# Patient Record
Sex: MALE | Race: WHITE | NOT HISPANIC OR LATINO | Employment: OTHER | URBAN - METROPOLITAN AREA
[De-identification: names, ages, dates, MRNs, and addresses within clinical notes are randomized per-mention and may not be internally consistent; named-entity substitution may affect disease eponyms.]

---

## 2017-01-24 ENCOUNTER — OFFICE VISIT (OUTPATIENT)
Dept: INTERNAL MEDICINE | Facility: CLINIC | Age: 67
End: 2017-01-24
Payer: COMMERCIAL

## 2017-01-24 ENCOUNTER — HOSPITAL ENCOUNTER (OUTPATIENT)
Dept: RADIOLOGY | Facility: HOSPITAL | Age: 67
Discharge: HOME | End: 2017-01-24
Attending: PEDIATRICS
Payer: COMMERCIAL

## 2017-01-24 VITALS
TEMPERATURE: 97 F | WEIGHT: 178.13 LBS | DIASTOLIC BLOOD PRESSURE: 84 MMHG | SYSTOLIC BLOOD PRESSURE: 140 MMHG | HEIGHT: 68 IN | BODY MASS INDEX: 27 KG/M2 | HEART RATE: 80 BPM | OXYGEN SATURATION: 98 %

## 2017-01-24 DIAGNOSIS — N40.0 PROSTATE HYPERTROPHY: ICD-10-CM

## 2017-01-24 DIAGNOSIS — Z96.651 HISTORY OF TOTAL RIGHT KNEE REPLACEMENT: ICD-10-CM

## 2017-01-24 DIAGNOSIS — C44.90 SKIN CANCER: ICD-10-CM

## 2017-01-24 DIAGNOSIS — S22.42XA CLOSED FRACTURE OF MULTIPLE RIBS OF LEFT SIDE, INITIAL ENCOUNTER: ICD-10-CM

## 2017-01-24 DIAGNOSIS — Z00.00 WELL ADULT EXAM: Primary | ICD-10-CM

## 2017-01-24 PROCEDURE — 90732 PPSV23 VACC 2 YRS+ SUBQ/IM: CPT | Mod: S$GLB,,, | Performed by: PEDIATRICS

## 2017-01-24 PROCEDURE — 90471 IMMUNIZATION ADMIN: CPT | Mod: S$GLB,,, | Performed by: PEDIATRICS

## 2017-01-24 PROCEDURE — 90472 IMMUNIZATION ADMIN EACH ADD: CPT | Mod: S$GLB,,, | Performed by: PEDIATRICS

## 2017-01-24 PROCEDURE — 71100 X-RAY EXAM RIBS UNI 2 VIEWS: CPT | Mod: TC,PO

## 2017-01-24 PROCEDURE — 99999 PR PBB SHADOW E&M-NEW PATIENT-LVL IV: CPT | Mod: PBBFAC,,, | Performed by: PEDIATRICS

## 2017-01-24 PROCEDURE — 71100 X-RAY EXAM RIBS UNI 2 VIEWS: CPT | Mod: 26,,, | Performed by: RADIOLOGY

## 2017-01-24 PROCEDURE — 90715 TDAP VACCINE 7 YRS/> IM: CPT | Mod: S$GLB,,, | Performed by: PEDIATRICS

## 2017-01-24 PROCEDURE — 99387 INIT PM E/M NEW PAT 65+ YRS: CPT | Mod: 25,S$GLB,, | Performed by: PEDIATRICS

## 2017-01-24 PROCEDURE — 90662 IIV NO PRSV INCREASED AG IM: CPT | Mod: S$GLB,,, | Performed by: PEDIATRICS

## 2017-01-24 RX ORDER — SILODOSIN 4 MG/1
8 CAPSULE ORAL DAILY
COMMUNITY
End: 2018-06-08

## 2017-01-24 NOTE — PROGRESS NOTES
Subjective:       Patient ID: Mekhi Lambert is a 66 y.o. male.    Chief Complaint: Establish Care    HPI Comments: New patient just retired [employer]  and moved from [place].     PMH/PSH/SH/FH reviewed.  Had robotic prostectomy due to severe BPH last year, TKR, right shoulder arthroscopy, skin cancer(BCC).  FH: father with separate lung, bone, skin, prostate cancers, mother with DM and lupus  SH: nonsmoker, rare Etoh use, exercises daily. B/P and  HR always good.    HMI:  Colonoscopy 2015 every 10 years, shingle vaccine 2016, no pneumococcal, tdap >10 years.    Review of Systems   Constitutional: Negative for fever and unexpected weight change.   HENT: Negative for congestion and rhinorrhea.    Eyes: Negative for discharge and redness.   Respiratory: Negative for cough, shortness of breath and wheezing.    Cardiovascular: Positive for chest pain (rolled left posterior ribs during exercise last week). Negative for palpitations and leg swelling.   Gastrointestinal: Negative for abdominal pain, anal bleeding, blood in stool, constipation, diarrhea and vomiting.   Endocrine: Negative for cold intolerance, heat intolerance, polydipsia, polyphagia and polyuria.   Genitourinary: Negative for decreased urine volume, difficulty urinating and hematuria.        S/p prostectomy   Musculoskeletal: Negative for arthralgias and joint swelling.   Skin: Negative for rash and wound.   Neurological: Negative for syncope and headaches.   Psychiatric/Behavioral: Negative for behavioral problems and sleep disturbance.       Objective:      Physical Exam   Constitutional: He is oriented to person, place, and time. He appears well-developed and well-nourished. No distress.   HENT:   Head: Normocephalic and atraumatic.   Right Ear: Tympanic membrane and external ear normal.   Left Ear: Tympanic membrane and external ear normal.   Nose: Nose normal.   Mouth/Throat: Uvula is midline, oropharynx is clear and moist and mucous  membranes are normal. Normal dentition.   Eyes: Conjunctivae, EOM and lids are normal. Pupils are equal, round, and reactive to light.   Neck: Trachea normal and normal range of motion. Neck supple. No JVD present. No thyromegaly present.   Cardiovascular: Normal rate, regular rhythm, S1 normal, S2 normal, normal heart sounds and normal pulses.  Exam reveals no gallop and no friction rub.    No murmur heard.  Pulmonary/Chest: Effort normal and breath sounds normal. He has no wheezes. He has no rales.   Abdominal: Soft. Normal appearance and bowel sounds are normal. He exhibits no mass. There is no hepatosplenomegaly. There is no tenderness. There is no rebound and no guarding.   Well healed prostectomy robotic scars.   Musculoskeletal: Normal range of motion. He exhibits no edema.   Tender step off left posterior rib about 4th rib.   Lymphadenopathy:     He has no cervical adenopathy.   Neurological: He is alert and oriented to person, place, and time. He has normal strength. Coordination and gait normal.   Skin: Skin is warm and intact. No rash noted.   Psychiatric: He has a normal mood and affect. His speech is normal and behavior is normal. Judgment and thought content normal.       Assessment:       1. Well adult exam    2. Prostate hypertrophy    3. History of total right knee replacement    4. Skin cancer    5. Closed fracture of multiple ribs of left side, initial encounter        Plan:       Well adult exam  -     Comprehensive metabolic panel; Future; Expected date: 1/24/17  -     Lipid panel; Future; Expected date: 1/24/17  -     TSH; Future; Expected date: 1/24/17  -     CBC auto differential; Future; Expected date: 1/24/17    Prostate hypertrophy  -     Ambulatory referral to Urology  -     PSA, Screening; Future; Expected date: 1/24/17    History of total right knee replacement    Skin cancer  -     Ambulatory referral to Dermatology    Closed fracture of multiple ribs of left side, initial encounter  -      X-Ray Ribs 2 View Left; Future; Expected date: 1/24/17    Other orders  -     Tdap Vaccine (Adult)  -     Pneumococcal Polysaccharide Vaccine (23 Valent) (SQ/IM)    flu vaccine. GENET. Monitor B/P for now. Rib belt. F/U 4 weeks to reassess.

## 2017-01-24 NOTE — MR AVS SNAPSHOT
[facility] Internal [facility]  [address] [place] [address]  Phone: [phone]  Fax: [phone]                  Mekhi Lambert   2017 1:00 PM   Office Visit    Description:  Male : 1950   Provider:  DEMETRIO Lopez Jr., MD   Department:  [facility]           Reason for Visit     Establish Care           Diagnoses this Visit        Comments    Well adult exam    -  Primary     Prostate hypertrophy         History of total right knee replacement         Skin cancer         Closed fracture of multiple ribs of left side, initial encounter                To Do List           Future Appointments        Provider Department Dept Phone    2017 7:50 AM LABORATORY, [facility] [phone]    2017 9:00 AM DEMETRIO Lopez Jr., MD [facility] Internal [facility] [phone]    2017 8:20 AM Asad Aburto IV, MD [facility] - Urology [phone]    2017 12:30 PM Zenaida Arguelles MD [facility] Dermatology [phone]      Goals (5 Years of Data)     None      Follow-Up and Disposition     Return in about 4 weeks (around 2017).      [facility]     [facility] -  Assistance  Registered nurses in the [facility] provide clinical advisement, health education, appointment booking, and other advisory services.  Call for this free service at [phone].             Medications           Message regarding Medications     Verify the changes and/or additions to your medication regime listed below are the same as discussed with your clinician today.  If any of these changes or additions are incorrect, please notify your healthcare provider.             Verify that the below list of medications is an accurate representation of the medications you are currently taking.  If none reported, the list may be blank. If incorrect, please contact your healthcare provider. Carry this list with you in case of emergency.     "       Current Medications     silodosin (RAPAFLO) 4 mg Cap capsule Take 4 mg by mouth once daily.           Clinical Reference Information           Vital Signs - Last Recorded  Most recent update: 1/24/2017  1:00 PM by Alison Pathak LPN    BP Pulse Temp Ht    (!) 140/84 (BP Location: Left arm, Patient Position: Sitting, BP Method: Manual) 80 96.6 °F (35.9 °C) (Tympanic) 5' 8" (1.727 m)    Wt SpO2 BMI    80.8 kg (178 lb 2.1 oz) 98% 27.08 kg/m2      Blood Pressure          Most Recent Value    BP  (!)  140/84      Allergies as of 1/24/2017     No Known Allergies      Immunizations Administered on Date of Encounter - 1/24/2017     Name Date Dose VIS Date Route    Pneumococcal Polysaccharide - 23 Valent  Incomplete 0.5 mL 4/24/2015 Intramuscular    TDAP  Incomplete 0.5 mL 2/24/2015 Intramuscular      Orders Placed During Today's Visit      Normal Orders This Visit    Ambulatory referral to Dermatology     Ambulatory referral to Urology     Pneumococcal Polysaccharide Vaccine (23 Valent) (SQ/IM)     Tdap Vaccine (Adult)     Future Labs/Procedures Expected by Expires    CBC auto differential  1/24/2017 3/25/2018    Comprehensive metabolic panel  1/24/2017 3/25/2018    Lipid panel  1/24/2017 1/24/2018    PSA, Screening  1/24/2017 3/25/2018    TSH  1/24/2017 3/25/2018    X-Ray Ribs 2 View Left  1/24/2017 1/24/2018      [facility] Sign-Up     Activating your [facility] account is as easy as 1-2-3!     1) Visit [email or web address], select Sign Up Now, enter this activation code and your date of birth, then select Next.  [email or web address]  Expires: 3/10/2017  1:27 PM      2) Create a username and password to use when you visit [facility] in the future and select a security question in case you lose your password and select Next.    3) Enter your e-mail address and click Sign Up!    Additional Information  If you have questions, please e-mail [email or web address] or call [phone] to talk to our [facility] staff. Remember, " [facility] is NOT to be used for urgent needs. For medical emergencies, dial 911.

## 2017-02-14 ENCOUNTER — LAB VISIT (OUTPATIENT)
Dept: LAB | Facility: HOSPITAL | Age: 67
End: 2017-02-14
Attending: PEDIATRICS
Payer: COMMERCIAL

## 2017-02-14 DIAGNOSIS — N40.0 PROSTATE HYPERTROPHY: ICD-10-CM

## 2017-02-14 DIAGNOSIS — Z00.00 WELL ADULT EXAM: ICD-10-CM

## 2017-02-14 LAB
ALBUMIN SERPL BCP-MCNC: 3.8 G/DL
ALP SERPL-CCNC: 79 U/L
ALT SERPL W/O P-5'-P-CCNC: 18 U/L
ANION GAP SERPL CALC-SCNC: 9 MMOL/L
AST SERPL-CCNC: 20 U/L
BASOPHILS # BLD AUTO: 0.04 K/UL
BASOPHILS NFR BLD: 0.6 %
BILIRUB SERPL-MCNC: 1.2 MG/DL
BUN SERPL-MCNC: 16 MG/DL
CALCIUM SERPL-MCNC: 9.2 MG/DL
CHLORIDE SERPL-SCNC: 107 MMOL/L
CHOLEST/HDLC SERPL: 3.9 {RATIO}
CO2 SERPL-SCNC: 25 MMOL/L
COMPLEXED PSA SERPL-MCNC: 1.2 NG/ML
CREAT SERPL-MCNC: 1 MG/DL
DIFFERENTIAL METHOD: NORMAL
EOSINOPHIL # BLD AUTO: 0.2 K/UL
EOSINOPHIL NFR BLD: 2.7 %
ERYTHROCYTE [DISTWIDTH] IN BLOOD BY AUTOMATED COUNT: 13.6 %
EST. GFR  (AFRICAN AMERICAN): >60 ML/MIN/1.73 M^2
EST. GFR  (NON AFRICAN AMERICAN): >60 ML/MIN/1.73 M^2
GLUCOSE SERPL-MCNC: 90 MG/DL
HCT VFR BLD AUTO: 47.8 %
HDL/CHOLESTEROL RATIO: 25.6 %
HDLC SERPL-MCNC: 234 MG/DL
HDLC SERPL-MCNC: 60 MG/DL
HGB BLD-MCNC: 16.9 G/DL
LDLC SERPL CALC-MCNC: 149.4 MG/DL
LYMPHOCYTES # BLD AUTO: 1.8 K/UL
LYMPHOCYTES NFR BLD: 28.3 %
MCH RBC QN AUTO: 30.9 PG
MCHC RBC AUTO-ENTMCNC: 35.4 %
MCV RBC AUTO: 87 FL
MONOCYTES # BLD AUTO: 0.5 K/UL
MONOCYTES NFR BLD: 7.9 %
NEUTROPHILS # BLD AUTO: 3.8 K/UL
NEUTROPHILS NFR BLD: 60.3 %
NONHDLC SERPL-MCNC: 174 MG/DL
PLATELET # BLD AUTO: 156 K/UL
PMV BLD AUTO: 10.4 FL
POTASSIUM SERPL-SCNC: 4.4 MMOL/L
PROT SERPL-MCNC: 6.5 G/DL
RBC # BLD AUTO: 5.47 M/UL
SODIUM SERPL-SCNC: 141 MMOL/L
TRIGL SERPL-MCNC: 123 MG/DL
TSH SERPL DL<=0.005 MIU/L-ACNC: 1.61 UIU/ML
WBC # BLD AUTO: 6.32 K/UL

## 2017-02-14 PROCEDURE — 36415 COLL VENOUS BLD VENIPUNCTURE: CPT | Mod: PO

## 2017-02-14 PROCEDURE — 84443 ASSAY THYROID STIM HORMONE: CPT

## 2017-02-14 PROCEDURE — 85025 COMPLETE CBC W/AUTO DIFF WBC: CPT

## 2017-02-14 PROCEDURE — 84153 ASSAY OF PSA TOTAL: CPT

## 2017-02-14 PROCEDURE — 80053 COMPREHEN METABOLIC PANEL: CPT

## 2017-02-14 PROCEDURE — 80061 LIPID PANEL: CPT

## 2017-02-20 ENCOUNTER — PATIENT OUTREACH (OUTPATIENT)
Dept: ADMINISTRATIVE | Facility: HOSPITAL | Age: 67
End: 2017-02-20

## 2017-02-20 DIAGNOSIS — Z11.59 NEED FOR HEPATITIS C SCREENING TEST: Primary | ICD-10-CM

## 2017-02-21 ENCOUNTER — OFFICE VISIT (OUTPATIENT)
Dept: INTERNAL MEDICINE | Facility: CLINIC | Age: 67
End: 2017-02-21
Payer: COMMERCIAL

## 2017-02-21 VITALS
DIASTOLIC BLOOD PRESSURE: 80 MMHG | BODY MASS INDEX: 26.09 KG/M2 | SYSTOLIC BLOOD PRESSURE: 118 MMHG | HEART RATE: 65 BPM | OXYGEN SATURATION: 98 % | TEMPERATURE: 97 F | WEIGHT: 176.13 LBS | HEIGHT: 69 IN

## 2017-02-21 DIAGNOSIS — Z13.9 SCREENING: ICD-10-CM

## 2017-02-21 DIAGNOSIS — N40.0 PROSTATE HYPERTROPHY: Primary | ICD-10-CM

## 2017-02-21 DIAGNOSIS — E78.00 HYPERCHOLESTEROLEMIA: ICD-10-CM

## 2017-02-21 PROCEDURE — 99999 PR PBB SHADOW E&M-EST. PATIENT-LVL III: CPT | Mod: PBBFAC,,, | Performed by: PEDIATRICS

## 2017-02-21 PROCEDURE — 1159F MED LIST DOCD IN RCRD: CPT | Mod: S$GLB,,, | Performed by: PEDIATRICS

## 2017-02-21 PROCEDURE — 99214 OFFICE O/P EST MOD 30 MIN: CPT | Mod: S$GLB,,, | Performed by: PEDIATRICS

## 2017-02-21 PROCEDURE — 1157F ADVNC CARE PLAN IN RCRD: CPT | Mod: S$GLB,,, | Performed by: PEDIATRICS

## 2017-02-21 RX ORDER — PRAVASTATIN SODIUM 10 MG/1
10 TABLET ORAL DAILY
Qty: 90 TABLET | Refills: 3 | Status: SHIPPED | OUTPATIENT
Start: 2017-02-21 | End: 2017-05-24

## 2017-02-21 NOTE — MR AVS SNAPSHOT
[facility] Internal Medicine  [address] [place] [address]  Phone: [phone]  Fax: [phone]                  Mekhi Lambert   2017 9:00 AM   Office Visit    Description:  Male : 1950   Provider:  DEMETRIO Lopez Jr., MD   Department:  [facility] Internal Medicine           Reason for Visit     Follow-up           Diagnoses this Visit        Comments    Prostate hypertrophy    -  Primary     Hypercholesterolemia         Screening                To Do List           Future Appointments        Provider Department Dept Phone    2017 8:20 AM LABORATORY, [facility] [phone]    2017 9:40 AM DEMETRIO Lopez Jr., MD [facility] Internal Medicine [phone]    2017 8:20 AM Asad Aburto IV, MD [place] - Urology [phone]    2017 12:30 PM Zenaida Arguelles MD [facility] Dermatology [phone]      Goals (5 Years of Data)     None      Follow-Up and Disposition     Return in about 3 months (around 2017).    Follow-up and Disposition History       These Medications        Disp Refills Start End    pravastatin (PRAVACHOL) 10 MG tablet 90 tablet 3 2017    Take 1 tablet (10 mg total) by mouth once daily. - Oral    Pharmacy: [id number] - [place], [place] - [address].  #: [phone]         [facility]     [facility] Care [facility] -  Assistance  Registered nurses in the [facility] provide clinical advisement, health education, appointment booking, and other advisory services.  Call for this free service at [phone].             Medications           Message regarding Medications     Verify the changes and/or additions to your medication regime listed below are the same as discussed with your clinician today.  If any of these changes or additions are incorrect, please notify your healthcare provider.        START taking these NEW medications        Refills    pravastatin  "(PRAVACHOL) 10 MG tablet 3    Sig: Take 1 tablet (10 mg total) by mouth once daily.    Class: Normal    Route: Oral           Verify that the below list of medications is an accurate representation of the medications you are currently taking.  If none reported, the list may be blank. If incorrect, please contact your healthcare provider. Carry this list with you in case of emergency.           Current Medications     silodosin (RAPAFLO) 4 mg Cap capsule Take 8 mg by mouth once daily.     pravastatin (PRAVACHOL) 10 MG tablet Take 1 tablet (10 mg total) by mouth once daily.           Clinical Reference Information           Your Vitals Were     BP Pulse Temp Height    118/80 (BP Location: Left arm, Patient Position: Sitting, BP Method: Manual) 65 97.3 °F (36.3 °C) (Tympanic) 5' 9.29" (1.76 m)    Weight SpO2 BMI    79.9 kg (176 lb 2.4 oz) 98% 25.79 kg/m2      Blood Pressure          Most Recent Value    BP  118/80      Allergies as of 2/21/2017     No Known Allergies      Immunizations Administered on Date of Encounter - 2/21/2017     None      Orders Placed During Today's Visit     Future Labs/Procedures Expected by Expires    CK  2/21/2017 4/22/2018    Comprehensive metabolic panel  2/21/2017 4/22/2018    Hepatitis C antibody  2/21/2017 4/22/2018    Lipid panel  2/21/2017 2/21/2018      Language Assistance Services     ATTENTION: Language assistance services are available, free of charge. Please call [phone].      ATENCIÓN: Si habla español, tiene a segura disposición servicios gratuitos de asistencia lingüística. Llame al [phone].     CHÚ Ý: N?u b?n nói Ti?ng Vi?t, có các d?ch v? h? tr? ngôn ng? mi?n phí dành cho b?n. G?i s? [phone].         [facility] - Internal Medicine complies with applicable Federal civil rights laws and does not discriminate on the basis of race, color, national origin, age, disability, or sex.        "

## 2017-02-21 NOTE — PROGRESS NOTES
Subjective:       Patient ID: Mekhi Lambert is a 66 y.o. male.    Chief Complaint: Follow-up (4 wk w lab review)    HPI Comments: Labs reviewed with patient, his PSA by his recollection is always about this level. He had trouble with muscle cramps with lipitor. Patient is very aware of lipitor studies. His 10 year risk is 15.9%    Review of Systems   Constitutional: Negative for fever and unexpected weight change.   HENT: Negative for congestion and rhinorrhea.    Eyes: Negative for discharge and redness.   Respiratory: Negative for cough and wheezing.    Cardiovascular: Negative for chest pain, palpitations and leg swelling.   Gastrointestinal: Negative for constipation, diarrhea and vomiting.   Endocrine: Negative for cold intolerance, heat intolerance, polydipsia, polyphagia and polyuria.   Genitourinary: Negative for decreased urine volume and difficulty urinating.   Musculoskeletal: Negative for arthralgias and joint swelling.   Skin: Negative for rash and wound.   Neurological: Negative for syncope and headaches.   Psychiatric/Behavioral: Negative for behavioral problems and sleep disturbance.       Objective:      Physical Exam   Constitutional: He is oriented to person, place, and time. He appears well-developed and well-nourished. No distress.   HENT:   Right Ear: Tympanic membrane normal.   Left Ear: Tympanic membrane normal.   Mouth/Throat: Uvula is midline and mucous membranes are normal. Normal dentition.   Eyes: EOM and lids are normal.   Neck: Trachea normal and normal range of motion. Neck supple. No thyromegaly present.   Cardiovascular: Normal rate, regular rhythm, S1 normal, S2 normal, normal heart sounds and normal pulses.  Exam reveals no gallop and no friction rub.    No murmur heard.  Pulmonary/Chest: Effort normal and breath sounds normal. He has no wheezes. He has no rales.   Abdominal: Soft. Normal appearance and bowel sounds are normal. He exhibits no mass. There is no  hepatosplenomegaly. There is no tenderness. There is no rebound and no guarding.   Musculoskeletal: He exhibits no edema.   Lymphadenopathy:     He has no cervical adenopathy.   Neurological: He is alert and oriented to person, place, and time. He has normal strength. No cranial nerve deficit. Coordination and gait normal.   Skin: Skin is warm and intact. No rash noted.   Psychiatric: He has a normal mood and affect. His speech is normal and behavior is normal. Judgment and thought content normal.       Assessment:       1. Prostate hypertrophy    2. Hypercholesterolemia    3. Screening        Plan:       Prostate hypertrophy    Hypercholesterolemia  -     Lipid panel; Future; Expected date: 2/21/17  -     Comprehensive metabolic panel; Future; Expected date: 2/21/17    Screening  -     Hepatitis C antibody; Future; Expected date: 2/21/17    Other orders  -     pravastatin (PRAVACHOL) 10 MG tablet; Take 1 tablet (10 mg total) by mouth once daily.  Dispense: 90 tablet; Refill: 3    Patient has healthy diet and exercise. We will start low dose pravastatin due to increase CV risk. Try CoQ10 . F/U in 3 months.

## 2017-05-18 ENCOUNTER — OFFICE VISIT (OUTPATIENT)
Dept: OPHTHALMOLOGY | Facility: CLINIC | Age: 67
End: 2017-05-18
Payer: COMMERCIAL

## 2017-05-18 DIAGNOSIS — Z13.5 GLAUCOMA SCREENING: ICD-10-CM

## 2017-05-18 DIAGNOSIS — H52.7 REFRACTIVE ERROR: ICD-10-CM

## 2017-05-18 PROCEDURE — 92015 DETERMINE REFRACTIVE STATE: CPT | Mod: S$GLB,,, | Performed by: OPTOMETRIST

## 2017-05-18 PROCEDURE — 99999 PR PBB SHADOW E&M-EST. PATIENT-LVL II: CPT | Mod: PBBFAC,,, | Performed by: OPTOMETRIST

## 2017-05-18 PROCEDURE — 92004 COMPRE OPH EXAM NEW PT 1/>: CPT | Mod: S$GLB,,, | Performed by: OPTOMETRIST

## 2017-05-18 NOTE — PROGRESS NOTES
HPI     New patient  Screening for glaucoma  RE  Needs updated Rx   Patient has new glasses and is not seeing well   Moved back to the area from [place] and is establishing care       Last edited by Krish Nunn MA on 5/18/2017  9:30 AM.         Assessment /Plan     For exam results, see Encounter Report.    Cataract, nuclear, bilateral    Glaucoma screening    Refractive error      Mild to moderate NS cataracts OU worse in the OS without major complaint = discussed and will follow.  OH OK OU otherwise.  Spec Rx given.  RTC one year.

## 2017-05-18 NOTE — MR AVS SNAPSHOT
[facility] Ophthalmology  [address] [place] [address]  Phone: [phone]  Fax: [phone]                  Mekhi Lambert   2017 9:00 AM   Office Visit    Description:  Male : 1950   Provider:  EMILY Rice, OD   Department:  [facility] - Op[facility]mology           Reason for Visit     Eye Exam           Diagnoses this Visit        Comments    Cataract, nuclear, bilateral    -  Primary     Glaucoma screening         Refractive error                To Do List           Future Appointments        Provider Department Dept Phone    2017 8:20 AM MD Lisette Mena [facility] - Urology [phone]    2017 2:15 PM Zenaida Arguelles MD [facility] Dermatology [phone]    2018 9:45 AM EMILY Rice, CHUCK [facility] Ophthalmology [phone]      Goals (5 Years of Data)     None      Follow-Up and Disposition     Return in about 1 year (around 2018).      [facility] On Call     [facility] On Call Nurse Care Line -  Assistance  Unless otherwise directed by your provider, please contact [facility], our nurse care line that is available for  assistance.     Registered nurses in the [facility] provide: appointment scheduling, clinical advisement, health education, and other advisory services.  Call: [phone] (toll free)               Medications           Message regarding Medications     Verify the changes and/or additions to your medication regime listed below are the same as discussed with your clinician today.  If any of these changes or additions are incorrect, please notify your healthcare provider.             Verify that the below list of medications is an accurate representation of the medications you are currently taking.  If none reported, the list may be blank. If incorrect, please contact your healthcare provider. Carry this list with you in case of emergency.           Current Medications     silodosin (RAPAFLO) 4 mg Cap capsule Take 8 mg by  mouth once daily.     pravastatin (PRAVACHOL) 10 MG tablet Take 1 tablet (10 mg total) by mouth once daily.           Clinical Reference Information           Allergies as of 5/18/2017     No Known Allergies      Immunizations Administered on Date of Encounter - 5/18/2017     None      Language Assistance Services     ATTENTION: Language assistance services are available, free of charge. Please call [phone].      ATENCIÓN: Si habla jaquan, tiene a segura disposición servicios gratuitos de asistencia lingüística. Llame al [phone].     CHÚ Ý: N?u b?n nói Ti?ng Vi?t, có các d?ch v? h? tr? ngôn ng? mi?n phí dành cho b?n. G?i s? [phone].         [facility] - Ophthalmology complies with applicable Federal civil rights laws and does not discriminate on the basis of race, color, national origin, age, disability, or sex.

## 2017-05-24 ENCOUNTER — OFFICE VISIT (OUTPATIENT)
Dept: UROLOGY | Facility: CLINIC | Age: 67
End: 2017-05-24
Payer: COMMERCIAL

## 2017-05-24 VITALS
HEART RATE: 66 BPM | SYSTOLIC BLOOD PRESSURE: 119 MMHG | DIASTOLIC BLOOD PRESSURE: 78 MMHG | BODY MASS INDEX: 25.5 KG/M2 | WEIGHT: 174.19 LBS

## 2017-05-24 DIAGNOSIS — K40.90 UNILATERAL INGUINAL HERNIA WITHOUT OBSTRUCTION OR GANGRENE, RECURRENCE NOT SPECIFIED: ICD-10-CM

## 2017-05-24 DIAGNOSIS — J30.2 SEASONAL ALLERGIC RHINITIS, UNSPECIFIED ALLERGIC RHINITIS TRIGGER: ICD-10-CM

## 2017-05-24 DIAGNOSIS — N40.1 BPH NOS W UR OBS/LUTS: Primary | ICD-10-CM

## 2017-05-24 LAB
BILIRUB SERPL-MCNC: NORMAL MG/DL
BLOOD URINE, POC: NORMAL
COLOR, POC UA: NORMAL
GLUCOSE UR QL STRIP: NORMAL
KETONES UR QL STRIP: NORMAL
LEUKOCYTE ESTERASE URINE, POC: NORMAL
NITRITE, POC UA: NORMAL
PH, POC UA: 6
PROTEIN, POC: NORMAL
SPECIFIC GRAVITY, POC UA: 1.01
UROBILINOGEN, POC UA: NORMAL

## 2017-05-24 PROCEDURE — 99999 PR PBB SHADOW E&M-EST. PATIENT-LVL II: CPT | Mod: PBBFAC,,, | Performed by: UROLOGY

## 2017-05-24 PROCEDURE — 1126F AMNT PAIN NOTED NONE PRSNT: CPT | Mod: S$GLB,,, | Performed by: UROLOGY

## 2017-05-24 PROCEDURE — 1160F RVW MEDS BY RX/DR IN RCRD: CPT | Mod: S$GLB,,, | Performed by: UROLOGY

## 2017-05-24 PROCEDURE — 99204 OFFICE O/P NEW MOD 45 MIN: CPT | Mod: 25,S$GLB,, | Performed by: UROLOGY

## 2017-05-24 PROCEDURE — 1159F MED LIST DOCD IN RCRD: CPT | Mod: S$GLB,,, | Performed by: UROLOGY

## 2017-05-24 PROCEDURE — 81002 URINALYSIS NONAUTO W/O SCOPE: CPT | Mod: S$GLB,,, | Performed by: UROLOGY

## 2017-05-24 NOTE — PROGRESS NOTES
Chief Complaint: BPH    HPI:   5/24/17: 67 yo man in last two years had a TURP followed by a RA Simple Prostatectomy.  No LUTS at all.  No abd/pelvic pain and no exac/rel factors.  No hematuria.  No urolithiasis.  No urinary bother.  Normal sexual function.  Takes rapaflo.    Allergies:  Review of patient's allergies indicates no known allergies.    Medications:  has a current medication list which includes the following prescription(s): silodosin.    Review of Systems:  General: No fever, chills, fatigability, or weight loss.  Skin: No rashes, itching, or changes in color or texture of skin.  Chest: Denies LILLY, cyanosis, wheezing, cough, and sputum production.  Abdomen: Appetite fine. No weight loss. Denies diarrhea, abdominal pain, hematemesis, or blood in stool.  Musculoskeletal: No joint stiffness or swelling. Denies back pain.  : As above.  All other review of systems negative.    PMH:   has no past medical history on file.    PSH:   has a past surgical history that includes Shoulder arthroscopy (Right); Knee surgery; Total knee arthroplasty; and simple prostatectomy (06/06/2016).    FamHx: family history includes Cancer in his father; Diabetes in his maternal aunt and mother; Heart disease in his mother.    SocHx:  reports that he has never smoked. He does not have any smokeless tobacco history on file. He reports that he drinks about 1.2 oz of alcohol per week . He reports that he does not use drugs.      Physical Exam:  Vitals:    05/24/17 0830   BP: 119/78   Pulse: 66     General: A&Ox3, no apparent distress, no deformities  Neck: No masses, normal thyroid  Lungs: normal inspiration, no use of accessory muscles  Heart: normal pulse, no arrhythmias  Abdomen: Soft, NT, ND, no masses,  no hepatosplenomegaly, right IH  Lymphatic: Neck and groin nodes negative  Skin: The skin is warm and dry. No jaundice.  Ext: No c/c/e.  : Test desc ofe, no abnormalities of epididymus. Penis normal, with normal penile and  scrotal skin. Meatus normal. Normal rectal tone, no hemorrhoids. Prost 40 gm no nodules or masses appreciated. SV not palpable. Perineum and anus normal.    Labs/Studies:   Urinalysis performed in clinic, summary: UA normal  PSA    2/17: 1.2    Impression/Plan:   1. Stop rapaflo.  PSA/RTC 1 year.  2. Discussed allergies (no benadryl)  3. Discussed right IH (cons mgmt)

## 2017-05-24 NOTE — LETTER
May 24, 2017      DEMETRIO Lopez Jr., MD  [address] [address]           [facility]  [address] [place] [address]  Phone: [phone]  Fax: [phone]          Patient: Mekhi Lambert   MR Number: [id number]   YOB: 1950   Date of Visit: 5/24/2017       Dear Dr. DEMETRIO Lopez Jr.:    Thank you for referring Mekhi Lambert to me for evaluation. Attached you will find relevant portions of my assessment and plan of care.    If you have questions, please do not hesitate to call me. I look forward to following Mekhi Lambert along with you.    Sincerely,    Asad Aburto IV, MD    Enclosure  CC:  No Recipients    If you would like to receive this communication electronically, please contact [email or web address] or [phone] to request more information on [facility] access.    For providers and/or their staff who would like to refer a patient to [facility], please contact us through our one-stop-shop provider referral line, [facility], at [phone].    If you feel you have received this communication in error or would no longer like to receive these types of communications, please e-mail [email or web address]

## 2018-05-14 ENCOUNTER — OFFICE VISIT (OUTPATIENT)
Dept: DERMATOLOGY | Facility: CLINIC | Age: 68
End: 2018-05-14
Payer: MEDICARE

## 2018-05-14 DIAGNOSIS — Z85.828 HISTORY OF SKIN CANCER: ICD-10-CM

## 2018-05-14 DIAGNOSIS — L90.5 SCAR CONDITIONS/SKIN FIBROSIS: Primary | ICD-10-CM

## 2018-05-14 DIAGNOSIS — Z12.83 SCREENING, MALIGNANT NEOPLASM, SKIN: ICD-10-CM

## 2018-05-14 DIAGNOSIS — D18.00 ANGIOMA: ICD-10-CM

## 2018-05-14 DIAGNOSIS — L82.1 SEBORRHEIC KERATOSIS: ICD-10-CM

## 2018-05-14 PROCEDURE — 99212 OFFICE O/P EST SF 10 MIN: CPT | Mod: PBBFAC,PO | Performed by: DERMATOLOGY

## 2018-05-14 PROCEDURE — 99203 OFFICE O/P NEW LOW 30 MIN: CPT | Mod: S$PBB,,, | Performed by: DERMATOLOGY

## 2018-05-14 PROCEDURE — 99999 PR PBB SHADOW E&M-EST. PATIENT-LVL II: CPT | Mod: PBBFAC,,, | Performed by: DERMATOLOGY

## 2018-05-14 NOTE — PROGRESS NOTES
Subjective:       Patient ID:  Mekhi Lambert is a 67 y.o. male who presents for   Chief Complaint   Patient presents with    Spot     Patient has a hx of skin cancer to the head 1.5 years and right side back 3 years. Patient is concerned about new spots that he noticed.     Hx of NMSC of the right ear and right shoulder, here to establish care. Father c/ hx of skin CA.     History of Present Illness: The patient presents with chief complaint of lesion.  Location: left forearm  Duration: several months  Signs/Symptoms: dark    Prior treatments: none              Review of Systems   Constitutional: Negative for fever and chills.   Gastrointestinal: Negative for nausea and vomiting.   Skin: Positive for activity-related sunscreen use. Negative for daily sunscreen use and recent sunburn.   Hematologic/Lymphatic: Does not bruise/bleed easily.        Objective:    Physical Exam   Constitutional: He appears well-developed and well-nourished. No distress.   Neurological: He is alert and oriented to person, place, and time. He is not disoriented.   Psychiatric: He has a normal mood and affect.   Skin:   Areas Examined (abnormalities noted in diagram):   Scalp / Hair Palpated and Inspected  Head / Face Inspection Performed  Neck Inspection Performed  Chest / Axilla Inspection Performed  Abdomen Inspection Performed  Genitals / Buttocks / Groin Inspection Performed  Back Inspection Performed  RUE Inspected  LUE Inspection Performed  RLE Inspected  LLE Inspection Performed  Nails and Digits Inspection Performed                   Diagram Legend     Erythematous scaling macule/papule c/w actinic keratosis       Vascular papule c/w angioma      Pigmented verrucoid papule/plaque c/w seborrheic keratosis      Yellow umbilicated papule c/w sebaceous hyperplasia      Irregularly shaped tan macule c/w lentigo     1-2 mm smooth white papules consistent with Milia      Movable subcutaneous cyst with punctum c/w epidermal  inclusion cyst      Subcutaneous movable cyst c/w pilar cyst      Firm pink to brown papule c/w dermatofibroma      Pedunculated fleshy papule(s) c/w skin tag(s)      Evenly pigmented macule c/w junctional nevus     Mildly variegated pigmented, slightly irregular-bordered macule c/w mildly atypical nevus      Flesh colored to evenly pigmented papule c/w intradermal nevus       Pink pearly papule/plaque c/w basal cell carcinoma      Erythematous hyperkeratotic cursted plaque c/w SCC      Surgical scar with no sign of skin cancer recurrence      Open and closed comedones      Inflammatory papules and pustules      Verrucoid papule consistent consistent with wart     Erythematous eczematous patches and plaques     Dystrophic onycholytic nail with subungual debris c/w onychomycosis     Umbilicated papule    Erythematous-base heme-crusted tan verrucoid plaque consistent with inflamed seborrheic keratosis     Erythematous Silvery Scaling Plaque c/w Psoriasis     See annotation      Assessment / Plan:        Scar conditions/skin fibrosis  Screening, malignant neoplasm, skin  History of skin cancer  Scar of the right upper back, hx of NMSC.  No evidence of recurrence on physical exam today.  Continue routine skin surveillance. Daily sunscreen advised.    Seborrheic keratosis  Reassurance given. Discussed diagnosis and that lesions are benign.  AAD handout given.     Angioma  Reassurance given.  Lesions are benign.             Follow-up in about 1 year (around 5/14/2019).

## 2018-05-14 NOTE — PATIENT INSTRUCTIONS
Preventing Skin Cancer  Relaxing in the sun may feel good. But it isnt good for your skin. In fact, being exposed to the suns harmful rays is a major cause of skin cancer. This is a serious disease that can be life-threatening. People of all ages and backgrounds are at risk. But in most cases, skin cancer can be prevented.    Your Role in Prevention  You can act today to help prevent skin cancer. Start by avoiding the suns UV (ultraviolet) rays. And dont use tanning beds, which are no safer than the sun. Taking these steps can help keep you from getting skin cancer. It can also help prevent wrinkles and other sun-induced aging effects. Make sure your children also follow these safeguards. Now is the time to start taking preventive steps against skin cancer.  When You Are Outdoors  Protect your skin when you go outdoors during the day. Take precautions whenever you go out to eat, run errands by car or on foot, or do any outdoor activity. There isnt just one easy way to protect your skin. Its best to follow all of these steps:  · Wear tightly woven clothing that covers your skin. Put on a wide-brimmed hat to protect your face, ears, and scalp.  · Watch the clock. Try to avoid the sun between 10 a.m. and 4 p.m., when it is strongest.  · Head for the shade or create your own. Use an umbrella when sitting or strolling.  · Know that the suns rays can reflect off sand, water, and snow. This can harm your skin. Take extra care when you are near reflective surfaces.  · Keep in mind that even when the weather is hazy or cloudy, your skin can be exposed to strong UV rays.  · Shield your skin with sunscreen. Also, apply sunscreen to your childrens skin.  Tips for Using Sunscreen  To help prevent skin cancer, choose the right sunscreen and use it correctly. Try the following tips:  · Choose a sunscreen that has a sun protection factor (SPF) of at least 15. For the best protection, an SPF of at least 30 is preferred.  Also, choose a sunscreen labeled broad spectrum. This will shield you from both UVA and UVB (ultraviolet A and B) rays.  · If one brand irritates your skin, try another, particularly ones without fragrance.  · Use a water-resistant sunscreen if swimming or sweating.  · Reapply sunscreen every 2 hours. If youre active, do this more often.  · Cover any sun-exposed skin, from your face to your feet. Dont forget your ears and your lips.  · Know that while sunscreen helps protect you, it isnt enough. You should also wear protective clothing. And try to stay out of the sun as much as you can, especially from 10 a.m. to 4 p.m.  © 4939-9775 [employer]. [address] [place] [address], [place] [address]. All rights reserved. This information is not intended as a substitute for professional medical care. Always follow your healthcare professional's instructions.

## 2018-06-08 ENCOUNTER — OFFICE VISIT (OUTPATIENT)
Dept: INTERNAL MEDICINE | Facility: CLINIC | Age: 68
End: 2018-06-08
Payer: MEDICARE

## 2018-06-08 ENCOUNTER — HOSPITAL ENCOUNTER (OUTPATIENT)
Dept: RADIOLOGY | Facility: HOSPITAL | Age: 68
Discharge: HOME | End: 2018-06-08
Attending: PEDIATRICS
Payer: MEDICARE

## 2018-06-08 ENCOUNTER — TELEPHONE (OUTPATIENT)
Dept: ORTHOPEDICS | Facility: CLINIC | Age: 68
End: 2018-06-08

## 2018-06-08 VITALS
HEART RATE: 63 BPM | OXYGEN SATURATION: 95 % | HEIGHT: 69 IN | BODY MASS INDEX: 25.37 KG/M2 | TEMPERATURE: 97 F | SYSTOLIC BLOOD PRESSURE: 124 MMHG | DIASTOLIC BLOOD PRESSURE: 72 MMHG | WEIGHT: 171.31 LBS

## 2018-06-08 DIAGNOSIS — M19.90 INFLAMMATORY ARTHRITIS: ICD-10-CM

## 2018-06-08 DIAGNOSIS — Z11.59 ENCOUNTER FOR HEPATITIS C SCREENING TEST FOR LOW RISK PATIENT: ICD-10-CM

## 2018-06-08 DIAGNOSIS — M15.9 PRIMARY OSTEOARTHRITIS INVOLVING MULTIPLE JOINTS: ICD-10-CM

## 2018-06-08 DIAGNOSIS — E78.00 HYPERCHOLESTEROLEMIA: Primary | ICD-10-CM

## 2018-06-08 PROCEDURE — 99213 OFFICE O/P EST LOW 20 MIN: CPT | Mod: PBBFAC,PO,25 | Performed by: PEDIATRICS

## 2018-06-08 PROCEDURE — 73130 X-RAY EXAM OF HAND: CPT | Mod: 50,TC,FY,PO

## 2018-06-08 PROCEDURE — 73130 X-RAY EXAM OF HAND: CPT | Mod: 26,XS,LT, | Performed by: RADIOLOGY

## 2018-06-08 PROCEDURE — 99999 PR PBB SHADOW E&M-EST. PATIENT-LVL III: CPT | Mod: PBBFAC,,, | Performed by: PEDIATRICS

## 2018-06-08 PROCEDURE — 99214 OFFICE O/P EST MOD 30 MIN: CPT | Mod: S$PBB,,, | Performed by: PEDIATRICS

## 2018-06-08 PROCEDURE — 73130 X-RAY EXAM OF HAND: CPT | Mod: 26,RT,, | Performed by: RADIOLOGY

## 2018-06-08 RX ORDER — METHOCARBAMOL 500 MG/1
500 TABLET, FILM COATED ORAL 3 TIMES DAILY PRN
Qty: 30 TABLET | Refills: 3 | Status: SHIPPED | OUTPATIENT
Start: 2018-06-08 | End: 2018-06-18

## 2018-06-08 NOTE — PROGRESS NOTES
Subjective:       Patient ID: Mekhi Lambert is a 67 y.o. male.    Chief Complaint: Muscle Pain    He has concerns about multiple complaints of arthritic pain. Sees Dr Bella. He has had 3 surgeries on knees and right shoulder microfracture surgery 3 years ago. He gets periodic cortisone injections. He has trouble with muscle cramps and achiness over most of his body. He has athletic training degree and has been following a scheduled exercise program. He wants to know if any other options beside wear and tear arthritis. He uses glucosamine and tumeric and either aleve or ibuprofen. Mother with lupus      Muscle Pain   Associated symptoms include arthralgias, myalgias and neck pain. Pertinent negatives include no chest pain, congestion, coughing, fever, headaches, joint swelling, rash or vomiting.     Review of Systems   Constitutional: Negative for fever and unexpected weight change.   HENT: Negative for congestion and rhinorrhea.    Eyes: Negative for discharge and redness.   Respiratory: Negative for cough and wheezing.    Cardiovascular: Negative for chest pain, palpitations and leg swelling.   Gastrointestinal: Negative for constipation, diarrhea and vomiting.   Genitourinary: Negative for decreased urine volume and difficulty urinating.   Musculoskeletal: Positive for arthralgias, back pain, myalgias, neck pain and neck stiffness. Negative for joint swelling.   Skin: Negative for rash and wound.   Neurological: Negative for syncope and headaches.   Psychiatric/Behavioral: Negative for behavioral problems and sleep disturbance.       Objective:      Physical Exam   Constitutional: He is oriented to person, place, and time. He appears well-developed and well-nourished. No distress.   Neck: No JVD present. No thyromegaly present.   Cardiovascular: Normal rate, regular rhythm and normal heart sounds.    No murmur heard.  Pulmonary/Chest: Effort normal and breath sounds normal. No respiratory distress. He has no  wheezes. He has no rales.   Abdominal: Soft. He exhibits no distension and no mass. There is no tenderness. There is no guarding.   Musculoskeletal: He exhibits no edema.   Good rom of joints but does has osteoarthritic changes.    Neurological: He is alert and oriented to person, place, and time. No cranial nerve deficit. Coordination normal.   Skin: Capillary refill takes less than 2 seconds. Rash (mild dry patches on elbow c/w mild psoriasis) noted.   Psychiatric: He has a normal mood and affect. His behavior is normal. Judgment and thought content normal.       Assessment:       1. Hypercholesterolemia    2. Primary osteoarthritis involving multiple joints    3. Inflammatory arthritis    4. Encounter for hepatitis C screening test for low risk patient        Plan:       Hypercholesterolemia  -     Comprehensive metabolic panel; Future; Expected date: 06/08/2018  -     Lipid panel; Future; Expected date: 06/08/2018    Primary osteoarthritis involving multiple joints  -     methocarbamol (ROBAXIN) 500 MG Tab; Take 1 tablet (500 mg total) by mouth 3 (three) times daily as needed.  Dispense: 30 tablet; Refill: 3  -     Ambulatory referral to Orthopedics  -     CK; Future; Expected date: 06/08/2018    Inflammatory arthritis  -     Sedimentation rate; Future; Expected date: 06/08/2018  -     Rheumatoid factor; Future; Expected date: 06/08/2018  -     Comprehensive metabolic panel; Future; Expected date: 06/08/2018  -     PHOSPHORUS; Future; Expected date: 06/08/2018  -     Magnesium; Future; Expected date: 06/08/2018  -     X-Ray Hand 3 View Bilateral; Future; Expected date: 06/08/2018  -     NOLVIA; Future; Expected date: 06/08/2018    Encounter for hepatitis C screening test for low risk patient  -     Hepatitis C antibody; Future; Expected date: 06/08/2018    Screening labs for inflammatory arthritis in case these complaints are not osteoarthritis. He meets with Dr Jena Holbrook and if he wants second opinion will  set up with our ortho. We discussed statin therapy for lipids, he absolutely refuses. Will screen yearly.

## 2018-06-08 NOTE — TELEPHONE ENCOUNTER
Called pt regarding about a referral that was sent to us from his PCP. He state he would be out of town and would like to schedule after his vacation. He will be seeing Dr. Urbina on Friday July 6th.

## 2018-06-26 DIAGNOSIS — M25.511 RIGHT SHOULDER PAIN, UNSPECIFIED CHRONICITY: Primary | ICD-10-CM

## 2018-09-19 ENCOUNTER — OFFICE VISIT (OUTPATIENT)
Dept: INTERNAL MEDICINE | Facility: CLINIC | Age: 68
End: 2018-09-19
Payer: MEDICARE

## 2018-09-19 VITALS
HEART RATE: 64 BPM | HEIGHT: 69 IN | TEMPERATURE: 98 F | BODY MASS INDEX: 25.11 KG/M2 | WEIGHT: 169.56 LBS | OXYGEN SATURATION: 98 % | SYSTOLIC BLOOD PRESSURE: 112 MMHG | DIASTOLIC BLOOD PRESSURE: 78 MMHG

## 2018-09-19 DIAGNOSIS — F43.0 STRESS REACTION: Primary | ICD-10-CM

## 2018-09-19 PROCEDURE — 99213 OFFICE O/P EST LOW 20 MIN: CPT | Mod: PBBFAC,PO | Performed by: PEDIATRICS

## 2018-09-19 PROCEDURE — 99214 OFFICE O/P EST MOD 30 MIN: CPT | Mod: S$PBB,,, | Performed by: PEDIATRICS

## 2018-09-19 PROCEDURE — 99999 PR PBB SHADOW E&M-EST. PATIENT-LVL III: CPT | Mod: PBBFAC,,, | Performed by: PEDIATRICS

## 2018-09-19 RX ORDER — ESCITALOPRAM OXALATE 10 MG/1
10 TABLET ORAL DAILY
Qty: 30 TABLET | Refills: 11 | Status: SHIPPED | OUTPATIENT
Start: 2018-09-19 | End: 2018-10-04 | Stop reason: SINTOL

## 2018-09-19 NOTE — PROGRESS NOTES
Subjective:       Patient ID: Mekhi Lambert is a 68 y.o. male.    Chief Complaint: Stress    He has semilong standing trouble with life changes, half-way, etc. Has trouble with stress, emotional lability, feeling dread. Was sleeping poorly, now ok. No HI/SI, no auditory/visual hallucinations- father and PGF were paranoid schizophrenic at end of life. Father was alcoholic. Patient drinks 1-2 glasses wine a week.      Review of Systems   Constitutional: Negative for fever and unexpected weight change.   HENT: Negative for congestion and rhinorrhea.    Eyes: Negative for discharge and redness.   Respiratory: Negative for cough and wheezing.    Cardiovascular: Negative for chest pain, palpitations and leg swelling.   Gastrointestinal: Negative for constipation, diarrhea and vomiting.   Genitourinary: Negative for decreased urine volume and difficulty urinating.   Musculoskeletal: Positive for arthralgias. Negative for joint swelling.   Skin: Negative for rash and wound.   Neurological: Negative for syncope and headaches.   Psychiatric/Behavioral: Positive for dysphoric mood and sleep disturbance. Negative for agitation, behavioral problems, confusion, decreased concentration, hallucinations, self-injury and suicidal ideas. The patient is nervous/anxious. The patient is not hyperactive.        Objective:      Physical Exam   Constitutional: He is oriented to person, place, and time. He appears well-developed and well-nourished. No distress.   Neck: No JVD present. No thyromegaly present.   Cardiovascular: Normal rate, regular rhythm and normal heart sounds.   No murmur heard.  Pulmonary/Chest: Effort normal and breath sounds normal. No respiratory distress. He has no wheezes. He has no rales.   Abdominal: Soft. He exhibits no distension and no mass. There is no tenderness. There is no guarding.   Musculoskeletal: He exhibits no edema.   Lymphadenopathy:     He has no cervical adenopathy.   Neurological: He is  alert and oriented to person, place, and time. No cranial nerve deficit. Coordination normal.   Skin: Capillary refill takes less than 2 seconds. No rash noted.   Psychiatric: He has a normal mood and affect. His behavior is normal. Judgment and thought content normal.       Assessment:       1. Stress reaction        Plan:       Stress reaction  -     escitalopram oxalate (LEXAPRO) 10 MG tablet; Take 1 tablet (10 mg total) by mouth once daily.  Dispense: 30 tablet; Refill: 11    FDA warnings d/w patient. Expect 2-6 weeks improvement. F/U 4-6 weeks.

## 2018-09-24 ENCOUNTER — TELEPHONE (OUTPATIENT)
Dept: UROLOGY | Facility: CLINIC | Age: 68
End: 2018-09-24

## 2018-09-24 NOTE — TELEPHONE ENCOUNTER
Patient states he is out of town and having difficulty urinating. Has a history of TURP and RA simple prostatectomy per Dr. Aburto's last note on 5/24/17. He was on Rapaflo 4mg BID for two years following prostate sx but it was discontinued by Dr. Aburto at last office visit in 2017. Scheduled first available follow up with MD to discuss and instructed patient to go to ER if he becomes unable to urinate or begins to have bladder pain/pressure. Patient states understanding but is requesting refill for Rapaflo to help facilitate urination. . . Please advise.

## 2018-09-24 NOTE — TELEPHONE ENCOUNTER
Called in Rapaflo 4mg BID #30 with 1 refill per Dr. Ulloa to [facility] in [place], [place] per patient request.

## 2018-10-04 ENCOUNTER — PATIENT MESSAGE (OUTPATIENT)
Dept: INTERNAL MEDICINE | Facility: CLINIC | Age: 68
End: 2018-10-04

## 2018-10-04 RX ORDER — BUPROPION HYDROCHLORIDE 150 MG/1
150 TABLET ORAL DAILY
Qty: 30 TABLET | Refills: 11 | Status: SHIPPED | OUTPATIENT
Start: 2018-10-04 | End: 2018-11-05

## 2018-10-07 ENCOUNTER — PATIENT MESSAGE (OUTPATIENT)
Dept: INTERNAL MEDICINE | Facility: CLINIC | Age: 68
End: 2018-10-07

## 2018-10-09 ENCOUNTER — PATIENT MESSAGE (OUTPATIENT)
Dept: INTERNAL MEDICINE | Facility: CLINIC | Age: 68
End: 2018-10-09

## 2018-10-09 RX ORDER — SULFAMETHOXAZOLE AND TRIMETHOPRIM 800; 160 MG/1; MG/1
1 TABLET ORAL 2 TIMES DAILY
Qty: 60 TABLET | Refills: 0 | Status: SHIPPED | OUTPATIENT
Start: 2018-10-09 | End: 2018-11-05

## 2018-10-15 ENCOUNTER — LAB VISIT (OUTPATIENT)
Dept: LAB | Facility: HOSPITAL | Age: 68
End: 2018-10-15
Attending: PEDIATRICS
Payer: MEDICARE

## 2018-10-15 ENCOUNTER — OFFICE VISIT (OUTPATIENT)
Dept: OPHTHALMOLOGY | Facility: CLINIC | Age: 68
End: 2018-10-15
Payer: MEDICARE

## 2018-10-15 ENCOUNTER — OFFICE VISIT (OUTPATIENT)
Dept: INTERNAL MEDICINE | Facility: CLINIC | Age: 68
End: 2018-10-15
Payer: MEDICARE

## 2018-10-15 VITALS
TEMPERATURE: 96 F | OXYGEN SATURATION: 99 % | HEIGHT: 69 IN | SYSTOLIC BLOOD PRESSURE: 110 MMHG | BODY MASS INDEX: 24.98 KG/M2 | DIASTOLIC BLOOD PRESSURE: 68 MMHG | HEART RATE: 65 BPM | WEIGHT: 168.63 LBS

## 2018-10-15 DIAGNOSIS — N40.0 PROSTATE HYPERTROPHY: Primary | ICD-10-CM

## 2018-10-15 DIAGNOSIS — H52.203 MYOPIC ASTIGMATISM OF BOTH EYES: ICD-10-CM

## 2018-10-15 DIAGNOSIS — H52.13 MYOPIC ASTIGMATISM OF BOTH EYES: ICD-10-CM

## 2018-10-15 DIAGNOSIS — H25.13 CATARACT, NUCLEAR SCLEROTIC SENILE, BILATERAL: Primary | ICD-10-CM

## 2018-10-15 DIAGNOSIS — Z13.5 GLAUCOMA SCREENING: ICD-10-CM

## 2018-10-15 DIAGNOSIS — H52.4 PRESBYOPIA: ICD-10-CM

## 2018-10-15 DIAGNOSIS — N40.0 PROSTATE HYPERTROPHY: ICD-10-CM

## 2018-10-15 DIAGNOSIS — H25.013 CATARACT CORTICAL, SENILE, BILATERAL: ICD-10-CM

## 2018-10-15 LAB — COMPLEXED PSA SERPL-MCNC: 1.8 NG/ML

## 2018-10-15 PROCEDURE — 99212 OFFICE O/P EST SF 10 MIN: CPT | Mod: PBBFAC,PO | Performed by: OPTOMETRIST

## 2018-10-15 PROCEDURE — 99213 OFFICE O/P EST LOW 20 MIN: CPT | Mod: PBBFAC,27,PO | Performed by: PEDIATRICS

## 2018-10-15 PROCEDURE — 92015 DETERMINE REFRACTIVE STATE: CPT | Mod: ,,, | Performed by: OPTOMETRIST

## 2018-10-15 PROCEDURE — 36415 COLL VENOUS BLD VENIPUNCTURE: CPT | Mod: PO

## 2018-10-15 PROCEDURE — 99213 OFFICE O/P EST LOW 20 MIN: CPT | Mod: S$PBB,,, | Performed by: PEDIATRICS

## 2018-10-15 PROCEDURE — 92014 COMPRE OPH EXAM EST PT 1/>: CPT | Mod: S$PBB,,, | Performed by: OPTOMETRIST

## 2018-10-15 PROCEDURE — 99999 PR PBB SHADOW E&M-EST. PATIENT-LVL III: CPT | Mod: PBBFAC,,, | Performed by: PEDIATRICS

## 2018-10-15 PROCEDURE — 84153 ASSAY OF PSA TOTAL: CPT

## 2018-10-15 PROCEDURE — 99999 PR PBB SHADOW E&M-EST. PATIENT-LVL II: CPT | Mod: PBBFAC,,, | Performed by: OPTOMETRIST

## 2018-10-15 RX ORDER — SILODOSIN 8 MG/1
CAPSULE ORAL
COMMUNITY
Start: 2018-09-24 | End: 2018-11-05

## 2018-10-15 NOTE — PROGRESS NOTES
HPI     Last [facility] exam 05/18/2017  Cataract, nuclear, bilateral  Screening for glaucoma  RE  No visual complaints  Interested in CLs   Patient has worn Mono Lenses in the past       Last edited by Krish Nunn MA on 10/15/2018  9:47 AM. (History)            Assessment /Plan     For exam results, see Encounter Report.    Cataract, nuclear sclerotic senile, bilateral    Cataract cortical, senile, bilateral    Glaucoma screening    Myopic astigmatism of both eyes    Presbyopia      Mild to moderate NS/cortical cataracts OU (OS>OD).  This makes him a poor candidate for monovision SCL = discussed and will decide.  RTC CL fit without dilation prn.  OH OK OU otherwise.  Spec Rx given.  RTC one year routine.

## 2018-10-15 NOTE — PROGRESS NOTES
Subjective:       Patient ID: Mekhi Lambert is a 68 y.o. male.    Chief Complaint: Follow-up    W/i 5 days of starting lexapro he developed obstipation(not hard stools) and urinary retention. Worsened over weekend 10 days ago. Stopped lexapro(has not yet started wellbutrin) tried few doses of repaflo and saw palmetto which has gotten him through. I called in bactrim also. Today his urination is much improved. He has f/u with urology next month. Hx of his prostate reviewed.      Review of Systems   Constitutional: Negative for fever and unexpected weight change.   HENT: Negative for congestion and rhinorrhea.    Eyes: Negative for discharge and redness.   Respiratory: Negative for cough and wheezing.    Cardiovascular: Negative for chest pain, palpitations and leg swelling.   Gastrointestinal: Negative for constipation, diarrhea and vomiting.   Genitourinary: Positive for difficulty urinating and urgency. Negative for decreased urine volume, discharge, dysuria, frequency, hematuria and penile pain.   Musculoskeletal: Negative for arthralgias and joint swelling.   Skin: Negative for rash and wound.   Neurological: Negative for syncope and headaches.   Psychiatric/Behavioral: Negative for behavioral problems and sleep disturbance.       Objective:      Physical Exam   Constitutional: He is oriented to person, place, and time. He appears well-developed and well-nourished. No distress.   Neck: No JVD present. No thyromegaly present.   Cardiovascular: Normal rate, regular rhythm and normal heart sounds.   No murmur heard.  Pulmonary/Chest: Effort normal and breath sounds normal. No respiratory distress. He has no wheezes. He has no rales.   Abdominal: Soft. He exhibits no distension and no mass. There is no tenderness. There is no guarding.   Bladder not palpated   Musculoskeletal: He exhibits no edema.   Lymphadenopathy:     He has no cervical adenopathy.   Neurological: He is alert and oriented to person, place,  and time. No cranial nerve deficit. Coordination normal.   Skin: Capillary refill takes less than 2 seconds. No rash noted.   Psychiatric: He has a normal mood and affect. His behavior is normal. Judgment and thought content normal.       Assessment:       1. Prostate hypertrophy        Plan:       Prostate hypertrophy  -     Urinalysis; Future; Expected date: 10/15/2018  -     Urine culture; Future; Expected date: 10/15/2018  -     Prostate Specific Antigen, Diagnostic; Future; Expected date: 10/15/2018    As he is better, will continue as is, finish antibiotics, use rapiflo daily or prn. Hold wellbutrin(I think this was lexapro side effect that may have affected his tone of his bladder with previous prostate surgery), see psychology. Secondary consideration would be abd/pelvic adhesion from previous surgery

## 2018-10-23 ENCOUNTER — OFFICE VISIT (OUTPATIENT)
Dept: OPHTHALMOLOGY | Facility: CLINIC | Age: 68
End: 2018-10-23
Payer: MEDICARE

## 2018-10-23 DIAGNOSIS — Z46.0 CONTACT LENS/GLASSES FITTING: Primary | ICD-10-CM

## 2018-10-23 PROCEDURE — 99212 OFFICE O/P EST SF 10 MIN: CPT | Mod: PBBFAC,PO | Performed by: OPTOMETRIST

## 2018-10-23 PROCEDURE — 92310 CONTACT LENS FITTING OU: CPT | Mod: ,,, | Performed by: OPTOMETRIST

## 2018-10-23 PROCEDURE — 99499 UNLISTED E&M SERVICE: CPT | Mod: S$PBB,,, | Performed by: OPTOMETRIST

## 2018-10-23 PROCEDURE — 99999 PR PBB SHADOW E&M-EST. PATIENT-LVL II: CPT | Mod: PBBFAC,,, | Performed by: OPTOMETRIST

## 2018-10-23 NOTE — PROGRESS NOTES
HPI     Last MLC exam 10/15/2018  CL Fit = distance only  Needs updated CL Rx  Has worn in the past for distance only      Cataract, nuclear, bilateral  Cataract cortical, senile, bilateral      Last edited by EMILY Rice, OD on 10/23/2018 10:37 AM. (History)            Assessment /Plan     For exam results, see Encounter Report.    Contact lens/glasses fitting      Good fit and vision with [employer] (see CL notes)  Follow-up in 5-7 days.  Distance only in a former wearer.

## 2018-10-30 ENCOUNTER — OFFICE VISIT (OUTPATIENT)
Dept: OPHTHALMOLOGY | Facility: CLINIC | Age: 68
End: 2018-10-30
Payer: MEDICARE

## 2018-10-30 DIAGNOSIS — Z46.0 CONTACT LENS/GLASSES FITTING: Primary | ICD-10-CM

## 2018-10-30 PROCEDURE — 99499 UNLISTED E&M SERVICE: CPT | Mod: S$PBB,,, | Performed by: OPTOMETRIST

## 2018-10-30 NOTE — PROGRESS NOTES
HPI     Last [facility] [place] [place] visit 10/23/2018  CL follow up  No visual complaints  Lenses are comfortable     Last edited by Krish Nunn MA on 10/30/2018 10:37 AM. (History)            Assessment /Plan     For exam results, see Encounter Report.    Contact lens/glasses fitting      Good fit and vision with [employer].  Dispense final boxes.  RTC one year.

## 2018-11-05 ENCOUNTER — OFFICE VISIT (OUTPATIENT)
Dept: UROLOGY | Facility: CLINIC | Age: 68
End: 2018-11-05
Payer: MEDICARE

## 2018-11-05 VITALS
HEART RATE: 75 BPM | HEIGHT: 69 IN | BODY MASS INDEX: 23.6 KG/M2 | WEIGHT: 159.38 LBS | DIASTOLIC BLOOD PRESSURE: 74 MMHG | SYSTOLIC BLOOD PRESSURE: 110 MMHG

## 2018-11-05 DIAGNOSIS — R33.9 URINARY RETENTION: ICD-10-CM

## 2018-11-05 DIAGNOSIS — N40.0 BENIGN PROSTATIC HYPERPLASIA, UNSPECIFIED WHETHER LOWER URINARY TRACT SYMPTOMS PRESENT: Primary | ICD-10-CM

## 2018-11-05 LAB
BILIRUB SERPL-MCNC: NORMAL MG/DL
BLOOD URINE, POC: NORMAL
COLOR, POC UA: YELLOW
GLUCOSE UR QL STRIP: NORMAL
KETONES UR QL STRIP: NORMAL
LEUKOCYTE ESTERASE URINE, POC: NORMAL
NITRITE, POC UA: NORMAL
PH, POC UA: 6
PROTEIN, POC: NORMAL
SPECIFIC GRAVITY, POC UA: 1.01
UROBILINOGEN, POC UA: NORMAL

## 2018-11-05 PROCEDURE — 99212 OFFICE O/P EST SF 10 MIN: CPT | Mod: PBBFAC | Performed by: UROLOGY

## 2018-11-05 PROCEDURE — 81002 URINALYSIS NONAUTO W/O SCOPE: CPT | Mod: PBBFAC | Performed by: UROLOGY

## 2018-11-05 PROCEDURE — 99999 PR PBB SHADOW E&M-EST. PATIENT-LVL II: CPT | Mod: PBBFAC,,, | Performed by: UROLOGY

## 2018-11-05 PROCEDURE — 99214 OFFICE O/P EST MOD 30 MIN: CPT | Mod: S$PBB,,, | Performed by: UROLOGY

## 2018-11-05 RX ORDER — TAMSULOSIN HYDROCHLORIDE 0.4 MG/1
0.4 CAPSULE ORAL DAILY
Qty: 30 CAPSULE | Refills: 11 | Status: SHIPPED | OUTPATIENT
Start: 2018-11-05 | End: 2019-04-04

## 2018-11-05 RX ORDER — FINASTERIDE 5 MG/1
5 TABLET, FILM COATED ORAL DAILY
Qty: 30 TABLET | Refills: 11 | Status: SHIPPED | OUTPATIENT
Start: 2018-11-05 | End: 2019-04-04

## 2018-11-05 NOTE — PROGRESS NOTES
Chief Complaint: BPH    HPI:   11/5/18: Was having trouble a month ago with voiding.  Hasn't taken rapaflo in quite a while.  Had started buproprion prior to that and the LUTS came in to play after that mainly with hesitancy.  At day 4 he felt he couldn't get started at all.  He stopped the med, started rapaflo.  Then had constipation/gas and thought he had a UTI.  Been great for a week.  Stopped rapaflo again.  PSA normal.    5/24/17: 65 yo man in last two years had a TURP followed by a RA Simple Prostatectomy.  No LUTS at all.  No abd/pelvic pain and no exac/rel factors.  No hematuria.  No urolithiasis.  No urinary bother.  Normal sexual function.  Takes rapaflo.    Allergies:  Patient has no known allergies.    Medications:  currently has no medications in their medication list.    Review of Systems:  General: No fever, chills, fatigability, or weight loss.  Skin: No rashes, itching, or changes in color or texture of skin.  Chest: Denies LILLY, cyanosis, wheezing, cough, and sputum production.  Abdomen: Appetite fine. No weight loss. Denies diarrhea, abdominal pain, hematemesis, or blood in stool.  Musculoskeletal: No joint stiffness or swelling. Denies back pain.  : As above.  All other review of systems negative.    PMH:   has a past medical history of Arthritis.    PSH:   has a past surgical history that includes Shoulder arthroscopy (Right); Knee surgery; Total knee arthroplasty; and simple prostatectomy (06/06/2016).    FamHx: family history includes Cancer in his father; Diabetes in his maternal aunt and mother; Heart disease in his mother.    SocHx:  reports that  has never smoked. He does not have any smokeless tobacco history on file. He reports that he drinks about 1.2 oz of alcohol per week. He reports that he does not use drugs.      Physical Exam:  Vitals:    11/05/18 1621   BP: 110/74   Pulse: 75     General: A&Ox3, no apparent distress, no deformities  Neck: No masses, normal thyroid  Lungs: normal  inspiration, no use of accessory muscles  Heart: normal pulse, no arrhythmias  Abdomen: Soft, NT, ND  Skin: The skin is warm and dry. No jaundice.  Ext: No c/c/e.  :   5/24/17: Test desc ofe, no abnormalities of epididymus. Penis normal, with normal penile and scrotal skin. Meatus normal. Normal rectal tone, no hemorrhoids. Prost 40 gm no nodules or masses appreciated. SV not palpable. Perineum and anus normal.    Labs/Studies:   Urinalysis performed in clinic, summary: UA normal  PSA    2/17: 1.2    10/18: 1.8    Impression/Plan:   1. Low risk of prostate cancer. PSA/RTC 1 year.  2. Can't say what antidepressants he can take and not have LUTS.  3. Recc finasteride to shrink prostate volume.  Adding flomax too.

## 2019-04-04 ENCOUNTER — OFFICE VISIT (OUTPATIENT)
Dept: INTERNAL MEDICINE | Facility: CLINIC | Age: 69
End: 2019-04-04
Payer: MEDICARE

## 2019-04-04 VITALS
OXYGEN SATURATION: 99 % | TEMPERATURE: 97 F | HEART RATE: 84 BPM | SYSTOLIC BLOOD PRESSURE: 122 MMHG | DIASTOLIC BLOOD PRESSURE: 74 MMHG | HEIGHT: 69 IN | WEIGHT: 169.56 LBS | BODY MASS INDEX: 25.11 KG/M2

## 2019-04-04 DIAGNOSIS — J40 BRONCHITIS: Primary | ICD-10-CM

## 2019-04-04 DIAGNOSIS — R09.82 POSTNASAL DRIP: ICD-10-CM

## 2019-04-04 PROCEDURE — 99999 PR PBB SHADOW E&M-EST. PATIENT-LVL III: ICD-10-PCS | Mod: PBBFAC,,, | Performed by: FAMILY MEDICINE

## 2019-04-04 PROCEDURE — 99999 PR PBB SHADOW E&M-EST. PATIENT-LVL III: CPT | Mod: PBBFAC,,, | Performed by: FAMILY MEDICINE

## 2019-04-04 PROCEDURE — 99214 OFFICE O/P EST MOD 30 MIN: CPT | Mod: S$PBB,,, | Performed by: FAMILY MEDICINE

## 2019-04-04 PROCEDURE — 99213 OFFICE O/P EST LOW 20 MIN: CPT | Mod: PBBFAC,PN | Performed by: FAMILY MEDICINE

## 2019-04-04 PROCEDURE — 99214 PR OFFICE/OUTPT VISIT, EST, LEVL IV, 30-39 MIN: ICD-10-PCS | Mod: S$PBB,,, | Performed by: FAMILY MEDICINE

## 2019-04-04 RX ORDER — TRIAMCINOLONE ACETONIDE 55 UG/1
2 SPRAY, METERED NASAL DAILY
Qty: 10.8 ML | Refills: 6 | Status: SHIPPED | OUTPATIENT
Start: 2019-04-04 | End: 2019-04-16

## 2019-04-04 RX ORDER — PREDNISONE 10 MG/1
10 TABLET ORAL DAILY
Qty: 5 TABLET | Refills: 0 | Status: SHIPPED | OUTPATIENT
Start: 2019-04-04 | End: 2019-04-09

## 2019-04-04 RX ORDER — PROMETHAZINE HYDROCHLORIDE 6.25 MG/5ML
12.5 SYRUP ORAL NIGHTLY PRN
Qty: 118 ML | Refills: 0 | Status: SHIPPED | OUTPATIENT
Start: 2019-04-04 | End: 2019-06-03

## 2019-04-04 NOTE — PROGRESS NOTES
Subjective:       Patient ID: Mekhi Lambert is a 68 y.o. male.    Chief Complaint: Sore Throat    69 yo male with history of sore throat and cough that started 4 days ago; on about the 4th day he was seen for sore throat in an urgent care and treated with amoxicillin. Had subjective fever but none documented at his visit. Sore throat resolved about 4 days into antibiotic therapy but returned about 2 days after completion of the antibiotic.         does not have any pertinent problems on file.  Past Medical History:   Diagnosis Date    Arthritis      Past Surgical History:   Procedure Laterality Date    KNEE SURGERY      SHOULDER ARTHROSCOPY Right     Dr Bella    simple prostatectomy  06/06/2016    robotic assisted partial prostectomy due to BPH    TOTAL KNEE ARTHROPLASTY       Family History   Problem Relation Age of Onset    Heart disease Mother         lupus related    Diabetes Mother     Cancer Father         prostate, bone, lung, skin cancers all seperate types    Diabetes Maternal Aunt      Social History     Socioeconomic History    Marital status:      Spouse name: Not on file    Number of children: Not on file    Years of education: Not on file    Highest education level: Not on file   Occupational History    Not on file   Social Needs    Financial resource strain: Not on file    Food insecurity:     Worry: Not on file     Inability: Not on file    Transportation needs:     Medical: Not on file     Non-medical: Not on file   Tobacco Use    Smoking status: Never Smoker   Substance and Sexual Activity    Alcohol use: Yes     Alcohol/week: 1.2 oz     Types: 2 Glasses of wine per week     Comment: per week    Drug use: No    Sexual activity: Not on file   Lifestyle    Physical activity:     Days per week: Not on file     Minutes per session: Not on file    Stress: Not on file   Relationships    Social connections:     Talks on phone: Not on file     Gets together: Not on file      Attends [employer] service: Not on file     Active member of club or organization: Not on file     Attends meetings of clubs or organizations: Not on file     Relationship status: Not on file   Other Topics Concern    Not on file   Social History Narrative    Not on file     Review of Systems   Constitutional: Negative for activity change, appetite change and fatigue.   HENT: Positive for postnasal drip and sore throat.    Respiratory: Positive for cough. Negative for chest tightness and shortness of breath.    Cardiovascular: Negative for chest pain and palpitations.   Gastrointestinal: Negative for abdominal pain, diarrhea, nausea and vomiting.   All other systems reviewed and are negative.      Objective:     Vitals:    04/04/19 1103   BP: 122/74   Pulse: 84   Temp: 97.3 °F (36.3 °C)        Physical Exam   Constitutional: He is oriented to person, place, and time. He appears well-developed and well-nourished.   HENT:   Head: Normocephalic and atraumatic.   Nose: Nose normal.   Mouth/Throat: Oropharynx is clear and moist. No oropharyngeal exudate or posterior oropharyngeal erythema.   Postnasal drip visible in posterior OP; mild erythema. Tonsils absent   Eyes: Pupils are equal, round, and reactive to light. Conjunctivae and EOM are normal. No scleral icterus.   Cardiovascular: Normal rate, regular rhythm, normal heart sounds and intact distal pulses. Exam reveals no friction rub.   No murmur heard.  Pulmonary/Chest: Effort normal and breath sounds normal. No respiratory distress. He has no wheezes. He has no rales.   Neurological: He is alert and oriented to person, place, and time.   Normal gait.   No speech abnormality   Psychiatric: He has a normal mood and affect. His behavior is normal. Judgment and thought content normal.   Nursing note and vitals reviewed.      Assessment:       1. Bronchitis    2. Postnasal drip        Plan:           Problem List Items Addressed This Visit     None      Visit  Diagnoses     Bronchitis    -  Primary    Relevant Medications    predniSONE (DELTASONE) 10 MG tablet    promethazine (PHENERGAN) 6.25 mg/5 mL syrup    Postnasal drip        Relevant Medications    triamcinolone (NASACORT) 55 mcg nasal inhaler

## 2019-04-04 NOTE — PATIENT INSTRUCTIONS
Viral Upper Respiratory Illness (Adult)  You have a viral upper respiratory illness (URI), which is another term for the common cold. This illness is contagious during the first few days. It is spread through the air by coughing and sneezing. It may also be spread by direct contact (touching the sick person and then touching your own eyes, nose, or mouth). Frequent handwashing will decrease risk of spread. Most viral illnesses go away within 7 to 10 days with rest and simple home remedies. Sometimes the illness may last for several weeks. Antibiotics will not kill a virus, and they are generally not prescribed for this condition.    Home care  · If symptoms are severe, rest at home for the first 2 to 3 days. When you resume activity, don't let yourself get too tired.  · Avoid being exposed to cigarette smoke (yours or others).  · You may use acetaminophen or ibuprofen to control pain and fever, unless another medicine was prescribed. (Note: If you have chronic liver or kidney disease, have ever had a stomach ulcer or gastrointestinal bleeding, or are taking blood-thinning medicines, talk with your healthcare provider before using these medicines.) Aspirin should never be given to anyone under 18 years of age who is ill with a viral infection or fever. It may cause severe liver or brain damage.  · Your appetite may be poor, so a light diet is fine. Avoid dehydration by drinking 6 to 8 glasses of fluids per day (water, soft drinks, juices, tea, or soup). Extra fluids will help loosen secretions in the nose and lungs.  · Over-the-counter cold medicines will not shorten the length of time youre sick, but they may be helpful for the following symptoms: cough, sore throat, and nasal and sinus congestion. (Note: Do not use decongestants if you have high blood pressure.)  Follow-up care  Follow up with your healthcare provider, or as advised.  When to seek medical advice  Call your healthcare provider right away if any  of these occur:  · Cough with lots of colored sputum (mucus)  · Severe headache; face, neck, or ear pain  · Difficulty swallowing due to throat pain  · Fever of 100.4°F (38°C)  Call 911, or get immediate medical care  Call emergency services right away if any of these occur:  · Chest pain, shortness of breath, wheezing, or difficulty breathing  · Coughing up blood  · Inability to swallow due to throat pain  Date Last Reviewed: 9/13/2015 © 2000-2017 [employer]. [address] [place] [id number]. All rights reserved. This information is not intended as a substitute for professional medical care. Always follow your healthcare professional's instructions.      Get extra rest, especially a good night's sleep.  Drink plenty of clear fluids to help thin mucus.   Use nasal saline spray several times a day to clear nasal drainage and help with nasal congestion and post-nasal drip.  Gargle with warm salt water several times a day as needed for throat comfort.  Over the counter medications that may be helpful:  Mucinex or Mucinex DM for thinning mucus and decreasing cough. Mucinex only works if you drink plenty of fluids while you are taking it.   Tylenol or Ibuprofen for fever, headache and body aches  Chloraseptic spray or lozenges for throat comfort  Pseudoephedrine is great for nasal congestion. You have to ask the pharmacist for this as it is kept behind the counter, but it does not require a prescription. DO NOT take pseudoephedrine or medication containing phenylephrine if you have high blood pressure or heart disease.

## 2019-04-15 ENCOUNTER — PATIENT MESSAGE (OUTPATIENT)
Dept: INTERNAL MEDICINE | Facility: CLINIC | Age: 69
End: 2019-04-15

## 2019-04-16 ENCOUNTER — OFFICE VISIT (OUTPATIENT)
Dept: INTERNAL MEDICINE | Facility: CLINIC | Age: 69
End: 2019-04-16
Payer: MEDICARE

## 2019-04-16 VITALS
OXYGEN SATURATION: 99 % | TEMPERATURE: 98 F | SYSTOLIC BLOOD PRESSURE: 90 MMHG | DIASTOLIC BLOOD PRESSURE: 60 MMHG | HEART RATE: 67 BPM | WEIGHT: 168.63 LBS | HEIGHT: 68 IN | BODY MASS INDEX: 25.56 KG/M2

## 2019-04-16 DIAGNOSIS — J32.0 CHRONIC MAXILLARY SINUSITIS: Primary | ICD-10-CM

## 2019-04-16 PROCEDURE — 99999 PR PBB SHADOW E&M-EST. PATIENT-LVL III: CPT | Mod: PBBFAC,,, | Performed by: PEDIATRICS

## 2019-04-16 PROCEDURE — 99213 OFFICE O/P EST LOW 20 MIN: CPT | Mod: S$PBB,,, | Performed by: PEDIATRICS

## 2019-04-16 PROCEDURE — 99213 PR OFFICE/OUTPT VISIT, EST, LEVL III, 20-29 MIN: ICD-10-PCS | Mod: S$PBB,,, | Performed by: PEDIATRICS

## 2019-04-16 PROCEDURE — 99999 PR PBB SHADOW E&M-EST. PATIENT-LVL III: ICD-10-PCS | Mod: PBBFAC,,, | Performed by: PEDIATRICS

## 2019-04-16 PROCEDURE — 99213 OFFICE O/P EST LOW 20 MIN: CPT | Mod: PBBFAC,PN | Performed by: PEDIATRICS

## 2019-04-16 RX ORDER — AMOXICILLIN AND CLAVULANATE POTASSIUM 875; 125 MG/1; MG/1
1 TABLET, FILM COATED ORAL 2 TIMES DAILY
Qty: 28 TABLET | Refills: 0 | Status: SHIPPED | OUTPATIENT
Start: 2019-04-16 | End: 2019-04-30

## 2019-04-16 NOTE — PROGRESS NOTES
Subjective:       Patient ID: Mekhi Lambert is a 68 y.o. male.    Chief Complaint: Sinusitis and Nasal Congestion (Yellow mucus)    Reviewed Dr Machado's note. Patient improved, but last week symptoms all returned. He has hx of septoplasty and turbinate shaving remotely.    Review of Systems   Constitutional: Negative for fever and unexpected weight change.   HENT: Positive for congestion, postnasal drip, rhinorrhea, sinus pressure and sinus pain. Negative for sneezing.    Eyes: Negative for discharge and redness.   Respiratory: Negative for cough and wheezing.    Cardiovascular: Negative for chest pain, palpitations and leg swelling.   Gastrointestinal: Negative for constipation, diarrhea and vomiting.   Genitourinary: Negative for decreased urine volume and difficulty urinating.   Musculoskeletal: Negative for arthralgias and joint swelling.   Skin: Negative for rash and wound.   Neurological: Negative for syncope and headaches.   Psychiatric/Behavioral: Negative for behavioral problems and sleep disturbance.       Objective:      Physical Exam   Constitutional: He is oriented to person, place, and time. He appears well-developed and well-nourished. No distress.   HENT:   Right Ear: External ear normal.   Left Ear: External ear normal.   Mouth/Throat: Oropharynx is clear and moist. No oropharyngeal exudate (nasal passages are swollen red, irritated. o/p red and raw, PND.).   Eyes: Pupils are equal, round, and reactive to light. Conjunctivae and EOM are normal.   Neck: Normal range of motion. No JVD present. No thyromegaly present.   Cardiovascular: Normal rate, regular rhythm and normal heart sounds.   No murmur heard.  Pulmonary/Chest: Effort normal and breath sounds normal. No respiratory distress. He has no wheezes. He has no rales.   Musculoskeletal: He exhibits no edema.   Lymphadenopathy:     He has no cervical adenopathy.   Neurological: He is alert and oriented to person, place, and time. No cranial  nerve deficit. Coordination normal.   Skin: Capillary refill takes less than 2 seconds. No rash noted.   Psychiatric: He has a normal mood and affect. His behavior is normal. Judgment and thought content normal.       Assessment:       1. Chronic maxillary sinusitis        Plan:       Chronic maxillary sinusitis  -     amoxicillin-clavulanate 875-125mg (AUGMENTIN) 875-125 mg per tablet; Take 1 tablet by mouth 2 (two) times daily. for 14 days  Dispense: 28 tablet; Refill: 0    Use distilled water su pots, go will 14 days augmentin. If not better then CT sinuses and see ENT.

## 2019-06-03 ENCOUNTER — OFFICE VISIT (OUTPATIENT)
Dept: OTOLARYNGOLOGY | Facility: CLINIC | Age: 69
End: 2019-06-03
Payer: MEDICARE

## 2019-06-03 VITALS
SYSTOLIC BLOOD PRESSURE: 117 MMHG | BODY MASS INDEX: 25.14 KG/M2 | TEMPERATURE: 98 F | DIASTOLIC BLOOD PRESSURE: 81 MMHG | WEIGHT: 165.38 LBS | HEART RATE: 67 BPM

## 2019-06-03 DIAGNOSIS — J32.9 CHRONIC SINUSITIS, UNSPECIFIED LOCATION: Primary | ICD-10-CM

## 2019-06-03 DIAGNOSIS — R51.9 LEFT FACIAL PRESSURE AND PAIN: ICD-10-CM

## 2019-06-03 PROCEDURE — 99204 OFFICE O/P NEW MOD 45 MIN: CPT | Mod: S$PBB,,, | Performed by: ORTHOPAEDIC SURGERY

## 2019-06-03 PROCEDURE — 99999 PR PBB SHADOW E&M-EST. PATIENT-LVL III: CPT | Mod: PBBFAC,,, | Performed by: ORTHOPAEDIC SURGERY

## 2019-06-03 PROCEDURE — 99204 PR OFFICE/OUTPT VISIT, NEW, LEVL IV, 45-59 MIN: ICD-10-PCS | Mod: S$PBB,,, | Performed by: ORTHOPAEDIC SURGERY

## 2019-06-03 PROCEDURE — 99213 OFFICE O/P EST LOW 20 MIN: CPT | Mod: PBBFAC | Performed by: ORTHOPAEDIC SURGERY

## 2019-06-03 PROCEDURE — 99999 PR PBB SHADOW E&M-EST. PATIENT-LVL III: ICD-10-PCS | Mod: PBBFAC,,, | Performed by: ORTHOPAEDIC SURGERY

## 2019-06-03 RX ORDER — MUPIROCIN 20 MG/G
OINTMENT TOPICAL 2 TIMES DAILY
Qty: 15 G | Refills: 3 | Status: SHIPPED | OUTPATIENT
Start: 2019-06-03 | End: 2019-06-13

## 2019-06-03 NOTE — PROGRESS NOTES
Subjective:       Patient ID: Mekhi Lambert is a 68 y.o. male.    Chief Complaint: Sinus Problem and Epistaxis    Patient is a very pleasant 68 year old gentleman here to see me today for evaluation of possible sinusitis.  He was first treated with Amoxil in March for strep throat (diagnosed in [place]).  He was then seen here and diagnosed with sinusitis by Dr. Lopez, and was treated with a course of oral Augmentin.  He denies any headaches, but has some pressure over the left midface with watering of his left eye.  He does not generally have issues with allergies.  He has a history of a septoplasty with SMRT about 16 years ago, and says that his breathing has been much better since that time.  The last time he was treated for sinusitis prior to this episode was about 3 years ago.  He has not had any recent imaging.  He is currently using a Nedipot, but still has the pressure in his left mid-face.  He has not noted any nasal drainage or secretions.  He does not smoke.    Review of Systems   Constitutional: Negative for fatigue, fever and unexpected weight change.   HENT: Positive for congestion, postnasal drip and sinus pressure (left midface). Negative for ear discharge, ear pain, facial swelling, hearing loss, nosebleeds, rhinorrhea, sneezing, sore throat (resolved for the last several days), tinnitus, trouble swallowing and voice change.    Eyes: Negative for discharge, redness and itching.   Respiratory: Positive for cough (he relates to postnasal drip). Negative for choking, shortness of breath and wheezing.    Cardiovascular: Negative for chest pain and palpitations.   Gastrointestinal: Negative for abdominal pain.        No reflux.   Musculoskeletal: Negative for neck pain.   Neurological: Negative for dizziness, facial asymmetry, light-headedness and headaches.   Hematological: Negative for adenopathy. Does not bruise/bleed easily.   Psychiatric/Behavioral: Negative for agitation, behavioral problems,  confusion and decreased concentration.       Objective:      Physical Exam   Constitutional: He is oriented to person, place, and time. Vital signs are normal. He appears well-developed and well-nourished. No distress.   HENT:   Head: Normocephalic and atraumatic.   Right Ear: Hearing, tympanic membrane, external ear and ear canal normal.   Left Ear: Hearing, tympanic membrane, external ear and ear canal normal.   Nose: Nose normal. No mucosal edema, rhinorrhea, nasal deformity or septal deviation.   Mouth/Throat: Uvula is midline, oropharynx is clear and moist and mucous membranes are normal. No trismus in the jaw. Normal dentition. No uvula swelling. No oropharyngeal exudate or posterior oropharyngeal edema.   Dried blood left anterior nasal septum, able to see middle turbinates bilaterally with no purulent drainage   Eyes: Pupils are equal, round, and reactive to light. Conjunctivae and EOM are normal. Right eye exhibits no chemosis. Left eye exhibits no chemosis. Right conjunctiva is not injected. Left conjunctiva is not injected. No scleral icterus.   Neck: Trachea normal and phonation normal. No tracheal tenderness present. No tracheal deviation present. No thyroid mass and no thyromegaly present.   Cardiovascular: Intact distal pulses.   Pulmonary/Chest: Effort normal. No accessory muscle usage or stridor. No respiratory distress.   Lymphadenopathy:        Head (right side): No submental, no submandibular, no preauricular and no posterior auricular adenopathy present.        Head (left side): No submental, no submandibular, no preauricular and no posterior auricular adenopathy present.     He has no cervical adenopathy.        Right cervical: No superficial cervical and no deep cervical adenopathy present.       Left cervical: No superficial cervical and no deep cervical adenopathy present.   Neurological: He is alert and oriented to person, place, and time. No cranial nerve deficit.   Skin: Skin is warm and  dry. No rash noted. No erythema.   Psychiatric: He has a normal mood and affect. His behavior is normal. Thought content normal.       Assessment:       1. Chronic sinusitis, unspecified location    2. Left facial pressure and pain        Plan:       1.  Chronic sinusitis:  He continues to have pain and pressure over the left side of his face despite 2 recent rounds of antibiotics, amoxicillin as well as Augmentin.  He also has a previous history of septoplasty with submucous resection of his inferior turbinates.  At this point, I would recommend a CT scan of the sinuses, and will call with results.  If he does in fact have persistent sinus disease, specifically in the left maxillary region, we will then proceed with a longer course of oral antibiotics likely with steroids as well. Will call with results when available.  Could also consider adding budesonide with or without antibiotics to his nasal irrigation.  Prescription for mupirocin twice daily to the bilateral nostril sent to his pharmacy as well.  2.  Left facial pressure and pain:  CT scan as above.  Will follow with results, could consider neurology evaluation if CT scan does not show any persistent sinus disease in that left maxillary sinus.

## 2019-06-04 ENCOUNTER — HOSPITAL ENCOUNTER (OUTPATIENT)
Dept: RADIOLOGY | Facility: HOSPITAL | Age: 69
Discharge: HOME | End: 2019-06-04
Attending: ORTHOPAEDIC SURGERY
Payer: MEDICARE

## 2019-06-04 DIAGNOSIS — R51.9 LEFT FACIAL PRESSURE AND PAIN: ICD-10-CM

## 2019-06-04 DIAGNOSIS — J32.9 CHRONIC SINUSITIS, UNSPECIFIED LOCATION: ICD-10-CM

## 2019-06-04 PROCEDURE — 70486 CT MAXILLOFACIAL W/O DYE: CPT | Mod: TC

## 2019-06-04 PROCEDURE — 70486 CT SINUSES WITHOUT CONTRAST: ICD-10-PCS | Mod: 26,,, | Performed by: RADIOLOGY

## 2019-06-04 PROCEDURE — 70486 CT MAXILLOFACIAL W/O DYE: CPT | Mod: 26,,, | Performed by: RADIOLOGY

## 2019-06-10 ENCOUNTER — OFFICE VISIT (OUTPATIENT)
Dept: DERMATOLOGY | Facility: CLINIC | Age: 69
End: 2019-06-10
Payer: MEDICARE

## 2019-06-10 DIAGNOSIS — Z80.8 FAMILY HISTORY OF MELANOMA: ICD-10-CM

## 2019-06-10 DIAGNOSIS — L40.8 OTHER PSORIASIS: ICD-10-CM

## 2019-06-10 DIAGNOSIS — D22.9 MULTIPLE NEVI: ICD-10-CM

## 2019-06-10 DIAGNOSIS — Z12.83 SCREENING, MALIGNANT NEOPLASM, SKIN: ICD-10-CM

## 2019-06-10 DIAGNOSIS — L90.5 SCAR CONDITIONS/SKIN FIBROSIS: Primary | ICD-10-CM

## 2019-06-10 DIAGNOSIS — Z85.828 HISTORY OF SKIN CANCER: ICD-10-CM

## 2019-06-10 DIAGNOSIS — L57.0 ACTINIC KERATOSES: ICD-10-CM

## 2019-06-10 PROCEDURE — 99999 PR PBB SHADOW E&M-EST. PATIENT-LVL III: ICD-10-PCS | Mod: PBBFAC,,, | Performed by: DERMATOLOGY

## 2019-06-10 PROCEDURE — 99213 OFFICE O/P EST LOW 20 MIN: CPT | Mod: PBBFAC | Performed by: DERMATOLOGY

## 2019-06-10 PROCEDURE — 17000 DESTRUCT PREMALG LESION: CPT | Mod: S$PBB,,, | Performed by: DERMATOLOGY

## 2019-06-10 PROCEDURE — 99213 PR OFFICE/OUTPT VISIT, EST, LEVL III, 20-29 MIN: ICD-10-PCS | Mod: 25,S$PBB,, | Performed by: DERMATOLOGY

## 2019-06-10 PROCEDURE — 99999 PR PBB SHADOW E&M-EST. PATIENT-LVL III: CPT | Mod: PBBFAC,,, | Performed by: DERMATOLOGY

## 2019-06-10 PROCEDURE — 99213 OFFICE O/P EST LOW 20 MIN: CPT | Mod: 25,S$PBB,, | Performed by: DERMATOLOGY

## 2019-06-10 PROCEDURE — 17003 DESTRUCT PREMALG LES 2-14: CPT | Mod: PBBFAC | Performed by: DERMATOLOGY

## 2019-06-10 PROCEDURE — 17003 DESTRUCTION, PREMALIGNANT LESIONS; SECOND THROUGH 14 LESIONS: ICD-10-PCS | Mod: S$PBB,,, | Performed by: DERMATOLOGY

## 2019-06-10 PROCEDURE — 17003 DESTRUCT PREMALG LES 2-14: CPT | Mod: S$PBB,,, | Performed by: DERMATOLOGY

## 2019-06-10 PROCEDURE — 17000 PR DESTRUCTION(LASER SURGERY,CRYOSURGERY,CHEMOSURGERY),PREMALIGNANT LESIONS,FIRST LESION: ICD-10-PCS | Mod: S$PBB,,, | Performed by: DERMATOLOGY

## 2019-06-10 PROCEDURE — 17000 DESTRUCT PREMALG LESION: CPT | Mod: PBBFAC | Performed by: DERMATOLOGY

## 2019-06-10 RX ORDER — MOMETASONE FUROATE 1 MG/G
CREAM TOPICAL
Qty: 50 G | Refills: 1 | Status: SHIPPED | OUTPATIENT
Start: 2019-06-10

## 2019-06-10 RX ORDER — TRIAMCINOLONE ACETONIDE 0.25 MG/G
CREAM TOPICAL 2 TIMES DAILY PRN
Qty: 30 G | Refills: 1 | Status: SHIPPED | OUTPATIENT
Start: 2019-06-10 | End: 2022-02-25 | Stop reason: ALTCHOICE

## 2019-06-10 NOTE — PROGRESS NOTES
Subjective:       Patient ID:  Mekhi Lambert is a 68 y.o. 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 This is a high risk patient here to check for the development of new lesions. He c/o lesion of the left forearm x several years, asymptomatic. No tx tried.         Review of Systems   Constitutional: Negative for fever and chills.   Gastrointestinal: Negative for nausea and vomiting.   Skin: Positive for activity-related sunscreen use. Negative for daily sunscreen use and recent sunburn.   Hematologic/Lymphatic: Does not bruise/bleed easily.        Objective:    Physical Exam   Constitutional: He appears well-developed and well-nourished. No distress.   Neurological: He is alert and oriented to person, place, and time. He is not disoriented.   Psychiatric: He has a normal mood and affect.   Skin:   Areas Examined (abnormalities noted in diagram):   Scalp / Hair Palpated and Inspected  Head / Face Inspection Performed  Neck Inspection Performed  Chest / Axilla Inspection Performed  Abdomen Inspection Performed  Genitals / Buttocks / Groin Inspection Performed  Back Inspection Performed  RUE Inspected  LUE Inspection Performed  RLE Inspected  LLE Inspection Performed  Nails and Digits Inspection Performed                       Diagram Legend     Erythematous scaling macule/papule c/w actinic keratosis       Vascular papule c/w angioma      Pigmented verrucoid papule/plaque c/w seborrheic keratosis      Yellow umbilicated papule c/w sebaceous hyperplasia      Irregularly shaped tan macule c/w lentigo     1-2 mm smooth white papules consistent with Milia      Movable subcutaneous cyst with punctum c/w epidermal inclusion cyst      Subcutaneous movable cyst c/w pilar cyst      Firm pink to brown papule c/w dermatofibroma       Pedunculated fleshy papule(s) c/w skin tag(s)      Evenly pigmented macule c/w junctional nevus     Mildly variegated pigmented, slightly irregular-bordered macule c/w mildly atypical nevus      Flesh colored to evenly pigmented papule c/w intradermal nevus       Pink pearly papule/plaque c/w basal cell carcinoma      Erythematous hyperkeratotic cursted plaque c/w SCC      Surgical scar with no sign of skin cancer recurrence      Open and closed comedones      Inflammatory papules and pustules      Verrucoid papule consistent consistent with wart     Erythematous eczematous patches and plaques     Dystrophic onycholytic nail with subungual debris c/w onychomycosis     Umbilicated papule    Erythematous-base heme-crusted tan verrucoid plaque consistent with inflamed seborrheic keratosis     Erythematous Silvery Scaling Plaque c/w Psoriasis     See annotation      Assessment / Plan:        Scar conditions/skin fibrosis  Screening, malignant neoplasm, skin  History of skin cancer  Scar of the right ear and right upper back, hx of NMSC.  No evidence of recurrence on physical exam today.  Continue routine skin surveillance. Daily sunscreen advised.    Multiple nevi  Family history of melanoma  Discussed ABCDEF of melanoma and changes for patient to look for.  AAD Handout given. Discussed importance of daily use of sunscreen which is broad-spectrum and has a minimum SPF of 30.    Other psoriasis  -     mometasone 0.1% (ELOCON) 0.1 % cream; AAA bid prn for psoriasis. Strong steroid.  Do not use on face, underarms or groin.  Dispense: 50 g; Refill: 1  -     triamcinolone acetonide 0.025% (KENALOG) 0.025 % cream; Apply topically 2 (two) times daily as needed. Mild steroid.  Safe for face up to 2 weeks then stop.  Dispense: 30 g; Refill: 1  -     No active areas noted.  Will start above meds prn.     Actinic keratoses  Cryosurgery Procedure Note    The patient is informed of the precancerous quality and need for  treatment of these lesions. After risks, benefits and alternatives explained, including blistering, pain, hyper- and hypopigmentation, patient verbally consents to cryotherapy to precancerous lesions. Liquid nitrogen cryosurgery is applied to the 3 actinic keratoses, as detailed in the physical exam, to produce a freeze injury. The patient is aware that blisters may form and is instructed on wound care with gentle cleansing and use of vaseline ointment to keep moist until healed. The patient is supplied a handout on cryosurgery and is instructed to call if lesions do not completely resolve.           Follow up in about 1 year (around 6/10/2020).

## 2019-10-25 DIAGNOSIS — Z12.11 COLON CANCER SCREENING: ICD-10-CM

## 2019-11-01 ENCOUNTER — LAB VISIT (OUTPATIENT)
Dept: LAB | Facility: HOSPITAL | Age: 69
End: 2019-11-01
Attending: PEDIATRICS
Payer: MEDICARE

## 2019-11-01 DIAGNOSIS — Z12.11 COLON CANCER SCREENING: ICD-10-CM

## 2019-11-01 PROCEDURE — 82274 ASSAY TEST FOR BLOOD FECAL: CPT

## 2019-11-05 ENCOUNTER — LAB VISIT (OUTPATIENT)
Dept: LAB | Facility: HOSPITAL | Age: 69
End: 2019-11-05
Attending: UROLOGY
Payer: MEDICARE

## 2019-11-05 DIAGNOSIS — N40.0 BENIGN PROSTATIC HYPERPLASIA, UNSPECIFIED WHETHER LOWER URINARY TRACT SYMPTOMS PRESENT: ICD-10-CM

## 2019-11-05 LAB — COMPLEXED PSA SERPL-MCNC: 0.98 NG/ML (ref 0–4)

## 2019-11-05 PROCEDURE — 84153 ASSAY OF PSA TOTAL: CPT

## 2019-11-05 PROCEDURE — 36415 COLL VENOUS BLD VENIPUNCTURE: CPT

## 2019-11-08 ENCOUNTER — TELEPHONE (OUTPATIENT)
Dept: INTERNAL MEDICINE | Facility: CLINIC | Age: 69
End: 2019-11-08

## 2019-11-08 NOTE — TELEPHONE ENCOUNTER
----- Message from Singh Kamara sent at 11/8/2019  2:17 PM CST -----  Contact: self -[phone]  .Type:  Patient Requesting Referral    Who Called:Mekhi Lambert    Does the patient already have the specialty appointment scheduled?:No  If yes, what is the date of that appointment?:  Referral to What Specialty:Pain medicine   Reason for Referral:pinching of nerves across spine   Does the patient want the referral with a specific physician?:no   [facility]   Patient Requesting a Response?:yes   Would the patient rather a call back or a response via [facility]? Call back   Best Call Back Number:[phone]    Additional Information:

## 2019-11-08 NOTE — TELEPHONE ENCOUNTER
Advised pt needs to see Dr. Lopez to eval s/s and for tx/referral. Pt voiced understanding and confirmed appt sched w/ Dr. Lopez 11/11/19 at  location.

## 2019-11-11 ENCOUNTER — OFFICE VISIT (OUTPATIENT)
Dept: UROLOGY | Facility: CLINIC | Age: 69
End: 2019-11-11
Payer: MEDICARE

## 2019-11-11 ENCOUNTER — OFFICE VISIT (OUTPATIENT)
Dept: INTERNAL MEDICINE | Facility: CLINIC | Age: 69
End: 2019-11-11
Payer: MEDICARE

## 2019-11-11 VITALS
TEMPERATURE: 98 F | DIASTOLIC BLOOD PRESSURE: 72 MMHG | WEIGHT: 168 LBS | HEIGHT: 68 IN | OXYGEN SATURATION: 99 % | SYSTOLIC BLOOD PRESSURE: 116 MMHG | BODY MASS INDEX: 25.46 KG/M2 | HEART RATE: 68 BPM

## 2019-11-11 VITALS
HEART RATE: 68 BPM | WEIGHT: 165 LBS | BODY MASS INDEX: 25.09 KG/M2 | DIASTOLIC BLOOD PRESSURE: 74 MMHG | SYSTOLIC BLOOD PRESSURE: 124 MMHG

## 2019-11-11 DIAGNOSIS — N40.0 BENIGN PROSTATIC HYPERPLASIA, UNSPECIFIED WHETHER LOWER URINARY TRACT SYMPTOMS PRESENT: ICD-10-CM

## 2019-11-11 DIAGNOSIS — M54.9 BACK PAIN, UNSPECIFIED BACK LOCATION, UNSPECIFIED BACK PAIN LATERALITY, UNSPECIFIED CHRONICITY: Primary | ICD-10-CM

## 2019-11-11 DIAGNOSIS — Z12.5 PROSTATE CANCER SCREENING: Primary | ICD-10-CM

## 2019-11-11 DIAGNOSIS — Z12.11 COLON CANCER SCREENING: ICD-10-CM

## 2019-11-11 LAB
BILIRUB SERPL-MCNC: NORMAL MG/DL
BLOOD URINE, POC: NORMAL
COLOR, POC UA: YELLOW
GLUCOSE UR QL STRIP: NORMAL
KETONES UR QL STRIP: NORMAL
LEUKOCYTE ESTERASE URINE, POC: NORMAL
NITRITE, POC UA: NORMAL
PH, POC UA: 5
PROTEIN, POC: NORMAL
SPECIFIC GRAVITY, POC UA: 1.02
UROBILINOGEN, POC UA: NORMAL

## 2019-11-11 PROCEDURE — 99213 OFFICE O/P EST LOW 20 MIN: CPT | Mod: S$PBB,,, | Performed by: PEDIATRICS

## 2019-11-11 PROCEDURE — 99999 PR PBB SHADOW E&M-EST. PATIENT-LVL III: CPT | Mod: PBBFAC,,, | Performed by: UROLOGY

## 2019-11-11 PROCEDURE — 99213 PR OFFICE/OUTPT VISIT, EST, LEVL III, 20-29 MIN: ICD-10-PCS | Mod: S$PBB,,, | Performed by: PEDIATRICS

## 2019-11-11 PROCEDURE — 99999 PR PBB SHADOW E&M-EST. PATIENT-LVL III: CPT | Mod: PBBFAC,,, | Performed by: PEDIATRICS

## 2019-11-11 PROCEDURE — 99999 PR PBB SHADOW E&M-EST. PATIENT-LVL III: ICD-10-PCS | Mod: PBBFAC,,, | Performed by: PEDIATRICS

## 2019-11-11 PROCEDURE — 99213 OFFICE O/P EST LOW 20 MIN: CPT | Mod: PBBFAC,27 | Performed by: PEDIATRICS

## 2019-11-11 PROCEDURE — 99213 OFFICE O/P EST LOW 20 MIN: CPT | Mod: PBBFAC | Performed by: UROLOGY

## 2019-11-11 PROCEDURE — 99999 PR PBB SHADOW E&M-EST. PATIENT-LVL III: ICD-10-PCS | Mod: PBBFAC,,, | Performed by: UROLOGY

## 2019-11-11 PROCEDURE — 81002 URINALYSIS NONAUTO W/O SCOPE: CPT | Mod: PBBFAC | Performed by: UROLOGY

## 2019-11-11 PROCEDURE — 99213 PR OFFICE/OUTPT VISIT, EST, LEVL III, 20-29 MIN: ICD-10-PCS | Mod: S$PBB,,, | Performed by: UROLOGY

## 2019-11-11 PROCEDURE — 99213 OFFICE O/P EST LOW 20 MIN: CPT | Mod: S$PBB,,, | Performed by: UROLOGY

## 2019-11-11 RX ORDER — METHOCARBAMOL 750 MG/1
750 TABLET, FILM COATED ORAL 3 TIMES DAILY PRN
Qty: 40 TABLET | Refills: 1 | Status: SHIPPED | OUTPATIENT
Start: 2019-11-11 | End: 2019-11-21

## 2019-11-11 RX ORDER — FINASTERIDE 5 MG/1
5 TABLET, FILM COATED ORAL DAILY
Qty: 30 TABLET | Refills: 11 | Status: ON HOLD | OUTPATIENT
Start: 2019-11-11 | End: 2020-02-23 | Stop reason: HOSPADM

## 2019-11-11 RX ORDER — AMOXICILLIN 500 MG/1
CAPSULE ORAL
Refills: 0 | Status: ON HOLD | COMMUNITY
Start: 2019-11-06 | End: 2020-02-23 | Stop reason: HOSPADM

## 2019-11-11 NOTE — PROGRESS NOTES
Chief Complaint: BPH    HPI:   11/11/19: PSA remains low.  BPH not been a problem lately.  Has read a lot about finasteride and flomax.  Discussed finasteride a a preventative hasn't ever taken it.    11/5/18: Was having trouble a month ago with voiding.  Hasn't taken rapaflo in quite a while.  Had started buproprion prior to that and the LUTS came in to play after that mainly with hesitancy.  At day 4 he felt he couldn't get started at all.  He stopped the med, started rapaflo.  Then had constipation/gas and thought he had a UTI.  Been great for a week.  Stopped rapaflo again.  PSA normal.    5/24/17: 65 yo man in last two years had a TURP followed by a RA Simple Prostatectomy.  No LUTS at all.  No abd/pelvic pain and no exac/rel factors.  No hematuria.  No urolithiasis.  No urinary bother.  Normal sexual function.  Takes rapaflo.    Allergies:  Patient has no known allergies.    Medications:  has a current medication list which includes the following prescription(s): mometasone 0.1% and triamcinolone acetonide 0.025%.    Review of Systems:  General: No fever, chills, fatigability, or weight loss.  Skin: No rashes, itching, or changes in color or texture of skin.  Chest: Denies LILLY, cyanosis, wheezing, cough, and sputum production.  Abdomen: Appetite fine. No weight loss. Denies diarrhea, abdominal pain, hematemesis, or blood in stool.  Musculoskeletal: No joint stiffness or swelling. Denies back pain.  : As above.  All other review of systems negative.    PMH:   has a past medical history of Arthritis.    PSH:   has a past surgical history that includes Shoulder arthroscopy (Right); Knee surgery; Total knee arthroplasty; and simple prostatectomy (06/06/2016).    FamHx: family history includes Cancer in his father; Diabetes in his maternal aunt and mother; Heart disease in his mother.    SocHx:  reports that he has never smoked. He quit smokeless tobacco use about 15 years ago.  His smokeless tobacco use included  chew. He reports that he drinks about 2.0 standard drinks of alcohol per week. He reports that he does not use drugs.      Physical Exam:  There were no vitals filed for this visit.  General: A&Ox3, no apparent distress, no deformities  Neck: No masses, normal thyroid  Lungs: normal inspiration, no use of accessory muscles  Heart: normal pulse, no arrhythmias  Abdomen: Soft, NT, ND  Skin: The skin is warm and dry. No jaundice.  Ext: No c/c/e.  :   5/24/17: Test desc ofe, no abnormalities of epididymus. Penis normal, with normal penile and scrotal skin. Meatus normal. Normal rectal tone, no hemorrhoids. Prost 40 gm no nodules or masses appreciated. SV not palpable. Perineum and anus normal.    Labs/Studies:   Urinalysis performed in clinic, summary: UA normal  PSA    2/17: 1.2    10/18: 1.8    11/19: 0.98    Impression/Plan:   1. Low risk of prostate cancer. PSA/RTC 1 year.  2. New finasteride prescription to prevent new onset BPH problems.

## 2019-11-11 NOTE — PROGRESS NOTES
Subjective:       Patient ID: Mekhi Lambert is a 69 y.o. male.    Chief Complaint: Back Pain (pinched nerve); Hand Injury (slammed index finger in the door); and Abrasion (scab/rough skin on right calf )    1)has had 4 years or more of back pain/stiffness from neck to lower back. Has seen pain specialist, has had MRI and NCS without significant findings. These symptoms are not constant, variable in nature and timing. For instance, turning head in certain way causes flashing lights and electrical activity or rising in AM causes pain and electrical activity elsewhere. It was done at [facility] 4 years ago. Dry needling, Yoga, stretching all help. He was evaluated by me for this 6/18, inflammatory w/u was negative.    Review of Systems   Constitutional: Negative for fever and unexpected weight change.   HENT: Negative for congestion and rhinorrhea.    Eyes: Negative for discharge and redness.   Respiratory: Negative for cough and wheezing.    Cardiovascular: Negative for chest pain, palpitations and leg swelling.   Gastrointestinal: Negative for constipation, diarrhea and vomiting.   Genitourinary: Negative for decreased urine volume and difficulty urinating.   Musculoskeletal: Positive for arthralgias, back pain, myalgias, neck pain and neck stiffness. Negative for gait problem and joint swelling.   Skin: Negative for rash and wound.   Neurological: Negative for syncope and headaches.   Psychiatric/Behavioral: Negative for behavioral problems and sleep disturbance.       Objective:      Physical Exam   Constitutional: He is oriented to person, place, and time. He appears well-developed and well-nourished. No distress.   Neck: No JVD present. No thyromegaly present.   Cardiovascular: Normal rate, regular rhythm and normal heart sounds.   No murmur heard.  Pulmonary/Chest: Effort normal and breath sounds normal. No respiratory distress. He has no wheezes. He has no rales.   Abdominal: Soft. He exhibits no  distension and no mass. There is no tenderness. There is no guarding.   Musculoskeletal: He exhibits no edema.   Mild trapezius muscle spasm bilaterally. Very flexible, rom excellent back motion, can place palms on floor. No scoliosis   Lymphadenopathy:     He has no cervical adenopathy.   Neurological: He is alert and oriented to person, place, and time. No cranial nerve deficit. He exhibits normal muscle tone. Coordination normal.   Skin: Capillary refill takes less than 2 seconds. No rash noted.   1 cm seb keratosis, right calf. Right index subnail hematoma w/o infection or tenderness   Psychiatric: He has a normal mood and affect. His behavior is normal. Judgment and thought content normal.       Assessment:       1. Back pain, unspecified back location, unspecified back pain laterality, unspecified chronicity    2. Colon cancer screening        Plan:       Back pain, unspecified back location, unspecified back pain laterality, unspecified chronicity  -     methocarbamol (ROBAXIN) 750 MG Tab; Take 1 tablet (750 mg total) by mouth 3 (three) times daily as needed.  Dispense: 40 tablet; Refill: 1  -     Ambulatory consult to Physical Therapy    Colon cancer screening  -     Case request GI: COLONOSCOPY    ROM is good, I would like to restart dry needling and continue core strength building. Will try robaxin prn. Address mattress sleeping. He agrees to colon cancer screening is about 5 years out. He will only address lipids with life style modifications. thr calf lesion is benign and the finger injury is minimal.

## 2019-11-12 ENCOUNTER — TELEPHONE (OUTPATIENT)
Dept: ENDOSCOPY | Facility: HOSPITAL | Age: 69
End: 2019-11-12

## 2019-11-12 RX ORDER — SODIUM, POTASSIUM,MAG SULFATES 17.5-3.13G
1 SOLUTION, RECONSTITUTED, ORAL ORAL DAILY
Qty: 1 KIT | Refills: 0 | Status: SHIPPED | OUTPATIENT
Start: 2019-11-12 | End: 2019-11-14

## 2019-11-12 NOTE — TELEPHONE ENCOUNTER

## 2019-11-15 LAB — HEMOCCULT STL QL IA: NEGATIVE

## 2019-11-29 ENCOUNTER — TELEPHONE (OUTPATIENT)
Dept: INTERNAL MEDICINE | Facility: CLINIC | Age: 69
End: 2019-11-29

## 2019-11-29 NOTE — TELEPHONE ENCOUNTER
LATE ENTRY 11/27/19: Per Dr. Lopez, faxed pt's signed GENET to [facility] @ [place], [place] requesting pt's Rt TKA op report and Pain Mgt OV from [phone], fax transmission confirmed F#[phone].

## 2019-12-04 ENCOUNTER — TELEPHONE (OUTPATIENT)
Dept: INTERNAL MEDICINE | Facility: CLINIC | Age: 69
End: 2019-12-04

## 2019-12-04 NOTE — TELEPHONE ENCOUNTER
Advised pt response to medical records requested received from [facility], per their medical records dept they have no records on pt from [phone]. Pt voiced understanding, will investigate his records further and bring any papers to office and we will send off new signed GENET at that time to any place that he brings in paperwork from.

## 2020-01-21 ENCOUNTER — TELEPHONE (OUTPATIENT)
Dept: INTERNAL MEDICINE | Facility: CLINIC | Age: 70
End: 2020-01-21

## 2020-02-18 DIAGNOSIS — M54.9 BACK PAIN, UNSPECIFIED BACK LOCATION, UNSPECIFIED BACK PAIN LATERALITY, UNSPECIFIED CHRONICITY: Primary | ICD-10-CM

## 2020-02-18 NOTE — TELEPHONE ENCOUNTER
----- Message from Polly Pate sent at 2/18/2020  1:19 PM CST -----  Contact: Self   Type: RX Refill Request    Who Called: Self     Have you contacted your pharmacy: no     Refill or New Rx:  Refill     RX Name and Strength:methocarbamol (ROBAXIN) 500 MG Tab    Preferred Pharmacy with phone number:[id number] [place] [place] [facility] [address]. [phone] (Phone)  [phone] (Fax)        Local or Mail Order: Local     Ordering Provider: Dr. Lopez     Would the patient rather a call back or a response via [facility]?  Call     Best Call Back Number:[phone]

## 2020-02-18 NOTE — TELEPHONE ENCOUNTER
S/w pt stating his Methocarbamol 750mg rx has , and pt still continuing PT but w/ recent gym exercising pt has strained back, and requesting new rx. Pt confirmeS able to walk and completes ADLs, denies dysuria or LOC, denies other pain reliever or narcotic use. Advised pt would send refill request to Dr. Lopez's assistant CARLITO Byrd since Dr. Lopez out of clinic, and let pt know when receive her response. Pt voiced understanding.    [place]  Verified  [place] 19  [place] not sched

## 2020-02-19 RX ORDER — METHOCARBAMOL 750 MG/1
750 TABLET, FILM COATED ORAL 3 TIMES DAILY PRN
Qty: 30 TABLET | Refills: 0 | Status: SHIPPED | OUTPATIENT
Start: 2020-02-19 | End: 2021-02-01

## 2020-02-22 ENCOUNTER — HOSPITAL ENCOUNTER (INPATIENT)
Facility: HOSPITAL | Age: 70
LOS: 1 days | Discharge: HOME | DRG: 247 | End: 2020-02-23
Attending: EMERGENCY MEDICINE | Admitting: FAMILY MEDICINE
Payer: MEDICARE

## 2020-02-22 DIAGNOSIS — R74.8 ELEVATED LIVER ENZYMES: ICD-10-CM

## 2020-02-22 DIAGNOSIS — I25.10 CAD (CORONARY ARTERY DISEASE): ICD-10-CM

## 2020-02-22 DIAGNOSIS — I21.4 NSTEMI (NON-ST ELEVATED MYOCARDIAL INFARCTION): ICD-10-CM

## 2020-02-22 DIAGNOSIS — E78.00 HYPERCHOLESTEROLEMIA: ICD-10-CM

## 2020-02-22 DIAGNOSIS — M79.10 MUSCLE PAIN: Primary | ICD-10-CM

## 2020-02-22 DIAGNOSIS — R07.9 CHEST PAIN: ICD-10-CM

## 2020-02-22 LAB
ALBUMIN SERPL BCP-MCNC: 3.9 G/DL (ref 3.5–5.2)
ALP SERPL-CCNC: 87 U/L (ref 55–135)
ALT SERPL W/O P-5'-P-CCNC: 67 U/L (ref 10–44)
ANION GAP SERPL CALC-SCNC: 8 MMOL/L (ref 8–16)
APTT BLDCRRT: 26.4 SEC (ref 21–32)
ASCENDING AORTA: 3.7 CM
AST SERPL-CCNC: 70 U/L (ref 10–40)
AV INDEX (PROSTH): 0.63
AV VALVE AREA: 3.1 CM2
BASOPHILS # BLD AUTO: 0.03 K/UL (ref 0–0.2)
BASOPHILS NFR BLD: 0.4 % (ref 0–1.9)
BILIRUB SERPL-MCNC: 0.8 MG/DL (ref 0.1–1)
BSA FOR ECHO PROCEDURE: 1.98 M2
BUN SERPL-MCNC: 14 MG/DL (ref 8–23)
CALCIUM SERPL-MCNC: 9.4 MG/DL (ref 8.7–10.5)
CATH EF ESTIMATED: 45 %
CATH EF QUANTITATIVE: 45 %
CHLORIDE SERPL-SCNC: 108 MMOL/L (ref 95–110)
CO2 SERPL-SCNC: 25 MMOL/L (ref 23–29)
CREAT SERPL-MCNC: 1.2 MG/DL (ref 0.5–1.4)
CV ECHO LV RWT: 0.49 CM
DIFFERENTIAL METHOD: ABNORMAL
DOP CALC AO VTI: 20.7 CM
DOP CALC LVOT AREA: 4.9 CM2
DOP CALC LVOT DIAMETER: 2.5 CM
DOP CALC LVOT STROKE VOLUME: 64.27 CM3
DOP CALC RVOT VTI: 7 CM
DOP CALCLVOT PEAK VEL VTI: 13.1 CM
E WAVE DECELERATION TIME: 161 MSEC
E/A RATIO: 1.17
E/E' RATIO: 12.2 M/S
ECHO LV POSTERIOR WALL: 1.1 CM (ref 0.6–1.1)
EOSINOPHIL # BLD AUTO: 0 K/UL (ref 0–0.5)
EOSINOPHIL NFR BLD: 0.2 % (ref 0–8)
ERYTHROCYTE [DISTWIDTH] IN BLOOD BY AUTOMATED COUNT: 13.4 % (ref 11.5–14.5)
EST. GFR  (AFRICAN AMERICAN): >60 ML/MIN/1.73 M^2
EST. GFR  (NON AFRICAN AMERICAN): >60 ML/MIN/1.73 M^2
FRACTIONAL SHORTENING: 18 % (ref 28–44)
GLUCOSE SERPL-MCNC: 120 MG/DL (ref 70–110)
HCT VFR BLD AUTO: 44.1 % (ref 40–54)
HGB BLD-MCNC: 15.3 G/DL (ref 14–18)
IMM GRANULOCYTES # BLD AUTO: 0.03 K/UL (ref 0–0.04)
IMM GRANULOCYTES NFR BLD AUTO: 0.4 % (ref 0–0.5)
INR PPP: 1 (ref 0.8–1.2)
INTERVENTRICULAR SEPTUM: 1.3 CM (ref 0.6–1.1)
LA MAJOR: 4.6 CM
LA MINOR: 4 CM
LA WIDTH: 4.7 CM
LEFT INTERNAL DIMENSION IN SYSTOLE: 3.7 CM (ref 2.1–4)
LEFT VENTRICLE MASS INDEX: 100 G/M2
LEFT VENTRICULAR INTERNAL DIMENSION IN DIASTOLE: 4.5 CM (ref 3.5–6)
LEFT VENTRICULAR MASS: 198.1 G
LIPASE SERPL-CCNC: 24 U/L (ref 4–60)
LV LATERAL E/E' RATIO: 20.33 M/S
LV SEPTAL E/E' RATIO: 8.71 M/S
LYMPHOCYTES # BLD AUTO: 0.8 K/UL (ref 1–4.8)
LYMPHOCYTES NFR BLD: 10.2 % (ref 18–48)
MAGNESIUM SERPL-MCNC: 1.9 MG/DL (ref 1.6–2.6)
MCH RBC QN AUTO: 30.8 PG (ref 27–31)
MCHC RBC AUTO-ENTMCNC: 34.7 G/DL (ref 32–36)
MCV RBC AUTO: 89 FL (ref 82–98)
MONOCYTES # BLD AUTO: 0.5 K/UL (ref 0.3–1)
MONOCYTES NFR BLD: 5.7 % (ref 4–15)
MV PEAK A VEL: 0.52 M/S
MV PEAK E VEL: 0.61 M/S
NEUTROPHILS # BLD AUTO: 6.8 K/UL (ref 1.8–7.7)
NEUTROPHILS NFR BLD: 83.1 % (ref 38–73)
NRBC BLD-RTO: 0 /100 WBC
PLATELET # BLD AUTO: 177 K/UL (ref 150–350)
PMV BLD AUTO: 10 FL (ref 9.2–12.9)
POC ACTIVATED CLOTTING TIME K: 164 SEC (ref 74–137)
POC ACTIVATED CLOTTING TIME K: 235 SEC (ref 74–137)
POC ACTIVATED CLOTTING TIME K: 296 SEC (ref 74–137)
POTASSIUM SERPL-SCNC: 4.6 MMOL/L (ref 3.5–5.1)
PROT SERPL-MCNC: 6.5 G/DL (ref 6–8.4)
PROTHROMBIN TIME: 10.4 SEC (ref 9–12.5)
PV MEAN GRADIENT: 0 MMHG
RA MAJOR: 4.8 CM
RA PRESSURE: 8 MMHG
RA WIDTH: 2.7 CM
RBC # BLD AUTO: 4.97 M/UL (ref 4.6–6.2)
SAMPLE: ABNORMAL
SODIUM SERPL-SCNC: 141 MMOL/L (ref 136–145)
TDI LATERAL: 0.03 M/S
TDI SEPTAL: 0.07 M/S
TDI: 0.05 M/S
TROPONIN I SERPL DL<=0.01 NG/ML-MCNC: 10.32 NG/ML (ref 0–0.03)
TROPONIN I SERPL DL<=0.01 NG/ML-MCNC: 13.79 NG/ML (ref 0–0.03)
WBC # BLD AUTO: 8.21 K/UL (ref 3.9–12.7)

## 2020-02-22 PROCEDURE — 99223 PR INITIAL HOSPITAL CARE,LEVL III: ICD-10-PCS | Mod: 25,,, | Performed by: INTERNAL MEDICINE

## 2020-02-22 PROCEDURE — 93010 EKG 12-LEAD: ICD-10-PCS | Mod: 59,,, | Performed by: INTERNAL MEDICINE

## 2020-02-22 PROCEDURE — 85025 COMPLETE CBC W/AUTO DIFF WBC: CPT

## 2020-02-22 PROCEDURE — 83735 ASSAY OF MAGNESIUM: CPT

## 2020-02-22 PROCEDURE — 63600175 PHARM REV CODE 636 W HCPCS: Performed by: INTERNAL MEDICINE

## 2020-02-22 PROCEDURE — 25000003 PHARM REV CODE 250: Performed by: EMERGENCY MEDICINE

## 2020-02-22 PROCEDURE — 99291 CRITICAL CARE FIRST HOUR: CPT | Mod: 25

## 2020-02-22 PROCEDURE — 93458 L HRT ARTERY/VENTRICLE ANGIO: CPT | Mod: 59 | Performed by: INTERNAL MEDICINE

## 2020-02-22 PROCEDURE — C1887 CATHETER, GUIDING: HCPCS | Performed by: INTERNAL MEDICINE

## 2020-02-22 PROCEDURE — 27201423 OPTIME MED/SURG SUP & DEVICES STERILE SUPPLY: Performed by: INTERNAL MEDICINE

## 2020-02-22 PROCEDURE — C1894 INTRO/SHEATH, NON-LASER: HCPCS | Performed by: INTERNAL MEDICINE

## 2020-02-22 PROCEDURE — 25000003 PHARM REV CODE 250: Performed by: INTERNAL MEDICINE

## 2020-02-22 PROCEDURE — 92941 PR AMI ANY METHOD: ICD-10-PCS | Mod: 22,LC,, | Performed by: INTERNAL MEDICINE

## 2020-02-22 PROCEDURE — C1769 GUIDE WIRE: HCPCS | Performed by: INTERNAL MEDICINE

## 2020-02-22 PROCEDURE — 93005 ELECTROCARDIOGRAM TRACING: CPT

## 2020-02-22 PROCEDURE — 83690 ASSAY OF LIPASE: CPT

## 2020-02-22 PROCEDURE — 99153 MOD SED SAME PHYS/QHP EA: CPT | Performed by: INTERNAL MEDICINE

## 2020-02-22 PROCEDURE — 99152 PR MOD CONSCIOUS SEDATION, SAME PHYS, 5+ YRS, FIRST 15 MIN: ICD-10-PCS | Mod: ,,, | Performed by: INTERNAL MEDICINE

## 2020-02-22 PROCEDURE — 99152 MOD SED SAME PHYS/QHP 5/>YRS: CPT | Mod: ,,, | Performed by: INTERNAL MEDICINE

## 2020-02-22 PROCEDURE — 99223 1ST HOSP IP/OBS HIGH 75: CPT | Mod: 25,,, | Performed by: INTERNAL MEDICINE

## 2020-02-22 PROCEDURE — 84484 ASSAY OF TROPONIN QUANT: CPT

## 2020-02-22 PROCEDURE — 96374 THER/PROPH/DIAG INJ IV PUSH: CPT

## 2020-02-22 PROCEDURE — 99152 MOD SED SAME PHYS/QHP 5/>YRS: CPT | Performed by: INTERNAL MEDICINE

## 2020-02-22 PROCEDURE — 84484 ASSAY OF TROPONIN QUANT: CPT | Mod: 91

## 2020-02-22 PROCEDURE — 20000000 HC ICU ROOM

## 2020-02-22 PROCEDURE — 63600175 PHARM REV CODE 636 W HCPCS

## 2020-02-22 PROCEDURE — 96375 TX/PRO/DX INJ NEW DRUG ADDON: CPT

## 2020-02-22 PROCEDURE — 92941 PRQ TRLML REVSC TOT OCCL AMI: CPT | Mod: 22,LC,, | Performed by: INTERNAL MEDICINE

## 2020-02-22 PROCEDURE — 80074 ACUTE HEPATITIS PANEL: CPT

## 2020-02-22 PROCEDURE — 63600175 PHARM REV CODE 636 W HCPCS: Mod: JG | Performed by: FAMILY MEDICINE

## 2020-02-22 PROCEDURE — 85610 PROTHROMBIN TIME: CPT

## 2020-02-22 PROCEDURE — 25500020 PHARM REV CODE 255

## 2020-02-22 PROCEDURE — C1874 STENT, COATED/COV W/DEL SYS: HCPCS | Performed by: INTERNAL MEDICINE

## 2020-02-22 PROCEDURE — 80053 COMPREHEN METABOLIC PANEL: CPT

## 2020-02-22 PROCEDURE — 36415 COLL VENOUS BLD VENIPUNCTURE: CPT

## 2020-02-22 PROCEDURE — 85730 THROMBOPLASTIN TIME PARTIAL: CPT

## 2020-02-22 PROCEDURE — 25500020 PHARM REV CODE 255: Performed by: INTERNAL MEDICINE

## 2020-02-22 PROCEDURE — 93010 ELECTROCARDIOGRAM REPORT: CPT | Mod: 59,,, | Performed by: INTERNAL MEDICINE

## 2020-02-22 PROCEDURE — 93458 L HRT ARTERY/VENTRICLE ANGIO: CPT | Mod: 26,22,59,51 | Performed by: INTERNAL MEDICINE

## 2020-02-22 PROCEDURE — 25000003 PHARM REV CODE 250

## 2020-02-22 PROCEDURE — C1725 CATH, TRANSLUMIN NON-LASER: HCPCS | Performed by: INTERNAL MEDICINE

## 2020-02-22 PROCEDURE — 63600175 PHARM REV CODE 636 W HCPCS: Performed by: EMERGENCY MEDICINE

## 2020-02-22 PROCEDURE — 93458 PR CATH PLACE/CORON ANGIO, IMG SUPER/INTERP,W LEFT HEART VENTRICULOGRAPHY: ICD-10-PCS | Mod: 26,22,59,51 | Performed by: INTERNAL MEDICINE

## 2020-02-22 PROCEDURE — 63700000 PHARM REV CODE 250 ALT 637 W/O HCPCS: Performed by: EMERGENCY MEDICINE

## 2020-02-22 PROCEDURE — C9606 PERC D-E COR REVASC W AMI S: HCPCS | Mod: LC | Performed by: INTERNAL MEDICINE

## 2020-02-22 DEVICE — STENT RONYX35038UX RESOLUTE ONYX 3.50X38
Type: IMPLANTABLE DEVICE | Site: HEART | Status: FUNCTIONAL
Brand: RESOLUTE ONYX™

## 2020-02-22 RX ORDER — PANTOPRAZOLE SODIUM 40 MG/1
40 TABLET, DELAYED RELEASE ORAL DAILY
Status: DISCONTINUED | OUTPATIENT
Start: 2020-02-22 | End: 2020-02-23 | Stop reason: HOSPADM

## 2020-02-22 RX ORDER — MELOXICAM 15 MG/1
15 TABLET ORAL DAILY
Qty: 10 TABLET | Refills: 0 | Status: ON HOLD | OUTPATIENT
Start: 2020-02-22 | End: 2020-03-03 | Stop reason: HOSPADM

## 2020-02-22 RX ORDER — LIDOCAINE HYDROCHLORIDE 20 MG/ML
INJECTION, SOLUTION EPIDURAL; INFILTRATION; INTRACAUDAL; PERINEURAL
Status: DISCONTINUED | OUTPATIENT
Start: 2020-02-22 | End: 2020-02-22

## 2020-02-22 RX ORDER — PHENYLEPHRINE HYDROCHLORIDE 10 MG/ML
INJECTION INTRAVENOUS
Status: DISCONTINUED | OUTPATIENT
Start: 2020-02-22 | End: 2020-02-22

## 2020-02-22 RX ORDER — TIROFIBAN HYDROCHLORIDE 50 UG/ML
0.15 INJECTION INTRAVENOUS CONTINUOUS
Status: DISCONTINUED | OUTPATIENT
Start: 2020-02-22 | End: 2020-02-22

## 2020-02-22 RX ORDER — VERAPAMIL HYDROCHLORIDE 2.5 MG/ML
INJECTION, SOLUTION INTRAVENOUS
Status: DISCONTINUED | OUTPATIENT
Start: 2020-02-22 | End: 2020-02-22

## 2020-02-22 RX ORDER — NITROGLYCERIN 0.4 MG/1
0.4 TABLET SUBLINGUAL
Status: COMPLETED | OUTPATIENT
Start: 2020-02-22 | End: 2020-02-22

## 2020-02-22 RX ORDER — MIDAZOLAM HYDROCHLORIDE 1 MG/ML
INJECTION, SOLUTION INTRAMUSCULAR; INTRAVENOUS
Status: DISCONTINUED | OUTPATIENT
Start: 2020-02-22 | End: 2020-02-22

## 2020-02-22 RX ORDER — ASPIRIN 81 MG/1
81 TABLET ORAL DAILY
Status: DISCONTINUED | OUTPATIENT
Start: 2020-02-23 | End: 2020-02-23 | Stop reason: HOSPADM

## 2020-02-22 RX ORDER — TIROFIBAN HYDROCHLORIDE 50 UG/ML
25 INJECTION INTRAVENOUS ONCE
Status: DISCONTINUED | OUTPATIENT
Start: 2020-02-22 | End: 2020-02-22

## 2020-02-22 RX ORDER — SODIUM CHLORIDE 9 MG/ML
INJECTION, SOLUTION INTRAVENOUS CONTINUOUS
Status: ACTIVE | OUTPATIENT
Start: 2020-02-22 | End: 2020-02-23

## 2020-02-22 RX ORDER — ASPIRIN 325 MG
325 TABLET ORAL
Status: COMPLETED | OUTPATIENT
Start: 2020-02-22 | End: 2020-02-22

## 2020-02-22 RX ORDER — ONDANSETRON 8 MG/1
8 TABLET, ORALLY DISINTEGRATING ORAL EVERY 8 HOURS PRN
Status: DISCONTINUED | OUTPATIENT
Start: 2020-02-22 | End: 2020-02-23 | Stop reason: HOSPADM

## 2020-02-22 RX ORDER — NITROGLYCERIN 5 MG/ML
INJECTION, SOLUTION INTRAVENOUS
Status: DISCONTINUED | OUTPATIENT
Start: 2020-02-22 | End: 2020-02-22

## 2020-02-22 RX ORDER — HEPARIN SODIUM 1000 [USP'U]/ML
INJECTION INTRAVENOUS; SUBCUTANEOUS
Status: DISCONTINUED | OUTPATIENT
Start: 2020-02-22 | End: 2020-02-22

## 2020-02-22 RX ORDER — SODIUM NITROPRUSSIDE 25 MG/ML
INJECTION INTRAVENOUS
Status: DISCONTINUED | OUTPATIENT
Start: 2020-02-22 | End: 2020-02-22

## 2020-02-22 RX ORDER — FENTANYL CITRATE 50 UG/ML
INJECTION, SOLUTION INTRAMUSCULAR; INTRAVENOUS
Status: DISCONTINUED | OUTPATIENT
Start: 2020-02-22 | End: 2020-02-22

## 2020-02-22 RX ORDER — HEPARIN SODIUM 1000 [USP'U]/ML
4000 INJECTION, SOLUTION INTRAVENOUS; SUBCUTANEOUS
Status: COMPLETED | OUTPATIENT
Start: 2020-02-22 | End: 2020-02-22

## 2020-02-22 RX ORDER — OXYCODONE HYDROCHLORIDE 5 MG/1
5 TABLET ORAL EVERY 4 HOURS PRN
Status: DISCONTINUED | OUTPATIENT
Start: 2020-02-22 | End: 2020-02-23 | Stop reason: HOSPADM

## 2020-02-22 RX ORDER — TIROFIBAN HYDROCHLORIDE 50 UG/ML
INJECTION INTRAVENOUS
Status: DISCONTINUED | OUTPATIENT
Start: 2020-02-22 | End: 2020-02-22

## 2020-02-22 RX ORDER — ENOXAPARIN SODIUM 100 MG/ML
40 INJECTION SUBCUTANEOUS EVERY 24 HOURS
Status: DISCONTINUED | OUTPATIENT
Start: 2020-02-23 | End: 2020-02-23 | Stop reason: HOSPADM

## 2020-02-22 RX ORDER — SODIUM CHLORIDE 9 MG/ML
1000 INJECTION, SOLUTION INTRAVENOUS
Status: DISCONTINUED | OUTPATIENT
Start: 2020-02-22 | End: 2020-02-22

## 2020-02-22 RX ORDER — TIROFIBAN HYDROCHLORIDE 50 UG/ML
0.15 INJECTION INTRAVENOUS CONTINUOUS
Status: DISCONTINUED | OUTPATIENT
Start: 2020-02-22 | End: 2020-02-23 | Stop reason: HOSPADM

## 2020-02-22 RX ORDER — METOPROLOL TARTRATE 25 MG/1
25 TABLET, FILM COATED ORAL 2 TIMES DAILY
Status: DISCONTINUED | OUTPATIENT
Start: 2020-02-22 | End: 2020-02-23 | Stop reason: HOSPADM

## 2020-02-22 RX ORDER — METOPROLOL TARTRATE 1 MG/ML
5 INJECTION, SOLUTION INTRAVENOUS
Status: COMPLETED | OUTPATIENT
Start: 2020-02-22 | End: 2020-02-22

## 2020-02-22 RX ORDER — DIPHENHYDRAMINE HYDROCHLORIDE 50 MG/ML
25 INJECTION INTRAMUSCULAR; INTRAVENOUS EVERY 6 HOURS PRN
Status: DISCONTINUED | OUTPATIENT
Start: 2020-02-22 | End: 2020-02-23 | Stop reason: HOSPADM

## 2020-02-22 RX ORDER — FINASTERIDE 5 MG/1
5 TABLET, FILM COATED ORAL DAILY
Status: DISCONTINUED | OUTPATIENT
Start: 2020-02-23 | End: 2020-02-23 | Stop reason: HOSPADM

## 2020-02-22 RX ADMIN — ASPIRIN 325 MG: 325 TABLET ORAL at 02:02

## 2020-02-22 RX ADMIN — SODIUM CHLORIDE 250 ML: 0.9 INJECTION, SOLUTION INTRAVENOUS at 02:02

## 2020-02-22 RX ADMIN — SODIUM CHLORIDE: 0.9 INJECTION, SOLUTION INTRAVENOUS at 05:02

## 2020-02-22 RX ADMIN — NITROGLYCERIN 0.4 MG: 0.4 TABLET, ORALLY DISINTEGRATING SUBLINGUAL at 02:02

## 2020-02-22 RX ADMIN — TIROFIBAN 0.15 MCG/KG/MIN: 5 INJECTION, SOLUTION INTRAVENOUS at 06:02

## 2020-02-22 RX ADMIN — PANTOPRAZOLE SODIUM 40 MG: 40 TABLET, DELAYED RELEASE ORAL at 05:02

## 2020-02-22 RX ADMIN — HEPARIN SODIUM 4000 UNITS: 1000 INJECTION INTRAVENOUS; SUBCUTANEOUS at 02:02

## 2020-02-22 RX ADMIN — METOPROLOL TARTRATE 5 MG: 5 INJECTION INTRAVENOUS at 02:02

## 2020-02-22 RX ADMIN — METOPROLOL TARTRATE 25 MG: 25 TABLET ORAL at 08:02

## 2020-02-22 RX ADMIN — NITROGLYCERIN 1 INCH: 20 OINTMENT TOPICAL at 02:02

## 2020-02-22 NOTE — H&P (VIEW-ONLY)
[facility] -   Cardiology  Consult Note    Patient Name: Mekhi Lambert  MRN: [id number]  Admission Date: 2/22/2020  Hospital Length of Stay: 0 days  Code Status: No Order   Attending Provider: Darek Stevens MD   Consulting Provider: Francis Melchor MD  Primary Care Physician: ABIGAIL Lopez Jr, MD  Principal Problem:<principal problem not specified>    Patient information was obtained from patient and ER records.     Inpatient consult to Cardiology  Consult performed by: Ute Melchor MD  Consult ordered by: Darek Stevens MD  Reason for consult: nstemi        Subjective:     Chief Complaint:  nstemi with continued angina     HPI:   A 68 yo male with h/o hlp intolerance to statins due to gi symptoms and severe myalgias has been having intermittent interscapular pain and shoulder pain intermittently since last Monday has no exertional symptoms he had multiple episodes today leading to er visit troponin showing 13 and has st depression in v1-v3 . His echo shows inferoposterior hk. He has been getting dry needling and massage for the pain. He ahs no claudication tia shortness of breath syncope or near syncope. He wa streated with iv heparin in the er  b blcokers nitrates ivf cath lab called for urgent lhc.     Past Medical History:   Diagnosis Date    Arthritis        Past Surgical History:   Procedure Laterality Date    KNEE SURGERY      SHOULDER ARTHROSCOPY Right     Dr Bella    simple prostatectomy  06/06/2016    robotic assisted partial prostectomy due to BPH    TOTAL KNEE ARTHROPLASTY         Review of patient's allergies indicates:  No Known Allergies    No current facility-administered medications on file prior to encounter.      Current Outpatient Medications on File Prior to Encounter   Medication Sig    amoxicillin (AMOXIL) 500 MG capsule TAKE 1 CAPSULE BY MOUTH EVERY 8 HOURS UNTIL GONE, TAKE 4 CAPSULES BY MOUTH 1 HOUR BEFORE PROCEDURE    finasteride (PROSCAR) 5 mg tablet Take 1  tablet (5 mg total) by mouth once daily.    methocarbamol (ROBAXIN) 750 MG Tab Take 1 tablet (750 mg total) by mouth 3 (three) times daily as needed.    mometasone 0.1% (ELOCON) 0.1 % cream AAA bid prn for psoriasis. Strong steroid.  Do not use on face, underarms or groin.    triamcinolone acetonide 0.025% (KENALOG) 0.025 % cream Apply topically 2 (two) times daily as needed. Mild steroid.  Safe for face up to 2 weeks then stop.     Family History     Problem Relation (Age of Onset)    Cancer Father    Diabetes Mother, Maternal Aunt    Heart disease Mother        Tobacco Use    Smoking status: Never Smoker    Smokeless tobacco: Former User     Types: Chew   Substance and Sexual Activity    Alcohol use: Yes     Alcohol/week: 2.0 standard drinks     Types: 2 Glasses of wine per week     Comment: per week    Drug use: No    Sexual activity: Not on file     Review of Systems   Constitution: Negative for malaise/fatigue.   Eyes: Negative for blurred vision.   Cardiovascular: Positive for chest pain. Negative for claudication, cyanosis, dyspnea on exertion, irregular heartbeat, leg swelling, near-syncope, orthopnea, palpitations and paroxysmal nocturnal dyspnea.   Respiratory: Negative for cough, hemoptysis and shortness of breath.    Hematologic/Lymphatic: Negative for bleeding problem. Does not bruise/bleed easily.   Skin: Negative for dry skin and itching.   Musculoskeletal: Negative for falls, muscle weakness and myalgias.        Shoulder and interscapular pain.   Gastrointestinal: Negative for abdominal pain, diarrhea, heartburn, hematemesis, hematochezia and melena.   Genitourinary: Negative for flank pain and hematuria.   Neurological: Negative for dizziness, focal weakness, headaches, light-headedness, numbness, paresthesias, seizures and weakness.   Psychiatric/Behavioral: Negative for altered mental status and memory loss. The patient is not nervous/anxious.    Allergic/Immunologic: Negative for hives.      Objective:     Vital Signs (Most Recent):  Pulse: 92 (02/22/20 1339)  Resp: 17 (02/22/20 1250)  BP: (!) 162/102 (02/22/20 1339)  SpO2: 97 % (02/22/20 1339) Vital Signs (24h Range):  Pulse:  [76-92] 92  Resp:  [16-17] 17  SpO2:  [97 %-98 %] 97 %  BP: (139-162)/() 162/102     Weight: 78.4 kg (172 lb 13.5 oz)  Body mass index is 24.11 kg/m².    SpO2: 97 %  O2 Device (Oxygen Therapy): room air    No intake or output data in the 24 hours ending 02/22/20 1433    Lines/Drains/Airways     Peripheral Intravenous Line                 Peripheral IV - Single Lumen 02/22/20 1357 20 G Right Antecubital less than 1 day         Peripheral IV - Single Lumen 02/22/20 1415 18 G Left Antecubital less than 1 day                Physical Exam   Constitutional: He is oriented to person, place, and time. He appears well-developed and well-nourished. No distress.   HENT:   Head: Normocephalic and atraumatic.   Eyes: Pupils are equal, round, and reactive to light. EOM are normal. Right eye exhibits no discharge. Left eye exhibits no discharge.   Neck: Neck supple. No JVD present. No thyromegaly present.   Cardiovascular: Normal rate, regular rhythm, normal heart sounds and intact distal pulses. Exam reveals no gallop and no friction rub.   No murmur heard.  Pulmonary/Chest: Effort normal and breath sounds normal. No respiratory distress. He has no wheezes. He has no rales. He exhibits no tenderness.   Abdominal: Soft. Bowel sounds are normal. He exhibits no distension. There is no tenderness.   Musculoskeletal: Normal range of motion. He exhibits no edema.   Neurological: He is alert and oriented to person, place, and time. No cranial nerve deficit.   Skin: Skin is warm and dry. No rash noted. He is not diaphoretic. No erythema.   Psychiatric: He has a normal mood and affect. His behavior is normal.   Nursing note and vitals reviewed.      Significant Labs:   Recent Lab Results       02/22/20  1143        Albumin 3.9     Alkaline  Phosphatase 87     ALT 67     Anion Gap 8     AST 70     Baso # 0.03     Basophil% 0.4     BILIRUBIN TOTAL 0.8  Comment:  For infants and newborns, interpretation of results should be based  on gestational age, weight and in agreement with clinical  observations.  Premature Infant recommended reference ranges:  Up to 24 hours.............<8.0 mg/dL  Up to 48 hours............<12.0 mg/dL  3-5 days..................<15.0 mg/dL  6-29 days.................<15.0 mg/dL       BUN, Bld 14     Calcium 9.4     Chloride 108     CO2 25     Creatinine 1.2     Differential Method Automated     eGFR if  >60     eGFR if non  >60  Comment:  Calculation used to obtain the estimated glomerular filtration  rate (eGFR) is the CKD-EPI equation.        Eos # 0.0     Eosinophil% 0.2     Glucose 120     Gran # (ANC) 6.8     Gran% 83.1     Hematocrit 44.1     Hemoglobin 15.3     Immature Grans (Abs) 0.03  Comment:  Mild elevation in immature granulocytes is non specific and   can be seen in a variety of conditions including stress response,   acute inflammation, trauma and pregnancy. Correlation with other   laboratory and clinical findings is essential.       Immature Granulocytes 0.4     Lipase 24     Lymph # 0.8     Lymph% 10.2     Magnesium 1.9     MCH 30.8     MCHC 34.7     MCV 89     Mono # 0.5     Mono% 5.7     MPV 10.0     nRBC 0     Platelets 177     Potassium 4.6     PROTEIN TOTAL 6.5     RBC 4.97     RDW 13.4     Sodium 141     Troponin I 13.791  Comment:  The reference interval for Troponin I represents the 99th percentile   cutoff   for our facility and is consistent with 3rd generation assay   performance.       WBC 8.21           Significant Imaging:cxr reviewed.  Reviewed ekg showing st depression..    Assessment and Plan:     NSTEMI (non-ST elevated myocardial infarction)  Has nstemi with stuttering course and continued angina with ekg changes and wall motion abnormalities will trteat with  standard therapy and  proceed urgent lhc/ptca  I have explained the risks, benefits , and alternatives of the procedure in detail.the patient voices understanding and all questions have been answered.the patient agrees to proceed as planned.    Hypercholesterolemia  intolerant to statins        VTE Risk Mitigation (From admission, onward)    None          Thank you for your consult. I will follow-up with patient. Please contact us if you have any additional questions.    Francis Melchor MD  Cardiology   [facility]

## 2020-02-22 NOTE — CONSULTS
[facility] -   Cardiology  Consult Note    Patient Name: Mekhi Lambert  MRN: [id number]  Admission Date: 2/22/2020  Hospital Length of Stay: 0 days  Code Status: No Order   Attending Provider: Darek Stevens MD   Consulting Provider: Francis Melchor MD  Primary Care Physician: ABIGAIL Lopez Jr, MD  Principal Problem:<principal problem not specified>    Patient information was obtained from patient and ER records.     Inpatient consult to Cardiology  Consult performed by: Ute Melchor MD  Consult ordered by: Darek Stevens MD  Reason for consult: nstemi        Subjective:     Chief Complaint:  nstemi with continued angina     HPI:   A 70 yo male with h/o hlp intolerance to statins due to gi symptoms and severe myalgias has been having intermittent interscapular pain and shoulder pain intermittently since last Monday has no exertional symptoms he had multiple episodes today leading to er visit troponin showing 13 and has st depression in v1-v3 . His echo shows inferoposterior hk. He has been getting dry needling and massage for the pain. He ahs no claudication tia shortness of breath syncope or near syncope. He wa streated with iv heparin in the er  b blcokers nitrates ivf cath lab called for urgent lhc.     Past Medical History:   Diagnosis Date    Arthritis        Past Surgical History:   Procedure Laterality Date    KNEE SURGERY      SHOULDER ARTHROSCOPY Right     Dr Bella    simple prostatectomy  06/06/2016    robotic assisted partial prostectomy due to BPH    TOTAL KNEE ARTHROPLASTY         Review of patient's allergies indicates:  No Known Allergies    No current facility-administered medications on file prior to encounter.      Current Outpatient Medications on File Prior to Encounter   Medication Sig    amoxicillin (AMOXIL) 500 MG capsule TAKE 1 CAPSULE BY MOUTH EVERY 8 HOURS UNTIL GONE, TAKE 4 CAPSULES BY MOUTH 1 HOUR BEFORE PROCEDURE    finasteride (PROSCAR) 5 mg tablet Take 1  tablet (5 mg total) by mouth once daily.    methocarbamol (ROBAXIN) 750 MG Tab Take 1 tablet (750 mg total) by mouth 3 (three) times daily as needed.    mometasone 0.1% (ELOCON) 0.1 % cream AAA bid prn for psoriasis. Strong steroid.  Do not use on face, underarms or groin.    triamcinolone acetonide 0.025% (KENALOG) 0.025 % cream Apply topically 2 (two) times daily as needed. Mild steroid.  Safe for face up to 2 weeks then stop.     Family History     Problem Relation (Age of Onset)    Cancer Father    Diabetes Mother, Maternal Aunt    Heart disease Mother        Tobacco Use    Smoking status: Never Smoker    Smokeless tobacco: Former User     Types: Chew   Substance and Sexual Activity    Alcohol use: Yes     Alcohol/week: 2.0 standard drinks     Types: 2 Glasses of wine per week     Comment: per week    Drug use: No    Sexual activity: Not on file     Review of Systems   Constitution: Negative for malaise/fatigue.   Eyes: Negative for blurred vision.   Cardiovascular: Positive for chest pain. Negative for claudication, cyanosis, dyspnea on exertion, irregular heartbeat, leg swelling, near-syncope, orthopnea, palpitations and paroxysmal nocturnal dyspnea.   Respiratory: Negative for cough, hemoptysis and shortness of breath.    Hematologic/Lymphatic: Negative for bleeding problem. Does not bruise/bleed easily.   Skin: Negative for dry skin and itching.   Musculoskeletal: Negative for falls, muscle weakness and myalgias.        Shoulder and interscapular pain.   Gastrointestinal: Negative for abdominal pain, diarrhea, heartburn, hematemesis, hematochezia and melena.   Genitourinary: Negative for flank pain and hematuria.   Neurological: Negative for dizziness, focal weakness, headaches, light-headedness, numbness, paresthesias, seizures and weakness.   Psychiatric/Behavioral: Negative for altered mental status and memory loss. The patient is not nervous/anxious.    Allergic/Immunologic: Negative for hives.      Objective:     Vital Signs (Most Recent):  Pulse: 92 (02/22/20 1339)  Resp: 17 (02/22/20 1250)  BP: (!) 162/102 (02/22/20 1339)  SpO2: 97 % (02/22/20 1339) Vital Signs (24h Range):  Pulse:  [76-92] 92  Resp:  [16-17] 17  SpO2:  [97 %-98 %] 97 %  BP: (139-162)/() 162/102     Weight: 78.4 kg (172 lb 13.5 oz)  Body mass index is 24.11 kg/m².    SpO2: 97 %  O2 Device (Oxygen Therapy): room air    No intake or output data in the 24 hours ending 02/22/20 1433    Lines/Drains/Airways     Peripheral Intravenous Line                 Peripheral IV - Single Lumen 02/22/20 1357 20 G Right Antecubital less than 1 day         Peripheral IV - Single Lumen 02/22/20 1415 18 G Left Antecubital less than 1 day                Physical Exam   Constitutional: He is oriented to person, place, and time. He appears well-developed and well-nourished. No distress.   HENT:   Head: Normocephalic and atraumatic.   Eyes: Pupils are equal, round, and reactive to light. EOM are normal. Right eye exhibits no discharge. Left eye exhibits no discharge.   Neck: Neck supple. No JVD present. No thyromegaly present.   Cardiovascular: Normal rate, regular rhythm, normal heart sounds and intact distal pulses. Exam reveals no gallop and no friction rub.   No murmur heard.  Pulmonary/Chest: Effort normal and breath sounds normal. No respiratory distress. He has no wheezes. He has no rales. He exhibits no tenderness.   Abdominal: Soft. Bowel sounds are normal. He exhibits no distension. There is no tenderness.   Musculoskeletal: Normal range of motion. He exhibits no edema.   Neurological: He is alert and oriented to person, place, and time. No cranial nerve deficit.   Skin: Skin is warm and dry. No rash noted. He is not diaphoretic. No erythema.   Psychiatric: He has a normal mood and affect. His behavior is normal.   Nursing note and vitals reviewed.      Significant Labs:   Recent Lab Results       02/22/20  1143        Albumin 3.9     Alkaline  Phosphatase 87     ALT 67     Anion Gap 8     AST 70     Baso # 0.03     Basophil% 0.4     BILIRUBIN TOTAL 0.8  Comment:  For infants and newborns, interpretation of results should be based  on gestational age, weight and in agreement with clinical  observations.  Premature Infant recommended reference ranges:  Up to 24 hours.............<8.0 mg/dL  Up to 48 hours............<12.0 mg/dL  3-5 days..................<15.0 mg/dL  6-29 days.................<15.0 mg/dL       BUN, Bld 14     Calcium 9.4     Chloride 108     CO2 25     Creatinine 1.2     Differential Method Automated     eGFR if  >60     eGFR if non  >60  Comment:  Calculation used to obtain the estimated glomerular filtration  rate (eGFR) is the CKD-EPI equation.        Eos # 0.0     Eosinophil% 0.2     Glucose 120     Gran # (ANC) 6.8     Gran% 83.1     Hematocrit 44.1     Hemoglobin 15.3     Immature Grans (Abs) 0.03  Comment:  Mild elevation in immature granulocytes is non specific and   can be seen in a variety of conditions including stress response,   acute inflammation, trauma and pregnancy. Correlation with other   laboratory and clinical findings is essential.       Immature Granulocytes 0.4     Lipase 24     Lymph # 0.8     Lymph% 10.2     Magnesium 1.9     MCH 30.8     MCHC 34.7     MCV 89     Mono # 0.5     Mono% 5.7     MPV 10.0     nRBC 0     Platelets 177     Potassium 4.6     PROTEIN TOTAL 6.5     RBC 4.97     RDW 13.4     Sodium 141     Troponin I 13.791  Comment:  The reference interval for Troponin I represents the 99th percentile   cutoff   for our facility and is consistent with 3rd generation assay   performance.       WBC 8.21           Significant Imaging:cxr reviewed.  Reviewed ekg showing st depression..    Assessment and Plan:     NSTEMI (non-ST elevated myocardial infarction)  Has nstemi with stuttering course and continued angina with ekg changes and wall motion abnormalities will trteat with  standard therapy and  proceed urgent lhc/ptca  I have explained the risks, benefits , and alternatives of the procedure in detail.the patient voices understanding and all questions have been answered.the patient agrees to proceed as planned.    Hypercholesterolemia  intolerant to statins        VTE Risk Mitigation (From admission, onward)    None          Thank you for your consult. I will follow-up with patient. Please contact us if you have any additional questions.    Francis Melchor MD  Cardiology   [facility]

## 2020-02-22 NOTE — ED NOTES
Dr. Stevens at bedside at this time, patient reports that he is no longer in pain. Patient calm and following commands, wife at bedside. Will continue to monitor patient.

## 2020-02-22 NOTE — ED NOTES
Pt lying in bed comfortably with wife at bedside. AAO x 4. Resp even and unlabored with equal chest rise and fall. Skin warm and dry. 20G PIV noted to R AC. Flushes well, drg CDI. Pt reports pain in center of chest that radiates down arms with numbness and tingling. Side rails up x 2. Call light within reach.  Pt denies any needs or assist at this time.

## 2020-02-22 NOTE — ED NOTES
Patient shaking and c/o chest pain and tingling in arms and fingertips - Sonia VILLALOBOS talking with Dr. Stevens at this time. Will continue to monitor patient.

## 2020-02-22 NOTE — INTERVAL H&P NOTE
The patient has been examined and the H&P has been reviewed:    I concur with the findings and no changes have occurred since H&P was written.    Anesthesia/Surgery risks, benefits and alternative options discussed and understood by patient/family.  I have explained the risks, benefits , and alternatives of the procedure in detail.the patient voices understanding and all questions have been answered.the patient agrees to proceed as planned.          Active Hospital Problems    Diagnosis  POA    NSTEMI (non-ST elevated myocardial infarction) [I21.4]  Yes    Hypercholesterolemia [E78.00]  Yes      Resolved Hospital Problems   No resolved problems to display.

## 2020-02-22 NOTE — ED PROVIDER NOTES
SCRIBE #1 NOTE: I, Naomie Clemente, am scribing for, and in the presence of, Darek Stevens MD. I have scribed the entire note.       History     Chief Complaint   Patient presents with    Muscle Pain     pt reports trapezius pain bilaterally. Pt reports that he sees PT and does dry needling for pain     Review of patient's allergies indicates:  No Known Allergies      History of Present Illness     HPI    2/22/2020, 1:56 PM  History obtained from the patient      History of Present Illness: Mekhi Lambert is a 69 y.o. male patient with a h/o arthritis who presents to the Emergency Department for evaluation of CP which onset this PTA. Pt reports he came to the ED for chronic bilateral shoulder pain and when he was about to leave the ED he had sudden CP. Pt states his daughter had an AA. Symptoms are intermittent and moderate in severity. No mitigating or exacerbating factors reported. Associated sxs include nausea. Patient denies any fever, sore throat, SOB, emesis, diarrhea, dysuria, back pain, rash, HA, bruises/blds easily, and all other sxs at this time. No prior Tx reported. No further complaints or concerns at this time.       Arrival mode: Personal transportation      PCP: ABIGAIL Lopez Jr, MD      Past Medical History:  Past Medical History:   Diagnosis Date    Arthritis     NSTEMI (non-ST elevated myocardial infarction) 2/22/2020       Past Surgical History:  Past Surgical History:   Procedure Laterality Date    KNEE SURGERY      SHOULDER ARTHROSCOPY Right     Dr Bella    simple prostatectomy  06/06/2016    robotic assisted partial prostectomy due to BPH    TOTAL KNEE ARTHROPLASTY           Family History:  Family History   Problem Relation Age of Onset    Heart disease Mother         lupus related    Diabetes Mother     Cancer Father         prostate, bone, lung, skin cancers all seperate types    Diabetes Maternal Aunt        Social History:   Social History     Tobacco Use    Smoking  status: Never Smoker    Smokeless tobacco: Former User     Types: Chew   Substance and Sexual Activity    Alcohol use: Yes     Alcohol/week: 2.0 standard drinks     Types: 2 Glasses of wine per week     Comment: per week    Drug use: No    Sexual activity: Unknown        Review of Systems     Review of Systems   Constitutional: Negative for fever.   HENT: Negative for sore throat.    Respiratory: Negative for shortness of breath.    Cardiovascular: Positive for chest pain.   Gastrointestinal: Positive for nausea. Negative for diarrhea and vomiting.   Genitourinary: Negative for dysuria.   Musculoskeletal: Negative for back pain.   Skin: Negative for rash.   Neurological: Negative for headaches.   Hematological: Does not bruise/bleed easily.   All other systems reviewed and are negative.       Physical Exam     Initial Vitals [02/22/20 1053]   BP Pulse Resp Temp SpO2   (!) 159/98 82 16 -- 98 %      MAP       --          Physical Exam  Nursing Notes and Vital Signs Reviewed.  Constitutional: Well-developed and well-nourished.   Head: Atraumatic. Normocephalic.  Eyes: EOM intact. No scleral icterus.  ENT: Mucous membranes are moist. Oropharynx is clear and symmetric.    Neck: Supple. Full ROM. No lymphadenopathy.  Cardiovascular: Regular rate. Regular rhythm. No murmurs, rubs, or gallops. Distal pulses are 2+ and symmetric.  Pulmonary/Chest: No respiratory distress. Clear to auscultation bilaterally. No wheezing or rales.  Abdominal: Soft and non-distended.  There is no tenderness.  No rebound, guarding, or rigidity. Good bowel sounds.  Genitourinary: No CVA tenderness  Musculoskeletal: Moves all extremities. No obvious deformities. No calf tenderness.  Skin: Warm and dry.  Neurological:  Alert, awake, and appropriate.  Normal speech.  No acute focal neurological deficits are appreciated.  Psychiatric: Normal affect. Good eye contact. Appropriate in content.     ED Course   Critical Care  Date/Time: 2/22/2020 2:18  "PM  Performed by: Darek Stevens MD  Authorized by: Darek Stevens MD   Direct patient critical care time: 11 minutes  Additional history critical care time: 7 minutes  Ordering / reviewing critical care time: 8 minutes  Documentation critical care time: 6 minutes  Consulting other physicians critical care time: 5 minutes  Total critical care time (exclusive of procedural time) : 37 minutes  Critical care time was exclusive of separately billable procedures and treating other patients and teaching time.  Critical care was necessary to treat or prevent imminent or life-threatening deterioration of the following conditions: elevated troponin.  Critical care was time spent personally by me on the following activities: blood draw for specimens, development of treatment plan with patient or surrogate, discussions with consultants, interpretation of cardiac output measurements, evaluation of patient's response to treatment, examination of patient, obtaining history from patient or surrogate, ordering and performing treatments and interventions, ordering and review of laboratory studies, ordering and review of radiographic studies, pulse oximetry, re-evaluation of patient's condition and review of old charts.        ED Vital Signs:  Vitals:    02/22/20 1053 02/22/20 1250 02/22/20 1333 02/22/20 1339   BP: (!) 159/98 139/81  (!) 162/102   Pulse: 82 76 (S) 77 92   Resp: 16 17     SpO2: 98% 98%  97%   Weight: 78.4 kg (172 lb 13.5 oz)      Height: 5' 11" (1.803 m)          Abnormal Lab Results:  Labs Reviewed   CBC W/ AUTO DIFFERENTIAL - Abnormal; Notable for the following components:       Result Value    Lymph # 0.8 (*)     Gran% 83.1 (*)     Lymph% 10.2 (*)     All other components within normal limits   COMPREHENSIVE METABOLIC PANEL - Abnormal; Notable for the following components:    Glucose 120 (*)     AST 70 (*)     ALT 67 (*)     All other components within normal limits   TROPONIN I - Abnormal; Notable for the " following components:    Troponin I 13.791 (*)     All other components within normal limits   MAGNESIUM   LIPASE   TROPONIN I   LIPASE   HEPATITIS PANEL, ACUTE   TROPONIN I   APTT   PROTIME-INR        All Lab Results:  Results for orders placed or performed during the hospital encounter of 02/22/20   CBC auto differential   Result Value Ref Range    WBC 8.21 3.90 - 12.70 K/uL    RBC 4.97 4.60 - 6.20 M/uL    Hemoglobin 15.3 14.0 - 18.0 g/dL    Hematocrit 44.1 40.0 - 54.0 %    Mean Corpuscular Volume 89 82 - 98 fL    Mean Corpuscular Hemoglobin 30.8 27.0 - 31.0 pg    Mean Corpuscular Hemoglobin Conc 34.7 32.0 - 36.0 g/dL    RDW 13.4 11.5 - 14.5 %    Platelets 177 150 - 350 K/uL    MPV 10.0 9.2 - 12.9 fL    Immature Granulocytes 0.4 0.0 - 0.5 %    Gran # (ANC) 6.8 1.8 - 7.7 K/uL    Immature Grans (Abs) 0.03 0.00 - 0.04 K/uL    Lymph # 0.8 (L) 1.0 - 4.8 K/uL    Mono # 0.5 0.3 - 1.0 K/uL    Eos # 0.0 0.0 - 0.5 K/uL    Baso # 0.03 0.00 - 0.20 K/uL    nRBC 0 0 /100 WBC    Gran% 83.1 (H) 38.0 - 73.0 %    Lymph% 10.2 (L) 18.0 - 48.0 %    Mono% 5.7 4.0 - 15.0 %    Eosinophil% 0.2 0.0 - 8.0 %    Basophil% 0.4 0.0 - 1.9 %    Differential Method Automated    Comprehensive metabolic panel   Result Value Ref Range    Sodium 141 136 - 145 mmol/L    Potassium 4.6 3.5 - 5.1 mmol/L    Chloride 108 95 - 110 mmol/L    CO2 25 23 - 29 mmol/L    Glucose 120 (H) 70 - 110 mg/dL    BUN, Bld 14 8 - 23 mg/dL    Creatinine 1.2 0.5 - 1.4 mg/dL    Calcium 9.4 8.7 - 10.5 mg/dL    Total Protein 6.5 6.0 - 8.4 g/dL    Albumin 3.9 3.5 - 5.2 g/dL    Total Bilirubin 0.8 0.1 - 1.0 mg/dL    Alkaline Phosphatase 87 55 - 135 U/L    AST 70 (H) 10 - 40 U/L    ALT 67 (H) 10 - 44 U/L    Anion Gap 8 8 - 16 mmol/L    eGFR if African American >60 >60 mL/min/1.73 m^2    eGFR if non African American >60 >60 mL/min/1.73 m^2   Magnesium   Result Value Ref Range    Magnesium 1.9 1.6 - 2.6 mg/dL   Lipase   Result Value Ref Range    Lipase 24 4 - 60 U/L   Troponin I    Result Value Ref Range    Troponin I 13.791 (H) 0.000 - 0.026 ng/mL         Imaging Results          X-Ray Chest AP Portable (Final result)  Result time 02/22/20 14:09:52    Final result by Asad Obrien III, MD (02/22/20 14:09:52)                 Impression:      No acute abnormality identified in the chest.  Stable mild cardiomegaly.      Electronically signed by: Asad Obrien MD  Date:    02/22/2020  Time:    14:09             Narrative:    EXAMINATION:  XR CHEST AP PORTABLE    CLINICAL HISTORY:  chest pain;    COMPARISON:  Left rib x-ray 01/24/2017    FINDINGS:  Stable mild cardiomegaly without evidence of pulmonary edema.  The lungs appear clear of active disease. No acute appearing infiltrate, pleural effusion or pneumothorax identified.                                 The EKG was ordered, reviewed, and independently interpreted by the ED provider.  Interpretation time: 13:33:29  Rate: 77 BPM  Rhythm: Sinus rhythm with occasional premature ventricular complexes  Interpretation: Low voltage QRS. ST depression V2-V5. No STEMI.    The EKG was ordered, reviewed, and independently interpreted by the ED provider.  Interpretation time: 13:34:10  Rate: 81 BPM  Rhythm: normal sinus rhythm  Interpretation: Low voltage QRS. Cannot rule out anterior infarct, age undetermined. No STEMI.    The EKG was ordered, reviewed, and independently interpreted by the ED provider.  Interpretation time: 14:01:24  Rate: 79 BPM  Rhythm: Sinus rhythm with occasional premature ventricular complexes  Interpretation: Low voltage QRS. No STEMI.  When compared to EKG performed on 2/22/20 13:34:10, worsening ST depresses.    The EKG was ordered, reviewed, and independently interpreted by the ED provider.  Interpretation time: 14:18:46  Rate: 90 BPM  Rhythm: normal sinus rhythm  Interpretation: Low voltage QRS. Nonspecific ST abnormality. No STEMI.  When compared to EKG performed on 2/22/20 at 14:01:24, there are no significant  changes.        The Emergency Provider reviewed the vital signs and test results, which are outlined above.     ED Discussion     2:06 PM: Discussed pt's case with Dr. Melchor (Cardiology) who recommends getting an echo STAT and states he will come see patient.    2:23 PM: Pt seen by Dr. Melchor at ED bedside and taken to cath lab.     2:35 PM: Discussed case with Christi Aviles NP (Hospital Medicine). Dr. Manriquez agrees with current care and management of pt and accepts admission.   Admitting Service: Hospital Medicine  Admit to: inpatient tele    Re-evaluated pt. I have discussed test results, shared treatment plan, and the need for admission with patient and family at bedside. Pt and family express understanding at this time and agree with all information. All questions answered. Pt and family have no further questions or concerns at this time. Pt is ready for admit.         [facility]     ED Course as of Feb 22 1435   Sat Feb 22, 2020   1421 To cath lab with Dr. melchor    [BA]      ED Course User Index  [BA] Darek Stevens MD     Medical Decision Making:   Clinical Tests:   Lab Tests: Ordered and Reviewed  Radiological Study: Ordered and Reviewed  Medical Tests: Ordered and Reviewed           ED Medication(s):  Medications   aspirin tablet 325 mg (325 mg Oral Given 2/22/20 1406)   nitroGLYCERIN SL tablet 0.4 mg (0.4 mg Sublingual Given 2/22/20 1408)   metoprolol injection 5 mg (5 mg Intravenous Given 2/22/20 1420)   nitroGLYCERIN SL tablet 0.4 mg (0.4 mg Sublingual Given 2/22/20 1415)   sodium chloride 0.9% bolus 250 mL (250 mLs Intravenous New Bag 2/22/20 1432)   nitroGLYCERIN 2% TD oint ointment 1 inch (1 inch Transdermal Given 2/22/20 1426)   heparin (porcine) injection 4,000 Units (4,000 Units Intravenous Given 2/22/20 1425)       New Prescriptions    MELOXICAM (MOBIC) 15 MG TABLET    Take 1 tablet (15 mg total) by mouth once daily.       Follow-up Information     E Kayden Lopez Jr, MD In 2 days.    Specialties:   Internal Medicine, Pediatrics  Contact information:  [id number] [address] [facility]  [place] [address]  [phone]                       Scribe Attestation:   Scribe #1: I performed the above scribed service and the documentation accurately describes the services I performed. I attest to the accuracy of the note.     Attending:   Physician Attestation Statement for Scribe #1: I, Darek Stevens MD, personally performed the services described in this documentation, as scribed by Naomie Clemente, in my presence, and it is both accurate and complete.           Clinical Impression       ICD-10-CM ICD-9-CM   1. Muscle pain M79.10 729.1   2. Elevated liver enzymes R74.8 790.5   3. Chest pain R07.9 786.50   4. NSTEMI (non-ST elevated myocardial infarction) I21.4 410.70       Disposition:   Disposition: Admitted  Condition: Stable

## 2020-02-22 NOTE — HPI
A 68 yo male with h/o hlp intolerance to statins due to gi symptoms and severe myalgias has been having intermittent interscapular pain and shoulder pain intermittently since last Monday has no exertional symptoms he had multiple episodes today leading to er visit troponin showing 13 and has st depression in v1-v3 . His echo shows inferoposterior hk. He has been getting dry needling and massage for the pain. He ahs no claudication tia shortness of breath syncope or near syncope. He wa streated with iv heparin in the er  b blcokers nitrates ivf cath lab called for urgent lhc.

## 2020-02-22 NOTE — ED NOTES
Pt in lobby with c/o CP radiating to shoulders and upper back with numbness and tingling in BUE. CN and PA notified. Pt moved to ED bed 21 at this time.

## 2020-02-22 NOTE — Clinical Note
ostium   right coronary artery. Hemodynamics performed. Angiography performed of the right coronary arteries in multiple views.

## 2020-02-22 NOTE — ASSESSMENT & PLAN NOTE
Has nstemi with stuttering course and continued angina with ekg changes and wall motion abnormalities will trteat with standard therapy and  proceed urgent lhc/ptca  I have explained the risks, benefits , and alternatives of the procedure in detail.the patient voices understanding and all questions have been answered.the patient agrees to proceed as planned.

## 2020-02-22 NOTE — SUBJECTIVE & OBJECTIVE
Past Medical History:   Diagnosis Date    Arthritis        Past Surgical History:   Procedure Laterality Date    KNEE SURGERY      SHOULDER ARTHROSCOPY Right      [place]    simple prostatectomy  06/06/2016    robotic assisted partial prostectomy due to BPH    TOTAL KNEE ARTHROPLASTY         Review of patient's allergies indicates:  No Known Allergies    No current facility-administered medications on file prior to encounter.      Current Outpatient Medications on File Prior to Encounter   Medication Sig    amoxicillin (AMOXIL) 500 MG capsule TAKE 1 CAPSULE BY MOUTH EVERY 8 HOURS UNTIL GONE, TAKE 4 CAPSULES BY MOUTH 1 HOUR BEFORE PROCEDURE    finasteride (PROSCAR) 5 mg tablet Take 1 tablet (5 mg total) by mouth once daily.    methocarbamol (ROBAXIN) 750 MG Tab Take 1 tablet (750 mg total) by mouth 3 (three) times daily as needed.    mometasone 0.1% (ELOCON) 0.1 % cream AAA bid prn for psoriasis. Strong steroid.  Do not use on face, underarms or groin.    triamcinolone acetonide 0.025% (KENALOG) 0.025 % cream Apply topically 2 (two) times daily as needed. Mild steroid.  Safe for face up to 2 weeks then stop.     Family History     Problem Relation (Age of Onset)    Cancer Father    Diabetes Mother, Maternal Aunt    Heart disease Mother        Tobacco Use    Smoking status: Never Smoker    Smokeless tobacco: Former User     Types: Chew   Substance and Sexual Activity    Alcohol use: Yes     Alcohol/week: 2.0 standard drinks     Types: 2 Glasses of wine per week     Comment: per week    Drug use: No    Sexual activity: Not on file     Review of Systems   Constitution: Negative for malaise/fatigue.   Eyes: Negative for blurred vision.   Cardiovascular: Positive for chest pain. Negative for claudication, cyanosis, dyspnea on exertion, irregular heartbeat, leg swelling, near-syncope, orthopnea, palpitations and paroxysmal nocturnal dyspnea.   Respiratory: Negative for cough, hemoptysis and shortness of  breath.    Hematologic/Lymphatic: Negative for bleeding problem. Does not bruise/bleed easily.   Skin: Negative for dry skin and itching.   Musculoskeletal: Negative for falls, muscle weakness and myalgias.        Shoulder and interscapular pain.   Gastrointestinal: Negative for abdominal pain, diarrhea, heartburn, hematemesis, hematochezia and melena.   Genitourinary: Negative for flank pain and hematuria.   Neurological: Negative for dizziness, focal weakness, headaches, light-headedness, numbness, paresthesias, seizures and weakness.   Psychiatric/Behavioral: Negative for altered mental status and memory loss. The patient is not nervous/anxious.    Allergic/Immunologic: Negative for hives.     Objective:     Vital Signs (Most Recent):  Pulse: 92 (02/22/20 1339)  Resp: 17 (02/22/20 1250)  BP: (!) 162/102 (02/22/20 1339)  SpO2: 97 % (02/22/20 1339) Vital Signs (24h Range):  Pulse:  [76-92] 92  Resp:  [16-17] 17  SpO2:  [97 %-98 %] 97 %  BP: (139-162)/() 162/102     Weight: 78.4 kg (172 lb 13.5 oz)  Body mass index is 24.11 kg/m².    SpO2: 97 %  O2 Device (Oxygen Therapy): room air    No intake or output data in the 24 hours ending 02/22/20 1433    Lines/Drains/Airways     Peripheral Intravenous Line                 Peripheral IV - Single Lumen 02/22/20 1357 20 G Right Antecubital less than 1 day         Peripheral IV - Single Lumen 02/22/20 1415 18 G Left Antecubital less than 1 day                Physical Exam   Constitutional: He is oriented to person, place, and time. He appears well-developed and well-nourished. No distress.   HENT:   Head: Normocephalic and atraumatic.   Eyes: Pupils are equal, round, and reactive to light. EOM are normal. Right eye exhibits no discharge. Left eye exhibits no discharge.   Neck: Neck supple. No JVD present. No thyromegaly present.   Cardiovascular: Normal rate, regular rhythm, normal heart sounds and intact distal pulses. Exam reveals no gallop and no friction rub.   No  murmur heard.  Pulmonary/Chest: Effort normal and breath sounds normal. No respiratory distress. He has no wheezes. He has no rales. He exhibits no tenderness.   Abdominal: Soft. Bowel sounds are normal. He exhibits no distension. There is no tenderness.   Musculoskeletal: Normal range of motion. He exhibits no edema.   Neurological: He is alert and oriented to person, place, and time. No cranial nerve deficit.   Skin: Skin is warm and dry. No rash noted. He is not diaphoretic. No erythema.   Psychiatric: He has a normal mood and affect. His behavior is normal.   Nursing note and vitals reviewed.      Significant Labs:   Recent Lab Results       02/22/20  1143        Albumin 3.9     Alkaline Phosphatase 87     ALT 67     Anion Gap 8     AST 70     Baso # 0.03     Basophil% 0.4     BILIRUBIN TOTAL 0.8  Comment:  For infants and newborns, interpretation of results should be based  on gestational age, weight and in agreement with clinical  observations.  Premature Infant recommended reference ranges:  Up to 24 hours.............<8.0 mg/dL  Up to 48 hours............<12.0 mg/dL  3-5 days..................<15.0 mg/dL  6-29 days.................<15.0 mg/dL       BUN, Bld 14     Calcium 9.4     Chloride 108     CO2 25     Creatinine 1.2     Differential Method Automated     eGFR if  >60     eGFR if non  >60  Comment:  Calculation used to obtain the estimated glomerular filtration  rate (eGFR) is the CKD-EPI equation.        Eos # 0.0     Eosinophil% 0.2     Glucose 120     Gran # (ANC) 6.8     Gran% 83.1     Hematocrit 44.1     Hemoglobin 15.3     Immature Grans (Abs) 0.03  Comment:  Mild elevation in immature granulocytes is non specific and   can be seen in a variety of conditions including stress response,   acute inflammation, trauma and pregnancy. Correlation with other   laboratory and clinical findings is essential.       Immature Granulocytes 0.4     Lipase 24     Lymph # 0.8      Lymph% 10.2     Magnesium 1.9     MCH 30.8     MCHC 34.7     MCV 89     Mono # 0.5     Mono% 5.7     MPV 10.0     nRBC 0     Platelets 177     Potassium 4.6     PROTEIN TOTAL 6.5     RBC 4.97     RDW 13.4     Sodium 141     Troponin I 13.791  Comment:  The reference interval for Troponin I represents the 99th percentile   cutoff   for our facility and is consistent with 3rd generation assay   performance.       WBC 8.21           Significant Imaging:cxr reviewed.  Reviewed ekg showing st depression..

## 2020-02-22 NOTE — HPI
· Mekhi Lambert is a 69 y.o. male patient with a h/o arthritis who presents to the Emergency Department for evaluation of CP which onset this PTA. Pt reports he came to the ED for chronic bilateral shoulder pain and when he was about to leave the ED he had sudden CP. Pt states his daughter had an AA. Symptoms are intermittent and moderate in severity. No mitigating or exacerbating factors reported. Associated sxs include nausea. Patient denies any fever, sore throat, SOB, emesis, diarrhea, dysuria, back pain, rash, HA, bruises/blds easily, and all other sxs at this time. No prior Tx reported. No further complaints or concerns at this time. EKG noted NSR with inverted T waves in the inferior leads. Vital signs are Temp 97.6, pulse 69, resp 15 and B/P 130/90. CBC is stable, chemistries find glucose 120, AST 70, ALT 67, troponin 13.79 & 10.31. Pt taken immediately to the Cath Lab per Dr. Melchor and he was found to have Mid Cx lesion , 99% stenosed.  A STENT RESOLUTE BETO [id number] stent was successfully placed at 14 NAT for 30 sec.Pt is admitted to ICU post procedure. According to Cardiology note, the procedure was difficult as there was no reflow and distal embolization treated infuse vasodilator therapy and reverse no reflow and resolve distal embolization.

## 2020-02-22 NOTE — ED PROVIDER NOTES
History      Chief Complaint   Patient presents with    Muscle Pain     pt reports trapezius pain bilaterally. Pt reports that he sees PT and does dry needling for pain       Review of patient's allergies indicates:  No Known Allergies     HPI   HPI    2/22/2020, 1:25 PM   History obtained from the patient      History of Present Illness: Mekhi Lambert is a 69 y.o. male patient who presents to the Emergency Department for bilateral trap pain for months but worse this am.   He says he has been having dry needling and physical therapy with improvement.  Denies fever, focal weakness, cp, sob, headache, light sensitivity, or n/v.  Symptoms are moderate in severity.     No further complaints or concerns at this time.           PCP: ABIGAIL Lopez Jr, MD       Past Medical History:  Past Medical History:   Diagnosis Date    Arthritis          Past Surgical History:  Past Surgical History:   Procedure Laterality Date    KNEE SURGERY      SHOULDER ARTHROSCOPY Right     Dr Bella    simple prostatectomy  06/06/2016    robotic assisted partial prostectomy due to BPH    TOTAL KNEE ARTHROPLASTY             Family History:  Family History   Problem Relation Age of Onset    Heart disease Mother         lupus related    Diabetes Mother     Cancer Father         prostate, bone, lung, skin cancers all seperate types    Diabetes Maternal Aunt            Social History:  Social History     Tobacco Use    Smoking status: Never Smoker    Smokeless tobacco: Former User     Types: Chew   Substance and Sexual Activity    Alcohol use: Yes     Alcohol/week: 2.0 standard drinks     Types: 2 Glasses of wine per week     Comment: per week    Drug use: No    Sexual activity: Not on file       ROS     Review of Systems   Constitutional: Negative for chills and fever.   HENT: Negative for sore throat.    Respiratory: Negative for shortness of breath.    Cardiovascular: Negative for chest pain.   Gastrointestinal: Negative  "for nausea and vomiting.   Genitourinary: Negative for dysuria.   Musculoskeletal: Positive for neck pain. Negative for back pain and neck stiffness.   Skin: Negative for color change and rash.   Neurological: Negative for weakness, numbness and headaches.   Hematological: Does not bruise/bleed easily.   All other systems reviewed and are negative.      Review of Systems    Physical Exam      Initial Vitals [02/22/20 1053]   BP Pulse Resp Temp SpO2   (!) 159/98 82 16 -- 98 %      MAP       --         Physical Exam  Vital signs and nursing notes reviewed.  Constitutional: Patient is in NAD. Awake and alert. Well-developed and well-nourished.  Head: Atraumatic. Normocephalic.  Eyes: PERRL. EOM intact. Conjunctivae nl. No scleral icterus.  ENT: Mucous membranes are moist. Oropharynx is clear.  Neck: Supple. No JVD. No lymphadenopathy.  No meningismus.  No midline c spine ttp.  +bilateral perispinous ttp.  FROM.  Strong and equal hand   Cardiovascular: Regular rate and rhythm. No murmurs, rubs, or gallops. Distal pulses are 2+ and symmetric.  Pulmonary/Chest: No respiratory distress. Clear to auscultation bilaterally. No wheezing, rales, or rhonchi.  Abdominal: Soft. Non-distended. No TTP. No rebound, guarding, or rigidity. Good bowel sounds.  Genitourinary: No CVA tenderness  Musculoskeletal: Moves all extremities. No edema.   Bilateral trap ttp.  Skin: Warm and dry.  Neurological: Awake and alert. No acute focal neurological deficits are appreciated. Strong and equal hand .  Sensation to fingers x 5.  Psychiatric: Normal affect. Good eye contact. Appropriate in content.      ED Course          Procedures  ED Vital Signs:  Vitals:    02/22/20 1053 02/22/20 1250 02/22/20 1333 02/22/20 1339   BP: (!) 159/98 139/81  (!) 162/102   Pulse: 82 76 (S) 77 92   Resp: 16 17     SpO2: 98% 98%  97%   Weight: 78.4 kg (172 lb 13.5 oz)      Height: 5' 11" (1.803 m)            Results for orders placed or performed during the " hospital encounter of 02/22/20   CBC auto differential   Result Value Ref Range    WBC 8.21 3.90 - 12.70 K/uL    RBC 4.97 4.60 - 6.20 M/uL    Hemoglobin 15.3 14.0 - 18.0 g/dL    Hematocrit 44.1 40.0 - 54.0 %    Mean Corpuscular Volume 89 82 - 98 fL    Mean Corpuscular Hemoglobin 30.8 27.0 - 31.0 pg    Mean Corpuscular Hemoglobin Conc 34.7 32.0 - 36.0 g/dL    RDW 13.4 11.5 - 14.5 %    Platelets 177 150 - 350 K/uL    MPV 10.0 9.2 - 12.9 fL    Immature Granulocytes 0.4 0.0 - 0.5 %    Gran # (ANC) 6.8 1.8 - 7.7 K/uL    Immature Grans (Abs) 0.03 0.00 - 0.04 K/uL    Lymph # 0.8 (L) 1.0 - 4.8 K/uL    Mono # 0.5 0.3 - 1.0 K/uL    Eos # 0.0 0.0 - 0.5 K/uL    Baso # 0.03 0.00 - 0.20 K/uL    nRBC 0 0 /100 WBC    Gran% 83.1 (H) 38.0 - 73.0 %    Lymph% 10.2 (L) 18.0 - 48.0 %    Mono% 5.7 4.0 - 15.0 %    Eosinophil% 0.2 0.0 - 8.0 %    Basophil% 0.4 0.0 - 1.9 %    Differential Method Automated    Comprehensive metabolic panel   Result Value Ref Range    Sodium 141 136 - 145 mmol/L    Potassium 4.6 3.5 - 5.1 mmol/L    Chloride 108 95 - 110 mmol/L    CO2 25 23 - 29 mmol/L    Glucose 120 (H) 70 - 110 mg/dL    BUN, Bld 14 8 - 23 mg/dL    Creatinine 1.2 0.5 - 1.4 mg/dL    Calcium 9.4 8.7 - 10.5 mg/dL    Total Protein 6.5 6.0 - 8.4 g/dL    Albumin 3.9 3.5 - 5.2 g/dL    Total Bilirubin 0.8 0.1 - 1.0 mg/dL    Alkaline Phosphatase 87 55 - 135 U/L    AST 70 (H) 10 - 40 U/L    ALT 67 (H) 10 - 44 U/L    Anion Gap 8 8 - 16 mmol/L    eGFR if African American >60 >60 mL/min/1.73 m^2    eGFR if non African American >60 >60 mL/min/1.73 m^2   Magnesium   Result Value Ref Range    Magnesium 1.9 1.6 - 2.6 mg/dL   Lipase   Result Value Ref Range    Lipase 24 4 - 60 U/L   Troponin I   Result Value Ref Range    Troponin I 13.791 (H) 0.000 - 0.026 ng/mL           Imaging Results:  Imaging Results          X-Ray Chest AP Portable (In process)  Result time 02/22/20 14:09:55                   The Emergency Provider reviewed the vital signs and test results,  which are outlined above.    ED Discussion             Medication(s) given in the ER:  Medications   aspirin tablet 325 mg (325 mg Oral Given 2/22/20 1406)   nitroGLYCERIN SL tablet 0.4 mg (0.4 mg Sublingual Given 2/22/20 1408)           Follow-up Information     E Kayden Lopez Jr, MD In 2 days.    Specialties:  Internal Medicine, Pediatrics  Contact information:  [address]  [place] [address] [phone]                       New Prescriptions    MELOXICAM (MOBIC) 15 MG TABLET    Take 1 tablet (15 mg total) by mouth once daily.          Medical Decision Making        All findings were reviewed with the patient/family in detail.   All remaining questions and concerns were addressed at that time.  Patient/family has been counseled regarding the need for follow-up as well as the indication to return to the emergency room should new or worrisome developments occur.      As patient went to [facility] he c/o cp.  He was brought back in for cardiac work up. Ekg showed st depression in ant leads.  I informed Dr Stevens of symptoms and asked that he see patient.        MDM               Clinical Impression:        ICD-10-CM ICD-9-CM   1. Muscle pain M79.10 729.1   2. Elevated liver enzymes R74.8 790.5   3. Chest pain R07.9 786.50   4. NSTEMI (non-ST elevated myocardial infarction) I21.4 410.70             Sonia Hazel PA-C  02/22/20 1417

## 2020-02-23 ENCOUNTER — NURSE TRIAGE (OUTPATIENT)
Dept: ADMINISTRATIVE | Facility: CLINIC | Age: 70
End: 2020-02-23

## 2020-02-23 VITALS
SYSTOLIC BLOOD PRESSURE: 110 MMHG | DIASTOLIC BLOOD PRESSURE: 78 MMHG | RESPIRATION RATE: 15 BRPM | BODY MASS INDEX: 24.08 KG/M2 | TEMPERATURE: 98 F | HEIGHT: 71 IN | OXYGEN SATURATION: 98 % | WEIGHT: 172 LBS | HEART RATE: 68 BPM

## 2020-02-23 LAB
ANION GAP SERPL CALC-SCNC: 8 MMOL/L (ref 8–16)
BASOPHILS # BLD AUTO: 0.04 K/UL (ref 0–0.2)
BASOPHILS NFR BLD: 0.5 % (ref 0–1.9)
BUN SERPL-MCNC: 13 MG/DL (ref 8–23)
CALCIUM SERPL-MCNC: 8.4 MG/DL (ref 8.7–10.5)
CHLORIDE SERPL-SCNC: 110 MMOL/L (ref 95–110)
CHOLEST SERPL-MCNC: 176 MG/DL (ref 120–199)
CHOLEST/HDLC SERPL: 3.4 {RATIO} (ref 2–5)
CO2 SERPL-SCNC: 24 MMOL/L (ref 23–29)
CREAT SERPL-MCNC: 1.1 MG/DL (ref 0.5–1.4)
DIFFERENTIAL METHOD: ABNORMAL
EOSINOPHIL # BLD AUTO: 0 K/UL (ref 0–0.5)
EOSINOPHIL NFR BLD: 0.4 % (ref 0–8)
ERYTHROCYTE [DISTWIDTH] IN BLOOD BY AUTOMATED COUNT: 13.8 % (ref 11.5–14.5)
EST. GFR  (AFRICAN AMERICAN): >60 ML/MIN/1.73 M^2
EST. GFR  (NON AFRICAN AMERICAN): >60 ML/MIN/1.73 M^2
ESTIMATED AVG GLUCOSE: 94 MG/DL (ref 68–131)
GLUCOSE SERPL-MCNC: 116 MG/DL (ref 70–110)
HBA1C MFR BLD HPLC: 4.9 % (ref 4–5.6)
HCT VFR BLD AUTO: 38.3 % (ref 40–54)
HCT VFR BLD AUTO: 41.8 % (ref 40–54)
HDLC SERPL-MCNC: 52 MG/DL (ref 40–75)
HDLC SERPL: 29.5 % (ref 20–50)
HGB BLD-MCNC: 13.2 G/DL (ref 14–18)
HGB BLD-MCNC: 14.1 G/DL (ref 14–18)
IMM GRANULOCYTES # BLD AUTO: 0.02 K/UL (ref 0–0.04)
IMM GRANULOCYTES NFR BLD AUTO: 0.2 % (ref 0–0.5)
LDLC SERPL CALC-MCNC: 104.2 MG/DL (ref 63–159)
LYMPHOCYTES # BLD AUTO: 1.1 K/UL (ref 1–4.8)
LYMPHOCYTES NFR BLD: 14.1 % (ref 18–48)
MCH RBC QN AUTO: 30.7 PG (ref 27–31)
MCHC RBC AUTO-ENTMCNC: 34.5 G/DL (ref 32–36)
MCV RBC AUTO: 89 FL (ref 82–98)
MONOCYTES # BLD AUTO: 0.7 K/UL (ref 0.3–1)
MONOCYTES NFR BLD: 8 % (ref 4–15)
NEUTROPHILS # BLD AUTO: 6.2 K/UL (ref 1.8–7.7)
NEUTROPHILS NFR BLD: 76.8 % (ref 38–73)
NONHDLC SERPL-MCNC: 124 MG/DL
NRBC BLD-RTO: 0 /100 WBC
PLATELET # BLD AUTO: 185 K/UL (ref 150–350)
PMV BLD AUTO: 10.3 FL (ref 9.2–12.9)
POTASSIUM SERPL-SCNC: 3.9 MMOL/L (ref 3.5–5.1)
RBC # BLD AUTO: 4.3 M/UL (ref 4.6–6.2)
SODIUM SERPL-SCNC: 142 MMOL/L (ref 136–145)
TRIGL SERPL-MCNC: 99 MG/DL (ref 30–150)
TROPONIN I SERPL DL<=0.01 NG/ML-MCNC: 15.56 NG/ML (ref 0–0.03)
TROPONIN I SERPL DL<=0.01 NG/ML-MCNC: 22.47 NG/ML (ref 0–0.03)
WBC # BLD AUTO: 8.09 K/UL (ref 3.9–12.7)

## 2020-02-23 PROCEDURE — 80061 LIPID PANEL: CPT

## 2020-02-23 PROCEDURE — 99233 SBSQ HOSP IP/OBS HIGH 50: CPT | Mod: ,,, | Performed by: INTERNAL MEDICINE

## 2020-02-23 PROCEDURE — 85014 HEMATOCRIT: CPT

## 2020-02-23 PROCEDURE — 99291 CRITICAL CARE FIRST HOUR: CPT | Mod: ,,, | Performed by: NURSE PRACTITIONER

## 2020-02-23 PROCEDURE — 84484 ASSAY OF TROPONIN QUANT: CPT

## 2020-02-23 PROCEDURE — 83036 HEMOGLOBIN GLYCOSYLATED A1C: CPT

## 2020-02-23 PROCEDURE — 80048 BASIC METABOLIC PNL TOTAL CA: CPT

## 2020-02-23 PROCEDURE — 63600175 PHARM REV CODE 636 W HCPCS: Mod: JG | Performed by: FAMILY MEDICINE

## 2020-02-23 PROCEDURE — 36415 COLL VENOUS BLD VENIPUNCTURE: CPT

## 2020-02-23 PROCEDURE — 99233 PR SUBSEQUENT HOSPITAL CARE,LEVL III: ICD-10-PCS | Mod: ,,, | Performed by: INTERNAL MEDICINE

## 2020-02-23 PROCEDURE — 99291 PR CRITICAL CARE, E/M 30-74 MINUTES: ICD-10-PCS | Mod: ,,, | Performed by: NURSE PRACTITIONER

## 2020-02-23 PROCEDURE — 25000003 PHARM REV CODE 250: Performed by: INTERNAL MEDICINE

## 2020-02-23 PROCEDURE — 85025 COMPLETE CBC W/AUTO DIFF WBC: CPT

## 2020-02-23 PROCEDURE — 85018 HEMOGLOBIN: CPT

## 2020-02-23 RX ORDER — ASPIRIN 81 MG/1
81 TABLET ORAL DAILY
Qty: 90 TABLET | Refills: 3 | Status: SHIPPED | OUTPATIENT
Start: 2020-02-24 | End: 2021-02-01

## 2020-02-23 RX ORDER — METOPROLOL TARTRATE 25 MG/1
25 TABLET, FILM COATED ORAL 2 TIMES DAILY
Qty: 60 TABLET | Refills: 11 | Status: SHIPPED | OUTPATIENT
Start: 2020-02-23 | End: 2021-02-01

## 2020-02-23 RX ORDER — ATORVASTATIN CALCIUM 40 MG/1
40 TABLET, FILM COATED ORAL NIGHTLY
Status: DISCONTINUED | OUTPATIENT
Start: 2020-02-23 | End: 2020-02-23

## 2020-02-23 RX ORDER — EZETIMIBE 10 MG/1
10 TABLET ORAL DAILY
Qty: 30 TABLET | Refills: 11 | Status: SHIPPED | OUTPATIENT
Start: 2020-02-23 | End: 2021-02-01

## 2020-02-23 RX ORDER — BISACODYL 10 MG
10 SUPPOSITORY, RECTAL RECTAL DAILY PRN
Status: DISCONTINUED | OUTPATIENT
Start: 2020-02-23 | End: 2020-02-23 | Stop reason: HOSPADM

## 2020-02-23 RX ADMIN — TICAGRELOR 90 MG: 90 TABLET ORAL at 01:02

## 2020-02-23 RX ADMIN — TICAGRELOR 90 MG: 90 TABLET ORAL at 08:02

## 2020-02-23 RX ADMIN — ASPIRIN 81 MG: 81 TABLET ORAL at 08:02

## 2020-02-23 RX ADMIN — METOPROLOL TARTRATE 25 MG: 25 TABLET ORAL at 08:02

## 2020-02-23 RX ADMIN — PANTOPRAZOLE SODIUM 40 MG: 40 TABLET, DELAYED RELEASE ORAL at 08:02

## 2020-02-23 RX ADMIN — TIROFIBAN 0.15 MCG/KG/MIN: 5 INJECTION, SOLUTION INTRAVENOUS at 03:02

## 2020-02-23 NOTE — ASSESSMENT & PLAN NOTE
-Cards Following  -Status Post LHC with ISSAC X 1 to LCx  -Tirofiban to infuse for 24 hours  -Continue cardiac monitoring  - No CP or shoulder pain  - cont Lopressor, ASA and statin  - start Brilienta once Aggrastat complete  - will need follow up for repeat LHC to address LAD lesion 75%

## 2020-02-23 NOTE — ASSESSMENT & PLAN NOTE
Has nstemi with stuttering course and continued angina with ekg changes and wall motion abnormalities will trteat with standard therapy and  proceed urgent lhc/ptca  I have explained the risks, benefits , and alternatives of the procedure in detail.the patient voices understanding and all questions have been answered.the patient agrees to proceed as planned.    2/23/2020 HAD STENT LCX HAS STILL RESDUAL SIGNIFICANT LAD DIAGONAL DISEASE NEEDING STGAED PCI.  HE IS ON B BLOCKERS ASA BRILINTA . WILL NOT GIVE STTINS IT IS CONTRAINDICATED DUE TO HIS SIDE EFFECTS. HE WILL BE PLACED ON PCSK9 INHIBITORS. WILL TRY ZETIA 10 MG PO DAILY.  WILL REPEAT TROPONIS IF TRENDING DOWN WILL AMBULATE AND IF ASYMPTOMATIC WILL D/C AND F.U IN CLINIC NEXT WEEK.

## 2020-02-23 NOTE — SUBJECTIVE & OBJECTIVE
Interval History: HAS NSTEMI FEELS GREAT TODAY.    Review of Systems   Constitution: Negative for malaise/fatigue.   Eyes: Negative for blurred vision.   Cardiovascular: Negative for chest pain, claudication, cyanosis, dyspnea on exertion, irregular heartbeat, leg swelling, near-syncope, orthopnea, palpitations and paroxysmal nocturnal dyspnea.   Respiratory: Negative for cough, hemoptysis and shortness of breath.    Hematologic/Lymphatic: Negative for bleeding problem. Does not bruise/bleed easily.   Skin: Negative for dry skin and itching.   Musculoskeletal: Negative for falls, muscle weakness and myalgias.   Gastrointestinal: Negative for abdominal pain, diarrhea, heartburn, hematemesis, hematochezia and melena.   Genitourinary: Negative for flank pain and hematuria.   Neurological: Negative for dizziness, focal weakness, headaches, light-headedness, numbness, paresthesias, seizures and weakness.   Psychiatric/Behavioral: Negative for altered mental status and memory loss. The patient is not nervous/anxious.    Allergic/Immunologic: Negative for hives.     Objective:     Vital Signs (Most Recent):  Temp: 98.7 °F (37.1 °C) (02/23/20 0305)  Pulse: 77 (02/23/20 0847)  Resp: (!) 21 (02/23/20 0630)  BP: 116/78 (02/23/20 0847)  SpO2: (!) 93 % (02/23/20 0630) Vital Signs (24h Range):  Temp:  [97.6 °F (36.4 °C)-99 °F (37.2 °C)] 98.7 °F (37.1 °C)  Pulse:  [65-92] 77  Resp:  [12-23] 21  SpO2:  [91 %-99 %] 93 %  BP: (101-162)/() 116/78     Weight: 78 kg (172 lb)  Body mass index is 23.99 kg/m².     SpO2: (!) 93 %  O2 Device (Oxygen Therapy): room air      Intake/Output Summary (Last 24 hours) at 2/23/2020 0929  Last data filed at 2/23/2020 0700  Gross per 24 hour   Intake 2185.33 ml   Output 1825 ml   Net 360.33 ml       Lines/Drains/Airways     Peripheral Intravenous Line                 Peripheral IV - Single Lumen 02/22/20 1357 20 G Right Antecubital less than 1 day         Peripheral IV - Single Lumen 02/22/20  1415 18 G Left Antecubital less than 1 day                Physical Exam   Constitutional: He is oriented to person, place, and time. He appears well-developed and well-nourished. No distress.   HENT:   Head: Normocephalic and atraumatic.   Eyes: Pupils are equal, round, and reactive to light. EOM are normal. Right eye exhibits no discharge. Left eye exhibits no discharge.   Neck: Neck supple. No JVD present. No thyromegaly present.   Cardiovascular: Normal rate, regular rhythm, normal heart sounds and intact distal pulses. Exam reveals no gallop and no friction rub.   No murmur heard.  LEFT RADIAL SITE NORMAL.   Pulmonary/Chest: Effort normal and breath sounds normal. No respiratory distress. He has no wheezes. He has no rales. He exhibits no tenderness.   Abdominal: Soft. Bowel sounds are normal. He exhibits no distension. There is no tenderness.   Musculoskeletal: Normal range of motion. He exhibits no edema.   Neurological: He is alert and oriented to person, place, and time. No cranial nerve deficit.   Skin: Skin is warm and dry. No rash noted. He is not diaphoretic. No erythema.   Psychiatric: He has a normal mood and affect. His behavior is normal.   Nursing note and vitals reviewed.      Significant Labs:   Recent Lab Results       02/23/20  0426   02/23/20  0423   02/22/20  1632   02/22/20  1630   02/22/20  1619        Albumin               Alkaline Phosphatase               ALT               Anion Gap   8           Ascending aorta     3.70         Ao VTI     20.70         aPTT               AST               AV valve area     3.10         AV index (prosthetic)     0.63         Baso #   0.04           Basophil%   0.5           BILIRUBIN TOTAL               BSA     1.98         BUN, Bld   13           Calcium   8.4           Cath EF Estimated       45       Cath EF Quantitative       45       Chloride   110           CO2   24           Creatinine   1.1           Left Ventricle Relative Wall Thickness     0.49          Differential Method   Automated           E/A ratio     1.17         E/E' ratio     12.20         eGFR if    >60           eGFR if non    >60  Comment:  Calculation used to obtain the estimated glomerular filtration  rate (eGFR) is the CKD-EPI equation.              Eos #   0.0           Eosinophil%   0.4           E wave decelartion time     161.00         FS     18         Glucose   116           Gran # (ANC)   6.2           Gran%   76.8           Hematocrit   38.3           Hemoglobin   13.2           Immature Grans (Abs)   0.02  Comment:  Mild elevation in immature granulocytes is non specific and   can be seen in a variety of conditions including stress response,   acute inflammation, trauma and pregnancy. Correlation with other   laboratory and clinical findings is essential.             Immature Granulocytes   0.2           INR               IVS     1.30         LA WIDTH     4.70         Left Atrium Major Axis     4.60         Left Atrium Minor Axis     4.00         LVOT area     4.9         Lipase               LV LATERAL E/E' RATIO     20.33         LV SEPTAL E/E' RATIO     8.71         LVIDD     4.50         LVIDS     3.70         LV mass     198.10         LV Mass Index     100         LVOT diameter     2.50         LVOT stroke volume     64.27         LVOT peak VTI     13.10         Lymph #   1.1           Lymph%   14.1           Magnesium               MCH   30.7           MCHC   34.5           MCV   89           Mean e'     0.05         Mono #   0.7           Mono%   8.0           MPV   10.3           MV Peak A Fredo     0.52         MV Peak E Fredo     0.61         nRBC   0           Platelets   185           POC ACTIVATED CLOTTING TIME K         235     Potassium   3.9           PROTEIN TOTAL               Protime               PV mean gradient     0         PW     1.10         Right Atrial Pressure (from IVC)     8         RA Major Axis     4.80         RA Width      2.70         RBC   4.30           RDW   13.8           RVOT peak VTI     7         Sample         unknown     Sodium   142           TDI SEPTAL     0.07         TDI LATERAL     0.03         Troponin I 22.473  Comment:  The reference interval for Troponin I represents the 99th percentile   cutoff   for our facility and is consistent with 3rd generation assay   performance.               WBC   8.09                            02/22/20  1536   02/22/20  1523   02/22/20  1416   02/22/20  1404   02/22/20  1143        Albumin         3.9     Alkaline Phosphatase         87     ALT         67     Anion Gap         8     Ascending aorta               Ao VTI               aPTT     26.4  Comment:  aPTT therapeutic range = 39-69 seconds         AST         70     AV valve area               AV index (prosthetic)               Baso #         0.03     Basophil%         0.4     BILIRUBIN TOTAL         0.8  Comment:  For infants and newborns, interpretation of results should be based  on gestational age, weight and in agreement with clinical  observations.  Premature Infant recommended reference ranges:  Up to 24 hours.............<8.0 mg/dL  Up to 48 hours............<12.0 mg/dL  3-5 days..................<15.0 mg/dL  6-29 days.................<15.0 mg/dL       BSA               BUN, Bld         14     Calcium         9.4     Cath EF Estimated               Cath EF Quantitative               Chloride         108     CO2         25     Creatinine         1.2     Left Ventricle Relative Wall Thickness               Differential Method         Automated     E/A ratio               E/E' ratio               eGFR if          >60     eGFR if non          >60  Comment:  Calculation used to obtain the estimated glomerular filtration  rate (eGFR) is the CKD-EPI equation.        Eos #         0.0     Eosinophil%         0.2     E wave decelartion time               FS               Glucose         120      Gran # (ANC)         6.8     Gran%         83.1     Hematocrit         44.1     Hemoglobin         15.3     Immature Grans (Abs)         0.03  Comment:  Mild elevation in immature granulocytes is non specific and   can be seen in a variety of conditions including stress response,   acute inflammation, trauma and pregnancy. Correlation with other   laboratory and clinical findings is essential.       Immature Granulocytes         0.4     INR     1.0  Comment:  Coumadin Therapy:  2.0 - 3.0 for INR for all indicators except mechanical heart valves  and antiphospholipid syndromes which should use 2.5 - 3.5.           IVS               LA WIDTH               Left Atrium Major Axis               Left Atrium Minor Axis               LVOT area               Lipase         24     LV LATERAL E/E' RATIO               LV SEPTAL E/E' RATIO               LVIDD               LVIDS               LV mass               LV Mass Index               LVOT diameter               LVOT stroke volume               LVOT peak VTI               Lymph #         0.8     Lymph%         10.2     Magnesium         1.9     MCH         30.8     MCHC         34.7     MCV         89     Mean e'               Mono #         0.5     Mono%         5.7     MPV         10.0     MV Peak A Fredo               MV Peak E Fredo               nRBC         0     Platelets         177     POC ACTIVATED CLOTTING TIME K 296 164           Potassium         4.6     PROTEIN TOTAL         6.5     Protime     10.4         PV mean gradient               PW               Right Atrial Pressure (from IVC)               RA Major Axis               RA Width               RBC         4.97     RDW         13.4     RVOT peak VTI               Sample unknown unknown           Sodium         141     TDI SEPTAL               TDI LATERAL               Troponin I       10.318  Comment:  The reference interval for Troponin I represents the 99th percentile   cutoff   for our facility and is  consistent with 3rd generation assay   performance.   13.791  Comment:  The reference interval for Troponin I represents the 99th percentile   cutoff   for our facility and is consistent with 3rd generation assay   performance.       WBC         8.21                          Significant Imaging: X-Ray: CXR: X-Ray Chest 1 View (CXR): No results found for this visit on 02/22/20.

## 2020-02-23 NOTE — HOSPITAL COURSE
Patient was admitted for NSTEMI and was taken directly to the cath lab.  He was found to have occlusion of the left circumflex along with the LAD.  The lesion of the left circumflex was corrected with stenting appears cardio plan to correct LAD after having outpatient follow-up.  Troponins trended postprocedure and was trending down prior to discharge. Patient was discharged home on beta-blockers Brilinta aspirin and Zetia.  It was noted during this admission the patient had a statin intolerance.  Instructed to follow up with Cardiology in 1 week to discuss plan going forward for correction of the LAD.

## 2020-02-23 NOTE — ASSESSMENT & PLAN NOTE
S/P left heart cath  · Mid Cx lesion , 99% stenosed reduced to 0%      ASA, BB, ticagrelor, Lovenox  Tirofiban infusion  Being followed by Cardiology  · 2 D ECHO Mildly decreased left ventricular systolic function. The estimated ejection fraction is 45%.  Grade I (mild) left ventricular diastolic dysfunction consistent with impaired relaxation.    Lipid panel and Hgb A1 c pending

## 2020-02-23 NOTE — HPI
2/22 - 69 y.o. male patient with a h/o arthritis (non-rheumatoid) who presents to the Emergency Department for evaluation of bilateral shoulder pain. He was about to leave the ED when he had sudden onset of Chest pain that radiated to his neck and mid back. Once there, Troponin came back at 13.791 with ST depression in v1-v3 . Echo showed inferoposterior hk. Heparin drip, beta blockers, and IVF started in ED and sent for urgent Left heart cath. Mid Cx lesion 99% stenosed with stent placed x1. LAD 75% stenosed. The ejection fraction is calculated to be 45%. Patient transferred to ICU for post cath care.

## 2020-02-23 NOTE — CONSULTS
[facility] -   Critical Care Medicine  Consult Note    Patient Name: Mekhi Lambert  MRN: [id number]  Admission Date: 2/22/2020  Hospital Length of Stay: 1 days  Code Status: No Order  Attending Physician: Carmelo Manriquez MD   Primary Care Provider: ABIGAIL Lopez Jr, MD   Principal Problem: NSTEMI (non-ST elevated myocardial infarction)      Subjective:     HPI:  2/22 - 69 y.o. male patient with a h/o arthritis (non-rheumatoid) who presents to the Emergency Department for evaluation of bilateral shoulder pain. He was about to leave the ED when he had sudden onset of Chest pain that radiated to his neck and mid back. Once there, Troponin came back at 13.791 with ST depression in v1-v3 . Echo showed inferoposterior hk. Heparin drip, beta blockers, and IVF started in ED and sent for urgent Left heart cath. Mid Cx lesion 99% stenosed with stent placed x1. LAD 75% stenosed. The ejection fraction is calculated to be 45%. Patient transferred to ICU for post cath care.      Hospital/ICU Course:  2/23 - Patient AAOx3, sitting up in chair eating breakfast. No signs of distress noted. Sats 100% on room air. No SOB, chest pain, or edema. Tirofiban infusion at  0.15 mcg/kg/min    Past Medical History:   Diagnosis Date    Arthritis        Past Surgical History:   Procedure Laterality Date    KNEE SURGERY      SHOULDER ARTHROSCOPY Right     Dr Bella    simple prostatectomy  06/06/2016    robotic assisted partial prostectomy due to BPH    TOTAL KNEE ARTHROPLASTY         Review of patient's allergies indicates:  No Known Allergies    Family History     Problem Relation (Age of Onset)    Cancer Father    Diabetes Mother, Maternal Aunt    Heart disease Mother        Tobacco Use    Smoking status: Never Smoker    Smokeless tobacco: Former User     Types: Chew   Substance and Sexual Activity    Alcohol use: Yes     Alcohol/week: 2.0 standard drinks     Types: 2 Glasses of wine per week     Comment: per week    Drug  use: No    Sexual activity: Not on file         Review of Systems   Constitutional: Negative for appetite change, diaphoresis and fatigue.   HENT: Negative for congestion.    Eyes: Negative for discharge.   Respiratory: Negative for cough, chest tightness, shortness of breath and wheezing.    Cardiovascular: Negative for chest pain, palpitations and leg swelling.   Gastrointestinal: Negative for abdominal distention, abdominal pain, blood in stool, nausea and vomiting.   Endocrine: Negative for polydipsia and polyuria.   Genitourinary: Negative for difficulty urinating.   Musculoskeletal: Negative for arthralgias.   Skin: Negative for color change.   Allergic/Immunologic: Negative for immunocompromised state.   Neurological: Negative for dizziness, weakness and headaches.   Hematological: Does not bruise/bleed easily.   Psychiatric/Behavioral: Negative for agitation, behavioral problems and confusion.     Objective:     Vital Signs (Most Recent):  Temp: 98.7 °F (37.1 °C) (02/23/20 0305)  Pulse: 77 (02/23/20 0847)  Resp: (!) 21 (02/23/20 0630)  BP: 116/78 (02/23/20 0847)  SpO2: (!) 93 % (02/23/20 0630) Vital Signs (24h Range):  Temp:  [97.6 °F (36.4 °C)-99 °F (37.2 °C)] 98.7 °F (37.1 °C)  Pulse:  [65-92] 77  Resp:  [12-23] 21  SpO2:  [91 %-99 %] 93 %  BP: (101-162)/() 116/78     Weight: 78 kg (172 lb)  Body mass index is 23.99 kg/m².      Intake/Output Summary (Last 24 hours) at 2/23/2020 0910  Last data filed at 2/23/2020 0700  Gross per 24 hour   Intake 2185.33 ml   Output 1825 ml   Net 360.33 ml       Physical Exam   Constitutional: He is oriented to person, place, and time. Vital signs are normal. He appears well-developed and well-nourished. He is cooperative.  Non-toxic appearance. He does not have a sickly appearance. He does not appear ill. No distress. He is not intubated.       HENT:   Head: Normocephalic and atraumatic.   Mouth/Throat: Oropharynx is clear and moist and mucous membranes are normal.    Eyes: Pupils are equal, round, and reactive to light. EOM and lids are normal.   Neck: Trachea normal and normal range of motion. Carotid bruit is not present.   Cardiovascular: Normal rate, regular rhythm, S1 normal, S2 normal, normal heart sounds, intact distal pulses and normal pulses.   Pulses:       Radial pulses are 2+ on the right side, and 2+ on the left side.        Dorsalis pedis pulses are 2+ on the right side, and 2+ on the left side.   Pulmonary/Chest: Effort normal and breath sounds normal. No accessory muscle usage. He is not intubated. No respiratory distress.   Abdominal: Soft. Normal appearance and bowel sounds are normal. He exhibits no distension. There is no tenderness.   Musculoskeletal: Normal range of motion.        Right foot: There is no deformity.        Left foot: There is no deformity.   No edema   Lymphadenopathy:     He has no cervical adenopathy.   Neurological: He is alert and oriented to person, place, and time. He has normal strength. GCS eye subscore is 4. GCS verbal subscore is 5. GCS motor subscore is 6.   Skin: Skin is warm, dry and intact. Capillary refill takes less than 2 seconds. No rash noted. No cyanosis.   Psychiatric: He has a normal mood and affect. His speech is normal and behavior is normal. Judgment and thought content normal. Cognition and memory are normal.       Vents:       Lines/Drains/Airways     Peripheral Intravenous Line                 Peripheral IV - Single Lumen 02/22/20 1357 20 G Right Antecubital less than 1 day         Peripheral IV - Single Lumen 02/22/20 1415 18 G Left Antecubital less than 1 day                Significant Labs:    CBC/Anemia Profile:  Recent Labs   Lab 02/22/20  1143 02/23/20  0423   WBC 8.21 8.09   HGB 15.3 13.2*   HCT 44.1 38.3*    185   MCV 89 89   RDW 13.4 13.8        Chemistries:  Recent Labs   Lab 02/22/20  1143 02/23/20  0423    142   K 4.6 3.9    110   CO2 25 24   BUN 14 13   CREATININE 1.2 1.1    CALCIUM 9.4 8.4*   ALBUMIN 3.9  --    PROT 6.5  --    BILITOT 0.8  --    ALKPHOS 87  --    ALT 67*  --    AST 70*  --    MG 1.9  --        Troponin:   Recent Labs   Lab 02/22/20  1143 02/22/20  1404 02/23/20  0426   TROPONINI 13.791* 10.318* 22.473*     All pertinent labs within the past 24 hours have been reviewed.    Significant Imaging:   I have reviewed all pertinent imaging results/findings within the past 24 hours.    Assessment/Plan:     Cardiac/Vascular  * NSTEMI (non-ST elevated myocardial infarction)  -Cards Following  -Status Post LHC with ISSAC X 1 to LCx  -Tirofiban to infuse for 24 hours  -Continue cardiac monitoring  - No CP or shoulder pain  - cont Lopressor, ASA and statin  - start Brilienta once Aggrastat complete  - will need follow up for repeat LHC to address LAD lesion 75%    Hypercholesterolemia  -Atorvastatin 40mg to start tonight    Renal/  Prostate hypertrophy  -Continue home finasteride     Preventive Measures and Monitoring:   Stress Ulcer: PPI  Nutrition:  Cardiac Diet  Glucose control: N/A  Bowel prophylaxis: PRN dulcolax  DVT prophylaxis: on Aggrastat infusion then start LMWH once aggrastat stopped  Hx CAD on B-Blocker: metoprolol  Head of Bed/Reposition: Elevate HOB and turn Q1-2 hours   Early Mobility: OOB with assistance walking now  Code Status: Full  Pneumonia Vaccine: received 1/2017  Flu Vaccine: received 11/2019    Counseling/Consultation: I have discussed the care of this patient in detail with the bedside nursing staff and Dr. Gamez and Dr. Melchor    Critical Care Time: 43 minutes  Critical secondary to post NSTEMI with LCH and Tirofiban drip     Critical care was time spent personally by me on the following activities: development of treatment plan with patient or surrogate and bedside caregivers, discussions with consultants, evaluation of patient's response to treatment, examination of patient, ordering and performing treatments and interventions, ordering and review  of laboratory studies, ordering and review of radiographic studies, pulse oximetry, re-evaluation of patient's condition. This critical care time did not overlap with that of any other provider or involve time for any procedures.    Thank you for your consult. I will follow-up with patient. Please contact us if you have any additional questions.     Clarence Finn NP  Critical Care Medicine  [facility] [place]

## 2020-02-23 NOTE — PLAN OF CARE
Patient remained free of falls/injury/trauma throughout shift. Patient AAOx4. Neuro status unchanged. VSS. No complaints of chest pain or SOB. Left Radial TR band removed, site remains CDI with no evidence of hematoma formation or bleeding and distal pulses remain +2. Aggrastat continues to infuse at prescribed rate. NS gtt stopped as ordered. Patient continues to void per the urinal at the bedside. Fall risk precautions reviewed and maintained with patient. POC reviewed with patient and Patient's spouse. Patient verbalized understanding. Will continue to monitor.

## 2020-02-23 NOTE — PROGRESS NOTES
[facility] -   Cardiology  Progress Note    Patient Name: Mekhi Lambert  MRN: [id number]  Admission Date: 2/22/2020  Hospital Length of Stay: 1 days  Code Status: No Order   Attending Physician: Carmelo Manriquez MD   Primary Care Physician: ABIGAIL Lopez Jr, MD  Expected Discharge Date:   Principal Problem:NSTEMI (non-ST elevated myocardial infarction)    Subjective:     Hospital Course:   2/23/2020 HAS NSTEMI SECONDARY TO ULCERATED THROMBOTIC OCCLUSION OF LCX TREATED WITH ISSAC . HAD DISTAL EMBOLIZATION TREATED WITH PHARMACOTHERAPY SUCCESSFULLY. HAD AN UNEVENTFUL NITE NO CHF NO ANGINA TOLERATED B BLOCKERS WELL. HE IS ON BRILINTA ASA AGGRASTAT STILL INFUSION. HAS A DROP IN HCT WHICH IS DILUTIONAL  FROM IV HYDRATION.HE HAS STATINS ALLERGY.    Interval History: HAS NSTEMI FEELS GREAT TODAY.    Review of Systems   Constitution: Negative for malaise/fatigue.   Eyes: Negative for blurred vision.   Cardiovascular: Negative for chest pain, claudication, cyanosis, dyspnea on exertion, irregular heartbeat, leg swelling, near-syncope, orthopnea, palpitations and paroxysmal nocturnal dyspnea.   Respiratory: Negative for cough, hemoptysis and shortness of breath.    Hematologic/Lymphatic: Negative for bleeding problem. Does not bruise/bleed easily.   Skin: Negative for dry skin and itching.   Musculoskeletal: Negative for falls, muscle weakness and myalgias.   Gastrointestinal: Negative for abdominal pain, diarrhea, heartburn, hematemesis, hematochezia and melena.   Genitourinary: Negative for flank pain and hematuria.   Neurological: Negative for dizziness, focal weakness, headaches, light-headedness, numbness, paresthesias, seizures and weakness.   Psychiatric/Behavioral: Negative for altered mental status and memory loss. The patient is not nervous/anxious.    Allergic/Immunologic: Negative for hives.     Objective:     Vital Signs (Most Recent):  Temp: 98.7 °F (37.1 °C) (02/23/20 0305)  Pulse: 77 (02/23/20  0847)  Resp: (!) 21 (02/23/20 0630)  BP: 116/78 (02/23/20 0847)  SpO2: (!) 93 % (02/23/20 0630) Vital Signs (24h Range):  Temp:  [97.6 °F (36.4 °C)-99 °F (37.2 °C)] 98.7 °F (37.1 °C)  Pulse:  [65-92] 77  Resp:  [12-23] 21  SpO2:  [91 %-99 %] 93 %  BP: (101-162)/() 116/78     Weight: 78 kg (172 lb)  Body mass index is 23.99 kg/m².     SpO2: (!) 93 %  O2 Device (Oxygen Therapy): room air      Intake/Output Summary (Last 24 hours) at 2/23/2020 0929  Last data filed at 2/23/2020 0700  Gross per 24 hour   Intake 2185.33 ml   Output 1825 ml   Net 360.33 ml       Lines/Drains/Airways     Peripheral Intravenous Line                 Peripheral IV - Single Lumen 02/22/20 1357 20 G Right Antecubital less than 1 day         Peripheral IV - Single Lumen 02/22/20 1415 18 G Left Antecubital less than 1 day                Physical Exam   Constitutional: He is oriented to person, place, and time. He appears well-developed and well-nourished. No distress.   HENT:   Head: Normocephalic and atraumatic.   Eyes: Pupils are equal, round, and reactive to light. EOM are normal. Right eye exhibits no discharge. Left eye exhibits no discharge.   Neck: Neck supple. No JVD present. No thyromegaly present.   Cardiovascular: Normal rate, regular rhythm, normal heart sounds and intact distal pulses. Exam reveals no gallop and no friction rub.   No murmur heard.  LEFT RADIAL SITE NORMAL.   Pulmonary/Chest: Effort normal and breath sounds normal. No respiratory distress. He has no wheezes. He has no rales. He exhibits no tenderness.   Abdominal: Soft. Bowel sounds are normal. He exhibits no distension. There is no tenderness.   Musculoskeletal: Normal range of motion. He exhibits no edema.   Neurological: He is alert and oriented to person, place, and time. No cranial nerve deficit.   Skin: Skin is warm and dry. No rash noted. He is not diaphoretic. No erythema.   Psychiatric: He has a normal mood and affect. His behavior is normal.   Nursing  note and vitals reviewed.      Significant Labs:   Recent Lab Results       02/23/20  0426   02/23/20  0423   02/22/20  1632   02/22/20  1630   02/22/20  1619        Albumin               Alkaline Phosphatase               ALT               Anion Gap   8           Ascending aorta     3.70         Ao VTI     20.70         aPTT               AST               AV valve area     3.10         AV index (prosthetic)     0.63         Baso #   0.04           Basophil%   0.5           BILIRUBIN TOTAL               BSA     1.98         BUN, Bld   13           Calcium   8.4           Cath EF Estimated       45       Cath EF Quantitative       45       Chloride   110           CO2   24           Creatinine   1.1           Left Ventricle Relative Wall Thickness     0.49         Differential Method   Automated           E/A ratio     1.17         E/E' ratio     12.20         eGFR if    >60           eGFR if non    >60  Comment:  Calculation used to obtain the estimated glomerular filtration  rate (eGFR) is the CKD-EPI equation.              Eos #   0.0           Eosinophil%   0.4           E wave decelartion time     161.00         FS     18         Glucose   116           Gran # (ANC)   6.2           Gran%   76.8           Hematocrit   38.3           Hemoglobin   13.2           Immature Grans (Abs)   0.02  Comment:  Mild elevation in immature granulocytes is non specific and   can be seen in a variety of conditions including stress response,   acute inflammation, trauma and pregnancy. Correlation with other   laboratory and clinical findings is essential.             Immature Granulocytes   0.2           INR               IVS     1.30         LA WIDTH     4.70         Left Atrium Major Axis     4.60         Left Atrium Minor Axis     4.00         LVOT area     4.9         Lipase               LV LATERAL E/E' RATIO     20.33         LV SEPTAL E/E' RATIO     8.71         LVIDD     4.50         LVIDS      3.70         LV mass     198.10         LV Mass Index     100         LVOT diameter     2.50         LVOT stroke volume     64.27         LVOT peak VTI     13.10         Lymph #   1.1           Lymph%   14.1           Magnesium               MCH   30.7           MCHC   34.5           MCV   89           Mean e'     0.05         Mono #   0.7           Mono%   8.0           MPV   10.3           MV Peak A Fredo     0.52         MV Peak E Fredo     0.61         nRBC   0           Platelets   185           POC ACTIVATED CLOTTING TIME K         235     Potassium   3.9           PROTEIN TOTAL               Protime               PV mean gradient     0         PW     1.10         Right Atrial Pressure (from IVC)     8         RA Major Axis     4.80         RA Width     2.70         RBC   4.30           RDW   13.8           RVOT peak VTI     7         Sample         unknown     Sodium   142           TDI SEPTAL     0.07         TDI LATERAL     0.03         Troponin I 22.473  Comment:  The reference interval for Troponin I represents the 99th percentile   cutoff   for our facility and is consistent with 3rd generation assay   performance.               WBC   8.09                            02/22/20  1536   02/22/20  1523   02/22/20  1416   02/22/20  1404   02/22/20  1143        Albumin         3.9     Alkaline Phosphatase         87     ALT         67     Anion Gap         8     Ascending aorta               Ao VTI               aPTT     26.4  Comment:  aPTT therapeutic range = 39-69 seconds         AST         70     AV valve area               AV index (prosthetic)               Baso #         0.03     Basophil%         0.4     BILIRUBIN TOTAL         0.8  Comment:  For infants and newborns, interpretation of results should be based  on gestational age, weight and in agreement with clinical  observations.  Premature Infant recommended reference ranges:  Up to 24 hours.............<8.0 mg/dL  Up to 48 hours............<12.0  mg/dL  3-5 days..................<15.0 mg/dL  6-29 days.................<15.0 mg/dL       BSA               BUN, Bld         14     Calcium         9.4     Cath EF Estimated               Cath EF Quantitative               Chloride         108     CO2         25     Creatinine         1.2     Left Ventricle Relative Wall Thickness               Differential Method         Automated     E/A ratio               E/E' ratio               eGFR if          >60     eGFR if non          >60  Comment:  Calculation used to obtain the estimated glomerular filtration  rate (eGFR) is the CKD-EPI equation.        Eos #         0.0     Eosinophil%         0.2     E wave decelartion time               FS               Glucose         120     Gran # (ANC)         6.8     Gran%         83.1     Hematocrit         44.1     Hemoglobin         15.3     Immature Grans (Abs)         0.03  Comment:  Mild elevation in immature granulocytes is non specific and   can be seen in a variety of conditions including stress response,   acute inflammation, trauma and pregnancy. Correlation with other   laboratory and clinical findings is essential.       Immature Granulocytes         0.4     INR     1.0  Comment:  Coumadin Therapy:  2.0 - 3.0 for INR for all indicators except mechanical heart valves  and antiphospholipid syndromes which should use 2.5 - 3.5.           IVS               LA WIDTH               Left Atrium Major Axis               Left Atrium Minor Axis               LVOT area               Lipase         24     LV LATERAL E/E' RATIO               LV SEPTAL E/E' RATIO               LVIDD               LVIDS               LV mass               LV Mass Index               LVOT diameter               LVOT stroke volume               LVOT peak VTI               Lymph #         0.8     Lymph%         10.2     Magnesium         1.9     MCH         30.8     MCHC         34.7     MCV         89     Mean e'                Mono #         0.5     Mono%         5.7     MPV         10.0     MV Peak A Fredo               MV Peak E Fredo               nRBC         0     Platelets         177     POC ACTIVATED CLOTTING TIME K 296 164           Potassium         4.6     PROTEIN TOTAL         6.5     Protime     10.4         PV mean gradient               PW               Right Atrial Pressure (from IVC)               RA Major Axis               RA Width               RBC         4.97     RDW         13.4     RVOT peak VTI               Sample unknown unknown           Sodium         141     TDI SEPTAL               TDI LATERAL               Troponin I       10.318  Comment:  The reference interval for Troponin I represents the 99th percentile   cutoff   for our facility and is consistent with 3rd generation assay   performance.   13.791  Comment:  The reference interval for Troponin I represents the 99th percentile   cutoff   for our facility and is consistent with 3rd generation assay   performance.       WBC         8.21                          Significant Imaging: X-Ray: CXR: X-Ray Chest 1 View (CXR): No results found for this visit on 02/22/20.    Assessment and Plan:     Brief HPI: A 68 YO MALE WITH NSTEMI S/P LCX STENT IS ASYMPTOMATIC UNEVENTFUL NITE.     * NSTEMI (non-ST elevated myocardial infarction)  Has nstemi with stuttering course and continued angina with ekg changes and wall motion abnormalities will trteat with standard therapy and  proceed urgent lhc/ptca  I have explained the risks, benefits , and alternatives of the procedure in detail.the patient voices understanding and all questions have been answered.the patient agrees to proceed as planned.    2/23/2020 HAD STENT LCX HAS STILL RESDUAL SIGNIFICANT LAD DIAGONAL DISEASE NEEDING STGAED PCI.  HE IS ON B BLOCKERS ASA BRILINTA . WILL NOT GIVE STTINS IT IS CONTRAINDICATED DUE TO HIS SIDE EFFECTS. HE WILL BE PLACED ON PCSK9 INHIBITORS. WILL TRY ZETIA 10 MG PO DAILY.  WILL  REPEAT TROPONIS IF TRENDING DOWN WILL AMBULATE AND IF ASYMPTOMATIC WILL D/C AND F.U IN CLINIC NEXT WEEK.     Hypercholesterolemia  intolerant to statins  HAD MAJOR SIDE EFECTS .        VTE Risk Mitigation (From admission, onward)         Ordered     enoxaparin injection 40 mg  Daily      02/22/20 4546                Francis Melchor MD  Cardiology  [facility]

## 2020-02-23 NOTE — NURSING
Discharge instructions reviewed with patient. All questions answered. VSS. Left wrist puncture site stable. IV discontinued and site dressed. Pt discharged via wheelchair home with wife.

## 2020-02-23 NOTE — SUBJECTIVE & OBJECTIVE
Past Medical History:   Diagnosis Date    Arthritis        Past Surgical History:   Procedure Laterality Date    KNEE SURGERY      SHOULDER ARTHROSCOPY Right     Dr Bella    simple prostatectomy  06/06/2016    robotic assisted partial prostectomy due to BPH    TOTAL KNEE ARTHROPLASTY         Review of patient's allergies indicates:  No Known Allergies    No current facility-administered medications on file prior to encounter.      Current Outpatient Medications on File Prior to Encounter   Medication Sig    amoxicillin (AMOXIL) 500 MG capsule TAKE 1 CAPSULE BY MOUTH EVERY 8 HOURS UNTIL GONE, TAKE 4 CAPSULES BY MOUTH 1 HOUR BEFORE PROCEDURE    finasteride (PROSCAR) 5 mg tablet Take 1 tablet (5 mg total) by mouth once daily.    methocarbamol (ROBAXIN) 750 MG Tab Take 1 tablet (750 mg total) by mouth 3 (three) times daily as needed.    mometasone 0.1% (ELOCON) 0.1 % cream AAA bid prn for psoriasis. Strong steroid.  Do not use on face, underarms or groin.    triamcinolone acetonide 0.025% (KENALOG) 0.025 % cream Apply topically 2 (two) times daily as needed. Mild steroid.  Safe for face up to 2 weeks then stop.     Family History     Problem Relation (Age of Onset)    Cancer Father    Diabetes Mother, Maternal Aunt    Heart disease Mother        Tobacco Use    Smoking status: Never Smoker    Smokeless tobacco: Former User     Types: Chew   Substance and Sexual Activity    Alcohol use: Yes     Alcohol/week: 2.0 standard drinks     Types: 2 Glasses of wine per week     Comment: per week    Drug use: No    Sexual activity: Not on file     Review of Systems   Constitutional: Negative.    HENT: Negative.    Eyes: Negative.    Respiratory: Positive for shortness of breath.    Cardiovascular: Positive for chest pain.   Gastrointestinal: Negative for abdominal pain, nausea and vomiting.   Genitourinary: Negative.    Musculoskeletal: Positive for myalgias.   Skin: Negative for pallor, rash and wound.    Neurological: Negative.    Hematological: Negative.    Psychiatric/Behavioral: Negative.      Objective:     Vital Signs (Most Recent):  Temp: 97.6 °F (36.4 °C) (02/22/20 1715)  Pulse: 69 (02/22/20 1715)  Resp: 15 (02/22/20 1715)  BP: (!) 130/90 (02/22/20 1715)  SpO2: 98 % (02/22/20 1715) Vital Signs (24h Range):  Temp:  [97.6 °F (36.4 °C)] 97.6 °F (36.4 °C)  Pulse:  [69-92] 69  Resp:  [15-21] 15  SpO2:  [94 %-98 %] 98 %  BP: (128-162)/() 130/90     Weight: 78 kg (172 lb)  Body mass index is 23.99 kg/m².    Physical Exam   Constitutional: He is oriented to person, place, and time. He appears well-developed.   HENT:   Head: Normocephalic and atraumatic.   Nose: Nose normal.   Mouth/Throat: Oropharynx is clear and moist.   Eyes: Conjunctivae are normal. No scleral icterus.   Neck: Normal range of motion. Neck supple.   Cardiovascular: Normal rate, regular rhythm and normal heart sounds. Exam reveals no gallop and no friction rub.   No murmur heard.  Pulmonary/Chest: Effort normal and breath sounds normal.   Abdominal: Soft. Bowel sounds are normal.   Musculoskeletal: Normal range of motion. He exhibits no edema or tenderness.   Neurological: He is alert and oriented to person, place, and time.   Skin: Skin is warm and dry.   Psychiatric: He has a normal mood and affect. His behavior is normal.   Vitals reviewed.          Significant Labs:   CBC:   Recent Labs   Lab 02/22/20  1143   WBC 8.21   HGB 15.3   HCT 44.1        CMP:   Recent Labs   Lab 02/22/20  1143      K 4.6      CO2 25   *   BUN 14   CREATININE 1.2   CALCIUM 9.4   PROT 6.5   ALBUMIN 3.9   BILITOT 0.8   ALKPHOS 87   AST 70*   ALT 67*   ANIONGAP 8   EGFRNONAA >60     All pertinent labs within the past 24 hours have been reviewed.    Significant Imaging: I have reviewed all pertinent imaging results/findings within the past 24 hours.

## 2020-02-23 NOTE — DISCHARGE SUMMARY
[facility] Hospital Medicine  Discharge Summary      Patient Name: Mekhi Lambert  MRN: [id number]  Admission Date: 2/22/2020  Hospital Length of Stay: 1 days  Discharge Date and Time: 2/23/2020  2:35 PM  Attending Physician: No att. providers found   Discharging Provider: Carmelo Manriquez MD  Primary Care Provider: ABIGAIL Lopez Jr, MD      HPI:   · Mekhi Lambert is a 69 y.o. male patient with a h/o arthritis who presents to the Emergency Department for evaluation of CP which onset this PTA. Pt reports he came to the ED for chronic bilateral shoulder pain and when he was about to leave the ED he had sudden CP. Pt states his daughter had an AA. Symptoms are intermittent and moderate in severity. No mitigating or exacerbating factors reported. Associated sxs include nausea. Patient denies any fever, sore throat, SOB, emesis, diarrhea, dysuria, back pain, rash, HA, bruises/blds easily, and all other sxs at this time. No prior Tx reported. No further complaints or concerns at this time. EKG noted NSR with inverted T waves in the inferior leads. Vital signs are Temp 97.6, pulse 69, resp 15 and B/P 130/90. CBC is stable, chemistries find glucose 120, AST 70, ALT 67, troponin 13.79 & 10.31. Pt taken immediately to the Cath Lab per Dr. Melchor and he was found to have Mid Cx lesion , 99% stenosed.  A STENT RESOLUTE BETO [id number] stent was successfully placed at 14 NAT for 30 sec.Pt is admitted to ICU post procedure. According to Cardiology note, the procedure was difficult as there was no reflow and distal embolization treated infuse vasodilator therapy and reverse no reflow and resolve distal embolization.    Procedure(s) (LRB):  CATHETERIZATION, HEART, LEFT (Left)  Angioplasty-coronary (Left)  Thrombectomy, Coronary (Left)  Stent, Drug Eluting, Single Vessel, Coronary (Left)      Hospital Course:   Patient was admitted for NSTEMI and was taken directly to the cath lab.  He was found to have occlusion of the  left circumflex along with the LAD.  The lesion of the left circumflex was corrected with stenting appears cardio plan to correct LAD after having outpatient follow-up.  Troponins trended postprocedure and was trending down prior to discharge. Patient was discharged home on beta-blockers Brilinta aspirin and Zetia.  It was noted during this admission the patient had a statin intolerance.  Instructed to follow up with Cardiology in 1 week to discuss plan going forward for correction of the LAD.     Consults:   Consults (From admission, onward)        Status Ordering Provider     Inpatient consult to Cardiology  Once     Provider:  Ute Melchor MD    Completed NEFTALI ETIENNE     Inpatient consult to Pulmonology  Once     Provider:  Paulino Sanchez MD    Completed UTE MELCHOR.          No new Assessment & Plan notes have been filed under this hospital service since the last note was generated.  Service: Hospital Medicine    Final Active Diagnoses:    Diagnosis Date Noted POA    PRINCIPAL PROBLEM:  NSTEMI (non-ST elevated myocardial infarction) [I21.4] 02/22/2020 Yes    Hypercholesterolemia [E78.00] 02/21/2017 Yes      Problems Resolved During this Admission:       Discharged Condition: good    Disposition: Home or Self Care    Follow Up:  Follow-up Information     E Kayden Lopez Jr, MD In 2 days.    Specialties:  Internal Medicine, Pediatrics  Contact information:  [id number] [facility]  [place] [address]  [phone]             Francis Melchor MD. Schedule an appointment as soon as possible for a visit in 1 week.    Specialty:  Cardiology  Contact information:  [id number] [facility]  [place] [address]  [phone]                 Patient Instructions:      Diet Cardiac       Significant Diagnostic Studies:   Results for orders placed or performed during the hospital encounter of 02/22/20   CBC auto differential   Result Value Ref Range    WBC 8.21 3.90 - 12.70 K/uL    RBC 4.97 4.60 - 6.20 M/uL     Hemoglobin 15.3 14.0 - 18.0 g/dL    Hematocrit 44.1 40.0 - 54.0 %    Mean Corpuscular Volume 89 82 - 98 fL    Mean Corpuscular Hemoglobin 30.8 27.0 - 31.0 pg    Mean Corpuscular Hemoglobin Conc 34.7 32.0 - 36.0 g/dL    RDW 13.4 11.5 - 14.5 %    Platelets 177 150 - 350 K/uL    MPV 10.0 9.2 - 12.9 fL    Immature Granulocytes 0.4 0.0 - 0.5 %    Gran # (ANC) 6.8 1.8 - 7.7 K/uL    Immature Grans (Abs) 0.03 0.00 - 0.04 K/uL    Lymph # 0.8 (L) 1.0 - 4.8 K/uL    Mono # 0.5 0.3 - 1.0 K/uL    Eos # 0.0 0.0 - 0.5 K/uL    Baso # 0.03 0.00 - 0.20 K/uL    nRBC 0 0 /100 WBC    Gran% 83.1 (H) 38.0 - 73.0 %    Lymph% 10.2 (L) 18.0 - 48.0 %    Mono% 5.7 4.0 - 15.0 %    Eosinophil% 0.2 0.0 - 8.0 %    Basophil% 0.4 0.0 - 1.9 %    Differential Method Automated    Comprehensive metabolic panel   Result Value Ref Range    Sodium 141 136 - 145 mmol/L    Potassium 4.6 3.5 - 5.1 mmol/L    Chloride 108 95 - 110 mmol/L    CO2 25 23 - 29 mmol/L    Glucose 120 (H) 70 - 110 mg/dL    BUN, Bld 14 8 - 23 mg/dL    Creatinine 1.2 0.5 - 1.4 mg/dL    Calcium 9.4 8.7 - 10.5 mg/dL    Total Protein 6.5 6.0 - 8.4 g/dL    Albumin 3.9 3.5 - 5.2 g/dL    Total Bilirubin 0.8 0.1 - 1.0 mg/dL    Alkaline Phosphatase 87 55 - 135 U/L    AST 70 (H) 10 - 40 U/L    ALT 67 (H) 10 - 44 U/L    Anion Gap 8 8 - 16 mmol/L    eGFR if African American >60 >60 mL/min/1.73 m^2    eGFR if non African American >60 >60 mL/min/1.73 m^2   Magnesium   Result Value Ref Range    Magnesium 1.9 1.6 - 2.6 mg/dL   Lipase   Result Value Ref Range    Lipase 24 4 - 60 U/L   Troponin I   Result Value Ref Range    Troponin I 13.791 (H) 0.000 - 0.026 ng/mL   Troponin I   Result Value Ref Range    Troponin I 10.318 (H) 0.000 - 0.026 ng/mL   APTT   Result Value Ref Range    aPTT 26.4 21.0 - 32.0 sec   Protime-INR   Result Value Ref Range    Prothrombin Time 10.4 9.0 - 12.5 sec    INR 1.0 0.8 - 1.2   Basic metabolic panel   Result Value Ref Range    Sodium 142 136 - 145 mmol/L    Potassium 3.9 3.5 -  5.1 mmol/L    Chloride 110 95 - 110 mmol/L    CO2 24 23 - 29 mmol/L    Glucose 116 (H) 70 - 110 mg/dL    BUN, Bld 13 8 - 23 mg/dL    Creatinine 1.1 0.5 - 1.4 mg/dL    Calcium 8.4 (L) 8.7 - 10.5 mg/dL    Anion Gap 8 8 - 16 mmol/L    eGFR if African American >60 >60 mL/min/1.73 m^2    eGFR if non African American >60 >60 mL/min/1.73 m^2   CBC auto differential   Result Value Ref Range    WBC 8.09 3.90 - 12.70 K/uL    RBC 4.30 (L) 4.60 - 6.20 M/uL    Hemoglobin 13.2 (L) 14.0 - 18.0 g/dL    Hematocrit 38.3 (L) 40.0 - 54.0 %    Mean Corpuscular Volume 89 82 - 98 fL    Mean Corpuscular Hemoglobin 30.7 27.0 - 31.0 pg    Mean Corpuscular Hemoglobin Conc 34.5 32.0 - 36.0 g/dL    RDW 13.8 11.5 - 14.5 %    Platelets 185 150 - 350 K/uL    MPV 10.3 9.2 - 12.9 fL    Immature Granulocytes 0.2 0.0 - 0.5 %    Gran # (ANC) 6.2 1.8 - 7.7 K/uL    Immature Grans (Abs) 0.02 0.00 - 0.04 K/uL    Lymph # 1.1 1.0 - 4.8 K/uL    Mono # 0.7 0.3 - 1.0 K/uL    Eos # 0.0 0.0 - 0.5 K/uL    Baso # 0.04 0.00 - 0.20 K/uL    nRBC 0 0 /100 WBC    Gran% 76.8 (H) 38.0 - 73.0 %    Lymph% 14.1 (L) 18.0 - 48.0 %    Mono% 8.0 4.0 - 15.0 %    Eosinophil% 0.4 0.0 - 8.0 %    Basophil% 0.5 0.0 - 1.9 %    Differential Method Automated    Lipid panel   Result Value Ref Range    Cholesterol 176 120 - 199 mg/dL    Triglycerides 99 30 - 150 mg/dL    HDL 52 40 - 75 mg/dL    LDL Cholesterol 104.2 63.0 - 159.0 mg/dL    Hdl/Cholesterol Ratio 29.5 20.0 - 50.0 %    Total Cholesterol/HDL Ratio 3.4 2.0 - 5.0    Non-HDL Cholesterol 124 mg/dL   Hemoglobin A1c   Result Value Ref Range    Hemoglobin A1C 4.9 4.0 - 5.6 %    Estimated Avg Glucose 94 68 - 131 mg/dL   Troponin I   Result Value Ref Range    Troponin I 22.473 (H) 0.000 - 0.026 ng/mL   Hematocrit   Result Value Ref Range    Hematocrit 41.8 40.0 - 54.0 %   Hemoglobin   Result Value Ref Range    Hemoglobin 14.1 14.0 - 18.0 g/dL   Troponin I   Result Value Ref Range    Troponin I 15.563 (H) 0.000 - 0.026 ng/mL   Echo Color  Flow Doppler? Yes   Result Value Ref Range    BSA 1.98 m2    TDI SEPTAL 0.07 m/s    LV LATERAL E/E' RATIO 20.33 m/s    LV SEPTAL E/E' RATIO 8.71 m/s    LA WIDTH 4.70 cm    TDI LATERAL 0.03 m/s    LVIDD 4.50 3.5 - 6.0 cm    IVS 1.30 0.6 - 1.1 cm    PW 1.10 0.6 - 1.1 cm    LVIDS 3.70 2.1 - 4.0 cm    FS 18 28 - 44 %    LV mass 198.10 g    Left Ventricle Relative Wall Thickness 0.49 cm    AV valve area 3.10 cm2    AV index (prosthetic) 0.63     E/A ratio 1.17     Mean e' 0.05 m/s    E wave decelartion time 161.00 msec    LVOT diameter 2.50 cm    LVOT area 4.9 cm2    LVOT peak VTI 13.10 cm    Ao VTI 20.70 cm    RVOT peak VTI 7 cm    LVOT stroke volume 64.27 cm3    PV mean gradient 0 mmHg    E/E' ratio 12.20 m/s    MV Peak E Fredo 0.61 m/s    MV Peak A Fredo 0.52 m/s    LV Mass Index 100 g/m2    RA Major Axis 4.80 cm    Left Atrium Minor Axis 4.00 cm    Left Atrium Major Axis 4.60 cm    RA Width 2.70 cm    Right Atrial Pressure (from IVC) 8 mmHg    Ascending aorta 3.70 cm   Cardiac catheterization   Result Value Ref Range    Cath EF Quantitative 45 %    Cath EF Estimated 45 %   ISTAT ACT-K   Result Value Ref Range    POC ACTIVATED CLOTTING TIME K 164 (H) 74 - 137 sec    Sample unknown    ISTAT ACT-K   Result Value Ref Range    POC ACTIVATED CLOTTING TIME K 296 (H) 74 - 137 sec    Sample unknown    ISTAT ACT-K   Result Value Ref Range    POC ACTIVATED CLOTTING TIME K 235 (H) 74 - 137 sec    Sample unknown         Pending Diagnostic Studies:     Procedure Component Value Units Date/Time    EKG 12-LEAD on arrival to floor [[id number]] Collected:  02/22/20 1401    Order Status:  Sent Lab Status:  In process Updated:  02/23/20 9671    Narrative:       Test Reason : I25.10,    Vent. Rate : 079 BPM     Atrial Rate : 080 BPM     P-R Int : 160 ms          QRS Dur : 092 ms      QT Int : 378 ms       P-R-T Axes : 063 021 067 degrees     QTc Int : 433 ms    Sinus rhythm with occasional Premature ventricular complexes  Low voltage  QRS  Cannot rule out Inferior infarct ,age undetermined  Abnormal ECG  When compared with ECG of 22-FEB-2020 13:34,  Premature ventricular complexes are now Present    Referred By: [email or web address]   SELF           Confirmed By:     EKG 12-LEAD starting tomorrow [[id number]] Collected:  02/22/20 1334    Order Status:  Sent Lab Status:  In process Updated:  02/23/20 1507    Narrative:       Test Reason : I25.10,    Vent. Rate : 081 BPM     Atrial Rate : 081 BPM     P-R Int : 132 ms          QRS Dur : 096 ms      QT Int : 378 ms       P-R-T Axes : 065 033 070 degrees     QTc Int : 439 ms    Normal sinus rhythm  Low voltage QRS  Cannot rule out Anterior infarct (cited on or before 22-FEB-2020)  Abnormal ECG  When compared with ECG of 22-FEB-2020 13:33,  Premature ventricular complexes are no longer Present    Referred By: [email or web address]   SELF           Confirmed By:     Hepatitis panel, acute [[id number]] Collected:  02/22/20 1308    Order Status:  Sent Lab Status:  In process Updated:  02/22/20 1938    Specimen:  Blood          Medications:  Reconciled Home Medications:      Medication List      START taking these medications    aspirin 81 MG EC tablet  Commonly known as:  ECOTRIN  Take 1 tablet (81 mg total) by mouth once daily.  Start taking on:  February 24, 2020     ezetimibe 10 mg tablet  Commonly known as:  ZETIA  Take 1 tablet (10 mg total) by mouth once daily.     meloxicam 15 MG tablet  Commonly known as:  MOBIC  Take 1 tablet (15 mg total) by mouth once daily.     metoprolol tartrate 25 MG tablet  Commonly known as:  LOPRESSOR  Take 1 tablet (25 mg total) by mouth 2 (two) times daily.     ticagrelor 90 mg tablet  Commonly known as:  BRILINTA  Take 1 tablet (90 mg total) by mouth 2 (two) times daily.        CONTINUE taking these medications    methocarbamol 750 MG Tab  Commonly known as:  ROBAXIN  Take 1 tablet (750 mg total) by mouth 3 (three) times daily as needed.     mometasone 0.1% 0.1 % cream  Commonly known as:   ELOCON  AAA bid prn for psoriasis. Strong steroid.  Do not use on face, underarms or groin.     triamcinolone acetonide 0.025% 0.025 % cream  Commonly known as:  KENALOG  Apply topically 2 (two) times daily as needed. Mild steroid.  Safe for face up to 2 weeks then stop.        STOP taking these medications    amoxicillin 500 MG capsule  Commonly known as:  AMOXIL     finasteride 5 mg tablet  Commonly known as:  PROSCAR            Indwelling Lines/Drains at time of discharge:   Lines/Drains/Airways     None                 Time spent on the discharge of patient: 40 minutes  Patient was seen and examined on the date of discharge and determined to be suitable for discharge.        Carmelo Manriquez MD  Department of Hospital Medicine  [facility]

## 2020-02-23 NOTE — H&P
[facility] Hospital Medicine  History & Physical    Patient Name: Mekhi Lambert  MRN: [id number]  Admission Date: 2/22/2020  Attending Physician: Carmelo Manriquez MD   Primary Care Provider: ABIGAIL Lopez Jr, MD         Patient information was obtained from patient, past medical records and ER records.     Subjective:     Principal Problem:NSTEMI (non-ST elevated myocardial infarction)    Chief Complaint:   Chief Complaint   Patient presents with    Muscle Pain     pt reports trapezius pain bilaterally. Pt reports that he sees PT and does dry needling for pain        · HPI: Mekhi Lambert is a 69 y.o. male patient with a h/o arthritis who presents to the Emergency Department for evaluation of CP which onset this PTA. Pt reports he came to the ED for chronic bilateral shoulder pain and when he was about to leave the ED he had sudden CP. Pt states his daughter had an AA. Symptoms are intermittent and moderate in severity. No mitigating or exacerbating factors reported. Associated sxs include nausea. Patient denies any fever, sore throat, SOB, emesis, diarrhea, dysuria, back pain, rash, HA, bruises/blds easily, and all other sxs at this time. No prior Tx reported. No further complaints or concerns at this time. EKG noted NSR with inverted T waves in the inferior leads. Vital signs are Temp 97.6, pulse 69, resp 15 and B/P 130/90. CBC is stable, chemistries find glucose 120, AST 70, ALT 67, troponin 13.79 & 10.31. Pt taken immediately to the Cath Lab per Dr. Melchor and he was found to have Mid Cx lesion , 99% stenosed.  A STENT RESOLUTE BETO 3.5X38MM stent was successfully placed at 14 NAT for 30 sec.Pt is admitted to ICU post procedure. According to Cardiology note, the procedure was difficult as there was no reflow and distal embolization treated infuse vasodilator therapy and reverse no reflow and resolve distal embolization.    Past Medical History:   Diagnosis Date    Arthritis        Past Surgical  History:   Procedure Laterality Date    KNEE SURGERY      SHOULDER ARTHROSCOPY Right     Dr Bella    simple prostatectomy  06/06/2016    robotic assisted partial prostectomy due to BPH    TOTAL KNEE ARTHROPLASTY         Review of patient's allergies indicates:  No Known Allergies    No current facility-administered medications on file prior to encounter.      Current Outpatient Medications on File Prior to Encounter   Medication Sig    amoxicillin (AMOXIL) 500 MG capsule TAKE 1 CAPSULE BY MOUTH EVERY 8 HOURS UNTIL GONE, TAKE 4 CAPSULES BY MOUTH 1 HOUR BEFORE PROCEDURE    finasteride (PROSCAR) 5 mg tablet Take 1 tablet (5 mg total) by mouth once daily.    methocarbamol (ROBAXIN) 750 MG Tab Take 1 tablet (750 mg total) by mouth 3 (three) times daily as needed.    mometasone 0.1% (ELOCON) 0.1 % cream AAA bid prn for psoriasis. Strong steroid.  Do not use on face, underarms or groin.    triamcinolone acetonide 0.025% (KENALOG) 0.025 % cream Apply topically 2 (two) times daily as needed. Mild steroid.  Safe for face up to 2 weeks then stop.     Family History     Problem Relation (Age of Onset)    Cancer Father    Diabetes Mother, Maternal Aunt    Heart disease Mother        Tobacco Use    Smoking status: Never Smoker    Smokeless tobacco: Former User     Types: Chew   Substance and Sexual Activity    Alcohol use: Yes     Alcohol/week: 2.0 standard drinks     Types: 2 Glasses of wine per week     Comment: per week    Drug use: No    Sexual activity: Not on file     Review of Systems   Constitutional: Negative.    HENT: Negative.    Eyes: Negative.    Respiratory: Positive for shortness of breath.    Cardiovascular: Positive for chest pain.   Gastrointestinal: Negative for abdominal pain, nausea and vomiting.   Genitourinary: Negative.    Musculoskeletal: Positive for myalgias.   Skin: Negative for pallor, rash and wound.   Neurological: Negative.    Hematological: Negative.    Psychiatric/Behavioral:  Negative.      Objective:     Vital Signs (Most Recent):  Temp: 97.6 °F (36.4 °C) (02/22/20 1715)  Pulse: 69 (02/22/20 1715)  Resp: 15 (02/22/20 1715)  BP: (!) 130/90 (02/22/20 1715)  SpO2: 98 % (02/22/20 1715) Vital Signs (24h Range):  Temp:  [97.6 °F (36.4 °C)] 97.6 °F (36.4 °C)  Pulse:  [69-92] 69  Resp:  [15-21] 15  SpO2:  [94 %-98 %] 98 %  BP: (128-162)/() 130/90     Weight: 78 kg (172 lb)  Body mass index is 23.99 kg/m².    Physical Exam   Constitutional: He is oriented to person, place, and time. He appears well-developed.   HENT:   Head: Normocephalic and atraumatic.   Nose: Nose normal.   Mouth/Throat: Oropharynx is clear and moist.   Eyes: Conjunctivae are normal. No scleral icterus.   Neck: Normal range of motion. Neck supple.   Cardiovascular: Normal rate, regular rhythm and normal heart sounds. Exam reveals no gallop and no friction rub.   No murmur heard.  Pulmonary/Chest: Effort normal and breath sounds normal.   Abdominal: Soft. Bowel sounds are normal.   Musculoskeletal: Normal range of motion. He exhibits no edema or tenderness.   Neurological: He is alert and oriented to person, place, and time.   Skin: Skin is warm and dry.   Psychiatric: He has a normal mood and affect. His behavior is normal.   Vitals reviewed.          Significant Labs:   CBC:   Recent Labs   Lab 02/22/20  1143   WBC 8.21   HGB 15.3   HCT 44.1        CMP:   Recent Labs   Lab 02/22/20  1143      K 4.6      CO2 25   *   BUN 14   CREATININE 1.2   CALCIUM 9.4   PROT 6.5   ALBUMIN 3.9   BILITOT 0.8   ALKPHOS 87   AST 70*   ALT 67*   ANIONGAP 8   EGFRNONAA >60     All pertinent labs within the past 24 hours have been reviewed.    Significant Imaging: I have reviewed all pertinent imaging results/findings within the past 24 hours.    Assessment/Plan:     * NSTEMI (non-ST elevated myocardial infarction)  S/P left heart cath  · Mid Cx lesion , 99% stenosed reduced to 0%      ASA, BB, ticagrelor,  Lovenox  Tirofiban infusion  Being followed by Cardiology  · 2 D ECHO Mildly decreased left ventricular systolic function. The estimated ejection fraction is 45%.  Grade I (mild) left ventricular diastolic dysfunction consistent with impaired relaxation.    Lipid panel and Hgb A1 c pending      Hypercholesterolemia  Lipid panel pending        VTE Risk Mitigation (From admission, onward)         Ordered     enoxaparin injection 40 mg  Daily      02/22/20 6013              Critical care time spent on the evaluation and treatment of severe organ dysfunction, review of pertinent labs and imaging studies, discussions with consulting providers and discussions with patient/family: 75 minutes.     Christi Aviles NP  Department of Hospital Medicine   [facility]

## 2020-02-23 NOTE — HOSPITAL COURSE
2/23 - Patient AAOx3, sitting up in chair eating breakfast. No signs of distress noted. Sats 100% on room air. No SOB, chest pain, or edema. Tirofiban infusion at  0.15 mcg/kg/min

## 2020-02-23 NOTE — SUBJECTIVE & OBJECTIVE
Past Medical History:   Diagnosis Date    Arthritis        Past Surgical History:   Procedure Laterality Date    KNEE SURGERY      SHOULDER ARTHROSCOPY Right     Dr Bella    simple prostatectomy  06/06/2016    robotic assisted partial prostectomy due to BPH    TOTAL KNEE ARTHROPLASTY         Review of patient's allergies indicates:  No Known Allergies    Family History     Problem Relation (Age of Onset)    Cancer Father    Diabetes Mother, Maternal Aunt    Heart disease Mother        Tobacco Use    Smoking status: Never Smoker    Smokeless tobacco: Former User     Types: Chew   Substance and Sexual Activity    Alcohol use: Yes     Alcohol/week: 2.0 standard drinks     Types: 2 Glasses of wine per week     Comment: per week    Drug use: No    Sexual activity: Not on file         Review of Systems   Constitutional: Negative for appetite change, diaphoresis and fatigue.   HENT: Negative for congestion.    Eyes: Negative for discharge.   Respiratory: Negative for cough, chest tightness, shortness of breath and wheezing.    Cardiovascular: Negative for chest pain, palpitations and leg swelling.   Gastrointestinal: Negative for abdominal distention, abdominal pain, blood in stool, nausea and vomiting.   Endocrine: Negative for polydipsia and polyuria.   Genitourinary: Negative for difficulty urinating.   Musculoskeletal: Negative for arthralgias.   Skin: Negative for color change.   Allergic/Immunologic: Negative for immunocompromised state.   Neurological: Negative for dizziness, weakness and headaches.   Hematological: Does not bruise/bleed easily.   Psychiatric/Behavioral: Negative for agitation, behavioral problems and confusion.     Objective:     Vital Signs (Most Recent):  Temp: 98.7 °F (37.1 °C) (02/23/20 0305)  Pulse: 77 (02/23/20 0847)  Resp: (!) 21 (02/23/20 0630)  BP: 116/78 (02/23/20 0847)  SpO2: (!) 93 % (02/23/20 0630) Vital Signs (24h Range):  Temp:  [97.6 °F (36.4 °C)-99 °F (37.2 °C)] 98.7 °F  (37.1 °C)  Pulse:  [65-92] 77  Resp:  [12-23] 21  SpO2:  [91 %-99 %] 93 %  BP: (101-162)/() 116/78     Weight: 78 kg (172 lb)  Body mass index is 23.99 kg/m².      Intake/Output Summary (Last 24 hours) at 2/23/2020 0910  Last data filed at 2/23/2020 0700  Gross per 24 hour   Intake 2185.33 ml   Output 1825 ml   Net 360.33 ml       Physical Exam   Constitutional: He is oriented to person, place, and time. Vital signs are normal. He appears well-developed and well-nourished. He is cooperative.  Non-toxic appearance. He does not have a sickly appearance. He does not appear ill. No distress. He is not intubated.       HENT:   Head: Normocephalic and atraumatic.   Mouth/Throat: Oropharynx is clear and moist and mucous membranes are normal.   Eyes: Pupils are equal, round, and reactive to light. EOM and lids are normal.   Neck: Trachea normal and normal range of motion. Carotid bruit is not present.   Cardiovascular: Normal rate, regular rhythm, S1 normal, S2 normal, normal heart sounds, intact distal pulses and normal pulses.   Pulses:       Radial pulses are 2+ on the right side, and 2+ on the left side.        Dorsalis pedis pulses are 2+ on the right side, and 2+ on the left side.   Pulmonary/Chest: Effort normal and breath sounds normal. No accessory muscle usage. He is not intubated. No respiratory distress.   Abdominal: Soft. Normal appearance and bowel sounds are normal. He exhibits no distension. There is no tenderness.   Musculoskeletal: Normal range of motion.        Right foot: There is no deformity.        Left foot: There is no deformity.   No edema   Lymphadenopathy:     He has no cervical adenopathy.   Neurological: He is alert and oriented to person, place, and time. He has normal strength. GCS eye subscore is 4. GCS verbal subscore is 5. GCS motor subscore is 6.   Skin: Skin is warm, dry and intact. Capillary refill takes less than 2 seconds. No rash noted. No cyanosis.   Psychiatric: He has a  normal mood and affect. His speech is normal and behavior is normal. Judgment and thought content normal. Cognition and memory are normal.       Vents:       Lines/Drains/Airways     Peripheral Intravenous Line                 Peripheral IV - Single Lumen 02/22/20 1357 20 G Right Antecubital less than 1 day         Peripheral IV - Single Lumen 02/22/20 1415 18 G Left Antecubital less than 1 day                Significant Labs:    CBC/Anemia Profile:  Recent Labs   Lab 02/22/20  1143 02/23/20  0423   WBC 8.21 8.09   HGB 15.3 13.2*   HCT 44.1 38.3*    185   MCV 89 89   RDW 13.4 13.8        Chemistries:  Recent Labs   Lab 02/22/20  1143 02/23/20  0423    142   K 4.6 3.9    110   CO2 25 24   BUN 14 13   CREATININE 1.2 1.1   CALCIUM 9.4 8.4*   ALBUMIN 3.9  --    PROT 6.5  --    BILITOT 0.8  --    ALKPHOS 87  --    ALT 67*  --    AST 70*  --    MG 1.9  --        Troponin:   Recent Labs   Lab 02/22/20  1143 02/22/20  1404 02/23/20  0426   TROPONINI 13.791* 10.318* 22.473*     All pertinent labs within the past 24 hours have been reviewed.    Significant Imaging:   I have reviewed all pertinent imaging results/findings within the past 24 hours.

## 2020-02-24 ENCOUNTER — TELEPHONE (OUTPATIENT)
Dept: CARDIOLOGY | Facility: CLINIC | Age: 70
End: 2020-02-24

## 2020-02-24 LAB
HAV IGM SERPL QL IA: NEGATIVE
HBV CORE IGM SERPL QL IA: NEGATIVE
HBV SURFACE AG SERPL QL IA: NEGATIVE
HCV AB SERPL QL IA: NEGATIVE

## 2020-02-24 NOTE — TELEPHONE ENCOUNTER
Spoke with patient--patient had left heart catheterization  with stenting done on 2/22/20---states he is experiencing numbness in both thumbs and lightheadedness---patient denies any other symptoms--patient states that since he had been on the phone talking with me, the dizziness has subsided and he now has numbness in right thumb only--  states blood pressure was 118/72 pulse 64 prior to calling our office--patient rechecked blood pressure while we were on the call it was 129/85 pulse 67-advised patient if symptoms worsens, if any chest pain, fever,  shortness of breath to go to E.R. Patient verbalizes understanding

## 2020-02-24 NOTE — TELEPHONE ENCOUNTER
"Returned call to patient and apologized for scheduling all appointments before calling to confirm.  Patient expressed many questions about his case, heart disease, heart attack and "vein blockage versus artery blockage", current medications no high blood pressure assured Dr. Carrasco would/could answer his questions and that all Cath Lab dates are tentative until after Friday's visit    ----- Message -----  From: Lizette Longo  Sent: 2/24/2020  11:37 AM CST  To: Danilo Pitts Staff    States he would like to speak to the nurse. She would like to see if all of his test results are in and schedule a sooner appt, States they had to put his stent in his cardian vein and he will get a second one done on Tuesday. Please call pt [phone]. Thank you       "

## 2020-02-24 NOTE — TELEPHONE ENCOUNTER
Was just discharged from hospital and was suppose to  some medications. Was suppose to be some pain medication Meloxicam. Was not with medication. Medication was at pharmacy but not put in bag. Pt wanting to know if he can take an old Rx of hydrocodone. 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 Pt states very mild discomfort. Advised to use tylenol and  Rx in the morning. Call back if pain worsens.     Reason for Disposition   Caller has medication question only, adult not sick, and triager answers question    Protocols used: MEDICATION QUESTION CALL-A-AH

## 2020-02-26 ENCOUNTER — HOSPITAL ENCOUNTER (OUTPATIENT)
Dept: CARDIOLOGY | Facility: HOSPITAL | Age: 70
Discharge: HOME | End: 2020-02-26
Attending: INTERNAL MEDICINE
Payer: MEDICARE

## 2020-02-26 ENCOUNTER — OFFICE VISIT (OUTPATIENT)
Dept: CARDIOLOGY | Facility: CLINIC | Age: 70
End: 2020-02-26
Payer: MEDICARE

## 2020-02-26 VITALS
OXYGEN SATURATION: 94 % | BODY MASS INDEX: 23.6 KG/M2 | SYSTOLIC BLOOD PRESSURE: 110 MMHG | WEIGHT: 169.19 LBS | DIASTOLIC BLOOD PRESSURE: 72 MMHG | HEART RATE: 63 BPM

## 2020-02-26 DIAGNOSIS — Z95.5 H/O HEART ARTERY STENT: ICD-10-CM

## 2020-02-26 DIAGNOSIS — Z78.9 STATIN INTOLERANCE: ICD-10-CM

## 2020-02-26 DIAGNOSIS — E78.00 HYPERCHOLESTEROLEMIA: ICD-10-CM

## 2020-02-26 DIAGNOSIS — I21.4 NSTEMI (NON-ST ELEVATED MYOCARDIAL INFARCTION): Primary | ICD-10-CM

## 2020-02-26 DIAGNOSIS — I25.118 CORONARY ARTERY DISEASE OF NATIVE ARTERY OF NATIVE HEART WITH STABLE ANGINA PECTORIS: ICD-10-CM

## 2020-02-26 DIAGNOSIS — I25.118 CORONARY ARTERY DISEASE OF NATIVE ARTERY OF NATIVE HEART WITH STABLE ANGINA PECTORIS: Primary | ICD-10-CM

## 2020-02-26 PROCEDURE — 99214 PR OFFICE/OUTPT VISIT, EST, LEVL IV, 30-39 MIN: ICD-10-PCS | Mod: S$PBB,,, | Performed by: INTERNAL MEDICINE

## 2020-02-26 PROCEDURE — 99214 OFFICE O/P EST MOD 30 MIN: CPT | Mod: S$PBB,,, | Performed by: INTERNAL MEDICINE

## 2020-02-26 PROCEDURE — 99213 OFFICE O/P EST LOW 20 MIN: CPT | Mod: PBBFAC,PO | Performed by: INTERNAL MEDICINE

## 2020-02-26 PROCEDURE — 99999 PR PBB SHADOW E&M-EST. PATIENT-LVL III: CPT | Mod: PBBFAC,,, | Performed by: INTERNAL MEDICINE

## 2020-02-26 PROCEDURE — 99999 PR PBB SHADOW E&M-EST. PATIENT-LVL III: ICD-10-PCS | Mod: PBBFAC,,, | Performed by: INTERNAL MEDICINE

## 2020-02-26 NOTE — PROGRESS NOTES
Subjective:   Patient ID:  Mekhi Lambert is a 69 y.o. male who presents for follow up of Follow-up and Numbness (left leg and foot. whiteness at the bottom of feet)      70 yo male, post PCI f/u  PMH CAD h/o NSTEMI in , s/p  99% lesion of Cx and 75% pLAD, s/p ISSAC in Cx. EF 45%, by Dr. Melchor, cardiomyopathy. Statin intolerance  Today, no chest pain, dyspnea, dizziness and palpitation  Sometime BP dropped to 85 mmHG and felt dizziness/fatigue   and BP wnl  Left leg numbness started before the procedure. No numbness during the exercise  Turned to the left head causes light flash  Left radial access for LHC and left arm bruise. No pain and numbness  Good exercise  EKG NSR and non T abnormality          Past Medical History:   Diagnosis Date    Arthritis        Past Surgical History:   Procedure Laterality Date    CARDIAC CATHETERIZATION      KNEE SURGERY      SHOULDER ARTHROSCOPY Right     Dr Bella    simple prostatectomy  06/06/2016    robotic assisted partial prostectomy due to BPH    TOTAL KNEE ARTHROPLASTY         Social History     Tobacco Use    Smoking status: Never Smoker    Smokeless tobacco: Former User     Types: Chew   Substance Use Topics    Alcohol use: Yes     Alcohol/week: 2.0 standard drinks     Types: 2 Glasses of wine per week     Comment: per week    Drug use: No       Family History   Problem Relation Age of Onset    Heart disease Mother         lupus related    Diabetes Mother     Cancer Father         prostate, bone, lung, skin cancers all seperate types    Diabetes Maternal Aunt          Review of Systems   Constitution: Negative for decreased appetite, diaphoresis, fever, malaise/fatigue and night sweats.   HENT: Negative for nosebleeds.    Eyes: Negative for blurred vision and double vision.   Cardiovascular: Negative for chest pain, claudication, dyspnea on exertion, irregular heartbeat, leg swelling, near-syncope, orthopnea, palpitations, paroxysmal nocturnal  dyspnea and syncope.   Respiratory: Negative for cough, shortness of breath, sleep disturbances due to breathing, snoring, sputum production and wheezing.    Endocrine: Negative for cold intolerance and polyuria.   Hematologic/Lymphatic: Does not bruise/bleed easily.   Skin: Negative for rash.   Musculoskeletal: Negative for back pain, falls, joint pain, joint swelling and neck pain.   Gastrointestinal: Negative for abdominal pain, heartburn, nausea and vomiting.   Genitourinary: Negative for dysuria, frequency and hematuria.   Neurological: Negative for difficulty with concentration, dizziness, focal weakness, headaches, light-headedness, numbness, seizures and weakness.   Psychiatric/Behavioral: Negative for depression, memory loss and substance abuse. The patient does not have insomnia.    Allergic/Immunologic: Negative for HIV exposure and hives.       Objective:   Physical Exam   Constitutional: He is oriented to person, place, and time. He appears well-nourished.   HENT:   Head: Normocephalic.   Eyes: Pupils are equal, round, and reactive to light.   Neck: Normal carotid pulses and no JVD present. Carotid bruit is not present. No thyromegaly present.   Cardiovascular: Normal rate, regular rhythm, normal heart sounds and normal pulses.  No extrasystoles are present. PMI is not displaced. Exam reveals no gallop and no S3.   No murmur heard.  Pulses:       Radial pulses are 2+ on the right side, and 2+ on the left side.   Pulmonary/Chest: Breath sounds normal. No stridor. No respiratory distress.   Abdominal: Soft. Bowel sounds are normal. There is no tenderness. There is no rebound.   Musculoskeletal: Normal range of motion.   Neurological: He is alert and oriented to person, place, and time.   Skin: Skin is intact. Bruising (left arm) noted. No rash noted.   Psychiatric: His behavior is normal.       Lab Results   Component Value Date    CHOL 176 02/23/2020    CHOL 234 (H) 02/14/2017     Lab Results   Component  Value Date    HDL 52 02/23/2020    HDL 60 02/14/2017     Lab Results   Component Value Date    LDLCALC 104.2 02/23/2020    LDLCALC 149.4 02/14/2017     Lab Results   Component Value Date    TRIG 99 02/23/2020    TRIG 123 02/14/2017     Lab Results   Component Value Date    CHOLHDL 29.5 02/23/2020    CHOLHDL 25.6 02/14/2017       Chemistry        Component Value Date/Time     02/23/2020 0423    K 3.9 02/23/2020 0423     02/23/2020 0423    CO2 24 02/23/2020 0423    BUN 13 02/23/2020 0423    CREATININE 1.1 02/23/2020 0423     (H) 02/23/2020 0423        Component Value Date/Time    CALCIUM 8.4 (L) 02/23/2020 0423    ALKPHOS 87 02/22/2020 1143    AST 70 (H) 02/22/2020 1143    ALT 67 (H) 02/22/2020 1143    BILITOT 0.8 02/22/2020 1143    ESTGFRAFRICA >60 02/23/2020 0423    EGFRNONAA >60 02/23/2020 0423          Lab Results   Component Value Date    HGBA1C 4.9 02/23/2020     Lab Results   Component Value Date    TSH 1.607 02/14/2017     Lab Results   Component Value Date    INR 1.0 02/22/2020     Lab Results   Component Value Date    WBC 8.09 02/23/2020    HGB 14.1 02/23/2020    HCT 41.8 02/23/2020    MCV 89 02/23/2020     02/23/2020     BMP  Sodium   Date Value Ref Range Status   02/23/2020 142 136 - 145 mmol/L Final     Potassium   Date Value Ref Range Status   02/23/2020 3.9 3.5 - 5.1 mmol/L Final     Chloride   Date Value Ref Range Status   02/23/2020 110 95 - 110 mmol/L Final     CO2   Date Value Ref Range Status   02/23/2020 24 23 - 29 mmol/L Final     BUN, Bld   Date Value Ref Range Status   02/23/2020 13 8 - 23 mg/dL Final     Creatinine   Date Value Ref Range Status   02/23/2020 1.1 0.5 - 1.4 mg/dL Final     Calcium   Date Value Ref Range Status   02/23/2020 8.4 (L) 8.7 - 10.5 mg/dL Final     Anion Gap   Date Value Ref Range Status   02/23/2020 8 8 - 16 mmol/L Final     eGFR if    Date Value Ref Range Status   02/23/2020 >60 >60 mL/min/1.73 m^2 Final     eGFR if non African  American   Date Value Ref Range Status   02/23/2020 >60 >60 mL/min/1.73 m^2 Final     Comment:     Calculation used to obtain the estimated glomerular filtration  rate (eGFR) is the CKD-EPI equation.        BNP  @LABRCNTIP(BNP,BNPTRIAGEBLO)@  @LABRCNTIP(troponini)@  Estimated Creatinine Clearance: 67.5 mL/min (based on SCr of 1.1 mg/dL).  No results found in the last 24 hours.  No results found in the last 24 hours.  No results found in the last 24 hours.    Assessment:      1. NSTEMI (non-ST elevated myocardial infarction)    2. Statin intolerance    3. Coronary artery disease of native artery of native heart with stable angina pectoris    4. H/O heart artery stent    5. Hypercholesterolemia        Plan:   Continue ASA, Brilliant, metoprolol and Zetia  Staged PCI with Dr. Melchor   Repeat Lipid profile in 3 months  Carotid artery US  Stage PCI with   Counseled DASH  Recommend heart-healthy diet, weight control and regular exercise.  [place]. Risk modification.     I have reviewed all pertinent labs and cardiac studies. Plans and recommendations have been formulated under my direct supervision. All questions answered and patient voiced understanding. Patient to continue current medications.

## 2020-02-28 ENCOUNTER — TELEPHONE (OUTPATIENT)
Dept: CARDIOLOGY | Facility: CLINIC | Age: 70
End: 2020-02-28

## 2020-02-28 DIAGNOSIS — I25.10 CORONARY ARTERY DISEASE WITHOUT ANGINA PECTORIS, UNSPECIFIED VESSEL OR LESION TYPE, UNSPECIFIED WHETHER NATIVE OR TRANSPLANTED HEART: ICD-10-CM

## 2020-02-28 DIAGNOSIS — I21.4 NSTEMI (NON-ST ELEVATED MYOCARDIAL INFARCTION): Primary | ICD-10-CM

## 2020-02-28 NOTE — TELEPHONE ENCOUNTER
Returned patient's call. Patient has several questions regarding previous/future appointments. Patient would like to know if he needs to schedule an appointment with Dr. Melchor for a f/u after LHC with stenting on 2/22. Told patient that he had his hospital f/u post-procedure with Dr. Carrasco. Patient would still like to know if he needs an appointment with Dr. Melchor for f/u or for evaluation before his PTCA on 3/3/20. Patient is also requesting instructions for his upcoming procedure and is asking for directions on when to stop taking any medications if he needs to. Told patient that this message will be forwarded to find out this information for him. Patient would like a call back as soon as possible to get this information.

## 2020-02-28 NOTE — TELEPHONE ENCOUNTER
Returned call to patient and reviewed pre cath instructions, answered all questions regarding staged intervention.  Advised NPO status, continuing all medications, arrival time 9:30am, having a family member/friend available

## 2020-03-02 ENCOUNTER — HOSPITAL ENCOUNTER (OUTPATIENT)
Dept: CARDIOLOGY | Facility: HOSPITAL | Age: 70
Discharge: HOME | End: 2020-03-02
Attending: INTERNAL MEDICINE
Payer: MEDICARE

## 2020-03-02 VITALS — BODY MASS INDEX: 23.66 KG/M2 | HEIGHT: 71 IN | WEIGHT: 169 LBS

## 2020-03-02 DIAGNOSIS — I25.118 CORONARY ARTERY DISEASE OF NATIVE ARTERY OF NATIVE HEART WITH STABLE ANGINA PECTORIS: ICD-10-CM

## 2020-03-02 LAB
LEFT ARM DIASTOLIC BLOOD PRESSURE: 70 MMHG
LEFT ARM SYSTOLIC BLOOD PRESSURE: 110 MMHG
LEFT CBA DIAS: 19 CM/S
LEFT CBA SYS: 63 CM/S
LEFT CCA DIST DIAS: 23 CM/S
LEFT CCA DIST SYS: 67 CM/S
LEFT CCA MID DIAS: 24 CM/S
LEFT CCA MID SYS: 79 CM/S
LEFT CCA PROX DIAS: 22 CM/S
LEFT CCA PROX SYS: 89 CM/S
LEFT ECA DIAS: 15 CM/S
LEFT ECA SYS: 75 CM/S
LEFT ICA DIST DIAS: 25 CM/S
LEFT ICA DIST SYS: 65 CM/S
LEFT ICA MID DIAS: 24 CM/S
LEFT ICA MID SYS: 60 CM/S
LEFT ICA PROX DIAS: 15 CM/S
LEFT ICA PROX SYS: 62 CM/S
LEFT VERTEBRAL DIAS: 18 CM/S
LEFT VERTEBRAL SYS: 49 CM/S
OHS CV CAROTID RIGHT ICA EDV HIGHEST: 22
OHS CV CAROTID ULTRASOUND LEFT ICA/CCA RATIO: 0.73
OHS CV CAROTID ULTRASOUND RIGHT ICA/CCA RATIO: 0.81
OHS CV PV CAROTID LEFT HIGHEST CCA: 89
OHS CV PV CAROTID LEFT HIGHEST ICA: 65
OHS CV PV CAROTID RIGHT HIGHEST CCA: 83
OHS CV PV CAROTID RIGHT HIGHEST ICA: 67
OHS CV US CAROTID LEFT HIGHEST EDV: 25
RIGHT ARM DIASTOLIC BLOOD PRESSURE: 70 MMHG
RIGHT ARM SYSTOLIC BLOOD PRESSURE: 115 MMHG
RIGHT CBA DIAS: 18 CM/S
RIGHT CBA SYS: 63 CM/S
RIGHT CCA DIST DIAS: 16 CM/S
RIGHT CCA DIST SYS: 59 CM/S
RIGHT CCA MID DIAS: 17 CM/S
RIGHT CCA MID SYS: 63 CM/S
RIGHT CCA PROX DIAS: 23 CM/S
RIGHT CCA PROX SYS: 83 CM/S
RIGHT ECA DIAS: 8 CM/S
RIGHT ECA SYS: 37 CM/S
RIGHT ICA DIST DIAS: 22 CM/S
RIGHT ICA DIST SYS: 56 CM/S
RIGHT ICA MID DIAS: 22 CM/S
RIGHT ICA MID SYS: 53 CM/S
RIGHT ICA PROX DIAS: 13 CM/S
RIGHT ICA PROX SYS: 67 CM/S
RIGHT VERTEBRAL DIAS: 12 CM/S
RIGHT VERTEBRAL SYS: 37 CM/S

## 2020-03-02 PROCEDURE — 93880 EXTRACRANIAL BILAT STUDY: CPT

## 2020-03-02 PROCEDURE — 93880 EXTRACRANIAL BILAT STUDY: CPT | Mod: 26,,, | Performed by: INTERNAL MEDICINE

## 2020-03-02 PROCEDURE — 93880 CV US DOPPLER CAROTID (CUPID ONLY): ICD-10-PCS | Mod: 26,,, | Performed by: INTERNAL MEDICINE

## 2020-03-03 ENCOUNTER — HOSPITAL ENCOUNTER (OUTPATIENT)
Facility: HOSPITAL | Age: 70
Discharge: HOME | End: 2020-03-03
Attending: INTERNAL MEDICINE | Admitting: INTERNAL MEDICINE
Payer: MEDICARE

## 2020-03-03 VITALS
BODY MASS INDEX: 25.61 KG/M2 | OXYGEN SATURATION: 100 % | HEART RATE: 56 BPM | RESPIRATION RATE: 19 BRPM | SYSTOLIC BLOOD PRESSURE: 116 MMHG | HEIGHT: 68 IN | TEMPERATURE: 98 F | WEIGHT: 169 LBS | DIASTOLIC BLOOD PRESSURE: 62 MMHG

## 2020-03-03 DIAGNOSIS — I21.4 NSTEMI (NON-ST ELEVATED MYOCARDIAL INFARCTION): ICD-10-CM

## 2020-03-03 DIAGNOSIS — I25.10 CAD, MULTIPLE VESSEL: ICD-10-CM

## 2020-03-03 DIAGNOSIS — Z95.820 S/P ANGIOPLASTY WITH STENT: ICD-10-CM

## 2020-03-03 LAB
ANION GAP SERPL CALC-SCNC: 9 MMOL/L (ref 8–16)
APTT BLDCRRT: 27.1 SEC (ref 21–32)
BASOPHILS # BLD AUTO: 0.05 K/UL (ref 0–0.2)
BASOPHILS NFR BLD: 0.6 % (ref 0–1.9)
BUN SERPL-MCNC: 18 MG/DL (ref 8–23)
CALCIUM SERPL-MCNC: 9.3 MG/DL (ref 8.7–10.5)
CHLORIDE SERPL-SCNC: 108 MMOL/L (ref 95–110)
CO2 SERPL-SCNC: 23 MMOL/L (ref 23–29)
CREAT SERPL-MCNC: 1.2 MG/DL (ref 0.5–1.4)
DIFFERENTIAL METHOD: NORMAL
EOSINOPHIL # BLD AUTO: 0.1 K/UL (ref 0–0.5)
EOSINOPHIL NFR BLD: 0.8 % (ref 0–8)
ERYTHROCYTE [DISTWIDTH] IN BLOOD BY AUTOMATED COUNT: 13.3 % (ref 11.5–14.5)
EST. GFR  (AFRICAN AMERICAN): >60 ML/MIN/1.73 M^2
EST. GFR  (NON AFRICAN AMERICAN): >60 ML/MIN/1.73 M^2
GLUCOSE SERPL-MCNC: 98 MG/DL (ref 70–110)
HCT VFR BLD AUTO: 44.4 % (ref 40–54)
HGB BLD-MCNC: 15.2 G/DL (ref 14–18)
IMM GRANULOCYTES # BLD AUTO: 0.02 K/UL (ref 0–0.04)
IMM GRANULOCYTES NFR BLD AUTO: 0.2 % (ref 0–0.5)
INR PPP: 1 (ref 0.8–1.2)
LYMPHOCYTES # BLD AUTO: 1.6 K/UL (ref 1–4.8)
LYMPHOCYTES NFR BLD: 18.2 % (ref 18–48)
MCH RBC QN AUTO: 30.2 PG (ref 27–31)
MCHC RBC AUTO-ENTMCNC: 34.2 G/DL (ref 32–36)
MCV RBC AUTO: 88 FL (ref 82–98)
MONOCYTES # BLD AUTO: 0.7 K/UL (ref 0.3–1)
MONOCYTES NFR BLD: 8.2 % (ref 4–15)
NEUTROPHILS # BLD AUTO: 6.3 K/UL (ref 1.8–7.7)
NEUTROPHILS NFR BLD: 72 % (ref 38–73)
NRBC BLD-RTO: 0 /100 WBC
PLATELET # BLD AUTO: 242 K/UL (ref 150–350)
PMV BLD AUTO: 9.5 FL (ref 9.2–12.9)
POC ACTIVATED CLOTTING TIME K: 268 SEC (ref 74–137)
POTASSIUM SERPL-SCNC: 4.5 MMOL/L (ref 3.5–5.1)
PROTHROMBIN TIME: 10.8 SEC (ref 9–12.5)
RBC # BLD AUTO: 5.03 M/UL (ref 4.6–6.2)
SAMPLE: ABNORMAL
SODIUM SERPL-SCNC: 140 MMOL/L (ref 136–145)
WBC # BLD AUTO: 8.76 K/UL (ref 3.9–12.7)

## 2020-03-03 PROCEDURE — 85730 THROMBOPLASTIN TIME PARTIAL: CPT

## 2020-03-03 PROCEDURE — C1894 INTRO/SHEATH, NON-LASER: HCPCS | Performed by: INTERNAL MEDICINE

## 2020-03-03 PROCEDURE — 85610 PROTHROMBIN TIME: CPT

## 2020-03-03 PROCEDURE — 93454 PR CATH PLACE/CORONARY ANGIO, IMG SUPER/INTERP: ICD-10-PCS | Mod: 26,,, | Performed by: INTERNAL MEDICINE

## 2020-03-03 PROCEDURE — 80048 BASIC METABOLIC PNL TOTAL CA: CPT

## 2020-03-03 PROCEDURE — 27201423 OPTIME MED/SURG SUP & DEVICES STERILE SUPPLY: Performed by: INTERNAL MEDICINE

## 2020-03-03 PROCEDURE — 25000003 PHARM REV CODE 250

## 2020-03-03 PROCEDURE — 63600175 PHARM REV CODE 636 W HCPCS: Performed by: INTERNAL MEDICINE

## 2020-03-03 PROCEDURE — 85025 COMPLETE CBC W/AUTO DIFF WBC: CPT

## 2020-03-03 PROCEDURE — 93454 CORONARY ARTERY ANGIO S&I: CPT | Mod: 26,,, | Performed by: INTERNAL MEDICINE

## 2020-03-03 PROCEDURE — 99152 PR MOD CONSCIOUS SEDATION, SAME PHYS, 5+ YRS, FIRST 15 MIN: ICD-10-PCS | Mod: ,,, | Performed by: INTERNAL MEDICINE

## 2020-03-03 PROCEDURE — 25000003 PHARM REV CODE 250: Performed by: INTERNAL MEDICINE

## 2020-03-03 PROCEDURE — 93571 PR HEART FLOW RESERV MEASURE,INIT VESSL: ICD-10-PCS | Mod: 26,52,, | Performed by: INTERNAL MEDICINE

## 2020-03-03 PROCEDURE — C1887 CATHETER, GUIDING: HCPCS | Performed by: INTERNAL MEDICINE

## 2020-03-03 PROCEDURE — C1769 GUIDE WIRE: HCPCS | Performed by: INTERNAL MEDICINE

## 2020-03-03 PROCEDURE — 63600175 PHARM REV CODE 636 W HCPCS

## 2020-03-03 PROCEDURE — 85347 COAGULATION TIME ACTIVATED: CPT | Performed by: INTERNAL MEDICINE

## 2020-03-03 PROCEDURE — 93571 IV DOP VEL&/PRESS C FLO 1ST: CPT | Mod: 26,52,, | Performed by: INTERNAL MEDICINE

## 2020-03-03 PROCEDURE — 99152 MOD SED SAME PHYS/QHP 5/>YRS: CPT | Mod: ,,, | Performed by: INTERNAL MEDICINE

## 2020-03-03 PROCEDURE — 25500020 PHARM REV CODE 255

## 2020-03-03 PROCEDURE — 93454 CORONARY ARTERY ANGIO S&I: CPT | Performed by: INTERNAL MEDICINE

## 2020-03-03 PROCEDURE — 25500020 PHARM REV CODE 255: Performed by: INTERNAL MEDICINE

## 2020-03-03 PROCEDURE — 93571 IV DOP VEL&/PRESS C FLO 1ST: CPT | Mod: 52 | Performed by: INTERNAL MEDICINE

## 2020-03-03 RX ORDER — NAPROXEN SODIUM 220 MG/1
81 TABLET, FILM COATED ORAL ONCE
Status: COMPLETED | OUTPATIENT
Start: 2020-03-03 | End: 2020-03-03

## 2020-03-03 RX ORDER — PHENYLEPHRINE HYDROCHLORIDE 10 MG/ML
INJECTION INTRAVENOUS
Status: DISCONTINUED | OUTPATIENT
Start: 2020-03-03 | End: 2020-03-03 | Stop reason: HOSPADM

## 2020-03-03 RX ORDER — FENTANYL CITRATE 50 UG/ML
INJECTION, SOLUTION INTRAMUSCULAR; INTRAVENOUS
Status: DISCONTINUED | OUTPATIENT
Start: 2020-03-03 | End: 2020-03-03 | Stop reason: HOSPADM

## 2020-03-03 RX ORDER — LIDOCAINE HYDROCHLORIDE 20 MG/ML
INJECTION, SOLUTION EPIDURAL; INFILTRATION; INTRACAUDAL; PERINEURAL
Status: DISCONTINUED | OUTPATIENT
Start: 2020-03-03 | End: 2020-03-03 | Stop reason: HOSPADM

## 2020-03-03 RX ORDER — MIDAZOLAM HYDROCHLORIDE 1 MG/ML
INJECTION, SOLUTION INTRAMUSCULAR; INTRAVENOUS
Status: DISCONTINUED | OUTPATIENT
Start: 2020-03-03 | End: 2020-03-03 | Stop reason: HOSPADM

## 2020-03-03 RX ORDER — SODIUM CHLORIDE 9 MG/ML
INJECTION, SOLUTION INTRAVENOUS CONTINUOUS
Status: DISCONTINUED | OUTPATIENT
Start: 2020-03-03 | End: 2020-03-03 | Stop reason: HOSPADM

## 2020-03-03 RX ORDER — VERAPAMIL HYDROCHLORIDE 2.5 MG/ML
INJECTION, SOLUTION INTRAVENOUS
Status: DISCONTINUED | OUTPATIENT
Start: 2020-03-03 | End: 2020-03-03 | Stop reason: HOSPADM

## 2020-03-03 RX ORDER — NITROGLYCERIN 20 MG/100ML
INJECTION INTRAVENOUS
Status: DISCONTINUED | OUTPATIENT
Start: 2020-03-03 | End: 2020-03-03 | Stop reason: HOSPADM

## 2020-03-03 RX ORDER — HEPARIN SODIUM 1000 [USP'U]/ML
INJECTION, SOLUTION INTRAVENOUS; SUBCUTANEOUS
Status: DISCONTINUED | OUTPATIENT
Start: 2020-03-03 | End: 2020-03-03 | Stop reason: HOSPADM

## 2020-03-03 RX ORDER — DIPHENHYDRAMINE HCL 50 MG
50 CAPSULE ORAL ONCE
Status: COMPLETED | OUTPATIENT
Start: 2020-03-03 | End: 2020-03-03

## 2020-03-03 RX ORDER — DIAZEPAM 5 MG/1
5 TABLET ORAL
Status: DISCONTINUED | OUTPATIENT
Start: 2020-03-03 | End: 2020-03-03 | Stop reason: HOSPADM

## 2020-03-03 RX ADMIN — Medication 50 MG: at 10:03

## 2020-03-03 RX ADMIN — NAPROXEN SODIUM 81 MG: 220 TABLET, FILM COATED ORAL at 10:03

## 2020-03-03 RX ADMIN — DIAZEPAM 5 MG: 5 TABLET ORAL at 10:03

## 2020-03-03 RX ADMIN — SODIUM CHLORIDE: 9 INJECTION, SOLUTION INTRAVENOUS at 10:03

## 2020-03-03 NOTE — PLAN OF CARE
1415 AWAKE AND ORIENTED.  L RADIAL SITE INTACT.  DISCHARGE INSTRUCTIONS REVIEWED AND COPY GIVEN.  1435 IV REMOVED.  1450 DISCHARGED HOME .  ESCORTED TO FRONT DOORS VIA W/C

## 2020-03-03 NOTE — Clinical Note
Prepped: left groin and left radial. Prepped with: ChloraPrep. The site was clipped. The patient was draped.

## 2020-03-03 NOTE — BRIEF OP NOTE
Date: 03/03/2020  Surgeon/Physician: Francis Melchor MD  Assistants:none    Pre Op Diagnosis: cad     Post OP Diagnosis: cad    Procedure Performed:lhc ifr lad diagonal     ANESTHESIA:RN IV SEDATION    COMPLICATION: none    Specimen / Tissue Removed: No    Estimated Blood Loss: <50 cc    Prostetics/Devices: None    Findings / Operative Note:   Lad 40-50% ifr 0.97  d1 50% ifr 0.93  Patent lcx stent      PLAN:  No need for intervention   Medical therapy.      Discharge Note  Short Stay      SUMMARY     Admit Date: 3/3/2020    Attending Physician: Ute Melchor MD     Discharge Physician: Frnacis Melchor MD     Discharge Date: 3/3/2020    Final Diagnosis: cad     Outcome of Stay:patient tolerated procedure well no complications. He was deemed stable for discharge.his cad was moderate ifr suggest medical therapy.he had no stent placed. He was deemed stable for discharge. He will follow up in clinic.    Disposition: Home or Self Care    Patient Instructions:   Current Discharge Medication List      CONTINUE these medications which have NOT CHANGED    Details   aspirin (ECOTRIN) 81 MG EC tablet Take 1 tablet (81 mg total) by mouth once daily.  Qty: 90 tablet, Refills: 3      metoprolol tartrate (LOPRESSOR) 25 MG tablet Take 1 tablet (25 mg total) by mouth 2 (two) times daily.  Qty: 60 tablet, Refills: 11      mometasone 0.1% (ELOCON) 0.1 % cream AAA bid prn for psoriasis. Strong steroid.  Do not use on face, underarms or groin.  Qty: 50 g, Refills: 1    Associated Diagnoses: Other psoriasis      ticagrelor (BRILINTA) 90 mg tablet Take 1 tablet (90 mg total) by mouth 2 (two) times daily.  Qty: 60 tablet, Refills: 11      triamcinolone acetonide 0.025% (KENALOG) 0.025 % cream Apply topically 2 (two) times daily as needed. Mild steroid.  Safe for face up to 2 weeks then stop.  Qty: 30 g, Refills: 1    Associated Diagnoses: Other psoriasis      ezetimibe (ZETIA) 10 mg tablet Take 1 tablet (10 mg total) by mouth once  daily.  Qty: 30 tablet, Refills: 11      methocarbamol (ROBAXIN) 750 MG Tab Take 1 tablet (750 mg total) by mouth 3 (three) times daily as needed.  Qty: 30 tablet, Refills: 0    Associated Diagnoses: Back pain, unspecified back location, unspecified back pain laterality, unspecified chronicity         STOP taking these medications       meloxicam (MOBIC) 15 MG tablet Comments:   Reason for Stopping:               Discharge Procedure Orders (must include Diet, Follow-up, Activity)   Discharge Procedure Orders (must include Diet, Follow-up, Activity)   Diet general     Call MD for:  temperature >100.4     Call MD for:  persistent nausea and vomiting     Call MD for:  severe uncontrolled pain     Call MD for:  difficulty breathing, headache or visual disturbances     Call MD for:  redness, tenderness, or signs of infection (pain, swelling, redness, odor or green/yellow discharge around incision site)     Call MD for:  hives     Call MD for:  persistent dizziness or light-headedness     Call MD for:  extreme fatigue     Follow-up Information     Francis Melchor MD In 2 weeks.    Specialty:  Cardiology  Contact information:  [address]  [place] [address] [phone]

## 2020-03-03 NOTE — INTERVAL H&P NOTE
The patient has been examined and the H&P has been reviewed:    I concur with the findings and no changes have occurred since H&P was written.    Anesthesia/Surgery risks, benefits and alternative options discussed and understood by patient/family.    I have explained the risks, benefits , and alternatives of the procedure in detail.the patient voices understanding and all questions have been answered.the patient agrees to proceed as planned.        Active Hospital Problems    Diagnosis  POA    CAD, multiple vessel [I25.10]  Yes     Priority: High      Resolved Hospital Problems   No resolved problems to display.

## 2020-03-05 ENCOUNTER — PATIENT MESSAGE (OUTPATIENT)
Dept: CARDIOLOGY | Facility: CLINIC | Age: 70
End: 2020-03-05

## 2020-03-05 ENCOUNTER — TELEPHONE (OUTPATIENT)
Dept: CARDIOLOGY | Facility: CLINIC | Age: 70
End: 2020-03-05

## 2020-03-05 NOTE — TELEPHONE ENCOUNTER
Contacted pt about results, pt needs al little bit more understanding of results. Will discuss with Mrs. Olmstead in upcoming appointment.              ----- Message from Hong Carrasco MD sent at 3/4/2020  9:16 PM CST -----  Carotid us normal

## 2020-03-09 ENCOUNTER — PATIENT OUTREACH (OUTPATIENT)
Dept: ADMINISTRATIVE | Facility: OTHER | Age: 70
End: 2020-03-09

## 2020-03-09 NOTE — PROGRESS NOTES
"     Cardiology Clinic Note  Reason for Visit: CAD    HPI:     Mekhi Lambert is a 69 y.o. M, who presents for follow up for CAD.    He presents to discuss ways to prevent recurrent issues due to CAD. He reports that he is very well educated regarding nutrition and lipid metabolism. He is also a . 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 He has a healthy diet and maintains a healthy weight. He reports that he wishes to control his lipids without statins and through diet and exercise alone.     He exercises routinely 5-6x per week. His ACS event in 2/2020 occurred while working out. He felt his shoulders "lock up." He presented to the ED and was given a muscle relaxer for suspeck MSK component. After discharge from the ED but before leaving campus, he began having chest discomfort. He returned to the ED and EKG showed inferior infarct. He then underwent PCI to LCx. A moderate grade LAD/D1 lesion was also seen but not intervened upon. Since the procedure, he has been without ischemic symptoms.     He does report that a statin caused myalgias for "doing what statins do - decreasing cholesterol."     Medical: CAD s/p PCI (2/22/2020 - 3.5x38mm to mid LCx for NSTEMI), prior statin intolerance  Surgical: prostatectomy, knee  Family: mother with DM and lupus; father with cancer  Social: never smoked, drinks alcohol in moderation  Allergies/side effects: statins caused severe myalgias    ROS:    Constitution: Negative for fever or chills.  HENT: Negative for  headaches.  Eyes: Negative for blurred vision.   Cardiovascular: See above  Pulmonary: Negative for SOB. Negative for cough.   Gastrointestinal: Negative for nausea/vomiting.   : Negative for dysuria.   Skin: Negative for rashes.  Neurological: Negative for focal weakness.  Psychological: Negative for depression.  PMH:     Past Medical History:   Diagnosis Date    Arthritis      Past Surgical History:   Procedure Laterality " Date    CARDIAC CATHETERIZATION      CORONARY ANGIOGRAPHY INCLUDING BYPASS GRAFTS WITH CATHETERIZATION OF LEFT HEART N/A 3/3/2020    Procedure: ANGIOGRAM, CORONARY, INCLUDING BYPASS GRAFT, WITH LEFT HEART CATHETERIZATION;  Surgeon: Ute Melchor MD;  Location: [facility] CATH LAB;  Service: Cardiology;  Laterality: N/A;    KNEE SURGERY      LEFT HEART CATHETERIZATION Left 2/22/2020    Procedure: CATHETERIZATION, HEART, LEFT;  Surgeon: Ute Melchor MD;  Location: [facility] CATH LAB;  Service: Cardiology;  Laterality: Left;    PERCUTANEOUS TRANSLUMINAL BALLOON ANGIOPLASTY OF CORONARY ARTERY Left 2/22/2020    Procedure: Angioplasty-coronary;  Surgeon: Ute Melchor MD;  Location: [facility] CATH LAB;  Service: Cardiology;  Laterality: Left;    SHOULDER ARTHROSCOPY Right     Dr Bella    simple prostatectomy  06/06/2016    robotic assisted partial prostectomy due to BPH    TOTAL KNEE ARTHROPLASTY       Allergies:     Review of patient's allergies indicates:   Allergen Reactions    Statins-hmg-coa reductase inhibitors      SEVERE MYALGIAS AND GI SIDE EFFECTS     Medications:     Current Outpatient Medications on File Prior to Visit   Medication Sig Dispense Refill    aspirin (ECOTRIN) 81 MG EC tablet Take 1 tablet (81 mg total) by mouth once daily. 90 tablet 3    ezetimibe (ZETIA) 10 mg tablet Take 1 tablet (10 mg total) by mouth once daily. 30 tablet 11    methocarbamol (ROBAXIN) 750 MG Tab Take 1 tablet (750 mg total) by mouth 3 (three) times daily as needed. (Patient not taking: Reported on 2/26/2020) 30 tablet 0    metoprolol tartrate (LOPRESSOR) 25 MG tablet Take 1 tablet (25 mg total) by mouth 2 (two) times daily. 60 tablet 11    mometasone 0.1% (ELOCON) 0.1 % cream AAA bid prn for psoriasis. Strong steroid.  Do not use on face, underarms or groin. 50 g 1    ticagrelor (BRILINTA) 90 mg tablet Take 1 tablet (90 mg total) by mouth 2 (two) times daily. 60 tablet 11    triamcinolone acetonide 0.025% (KENALOG)  "0.025 % cream Apply topically 2 (two) times daily as needed. Mild steroid.  Safe for face up to 2 weeks then stop. 30 g 1     No current facility-administered medications on file prior to visit.      Social History:     Social History     Tobacco Use    Smoking status: Never Smoker    Smokeless tobacco: Former User     Types: Chew   Substance Use Topics    Alcohol use: Yes     Alcohol/week: 2.0 standard drinks     Types: 2 Glasses of wine per week     Comment: per week     Family History:     Family History   Problem Relation Age of Onset    Heart disease Mother         lupus related    Diabetes Mother     Cancer Father         prostate, bone, lung, skin cancers all seperate types    Diabetes Maternal Aunt      Physical Exam:   /78 (BP Location: Left arm, Patient Position: Sitting, BP Method: Medium (Automatic))   Pulse (!) 56   Ht 5' 8" (1.727 m)   Wt 73.4 kg (161 lb 13.1 oz)   BMI 24.60 kg/m²      Constitutional: No apparent distress, conversant  HEENT: Sclera anicteric, extraocular movements intact  Neck: No jugular venous distension, no carotid bruits  CV: Regular rate and rhythm, no murmurs rubs or gallops, normal S1/S2  Pulm: Clear to auscultation bilaterally  GI: Abdomen soft, no palpable masses  Extremities: No lower extremity edema, warm with palpable pulses  Skin: No ecchymosis, erythema, or ulcers  Psych: Alert and oriented to person place location, appropriate affect  Neuro: No focal deficits    Labs:     Blood Tests:  Lab Results   Component Value Date     03/03/2020    K 4.5 03/03/2020     03/03/2020    CO2 23 03/03/2020    BUN 18 03/03/2020    CREATININE 1.2 03/03/2020    GLU 98 03/03/2020    HGBA1C 4.9 02/23/2020    MG 1.9 02/22/2020    AST 70 (H) 02/22/2020    ALT 67 (H) 02/22/2020    ALBUMIN 3.9 02/22/2020    PROT 6.5 02/22/2020    BILITOT 0.8 02/22/2020    WBC 8.76 03/03/2020    HGB 15.2 03/03/2020    HCT 44.4 03/03/2020    MCV 88 03/03/2020     03/03/2020    " INR 1.0 03/03/2020    PSA 1.2 02/14/2017    TSH 1.607 02/14/2017       Lab Results   Component Value Date    CHOL 176 02/23/2020    HDL 52 02/23/2020    TRIG 99 02/23/2020       Lab Results   Component Value Date    LDLCALC 104.2 02/23/2020       Urine Tests:  Lab Results   Component Value Date    COLORU yellow 11/11/2019    COLORU Yellow 10/15/2018    APPEARANCEUA Clear 10/15/2018    PHUR 5 11/11/2019    PHUR 6.0 10/15/2018    SPECGRAV 1.025 11/11/2019    SPECGRAV <=1.005 (A) 10/15/2018    PROTEINUA Negative 10/15/2018    GLUCUA Negative 10/15/2018    KETONESU neg 11/11/2019    KETONESU Trace (A) 10/15/2018    BILIRUBINUA Negative 10/15/2018    OCCULTUA Negative 10/15/2018    NITRITE neg 11/11/2019    NITRITE Negative 10/15/2018    UROBILINOGEN neg 11/11/2019    LEUKOCYTESUR Negative 10/15/2018       Imaging:     Echocardiogram  TTE 2/22/20  · Concentric left ventricular remodeling.  · Mildly decreased left ventricular systolic function. The estimated ejection fraction is 45%.  · Grade I (mild) left ventricular diastolic dysfunction consistent with impaired relaxation.  · Local segmental wall motion abnormalities.  · Normal right ventricular systolic function.  · Intermediate central venous pressure (8 mmHg).  · The ascending aorta is mildly dilated.    Stress testing  None    Cath Lab  [facility] 3/3/20  · Estimated blood loss: <50 mL  · Moderate lad and diagonal disease with ifr suggesting medical therapy.  · Widely patent lcx stent and normal distal vascularure.    Diagnostic   Dominance: Right   Left Anterior Descending   Prox LAD lesion is 40% stenosed with 60% stenosed side branch in Ost 1st Diag to 1st Diag. This is not the culprit lesion. The lesion is located at the major branch and segmental. The lesion is 30 mm long. Lesion shape is eccentric. Lesion contour is irregular. The lesion is a type C lesion. The lesion was not previously treated. the ifr in the lad was 0.97 the ifr in the diagonal was 0.93   Left  Circumflex   Previously placed Prox Cx to Dist Cx drug eluting stent is widely patent. Previous treatment took place <1 month ago. No thrombosis in the previous stent. No restenosis in the previous stent. the stent is widely patent.   First Obtuse Marginal Branch   The vessel is large and is angiographically normal.   Intervention   No interventions have been documented.     [facility] 2/22/20  · Mid Cx lesion , 99% stenosed reduced to 0%..  · There is mild left ventricular systolic dysfunction.  · LV end diastolic pressure is normal.  · The ejection fraction is calculated to be 45%.  · LVEDP (Pre): 22  · LVEDP (Post): 22  · The ejection fraction is 40-50% by visual estimate.  · Prox LAD lesion , 75% stenosed.  · A STENT RESOLUTE BETO 3.5X38MM stent was successfully placed at 14 NAT for 30 sec.  · Estimated blood loss: <50 mL  · Two vessel coronary artery disease.  · Diastolic dysfunction.  · Successful PCI for acute myocardial infarction.  · There was no reflow and distal embolization treated with thrombectomy and intarcoronary nip[ride and nitroglycerin.     This is a techniocally challenging intervention for acute nstemi due to distal embolization and tyrasient no reflow requiring thrombectomy as well as distal catheter placemnet in the distal lcx and om to infuse vasodilator therapy and reverse no reflow and resolve distal embolization.     Diagnostic   Dominance: Right   Left Main   The vessel is moderate in size and is angiographically normal.   Left Anterior Descending   Prox LAD lesion is 75% stenosed with 70% stenosed side branch in Ost 1st Diag to 1st Diag. This is not the culprit lesion. The lesion is segmental. The lesion is 20 mm long. Lesion shape is eccentric. Lesion contour is irregular. The lesion was not previously treated.   First Diagonal Branch   The vessel was visualized by angiography and is moderate in size. There is severe diffuse disease throughout the vessel. there is an itrregular lesion in the  ostium and proximal diagonal which is an extension of the lad stenosis.   Left Circumflex   Mid Cx lesion is 99% stenosed. This is the culprit lesion. The lesion is located proximal to the major branch, dissected, segmental, thrombotic and ulcerated. The lesion is 20 mm long. Thrombus amount is mild. Lesion shape is eccentric. Lesion contour is irregular. Lesion proximal tortuosity is mild. The lesion is a type C lesion. The lesion was not previously treated.   Right Coronary Artery   The vessel is large and is angiographically normal.   Intervention     Mid Cx lesion   Angioplasty   The balloon was inserted and placed across lesion. Angioplasty using a standard balloon: CATH LN FG APEX MR 3.55I36IA was performed independent of stent deployment. The balloon was inflated. Max inflation for the main balloon was 14 tiki for 20 sec.   Supplies used: CATH LN FG APEX MR 3.13S36GD   Angioplasty   The balloon was placed across lesion. Angioplasty using CATH BLLN FG APEX MR 3.90D16LB was performed independent of stent deployment. The balloon was multiple times. Max inflation for the main balloon was 12 tiki for 20 sec.   Supplies used: CATH BLLN FG APEX MR 3.68M64CC   Thrombectomy   The clot was removed manually. The catheter used was a CATH EXPORT ASPIRATION 6FR. Passes taken: 1.   Supplies used: CATH EXPORT ASPIRATION 6FR   Stent   Drug-eluting stent was successfully placed. The stent used was a STENT RESOLUTE BETO 3.5X38MM. Stent was deployed by way of balloon expansion. Maximum pressure: 14 tiki. Inflation time: 30 sec. Stent balloon removed after the stent was deployed. Stent balloon inflated.   Supplies used: STENT RESOLUTE BETO 3.5X38MM   Thrombectomy   The clot was removed by aspiration. The catheter used was a CATH EXPORT ASPIRATION 6FR. Passes taken: 1.   Supplies used: CATH EXPORT ASPIRATION 6FR   Angioplasty   The balloon was inserted and placed across lesion. Angioplasty using CATH NC QUANTUM APEX MR 4X20 was  performed following stent deployment. The balloon was inflated multiple times. Max inflation for the main balloon was 14 tiki for 30 sec.   Supplies used: CATH NC QUANTUM APEX MR 4X20   Post-Intervention Lesion Assessment   Intervention was planned with diagnostic cath. The intervention was successful. The guidewire crossed the lesion. Intentional subintimal strategy was not used. Device was deployed. Diagnostic [place] flow is 3. Post-intervention [place] flow is 3. Lesion had 30 mm of its length treated. The patient experienced the no-reflow phenomenon in this vessel. At this lesion, distal embolization occurred. The no reflow and distal embolization were treated with thrombectomy and resolution of no reflow. There was significant white and red thrombus removed.   There is a 0% residual stenosis post intervention.     Other  Carotid US 3/2/20  · There is 0-19% right Internal Carotid Stenosis.  · There is 0-19% left Internal Carotid Stenosis.    EK20  Normal sinus rhythm  Low voltage QRS  Nonspecific T wave abnormality    20  Sinus rhythm with occasional Premature ventricular complexes  Low voltage QRS  Cannot rule out Anterior infarct ,age undetermined    00  Sinus bradycardia  Otherwise normal ECG    Assessment:     1. NSTEMI (non-ST elevated myocardial infarction)    2. Coronary artery disease of native artery of native heart with stable angina pectoris    3. H/O heart artery stent    4. Hypercholesterolemia    5. CAD, multiple vessel    6. Statin intolerance        Plan:     Coronary artery disease of native artery of native heart with stable angina pectoris  H/O heart artery stent  NSTEMI (non-ST elevated myocardial infarction)  Stable. BP and HR at goal. LDL not at goal.    Continue ASA 81mg daily indefinitely  Continue ticagrelor 90mg BID for at least 6 months after PCI. Due to stent in the setting of ACS, ideally would extend to 1 year.  Continue zetia 10mg daily with CoQ 10. He does not wish  to use a statin.   Check lipids in 6 weeks. He stated that he would be willing to compromise if lipids are not at goal (LDL <70, TC/HDL >2)   Will again discuss trying a different statin again at next visit.   Continue metoprolol 12.5mg BID    Discussed regular aerobic exercise. Refer to cardiac rehab. Will need a place in [place].  Discussed heart healthy diet.    Hypercholesterolemia  Statin intolerance  LDL not at goal.  Continue current medications. Will discuss statin at next visit.    I spent 60 minutes face-to-face with the patient, over half in discussion of the diagnosis and the importance of compliance with the treatment plan.    Signed:  Ty Morales MD  Cardiology     3/10/2020 11:07 AM    Follow-up:     Future Appointments   Date Time Provider Department Center   3/10/2020  8:00 AM Maco Morales III, MD [facility] CARDIO [facility]   4/28/2020  9:15 AM Zenaida Arguelles MD [facility]   11/10/2020  9:30 AM LABORATORY, [facility] LAB [place]   11/16/2020 10:10 AM Asad Aburto IV, MD [facility] UROLOGY  Medical

## 2020-03-10 ENCOUNTER — OFFICE VISIT (OUTPATIENT)
Dept: CARDIOLOGY | Facility: CLINIC | Age: 70
End: 2020-03-10
Payer: MEDICARE

## 2020-03-10 VITALS
HEART RATE: 56 BPM | SYSTOLIC BLOOD PRESSURE: 120 MMHG | BODY MASS INDEX: 24.52 KG/M2 | WEIGHT: 161.81 LBS | HEIGHT: 68 IN | DIASTOLIC BLOOD PRESSURE: 78 MMHG

## 2020-03-10 DIAGNOSIS — I25.118 CORONARY ARTERY DISEASE OF NATIVE ARTERY OF NATIVE HEART WITH STABLE ANGINA PECTORIS: ICD-10-CM

## 2020-03-10 DIAGNOSIS — I21.4 NSTEMI (NON-ST ELEVATED MYOCARDIAL INFARCTION): Primary | ICD-10-CM

## 2020-03-10 DIAGNOSIS — I25.10 CAD, MULTIPLE VESSEL: ICD-10-CM

## 2020-03-10 DIAGNOSIS — Z95.5 H/O HEART ARTERY STENT: ICD-10-CM

## 2020-03-10 DIAGNOSIS — Z78.9 STATIN INTOLERANCE: ICD-10-CM

## 2020-03-10 DIAGNOSIS — E78.00 HYPERCHOLESTEROLEMIA: ICD-10-CM

## 2020-03-10 PROCEDURE — 99999 PR PBB SHADOW E&M-EST. PATIENT-LVL IV: ICD-10-PCS | Mod: PBBFAC,,, | Performed by: INTERNAL MEDICINE

## 2020-03-10 PROCEDURE — 99215 OFFICE O/P EST HI 40 MIN: CPT | Mod: S$PBB,,, | Performed by: INTERNAL MEDICINE

## 2020-03-10 PROCEDURE — 99214 OFFICE O/P EST MOD 30 MIN: CPT | Mod: PBBFAC | Performed by: INTERNAL MEDICINE

## 2020-03-10 PROCEDURE — 99999 PR PBB SHADOW E&M-EST. PATIENT-LVL IV: CPT | Mod: PBBFAC,,, | Performed by: INTERNAL MEDICINE

## 2020-03-10 PROCEDURE — 99215 PR OFFICE/OUTPT VISIT, EST, LEVL V, 40-54 MIN: ICD-10-PCS | Mod: S$PBB,,, | Performed by: INTERNAL MEDICINE

## 2020-03-10 RX ORDER — UBIDECARENONE 30 MG
100 CAPSULE ORAL 3 TIMES DAILY
COMMUNITY
End: 2021-03-15

## 2020-03-11 ENCOUNTER — PATIENT MESSAGE (OUTPATIENT)
Dept: CARDIOLOGY | Facility: CLINIC | Age: 70
End: 2020-03-11

## 2020-03-12 ENCOUNTER — PATIENT MESSAGE (OUTPATIENT)
Dept: CARDIOLOGY | Facility: CLINIC | Age: 70
End: 2020-03-12

## 2020-03-16 ENCOUNTER — PATIENT MESSAGE (OUTPATIENT)
Dept: INTERNAL MEDICINE | Facility: CLINIC | Age: 70
End: 2020-03-16

## 2020-03-16 DIAGNOSIS — F32.A DEPRESSION, UNSPECIFIED DEPRESSION TYPE: Primary | ICD-10-CM

## 2020-03-18 ENCOUNTER — PATIENT MESSAGE (OUTPATIENT)
Dept: INTERNAL MEDICINE | Facility: CLINIC | Age: 70
End: 2020-03-18

## 2020-04-03 ENCOUNTER — PATIENT MESSAGE (OUTPATIENT)
Dept: INTERNAL MEDICINE | Facility: CLINIC | Age: 70
End: 2020-04-03

## 2020-04-27 ENCOUNTER — PATIENT MESSAGE (OUTPATIENT)
Dept: CARDIOLOGY | Facility: CLINIC | Age: 70
End: 2020-04-27

## 2020-04-27 ENCOUNTER — PATIENT MESSAGE (OUTPATIENT)
Dept: UROLOGY | Facility: CLINIC | Age: 70
End: 2020-04-27

## 2020-05-10 ENCOUNTER — PATIENT MESSAGE (OUTPATIENT)
Dept: CARDIOLOGY | Facility: CLINIC | Age: 70
End: 2020-05-10

## 2020-05-11 ENCOUNTER — PATIENT MESSAGE (OUTPATIENT)
Dept: INTERNAL MEDICINE | Facility: CLINIC | Age: 70
End: 2020-05-11

## 2020-05-12 ENCOUNTER — OFFICE VISIT (OUTPATIENT)
Dept: PSYCHIATRY | Facility: CLINIC | Age: 70
End: 2020-05-12
Payer: MEDICARE

## 2020-05-12 DIAGNOSIS — F32.A DEPRESSION, UNSPECIFIED DEPRESSION TYPE: ICD-10-CM

## 2020-05-12 DIAGNOSIS — F39 UNSPECIFIED MOOD (AFFECTIVE) DISORDER: Primary | ICD-10-CM

## 2020-05-12 PROCEDURE — 90792 PR PSYCHIATRIC DIAGNOSTIC EVALUATION W/MEDICAL SERVICES: ICD-10-PCS | Mod: 95,,, | Performed by: PSYCHIATRY & NEUROLOGY

## 2020-05-12 PROCEDURE — 90792 PSYCH DIAG EVAL W/MED SRVCS: CPT | Mod: 95,,, | Performed by: PSYCHIATRY & NEUROLOGY

## 2020-05-12 RX ORDER — LAMOTRIGINE 25 MG/1
TABLET ORAL
Qty: 45 TABLET | Refills: 1 | Status: SHIPPED | OUTPATIENT
Start: 2020-05-12 | End: 2020-06-09

## 2020-05-12 NOTE — PATIENT INSTRUCTIONS
PLAN:     Please call  [facility] at [phone] and  arrange your FOLLOW UP TELEHEALTH (video)  appointment  with [facility] Post Artemio DOSS for: June 9th @ 9:30 am     counselor Mendez Lopez LCSW June 1 @ 9am     Meds:  See after visit summary  / lamictal as Rx'd     References: (reviewed with pt as well):    Anxiety &  phobia workbook by ABIGAIL Fong PhD  (web retailers: used: $ 7-10)    Relaxation stress reduction workbook by ELISABETH Nunn PhD ( used: $7-10)     Feeling Good Website: Kayden Mead MD / www.[employer]It website (free)     [place]: Path to Better Sleep : https://www.veterantraining.[place].gov/insomnia/ (free)       Pt expressed appreciation for the visit today and did not have further question at this time though pt  was still informed to:     Call  if problems.    Call / Report Side Effects to Artemio DOSS     Encouraged to follow up with primary care / Gen Med MD for continued monitoring of general health and wellness.    Understanding was expressed; and no further concerns nor questions were raised at this time.     remember healthy self care:   eat right  attempt adequate rest   HANDWASHING / encourage such kosta. During this corona virus time   walk or light exercise within reason and as your general med team approves  read or explore any of reference materials / homework mentioned  reach out (I.e.,  connect with)  others who nuture and bring out best in you  avoid risky behaviors  keep your appointments  IF you  cannot make your appt THEN please call or go online to reschedule.  avoid  alcohol and illicit substances.  Look for the positive.  All is often relative-seek balance  Call sooner if needed : [phone]   Call 911 or go to Emergency Room  (ER)  if any acute concerns

## 2020-05-12 NOTE — PROGRESS NOTES
"PSYCHIATRIC EVALUATION     Disclaimer: Evaluation and treatment is based on information presented to date. Any new information may affect assessment and findings.     Name: Mekhi Lambert  Age: 69 y.o.  : 1950       Timeframe: Corona Virus Outbreak     The patient location is: Patient's home/ Patient reported that his/her location at the time of this visit was in the [facility]     Visit type: Virtual visit with synchronous audio and video     Each patient to whom he or she provides medical services by telemedicine is: (1) informed of the relationship between the physician and patient and the respective role of any other health care provider with respect to management of the patient; and (2) notified that he or she may decline to receive medical services by telemedicine and may withdraw from such care at any time.    I also informed patient of the following:   Kayden Salvador MD:  [id number]    My contact info as:  [facility]: [facility] / 2nd Floor  [address] [place] [address]   Ph: [phone]    If technology issues, call office phone: Ph: [phone]  if crisis: Dial 911 or go to nearest Emergency Room (ER)  If questions related to privacy practices: contact [facility] Information Department: [phone]    Understanding Expressed. No questions.      Note: I reviewed Epic EHR_"Encounters" Info for general background info.      CHIEF COMPLAINT: says he retired 3 yrs ago as  ; says has found himself getting  verbally irritable moreso than previous    HISTORY:         Mekhi Lambert a 69 y.o. biologic male who is a   retired  [employer] . He presents today as referred by Dr Lopez.     Pt Says moved from [place] back to [place].    Says there are some physical issues that developed. Says started with shoulder pain. Says escalated; says was taking OTC pain meds so he could sleep.''over " last 3 yrs went to 3 different physical therapists.    Says had heart attack. In retrospect he recognizes that shoulder pain was related to heart disease. Says he had 1 stent placed. Says physically improved.     Says prior to this he did not have MH history     Says never any suicidal thoughts gestures nor ideation.    Says no substance problem; says never had alcohol problem. Also says not hitting not punching.    Says the  main issue is that I have found myself self getting a  bit more verbally irritable (as compared to times past).    He deneis depression; denies bhavesh.      He reports that he has not had any  prior mental health issues or treatment.    He is well spoken, polite, calm. No history if SI / no HI nor gesture of such. As well no psychosis     Symptom Clusters:  Depressive Disorder: REPORTS irritable mood.   Anxiety Disorder: irritability.   Manic Disorder: REPORTS none.   Psychotic Disorder: DENIES all.   Substance Use:  DENIES all.   Physical or Sexual Abuse: DENIES all.       PAST PSYCHIATRIC HISTORY:     Inpatient: none  Outpatient: (incl. Primary Care): none       Allergy Review:   Review of patient's allergies indicates:   Allergen Reactions    Statins-hmg-coa reductase inhibitors      SEVERE MYALGIAS AND GI SIDE EFFECTS        Medical Problem List:   Patient Active Problem List   Diagnosis    Prostate hypertrophy    History of total right knee replacement    Skin cancer    Hypercholesterolemia    Family history of melanoma    NSTEMI (non-ST elevated myocardial infarction)    Statin intolerance    Coronary artery disease of native artery of native heart with stable angina pectoris    H/O heart artery stent    CAD, multiple vessel    Mood Disorder Unspecified (intermittent verbal irritability)        Past Surgical History:   Procedure Laterality Date    CARDIAC CATHETERIZATION      CORONARY ANGIOGRAPHY INCLUDING BYPASS GRAFTS WITH CATHETERIZATION OF LEFT HEART N/A 3/3/2020     Procedure: ANGIOGRAM, CORONARY, INCLUDING BYPASS GRAFT, WITH LEFT HEART CATHETERIZATION;  Surgeon: Ute Melchor MD;  Location: [facility] CATH LAB;  Service: Cardiology;  Laterality: N/A;    KNEE SURGERY      LEFT HEART CATHETERIZATION Left 2/22/2020    Procedure: CATHETERIZATION, HEART, LEFT;  Surgeon: Ute Melchor MD;  Location: [facility] CATH LAB;  Service: Cardiology;  Laterality: Left;    PERCUTANEOUS TRANSLUMINAL BALLOON ANGIOPLASTY OF CORONARY ARTERY Left 2/22/2020    Procedure: Angioplasty-coronary;  Surgeon: Ute Melchor MD;  Location: [facility] CATH LAB;  Service: Cardiology;  Laterality: Left;    SHOULDER ARTHROSCOPY Right     Dr Bella    simple prostatectomy  06/06/2016    robotic assisted partial prostectomy due to BPH    TOTAL KNEE ARTHROPLASTY          Other (medical) :    Head Injury: n  Seizure: n  Diabetes n  HTN n  Other: n    Substance Use:    Alcohol: rare social / says no control issues  Cannabis n  Tobacco:n  Cocaine:n  Benzo:n  Opioid: n  Ecstasy:n  Other:n    Family History:  Family History   Problem Relation Age of Onset    Heart disease Mother         lupus related    Diabetes Mother     Cancer Father         prostate, bone, lung, skin cancers all seperate types    Diabetes Maternal Aunt         Mental Status Exam:   Appearance: casual   Oriented: x 3  May 2020Minna Malcolm,   Attitude: cooperative engaging pleasant   Eye Contact: good   Behavior: calm here   Mood: says Ok   Cognition: alert / 20-3: 17, 14, 11, 8, 5 ,2   Concentration: grossly intact   Affect: appropriate range   Anxiety: mild / mod (cites intermittent irritability)  Thought Process: goal directed   Speech: Volume : WNL   Quantity WNL   Quality: appears to openly answer questions   Eye Contact: good   Insight: fair to good   Threats: no SI / HI   Memory: intact (as relay information across time spans)   Psychosis: denies all   Estimate of Intellectual Function: average   Judgment (to simple situation): if saw a  house on fire / A: call fire dept  Impulse Control: no history SI / nor HI ; calm here     3 wishes? : only came up with one item: continuous good health ; passed on other 2 wishes    PSYCHO-SOCIAL DEVELOPMENT HISTORY:   Social History     Socioeconomic History    Marital status:      Spouse name: Not on file    Number of children: Not on file    Years of education: Not on file    Highest education level: Not on file   Occupational History    Not on file   Social Needs    Financial resource strain: Not on file    Food insecurity:     Worry: Not on file     Inability: Not on file    Transportation needs:     Medical: Not on file     Non-medical: Not on file   Tobacco Use    Smoking status: Never Smoker    Smokeless tobacco: Former User     Types: Chew   Substance and Sexual Activity    Alcohol use: Yes     Alcohol/week: 2.0 standard drinks     Types: 2 Glasses of wine per week     Comment: per week    Drug use: No    Sexual activity: Not on file   Lifestyle    Physical activity:     Days per week: Not on file     Minutes per session: Not on file    Stress: Not on file   Relationships    Social connections:     Talks on phone: Not on file     Gets together: Not on file     Attends Adventism service: Not on file     Active member of club or organization: Not on file     Attends meetings of clubs or organizations: Not on file     Relationship status: Not on file   Other Topics Concern    Not on file   Social History Narrative    Not on file        City Born: [place]      Siblings (full or half)  Brothers: 0  Sisters: 1 one yr older    Closet to:     Briefly Describe  your Mom: very bright / later diagnosed lupus /  age 50   Briefly Describe your Dad self male hard working    Bio Mom: Occupation: dental assistant and nurse  Bio Dad:  Occupation:[employer]     Marital Status:  / 1st / 30+ years    Children   Girls  (ages): 2 ( 1 Stillmore / 1 Elaine)   Boys (ages): 0    Physical  Abuse: N       Sexual abuse  : N     Other trauma / abuse? 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 n    Employment:   Last Job?   Longest Job? [employer]  16 yrs    On Disability? n     Assessment:            Mood Disorder Unspecified (intermittent verbal irritability)               PLAN:     Please call  [facility] at [phone] and  arrange your FOLLOW UP TELEHEALTH (video)  appointment  with D Post Artemio DOSS for: June 9th @ 9:30 am     counselor Mendez Lopez LCSW June 1 @ 9am     Meds:  See after visit summary  / lamictal as Rx'd     References: (reviewed with pt as well):    Anxiety &  phobia workbook by ABIGAIL Fong PhD  (web retailers: used: $ 7-10)    Relaxation stress reduction workbook by ELISABETH Nunn PhD ( used: $7-10)     Feeling Good Website: Kayden Mead MD / [email or web address] website (free)     [place]: Path to Better Sleep : https://www.veterantraining.[place].gov/insomnia/ (free)       Pt expressed appreciation for the visit today and did not have further question at this time though pt  was still informed to:     Call  if problems.    Call / Report Side Effects to Artemio DOSS     Encouraged to follow up with primary care / Gen Med MD for continued monitoring of general health and wellness.    Understanding was expressed; and no further concerns nor questions were raised at this time.     remember healthy self care:   eat right  attempt adequate rest   HANDWASHING / encourage such kosta. During this corona virus time   walk or light exercise within reason and as your general med team  approves  read or explore any of reference materials / homework mentioned  reach out (I.e.,  connect with)  others who nuture and bring out best in you  avoid risky behaviors  keep your appointments  IF you  cannot make your appt THEN please call or go online to reschedule.  avoid  alcohol and illicit substances.  Look for the positive.  All is often relative-seek balance  Call sooner if needed : [phone]   Call 911 or go to Emergency Room  (ER)  if any acute concerns

## 2020-05-13 NOTE — TELEPHONE ENCOUNTER
Please read this:    https://www.acc.org/latest-in-cardiology/articles/2014/08/25/15/07/advanced-lipoprotein-testing-strengths-and-limitations    Thanks,  Ty Morales

## 2020-05-19 ENCOUNTER — PATIENT MESSAGE (OUTPATIENT)
Dept: CARDIOLOGY | Facility: CLINIC | Age: 70
End: 2020-05-19

## 2020-05-27 DIAGNOSIS — F39 UNSPECIFIED MOOD (AFFECTIVE) DISORDER: ICD-10-CM

## 2020-05-27 RX ORDER — LAMOTRIGINE 25 MG/1
TABLET ORAL
Qty: 45 TABLET | Refills: 1 | OUTPATIENT
Start: 2020-05-27

## 2020-06-01 ENCOUNTER — OFFICE VISIT (OUTPATIENT)
Dept: PSYCHIATRY | Facility: CLINIC | Age: 70
End: 2020-06-01
Payer: MEDICARE

## 2020-06-01 DIAGNOSIS — F41.1 GENERALIZED ANXIETY DISORDER: Primary | ICD-10-CM

## 2020-06-01 PROCEDURE — 90791 PR PSYCHIATRIC DIAGNOSTIC EVALUATION: ICD-10-PCS | Mod: 95,,, | Performed by: SOCIAL WORKER

## 2020-06-01 PROCEDURE — 90791 PSYCH DIAGNOSTIC EVALUATION: CPT | Mod: 95,,, | Performed by: SOCIAL WORKER

## 2020-06-01 NOTE — LETTER
June 1, 2020        Kayden Salvador MD  [address]  [place] [address]             [facility]  [address] [facility] [address]  Phone: [phone]  Fax: [phone]   Patient: Mekhi Lambert   MR Number: [id number]   YOB: 1950   Date of Visit: 6/1/2020       Dear Dr. Salvador:    Thank you for referring Mekhi Lambert to me for evaluation. Below are the relevant portions of my assessment and plan of care.    If you have questions, please do not hesitate to call me. I look forward to following Mekhi along with you.    Sincerely,      KATIE Keen Jr., MD

## 2020-06-01 NOTE — PROGRESS NOTES
"Psychiatry Initial Visit (PhD/LCSW)  Diagnostic Interview - CPT 76095    Date: 6/1/2020    Site: [place]    Referral source:  Kayden Salvador MD    Clinical status of patient: Outpatient    Mekhi Lambert, a 69 y.o. male, for initial evaluation visit.  Met with patient.  Virtual visit with synchronous audio and video     Each patient to whom he provides medical services by telemedicine is:  (1) informed of the relationship between the physician and patient and the respective role of any other health care provider with respect to management of the patient; and (2) notified that he or she may decline to receive medical services by telemedicine and may withdraw from such care at any time.     Location:  His home bedroom    Chief complaint/reason for encounter: anxiety    History of present illness:  He had an ergonomic office and he worked out all of the time when he was employed.  Six months after he retired, he had constant shoulder pain.  He was taking pain medication and having cortisone shots.  Six months ago, he went to a physical therapist and he conditioning started to work.  He has arthritis in both shoulders for fifteen years.  He was having a muscle imbalance issue.  His muscles started to cramp up.  He found out he had blockage of two arteries in February of 2020.  He had an emergency stent put in his heart.  He has had zero pain in his shoulders since then.  It has been three months and he is doing some exercise.  Over the past five years, it would take him three days to recover from a workout.  He has anxiety in "normal" discussions.  He will raise his voice continuously.  Dr. Salvador did not think the patient was manic.  He has been having this in past ten years.  He is now around his wife all of the time.  He notes that a big difference between his marriage and other couples is the patient and his wife do not have grandkids.  One of his friend in [place] is on medication.  He told the patient that " "he saw similar behaviors in him and encouraged him to seek treatment.  "I see everything in shades of mcdowell."  His wife sees everything in black and white.  He is sleeping "okay."  During his career, he was only getting 5-7 hours a sleep per night.  He is sleeping 7-8 hours of sleep per night.  His has a good appetite.        Pain: 0    Symptoms:   · Mood: diminished interest  · Anxiety: excessive anxiety/worry, restlessness/keyed up and irritability  · Substance abuse: denied  · Cognitive functioning: denied  · Health behaviors: noncontributory    Psychiatric history:  He has never been to counseling before.       Medical history: coronary artery disease, hypercholesterolemia    Family history of psychiatric illness: He has a paternal aunt and uncle who were alcoholics.      Social history (marriage, employment, etc.):  Patient's mother, Shavon, is  when she was 50 due to lupus.  The patient was 19.  She diagnosed when the patient was in the 6th grade.  She worked as a nurse for Dr. Edgar and a dental assistant.  Patient's father, Angel, is  due to lung cancer.  He was a smoker.  He was a WWII.  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  He was stationed in [place].  He worked for [employer].  His father was from [place], [place].  His father met his mother in [place].  His father was 82 when he passed.  He remarried after his mother .  The patient had a good relationship with his step mother. His parents were  about twenty two years.  He believes it was a good marriage.  His father was an alcoholic, but he was not abusive.  He had an addictive personality.  His father had a purple heart from the war.  His sister, Arturo Cobian, lives in [place].  She is .  She has three daughters.  She worked for [employer] as a  and a dental office.  He has an "okay" relationship with his sister.  They are different.  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  He grew up in " "[place], [place].  He reports a happy childhood.  He graduated from [employer] in 1968.  He has a bachelor's degree in science and physical education in 1973 from [employer].  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  He taught ninth grade science for two years.  He went to [employer] and he finished in petroleum engineering in 1979.  He worked as a  for the majority of his career from [id number].  He worked for [employer] for twelve years.  He was with [employer] for about six years.  There was reorganization.  He worked for [employer] for six years.  He moved to [place] for eight years.  He retired from there.  He lived in [place] for sixteen years.  He lived in [place].  He lived in [place] and [place] and [place].  He did not get water during [place].  He moved back to [place] three years ago.  He is  to Samuel Bailey, for forty years.  He believes it is a good marriage.  She is retired.  She taught school for a handful of years.  They have two girls.  They are:  Mariia, 39, lives in [place].  She is  with no children.  She works for a car dealership.  Tabatha, 37, lives in [place].  She is single with no children.  She is a sixth grade .  His oldest daughter has had major heart surgery in the past.  He reports a good relationship with his daughters.  He and his wife decided to move to [place] to renew acquaintances.  They are considering moving to [place].  He was raised Sabianist.  He converted to Catholicism in college.  He has been a humanist for 20 years.  He is a sports enthusiast.  He enjoys football and baseball.  He grew up playing a lot of tennis.  He has had knee and shoulder surgery.  He plays golf when he can.  He will read research.  He has been researching nutrition for the past couple of years.  He has studied brain function prior.  He read and studied "[employer]."    Substance use:   Alcohol: He drinks a couple glasses of wine " a week.  He is conscious about drinking alcohol due to his father.   Drugs: none   Tobacco: none   Caffeine: two cups of coffee in the morning    Current medications and drug reactions (include OTC, herbal): see medication list  He started Lamictal two weeks ago.  He will be increasing the dose tonight.  He does see some improvement in his anxiety.    Strengths and liabilities: Strength: Patient accepts guidance/feedback, Strength: Patient is expressive/articulate., Strength: Patient is intelligent., Strength: Patient is motivated for change., Strength: Patient has positive support network., Strength: Patient has reasonable judgment., Strength: Patient is stable., Liability: Patient has poor health., Liability: Patient lacks coping skills.    Current Evaluation:     Mental Status Exam:  General Appearance:  age appropriate, casually dressed, neatly groomed, glasses   Speech: normal tone, normal rate, normal pitch, normal volume      Level of Cooperation: cooperative      Thought Processes: normal and logical   Mood: anxious      Thought Content: normal, no suicidality, no homicidality, delusions, or paranoia   Affect: anxious   Orientation: Oriented x3   Memory: recent >  intact, remote >  intact   Attention Span & Concentration: intact   Fund of General Knowledge: intact and appropriate to age and level of education   Abstract Reasoning:    Judgment & Insight: fair     Language  intact     Diagnostic Impression - Plan:     Generalized Anxiety Disorder    Plan:individual psychotherapy and medication management by physician, Kayden Salvador MD    Return to Clinic: as scheduled    Length of Service (minutes): 45

## 2020-06-09 ENCOUNTER — OFFICE VISIT (OUTPATIENT)
Dept: PSYCHIATRY | Facility: CLINIC | Age: 70
End: 2020-06-09
Payer: MEDICARE

## 2020-06-09 DIAGNOSIS — F39 UNSPECIFIED MOOD (AFFECTIVE) DISORDER: Primary | ICD-10-CM

## 2020-06-09 PROCEDURE — 99214 PR OFFICE/OUTPT VISIT, EST, LEVL IV, 30-39 MIN: ICD-10-PCS | Mod: 95,,, | Performed by: PSYCHIATRY & NEUROLOGY

## 2020-06-09 PROCEDURE — 99214 OFFICE O/P EST MOD 30 MIN: CPT | Mod: 95,,, | Performed by: PSYCHIATRY & NEUROLOGY

## 2020-06-09 RX ORDER — LAMOTRIGINE 25 MG/1
TABLET ORAL
Qty: 75 TABLET | Refills: 1 | Status: SHIPPED | OUTPATIENT
Start: 2020-06-09 | End: 2020-07-15 | Stop reason: SDUPTHER

## 2020-06-09 NOTE — PATIENT INSTRUCTIONS
PLAN:     Please call  [facility] at [phone] and  arrange your FOLLOW UP TELEHEALTH (video)  appointment with:    D Post Artemio DOSS for: July 14 or July 16    Return to Clinic Mendez Lopez LCSW as arranged     References:     Anxiety &  phobia workbook by ABIGAIL Fong PhD  (web retailers: used: $ 7-10)    Relaxation stress reduction workbook: ELISABETH Nunn PhD ( used: $7-10)    Feeling Good Website: Kayden Mead MD / www.[employer] website (free)     [place]: Path to Better Sleep : https://www.veterantraining.[place].gov/insomnia/ (free)       Meds: see after visit summary / stop lamictal IF any rash and tell artemio DOSS    Pt expressed appreciation for the visit today and did not have further question at this time though pt  was still informed to:     Call  if problems.    Call / Report Side Effects to Artemio DOSS     Encouraged to follow up with primary care / Gen Med MD for continued monitoring of general health and wellness.    Understanding was expressed; and no further concerns nor questions were raised at this time.     remember healthy self care:   eat right  attempt adequate rest   HANDWASHING / encourage such kosta. During this corona virus time   walk or light exercise within reason and as your general med team approves  read or explore any of reference materials / homework mentioned  reach out (I.e.,  connect with)  others who nuture and bring out best in you  avoid risky behaviors  keep your appointments  IF you  cannot make your appt THEN please call or go online to reschedule.  avoid  alcohol and illicit substances.  Look for the positive.  All is often relative-seek balance  Call sooner if needed : [phone]   Call 911 or go to Emergency Room  (ER)  if any acute concerns

## 2020-06-09 NOTE — PROGRESS NOTES
Timeframe: Corona Virus Outbreak     The patient location is: Patient's home/ Patient reported that his/her location at the time of this visit was in the [facility]     Visit type: Virtual visit with synchronous audio and video     Each patient to whom  medical services are provided by telemedicine is: (1) informed of the relationship between the physician and patient and the respective role of any other health care provider with respect to management of the patient; and (2) notified that he or she may decline to receive medical services by telemedicine and may withdraw from such care at any time.    I also informed patient of the following:   Kayden Salvador MD:  [id number]    My contact info as:  [facility]: [facility] / 2nd Floor  [address] [address] [facility] [address]  [place] [address]   Ph: [phone]    If technology issues, call office phone: Ph: [phone]  if crisis: Dial 911 or go to nearest Emergency Room (ER)  If questions related to privacy practices: contact [facility] Information Department: [phone]      Mekhi Lambert   1950 06/09/2020     Disclaimer: Evaluation and treatment is based on information presented to date. Any new information may affect assessment and findings.     Who (in attendance) :  Pt himself       S: Patient's Own Perception of Condition (& Side Effects) : no side effects  / no rash      O:     CURRENT PRESENTATION:     Overall impression: doing ok; seems a bit more 'mellow ' since initial session; was never overly brash in interaction with me; tho does sound relaxed; only recently moved from 1 tab of lamictal 25 mg to 50 mg; says does seem to think as well feeling bit more calm    Discussed moving to 100 mg vs more gradual advance from 50 mg to 75 mg of almictal; mutually agree to the more gradual ramp up    I did emphasize his working on CBT with Mendez WILSON; he has history doing some self hypnosis for sleep  and that did help him in past 920 yrs ago); I did shoe him the [employer] Path to better sleep web asite as well as I reviewed relaxation stress mngt book and Feeling good.Pharmalink website; says he will investigate such.       Any New Medical / Allergies Status: none new     Mood: good  / more relaxed     Safety: no SI / no HI     Supports: wife of 49 yrs / and daughter     Work / School : retired       Medication Adjustments / Renewals:   Discussed moving to 100 mg vs more gradual advance from 50 mg to 75 mg of lamictal ; mutually agree to the more gradual ramp up: I will renew at THREE tabs of 75 mg Lamictal / stop If any rash       Counselor/ Coping Skills / Counseling work zoltan Lopez LCSW    Constitutional Health Concerns:       Review of Systems   Constitutional: Negative.    HENT: Negative.    Eyes: Negative.    Respiratory: Negative.    Cardiovascular: Negative.    Gastrointestinal: Negative.    Genitourinary: Negative.    Musculoskeletal: Negative.    Skin: Negative.    Neurological: Negative.    Endo/Heme/Allergies: Negative.         Musculoskeletal: no tremor / no stiffness     Laboratory Data  No visits with results within 1 Month(s) from this visit.   Latest known visit with results is:   Admission on 03/03/2020, Discharged on 03/03/2020   Component Date Value Ref Range Status    WBC 03/03/2020 8.76  3.90 - 12.70 K/uL Final    RBC 03/03/2020 5.03  4.60 - 6.20 M/uL Final    Hemoglobin 03/03/2020 15.2  14.0 - 18.0 g/dL Final    Hematocrit 03/03/2020 44.4  40.0 - 54.0 % Final    Mean Corpuscular Volume 03/03/2020 88  82 - 98 fL Final    Mean Corpuscular Hemoglobin 03/03/2020 30.2  27.0 - 31.0 pg Final    Mean Corpuscular Hemoglobin Conc 03/03/2020 34.2  32.0 - 36.0 g/dL Final    RDW 03/03/2020 13.3  11.5 - 14.5 % Final    Platelets 03/03/2020 242  150 - 350 K/uL Final    MPV 03/03/2020 9.5  9.2 - 12.9 fL Final    Immature Granulocytes 03/03/2020 0.2  0.0 - 0.5 % Final    Gran # (ANC)  03/03/2020 6.3  1.8 - 7.7 K/uL Final    Immature Grans (Abs) 03/03/2020 0.02  0.00 - 0.04 K/uL Final    Lymph # 03/03/2020 1.6  1.0 - 4.8 K/uL Final    Mono # 03/03/2020 0.7  0.3 - 1.0 K/uL Final    Eos # 03/03/2020 0.1  0.0 - 0.5 K/uL Final    Baso # 03/03/2020 0.05  0.00 - 0.20 K/uL Final    nRBC 03/03/2020 0  0 /100 WBC Final    Gran% 03/03/2020 72.0  38.0 - 73.0 % Final    Lymph% 03/03/2020 18.2  18.0 - 48.0 % Final    Mono% 03/03/2020 8.2  4.0 - 15.0 % Final    Eosinophil% 03/03/2020 0.8  0.0 - 8.0 % Final    Basophil% 03/03/2020 0.6  0.0 - 1.9 % Final    Differential Method 03/03/2020 Automated   Final    Sodium 03/03/2020 140  136 - 145 mmol/L Final    Potassium 03/03/2020 4.5  3.5 - 5.1 mmol/L Final    Chloride 03/03/2020 108  95 - 110 mmol/L Final    CO2 03/03/2020 23  23 - 29 mmol/L Final    Glucose 03/03/2020 98  70 - 110 mg/dL Final    BUN, Bld 03/03/2020 18  8 - 23 mg/dL Final    Creatinine 03/03/2020 1.2  0.5 - 1.4 mg/dL Final    Calcium 03/03/2020 9.3  8.7 - 10.5 mg/dL Final    Anion Gap 03/03/2020 9  8 - 16 mmol/L Final    eGFR if African American 03/03/2020 >60  >60 mL/min/1.73 m^2 Final    eGFR if non African American 03/03/2020 >60  >60 mL/min/1.73 m^2 Final    Prothrombin Time 03/03/2020 10.8  9.0 - 12.5 sec Final    INR 03/03/2020 1.0  0.8 - 1.2 Final    aPTT 03/03/2020 27.1  21.0 - 32.0 sec Final    POC ACTIVATED CLOTTING TIME K 03/03/2020 268* 74 - 137 sec Final    Sample 03/03/2020 unknown   Final       Patient Active Problem List   Diagnosis    Prostate hypertrophy    History of total right knee replacement    Skin cancer    Hypercholesterolemia    Family history of melanoma    NSTEMI (non-ST elevated myocardial infarction)    Statin intolerance    Coronary artery disease of native artery of native heart with stable angina pectoris    H/O heart artery stent    CAD, multiple vessel    Mood Disorder Unspecified (intermittent verbal irritability)           Current Outpatient Medications:     aspirin (ECOTRIN) 81 MG EC tablet, Take 1 tablet (81 mg total) by mouth once daily., Disp: 90 tablet, Rfl: 3    co-enzyme Q-10 30 mg capsule, Take 100 mg by mouth 3 (three) times daily., Disp: , Rfl:     ezetimibe (ZETIA) 10 mg tablet, Take 1 tablet (10 mg total) by mouth once daily. (Patient not taking: Reported on 3/10/2020), Disp: 30 tablet, Rfl: 11    lamoTRIgine (LAMICTAL) 25 MG tablet, THREE tabs by mouth at bed. Note: STOP ALL Lamictal IF any RASH and tell Psyc MD, Disp: 75 tablet, Rfl: 1    methocarbamol (ROBAXIN) 750 MG Tab, Take 1 tablet (750 mg total) by mouth 3 (three) times daily as needed. (Patient not taking: Reported on 3/10/2020), Disp: 30 tablet, Rfl: 0    metoprolol tartrate (LOPRESSOR) 25 MG tablet, Take 1 tablet (25 mg total) by mouth 2 (two) times daily. (Patient taking differently: Take 12.5 mg by mouth 2 (two) times daily. ), Disp: 60 tablet, Rfl: 11    mometasone 0.1% (ELOCON) 0.1 % cream, AAA bid prn for psoriasis. Strong steroid.  Do not use on face, underarms or groin., Disp: 50 g, Rfl: 1    multivitamin capsule, Take 1 capsule by mouth once daily., Disp: , Rfl:     ticagrelor (BRILINTA) 90 mg tablet, Take 1 tablet (90 mg total) by mouth 2 (two) times daily., Disp: 60 tablet, Rfl: 11    triamcinolone acetonide 0.025% (KENALOG) 0.025 % cream, Apply topically 2 (two) times daily as needed. Mild steroid.  Safe for face up to 2 weeks then stop., Disp: 30 g, Rfl: 1     Social History     Tobacco Use   Smoking Status Never Smoker   Smokeless Tobacco Former User    Types: Chew        Review of patient's allergies indicates:   Allergen Reactions    Statins-hmg-coa reductase inhibitors      SEVERE MYALGIAS AND GI SIDE EFFECTS        Mental Status Exam:   Appearance: casual   Oriented: x 3   Attitude: cooperative   Eye Contact: good   Behavior: calm here   Mood: Ok   Cognition: alert   Concentration: grossly intact   Affect: appropriate range    Anxiety: mild  Thought Process: goal directed   Speech: Volume : WNL   Quantity WNL   Quality: appears to openly answer questions   Eye Contact: good   Threats: no SI / HI   Memory: intact (as relay information across time spans)   Psychosis: denies all   Estimate of Intellectual Function: upper  average       ASSESSMENT:   Encounter Diagnosis   Name Primary?    Mood Disorder Unspecified (intermittent verbal irritability) Yes       PLAN:     Please call  [facility] at [phone] and  arrange your FOLLOW UP TELEHEALTH (video)  appointment with:    D Post Artemio DOSS for: July 14 or July 16    Return to Clinic Mendez Lopez LCSW as arranged     References:     Anxiety &  phobia workbook by ABIGAIL Fong PhD  (web retailers: used: $ 7-10)    Relaxation stress reduction workbook: ELISABETH Nunn PhD ( used: $7-10)    Feeling Good Website: Kayden Mead MD / [email or web address] Solar.[email or web address] website (free)     [place]: Path to Better Sleep : https://www.veterantraining.[place].gov/insomnia/ (free)     Meds: see after visit summary / stop lamictal IF any rash and tell artemio DOSS    Pt expressed appreciation for the visit today and did not have further question at this time though pt  was still informed to:     Call  if problems.    Call / Report Side Effects to Artemio DOSS

## 2020-06-16 ENCOUNTER — TELEPHONE (OUTPATIENT)
Dept: CARDIOLOGY | Facility: CLINIC | Age: 70
End: 2020-06-16

## 2020-06-16 NOTE — TELEPHONE ENCOUNTER
"Patient complains of L side rib discomfort. Reports has been having muscle issues. Denies chest pain. Has been doing light exercises at home without discomfort until 15min later will get pain around rib cage on L side, lasting 5min. intermittent, "sharp", sometimes occurs lying on L side as well, relieved with stretching, sometimes occurs with rest, has not tried anything over the counter. Denies shortness of breath with exertion. Also reports can feel heart beat on side of rib cage lying on that side, states can feel has occasional skip of heart beat. Asymptomatic. States does not feel bad. Reports no pain with exercise. Routed to Dr. Morales for review.   "

## 2020-06-16 NOTE — TELEPHONE ENCOUNTER
Message left on machine for patient to call for review of symptoms post [facility] message sent physician. Included instruction to go to ER with severe acute or un resolving chest pain.

## 2020-06-18 ENCOUNTER — OFFICE VISIT (OUTPATIENT)
Dept: CARDIOLOGY | Facility: CLINIC | Age: 70
End: 2020-06-18
Payer: MEDICARE

## 2020-06-18 ENCOUNTER — PATIENT OUTREACH (OUTPATIENT)
Dept: ADMINISTRATIVE | Facility: OTHER | Age: 70
End: 2020-06-18

## 2020-06-18 ENCOUNTER — TELEPHONE (OUTPATIENT)
Dept: CARDIOLOGY | Facility: CLINIC | Age: 70
End: 2020-06-18

## 2020-06-18 VITALS
DIASTOLIC BLOOD PRESSURE: 66 MMHG | HEART RATE: 56 BPM | SYSTOLIC BLOOD PRESSURE: 118 MMHG | WEIGHT: 160.69 LBS | BODY MASS INDEX: 24.44 KG/M2 | OXYGEN SATURATION: 98 %

## 2020-06-18 DIAGNOSIS — I71.20 THORACIC AORTIC ANEURYSM, WITHOUT RUPTURE: ICD-10-CM

## 2020-06-18 DIAGNOSIS — E78.00 HYPERCHOLESTEROLEMIA: ICD-10-CM

## 2020-06-18 DIAGNOSIS — I25.118 CORONARY ARTERY DISEASE OF NATIVE ARTERY OF NATIVE HEART WITH STABLE ANGINA PECTORIS: Primary | ICD-10-CM

## 2020-06-18 DIAGNOSIS — Z78.9 STATIN INTOLERANCE: ICD-10-CM

## 2020-06-18 DIAGNOSIS — Z95.5 H/O HEART ARTERY STENT: ICD-10-CM

## 2020-06-18 DIAGNOSIS — I25.10 CAD, MULTIPLE VESSEL: Primary | ICD-10-CM

## 2020-06-18 PROCEDURE — 99214 OFFICE O/P EST MOD 30 MIN: CPT | Mod: S$PBB,,, | Performed by: INTERNAL MEDICINE

## 2020-06-18 PROCEDURE — 99999 PR PBB SHADOW E&M-EST. PATIENT-LVL IV: CPT | Mod: PBBFAC,,, | Performed by: INTERNAL MEDICINE

## 2020-06-18 PROCEDURE — 99214 OFFICE O/P EST MOD 30 MIN: CPT | Mod: PBBFAC | Performed by: INTERNAL MEDICINE

## 2020-06-18 PROCEDURE — 99214 PR OFFICE/OUTPT VISIT, EST, LEVL IV, 30-39 MIN: ICD-10-PCS | Mod: S$PBB,,, | Performed by: INTERNAL MEDICINE

## 2020-06-18 PROCEDURE — 99999 PR PBB SHADOW E&M-EST. PATIENT-LVL IV: ICD-10-PCS | Mod: PBBFAC,,, | Performed by: INTERNAL MEDICINE

## 2020-06-18 NOTE — TELEPHONE ENCOUNTER
----- Message from Cora Riggins, RT sent at 6/18/2020  4:21 PM CDT -----  Regarding: Stat labs for Mr Lambert  Good  afternoon,   Mr Lambert has a CT scan scheduled for Monday 6/22/2020. He needs a current Creatinine and GFR for his test. His last value is not within the last 30 days, so we need you to order STAT labs for these values before his test.   Thank You,   Cora Riggins   Ct Technologist

## 2020-06-18 NOTE — PROGRESS NOTES
Subjective:   Patient ID:  Mekhi Lambert is a 69 y.o. male who presents for follow up of Shortness of Breath, Chest Pain, and Back Pain (since stent was put in.)      70 yo male, post PCI f/u  PMH CAD h/o NSTEMI in , s/p  99% lesion of Cx and 75% pLAD, s/p ISSAC in Cx. EF 45%, by Dr. Melchor, cardiomyopathy. Statin intolerance. Repeat LHC in  LAD lesion FR 0.9%  Today, c/o back pain when sitting. Resolved quickly  No chest pain, dyspnea, dizziness and palpitation. Sleeps with 2 pillows   and BP wnl  No leg numbness  Good exercise, weight rubber band walking  EKG NSR and non T abnormality  Refused cardiac rehab and cholesterol Rx including statin and PSCS9 inhibitor          Past Medical History:   Diagnosis Date    Arthritis        Past Surgical History:   Procedure Laterality Date    CARDIAC CATHETERIZATION      CORONARY ANGIOGRAPHY INCLUDING BYPASS GRAFTS WITH CATHETERIZATION OF LEFT HEART N/A 3/3/2020    Procedure: ANGIOGRAM, CORONARY, INCLUDING BYPASS GRAFT, WITH LEFT HEART CATHETERIZATION;  Surgeon: Ute Melchor MD;  Location: [facility] CATH LAB;  Service: Cardiology;  Laterality: N/A;    KNEE SURGERY      LEFT HEART CATHETERIZATION Left 2/22/2020    Procedure: CATHETERIZATION, HEART, LEFT;  Surgeon: Ute Melchor MD;  Location: [facility] CATH LAB;  Service: Cardiology;  Laterality: Left;    PERCUTANEOUS TRANSLUMINAL BALLOON ANGIOPLASTY OF CORONARY ARTERY Left 2/22/2020    Procedure: Angioplasty-coronary;  Surgeon: Ute Melchor MD;  Location: [facility] CATH LAB;  Service: Cardiology;  Laterality: Left;    SHOULDER ARTHROSCOPY Right     Dr Bella    simple prostatectomy  06/06/2016    robotic assisted partial prostectomy due to BPH    TOTAL KNEE ARTHROPLASTY         Social History     Tobacco Use    Smoking status: Never Smoker    Smokeless tobacco: Former User     Types: Chew   Substance Use Topics    Alcohol use: Yes     Alcohol/week: 2.0 standard drinks     Types: 2 Glasses of wine  per week     Comment: per week    Drug use: No       Family History   Problem Relation Age of Onset    Heart disease Mother         lupus related    Diabetes Mother     Cancer Father         prostate, bone, lung, skin cancers all seperate types    Diabetes Maternal Aunt          Review of Systems   Constitution: Negative for decreased appetite, diaphoresis, fever, malaise/fatigue and night sweats.   HENT: Negative for nosebleeds.    Eyes: Negative for blurred vision and double vision.   Cardiovascular: Negative for chest pain, claudication, dyspnea on exertion, irregular heartbeat, leg swelling, near-syncope, orthopnea, palpitations, paroxysmal nocturnal dyspnea and syncope.        Apical pulse when lying on left side   Respiratory: Negative for cough, shortness of breath, sleep disturbances due to breathing, snoring, sputum production and wheezing.    Endocrine: Negative for cold intolerance and polyuria.   Hematologic/Lymphatic: Does not bruise/bleed easily.   Skin: Negative for rash.   Musculoskeletal: Positive for back pain. Negative for falls, joint pain, joint swelling and neck pain.   Gastrointestinal: Negative for abdominal pain, heartburn, nausea and vomiting.   Genitourinary: Negative for dysuria, frequency and hematuria.   Neurological: Negative for difficulty with concentration, dizziness, focal weakness, headaches, light-headedness, numbness, seizures and weakness.   Psychiatric/Behavioral: Negative for depression, memory loss and substance abuse. The patient does not have insomnia.    Allergic/Immunologic: Negative for HIV exposure and hives.       Objective:   Physical Exam   Constitutional: He is oriented to person, place, and time. He appears well-nourished.   HENT:   Head: Normocephalic.   Eyes: Pupils are equal, round, and reactive to light.   Neck: Normal carotid pulses and no JVD present. Carotid bruit is not present. No thyromegaly present.   Cardiovascular: Normal rate, regular rhythm,  normal heart sounds and normal pulses.  No extrasystoles are present. PMI is not displaced. Exam reveals no gallop and no S3.   No murmur heard.  Pulses:       Radial pulses are 2+ on the right side and 2+ on the left side.   Pulmonary/Chest: Breath sounds normal. No stridor. No respiratory distress.   Abdominal: Soft. Bowel sounds are normal. There is no abdominal tenderness. There is no rebound.   Musculoskeletal: Normal range of motion.   Neurological: He is alert and oriented to person, place, and time.   Skin: Skin is intact. Bruising (left arm) noted. No rash noted.   Psychiatric: His behavior is normal.       Lab Results   Component Value Date    CHOL 176 02/23/2020    CHOL 234 (H) 02/14/2017     Lab Results   Component Value Date    HDL 52 02/23/2020    HDL 60 02/14/2017     Lab Results   Component Value Date    LDLCALC 104.2 02/23/2020    LDLCALC 149.4 02/14/2017     Lab Results   Component Value Date    TRIG 99 02/23/2020    TRIG 123 02/14/2017     Lab Results   Component Value Date    CHOLHDL 29.5 02/23/2020    CHOLHDL 25.6 02/14/2017       Chemistry        Component Value Date/Time     03/03/2020 1012    K 4.5 03/03/2020 1012     03/03/2020 1012    CO2 23 03/03/2020 1012    BUN 18 03/03/2020 1012    CREATININE 1.2 03/03/2020 1012    GLU 98 03/03/2020 1012        Component Value Date/Time    CALCIUM 9.3 03/03/2020 1012    ALKPHOS 87 02/22/2020 1143    AST 70 (H) 02/22/2020 1143    ALT 67 (H) 02/22/2020 1143    BILITOT 0.8 02/22/2020 1143    ESTGFRAFRICA >60 03/03/2020 1012    EGFRNONAA >60 03/03/2020 1012          Lab Results   Component Value Date    HGBA1C 4.9 02/23/2020     Lab Results   Component Value Date    TSH 1.607 02/14/2017     Lab Results   Component Value Date    INR 1.0 03/03/2020    INR 1.0 02/22/2020     Lab Results   Component Value Date    WBC 8.76 03/03/2020    HGB 15.2 03/03/2020    HCT 44.4 03/03/2020    MCV 88 03/03/2020     03/03/2020     BMP  Sodium   Date Value  Ref Range Status   03/03/2020 140 136 - 145 mmol/L Final     Potassium   Date Value Ref Range Status   03/03/2020 4.5 3.5 - 5.1 mmol/L Final     Chloride   Date Value Ref Range Status   03/03/2020 108 95 - 110 mmol/L Final     CO2   Date Value Ref Range Status   03/03/2020 23 23 - 29 mmol/L Final     BUN, Bld   Date Value Ref Range Status   03/03/2020 18 8 - 23 mg/dL Final     Creatinine   Date Value Ref Range Status   03/03/2020 1.2 0.5 - 1.4 mg/dL Final     Calcium   Date Value Ref Range Status   03/03/2020 9.3 8.7 - 10.5 mg/dL Final     Anion Gap   Date Value Ref Range Status   03/03/2020 9 8 - 16 mmol/L Final     eGFR if    Date Value Ref Range Status   03/03/2020 >60 >60 mL/min/1.73 m^2 Final     eGFR if non    Date Value Ref Range Status   03/03/2020 >60 >60 mL/min/1.73 m^2 Final     Comment:     Calculation used to obtain the estimated glomerular filtration  rate (eGFR) is the CKD-EPI equation.        BNP  @LABRCNTIP(BNP,BNPTRIAGEBLO)@  @LABRCNTIP(troponini)@  CrCl cannot be calculated (Patient's most recent lab result is older than the maximum 7 days allowed.).  No results found in the last 24 hours.  No results found in the last 24 hours.  No results found in the last 24 hours.    Assessment:      1. CAD, multiple vessel    2. H/O heart artery stent    3. Hypercholesterolemia    4. Statin intolerance    5. Thoracic aortic aneurysm, without rupture        Plan:   Check Lipid profile  Chest Ct ordered for positional apical pulse  Continue ASA Brilinta 90 mg bid and Metoprolol  Counseled DASH  Check Lipid profile in 6 months  Recommend heart-healthy diet, weight control and regular exercise.  [place]. Risk modification.   RTC in 6 months    I have reviewed all pertinent labs and cardiac studies independently. Plans and recommendations have been formulated under my direct supervision. All questions answered and patient voiced understanding. Patient to continue current medications.    Return sooner for concerns or questions.   If symptoms persist go to the ED

## 2020-06-19 ENCOUNTER — TELEPHONE (OUTPATIENT)
Dept: RADIOLOGY | Facility: HOSPITAL | Age: 70
End: 2020-06-19

## 2020-06-22 ENCOUNTER — HOSPITAL ENCOUNTER (OUTPATIENT)
Dept: RADIOLOGY | Facility: HOSPITAL | Age: 70
Discharge: HOME | End: 2020-06-22
Attending: INTERNAL MEDICINE
Payer: MEDICARE

## 2020-06-22 ENCOUNTER — PATIENT MESSAGE (OUTPATIENT)
Dept: CARDIOLOGY | Facility: CLINIC | Age: 70
End: 2020-06-22

## 2020-06-22 DIAGNOSIS — I71.20 THORACIC AORTIC ANEURYSM, WITHOUT RUPTURE: ICD-10-CM

## 2020-06-22 DIAGNOSIS — I25.10 CAD, MULTIPLE VESSEL: Primary | ICD-10-CM

## 2020-06-22 PROCEDURE — 71275 CT ANGIOGRAPHY CHEST: CPT | Mod: TC

## 2020-06-22 PROCEDURE — 25500020 PHARM REV CODE 255: Performed by: INTERNAL MEDICINE

## 2020-06-22 PROCEDURE — 71275 CTA CHEST NON CORONARY: ICD-10-PCS | Mod: 26,,, | Performed by: RADIOLOGY

## 2020-06-22 PROCEDURE — 71275 CT ANGIOGRAPHY CHEST: CPT | Mod: 26,,, | Performed by: RADIOLOGY

## 2020-06-22 RX ADMIN — IOHEXOL 100 ML: 350 INJECTION, SOLUTION INTRAVENOUS at 01:06

## 2020-06-23 ENCOUNTER — TELEPHONE (OUTPATIENT)
Dept: CARDIOLOGY | Facility: CLINIC | Age: 70
End: 2020-06-23

## 2020-06-23 ENCOUNTER — TELEPHONE (OUTPATIENT)
Dept: INTERNAL MEDICINE | Facility: CLINIC | Age: 70
End: 2020-06-23

## 2020-06-23 DIAGNOSIS — R91.1 SOLITARY PULMONARY NODULE: ICD-10-CM

## 2020-06-23 NOTE — TELEPHONE ENCOUNTER
Patient contacted and was advised that his   Chest CT showed aortic atherosclerosis  Also small lung nodules and please f/u with PCP.  Lab showed total chol 272 and    The patient stated understanding with no questions will follow up with primary provider

## 2020-06-23 NOTE — TELEPHONE ENCOUNTER
Due to non calcified nodes on CT, CT chest with stat cre lab draw prior in 3-6 months with f/u with me.

## 2020-06-23 NOTE — TELEPHONE ENCOUNTER
Advised pt as below per Dr. Lopez, pt voiced understanding and recall placed. Per pt request, Epic message sent to Cards Dr. Morales requesting advanced lipid testing prior to appt sched 07/13/20.

## 2020-06-25 ENCOUNTER — OFFICE VISIT (OUTPATIENT)
Dept: PSYCHIATRY | Facility: CLINIC | Age: 70
End: 2020-06-25
Payer: MEDICARE

## 2020-06-25 DIAGNOSIS — F41.1 GENERALIZED ANXIETY DISORDER: Primary | ICD-10-CM

## 2020-06-25 PROCEDURE — 90834 PSYTX W PT 45 MINUTES: CPT | Mod: 95,,, | Performed by: SOCIAL WORKER

## 2020-06-25 PROCEDURE — 90834 PR PSYCHOTHERAPY W/PATIENT, 45 MIN: ICD-10-PCS | Mod: 95,,, | Performed by: SOCIAL WORKER

## 2020-06-25 NOTE — PROGRESS NOTES
"Individual Psychotherapy (PhD/LCSW)    6/25/2020    Site:  [facility]         Therapeutic Intervention: Met with patient.  Outpatient - Insight oriented psychotherapy 45 min - CPT code 28935  Virtual visit with synchronous audio and video     Each patient to whom he provides medical services by telemedicine is:  (1) informed of the relationship between the physician and patient and the respective role of any other health care provider with respect to management of the patient; and (2) notified that he or she may decline to receive medical services by telemedicine and may withdraw from such care at any time.      Location:  His home bedroom    Chief complaint/reason for encounter: anxiety     Interval history and content of current session:  Patient presents via video to ongoing individual therapy due to anxiety.  He feels that his physical problems were contributing to anxiety.  He is a high energy person.  He is always moving and doing something.  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  He has been researching diet and health issues recently.  He is not sure where his stress is coming from.  Within the last few years after alcohol is moving out of his system, he has increased anxiety and he will do something more erratic.  He gets excited watching sporting events.  He would stop drinking prior to going into the game.  He was a lot more animated while in the game.  He is not typically that overexcited.  He has only had these symptoms in the last couple of years.  He is not able to take nasal decongestants because it would hype him up.  "I am sensitive to a lot of different things."  "When you read the side effects, it's almost guaranteed it's what I'm going to have."  His kidneys were shut down when  He tried to take another antidepressant.  Explore dynamics in his marriage.  Emphasize barriers that may trigger anxiety and irritability.  Encourage continued physical activity and nutrition.  Note that " "he may want to find a friendship where he can discuss his research.  He will be off Robaxin and Brilinta in two months.  He wonders if his anxiety is related to the medication.  He does admit he was having these problems prior to his heart issues.  He and his wife have not had any major confrontations.  He thinks his oldest daughter talked to his wife.  His wife is not as combative as she was before.  He is trying to be calmer in the marriage as well.  He does believe he chose some of his confrontative behavior on purpose.  He confronted his supervisors for their passive behavior because they were allowing his co workers to be trained with wrong information.  His wife is a binary thinker.  She will say that she is going to do something regardless of his opinion or feedback.  "It got to a point that I decided not to walk away."  His daughter has reflected to him that he comes off as lecturing.  "I am left prefrontal cortex dominant."  His manager told him he solves a problem quicker than others in a room.  His wife is not able to drive for past twenty years.  In [place], they had neighbors that would help.  She does not have the same support system in [place].  His wife is not interested academically in what he is studying.                     Treatment plan:  · Target symptoms: anxiety   · Why chosen therapy is appropriate versus another modality: relevant to diagnosis  · Outcome monitoring methods: self-report, observation  · Therapeutic intervention type: insight oriented psychotherapy, supportive psychotherapy, interactive psychotherapy    Risk parameters:  Patient reports no suicidal ideation  Patient reports no homicidal ideation  Patient reports no self-injurious behavior  Patient reports no violent behavior    Verbal deficits: None    Patient's response to intervention:  The patient's response to intervention is accepting, motivated.    Progress toward goals and other mental status changes:  The patient's " progress toward goals is fair .    Diagnosis:   Generalized Anxiety Disorder    Plan:  individual psychotherapy and medication management by physician    Return to clinic: as scheduled    Length of Service (minutes): 45

## 2020-07-15 DIAGNOSIS — F41.1 GENERALIZED ANXIETY DISORDER: ICD-10-CM

## 2020-07-15 DIAGNOSIS — F39 UNSPECIFIED MOOD (AFFECTIVE) DISORDER: Primary | ICD-10-CM

## 2020-07-15 DIAGNOSIS — I25.118 CORONARY ARTERY DISEASE OF NATIVE ARTERY OF NATIVE HEART WITH STABLE ANGINA PECTORIS: ICD-10-CM

## 2020-07-15 DIAGNOSIS — Z71.89 COMPLEX CARE COORDINATION: ICD-10-CM

## 2020-07-15 RX ORDER — LAMOTRIGINE 25 MG/1
TABLET ORAL
Qty: 75 TABLET | Refills: 1 | Status: SHIPPED | OUTPATIENT
Start: 2020-07-15 | End: 2020-09-17 | Stop reason: SDUPTHER

## 2020-07-15 NOTE — PROGRESS NOTES
Pt had been scheduled for [facility] tho he had difficulty logging in.    As such I called him, he is at his home in [place], [place].    S: no gen med complaint; says though previously he had some angina and cardiology adjusted his cardiac meds    O:    Goal directed calm    Alert    No psychosis     No SI no HI    Mood: ok    Meds: he only recently moved to the 75 mg lamictal; finds the medication helpful.    No rash     A:    Encounter Diagnoses   Name Primary?    Mood Disorder Unspecified (intermittent verbal irritability) Yes    Generalized anxiety disorder     Coronary artery disease of native artery of native heart with stable angina pectoris        P: I asked him to follow up 5-6 weeks

## 2020-07-27 ENCOUNTER — OFFICE VISIT (OUTPATIENT)
Dept: PSYCHIATRY | Facility: CLINIC | Age: 70
End: 2020-07-27
Payer: MEDICARE

## 2020-07-27 DIAGNOSIS — F41.1 GENERALIZED ANXIETY DISORDER: Primary | ICD-10-CM

## 2020-07-27 PROCEDURE — 90834 PSYTX W PT 45 MINUTES: CPT | Mod: 95,,, | Performed by: SOCIAL WORKER

## 2020-07-27 PROCEDURE — 90834 PR PSYCHOTHERAPY W/PATIENT, 45 MIN: ICD-10-PCS | Mod: 95,,, | Performed by: SOCIAL WORKER

## 2020-07-27 NOTE — PROGRESS NOTES
Individual Psychotherapy (PhD/LCSW)    7/27/2020    Site:  [facility]         Therapeutic Intervention: Met with patient.  Outpatient - Insight oriented psychotherapy 45 min - CPT code 94449  Virtual visit with synchronous audio and video     Each patient to whom he provides medical services by telemedicine is:  (1) informed of the relationship between the physician and patient and the respective role of any other health care provider with respect to management of the patient; and (2) notified that he or she may decline to receive medical services by telemedicine and may withdraw from such care at any time.      Location:  His home bedroom    Chief complaint/reason for encounter: anxiety     Interval history and content of current session:  Patient presents via video to ongoing individual therapy due to anxiety.  His medication, Lamictal, was increased last week.  He does not yet find a difference in his mood.  He started getting better sleep in the last month and a half.  He is sleeping more soundly.  He is getting an extra hour to hour and a half of sleep.  He now gets seven and a half to eight hours of sleep.  In the past, he had twenty four hour call which limited his sleep to only six hours.  He does find that the sleep has helped his mood.  He has not had any excitable discussions with his wife since she is back from his daughter a couple of months ago.  They took the time to separate.  He thinks they are more in line with doing things together.  He is hoping they can back into yoga together.  They have taken rides together.  His wife does not like to sweat.  He will ride his bike.  Emphasize the need to work toward compromise in a relationship.  Educate the patient that it is important to ask and be specific about his needs.  Encourage trying to find a positive activity to do together.  His wife is nervous about COVID.  His daughter is staying with them for the next week.  She teaches ninth grade science in  [place].  She is nervous about returning to schools.  His daughter came in last Thursday.  His youngest daughter does not talk much.  His wife tends to be quiet as well.  He is cautious when he first meets people.  He tries not to say things that offend people.  In the past, he would avoid conversation because he often had a different view point.  [place]  He was a .  He will now call out the facts when he hears something erroneous.  He will say that their statement is "idiotic."  A co worker warned another co worker to watch what he said in front of the patient because he would call them out.  His wife is more open to doing things at this time.  He hopes that they can find something to do with COVID.        Treatment plan:  ? Target symptoms: anxiety   ? Why chosen therapy is appropriate versus another modality: relevant to diagnosis  ? Outcome monitoring methods: self-report, observation  ? Therapeutic intervention type: insight oriented psychotherapy, supportive psychotherapy, interactive psychotherapy     Risk parameters:  Patient reports no suicidal ideation  Patient reports no homicidal ideation  Patient reports no self-injurious behavior  Patient reports no violent behavior     Verbal deficits: None     Patient's response to intervention:  The patient's response to intervention is accepting, motivated.     Progress toward goals and other mental status changes:  The patient's progress toward goals is fair .     Diagnosis:   Generalized Anxiety Disorder     Plan:  individual psychotherapy and medication management by physician     Return to clinic: as scheduled     Length of Service (minutes): 45       "

## 2020-08-08 ENCOUNTER — HOSPITAL ENCOUNTER (EMERGENCY)
Facility: HOSPITAL | Age: 70
Discharge: HOME | End: 2020-08-08
Attending: EMERGENCY MEDICINE
Payer: MEDICARE

## 2020-08-08 VITALS
HEIGHT: 68 IN | WEIGHT: 159 LBS | OXYGEN SATURATION: 98 % | RESPIRATION RATE: 22 BRPM | BODY MASS INDEX: 24.1 KG/M2 | DIASTOLIC BLOOD PRESSURE: 64 MMHG | HEART RATE: 61 BPM | TEMPERATURE: 98 F | SYSTOLIC BLOOD PRESSURE: 124 MMHG

## 2020-08-08 DIAGNOSIS — R07.9 CHEST PAIN: ICD-10-CM

## 2020-08-08 LAB
ALBUMIN SERPL BCP-MCNC: 4 G/DL (ref 3.5–5.2)
ALP SERPL-CCNC: 64 U/L (ref 55–135)
ALT SERPL W/O P-5'-P-CCNC: 18 U/L (ref 10–44)
ANION GAP SERPL CALC-SCNC: 8 MMOL/L (ref 8–16)
AST SERPL-CCNC: 20 U/L (ref 10–40)
BASOPHILS # BLD AUTO: 0.05 K/UL (ref 0–0.2)
BASOPHILS NFR BLD: 0.7 % (ref 0–1.9)
BILIRUB SERPL-MCNC: 0.6 MG/DL (ref 0.1–1)
BNP SERPL-MCNC: 326 PG/ML (ref 0–99)
BUN SERPL-MCNC: 14 MG/DL (ref 8–23)
CALCIUM SERPL-MCNC: 9.5 MG/DL (ref 8.7–10.5)
CHLORIDE SERPL-SCNC: 109 MMOL/L (ref 95–110)
CO2 SERPL-SCNC: 24 MMOL/L (ref 23–29)
CREAT SERPL-MCNC: 1 MG/DL (ref 0.5–1.4)
DIFFERENTIAL METHOD: ABNORMAL
EOSINOPHIL # BLD AUTO: 0 K/UL (ref 0–0.5)
EOSINOPHIL NFR BLD: 0.6 % (ref 0–8)
ERYTHROCYTE [DISTWIDTH] IN BLOOD BY AUTOMATED COUNT: 14.1 % (ref 11.5–14.5)
EST. GFR  (AFRICAN AMERICAN): >60 ML/MIN/1.73 M^2
EST. GFR  (NON AFRICAN AMERICAN): >60 ML/MIN/1.73 M^2
GLUCOSE SERPL-MCNC: 105 MG/DL (ref 70–110)
HCT VFR BLD AUTO: 45.6 % (ref 40–54)
HCV AB SERPL QL IA: NEGATIVE
HGB BLD-MCNC: 15.4 G/DL (ref 14–18)
IMM GRANULOCYTES # BLD AUTO: 0.02 K/UL (ref 0–0.04)
IMM GRANULOCYTES NFR BLD AUTO: 0.3 % (ref 0–0.5)
LYMPHOCYTES # BLD AUTO: 1.1 K/UL (ref 1–4.8)
LYMPHOCYTES NFR BLD: 15.8 % (ref 18–48)
MCH RBC QN AUTO: 30.4 PG (ref 27–31)
MCHC RBC AUTO-ENTMCNC: 33.8 G/DL (ref 32–36)
MCV RBC AUTO: 90 FL (ref 82–98)
MONOCYTES # BLD AUTO: 0.5 K/UL (ref 0.3–1)
MONOCYTES NFR BLD: 7.4 % (ref 4–15)
NEUTROPHILS # BLD AUTO: 5.2 K/UL (ref 1.8–7.7)
NEUTROPHILS NFR BLD: 75.2 % (ref 38–73)
NRBC BLD-RTO: 0 /100 WBC
PLATELET # BLD AUTO: 160 K/UL (ref 150–350)
PMV BLD AUTO: 9.9 FL (ref 9.2–12.9)
POTASSIUM SERPL-SCNC: 4.3 MMOL/L (ref 3.5–5.1)
PROT SERPL-MCNC: 6.5 G/DL (ref 6–8.4)
RBC # BLD AUTO: 5.06 M/UL (ref 4.6–6.2)
SODIUM SERPL-SCNC: 141 MMOL/L (ref 136–145)
TROPONIN I SERPL DL<=0.01 NG/ML-MCNC: 0.01 NG/ML (ref 0–0.03)
WBC # BLD AUTO: 6.91 K/UL (ref 3.9–12.7)

## 2020-08-08 PROCEDURE — 93010 EKG 12-LEAD: ICD-10-PCS | Mod: ,,, | Performed by: INTERNAL MEDICINE

## 2020-08-08 PROCEDURE — 84484 ASSAY OF TROPONIN QUANT: CPT

## 2020-08-08 PROCEDURE — 93010 ELECTROCARDIOGRAM REPORT: CPT | Mod: ,,, | Performed by: INTERNAL MEDICINE

## 2020-08-08 PROCEDURE — 86803 HEPATITIS C AB TEST: CPT

## 2020-08-08 PROCEDURE — 99284 EMERGENCY DEPT VISIT MOD MDM: CPT | Mod: 25

## 2020-08-08 PROCEDURE — 25000003 PHARM REV CODE 250: Performed by: EMERGENCY MEDICINE

## 2020-08-08 PROCEDURE — 85025 COMPLETE CBC W/AUTO DIFF WBC: CPT

## 2020-08-08 PROCEDURE — 93005 ELECTROCARDIOGRAM TRACING: CPT

## 2020-08-08 PROCEDURE — 80053 COMPREHEN METABOLIC PANEL: CPT

## 2020-08-08 PROCEDURE — 83880 ASSAY OF NATRIURETIC PEPTIDE: CPT

## 2020-08-08 RX ORDER — ASPIRIN 325 MG
325 TABLET ORAL
Status: COMPLETED | OUTPATIENT
Start: 2020-08-08 | End: 2020-08-08

## 2020-08-08 RX ADMIN — ASPIRIN 325 MG: 325 TABLET, FILM COATED ORAL at 11:08

## 2020-08-08 NOTE — ED NOTES
Patient placed on continuous cardiac monitor, automatic blood pressure cuff and continuous pulse oximeter.     Marlin Pollock, Patient Care Assistant  08/08/20 7179

## 2020-08-08 NOTE — ED PROVIDER NOTES
SCRIBE #1 NOTE: I, Naomie Clemente, am scribing for, and in the presence of, Malik Haines MD. I have scribed the entire note.       History     Chief Complaint   Patient presents with    Chest Pain     Review of patient's allergies indicates:   Allergen Reactions    Statins-hmg-coa reductase inhibitors      SEVERE MYALGIAS AND GI SIDE EFFECTS         History of Present Illness     HPI    8/8/2020, 11:34 AM  History obtained from the patient      History of Present Illness: Mekhi Lambert is a 70 y.o. male patient with a h/o arthritis, CAD, who presents to the Emergency Department for evaluation of sharp L-sided CP which onset this morning at 04:30 while sleeping. Pt reports that the sharp pain lasts for a few seconds and then alleviated. He states that he exercises regularly and denies noticing the pain when working out. Symptoms are intermittent and moderate in severity. No mitigating or exacerbating factors reported. No ssociated sxs reported. Patient denies any SOB, HA, light-headedness, palpitations, leg swelling, fever, n/v, and all other sxs at this time. Prior Tx includes a baby aspirin and peptidase C (for concerns about possible heartburn). No further complaints or concerns at this time.       Arrival mode: EMS    PCP: ABIGAIL Lopez Jr, MD      Past Medical History:  Past Medical History:   Diagnosis Date    Arthritis        Past Surgical History:  Past Surgical History:   Procedure Laterality Date    CARDIAC CATHETERIZATION      CORONARY ANGIOGRAPHY INCLUDING BYPASS GRAFTS WITH CATHETERIZATION OF LEFT HEART N/A 3/3/2020    Procedure: ANGIOGRAM, CORONARY, INCLUDING BYPASS GRAFT, WITH LEFT HEART CATHETERIZATION;  Surgeon: Ute Melchor MD;  Location: [facility] CATH LAB;  Service: Cardiology;  Laterality: N/A;    KNEE SURGERY      LEFT HEART CATHETERIZATION Left 2/22/2020    Procedure: CATHETERIZATION, HEART, LEFT;  Surgeon: Ute Melchor MD;  Location: [facility] CATH LAB;  Service: Cardiology;   Laterality: Left;    PERCUTANEOUS TRANSLUMINAL BALLOON ANGIOPLASTY OF CORONARY ARTERY Left 2/22/2020    Procedure: Angioplasty-coronary;  Surgeon: Ute Melchor MD;  Location: [facility] C[facility] LAB;  Service: Cardiology;  Laterality: Left;    SHOULDER ARTHROSCOPY Right     Dr Bella    simple prostatectomy  06/06/2016    robotic assisted partial prostectomy due to BPH    TOTAL KNEE ARTHROPLASTY           Family History:  Family History   Problem Relation Age of Onset    Heart disease Mother         lupus related    Diabetes Mother     Cancer Father         prostate, bone, lung, skin cancers all seperate types    Diabetes Maternal Aunt        Social History:   Social History     Tobacco Use    Smoking status: Never Smoker    Smokeless tobacco: Former User     Types: Chew   Substance and Sexual Activity    Alcohol use: Yes     Alcohol/week: 2.0 standard drinks     Types: 2 Glasses of wine per week     Comment: per week    Drug use: No    Sexual activity: Unknown        Review of Systems     Review of Systems   Constitutional: Negative for fever.   HENT: Negative for sore throat.    Respiratory: Negative for shortness of breath.    Cardiovascular: Positive for chest pain (sharp L-sided). Negative for palpitations and leg swelling.   Gastrointestinal: Negative for nausea and vomiting.   Genitourinary: Negative for dysuria.   Musculoskeletal: Negative for back pain.   Skin: Negative for rash.   Neurological: Negative for weakness, light-headedness and headaches.   Hematological: Does not bruise/bleed easily.   All other systems reviewed and are negative.       Physical Exam     Initial Vitals [08/08/20 1127]   BP Pulse Resp Temp SpO2   137/86 (!) 54 18 98.1 °F (36.7 °C) 98 %      MAP       --          Physical Exam  Nursing Notes and Vital Signs Reviewed.  Constitutional: Well-developed and well-nourished.   Head: Atraumatic. Normocephalic.  Eyes: EOM intact. No scleral icterus.  ENT: Mucous membranes are moist.  "Oropharynx is clear and symmetric.    Neck: Supple. Full ROM. No lymphadenopathy.  Cardiovascular: Bradycardic. Regular rhythm. No murmurs, rubs, or gallops. Distal pulses are 2+ and symmetric.  Pulmonary/Chest: No respiratory distress. Clear to auscultation bilaterally. No wheezing or rales.  Abdominal: Soft and non-distended.  There is no tenderness.  No rebound, guarding, or rigidity. Good bowel sounds.  Genitourinary: No CVA tenderness  Musculoskeletal: Moves all extremities. No obvious deformities. No calf tenderness.  Skin: Warm and dry.  Neurological:  Alert, awake, and appropriate.  Normal speech.  No acute focal neurological deficits are appreciated.  Psychiatric: Normal affect. Good eye contact. Appropriate in content.     ED Course   Procedures  ED Vital Signs:  Vitals:    08/08/20 1127 08/08/20 1149 08/08/20 1200 08/08/20 1300   BP: 137/86  133/86    Pulse: (!) 54  (!) 50 64   Resp: 18  20 20   Temp: 98.1 °F (36.7 °C)      TempSrc: Temporal      SpO2: 98%  97% 96%   Weight:  72.1 kg (159 lb)     Height: 5' 8" (1.727 m)       08/08/20 1301 08/08/20 1302   BP:  124/64   Pulse: 61    Resp:  (!) 22   Temp:     TempSrc:     SpO2:  98%   Weight:     Height:         Abnormal Lab Results:  Labs Reviewed   CBC W/ AUTO DIFFERENTIAL - Abnormal; Notable for the following components:       Result Value    Gran% 75.2 (*)     Lymph% 15.8 (*)     All other components within normal limits   B-TYPE NATRIURETIC PEPTIDE - Abnormal; Notable for the following components:     (*)     All other components within normal limits   HEPATITIS C ANTIBODY   COMPREHENSIVE METABOLIC PANEL   TROPONIN I        All Lab Results:  Results for orders placed or performed during the hospital encounter of 08/08/20   Hepatitis C antibody   Result Value Ref Range    Hepatitis C Ab Negative Negative   CBC auto differential   Result Value Ref Range    WBC 6.91 3.90 - 12.70 K/uL    RBC 5.06 4.60 - 6.20 M/uL    Hemoglobin 15.4 14.0 - 18.0 g/dL "    Hematocrit 45.6 40.0 - 54.0 %    Mean Corpuscular Volume 90 82 - 98 fL    Mean Corpuscular Hemoglobin 30.4 27.0 - 31.0 pg    Mean Corpuscular Hemoglobin Conc 33.8 32.0 - 36.0 g/dL    RDW 14.1 11.5 - 14.5 %    Platelets 160 150 - 350 K/uL    MPV 9.9 9.2 - 12.9 fL    Immature Granulocytes 0.3 0.0 - 0.5 %    Gran # (ANC) 5.2 1.8 - 7.7 K/uL    Immature Grans (Abs) 0.02 0.00 - 0.04 K/uL    Lymph # 1.1 1.0 - 4.8 K/uL    Mono # 0.5 0.3 - 1.0 K/uL    Eos # 0.0 0.0 - 0.5 K/uL    Baso # 0.05 0.00 - 0.20 K/uL    nRBC 0 0 /100 WBC    Gran% 75.2 (H) 38.0 - 73.0 %    Lymph% 15.8 (L) 18.0 - 48.0 %    Mono% 7.4 4.0 - 15.0 %    Eosinophil% 0.6 0.0 - 8.0 %    Basophil% 0.7 0.0 - 1.9 %    Differential Method Automated    Comprehensive metabolic panel   Result Value Ref Range    Sodium 141 136 - 145 mmol/L    Potassium 4.3 3.5 - 5.1 mmol/L    Chloride 109 95 - 110 mmol/L    CO2 24 23 - 29 mmol/L    Glucose 105 70 - 110 mg/dL    BUN, Bld 14 8 - 23 mg/dL    Creatinine 1.0 0.5 - 1.4 mg/dL    Calcium 9.5 8.7 - 10.5 mg/dL    Total Protein 6.5 6.0 - 8.4 g/dL    Albumin 4.0 3.5 - 5.2 g/dL    Total Bilirubin 0.6 0.1 - 1.0 mg/dL    Alkaline Phosphatase 64 55 - 135 U/L    AST 20 10 - 40 U/L    ALT 18 10 - 44 U/L    Anion Gap 8 8 - 16 mmol/L    eGFR if African American >60 >60 mL/min/1.73 m^2    eGFR if non African American >60 >60 mL/min/1.73 m^2   Troponin I #1   Result Value Ref Range    Troponin I 0.007 0.000 - 0.026 ng/mL   B-Type natriuretic peptide (BNP)   Result Value Ref Range     (H) 0 - 99 pg/mL         The EKG was ordered, reviewed, and independently interpreted by the ED provider.  Interpretation time: 11:31  Rate: 50 BPM  Rhythm: sinus bradycardia  Interpretation: Nonspecific T wave abnormality. No STEMI.      The Emergency Provider reviewed the vital signs and test results, which are outlined above.     ED Discussion       1:05 PM: Reassessed pt at this time.  Pt states his condition has improved at this time. Pt states  that his pain is reproducible when he turns to the left. Discussed with pt all pertinent ED information and results. Discussed pt dx and plan of tx. Gave pt all f/u and return to the ED instructions. All questions and concerns were addressed at this time. Pt expresses understanding of information and instructions, and is comfortable with plan to discharge. Pt is stable for discharge.    I discussed with patient and/or family/caretaker that evaluation in the ED does not suggest any emergent or life threatening medical conditions requiring immediate intervention beyond what was provided in the ED, and I believe patient is safe for discharge.  Regardless, an unremarkable evaluation in the ED does not preclude the development or presence of a serious of life threatening condition. As such, patient was instructed to return immediately for any worsening or change in current symptoms.       MDM        Medical Decision Making:   Clinical Tests:   Lab Tests: Ordered and Reviewed  Medical Tests: Ordered and Reviewed           ED Medication(s):  Medications   aspirin tablet 325 mg (325 mg Oral Given 8/8/20 1155)       Discharge Medication List as of 8/8/2020  1:04 PM          Follow-up Information     Francis Melchor MD In 2 days.    Specialty: Cardiology  Contact information:  [address]  [place] [address]  [phone]             [facility]    Specialty: Emergency Medicine  Why: As needed, If symptoms worsen  Contact information:  [address] [facility] [address] [phone]                     Scribe Attestation:   Scribe #1: I performed the above scribed service and the documentation accurately describes the services I performed. I attest to the accuracy of the note.     Attending:   Physician Attestation Statement for Scribe #1: I, Malik Haines MD, personally performed the services described in this documentation, as scribed by Naomie Clemente, in my presence,  and it is both accurate and complete.           Clinical Impression       ICD-10-CM ICD-9-CM   1. Chest pain  R07.9 786.50       Disposition:   Disposition: Discharged  Condition: Stable         Malik Haines MD  08/08/20 0719

## 2020-09-09 ENCOUNTER — OFFICE VISIT (OUTPATIENT)
Dept: PSYCHIATRY | Facility: CLINIC | Age: 70
End: 2020-09-09
Payer: MEDICARE

## 2020-09-09 DIAGNOSIS — F41.1 GENERALIZED ANXIETY DISORDER: Primary | ICD-10-CM

## 2020-09-09 PROCEDURE — 90834 PSYTX W PT 45 MINUTES: CPT | Mod: 95,,, | Performed by: SOCIAL WORKER

## 2020-09-09 PROCEDURE — 90834 PR PSYCHOTHERAPY W/PATIENT, 45 MIN: ICD-10-PCS | Mod: 95,,, | Performed by: SOCIAL WORKER

## 2020-09-09 NOTE — PROGRESS NOTES
Individual Psychotherapy (PhD/LCSW)    9/9/2020    Site:  [facility]         Therapeutic Intervention: Met with patient.  Outpatient - Insight oriented psychotherapy 45 min - CPT code 51844   Virtual visit with synchronous audio and video      Each patient to whom he provides medical services by telemedicine is:  (1) informed of the relationship between the physician and patient and the respective role of any other health care provider with respect to management of the patient; and (2) notified that he or she may decline to receive medical services by telemedicine and may withdraw from such care at any time.      Location:  His home bedroom    Chief complaint/reason for encounter: anxiety     Interval history and content of current session:  Patient presents via video to ongoing individual therapy due to anxiety.  His youngest daughter has advanced classes in science.  She is teaching half of her students remotely.  She feels better about social distancing now.  He youngest daughter is more emotional.  He has been doing well.  He is off of his medication from his stent and his blood pressure.  He did not sleep well for two days straight.  It made him hyper and on edge.  Three weeks ago, he had three glasses of wine.  Two to three hours later, he got into an intense mood episode.  He raised his voice and he had an attacking vocabulary.  He was more aggressive.  His father was an alcoholic.  When the alcohol was going out of his body, he became more angry.  He was a jokester in school.  He has issues when negative comments are made toward him.  Emphasize the need to set boundaries if others are trying to take advantage of him.  Emphasize that he and his wife have very different personalities.  Praise steps to take care of himself physically.  Encourage continuing to pursue his intellectual interests.  His wife has a pattern of saying things, but she will then deny what she has said.  Her wife is the youngest of five.   She was picked on by her older siblings because she was very emotional.  She was able to admit what she said and apologize to him.  When her sister some around, her wife is much more negative.  It is as if she wants to prove something.  However, when she returned from their oldest daugther, she was good for several months.  He gets more stressed because there is a combative situation at home.  If he asks her nicely, she will not agree to do something.  However, if he tells her to do something, she will follow his directions.  Her initial response is often negative.  She plays games on her iPad all day long.  He finds that she has no attention span.  She has difficulty focusing.  He has changed his diet and he is doing better physically.  He is doing a diet close to the Keto diet.  His wife has not read a book in thirty years.  He feels that his wife is intellectually lazy.         Treatment plan:  ? Target symptoms: anxiety   ? Why chosen therapy is appropriate versus another modality: relevant to diagnosis  ? Outcome monitoring methods: self-report, observation  ? Therapeutic intervention type: insight oriented psychotherapy, supportive psychotherapy, interactive psychotherapy     Risk parameters:  Patient reports no suicidal ideation  Patient reports no homicidal ideation  Patient reports no self-injurious behavior  Patient reports no violent behavior     Verbal deficits: None     Patient's response to intervention:  The patient's response to intervention is accepting, motivated.     Progress toward goals and other mental status changes:  The patient's progress toward goals is fair .     Diagnosis:   Generalized Anxiety Disorder     Plan:  individual psychotherapy and medication management by physician     Return to clinic: as scheduled     Length of Service (minutes): 45

## 2020-09-14 ENCOUNTER — TELEPHONE (OUTPATIENT)
Dept: PSYCHIATRY | Facility: CLINIC | Age: 70
End: 2020-09-14

## 2020-09-15 NOTE — TELEPHONE ENCOUNTER
Called pt     Re: lamictal / automated request    He has appt 9-17-20    Left message IF desires refill BEFORE appt to message me (via my chart jass) otherwise will address at appt in 3 days.    Also told him he could call office [phone] if needed     911 or ER if acute concerns.    D [place] DOSS

## 2020-09-17 ENCOUNTER — OFFICE VISIT (OUTPATIENT)
Dept: PSYCHIATRY | Facility: CLINIC | Age: 70
End: 2020-09-17
Payer: MEDICARE

## 2020-09-17 DIAGNOSIS — F39 UNSPECIFIED MOOD (AFFECTIVE) DISORDER: ICD-10-CM

## 2020-09-17 DIAGNOSIS — F41.9 ANXIETY DISORDER, UNSPECIFIED TYPE: ICD-10-CM

## 2020-09-17 DIAGNOSIS — I25.118 CORONARY ARTERY DISEASE OF NATIVE ARTERY OF NATIVE HEART WITH STABLE ANGINA PECTORIS: ICD-10-CM

## 2020-09-17 DIAGNOSIS — G47.00 INSOMNIA, UNSPECIFIED TYPE: Primary | ICD-10-CM

## 2020-09-17 PROCEDURE — 99214 OFFICE O/P EST MOD 30 MIN: CPT | Mod: 95,,, | Performed by: PSYCHIATRY & NEUROLOGY

## 2020-09-17 PROCEDURE — 99214 PR OFFICE/OUTPT VISIT, EST, LEVL IV, 30-39 MIN: ICD-10-PCS | Mod: 95,,, | Performed by: PSYCHIATRY & NEUROLOGY

## 2020-09-17 NOTE — PATIENT INSTRUCTIONS
PLAN:     Follow Up D Post Artemio DOSS for: 12-1-2020 at 1:30p TELEHEALTH    Return to [facility] as arranged     References:     Anxiety &  phobia workbook by ABIGAIL Fong PhD  (web retailers: used: $ 7-10)    Relaxation stress reduction workbook: ELISABETH Nunn PhD ( used: $7-10)    Feeling Good Website: Kayden Mead MD / www.[place] website (free)     [place]: Path to Better Sleep : https://www.veterantraining.[place].gov/insomnia/ (free)       Meds: see after visit summary / stop lamictal IF any rash and tell psnathan DOSS    Pt expressed appreciation for the visit today and did not have further question at this time though pt  was still informed to:     Call  if problems.    Call / Report Side Effects to Psnathan DOSS     Encouraged to follow up with primary care / Gen Med MD for continued monitoring of general health and wellness.    Understanding was expressed; and no further concerns nor questions were raised at this time.     remember healthy self care:   eat right  attempt adequate rest   HANDWASHING / encourage such kosta. During this corona virus time   walk or light exercise within reason and as your general med team approves  read or explore any of reference materials / homework mentioned  reach out (I.e.,  connect with)  others who nuture and bring out best in you  avoid risky behaviors  keep your appointments  IF you  cannot make your appt THEN please call or go online to reschedule.  avoid  alcohol and illicit substances.  Look for the positive.  All is often relative-seek balance  Call sooner if needed : [phone]   Call 911 or go to Emergency Room  (ER)  if any acute concerns

## 2020-09-17 NOTE — PROGRESS NOTES
Timeframe: Corona Virus Outbreak     [place] / Patient reported that his/her location at the time of this visit was in the [facility]     Each patient to whom  medical services are provided by telemedicine is: (1) informed of the relationship between the physician and patient and the respective role of any other health care provider with respect to management of the patient; and (2) notified that he or she may decline to receive medical services by telemedicine and may withdraw from such care at any time.    if crisis: Dial 911 or go to nearest Emergency Room (ER)  If questions related to privacy practices: contact [facility]: [phone]    Mekhi Bear Fausto   1950   07/15/2020     Disclaimer: Evaluation and treatment is based on information presented to date. Any new information may affect assessment and findings.     Who (in attendance) :  Pt himself       S: Patient's Own Perception of Condition (& Side Effects) : no side effects  / no rash / says contiues to have good result with lamictal; says he recently advacned few days ago to the 3rd tab (75 mg)    O:     CURRENT PRESENTATION:     Overall impression: doing ok; seems a bit more 'mellow ' since initial session; was never overly brash in interaction with me; tho does sound relaxed; only recently moved from 1 tab of lamictal 25 mg to 50 mg; says does seem to think as well feeling bit more calm    I did emphasize his working on CBT with Mendez WILSON; he has history doing some self hypnosis for sleep and that did help him in past (20 yrs ago); I did shoe him the [employer] Path to better sleep web site as well as I reviewed relaxation stress mngt book and Feeling good.com website; says he will investigate such.     Any New Medical / Allergies Status: none new     Mood: good  / more relaxed     Safety: no SI / no HI     Supports: wife of 49 yrs / and daughter     Work /  School : retired       Medication Adjustments / Renewals:   Discussed moving to 100 mg vs more gradual advance from 50 mg to 75 mg of lamictal ; mutually agree to the more gradual ramp up to 75 mg: I will renew at THREE tabs of 75 mg Lamictal / stop If any rash       Counselor/ Coping Skills / Counseling work zoltan Lopez LCSW    Constitutional Health Concerns:       Review of Systems   Constitutional: Negative.    HENT: Negative.    Eyes: Negative.    Respiratory: Negative.    Cardiovascular: Negative.    Gastrointestinal: Negative.    Genitourinary: Negative.    Musculoskeletal: Negative.    Skin: Negative.    Neurological: Negative.    Endo/Heme/Allergies: Negative.         Musculoskeletal: no tremor / no stiffness     Laboratory Data  Lab Visit on 06/22/2020   Component Date Value Ref Range Status    Cholesterol 06/22/2020 272* 120 - 199 mg/dL Final    Triglycerides 06/22/2020 92  30 - 150 mg/dL Final    HDL 06/22/2020 73  40 - 75 mg/dL Final    LDL Cholesterol 06/22/2020 180.6* 63.0 - 159.0 mg/dL Final    Hdl/Cholesterol Ratio 06/22/2020 26.8  20.0 - 50.0 % Final    Total Cholesterol/HDL Ratio 06/22/2020 3.7  2.0 - 5.0 Final    Non-HDL Cholesterol 06/22/2020 199  mg/dL Final    Sodium 06/22/2020 140  136 - 145 mmol/L Final    Potassium 06/22/2020 4.6  3.5 - 5.1 mmol/L Final    Chloride 06/22/2020 105  95 - 110 mmol/L Final    CO2 06/22/2020 27  23 - 29 mmol/L Final    Glucose 06/22/2020 92  70 - 110 mg/dL Final    BUN, Bld 06/22/2020 13  8 - 23 mg/dL Final    Creatinine 06/22/2020 1.1  0.5 - 1.4 mg/dL Final    Calcium 06/22/2020 9.7  8.7 - 10.5 mg/dL Final    Anion Gap 06/22/2020 8  8 - 16 mmol/L Final    eGFR if African American 06/22/2020 >60  >60 mL/min/1.73 m^2 Final    eGFR if non African American 06/22/2020 >60  >60 mL/min/1.73 m^2 Final       Patient Active Problem List   Diagnosis    Prostate hypertrophy    History of total right knee replacement    Skin cancer     Hypercholesterolemia    Family history of melanoma    NSTEMI (non-ST elevated myocardial infarction)    Statin intolerance    Coronary artery disease of native artery of native heart with stable angina pectoris    H/O heart artery stent    CAD, multiple vessel    Mood Disorder Unspecified (intermittent verbal irritability)          Current Outpatient Medications:     aspirin (ECOTRIN) 81 MG EC tablet, Take 1 tablet (81 mg total) by mouth once daily., Disp: 90 tablet, Rfl: 3    co-enzyme Q-10 30 mg capsule, Take 100 mg by mouth 3 (three) times daily., Disp: , Rfl:     ezetimibe (ZETIA) 10 mg tablet, Take 1 tablet (10 mg total) by mouth once daily. (Patient not taking: Reported on 3/10/2020), Disp: 30 tablet, Rfl: 11    lamoTRIgine (LAMICTAL) 25 MG tablet, THREE tabs by mouth at bed. Note: STOP ALL Lamictal IF any RASH and tell Psyc MD, Disp: 75 tablet, Rfl: 1    methocarbamol (ROBAXIN) 750 MG Tab, Take 1 tablet (750 mg total) by mouth 3 (three) times daily as needed. (Patient not taking: Reported on 3/10/2020), Disp: 30 tablet, Rfl: 0    metoprolol tartrate (LOPRESSOR) 25 MG tablet, Take 1 tablet (25 mg total) by mouth 2 (two) times daily. (Patient taking differently: Take 12.5 mg by mouth 2 (two) times daily. ), Disp: 60 tablet, Rfl: 11    mometasone 0.1% (ELOCON) 0.1 % cream, AAA bid prn for psoriasis. Strong steroid.  Do not use on face, underarms or groin., Disp: 50 g, Rfl: 1    multivitamin capsule, Take 1 capsule by mouth once daily., Disp: , Rfl:     ticagrelor (BRILINTA) 90 mg tablet, Take 1 tablet (90 mg total) by mouth 2 (two) times daily., Disp: 60 tablet, Rfl: 11    triamcinolone acetonide 0.025% (KENALOG) 0.025 % cream, Apply topically 2 (two) times daily as needed. Mild steroid.  Safe for face up to 2 weeks then stop., Disp: 30 g, Rfl: 1     Social History     Tobacco Use   Smoking Status Never Smoker   Smokeless Tobacco Former User    Types: Chew        Review of patient's allergies  indicates:   Allergen Reactions    Statins-hmg-coa reductase inhibitors      SEVERE MYALGIAS AND GI SIDE EFFECTS        Mental Status Exam:   Appearance: casual   Oriented: x 3   Attitude: cooperative   Eye Contact: good   Behavior: calm here   Mood: Ok   Cognition: alert   Concentration: grossly intact   Affect: appropriate range   Anxiety: mild  Thought Process: goal directed   Speech: Volume : WNL   Quantity WNL   Quality: appears to openly answer questions   Eye Contact: good   Threats: no SI / HI   Memory: intact (as relay information across time spans)   Psychosis: denies all   Estimate of Intellectual Function: upper  average       ASSESSMENT:   No diagnosis found.    PLAN:     Please call  [facility] at [phone] and  arrange your FOLLOW UP TELEHEALTH (video)  appointment with:    D Post Artemio DOSS for: July 14 or July 16    Return to Clinic Mendez Lopez LCSW as arranged     References:     Anxiety &  phobia workbook by ABIGAIL Fong PhD  (web retailers: used: $ 7-10)    Relaxation stress reduction workbook: ELISABETH Nunn PhD ( used: $7-10)    Feeling Good Website: Kayden Mead MD / www.[employer] website (free)     [place]: Path to Better Sleep : https://www.veterantraining.[place].gov/insomnia/ (free)     Meds: see after visit summary / stop lamictal IF any rash and tell artemio DOSS    Pt expressed appreciation for the visit today and did not have further question at this time though pt  was still informed to:     Call  if problems.    Call / Report Side Effects to Artemio DOSS

## 2020-09-21 RX ORDER — LAMOTRIGINE 25 MG/1
TABLET ORAL
Qty: 75 TABLET | Refills: 2 | Status: SHIPPED | OUTPATIENT
Start: 2020-09-21 | End: 2020-12-01

## 2020-11-10 ENCOUNTER — LAB VISIT (OUTPATIENT)
Dept: LAB | Facility: HOSPITAL | Age: 70
End: 2020-11-10
Attending: UROLOGY
Payer: MEDICARE

## 2020-11-10 DIAGNOSIS — Z12.5 PROSTATE CANCER SCREENING: ICD-10-CM

## 2020-11-10 LAB — COMPLEXED PSA SERPL-MCNC: 1.1 NG/ML (ref 0–4)

## 2020-11-10 PROCEDURE — 36415 COLL VENOUS BLD VENIPUNCTURE: CPT

## 2020-11-10 PROCEDURE — 84153 ASSAY OF PSA TOTAL: CPT

## 2020-11-30 ENCOUNTER — EXTERNAL CHRONIC CARE MANAGEMENT (OUTPATIENT)
Dept: PRIMARY CARE CLINIC | Facility: CLINIC | Age: 70
End: 2020-11-30
Payer: MEDICARE

## 2020-11-30 PROCEDURE — 99490 PR CHRONIC CARE MGMT, 1ST 20 MIN: ICD-10-PCS | Mod: S$PBB,,, | Performed by: PEDIATRICS

## 2020-11-30 PROCEDURE — 99490 CHRNC CARE MGMT STAFF 1ST 20: CPT | Mod: S$PBB,,, | Performed by: PEDIATRICS

## 2020-11-30 PROCEDURE — 99490 CHRNC CARE MGMT STAFF 1ST 20: CPT | Mod: PBBFAC | Performed by: PEDIATRICS

## 2020-12-01 ENCOUNTER — OFFICE VISIT (OUTPATIENT)
Dept: PSYCHIATRY | Facility: CLINIC | Age: 70
End: 2020-12-01
Payer: MEDICARE

## 2020-12-01 DIAGNOSIS — F41.1 GENERALIZED ANXIETY DISORDER: ICD-10-CM

## 2020-12-01 DIAGNOSIS — G47.00 INSOMNIA, UNSPECIFIED TYPE: ICD-10-CM

## 2020-12-01 DIAGNOSIS — F39 UNSPECIFIED MOOD (AFFECTIVE) DISORDER: Primary | ICD-10-CM

## 2020-12-01 DIAGNOSIS — I25.118 CORONARY ARTERY DISEASE OF NATIVE ARTERY OF NATIVE HEART WITH STABLE ANGINA PECTORIS: ICD-10-CM

## 2020-12-01 PROCEDURE — 99214 PR OFFICE/OUTPT VISIT, EST, LEVL IV, 30-39 MIN: ICD-10-PCS | Mod: 95,,, | Performed by: PSYCHIATRY & NEUROLOGY

## 2020-12-01 PROCEDURE — 99214 OFFICE O/P EST MOD 30 MIN: CPT | Mod: 95,,, | Performed by: PSYCHIATRY & NEUROLOGY

## 2020-12-01 RX ORDER — LAMOTRIGINE 100 MG/1
100 TABLET ORAL DAILY
Qty: 30 TABLET | Refills: 2 | Status: SHIPPED | OUTPATIENT
Start: 2020-12-01 | End: 2021-02-01 | Stop reason: SDUPTHER

## 2020-12-01 NOTE — PROGRESS NOTES
Timeframe: Corona Virus Outbreak     [place] / Patient reported that his/her location at the time of this visit was in the [facility]     Each patient to whom  medical services are provided by telemedicine is: (1) informed of the relationship between the physician and patient and the respective role of any other health care provider with respect to management of the patient; and (2) notified that he or she may decline to receive medical services by telemedicine and may withdraw from such care at any time.    if crisis: Dial 911 or go to nearest Emergency Room (ER)  If questions related to privacy practices: contact [facility]: [phone]    Mekhi Bear Fausto   1950   07/15/2020     Disclaimer: Evaluation and treatment is based on information presented to date. Any new information may affect assessment and findings.     Who (in attendance) :  Pt himself     S: Patient's Own Perception of Condition (& Side Effects) : side effects  / no rash / says contiues to have good result with lamictal; says he recently advacned few days ago to the 3rd tab (75 mg)    O:     CURRENT PRESENTATION:     Says he thinks not going to need to see  going forward    Says he discussed marital stress with Mendez WILSON     Saying that he was more assertive with wife ; challenging her     Ex; he details scenario telling wife where he wanted to plant  [place] plant ; she challenged where he wanted to plant; he told her the rationale of such; says based on where Sun omes up; he takes her outside and shows her     Overall impression: doing ok; seems a bit more 'mellow ' since initial session; was never overly brash in interaction with me; tho does sound relaxed; only recently moved from 1 tab of lamictal 25 mg to 50 mg; says does seem to think as well feeling bit more calm    I did emphasize his working on CBT with Mendez WILSON; he has  history doing some self hypnosis for sleep and that did help him in past (20 yrs ago); I did shoe him the [employer] Path to better sleep web site as well as I reviewed relaxation stress mngt book and Feeling good.[employer] website; says he will investigate such.     Any New Medical / Allergies Status: none new     Mood: good  / more relaxed     Safety: no SI / no HI     Supports: wife of 49 yrs / and daughter     Work / School : retired       Medication Adjustments / Renewals:   Discussed moving to 100 mg vs more gradual advance from 50 mg to 75 mg of lamictal ; mutually agree to the more gradual ramp up to 75 mg: I will renew at THREE tabs of 75 mg Lamictal / stop If any rash       Counselor/ Coping Skills / Counseling work zoltan Lopez LCSW    Constitutional Health Concerns:       Review of Systems   Constitutional: Negative.    HENT: Negative.    Eyes: Negative.    Respiratory: Negative.    Cardiovascular: Negative.    Gastrointestinal: Negative.    Genitourinary: Negative.    Musculoskeletal: Negative.    Skin: Negative.    Neurological: Negative.    Endo/Heme/Allergies: Negative.         Musculoskeletal: no tremor / no stiffness     Laboratory Data  Lab Visit on 06/22/2020   Component Date Value Ref Range Status    Cholesterol 06/22/2020 272* 120 - 199 mg/dL Final    Triglycerides 06/22/2020 92  30 - 150 mg/dL Final    HDL 06/22/2020 73  40 - 75 mg/dL Final    LDL Cholesterol 06/22/2020 180.6* 63.0 - 159.0 mg/dL Final    Hdl/Cholesterol Ratio 06/22/2020 26.8  20.0 - 50.0 % Final    Total Cholesterol/HDL Ratio 06/22/2020 3.7  2.0 - 5.0 Final    Non-HDL Cholesterol 06/22/2020 199  mg/dL Final    Sodium 06/22/2020 140  136 - 145 mmol/L Final    Potassium 06/22/2020 4.6  3.5 - 5.1 mmol/L Final    Chloride 06/22/2020 105  95 - 110 mmol/L Final    CO2 06/22/2020 27  23 - 29 mmol/L Final    Glucose 06/22/2020 92  70 - 110 mg/dL Final    BUN, Bld 06/22/2020 13  8 - 23 mg/dL Final    Creatinine  06/22/2020 1.1  0.5 - 1.4 mg/dL Final    Calcium 06/22/2020 9.7  8.7 - 10.5 mg/dL Final    Anion Gap 06/22/2020 8  8 - 16 mmol/L Final    eGFR if African American 06/22/2020 >60  >60 mL/min/1.73 m^2 Final    eGFR if non African American 06/22/2020 >60  >60 mL/min/1.73 m^2 Final       Patient Active Problem List   Diagnosis    Prostate hypertrophy    History of total right knee replacement    Skin cancer    Hypercholesterolemia    Family history of melanoma    NSTEMI (non-ST elevated myocardial infarction)    Statin intolerance    Coronary artery disease of native artery of native heart with stable angina pectoris    H/O heart artery stent    CAD, multiple vessel    Mood Disorder Unspecified (intermittent verbal irritability)          Current Outpatient Medications:     aspirin (ECOTRIN) 81 MG EC tablet, Take 1 tablet (81 mg total) by mouth once daily., Disp: 90 tablet, Rfl: 3    co-enzyme Q-10 30 mg capsule, Take 100 mg by mouth 3 (three) times daily., Disp: , Rfl:     ezetimibe (ZETIA) 10 mg tablet, Take 1 tablet (10 mg total) by mouth once daily. (Patient not taking: Reported on 3/10/2020), Disp: 30 tablet, Rfl: 11    lamoTRIgine (LAMICTAL) 25 MG tablet, THREE tabs by mouth at bed. Note: STOP ALL Lamictal IF any RASH and tell Psyc MD, Disp: 75 tablet, Rfl: 1    methocarbamol (ROBAXIN) 750 MG Tab, Take 1 tablet (750 mg total) by mouth 3 (three) times daily as needed. (Patient not taking: Reported on 3/10/2020), Disp: 30 tablet, Rfl: 0    metoprolol tartrate (LOPRESSOR) 25 MG tablet, Take 1 tablet (25 mg total) by mouth 2 (two) times daily. (Patient taking differently: Take 12.5 mg by mouth 2 (two) times daily. ), Disp: 60 tablet, Rfl: 11    mometasone 0.1% (ELOCON) 0.1 % cream, AAA bid prn for psoriasis. Strong steroid.  Do not use on face, underarms or groin., Disp: 50 g, Rfl: 1    multivitamin capsule, Take 1 capsule by mouth once daily., Disp: , Rfl:     ticagrelor (BRILINTA) 90 mg  tablet, Take 1 tablet (90 mg total) by mouth 2 (two) times daily., Disp: 60 tablet, Rfl: 11    triamcinolone acetonide 0.025% (KENALOG) 0.025 % cream, Apply topically 2 (two) times daily as needed. Mild steroid.  Safe for face up to 2 weeks then stop., Disp: 30 g, Rfl: 1     Social History     Tobacco Use   Smoking Status Never Smoker   Smokeless Tobacco Former User    Types: Chew        Review of patient's allergies indicates:   Allergen Reactions    Statins-hmg-coa reductase inhibitors      SEVERE MYALGIAS AND GI SIDE EFFECTS        Mental Status Exam:   Appearance: casual   Oriented: x 3   Attitude: cooperative   Eye Contact: good   Behavior: calm here   Mood: Ok   Cognition: alert   Concentration: grossly intact   Affect: appropriate range   Anxiety: mild  Thought Process: goal directed   Speech: Volume : WNL   Quantity WNL   Quality: appears to openly answer questions   Eye Contact: good   Threats: no SI / HI   Memory: intact (as relay information across time spans)   Psychosis: denies all   Estimate of Intellectual Function: upper  average       ASSESSMENT:   Encounter Diagnoses   Name Primary?    Mood Disorder Unspecified (intermittent verbal irritability) Yes    Generalized anxiety disorder     Coronary artery disease of native artery of native heart with stable angina pectoris     Insomnia, unspecified (mild intermittent)           PLAN:     Please call  [facility] at [phone] and  arrange your FOLLOW UP TELEHEALTH (video)  appointment with:    ADITI Ulloa MD for: Feb 1st  or 2nd 2021    Return to Clinic Mendez Lopez LCSW as arranged     Medication: lamictal renewed : at 100 mg (up from 75 mg) per his request; informed to Stop If any rasj and tell vinicio DOSS    References:     Anxiety &  phobia workbook by ABIGAIL Fong PhD  (web retailers: used: $ 7-10)    Relaxation stress reduction workbook: ELISABETH Nunn PhD ( used: $7-10)    Feeling Good Website: Kayden Mead MD /  www.feelinggood.com website (free)     [place]: Path to Better Sleep : https://www.veterantraining.[place].gov/insomnia/ (free)     Meds: see after visit summary / stop lamictal IF any rash and tell artemio DOSS    Pt expressed appreciation for the visit today and did not have further question at this time though pt  was still informed to:     Call  if problems.    Call / Report Side Effects to Artemio DOSS

## 2020-12-02 ENCOUNTER — PATIENT MESSAGE (OUTPATIENT)
Dept: ADMINISTRATIVE | Facility: HOSPITAL | Age: 70
End: 2020-12-02

## 2020-12-03 NOTE — PATIENT INSTRUCTIONS
PLAN:     Please call  [facility] at [phone] and  arrange your FOLLOW UP TELEHEALTH (video)  appointment with:    ADITI Ulloa MD for: Feb 1st  or 2nd 2021    Return to Clinic Mendez Lopez LCSW as arranged     Medication: lamictal renewed : at 100 mg (up from 75 mg) per his request; informed to Stop If any rasj and tell artemio DOSS    References:     Anxiety &  phobia workbook by ABIGAIL Fong PhD  (web retailers: used: $ 7-10)    Relaxation stress reduction workbook: ELISABETH Nunn PhD ( used: $7-10)    Feeling Good Website: Kaydne Mead MD / www.[email or web address] website (free)     [place]: Path to Better Sleep : https://www.veterantraining.[place].gov/insomnia/ (free)     Meds: see after visit summary / stop lamictal IF any rash and tell artemio DOSS    Pt expressed appreciation for the visit today and did not have further question at this time though pt  was still informed to:     Call  if problems.    Call / Report Side Effects to Artemio DOSS

## 2020-12-09 ENCOUNTER — OFFICE VISIT (OUTPATIENT)
Dept: DERMATOLOGY | Facility: CLINIC | Age: 70
End: 2020-12-09
Payer: MEDICARE

## 2020-12-09 ENCOUNTER — PATIENT OUTREACH (OUTPATIENT)
Dept: ADMINISTRATIVE | Facility: OTHER | Age: 70
End: 2020-12-09

## 2020-12-09 DIAGNOSIS — Z85.828 HISTORY OF SKIN CANCER: ICD-10-CM

## 2020-12-09 DIAGNOSIS — L90.5 SCAR CONDITIONS/SKIN FIBROSIS: Primary | ICD-10-CM

## 2020-12-09 DIAGNOSIS — L82.1 SEBORRHEIC KERATOSIS: ICD-10-CM

## 2020-12-09 DIAGNOSIS — Z12.83 SCREENING, MALIGNANT NEOPLASM, SKIN: ICD-10-CM

## 2020-12-09 PROCEDURE — 99213 OFFICE O/P EST LOW 20 MIN: CPT | Mod: PBBFAC | Performed by: DERMATOLOGY

## 2020-12-09 PROCEDURE — 99999 PR PBB SHADOW E&M-EST. PATIENT-LVL III: ICD-10-PCS | Mod: PBBFAC,,, | Performed by: DERMATOLOGY

## 2020-12-09 PROCEDURE — 99999 PR PBB SHADOW E&M-EST. PATIENT-LVL III: CPT | Mod: PBBFAC,,, | Performed by: DERMATOLOGY

## 2020-12-09 PROCEDURE — 99213 PR OFFICE/OUTPT VISIT, EST, LEVL III, 20-29 MIN: ICD-10-PCS | Mod: S$PBB,,, | Performed by: DERMATOLOGY

## 2020-12-09 PROCEDURE — 99213 OFFICE O/P EST LOW 20 MIN: CPT | Mod: S$PBB,,, | Performed by: DERMATOLOGY

## 2020-12-09 RX ORDER — A/SINGAPORE/GP1908/2015 IVR-180 (AN A/MICHIGAN/45/2015 (H1N1)PDM09-LIKE VIRUS, A/HONG KONG/4801/2014, NYMC X-263B (H3N2) (AN A/HONG KONG/4801/2014-LIKE VIRUS), AND B/BRISBANE/60/2008, WILD TYPE (A B/BRISBANE/60/2008-LIKE VIRUS) 15; 15; 15 UG/.5ML; UG/.5ML; UG/.5ML
INJECTION, SUSPENSION INTRAMUSCULAR
Status: ON HOLD | COMMUNITY
Start: 2020-09-14 | End: 2021-04-24 | Stop reason: HOSPADM

## 2020-12-09 NOTE — PATIENT INSTRUCTIONS
Preventing Skin Cancer  Relaxing in the sun may feel good. But it isnt good for your skin. In fact, being exposed to the suns harmful rays is a major cause of skin cancer. This is a serious disease that can be life-threatening. People of all ages and backgrounds are at risk. But in most cases, skin cancer can be prevented.    Your Role in Prevention  You can act today to help prevent skin cancer. Start by avoiding the suns UV (ultraviolet) rays. And dont use tanning beds, which are no safer than the sun. Taking these steps can help keep you from getting skin cancer. It can also help prevent wrinkles and other sun-induced aging effects. Make sure your children also follow these safeguards. Now is the time to start taking preventive steps against skin cancer.  When You Are Outdoors  Protect your skin when you go outdoors during the day. Take precautions whenever you go out to eat, run errands by car or on foot, or do any outdoor activity. There isnt just one easy way to protect your skin. Its best to follow all of these steps:  · Wear tightly woven clothing that covers your skin. Put on a wide-brimmed hat to protect your face, ears, and scalp.  · Watch the clock. Try to avoid the sun between 10 a.m. and 4 p.m., when it is strongest.  · Head for the shade or create your own. Use an umbrella when sitting or strolling.  · Know that the suns rays can reflect off sand, water, and snow. This can harm your skin. Take extra care when you are near reflective surfaces.  · Keep in mind that even when the weather is hazy or cloudy, your skin can be exposed to strong UV rays.  · Shield your skin with sunscreen. Also, apply sunscreen to your childrens skin.  Tips for Using Sunscreen  To help prevent skin cancer, choose the right sunscreen and use it correctly. Try the following tips:  · Choose a sunscreen that has a sun protection factor (SPF) of at least 15. For the best protection, an SPF of at least 30 is preferred.  Also, choose a sunscreen labeled broad spectrum. This will shield you from both UVA and UVB (ultraviolet A and B) rays.  · If one brand irritates your skin, try another, particularly ones without fragrance.  · Use a water-resistant sunscreen if swimming or sweating.  · Reapply sunscreen every 2 hours. If youre active, do this more often.  · Cover any sun-exposed skin, from your face to your feet. Dont forget your ears and your lips.  · Know that while sunscreen helps protect you, it isnt enough. You should also wear protective clothing. And try to stay out of the sun as much as you can, especially from 10 a.m. to 4 p.m.  © 1677-7248 [employer]. [address] [place] [address], [place] [address]. All rights reserved. This information is not intended as a substitute for professional medical care. Always follow your healthcare professional's instructions.

## 2020-12-09 NOTE — PROGRESS NOTES
Subjective:       Patient ID:  Mekhi Lambert is a 70 y.o. male who presents for   Chief Complaint   Patient presents with    Annual Exam     skin check     Hx of NMSC of the right upper back, family hx of melanoma (father), psoriasis, last seen on 6/10/19. He c/o lesions of the bilateral arms. No tx tried.  This is a high risk patient here to check for the development of new lesions.       Review of Systems   Constitutional: Negative for fever and chills.   Gastrointestinal: Negative for nausea and vomiting.   Skin: Positive for activity-related sunscreen use. Negative for daily sunscreen use and recent sunburn.   Hematologic/Lymphatic: Does not bruise/bleed easily.        Objective:    Physical Exam   Constitutional: He appears well-developed and well-nourished. No distress.   Neurological: He is alert and oriented to person, place, and time. He is not disoriented.   Psychiatric: He has a normal mood and affect.   Skin:   Areas Examined (abnormalities noted in diagram):   Scalp / Hair Palpated and Inspected  Head / Face Inspection Performed  Neck Inspection Performed  Chest / Axilla Inspection Performed  Abdomen Inspection Performed  Back Inspection Performed  RUE Inspected  LUE Inspection Performed  RLE Inspected  LLE Inspection Performed  Nails and Digits Inspection Performed                   Diagram Legend     Erythematous scaling macule/papule c/w actinic keratosis       Vascular papule c/w angioma      Pigmented verrucoid papule/plaque c/w seborrheic keratosis      Yellow umbilicated papule c/w sebaceous hyperplasia      Irregularly shaped tan macule c/w lentigo     1-2 mm smooth white papules consistent with Milia      Movable subcutaneous cyst with punctum c/w epidermal inclusion cyst      Subcutaneous movable cyst c/w pilar cyst      Firm pink to brown papule c/w dermatofibroma      Pedunculated fleshy papule(s) c/w skin tag(s)      Evenly pigmented macule c/w junctional nevus     Mildly variegated  pigmented, slightly irregular-bordered macule c/w mildly atypical nevus      Flesh colored to evenly pigmented papule c/w intradermal nevus       Pink pearly papule/plaque c/w basal cell carcinoma      Erythematous hyperkeratotic cursted plaque c/w SCC      Surgical scar with no sign of skin cancer recurrence      Open and closed comedones      Inflammatory papules and pustules      Verrucoid papule consistent consistent with wart     Erythematous eczematous patches and plaques     Dystrophic onycholytic nail with subungual debris c/w onychomycosis     Umbilicated papule    Erythematous-base heme-crusted tan verrucoid plaque consistent with inflamed seborrheic keratosis     Erythematous Silvery Scaling Plaque c/w Psoriasis     See annotation      Assessment / Plan:        Scar conditions/skin fibrosis  Screening, malignant neoplasm, skin  History of skin cancer  Scar of the right ear and right upper back, hx of NMSC.  No evidence of recurrence on physical exam today.  Continue routine skin surveillance. Daily sunscreen advised.    Seborrheic keratosis  Reassurance given. Discussed diagnosis and that lesions are benign.  AAD handout given.              Follow up in about 1 year (around 12/9/2021).

## 2020-12-10 ENCOUNTER — OFFICE VISIT (OUTPATIENT)
Dept: PSYCHIATRY | Facility: CLINIC | Age: 70
End: 2020-12-10
Payer: MEDICARE

## 2020-12-10 DIAGNOSIS — F41.1 GENERALIZED ANXIETY DISORDER: Primary | ICD-10-CM

## 2020-12-10 PROCEDURE — 90834 PSYTX W PT 45 MINUTES: CPT | Mod: 95,,, | Performed by: SOCIAL WORKER

## 2020-12-10 PROCEDURE — 90834 PR PSYCHOTHERAPY W/PATIENT, 45 MIN: ICD-10-PCS | Mod: 95,,, | Performed by: SOCIAL WORKER

## 2020-12-31 ENCOUNTER — EXTERNAL CHRONIC CARE MANAGEMENT (OUTPATIENT)
Dept: PRIMARY CARE CLINIC | Facility: CLINIC | Age: 70
End: 2020-12-31
Payer: MEDICARE

## 2020-12-31 PROCEDURE — 99490 CHRNC CARE MGMT STAFF 1ST 20: CPT | Mod: S$PBB,,, | Performed by: PEDIATRICS

## 2020-12-31 PROCEDURE — 99490 CHRNC CARE MGMT STAFF 1ST 20: CPT | Mod: PBBFAC | Performed by: PEDIATRICS

## 2020-12-31 PROCEDURE — 99490 PR CHRONIC CARE MGMT, 1ST 20 MIN: ICD-10-PCS | Mod: S$PBB,,, | Performed by: PEDIATRICS

## 2021-01-08 ENCOUNTER — IMMUNIZATION (OUTPATIENT)
Dept: INTERNAL MEDICINE | Facility: CLINIC | Age: 71
End: 2021-01-08
Payer: MEDICARE

## 2021-01-08 DIAGNOSIS — Z23 NEED FOR VACCINATION: ICD-10-CM

## 2021-01-08 PROCEDURE — 91300 COVID-19, MRNA, LNP-S, PF, 30 MCG/0.3 ML DOSE VACCINE: CPT | Mod: PBBFAC | Performed by: FAMILY MEDICINE

## 2021-01-29 ENCOUNTER — IMMUNIZATION (OUTPATIENT)
Dept: INTERNAL MEDICINE | Facility: CLINIC | Age: 71
End: 2021-01-29
Payer: MEDICARE

## 2021-01-29 DIAGNOSIS — Z23 NEED FOR VACCINATION: Primary | ICD-10-CM

## 2021-01-29 PROCEDURE — 0002A COVID-19, MRNA, LNP-S, PF, 30 MCG/0.3 ML DOSE VACCINE: CPT | Mod: PBBFAC

## 2021-01-29 PROCEDURE — 91300 COVID-19, MRNA, LNP-S, PF, 30 MCG/0.3 ML DOSE VACCINE: CPT | Mod: PBBFAC

## 2021-01-31 ENCOUNTER — EXTERNAL CHRONIC CARE MANAGEMENT (OUTPATIENT)
Dept: PRIMARY CARE CLINIC | Facility: CLINIC | Age: 71
End: 2021-01-31
Payer: MEDICARE

## 2021-01-31 PROCEDURE — 99490 CHRNC CARE MGMT STAFF 1ST 20: CPT | Mod: PBBFAC | Performed by: PEDIATRICS

## 2021-01-31 PROCEDURE — 99490 CHRNC CARE MGMT STAFF 1ST 20: CPT | Mod: S$PBB,,, | Performed by: PEDIATRICS

## 2021-01-31 PROCEDURE — 99490 PR CHRONIC CARE MGMT, 1ST 20 MIN: ICD-10-PCS | Mod: S$PBB,,, | Performed by: PEDIATRICS

## 2021-02-01 ENCOUNTER — OFFICE VISIT (OUTPATIENT)
Dept: PSYCHIATRY | Facility: CLINIC | Age: 71
End: 2021-02-01
Payer: MEDICARE

## 2021-02-01 DIAGNOSIS — I25.118 CORONARY ARTERY DISEASE OF NATIVE ARTERY OF NATIVE HEART WITH STABLE ANGINA PECTORIS: ICD-10-CM

## 2021-02-01 DIAGNOSIS — F39 UNSPECIFIED MOOD (AFFECTIVE) DISORDER: ICD-10-CM

## 2021-02-01 DIAGNOSIS — G47.00 INSOMNIA, UNSPECIFIED TYPE: ICD-10-CM

## 2021-02-01 DIAGNOSIS — F41.1 GENERALIZED ANXIETY DISORDER: Primary | ICD-10-CM

## 2021-02-01 DIAGNOSIS — F41.9 ANXIETY DISORDER, UNSPECIFIED TYPE: ICD-10-CM

## 2021-02-01 PROCEDURE — 99214 PR OFFICE/OUTPT VISIT, EST, LEVL IV, 30-39 MIN: ICD-10-PCS | Mod: 95,,, | Performed by: PSYCHIATRY & NEUROLOGY

## 2021-02-01 PROCEDURE — 99214 OFFICE O/P EST MOD 30 MIN: CPT | Mod: 95,,, | Performed by: PSYCHIATRY & NEUROLOGY

## 2021-02-01 RX ORDER — LAMOTRIGINE 100 MG/1
100 TABLET ORAL DAILY
Qty: 30 TABLET | Refills: 2 | Status: SHIPPED | OUTPATIENT
Start: 2021-02-01 | End: 2021-05-28 | Stop reason: SDUPTHER

## 2021-02-28 ENCOUNTER — EXTERNAL CHRONIC CARE MANAGEMENT (OUTPATIENT)
Dept: PRIMARY CARE CLINIC | Facility: CLINIC | Age: 71
End: 2021-02-28
Payer: MEDICARE

## 2021-02-28 PROCEDURE — 99490 CHRNC CARE MGMT STAFF 1ST 20: CPT | Mod: S$PBB,,, | Performed by: PEDIATRICS

## 2021-02-28 PROCEDURE — 99490 PR CHRONIC CARE MGMT, 1ST 20 MIN: ICD-10-PCS | Mod: S$PBB,,, | Performed by: PEDIATRICS

## 2021-02-28 PROCEDURE — 99490 CHRNC CARE MGMT STAFF 1ST 20: CPT | Mod: PBBFAC | Performed by: PEDIATRICS

## 2021-03-04 ENCOUNTER — OFFICE VISIT (OUTPATIENT)
Dept: DERMATOLOGY | Facility: CLINIC | Age: 71
End: 2021-03-04
Payer: MEDICARE

## 2021-03-04 ENCOUNTER — HOSPITAL ENCOUNTER (EMERGENCY)
Facility: HOSPITAL | Age: 71
Discharge: HOME | End: 2021-03-04
Attending: EMERGENCY MEDICINE
Payer: MEDICARE

## 2021-03-04 VITALS
HEART RATE: 79 BPM | SYSTOLIC BLOOD PRESSURE: 133 MMHG | WEIGHT: 164.25 LBS | TEMPERATURE: 98 F | OXYGEN SATURATION: 98 % | DIASTOLIC BLOOD PRESSURE: 75 MMHG | BODY MASS INDEX: 24.89 KG/M2 | RESPIRATION RATE: 18 BRPM | HEIGHT: 68 IN

## 2021-03-04 DIAGNOSIS — R10.13 EPIGASTRIC PAIN: ICD-10-CM

## 2021-03-04 DIAGNOSIS — D48.5 NEOPLASM OF UNCERTAIN BEHAVIOR OF SKIN: ICD-10-CM

## 2021-03-04 DIAGNOSIS — R10.10 UPPER ABDOMINAL PAIN: ICD-10-CM

## 2021-03-04 DIAGNOSIS — L70.0 ACNE COMEDONE: Primary | ICD-10-CM

## 2021-03-04 LAB
ALBUMIN SERPL BCP-MCNC: 4.2 G/DL (ref 3.5–5.2)
ALP SERPL-CCNC: 76 U/L (ref 55–135)
ALT SERPL W/O P-5'-P-CCNC: 22 U/L (ref 10–44)
ANION GAP SERPL CALC-SCNC: 9 MMOL/L (ref 8–16)
AST SERPL-CCNC: 22 U/L (ref 10–40)
BASOPHILS # BLD AUTO: 0.08 K/UL (ref 0–0.2)
BASOPHILS NFR BLD: 0.8 % (ref 0–1.9)
BILIRUB SERPL-MCNC: 0.8 MG/DL (ref 0.1–1)
BILIRUB UR QL STRIP: NEGATIVE
BUN SERPL-MCNC: 20 MG/DL (ref 8–23)
CALCIUM SERPL-MCNC: 9.4 MG/DL (ref 8.7–10.5)
CHLORIDE SERPL-SCNC: 108 MMOL/L (ref 95–110)
CLARITY UR: CLEAR
CO2 SERPL-SCNC: 24 MMOL/L (ref 23–29)
COLOR UR: YELLOW
CREAT SERPL-MCNC: 1 MG/DL (ref 0.5–1.4)
DIFFERENTIAL METHOD: ABNORMAL
EOSINOPHIL # BLD AUTO: 0.1 K/UL (ref 0–0.5)
EOSINOPHIL NFR BLD: 0.6 % (ref 0–8)
ERYTHROCYTE [DISTWIDTH] IN BLOOD BY AUTOMATED COUNT: 14 % (ref 11.5–14.5)
EST. GFR  (AFRICAN AMERICAN): >60 ML/MIN/1.73 M^2
EST. GFR  (NON AFRICAN AMERICAN): >60 ML/MIN/1.73 M^2
GLUCOSE SERPL-MCNC: 95 MG/DL (ref 70–110)
GLUCOSE UR QL STRIP: NEGATIVE
HCT VFR BLD AUTO: 45.4 % (ref 40–54)
HGB BLD-MCNC: 15.5 G/DL (ref 14–18)
HGB UR QL STRIP: ABNORMAL
IMM GRANULOCYTES # BLD AUTO: 0.05 K/UL (ref 0–0.04)
IMM GRANULOCYTES NFR BLD AUTO: 0.5 % (ref 0–0.5)
KETONES UR QL STRIP: ABNORMAL
LEUKOCYTE ESTERASE UR QL STRIP: NEGATIVE
LIPASE SERPL-CCNC: 30 U/L (ref 4–60)
LYMPHOCYTES # BLD AUTO: 1.2 K/UL (ref 1–4.8)
LYMPHOCYTES NFR BLD: 11.4 % (ref 18–48)
MCH RBC QN AUTO: 31 PG (ref 27–31)
MCHC RBC AUTO-ENTMCNC: 34.1 G/DL (ref 32–36)
MCV RBC AUTO: 91 FL (ref 82–98)
MONOCYTES # BLD AUTO: 0.7 K/UL (ref 0.3–1)
MONOCYTES NFR BLD: 6.6 % (ref 4–15)
NEUTROPHILS # BLD AUTO: 8.3 K/UL (ref 1.8–7.7)
NEUTROPHILS NFR BLD: 80.1 % (ref 38–73)
NITRITE UR QL STRIP: NEGATIVE
NRBC BLD-RTO: 0 /100 WBC
PH UR STRIP: 6 [PH] (ref 5–8)
PLATELET # BLD AUTO: 165 K/UL (ref 150–350)
PMV BLD AUTO: 10.1 FL (ref 9.2–12.9)
POTASSIUM SERPL-SCNC: 4.4 MMOL/L (ref 3.5–5.1)
PROT SERPL-MCNC: 6.5 G/DL (ref 6–8.4)
PROT UR QL STRIP: NEGATIVE
RBC # BLD AUTO: 5 M/UL (ref 4.6–6.2)
SODIUM SERPL-SCNC: 141 MMOL/L (ref 136–145)
SP GR UR STRIP: 1.01 (ref 1–1.03)
TROPONIN I SERPL DL<=0.01 NG/ML-MCNC: 0.02 NG/ML (ref 0–0.03)
URN SPEC COLLECT METH UR: ABNORMAL
UROBILINOGEN UR STRIP-ACNC: NEGATIVE EU/DL
WBC # BLD AUTO: 10.38 K/UL (ref 3.9–12.7)

## 2021-03-04 PROCEDURE — 99213 PR OFFICE/OUTPT VISIT, EST, LEVL III, 20-29 MIN: ICD-10-PCS | Mod: 25,S$PBB,, | Performed by: DERMATOLOGY

## 2021-03-04 PROCEDURE — 93010 EKG 12-LEAD: ICD-10-PCS | Mod: ,,, | Performed by: INTERNAL MEDICINE

## 2021-03-04 PROCEDURE — 11102 PR TANGENTIAL BIOPSY, SKIN, SINGLE LESION: ICD-10-PCS | Mod: S$PBB,59,, | Performed by: DERMATOLOGY

## 2021-03-04 PROCEDURE — 88305 TISSUE EXAM BY PATHOLOGIST: ICD-10-PCS | Mod: 26,,, | Performed by: PATHOLOGY

## 2021-03-04 PROCEDURE — 84484 ASSAY OF TROPONIN QUANT: CPT | Performed by: EMERGENCY MEDICINE

## 2021-03-04 PROCEDURE — 93010 ELECTROCARDIOGRAM REPORT: CPT | Mod: ,,, | Performed by: INTERNAL MEDICINE

## 2021-03-04 PROCEDURE — 80053 COMPREHEN METABOLIC PANEL: CPT | Performed by: EMERGENCY MEDICINE

## 2021-03-04 PROCEDURE — 88342 IMHCHEM/IMCYTCHM 1ST ANTB: CPT | Performed by: PATHOLOGY

## 2021-03-04 PROCEDURE — 85025 COMPLETE CBC W/AUTO DIFF WBC: CPT | Performed by: EMERGENCY MEDICINE

## 2021-03-04 PROCEDURE — 99999 PR PBB SHADOW E&M-EST. PATIENT-LVL III: ICD-10-PCS | Mod: PBBFAC,,, | Performed by: DERMATOLOGY

## 2021-03-04 PROCEDURE — 10040 PR ACNE SURGERY OF SKIN ABSCESS: ICD-10-PCS | Mod: S$PBB,,, | Performed by: DERMATOLOGY

## 2021-03-04 PROCEDURE — 10040 EXTRACTION: CPT | Mod: PBBFAC | Performed by: DERMATOLOGY

## 2021-03-04 PROCEDURE — 88341 IMHCHEM/IMCYTCHM EA ADD ANTB: CPT | Mod: 26,,, | Performed by: PATHOLOGY

## 2021-03-04 PROCEDURE — 93005 ELECTROCARDIOGRAM TRACING: CPT

## 2021-03-04 PROCEDURE — 10040 EXTRACTION: CPT | Mod: S$PBB,,, | Performed by: DERMATOLOGY

## 2021-03-04 PROCEDURE — 11102 TANGNTL BX SKIN SINGLE LES: CPT | Mod: S$PBB,59,, | Performed by: DERMATOLOGY

## 2021-03-04 PROCEDURE — 99213 OFFICE O/P EST LOW 20 MIN: CPT | Mod: 25,S$PBB,, | Performed by: DERMATOLOGY

## 2021-03-04 PROCEDURE — 99999 PR PBB SHADOW E&M-EST. PATIENT-LVL III: CPT | Mod: PBBFAC,,, | Performed by: DERMATOLOGY

## 2021-03-04 PROCEDURE — 99285 EMERGENCY DEPT VISIT HI MDM: CPT | Mod: 25,27

## 2021-03-04 PROCEDURE — 11102 TANGNTL BX SKIN SINGLE LES: CPT | Mod: PBBFAC | Performed by: DERMATOLOGY

## 2021-03-04 PROCEDURE — 99213 OFFICE O/P EST LOW 20 MIN: CPT | Mod: PBBFAC,25 | Performed by: DERMATOLOGY

## 2021-03-04 PROCEDURE — 88341 IMHCHEM/IMCYTCHM EA ADD ANTB: CPT | Performed by: PATHOLOGY

## 2021-03-04 PROCEDURE — 88305 TISSUE EXAM BY PATHOLOGIST: CPT | Mod: 26,,, | Performed by: PATHOLOGY

## 2021-03-04 PROCEDURE — 88342 CHG IMMUNOCYTOCHEMISTRY: ICD-10-PCS | Mod: 26,,, | Performed by: PATHOLOGY

## 2021-03-04 PROCEDURE — 88341 PR IHC OR ICC EACH ADD'L SINGLE ANTIBODY  STAINPR: ICD-10-PCS | Mod: 26,,, | Performed by: PATHOLOGY

## 2021-03-04 PROCEDURE — 36415 COLL VENOUS BLD VENIPUNCTURE: CPT | Performed by: EMERGENCY MEDICINE

## 2021-03-04 PROCEDURE — 88305 TISSUE EXAM BY PATHOLOGIST: CPT | Performed by: PATHOLOGY

## 2021-03-04 PROCEDURE — 81003 URINALYSIS AUTO W/O SCOPE: CPT | Performed by: EMERGENCY MEDICINE

## 2021-03-04 PROCEDURE — 83690 ASSAY OF LIPASE: CPT | Performed by: EMERGENCY MEDICINE

## 2021-03-04 PROCEDURE — 88342 IMHCHEM/IMCYTCHM 1ST ANTB: CPT | Mod: 26,,, | Performed by: PATHOLOGY

## 2021-03-04 PROCEDURE — 25000003 PHARM REV CODE 250: Performed by: EMERGENCY MEDICINE

## 2021-03-04 RX ORDER — OMEPRAZOLE 20 MG/1
20 CAPSULE, DELAYED RELEASE ORAL DAILY
Qty: 30 CAPSULE | Refills: 1 | Status: SHIPPED | OUTPATIENT
Start: 2021-03-04 | End: 2021-08-31

## 2021-03-04 RX ADMIN — LIDOCAINE HYDROCHLORIDE 50 ML: 20 SOLUTION ORAL; TOPICAL at 06:03

## 2021-03-05 ENCOUNTER — PATIENT MESSAGE (OUTPATIENT)
Dept: INTERNAL MEDICINE | Facility: CLINIC | Age: 71
End: 2021-03-05

## 2021-03-05 DIAGNOSIS — Z12.11 COLON CANCER SCREENING: Primary | ICD-10-CM

## 2021-03-08 ENCOUNTER — TELEPHONE (OUTPATIENT)
Dept: ENDOSCOPY | Facility: HOSPITAL | Age: 71
End: 2021-03-08

## 2021-03-08 ENCOUNTER — PES CALL (OUTPATIENT)
Dept: ADMINISTRATIVE | Facility: CLINIC | Age: 71
End: 2021-03-08

## 2021-03-08 DIAGNOSIS — Z13.9 SCREENING PROCEDURE: Primary | ICD-10-CM

## 2021-03-09 ENCOUNTER — OFFICE VISIT (OUTPATIENT)
Dept: PSYCHIATRY | Facility: CLINIC | Age: 71
End: 2021-03-09
Payer: MEDICARE

## 2021-03-09 DIAGNOSIS — F41.1 GENERALIZED ANXIETY DISORDER: Primary | ICD-10-CM

## 2021-03-09 LAB
FINAL PATHOLOGIC DIAGNOSIS: NORMAL
GROSS: NORMAL
MICROSCOPIC EXAM: NORMAL

## 2021-03-09 PROCEDURE — 90834 PSYTX W PT 45 MINUTES: CPT | Mod: 95,,, | Performed by: SOCIAL WORKER

## 2021-03-09 PROCEDURE — 90834 PR PSYCHOTHERAPY W/PATIENT, 45 MIN: ICD-10-PCS | Mod: 95,,, | Performed by: SOCIAL WORKER

## 2021-03-09 RX ORDER — SODIUM, POTASSIUM,MAG SULFATES 17.5-3.13G
1 SOLUTION, RECONSTITUTED, ORAL ORAL DAILY
Qty: 1 KIT | Refills: 0 | Status: SHIPPED | OUTPATIENT
Start: 2021-03-09 | End: 2021-03-11

## 2021-03-15 ENCOUNTER — OFFICE VISIT (OUTPATIENT)
Dept: INTERNAL MEDICINE | Facility: CLINIC | Age: 71
End: 2021-03-15
Payer: MEDICARE

## 2021-03-15 DIAGNOSIS — M19.011 PRIMARY OSTEOARTHRITIS OF RIGHT SHOULDER: ICD-10-CM

## 2021-03-15 DIAGNOSIS — K29.00 ACUTE GASTRITIS WITHOUT HEMORRHAGE, UNSPECIFIED GASTRITIS TYPE: Primary | ICD-10-CM

## 2021-03-15 PROCEDURE — 99213 PR OFFICE/OUTPT VISIT, EST, LEVL III, 20-29 MIN: ICD-10-PCS | Mod: 95,,, | Performed by: PEDIATRICS

## 2021-03-15 PROCEDURE — 99213 OFFICE O/P EST LOW 20 MIN: CPT | Mod: 95,,, | Performed by: PEDIATRICS

## 2021-03-15 RX ORDER — DICLOFENAC SODIUM 10 MG/G
2 GEL TOPICAL DAILY
Qty: 450 G | Refills: 1 | Status: SHIPPED | OUTPATIENT
Start: 2021-03-15 | End: 2021-08-31

## 2021-04-09 ENCOUNTER — TELEPHONE (OUTPATIENT)
Dept: PREADMISSION TESTING | Facility: HOSPITAL | Age: 71
End: 2021-04-09

## 2021-04-12 ENCOUNTER — LAB VISIT (OUTPATIENT)
Dept: OTOLARYNGOLOGY | Facility: CLINIC | Age: 71
End: 2021-04-12
Payer: MEDICARE

## 2021-04-12 DIAGNOSIS — Z13.9 SCREENING PROCEDURE: ICD-10-CM

## 2021-04-12 PROCEDURE — U0003 INFECTIOUS AGENT DETECTION BY NUCLEIC ACID (DNA OR RNA); SEVERE ACUTE RESPIRATORY SYNDROME CORONAVIRUS 2 (SARS-COV-2) (CORONAVIRUS DISEASE [COVID-19]), AMPLIFIED PROBE TECHNIQUE, MAKING USE OF HIGH THROUGHPUT TECHNOLOGIES AS DESCRIBED BY CMS-2020-01-R: HCPCS | Performed by: INTERNAL MEDICINE

## 2021-04-12 PROCEDURE — U0005 INFEC AGEN DETEC AMPLI PROBE: HCPCS | Performed by: INTERNAL MEDICINE

## 2021-04-13 ENCOUNTER — PATIENT MESSAGE (OUTPATIENT)
Dept: ENDOSCOPY | Facility: HOSPITAL | Age: 71
End: 2021-04-13

## 2021-04-13 ENCOUNTER — TELEPHONE (OUTPATIENT)
Dept: ENDOSCOPY | Facility: HOSPITAL | Age: 71
End: 2021-04-13

## 2021-04-13 ENCOUNTER — PATIENT MESSAGE (OUTPATIENT)
Dept: INTERNAL MEDICINE | Facility: CLINIC | Age: 71
End: 2021-04-13

## 2021-04-13 DIAGNOSIS — U07.1 COVID-19: ICD-10-CM

## 2021-04-13 DIAGNOSIS — Z01.818 PREOP EXAM FOR INTERNAL MEDICINE: Primary | ICD-10-CM

## 2021-04-13 DIAGNOSIS — D69.9 BLEEDING DISORDER: ICD-10-CM

## 2021-04-13 DIAGNOSIS — Z91.89 AT HIGH RISK FOR BLEEDING: ICD-10-CM

## 2021-04-13 LAB — SARS-COV-2 RNA RESP QL NAA+PROBE: NOT DETECTED

## 2021-04-14 ENCOUNTER — PATIENT MESSAGE (OUTPATIENT)
Dept: INTERNAL MEDICINE | Facility: CLINIC | Age: 71
End: 2021-04-14

## 2021-04-14 ENCOUNTER — HOSPITAL ENCOUNTER (OUTPATIENT)
Dept: RADIOLOGY | Facility: HOSPITAL | Age: 71
Discharge: HOME | End: 2021-04-14
Attending: PEDIATRICS
Payer: MEDICARE

## 2021-04-14 ENCOUNTER — HOSPITAL ENCOUNTER (OUTPATIENT)
Dept: CARDIOLOGY | Facility: HOSPITAL | Age: 71
Discharge: HOME | End: 2021-04-14
Attending: PEDIATRICS
Payer: MEDICARE

## 2021-04-14 ENCOUNTER — TELEPHONE (OUTPATIENT)
Dept: INTERNAL MEDICINE | Facility: CLINIC | Age: 71
End: 2021-04-14

## 2021-04-14 DIAGNOSIS — Z01.818 PREOP EXAM FOR INTERNAL MEDICINE: ICD-10-CM

## 2021-04-14 PROCEDURE — 93005 ELECTROCARDIOGRAM TRACING: CPT

## 2021-04-14 PROCEDURE — 71046 X-RAY EXAM CHEST 2 VIEWS: CPT | Mod: 26,,, | Performed by: RADIOLOGY

## 2021-04-14 PROCEDURE — 71046 X-RAY EXAM CHEST 2 VIEWS: CPT | Mod: TC

## 2021-04-14 PROCEDURE — 93010 ELECTROCARDIOGRAM REPORT: CPT | Mod: ,,, | Performed by: INTERNAL MEDICINE

## 2021-04-14 PROCEDURE — 93010 EKG 12-LEAD: ICD-10-PCS | Mod: ,,, | Performed by: INTERNAL MEDICINE

## 2021-04-14 PROCEDURE — 71046 XR CHEST PA AND LATERAL: ICD-10-PCS | Mod: 26,,, | Performed by: RADIOLOGY

## 2021-04-15 ENCOUNTER — TELEPHONE (OUTPATIENT)
Dept: INTERNAL MEDICINE | Facility: CLINIC | Age: 71
End: 2021-04-15

## 2021-04-20 ENCOUNTER — TELEPHONE (OUTPATIENT)
Dept: CARDIOLOGY | Facility: CLINIC | Age: 71
End: 2021-04-20

## 2021-04-20 ENCOUNTER — OFFICE VISIT (OUTPATIENT)
Dept: INTERNAL MEDICINE | Facility: CLINIC | Age: 71
End: 2021-04-20
Payer: MEDICARE

## 2021-04-20 ENCOUNTER — HOSPITAL ENCOUNTER (OUTPATIENT)
Dept: CARDIOLOGY | Facility: HOSPITAL | Age: 71
Discharge: HOME | End: 2021-04-20
Attending: INTERNAL MEDICINE
Payer: MEDICARE

## 2021-04-20 ENCOUNTER — OFFICE VISIT (OUTPATIENT)
Dept: CARDIOLOGY | Facility: CLINIC | Age: 71
End: 2021-04-20
Payer: MEDICARE

## 2021-04-20 VITALS
HEIGHT: 68 IN | BODY MASS INDEX: 24.25 KG/M2 | WEIGHT: 160 LBS | SYSTOLIC BLOOD PRESSURE: 98 MMHG | DIASTOLIC BLOOD PRESSURE: 64 MMHG

## 2021-04-20 VITALS
TEMPERATURE: 96 F | WEIGHT: 161.63 LBS | OXYGEN SATURATION: 97 % | HEIGHT: 68 IN | SYSTOLIC BLOOD PRESSURE: 118 MMHG | BODY MASS INDEX: 24.49 KG/M2 | DIASTOLIC BLOOD PRESSURE: 82 MMHG | HEART RATE: 86 BPM

## 2021-04-20 VITALS
DIASTOLIC BLOOD PRESSURE: 64 MMHG | WEIGHT: 160.94 LBS | BODY MASS INDEX: 24.47 KG/M2 | HEART RATE: 65 BPM | SYSTOLIC BLOOD PRESSURE: 98 MMHG | OXYGEN SATURATION: 98 %

## 2021-04-20 DIAGNOSIS — F39 UNSPECIFIED MOOD (AFFECTIVE) DISORDER: ICD-10-CM

## 2021-04-20 DIAGNOSIS — E78.00 HYPERCHOLESTEROLEMIA: ICD-10-CM

## 2021-04-20 DIAGNOSIS — I25.118 CORONARY ARTERY DISEASE OF NATIVE ARTERY OF NATIVE HEART WITH STABLE ANGINA PECTORIS: ICD-10-CM

## 2021-04-20 DIAGNOSIS — I21.4 NSTEMI (NON-ST ELEVATED MYOCARDIAL INFARCTION): ICD-10-CM

## 2021-04-20 DIAGNOSIS — Z78.9 STATIN INTOLERANCE: ICD-10-CM

## 2021-04-20 DIAGNOSIS — Z95.5 H/O HEART ARTERY STENT: ICD-10-CM

## 2021-04-20 DIAGNOSIS — N40.0 PROSTATE HYPERTROPHY: ICD-10-CM

## 2021-04-20 DIAGNOSIS — F41.1 GENERALIZED ANXIETY DISORDER: ICD-10-CM

## 2021-04-20 DIAGNOSIS — Z96.651 HISTORY OF TOTAL RIGHT KNEE REPLACEMENT: ICD-10-CM

## 2021-04-20 DIAGNOSIS — M19.011 PRIMARY OSTEOARTHRITIS OF RIGHT SHOULDER: ICD-10-CM

## 2021-04-20 DIAGNOSIS — Z01.810 PREOP CARDIOVASCULAR EXAM: Primary | ICD-10-CM

## 2021-04-20 DIAGNOSIS — Z01.818 PREOPERATIVE CLEARANCE: Primary | ICD-10-CM

## 2021-04-20 DIAGNOSIS — Z01.810 PREOP CARDIOVASCULAR EXAM: ICD-10-CM

## 2021-04-20 LAB
AORTIC ROOT ANNULUS: 3.72 CM
ASCENDING AORTA: 3.63 CM
AV INDEX (PROSTH): 0.62
AV MEAN GRADIENT: 3 MMHG
AV PEAK GRADIENT: 7 MMHG
AV VALVE AREA: 2.68 CM2
AV VELOCITY RATIO: 0.54
BSA FOR ECHO PROCEDURE: 1.87 M2
CV ECHO LV RWT: 0.37 CM
DOP CALC AO PEAK VEL: 1.3 M/S
DOP CALC AO VTI: 24.23 CM
DOP CALC LVOT AREA: 4.3 CM2
DOP CALC LVOT DIAMETER: 2.35 CM
DOP CALC LVOT PEAK VEL: 0.7 M/S
DOP CALC LVOT STROKE VOLUME: 64.85 CM3
DOP CALC RVOT PEAK VEL: 0.52 M/S
DOP CALC RVOT VTI: 12.78 CM
DOP CALCLVOT PEAK VEL VTI: 14.96 CM
E WAVE DECELERATION TIME: 229.77 MSEC
E/A RATIO: 0.55
E/E' RATIO: 3.82 M/S
ECHO LV POSTERIOR WALL: 0.93 CM (ref 0.6–1.1)
EJECTION FRACTION: 25 %
FRACTIONAL SHORTENING: 10 % (ref 28–44)
INTERVENTRICULAR SEPTUM: 1.19 CM (ref 0.6–1.1)
LA MAJOR: 5.11 CM
LA MINOR: 6.15 CM
LA WIDTH: 3.81 CM
LEFT ATRIUM SIZE: 3.43 CM
LEFT ATRIUM VOLUME INDEX: 33.3 ML/M2
LEFT ATRIUM VOLUME: 62 CM3
LEFT INTERNAL DIMENSION IN SYSTOLE: 4.54 CM (ref 2.1–4)
LEFT VENTRICLE DIASTOLIC VOLUME INDEX: 65.61 ML/M2
LEFT VENTRICLE DIASTOLIC VOLUME: 122.04 ML
LEFT VENTRICLE MASS INDEX: 108 G/M2
LEFT VENTRICLE SYSTOLIC VOLUME INDEX: 50.7 ML/M2
LEFT VENTRICLE SYSTOLIC VOLUME: 94.3 ML
LEFT VENTRICULAR INTERNAL DIMENSION IN DIASTOLE: 5.07 CM (ref 3.5–6)
LEFT VENTRICULAR MASS: 201.42 G
LV LATERAL E/E' RATIO: 3.82 M/S
LV SEPTAL E/E' RATIO: 3.82 M/S
MV PEAK A VEL: 0.76 M/S
MV PEAK E VEL: 0.42 M/S
MV STENOSIS PRESSURE HALF TIME: 66.63 MS
MV VALVE AREA P 1/2 METHOD: 3.3 CM2
PISA TR MAX VEL: 3.04 M/S
PULM VEIN S/D RATIO: 1.25
PV MEAN GRADIENT: 1 MMHG
PV PEAK D VEL: 0.57 M/S
PV PEAK S VEL: 0.71 M/S
PV PEAK VELOCITY: 0.67 CM/S
RA MAJOR: 4.56 CM
RA PRESSURE: 3 MMHG
RA WIDTH: 3.77 CM
RIGHT VENTRICULAR END-DIASTOLIC DIMENSION: 4.36 CM
SINUS: 3.58 CM
STJ: 3.81 CM
TDI LATERAL: 0.11 M/S
TDI SEPTAL: 0.11 M/S
TDI: 0.11 M/S
TR MAX PG: 37 MMHG
TV REST PULMONARY ARTERY PRESSURE: 40 MMHG

## 2021-04-20 PROCEDURE — 99214 PR OFFICE/OUTPT VISIT, EST, LEVL IV, 30-39 MIN: ICD-10-PCS | Mod: S$PBB,,, | Performed by: PEDIATRICS

## 2021-04-20 PROCEDURE — 93306 TTE W/DOPPLER COMPLETE: CPT | Mod: 26,,, | Performed by: INTERNAL MEDICINE

## 2021-04-20 PROCEDURE — 99215 OFFICE O/P EST HI 40 MIN: CPT | Mod: S$PBB,,, | Performed by: INTERNAL MEDICINE

## 2021-04-20 PROCEDURE — 99213 OFFICE O/P EST LOW 20 MIN: CPT | Mod: PBBFAC,25 | Performed by: PEDIATRICS

## 2021-04-20 PROCEDURE — 99999 PR PBB SHADOW E&M-EST. PATIENT-LVL III: ICD-10-PCS | Mod: PBBFAC,,, | Performed by: PEDIATRICS

## 2021-04-20 PROCEDURE — 99999 PR PBB SHADOW E&M-EST. PATIENT-LVL III: CPT | Mod: PBBFAC,,, | Performed by: INTERNAL MEDICINE

## 2021-04-20 PROCEDURE — 99999 PR PBB SHADOW E&M-EST. PATIENT-LVL III: ICD-10-PCS | Mod: PBBFAC,,, | Performed by: INTERNAL MEDICINE

## 2021-04-20 PROCEDURE — 99215 PR OFFICE/OUTPT VISIT, EST, LEVL V, 40-54 MIN: ICD-10-PCS | Mod: S$PBB,,, | Performed by: INTERNAL MEDICINE

## 2021-04-20 PROCEDURE — 93306 ECHO (CUPID ONLY): ICD-10-PCS | Mod: 26,,, | Performed by: INTERNAL MEDICINE

## 2021-04-20 PROCEDURE — 99999 PR PBB SHADOW E&M-EST. PATIENT-LVL III: CPT | Mod: PBBFAC,,, | Performed by: PEDIATRICS

## 2021-04-20 PROCEDURE — 93306 TTE W/DOPPLER COMPLETE: CPT

## 2021-04-20 PROCEDURE — 99213 OFFICE O/P EST LOW 20 MIN: CPT | Mod: PBBFAC,25,27 | Performed by: INTERNAL MEDICINE

## 2021-04-20 PROCEDURE — 99214 OFFICE O/P EST MOD 30 MIN: CPT | Mod: S$PBB,,, | Performed by: PEDIATRICS

## 2021-04-21 ENCOUNTER — PATIENT MESSAGE (OUTPATIENT)
Dept: CARDIOLOGY | Facility: CLINIC | Age: 71
End: 2021-04-21

## 2021-04-21 ENCOUNTER — TELEPHONE (OUTPATIENT)
Dept: CARDIOLOGY | Facility: CLINIC | Age: 71
End: 2021-04-21

## 2021-04-21 RX ORDER — LOSARTAN POTASSIUM 25 MG/1
12.5 TABLET ORAL DAILY
Qty: 30 TABLET | Refills: 5 | Status: ON HOLD | OUTPATIENT
Start: 2021-04-21 | End: 2021-04-24 | Stop reason: HOSPADM

## 2021-04-21 RX ORDER — METOPROLOL SUCCINATE 25 MG/1
12.5 TABLET, EXTENDED RELEASE ORAL DAILY
Qty: 30 TABLET | Refills: 5 | Status: ON HOLD | OUTPATIENT
Start: 2021-04-21 | End: 2021-04-24 | Stop reason: HOSPADM

## 2021-04-21 RX ORDER — ASPIRIN 81 MG/1
81 TABLET ORAL DAILY
Status: ON HOLD
Start: 2021-04-21 | End: 2022-02-27 | Stop reason: SDUPTHER

## 2021-04-22 ENCOUNTER — HOSPITAL ENCOUNTER (OUTPATIENT)
Facility: HOSPITAL | Age: 71
Discharge: HOME | End: 2021-04-24
Attending: EMERGENCY MEDICINE | Admitting: INTERNAL MEDICINE
Payer: MEDICARE

## 2021-04-22 DIAGNOSIS — R07.9 CHEST PAIN: ICD-10-CM

## 2021-04-22 DIAGNOSIS — R07.9 CHEST PAIN, UNSPECIFIED TYPE: Primary | ICD-10-CM

## 2021-04-22 DIAGNOSIS — I42.0 DILATED CARDIOMYOPATHY: ICD-10-CM

## 2021-04-22 PROBLEM — I25.5 ISCHEMIC CARDIOMYOPATHY: Status: ACTIVE | Noted: 2021-04-22

## 2021-04-22 LAB
ALBUMIN SERPL BCP-MCNC: 4.7 G/DL (ref 3.5–5.2)
ALP SERPL-CCNC: 79 U/L (ref 55–135)
ALT SERPL W/O P-5'-P-CCNC: 19 U/L (ref 10–44)
ANION GAP SERPL CALC-SCNC: 9 MMOL/L (ref 8–16)
AST SERPL-CCNC: 21 U/L (ref 10–40)
BASOPHILS # BLD AUTO: 0.04 K/UL (ref 0–0.2)
BASOPHILS NFR BLD: 0.4 % (ref 0–1.9)
BILIRUB SERPL-MCNC: 0.7 MG/DL (ref 0.1–1)
BILIRUB UR QL STRIP: NEGATIVE
BNP SERPL-MCNC: 147 PG/ML (ref 0–99)
BUN SERPL-MCNC: 15 MG/DL (ref 8–23)
CALCIUM SERPL-MCNC: 10 MG/DL (ref 8.7–10.5)
CHLORIDE SERPL-SCNC: 106 MMOL/L (ref 95–110)
CLARITY UR: CLEAR
CO2 SERPL-SCNC: 28 MMOL/L (ref 23–29)
COLOR UR: YELLOW
CREAT SERPL-MCNC: 1.2 MG/DL (ref 0.5–1.4)
DIFFERENTIAL METHOD: ABNORMAL
EOSINOPHIL # BLD AUTO: 0 K/UL (ref 0–0.5)
EOSINOPHIL NFR BLD: 0.2 % (ref 0–8)
ERYTHROCYTE [DISTWIDTH] IN BLOOD BY AUTOMATED COUNT: 12.8 % (ref 11.5–14.5)
EST. GFR  (AFRICAN AMERICAN): >60 ML/MIN/1.73 M^2
EST. GFR  (NON AFRICAN AMERICAN): >60 ML/MIN/1.73 M^2
GLUCOSE SERPL-MCNC: 116 MG/DL (ref 70–110)
GLUCOSE UR QL STRIP: NEGATIVE
HCT VFR BLD AUTO: 51.6 % (ref 40–54)
HGB BLD-MCNC: 18 G/DL (ref 14–18)
HGB UR QL STRIP: NEGATIVE
IMM GRANULOCYTES # BLD AUTO: 0.04 K/UL (ref 0–0.04)
IMM GRANULOCYTES NFR BLD AUTO: 0.4 % (ref 0–0.5)
KETONES UR QL STRIP: ABNORMAL
LEUKOCYTE ESTERASE UR QL STRIP: NEGATIVE
LYMPHOCYTES # BLD AUTO: 1.3 K/UL (ref 1–4.8)
LYMPHOCYTES NFR BLD: 13.8 % (ref 18–48)
MCH RBC QN AUTO: 30.6 PG (ref 27–31)
MCHC RBC AUTO-ENTMCNC: 34.9 G/DL (ref 32–36)
MCV RBC AUTO: 88 FL (ref 82–98)
MONOCYTES # BLD AUTO: 0.6 K/UL (ref 0.3–1)
MONOCYTES NFR BLD: 6.8 % (ref 4–15)
NEUTROPHILS # BLD AUTO: 7.3 K/UL (ref 1.8–7.7)
NEUTROPHILS NFR BLD: 78.4 % (ref 38–73)
NITRITE UR QL STRIP: NEGATIVE
NRBC BLD-RTO: 0 /100 WBC
PH UR STRIP: 6 [PH] (ref 5–8)
PLATELET # BLD AUTO: 212 K/UL (ref 150–450)
PMV BLD AUTO: 9.5 FL (ref 9.2–12.9)
POTASSIUM SERPL-SCNC: 4.4 MMOL/L (ref 3.5–5.1)
PROT SERPL-MCNC: 7.5 G/DL (ref 6–8.4)
PROT UR QL STRIP: NEGATIVE
RBC # BLD AUTO: 5.88 M/UL (ref 4.6–6.2)
SARS-COV-2 RDRP RESP QL NAA+PROBE: NEGATIVE
SODIUM SERPL-SCNC: 143 MMOL/L (ref 136–145)
SP GR UR STRIP: 1.02 (ref 1–1.03)
TROPONIN I SERPL DL<=0.01 NG/ML-MCNC: 0.01 NG/ML (ref 0–0.03)
TROPONIN I SERPL DL<=0.01 NG/ML-MCNC: 0.01 NG/ML (ref 0–0.03)
URN SPEC COLLECT METH UR: ABNORMAL
UROBILINOGEN UR STRIP-ACNC: NEGATIVE EU/DL
WBC # BLD AUTO: 9.37 K/UL (ref 3.9–12.7)

## 2021-04-22 PROCEDURE — G0378 HOSPITAL OBSERVATION PER HR: HCPCS

## 2021-04-22 PROCEDURE — 25000242 PHARM REV CODE 250 ALT 637 W/ HCPCS: Performed by: INTERNAL MEDICINE

## 2021-04-22 PROCEDURE — 83880 ASSAY OF NATRIURETIC PEPTIDE: CPT | Performed by: NURSE PRACTITIONER

## 2021-04-22 PROCEDURE — 96372 THER/PROPH/DIAG INJ SC/IM: CPT | Mod: 59 | Performed by: EMERGENCY MEDICINE

## 2021-04-22 PROCEDURE — 81003 URINALYSIS AUTO W/O SCOPE: CPT | Performed by: INTERNAL MEDICINE

## 2021-04-22 PROCEDURE — 36415 COLL VENOUS BLD VENIPUNCTURE: CPT | Performed by: INTERNAL MEDICINE

## 2021-04-22 PROCEDURE — 80053 COMPREHEN METABOLIC PANEL: CPT | Performed by: NURSE PRACTITIONER

## 2021-04-22 PROCEDURE — U0002 COVID-19 LAB TEST NON-CDC: HCPCS | Performed by: EMERGENCY MEDICINE

## 2021-04-22 PROCEDURE — 25000003 PHARM REV CODE 250: Performed by: INTERNAL MEDICINE

## 2021-04-22 PROCEDURE — 85025 COMPLETE CBC W/AUTO DIFF WBC: CPT | Performed by: NURSE PRACTITIONER

## 2021-04-22 PROCEDURE — 84484 ASSAY OF TROPONIN QUANT: CPT | Performed by: NURSE PRACTITIONER

## 2021-04-22 PROCEDURE — 63600175 PHARM REV CODE 636 W HCPCS: Performed by: INTERNAL MEDICINE

## 2021-04-22 PROCEDURE — 84484 ASSAY OF TROPONIN QUANT: CPT | Mod: 91 | Performed by: INTERNAL MEDICINE

## 2021-04-22 PROCEDURE — 96374 THER/PROPH/DIAG INJ IV PUSH: CPT | Performed by: EMERGENCY MEDICINE

## 2021-04-22 PROCEDURE — 99285 EMERGENCY DEPT VISIT HI MDM: CPT | Mod: 25

## 2021-04-22 RX ORDER — ONDANSETRON 2 MG/ML
4 INJECTION INTRAMUSCULAR; INTRAVENOUS EVERY 8 HOURS PRN
Status: DISCONTINUED | OUTPATIENT
Start: 2021-04-22 | End: 2021-04-24 | Stop reason: HOSPADM

## 2021-04-22 RX ORDER — HEPARIN SODIUM 5000 [USP'U]/ML
5000 INJECTION, SOLUTION INTRAVENOUS; SUBCUTANEOUS EVERY 8 HOURS
Status: DISCONTINUED | OUTPATIENT
Start: 2021-04-22 | End: 2021-04-24 | Stop reason: HOSPADM

## 2021-04-22 RX ORDER — LAMOTRIGINE 100 MG/1
100 TABLET ORAL DAILY
Status: DISCONTINUED | OUTPATIENT
Start: 2021-04-23 | End: 2021-04-24 | Stop reason: HOSPADM

## 2021-04-22 RX ORDER — METOPROLOL TARTRATE 25 MG/1
12.5 TABLET ORAL 2 TIMES DAILY
Status: DISCONTINUED | OUTPATIENT
Start: 2021-04-22 | End: 2021-04-23

## 2021-04-22 RX ORDER — AMOXICILLIN 250 MG
1 CAPSULE ORAL DAILY PRN
Status: DISCONTINUED | OUTPATIENT
Start: 2021-04-22 | End: 2021-04-24 | Stop reason: HOSPADM

## 2021-04-22 RX ORDER — ACETAMINOPHEN 325 MG/1
650 TABLET ORAL EVERY 4 HOURS PRN
Status: DISCONTINUED | OUTPATIENT
Start: 2021-04-22 | End: 2021-04-24 | Stop reason: HOSPADM

## 2021-04-22 RX ORDER — FAMOTIDINE 20 MG/1
20 TABLET, FILM COATED ORAL 2 TIMES DAILY
Status: DISCONTINUED | OUTPATIENT
Start: 2021-04-22 | End: 2021-04-24 | Stop reason: HOSPADM

## 2021-04-22 RX ORDER — MORPHINE SULFATE 2 MG/ML
2 INJECTION, SOLUTION INTRAMUSCULAR; INTRAVENOUS EVERY 4 HOURS PRN
Status: DISCONTINUED | OUTPATIENT
Start: 2021-04-22 | End: 2021-04-24 | Stop reason: HOSPADM

## 2021-04-22 RX ORDER — NITROGLYCERIN 0.4 MG/1
0.4 TABLET SUBLINGUAL EVERY 5 MIN PRN
Status: DISCONTINUED | OUTPATIENT
Start: 2021-04-22 | End: 2021-04-24 | Stop reason: HOSPADM

## 2021-04-22 RX ORDER — SODIUM CHLORIDE 0.9 % (FLUSH) 0.9 %
10 SYRINGE (ML) INJECTION
Status: DISCONTINUED | OUTPATIENT
Start: 2021-04-22 | End: 2021-04-24 | Stop reason: HOSPADM

## 2021-04-22 RX ORDER — TALC
6 POWDER (GRAM) TOPICAL NIGHTLY PRN
Status: DISCONTINUED | OUTPATIENT
Start: 2021-04-22 | End: 2021-04-24 | Stop reason: HOSPADM

## 2021-04-22 RX ORDER — HYDROCODONE BITARTRATE AND ACETAMINOPHEN 5; 325 MG/1; MG/1
1 TABLET ORAL EVERY 4 HOURS PRN
Status: DISCONTINUED | OUTPATIENT
Start: 2021-04-22 | End: 2021-04-24 | Stop reason: HOSPADM

## 2021-04-22 RX ORDER — ASPIRIN 81 MG/1
81 TABLET ORAL DAILY
Status: DISCONTINUED | OUTPATIENT
Start: 2021-04-23 | End: 2021-04-24 | Stop reason: HOSPADM

## 2021-04-22 RX ADMIN — METOPROLOL TARTRATE 12.5 MG: 25 TABLET, FILM COATED ORAL at 08:04

## 2021-04-22 RX ADMIN — HEPARIN SODIUM 5000 UNITS: 5000 INJECTION INTRAVENOUS; SUBCUTANEOUS at 09:04

## 2021-04-22 RX ADMIN — HYDROCODONE BITARTRATE AND ACETAMINOPHEN 1 TABLET: 5; 325 TABLET ORAL at 09:04

## 2021-04-22 RX ADMIN — MORPHINE SULFATE 2 MG: 2 INJECTION, SOLUTION INTRAMUSCULAR; INTRAVENOUS at 10:04

## 2021-04-22 RX ADMIN — FAMOTIDINE 20 MG: 20 TABLET ORAL at 08:04

## 2021-04-22 RX ADMIN — NITROGLYCERIN 0.4 MG: 0.4 TABLET, ORALLY DISINTEGRATING SUBLINGUAL at 09:04

## 2021-04-23 ENCOUNTER — TELEPHONE (OUTPATIENT)
Dept: CARDIOLOGY | Facility: CLINIC | Age: 71
End: 2021-04-23

## 2021-04-23 ENCOUNTER — TELEPHONE (OUTPATIENT)
Dept: INTERNAL MEDICINE | Facility: CLINIC | Age: 71
End: 2021-04-23

## 2021-04-23 PROBLEM — I42.0 DILATED CARDIOMYOPATHY: Status: ACTIVE | Noted: 2021-04-22

## 2021-04-23 LAB
ANION GAP SERPL CALC-SCNC: 12 MMOL/L (ref 8–16)
BASOPHILS # BLD AUTO: 0.05 K/UL (ref 0–0.2)
BASOPHILS NFR BLD: 0.7 % (ref 0–1.9)
BUN SERPL-MCNC: 16 MG/DL (ref 8–23)
CALCIUM SERPL-MCNC: 9.2 MG/DL (ref 8.7–10.5)
CHLORIDE SERPL-SCNC: 106 MMOL/L (ref 95–110)
CO2 SERPL-SCNC: 22 MMOL/L (ref 23–29)
CREAT SERPL-MCNC: 1 MG/DL (ref 0.5–1.4)
DIFFERENTIAL METHOD: NORMAL
EOSINOPHIL # BLD AUTO: 0.1 K/UL (ref 0–0.5)
EOSINOPHIL NFR BLD: 1.3 % (ref 0–8)
ERYTHROCYTE [DISTWIDTH] IN BLOOD BY AUTOMATED COUNT: 12.8 % (ref 11.5–14.5)
EST. GFR  (AFRICAN AMERICAN): >60 ML/MIN/1.73 M^2
EST. GFR  (NON AFRICAN AMERICAN): >60 ML/MIN/1.73 M^2
GLUCOSE SERPL-MCNC: 91 MG/DL (ref 70–110)
HCT VFR BLD AUTO: 48.4 % (ref 40–54)
HGB BLD-MCNC: 17 G/DL (ref 14–18)
IMM GRANULOCYTES # BLD AUTO: 0.01 K/UL (ref 0–0.04)
IMM GRANULOCYTES NFR BLD AUTO: 0.1 % (ref 0–0.5)
LYMPHOCYTES # BLD AUTO: 1.7 K/UL (ref 1–4.8)
LYMPHOCYTES NFR BLD: 25.4 % (ref 18–48)
MAGNESIUM SERPL-MCNC: 1.9 MG/DL (ref 1.6–2.6)
MCH RBC QN AUTO: 31 PG (ref 27–31)
MCHC RBC AUTO-ENTMCNC: 35.1 G/DL (ref 32–36)
MCV RBC AUTO: 88 FL (ref 82–98)
MONOCYTES # BLD AUTO: 0.6 K/UL (ref 0.3–1)
MONOCYTES NFR BLD: 8.8 % (ref 4–15)
NEUTROPHILS # BLD AUTO: 4.4 K/UL (ref 1.8–7.7)
NEUTROPHILS NFR BLD: 63.7 % (ref 38–73)
NRBC BLD-RTO: 0 /100 WBC
PHOSPHATE SERPL-MCNC: 3.3 MG/DL (ref 2.7–4.5)
PLATELET # BLD AUTO: 178 K/UL (ref 150–450)
PMV BLD AUTO: 9.8 FL (ref 9.2–12.9)
POTASSIUM SERPL-SCNC: 4.2 MMOL/L (ref 3.5–5.1)
RBC # BLD AUTO: 5.49 M/UL (ref 4.6–6.2)
SODIUM SERPL-SCNC: 140 MMOL/L (ref 136–145)
TROPONIN I SERPL DL<=0.01 NG/ML-MCNC: 0.01 NG/ML (ref 0–0.03)
TROPONIN I SERPL DL<=0.01 NG/ML-MCNC: 0.01 NG/ML (ref 0–0.03)
WBC # BLD AUTO: 6.84 K/UL (ref 3.9–12.7)

## 2021-04-23 PROCEDURE — 80048 BASIC METABOLIC PNL TOTAL CA: CPT | Performed by: INTERNAL MEDICINE

## 2021-04-23 PROCEDURE — 25000003 PHARM REV CODE 250: Performed by: INTERNAL MEDICINE

## 2021-04-23 PROCEDURE — 25000003 PHARM REV CODE 250: Performed by: NURSE PRACTITIONER

## 2021-04-23 PROCEDURE — 63600175 PHARM REV CODE 636 W HCPCS: Performed by: NURSE PRACTITIONER

## 2021-04-23 PROCEDURE — 99220 PR INITIAL OBSERVATION CARE,LEVL III: CPT | Mod: ,,, | Performed by: NURSE PRACTITIONER

## 2021-04-23 PROCEDURE — 85025 COMPLETE CBC W/AUTO DIFF WBC: CPT | Performed by: INTERNAL MEDICINE

## 2021-04-23 PROCEDURE — 36415 COLL VENOUS BLD VENIPUNCTURE: CPT | Performed by: INTERNAL MEDICINE

## 2021-04-23 PROCEDURE — 84484 ASSAY OF TROPONIN QUANT: CPT | Performed by: INTERNAL MEDICINE

## 2021-04-23 PROCEDURE — G0378 HOSPITAL OBSERVATION PER HR: HCPCS

## 2021-04-23 PROCEDURE — 99220 PR INITIAL OBSERVATION CARE,LEVL III: ICD-10-PCS | Mod: ,,, | Performed by: NURSE PRACTITIONER

## 2021-04-23 PROCEDURE — 84100 ASSAY OF PHOSPHORUS: CPT | Performed by: INTERNAL MEDICINE

## 2021-04-23 PROCEDURE — 83735 ASSAY OF MAGNESIUM: CPT | Performed by: INTERNAL MEDICINE

## 2021-04-23 PROCEDURE — 63600175 PHARM REV CODE 636 W HCPCS: Performed by: INTERNAL MEDICINE

## 2021-04-23 PROCEDURE — 96372 THER/PROPH/DIAG INJ SC/IM: CPT | Mod: 59 | Performed by: EMERGENCY MEDICINE

## 2021-04-23 RX ORDER — REGADENOSON 0.08 MG/ML
0.4 INJECTION, SOLUTION INTRAVENOUS ONCE
Status: COMPLETED | OUTPATIENT
Start: 2021-04-23 | End: 2021-04-23

## 2021-04-23 RX ADMIN — ASPIRIN 81 MG: 81 TABLET, COATED ORAL at 12:04

## 2021-04-23 RX ADMIN — HEPARIN SODIUM 5000 UNITS: 5000 INJECTION INTRAVENOUS; SUBCUTANEOUS at 09:04

## 2021-04-23 RX ADMIN — REGADENOSON 0.4 MG: 0.08 INJECTION, SOLUTION INTRAVENOUS at 09:04

## 2021-04-23 RX ADMIN — METOPROLOL TARTRATE 12.5 MG: 25 TABLET, FILM COATED ORAL at 12:04

## 2021-04-23 RX ADMIN — FAMOTIDINE 20 MG: 20 TABLET ORAL at 08:04

## 2021-04-23 RX ADMIN — LAMOTRIGINE 100 MG: 100 TABLET ORAL at 12:04

## 2021-04-23 RX ADMIN — HEPARIN SODIUM 5000 UNITS: 5000 INJECTION INTRAVENOUS; SUBCUTANEOUS at 01:04

## 2021-04-23 RX ADMIN — LOSARTAN POTASSIUM 12.5 MG: 25 TABLET, FILM COATED ORAL at 12:04

## 2021-04-23 RX ADMIN — METOPROLOL SUCCINATE 12.5 MG: 25 TABLET, EXTENDED RELEASE ORAL at 08:04

## 2021-04-23 RX ADMIN — HEPARIN SODIUM 5000 UNITS: 5000 INJECTION INTRAVENOUS; SUBCUTANEOUS at 05:04

## 2021-04-23 RX ADMIN — FAMOTIDINE 20 MG: 20 TABLET ORAL at 12:04

## 2021-04-24 VITALS
TEMPERATURE: 98 F | HEART RATE: 85 BPM | OXYGEN SATURATION: 98 % | RESPIRATION RATE: 18 BRPM | HEIGHT: 68 IN | DIASTOLIC BLOOD PRESSURE: 57 MMHG | BODY MASS INDEX: 23.52 KG/M2 | WEIGHT: 155.19 LBS | SYSTOLIC BLOOD PRESSURE: 95 MMHG

## 2021-04-24 PROBLEM — R07.9 CHEST PAIN: Status: RESOLVED | Noted: 2021-04-22 | Resolved: 2021-04-24

## 2021-04-24 LAB
ANION GAP SERPL CALC-SCNC: 10 MMOL/L (ref 8–16)
BASOPHILS # BLD AUTO: 0.05 K/UL (ref 0–0.2)
BASOPHILS NFR BLD: 0.7 % (ref 0–1.9)
BUN SERPL-MCNC: 19 MG/DL (ref 8–23)
CALCIUM SERPL-MCNC: 9.2 MG/DL (ref 8.7–10.5)
CHLORIDE SERPL-SCNC: 106 MMOL/L (ref 95–110)
CO2 SERPL-SCNC: 26 MMOL/L (ref 23–29)
CREAT SERPL-MCNC: 1.1 MG/DL (ref 0.5–1.4)
CV STRESS BASE HR: 67 BPM
DIASTOLIC BLOOD PRESSURE: 80 MMHG
DIFFERENTIAL METHOD: NORMAL
EOSINOPHIL # BLD AUTO: 0.1 K/UL (ref 0–0.5)
EOSINOPHIL NFR BLD: 0.8 % (ref 0–8)
ERYTHROCYTE [DISTWIDTH] IN BLOOD BY AUTOMATED COUNT: 13.1 % (ref 11.5–14.5)
EST. GFR  (AFRICAN AMERICAN): >60 ML/MIN/1.73 M^2
EST. GFR  (NON AFRICAN AMERICAN): >60 ML/MIN/1.73 M^2
GLUCOSE SERPL-MCNC: 87 MG/DL (ref 70–110)
HCT VFR BLD AUTO: 48.1 % (ref 40–54)
HGB BLD-MCNC: 16.4 G/DL (ref 14–18)
IMM GRANULOCYTES # BLD AUTO: 0.02 K/UL (ref 0–0.04)
IMM GRANULOCYTES NFR BLD AUTO: 0.3 % (ref 0–0.5)
LYMPHOCYTES # BLD AUTO: 1.6 K/UL (ref 1–4.8)
LYMPHOCYTES NFR BLD: 22 % (ref 18–48)
MAGNESIUM SERPL-MCNC: 1.9 MG/DL (ref 1.6–2.6)
MCH RBC QN AUTO: 30.3 PG (ref 27–31)
MCHC RBC AUTO-ENTMCNC: 34.1 G/DL (ref 32–36)
MCV RBC AUTO: 89 FL (ref 82–98)
MONOCYTES # BLD AUTO: 0.7 K/UL (ref 0.3–1)
MONOCYTES NFR BLD: 8.9 % (ref 4–15)
NEUTROPHILS # BLD AUTO: 4.9 K/UL (ref 1.8–7.7)
NEUTROPHILS NFR BLD: 67.3 % (ref 38–73)
NRBC BLD-RTO: 0 /100 WBC
NUC REST DIASTOLIC VOLUME INDEX: 225
NUC REST EJECTION FRACTION: 20
NUC REST SYSTOLIC VOLUME INDEX: 180
NUC STRESS DIASTOLIC VOLUME INDEX: 181
NUC STRESS EJECTION FRACTION: 25 %
NUC STRESS SYSTOLIC VOLUME INDEX: 135
OHS CV CPX 85 PERCENT MAX PREDICTED HEART RATE MALE: 128
OHS CV CPX MAX PREDICTED HEART RATE: 150
OHS CV CPX PATIENT IS FEMALE: 0
OHS CV CPX PATIENT IS MALE: 1
OHS CV CPX PEAK DIASTOLIC BLOOD PRESSURE: 75 MMHG
OHS CV CPX PEAK HEAR RATE: 97 BPM
OHS CV CPX PEAK RATE PRESSURE PRODUCT: NORMAL
OHS CV CPX PEAK SYSTOLIC BLOOD PRESSURE: 129 MMHG
OHS CV CPX PERCENT MAX PREDICTED HEART RATE ACHIEVED: 65
OHS CV CPX RATE PRESSURE PRODUCT PRESENTING: 8040
PHOSPHATE SERPL-MCNC: 3.1 MG/DL (ref 2.7–4.5)
PLATELET # BLD AUTO: 176 K/UL (ref 150–450)
PMV BLD AUTO: 9.6 FL (ref 9.2–12.9)
POTASSIUM SERPL-SCNC: 4.1 MMOL/L (ref 3.5–5.1)
RBC # BLD AUTO: 5.42 M/UL (ref 4.6–6.2)
SODIUM SERPL-SCNC: 142 MMOL/L (ref 136–145)
SYSTOLIC BLOOD PRESSURE: 120 MMHG
WBC # BLD AUTO: 7.28 K/UL (ref 3.9–12.7)

## 2021-04-24 PROCEDURE — 63600175 PHARM REV CODE 636 W HCPCS: Performed by: INTERNAL MEDICINE

## 2021-04-24 PROCEDURE — 36415 COLL VENOUS BLD VENIPUNCTURE: CPT | Performed by: INTERNAL MEDICINE

## 2021-04-24 PROCEDURE — G0378 HOSPITAL OBSERVATION PER HR: HCPCS

## 2021-04-24 PROCEDURE — 84100 ASSAY OF PHOSPHORUS: CPT | Performed by: INTERNAL MEDICINE

## 2021-04-24 PROCEDURE — 25000003 PHARM REV CODE 250: Performed by: INTERNAL MEDICINE

## 2021-04-24 PROCEDURE — 25000003 PHARM REV CODE 250: Performed by: NURSE PRACTITIONER

## 2021-04-24 PROCEDURE — 83735 ASSAY OF MAGNESIUM: CPT | Performed by: INTERNAL MEDICINE

## 2021-04-24 PROCEDURE — 96372 THER/PROPH/DIAG INJ SC/IM: CPT | Mod: 59 | Performed by: EMERGENCY MEDICINE

## 2021-04-24 PROCEDURE — 99226 PR SUBSEQUENT OBSERVATION CARE,LEVEL III: ICD-10-PCS | Mod: ,,, | Performed by: NURSE PRACTITIONER

## 2021-04-24 PROCEDURE — 80048 BASIC METABOLIC PNL TOTAL CA: CPT | Performed by: INTERNAL MEDICINE

## 2021-04-24 PROCEDURE — 99226 PR SUBSEQUENT OBSERVATION CARE,LEVEL III: CPT | Mod: ,,, | Performed by: NURSE PRACTITIONER

## 2021-04-24 PROCEDURE — 85025 COMPLETE CBC W/AUTO DIFF WBC: CPT | Performed by: INTERNAL MEDICINE

## 2021-04-24 RX ORDER — NITROGLYCERIN 0.4 MG/1
0.4 TABLET SUBLINGUAL EVERY 5 MIN PRN
Qty: 30 TABLET | Refills: 1 | Status: ON HOLD | OUTPATIENT
Start: 2021-04-24 | End: 2022-02-27 | Stop reason: SDUPTHER

## 2021-04-24 RX ORDER — METOPROLOL SUCCINATE 25 MG/1
12.5 TABLET, EXTENDED RELEASE ORAL NIGHTLY
Qty: 15 TABLET | Refills: 11 | Status: SHIPPED | OUTPATIENT
Start: 2021-04-24 | End: 2021-05-26 | Stop reason: SDUPTHER

## 2021-04-24 RX ADMIN — HEPARIN SODIUM 5000 UNITS: 5000 INJECTION INTRAVENOUS; SUBCUTANEOUS at 05:04

## 2021-04-24 RX ADMIN — SACUBITRIL AND VALSARTAN 1 TABLET: 24; 26 TABLET, FILM COATED ORAL at 08:04

## 2021-04-24 RX ADMIN — FAMOTIDINE 20 MG: 20 TABLET ORAL at 08:04

## 2021-04-24 RX ADMIN — ASPIRIN 81 MG: 81 TABLET, COATED ORAL at 08:04

## 2021-04-24 RX ADMIN — LAMOTRIGINE 100 MG: 100 TABLET ORAL at 08:04

## 2021-04-28 ENCOUNTER — OFFICE VISIT (OUTPATIENT)
Dept: CARDIOLOGY | Facility: CLINIC | Age: 71
End: 2021-04-28
Payer: MEDICARE

## 2021-04-28 VITALS
DIASTOLIC BLOOD PRESSURE: 68 MMHG | WEIGHT: 156.94 LBS | HEART RATE: 73 BPM | SYSTOLIC BLOOD PRESSURE: 110 MMHG | BODY MASS INDEX: 23.87 KG/M2 | OXYGEN SATURATION: 95 %

## 2021-04-28 DIAGNOSIS — I42.0 DILATED CARDIOMYOPATHY: ICD-10-CM

## 2021-04-28 DIAGNOSIS — Z78.9 STATIN INTOLERANCE: ICD-10-CM

## 2021-04-28 DIAGNOSIS — R00.2 PALPITATIONS: Primary | ICD-10-CM

## 2021-04-28 DIAGNOSIS — I50.20 HEART FAILURE WITH REDUCED EJECTION FRACTION, NYHA CLASS II: ICD-10-CM

## 2021-04-28 DIAGNOSIS — E78.00 HYPERCHOLESTEROLEMIA: ICD-10-CM

## 2021-04-28 PROCEDURE — 99999 PR PBB SHADOW E&M-EST. PATIENT-LVL III: CPT | Mod: PBBFAC,,, | Performed by: NURSE PRACTITIONER

## 2021-04-28 PROCEDURE — 99215 PR OFFICE/OUTPT VISIT, EST, LEVL V, 40-54 MIN: ICD-10-PCS | Mod: S$PBB,,, | Performed by: NURSE PRACTITIONER

## 2021-04-28 PROCEDURE — 99213 OFFICE O/P EST LOW 20 MIN: CPT | Mod: PBBFAC | Performed by: NURSE PRACTITIONER

## 2021-04-28 PROCEDURE — 99215 OFFICE O/P EST HI 40 MIN: CPT | Mod: S$PBB,,, | Performed by: NURSE PRACTITIONER

## 2021-04-28 PROCEDURE — 99999 PR PBB SHADOW E&M-EST. PATIENT-LVL III: ICD-10-PCS | Mod: PBBFAC,,, | Performed by: NURSE PRACTITIONER

## 2021-04-30 ENCOUNTER — TELEPHONE (OUTPATIENT)
Dept: ADMINISTRATIVE | Facility: HOSPITAL | Age: 71
End: 2021-04-30

## 2021-05-06 ENCOUNTER — HOSPITAL ENCOUNTER (OUTPATIENT)
Dept: CARDIOLOGY | Facility: HOSPITAL | Age: 71
Discharge: HOME | End: 2021-05-06
Attending: NURSE PRACTITIONER
Payer: MEDICARE

## 2021-05-06 DIAGNOSIS — I42.0 DILATED CARDIOMYOPATHY: ICD-10-CM

## 2021-05-06 DIAGNOSIS — R00.2 PALPITATIONS: ICD-10-CM

## 2021-05-06 PROCEDURE — 93246 EXT ECG>7D<15D RECORDING: CPT

## 2021-05-06 PROCEDURE — 93248 CV CARDIAC MONITOR - 3-14 DAY ADULT (CUPID ONLY): ICD-10-PCS | Mod: ,,, | Performed by: INTERNAL MEDICINE

## 2021-05-06 PROCEDURE — 93248 EXT ECG>7D<15D REV&INTERPJ: CPT | Mod: ,,, | Performed by: INTERNAL MEDICINE

## 2021-05-06 PROCEDURE — 93247 EXT ECG>7D<15D SCAN A/R: CPT

## 2021-05-17 NOTE — PROGRESS NOTES
-- DO NOT REPLY / DO NOT REPLY ALL --  -- Message is from the [facility]--    Patient is requesting a medication refill - medication is on active medication list    Patient is currently OUT of the requested medication.    Was Medication Pended?  Yes.    Rx Name and Dose:  irbesartan (AVAPRO) 150 MG tablet    Duration: 90 days    Pharmacy  [facility] [address] [place] [address]    Patient confirmed the above pharmacy as correct?  Yes    Caller Information       Type Contact Phone    05/17/2021 04:27 PM CDT Phone (Incoming) Ginny Ruiz (Self) [phone] (M)          Alternative phone number: Patient speaks [place] Islander only and will need an     Turnaround time given to caller:   \"Your doctor's office is closed. This message will be sent to our nurse. They will return your call as soon as they review your message. (Calls after 10 PM will be returned tomorrow morning.)\"   Individual Psychotherapy (PhD/LCSW)    12/10/2020    Site:  [facility]         Therapeutic Intervention: Met with patient.  Outpatient - Insight oriented psychotherapy 45 min - CPT code 08635  Virtual visit with synchronous audio and video      Each patient to whom he provides medical services by telemedicine is:  (1) informed of the relationship between the physician and patient and the respective role of any other health care provider with respect to management of the patient; and (2) notified that he or she may decline to receive medical services by telemedicine and may withdraw from such care at any time.      Location:  His home bedroom    Chief complaint/reason for encounter: anxiety     Interval history and content of current session:  Patient presents via video to ongoing individual therapy due to anxiety.  He injured his right shoulder again due to stressing out his muscles.  He has been in a lot pain.  He had a level six pain prior to cortisone shot last week.  The pain has improved in the last week.  He is not taking pain medication.  His anxiety has been higher with the pain.  He is not getting good sleep due to the pain.  He has slept good on and off since the cortisone shot.  He is feeling better during the day.  They did not have any family over for Thanksgiving.  His daughter in [place] was nervous about coming because she has a calf valve place in her heart three years ago.  She is nervous about being around others.  She works out of her house for four years.  She is in marketing for a big car dealership.  She likes being alone.  His daughters were supposed to come for [place], but his oldest daughter may be nervous due to the spike in COVID.  He does plan to get the COVID vaccine.  Emphasize that he has no control over others in relation to the vaccine or politics.  Encourage limiting the amount of time that he consumes negative news and social media.  Praise his insight into using a pause before  "replying to others.  Emphasize the concept of active listening without considering what the next response will be.  He is unsettled by several issues related to politics.  It has increased his anxiety.  He relays how President Fish has taken pages directly from the [employer] playbook.  A previous co worker has been putting his political ideas on [employer].  He confronted his friend that it wasn't proper material for a job website.  He is frustrated that he is no longer able to have a discourse with people about controversial topics.  He has been more isolated which has helped him to avoid talking about politics.  However, he is worried about people "arming up."  He plans to use the ten second rule, where he will pause and thin before he responds.  He read a book about active listening skills.      Treatment plan:  ? Target symptoms: anxiety   ? Why chosen therapy is appropriate versus another modality: relevant to diagnosis  ? Outcome monitoring methods: self-report, observation  ? Therapeutic intervention type: insight oriented psychotherapy, supportive psychotherapy, interactive psychotherapy     Risk parameters:  Patient reports no suicidal ideation  Patient reports no homicidal ideation  Patient reports no self-injurious behavior  Patient reports no violent behavior     Verbal deficits: None     Patient's response to intervention:  The patient's response to intervention is accepting, motivated.     Progress toward goals and other mental status changes:  The patient's progress toward goals is fair .     Diagnosis:   Generalized Anxiety Disorder     Plan:  individual psychotherapy and medication management by physician     Return to clinic: as scheduled     Length of Service (minutes): 45  "

## 2021-05-18 ENCOUNTER — PATIENT MESSAGE (OUTPATIENT)
Dept: CARDIOLOGY | Facility: CLINIC | Age: 71
End: 2021-05-18

## 2021-05-19 ENCOUNTER — PATIENT MESSAGE (OUTPATIENT)
Dept: CARDIOLOGY | Facility: CLINIC | Age: 71
End: 2021-05-19

## 2021-05-21 ENCOUNTER — PATIENT MESSAGE (OUTPATIENT)
Dept: CARDIOLOGY | Facility: CLINIC | Age: 71
End: 2021-05-21

## 2021-05-21 ENCOUNTER — PATIENT OUTREACH (OUTPATIENT)
Dept: ADMINISTRATIVE | Facility: OTHER | Age: 71
End: 2021-05-21

## 2021-05-21 DIAGNOSIS — Z12.11 ENCOUNTER FOR FIT (FECAL IMMUNOCHEMICAL TEST) SCREENING: Primary | ICD-10-CM

## 2021-05-24 ENCOUNTER — TELEPHONE (OUTPATIENT)
Dept: CARDIOLOGY | Facility: CLINIC | Age: 71
End: 2021-05-24

## 2021-05-24 ENCOUNTER — OFFICE VISIT (OUTPATIENT)
Dept: CARDIOLOGY | Facility: CLINIC | Age: 71
End: 2021-05-24
Payer: MEDICARE

## 2021-05-24 VITALS
BODY MASS INDEX: 24.1 KG/M2 | WEIGHT: 158.5 LBS | HEART RATE: 53 BPM | OXYGEN SATURATION: 97 % | DIASTOLIC BLOOD PRESSURE: 70 MMHG | SYSTOLIC BLOOD PRESSURE: 110 MMHG

## 2021-05-24 DIAGNOSIS — Z78.9 STATIN INTOLERANCE: ICD-10-CM

## 2021-05-24 DIAGNOSIS — I50.20 HEART FAILURE WITH REDUCED EJECTION FRACTION, NYHA CLASS II: ICD-10-CM

## 2021-05-24 DIAGNOSIS — I42.0 DILATED CARDIOMYOPATHY: Primary | ICD-10-CM

## 2021-05-24 DIAGNOSIS — E78.00 HYPERCHOLESTEROLEMIA: ICD-10-CM

## 2021-05-24 DIAGNOSIS — I27.20 PULMONARY HTN: ICD-10-CM

## 2021-05-24 PROBLEM — I25.2 H/O NON-ST ELEVATION MYOCARDIAL INFARCTION (NSTEMI): Status: ACTIVE | Noted: 2020-02-22

## 2021-05-24 PROCEDURE — 99214 PR OFFICE/OUTPT VISIT, EST, LEVL IV, 30-39 MIN: ICD-10-PCS | Mod: S$PBB,,, | Performed by: NURSE PRACTITIONER

## 2021-05-24 PROCEDURE — 99999 PR PBB SHADOW E&M-EST. PATIENT-LVL III: ICD-10-PCS | Mod: PBBFAC,,, | Performed by: NURSE PRACTITIONER

## 2021-05-24 PROCEDURE — 99213 OFFICE O/P EST LOW 20 MIN: CPT | Mod: PBBFAC | Performed by: NURSE PRACTITIONER

## 2021-05-24 PROCEDURE — 99214 OFFICE O/P EST MOD 30 MIN: CPT | Mod: S$PBB,,, | Performed by: NURSE PRACTITIONER

## 2021-05-24 PROCEDURE — 99999 PR PBB SHADOW E&M-EST. PATIENT-LVL III: CPT | Mod: PBBFAC,,, | Performed by: NURSE PRACTITIONER

## 2021-05-26 ENCOUNTER — PATIENT MESSAGE (OUTPATIENT)
Dept: PSYCHIATRY | Facility: CLINIC | Age: 71
End: 2021-05-26

## 2021-05-26 ENCOUNTER — PATIENT MESSAGE (OUTPATIENT)
Dept: CARDIOLOGY | Facility: CLINIC | Age: 71
End: 2021-05-26

## 2021-05-26 RX ORDER — METOPROLOL SUCCINATE 25 MG/1
12.5 TABLET, EXTENDED RELEASE ORAL NIGHTLY
Qty: 15 TABLET | Refills: 11 | Status: SHIPPED | OUTPATIENT
Start: 2021-05-26 | End: 2021-09-14 | Stop reason: SDUPTHER

## 2021-05-28 ENCOUNTER — PATIENT MESSAGE (OUTPATIENT)
Dept: PSYCHIATRY | Facility: CLINIC | Age: 71
End: 2021-05-28

## 2021-05-28 DIAGNOSIS — F39 UNSPECIFIED MOOD (AFFECTIVE) DISORDER: ICD-10-CM

## 2021-05-28 RX ORDER — LAMOTRIGINE 100 MG/1
100 TABLET ORAL DAILY
Qty: 30 TABLET | Refills: 0 | Status: SHIPPED | OUTPATIENT
Start: 2021-05-28 | End: 2021-06-07

## 2021-06-02 ENCOUNTER — PATIENT MESSAGE (OUTPATIENT)
Dept: SURGERY | Facility: CLINIC | Age: 71
End: 2021-06-02

## 2021-06-02 ENCOUNTER — TELEPHONE (OUTPATIENT)
Dept: DERMATOLOGY | Facility: CLINIC | Age: 71
End: 2021-06-02

## 2021-06-03 ENCOUNTER — OFFICE VISIT (OUTPATIENT)
Dept: INTERNAL MEDICINE | Facility: CLINIC | Age: 71
End: 2021-06-03
Payer: MEDICARE

## 2021-06-03 ENCOUNTER — LAB VISIT (OUTPATIENT)
Dept: LAB | Facility: HOSPITAL | Age: 71
End: 2021-06-03
Attending: PEDIATRICS
Payer: MEDICARE

## 2021-06-03 ENCOUNTER — TELEPHONE (OUTPATIENT)
Dept: DERMATOLOGY | Facility: CLINIC | Age: 71
End: 2021-06-03

## 2021-06-03 VITALS
DIASTOLIC BLOOD PRESSURE: 68 MMHG | HEART RATE: 56 BPM | BODY MASS INDEX: 24.81 KG/M2 | TEMPERATURE: 98 F | OXYGEN SATURATION: 98 % | WEIGHT: 163.13 LBS | SYSTOLIC BLOOD PRESSURE: 112 MMHG

## 2021-06-03 DIAGNOSIS — N40.0 PROSTATISM: Primary | ICD-10-CM

## 2021-06-03 DIAGNOSIS — N40.0 PROSTATISM: ICD-10-CM

## 2021-06-03 LAB — COMPLEXED PSA SERPL-MCNC: 1.1 NG/ML (ref 0–4)

## 2021-06-03 PROCEDURE — 99214 OFFICE O/P EST MOD 30 MIN: CPT | Mod: S$PBB,,, | Performed by: PEDIATRICS

## 2021-06-03 PROCEDURE — 99999 PR PBB SHADOW E&M-EST. PATIENT-LVL III: CPT | Mod: PBBFAC,,, | Performed by: PEDIATRICS

## 2021-06-03 PROCEDURE — 99999 PR PBB SHADOW E&M-EST. PATIENT-LVL III: ICD-10-PCS | Mod: PBBFAC,,, | Performed by: PEDIATRICS

## 2021-06-03 PROCEDURE — 99214 PR OFFICE/OUTPT VISIT, EST, LEVL IV, 30-39 MIN: ICD-10-PCS | Mod: S$PBB,,, | Performed by: PEDIATRICS

## 2021-06-03 PROCEDURE — 36415 COLL VENOUS BLD VENIPUNCTURE: CPT | Performed by: PEDIATRICS

## 2021-06-03 PROCEDURE — 84153 ASSAY OF PSA TOTAL: CPT | Performed by: PEDIATRICS

## 2021-06-03 PROCEDURE — 99213 OFFICE O/P EST LOW 20 MIN: CPT | Mod: PBBFAC | Performed by: PEDIATRICS

## 2021-06-03 RX ORDER — CIPROFLOXACIN 500 MG/1
500 TABLET ORAL 2 TIMES DAILY
Qty: 28 TABLET | Refills: 1 | Status: SHIPPED | OUTPATIENT
Start: 2021-06-03 | End: 2021-06-17

## 2021-06-03 RX ORDER — SILODOSIN 4 MG/1
4 CAPSULE ORAL DAILY
Qty: 30 CAPSULE | Refills: 11 | Status: SHIPPED | OUTPATIENT
Start: 2021-06-03 | End: 2021-08-31

## 2021-06-04 ENCOUNTER — OFFICE VISIT (OUTPATIENT)
Dept: DERMATOLOGY | Facility: CLINIC | Age: 71
End: 2021-06-04
Payer: MEDICARE

## 2021-06-04 DIAGNOSIS — D18.01 HEMANGIOMA OF SKIN: ICD-10-CM

## 2021-06-04 DIAGNOSIS — Z12.83 SCREENING, MALIGNANT NEOPLASM, SKIN: ICD-10-CM

## 2021-06-04 DIAGNOSIS — L90.5 SCAR CONDITIONS/SKIN FIBROSIS: Primary | ICD-10-CM

## 2021-06-04 DIAGNOSIS — Z85.828 HISTORY OF SKIN CANCER: ICD-10-CM

## 2021-06-04 DIAGNOSIS — L57.0 ACTINIC KERATOSES: ICD-10-CM

## 2021-06-04 PROCEDURE — 17000 DESTRUCT PREMALG LESION: CPT | Mod: S$PBB,,, | Performed by: DERMATOLOGY

## 2021-06-04 PROCEDURE — 99213 PR OFFICE/OUTPT VISIT, EST, LEVL III, 20-29 MIN: ICD-10-PCS | Mod: 25,S$PBB,, | Performed by: DERMATOLOGY

## 2021-06-04 PROCEDURE — 99999 PR PBB SHADOW E&M-EST. PATIENT-LVL III: CPT | Mod: PBBFAC,,, | Performed by: DERMATOLOGY

## 2021-06-04 PROCEDURE — 99213 OFFICE O/P EST LOW 20 MIN: CPT | Mod: PBBFAC | Performed by: DERMATOLOGY

## 2021-06-04 PROCEDURE — 17000 PR DESTRUCTION(LASER SURGERY,CRYOSURGERY,CHEMOSURGERY),PREMALIGNANT LESIONS,FIRST LESION: ICD-10-PCS | Mod: S$PBB,,, | Performed by: DERMATOLOGY

## 2021-06-04 PROCEDURE — 17000 DESTRUCT PREMALG LESION: CPT | Mod: PBBFAC | Performed by: DERMATOLOGY

## 2021-06-04 PROCEDURE — 99213 OFFICE O/P EST LOW 20 MIN: CPT | Mod: 25,S$PBB,, | Performed by: DERMATOLOGY

## 2021-06-04 PROCEDURE — 99999 PR PBB SHADOW E&M-EST. PATIENT-LVL III: ICD-10-PCS | Mod: PBBFAC,,, | Performed by: DERMATOLOGY

## 2021-06-06 ENCOUNTER — PATIENT MESSAGE (OUTPATIENT)
Dept: PSYCHIATRY | Facility: CLINIC | Age: 71
End: 2021-06-06

## 2021-06-07 ENCOUNTER — OFFICE VISIT (OUTPATIENT)
Dept: PSYCHIATRY | Facility: CLINIC | Age: 71
End: 2021-06-07
Payer: MEDICARE

## 2021-06-07 DIAGNOSIS — I25.118 CORONARY ARTERY DISEASE OF NATIVE ARTERY OF NATIVE HEART WITH STABLE ANGINA PECTORIS: ICD-10-CM

## 2021-06-07 DIAGNOSIS — F39 UNSPECIFIED MOOD (AFFECTIVE) DISORDER: Primary | ICD-10-CM

## 2021-06-07 DIAGNOSIS — F41.9 ANXIETY DISORDER, UNSPECIFIED TYPE: ICD-10-CM

## 2021-06-07 PROCEDURE — 99214 OFFICE O/P EST MOD 30 MIN: CPT | Mod: 95,,, | Performed by: PSYCHIATRY & NEUROLOGY

## 2021-06-07 PROCEDURE — 99214 PR OFFICE/OUTPT VISIT, EST, LEVL IV, 30-39 MIN: ICD-10-PCS | Mod: 95,,, | Performed by: PSYCHIATRY & NEUROLOGY

## 2021-06-07 RX ORDER — LAMOTRIGINE 25 MG/1
50 TABLET ORAL DAILY
Qty: 180 TABLET | Refills: 1 | Status: SHIPPED | OUTPATIENT
Start: 2021-06-07 | End: 2022-01-05 | Stop reason: ALTCHOICE

## 2021-06-24 ENCOUNTER — OFFICE VISIT (OUTPATIENT)
Dept: PSYCHIATRY | Facility: CLINIC | Age: 71
End: 2021-06-24
Payer: MEDICARE

## 2021-06-24 DIAGNOSIS — F41.1 GENERALIZED ANXIETY DISORDER: Primary | ICD-10-CM

## 2021-06-24 PROCEDURE — 90834 PR PSYCHOTHERAPY W/PATIENT, 45 MIN: ICD-10-PCS | Mod: ,,, | Performed by: SOCIAL WORKER

## 2021-06-24 PROCEDURE — 90834 PSYTX W PT 45 MINUTES: CPT | Mod: ,,, | Performed by: SOCIAL WORKER

## 2021-07-01 ENCOUNTER — PES CALL (OUTPATIENT)
Dept: ADMINISTRATIVE | Facility: CLINIC | Age: 71
End: 2021-07-01

## 2021-07-06 ENCOUNTER — PATIENT MESSAGE (OUTPATIENT)
Dept: ADMINISTRATIVE | Facility: HOSPITAL | Age: 71
End: 2021-07-06

## 2021-07-08 ENCOUNTER — PATIENT MESSAGE (OUTPATIENT)
Dept: PSYCHIATRY | Facility: CLINIC | Age: 71
End: 2021-07-08

## 2021-07-21 ENCOUNTER — PATIENT MESSAGE (OUTPATIENT)
Dept: ADMINISTRATIVE | Facility: HOSPITAL | Age: 71
End: 2021-07-21

## 2021-07-23 ENCOUNTER — HOSPITAL ENCOUNTER (OUTPATIENT)
Dept: CARDIOLOGY | Facility: HOSPITAL | Age: 71
Discharge: HOME | End: 2021-07-23
Attending: NURSE PRACTITIONER
Payer: MEDICARE

## 2021-07-23 VITALS — WEIGHT: 163 LBS | HEIGHT: 68 IN | BODY MASS INDEX: 24.71 KG/M2

## 2021-07-23 DIAGNOSIS — I42.0 DILATED CARDIOMYOPATHY: ICD-10-CM

## 2021-07-23 DIAGNOSIS — I50.20 HEART FAILURE WITH REDUCED EJECTION FRACTION, NYHA CLASS II: ICD-10-CM

## 2021-07-23 LAB
AORTIC ROOT ANNULUS: 3.58 CM
AV INDEX (PROSTH): 0.76
AV MEAN GRADIENT: 3 MMHG
AV PEAK GRADIENT: 5 MMHG
AV VALVE AREA: 2.25 CM2
AV VELOCITY RATIO: 0.74
BSA FOR ECHO PROCEDURE: 1.88 M2
CV ECHO LV RWT: 0.64 CM
DOP CALC AO PEAK VEL: 1.17 M/S
DOP CALC AO VTI: 27.72 CM
DOP CALC LVOT AREA: 3 CM2
DOP CALC LVOT DIAMETER: 1.95 CM
DOP CALC LVOT PEAK VEL: 0.87 M/S
DOP CALC LVOT STROKE VOLUME: 62.48 CM3
DOP CALC RVOT PEAK VEL: 0.64 M/S
DOP CALC RVOT VTI: 15.96 CM
DOP CALCLVOT PEAK VEL VTI: 20.93 CM
E WAVE DECELERATION TIME: 192.07 MSEC
E/A RATIO: 0.63
E/E' RATIO: 9.08 M/S
ECHO LV POSTERIOR WALL: 1.33 CM (ref 0.6–1.1)
EJECTION FRACTION: 30 %
FRACTIONAL SHORTENING: 20 % (ref 28–44)
INTERVENTRICULAR SEPTUM: 1.21 CM (ref 0.6–1.1)
IVRT: 94.2 MSEC
LA MAJOR: 5.28 CM
LA MINOR: 5.01 CM
LA WIDTH: 3.42 CM
LEFT ATRIUM SIZE: 3.97 CM
LEFT ATRIUM VOLUME INDEX: 31.7 ML/M2
LEFT ATRIUM VOLUME: 59.34 CM3
LEFT INTERNAL DIMENSION IN SYSTOLE: 3.3 CM (ref 2.1–4)
LEFT VENTRICLE DIASTOLIC VOLUME INDEX: 40.34 ML/M2
LEFT VENTRICLE DIASTOLIC VOLUME: 75.44 ML
LEFT VENTRICLE MASS INDEX: 101 G/M2
LEFT VENTRICLE SYSTOLIC VOLUME INDEX: 23.6 ML/M2
LEFT VENTRICLE SYSTOLIC VOLUME: 44.14 ML
LEFT VENTRICULAR INTERNAL DIMENSION IN DIASTOLE: 4.13 CM (ref 3.5–6)
LEFT VENTRICULAR MASS: 188.88 G
LV LATERAL E/E' RATIO: 7.38 M/S
LV SEPTAL E/E' RATIO: 11.8 M/S
MV A" WAVE DURATION": 9.42 MSEC
MV PEAK A VEL: 0.93 M/S
MV PEAK E VEL: 0.59 M/S
PISA TR MAX VEL: 2.55 M/S
PULM VEIN S/D RATIO: 1.79
PV MEAN GRADIENT: 1 MMHG
PV PEAK D VEL: 0.42 M/S
PV PEAK S VEL: 0.75 M/S
PV PEAK VELOCITY: 0.91 CM/S
RA MAJOR: 5.05 CM
RA PRESSURE: 3 MMHG
RA WIDTH: 3.57 CM
RIGHT VENTRICULAR END-DIASTOLIC DIMENSION: 3.08 CM
SINUS: 2.93 CM
STJ: 3.22 CM
TDI LATERAL: 0.08 M/S
TDI SEPTAL: 0.05 M/S
TDI: 0.07 M/S
TR MAX PG: 26 MMHG
TV REST PULMONARY ARTERY PRESSURE: 29 MMHG

## 2021-07-23 PROCEDURE — 93306 TTE W/DOPPLER COMPLETE: CPT | Mod: 26,,, | Performed by: INTERNAL MEDICINE

## 2021-07-23 PROCEDURE — 93306 ECHO (CUPID ONLY): ICD-10-PCS | Mod: 26,,, | Performed by: INTERNAL MEDICINE

## 2021-07-23 PROCEDURE — 93306 TTE W/DOPPLER COMPLETE: CPT

## 2021-07-26 ENCOUNTER — PATIENT MESSAGE (OUTPATIENT)
Dept: CARDIOLOGY | Facility: CLINIC | Age: 71
End: 2021-07-26

## 2021-07-26 ENCOUNTER — LAB VISIT (OUTPATIENT)
Dept: LAB | Facility: HOSPITAL | Age: 71
End: 2021-07-26
Payer: MEDICARE

## 2021-07-26 ENCOUNTER — TELEPHONE (OUTPATIENT)
Dept: CARDIOLOGY | Facility: CLINIC | Age: 71
End: 2021-07-26

## 2021-07-26 ENCOUNTER — DOCUMENTATION ONLY (OUTPATIENT)
Dept: CARDIOLOGY | Facility: CLINIC | Age: 71
End: 2021-07-26

## 2021-07-26 DIAGNOSIS — I50.20 NYHA CLASS 2 HEART FAILURE WITH REDUCED EJECTION FRACTION: ICD-10-CM

## 2021-07-26 DIAGNOSIS — I42.0 DCM (DILATED CARDIOMYOPATHY): ICD-10-CM

## 2021-07-26 DIAGNOSIS — I50.20 NYHA CLASS 2 HEART FAILURE WITH REDUCED EJECTION FRACTION: Primary | ICD-10-CM

## 2021-07-26 LAB
APTT BLDCRRT: 23.2 SEC (ref 21–32)
BASOPHILS # BLD AUTO: 0.04 K/UL (ref 0–0.2)
BASOPHILS NFR BLD: 0.6 % (ref 0–1.9)
DIFFERENTIAL METHOD: ABNORMAL
EOSINOPHIL # BLD AUTO: 0 K/UL (ref 0–0.5)
EOSINOPHIL NFR BLD: 0.3 % (ref 0–8)
ERYTHROCYTE [DISTWIDTH] IN BLOOD BY AUTOMATED COUNT: 14 % (ref 11.5–14.5)
HCT VFR BLD AUTO: 43.8 % (ref 40–54)
HGB BLD-MCNC: 15.1 G/DL (ref 14–18)
IMM GRANULOCYTES # BLD AUTO: 0.02 K/UL (ref 0–0.04)
IMM GRANULOCYTES NFR BLD AUTO: 0.3 % (ref 0–0.5)
INR PPP: 1 (ref 0.8–1.2)
LYMPHOCYTES # BLD AUTO: 1.5 K/UL (ref 1–4.8)
LYMPHOCYTES NFR BLD: 21.5 % (ref 18–48)
MCH RBC QN AUTO: 31.3 PG (ref 27–31)
MCHC RBC AUTO-ENTMCNC: 34.5 G/DL (ref 32–36)
MCV RBC AUTO: 91 FL (ref 82–98)
MONOCYTES # BLD AUTO: 0.5 K/UL (ref 0.3–1)
MONOCYTES NFR BLD: 6.8 % (ref 4–15)
NEUTROPHILS # BLD AUTO: 5 K/UL (ref 1.8–7.7)
NEUTROPHILS NFR BLD: 70.5 % (ref 38–73)
NRBC BLD-RTO: 0 /100 WBC
PLATELET # BLD AUTO: 164 K/UL (ref 150–450)
PMV BLD AUTO: 10.6 FL (ref 9.2–12.9)
PROTHROMBIN TIME: 10.6 SEC (ref 9–12.5)
RBC # BLD AUTO: 4.83 M/UL (ref 4.6–6.2)
WBC # BLD AUTO: 7.02 K/UL (ref 3.9–12.7)

## 2021-07-26 PROCEDURE — 85730 THROMBOPLASTIN TIME PARTIAL: CPT | Performed by: NURSE PRACTITIONER

## 2021-07-26 PROCEDURE — 80048 BASIC METABOLIC PNL TOTAL CA: CPT | Performed by: NURSE PRACTITIONER

## 2021-07-26 PROCEDURE — 36415 COLL VENOUS BLD VENIPUNCTURE: CPT | Performed by: NURSE PRACTITIONER

## 2021-07-26 PROCEDURE — 85610 PROTHROMBIN TIME: CPT | Performed by: NURSE PRACTITIONER

## 2021-07-26 PROCEDURE — 85025 COMPLETE CBC W/AUTO DIFF WBC: CPT | Performed by: NURSE PRACTITIONER

## 2021-07-27 DIAGNOSIS — Z95.810 ICD (IMPLANTABLE CARDIOVERTER-DEFIBRILLATOR) IN PLACE: Primary | ICD-10-CM

## 2021-07-27 DIAGNOSIS — I42.0 DCM (DILATED CARDIOMYOPATHY): ICD-10-CM

## 2021-07-27 DIAGNOSIS — I50.20 NYHA CLASS 2 HEART FAILURE WITH REDUCED EJECTION FRACTION: ICD-10-CM

## 2021-07-27 LAB
ANION GAP SERPL CALC-SCNC: 8 MMOL/L (ref 8–16)
BUN SERPL-MCNC: 21 MG/DL (ref 8–23)
CALCIUM SERPL-MCNC: 10 MG/DL (ref 8.7–10.5)
CHLORIDE SERPL-SCNC: 108 MMOL/L (ref 95–110)
CO2 SERPL-SCNC: 27 MMOL/L (ref 23–29)
CREAT SERPL-MCNC: 1.3 MG/DL (ref 0.5–1.4)
EST. GFR  (AFRICAN AMERICAN): >60 ML/MIN/1.73 M^2
EST. GFR  (NON AFRICAN AMERICAN): 54.9 ML/MIN/1.73 M^2
GLUCOSE SERPL-MCNC: 86 MG/DL (ref 70–110)
POTASSIUM SERPL-SCNC: 4.8 MMOL/L (ref 3.5–5.1)
SODIUM SERPL-SCNC: 143 MMOL/L (ref 136–145)

## 2021-07-28 ENCOUNTER — PATIENT MESSAGE (OUTPATIENT)
Dept: CARDIOLOGY | Facility: CLINIC | Age: 71
End: 2021-07-28

## 2021-08-03 ENCOUNTER — PATIENT MESSAGE (OUTPATIENT)
Dept: CARDIOLOGY | Facility: CLINIC | Age: 71
End: 2021-08-03

## 2021-08-04 ENCOUNTER — OFFICE VISIT (OUTPATIENT)
Dept: CARDIOLOGY | Facility: CLINIC | Age: 71
End: 2021-08-04
Payer: MEDICARE

## 2021-08-04 DIAGNOSIS — R07.9 CHEST PAIN, UNSPECIFIED TYPE: ICD-10-CM

## 2021-08-04 DIAGNOSIS — Z78.9 STATIN INTOLERANCE: ICD-10-CM

## 2021-08-04 DIAGNOSIS — Z95.810 ICD (IMPLANTABLE CARDIOVERTER-DEFIBRILLATOR) IN PLACE: ICD-10-CM

## 2021-08-04 DIAGNOSIS — E78.00 HYPERCHOLESTEROLEMIA: ICD-10-CM

## 2021-08-04 DIAGNOSIS — I42.0 DCM (DILATED CARDIOMYOPATHY): Primary | ICD-10-CM

## 2021-08-04 DIAGNOSIS — I50.20 NYHA CLASS 2 HEART FAILURE WITH REDUCED EJECTION FRACTION: ICD-10-CM

## 2021-08-04 PROCEDURE — 99214 OFFICE O/P EST MOD 30 MIN: CPT | Mod: 95,,, | Performed by: INTERNAL MEDICINE

## 2021-08-04 PROCEDURE — 99214 PR OFFICE/OUTPT VISIT, EST, LEVL IV, 30-39 MIN: ICD-10-PCS | Mod: 95,,, | Performed by: INTERNAL MEDICINE

## 2021-08-23 DIAGNOSIS — R06.02 SOB (SHORTNESS OF BREATH): Primary | ICD-10-CM

## 2021-08-25 ENCOUNTER — PATIENT OUTREACH (OUTPATIENT)
Dept: ADMINISTRATIVE | Facility: OTHER | Age: 71
End: 2021-08-25

## 2021-08-26 ENCOUNTER — HOSPITAL ENCOUNTER (OUTPATIENT)
Dept: RADIOLOGY | Facility: HOSPITAL | Age: 71
Discharge: HOME | End: 2021-08-26
Attending: INTERNAL MEDICINE
Payer: MEDICARE

## 2021-08-26 ENCOUNTER — OFFICE VISIT (OUTPATIENT)
Dept: PULMONOLOGY | Facility: CLINIC | Age: 71
End: 2021-08-26
Payer: MEDICARE

## 2021-08-26 VITALS
RESPIRATION RATE: 20 BRPM | OXYGEN SATURATION: 98 % | WEIGHT: 159.81 LBS | BODY MASS INDEX: 24.22 KG/M2 | HEIGHT: 68 IN | DIASTOLIC BLOOD PRESSURE: 60 MMHG | HEART RATE: 51 BPM | SYSTOLIC BLOOD PRESSURE: 122 MMHG

## 2021-08-26 DIAGNOSIS — I27.20 PULMONARY HTN: ICD-10-CM

## 2021-08-26 DIAGNOSIS — R06.02 SOB (SHORTNESS OF BREATH): ICD-10-CM

## 2021-08-26 DIAGNOSIS — R91.1 SOLITARY PULMONARY NODULE: ICD-10-CM

## 2021-08-26 DIAGNOSIS — I42.0 DILATED CARDIOMYOPATHY: ICD-10-CM

## 2021-08-26 DIAGNOSIS — G47.34 NOCTURNAL HYPOXEMIA: Primary | ICD-10-CM

## 2021-08-26 PROCEDURE — 99999 PR PBB SHADOW E&M-EST. PATIENT-LVL V: CPT | Mod: PBBFAC,,, | Performed by: INTERNAL MEDICINE

## 2021-08-26 PROCEDURE — 99999 PR PBB SHADOW E&M-EST. PATIENT-LVL V: ICD-10-PCS | Mod: PBBFAC,,, | Performed by: INTERNAL MEDICINE

## 2021-08-26 PROCEDURE — 71046 X-RAY EXAM CHEST 2 VIEWS: CPT | Mod: TC

## 2021-08-26 PROCEDURE — 71046 X-RAY EXAM CHEST 2 VIEWS: CPT | Mod: 26,,, | Performed by: RADIOLOGY

## 2021-08-26 PROCEDURE — 71046 XR CHEST PA AND LATERAL: ICD-10-PCS | Mod: 26,,, | Performed by: RADIOLOGY

## 2021-08-26 PROCEDURE — 99215 OFFICE O/P EST HI 40 MIN: CPT | Mod: PBBFAC | Performed by: INTERNAL MEDICINE

## 2021-08-26 PROCEDURE — 99215 OFFICE O/P EST HI 40 MIN: CPT | Mod: S$PBB,,, | Performed by: INTERNAL MEDICINE

## 2021-08-26 PROCEDURE — 99215 PR OFFICE/OUTPT VISIT, EST, LEVL V, 40-54 MIN: ICD-10-PCS | Mod: S$PBB,,, | Performed by: INTERNAL MEDICINE

## 2021-08-31 ENCOUNTER — TELEPHONE (OUTPATIENT)
Dept: RADIOLOGY | Facility: HOSPITAL | Age: 71
End: 2021-08-31

## 2021-08-31 ENCOUNTER — OFFICE VISIT (OUTPATIENT)
Dept: INTERNAL MEDICINE | Facility: CLINIC | Age: 71
End: 2021-08-31
Payer: MEDICARE

## 2021-08-31 ENCOUNTER — HOSPITAL ENCOUNTER (OUTPATIENT)
Dept: RADIOLOGY | Facility: HOSPITAL | Age: 71
Discharge: HOME | End: 2021-08-31
Attending: PEDIATRICS
Payer: MEDICARE

## 2021-08-31 VITALS
TEMPERATURE: 98 F | HEIGHT: 68 IN | HEART RATE: 50 BPM | OXYGEN SATURATION: 99 % | DIASTOLIC BLOOD PRESSURE: 64 MMHG | BODY MASS INDEX: 24.26 KG/M2 | WEIGHT: 160.06 LBS | SYSTOLIC BLOOD PRESSURE: 122 MMHG

## 2021-08-31 DIAGNOSIS — R15.2 RECTAL URGENCY: ICD-10-CM

## 2021-08-31 DIAGNOSIS — R15.2 RECTAL URGENCY: Primary | ICD-10-CM

## 2021-08-31 DIAGNOSIS — K59.00 CONSTIPATION, UNSPECIFIED CONSTIPATION TYPE: ICD-10-CM

## 2021-08-31 PROCEDURE — 99214 OFFICE O/P EST MOD 30 MIN: CPT | Mod: S$PBB,,, | Performed by: PEDIATRICS

## 2021-08-31 PROCEDURE — 99214 PR OFFICE/OUTPT VISIT, EST, LEVL IV, 30-39 MIN: ICD-10-PCS | Mod: S$PBB,,, | Performed by: PEDIATRICS

## 2021-08-31 PROCEDURE — 74019 XR ABDOMEN FLAT AND ERECT: ICD-10-PCS | Mod: 26,,, | Performed by: RADIOLOGY

## 2021-08-31 PROCEDURE — 74019 RADEX ABDOMEN 2 VIEWS: CPT | Mod: TC

## 2021-08-31 PROCEDURE — 99999 PR PBB SHADOW E&M-EST. PATIENT-LVL IV: CPT | Mod: PBBFAC,,, | Performed by: PEDIATRICS

## 2021-08-31 PROCEDURE — 74019 RADEX ABDOMEN 2 VIEWS: CPT | Mod: 26,,, | Performed by: RADIOLOGY

## 2021-08-31 PROCEDURE — 99214 OFFICE O/P EST MOD 30 MIN: CPT | Mod: PBBFAC | Performed by: PEDIATRICS

## 2021-08-31 PROCEDURE — 99999 PR PBB SHADOW E&M-EST. PATIENT-LVL IV: ICD-10-PCS | Mod: PBBFAC,,, | Performed by: PEDIATRICS

## 2021-09-01 ENCOUNTER — HOSPITAL ENCOUNTER (OUTPATIENT)
Dept: RADIOLOGY | Facility: HOSPITAL | Age: 71
Discharge: HOME | End: 2021-09-01
Attending: INTERNAL MEDICINE
Payer: MEDICARE

## 2021-09-01 ENCOUNTER — PATIENT MESSAGE (OUTPATIENT)
Dept: PULMONOLOGY | Facility: CLINIC | Age: 71
End: 2021-09-01

## 2021-09-01 DIAGNOSIS — R91.1 SOLITARY PULMONARY NODULE: ICD-10-CM

## 2021-09-01 DIAGNOSIS — I27.20 PULMONARY HTN: ICD-10-CM

## 2021-09-01 PROCEDURE — 71250 CT THORAX DX C-: CPT | Mod: TC

## 2021-09-01 PROCEDURE — 71250 CT THORAX DX C-: CPT | Mod: 26,,, | Performed by: RADIOLOGY

## 2021-09-01 PROCEDURE — 71250 CT CHEST WITHOUT CONTRAST: ICD-10-PCS | Mod: 26,,, | Performed by: RADIOLOGY

## 2021-09-09 ENCOUNTER — TELEPHONE (OUTPATIENT)
Dept: PULMONOLOGY | Facility: CLINIC | Age: 71
End: 2021-09-09

## 2021-09-10 ENCOUNTER — TELEPHONE (OUTPATIENT)
Dept: PULMONOLOGY | Facility: CLINIC | Age: 71
End: 2021-09-10

## 2021-09-10 ENCOUNTER — OFFICE VISIT (OUTPATIENT)
Dept: PULMONOLOGY | Facility: CLINIC | Age: 71
End: 2021-09-10
Payer: MEDICARE

## 2021-09-10 DIAGNOSIS — R91.1 SOLITARY PULMONARY NODULE: ICD-10-CM

## 2021-09-10 DIAGNOSIS — I27.20 PULMONARY HTN: ICD-10-CM

## 2021-09-10 DIAGNOSIS — G47.34 NOCTURNAL HYPOXEMIA: Primary | ICD-10-CM

## 2021-09-10 PROCEDURE — 99213 PR OFFICE/OUTPT VISIT, EST, LEVL III, 20-29 MIN: ICD-10-PCS | Mod: 95,,, | Performed by: INTERNAL MEDICINE

## 2021-09-10 PROCEDURE — 99213 OFFICE O/P EST LOW 20 MIN: CPT | Mod: 95,,, | Performed by: INTERNAL MEDICINE

## 2021-09-13 ENCOUNTER — OFFICE VISIT (OUTPATIENT)
Dept: GASTROENTEROLOGY | Facility: CLINIC | Age: 71
End: 2021-09-13
Payer: MEDICARE

## 2021-09-13 VITALS
DIASTOLIC BLOOD PRESSURE: 72 MMHG | WEIGHT: 158.31 LBS | HEIGHT: 68 IN | SYSTOLIC BLOOD PRESSURE: 122 MMHG | BODY MASS INDEX: 23.99 KG/M2

## 2021-09-13 DIAGNOSIS — Z86.010 HISTORY OF COLON POLYPS: ICD-10-CM

## 2021-09-13 DIAGNOSIS — K62.89 RECTAL PAIN: Primary | ICD-10-CM

## 2021-09-13 DIAGNOSIS — K59.00 CONSTIPATION, UNSPECIFIED CONSTIPATION TYPE: ICD-10-CM

## 2021-09-13 DIAGNOSIS — R15.2 RECTAL URGENCY: ICD-10-CM

## 2021-09-13 PROCEDURE — 99214 PR OFFICE/OUTPT VISIT, EST, LEVL IV, 30-39 MIN: ICD-10-PCS | Mod: S$PBB,,, | Performed by: NURSE PRACTITIONER

## 2021-09-13 PROCEDURE — 99214 OFFICE O/P EST MOD 30 MIN: CPT | Mod: S$PBB,,, | Performed by: NURSE PRACTITIONER

## 2021-09-13 PROCEDURE — 99999 PR PBB SHADOW E&M-EST. PATIENT-LVL IV: CPT | Mod: PBBFAC,,, | Performed by: NURSE PRACTITIONER

## 2021-09-13 PROCEDURE — 99999 PR PBB SHADOW E&M-EST. PATIENT-LVL IV: ICD-10-PCS | Mod: PBBFAC,,, | Performed by: NURSE PRACTITIONER

## 2021-09-13 PROCEDURE — 99214 OFFICE O/P EST MOD 30 MIN: CPT | Mod: PBBFAC | Performed by: NURSE PRACTITIONER

## 2021-09-14 ENCOUNTER — PATIENT MESSAGE (OUTPATIENT)
Dept: CARDIOLOGY | Facility: CLINIC | Age: 71
End: 2021-09-14

## 2021-09-14 DIAGNOSIS — I42.0 DCM (DILATED CARDIOMYOPATHY): ICD-10-CM

## 2021-09-14 DIAGNOSIS — I42.0 DILATED CARDIOMYOPATHY: ICD-10-CM

## 2021-09-14 DIAGNOSIS — R00.2 PALPITATIONS: Primary | ICD-10-CM

## 2021-09-14 RX ORDER — METOPROLOL SUCCINATE 25 MG/1
12.5 TABLET, EXTENDED RELEASE ORAL NIGHTLY
Qty: 15 TABLET | Refills: 11 | Status: ON HOLD | OUTPATIENT
Start: 2021-09-14 | End: 2022-02-27 | Stop reason: SDUPTHER

## 2021-09-19 ENCOUNTER — CLINICAL SUPPORT (OUTPATIENT)
Dept: URGENT CARE | Facility: CLINIC | Age: 71
End: 2021-09-19
Payer: MEDICARE

## 2021-09-19 DIAGNOSIS — Z86.010 HISTORY OF COLON POLYPS: ICD-10-CM

## 2021-09-19 PROCEDURE — U0003 INFECTIOUS AGENT DETECTION BY NUCLEIC ACID (DNA OR RNA); SEVERE ACUTE RESPIRATORY SYNDROME CORONAVIRUS 2 (SARS-COV-2) (CORONAVIRUS DISEASE [COVID-19]), AMPLIFIED PROBE TECHNIQUE, MAKING USE OF HIGH THROUGHPUT TECHNOLOGIES AS DESCRIBED BY CMS-2020-01-R: HCPCS | Performed by: NURSE PRACTITIONER

## 2021-09-19 PROCEDURE — U0005 INFEC AGEN DETEC AMPLI PROBE: HCPCS | Performed by: NURSE PRACTITIONER

## 2021-09-20 ENCOUNTER — OFFICE VISIT (OUTPATIENT)
Dept: CARDIOLOGY | Facility: CLINIC | Age: 71
End: 2021-09-20
Payer: MEDICARE

## 2021-09-20 VITALS
RESPIRATION RATE: 16 BRPM | HEART RATE: 55 BPM | HEIGHT: 68 IN | WEIGHT: 155.44 LBS | BODY MASS INDEX: 23.56 KG/M2 | SYSTOLIC BLOOD PRESSURE: 106 MMHG | DIASTOLIC BLOOD PRESSURE: 74 MMHG | OXYGEN SATURATION: 96 %

## 2021-09-20 DIAGNOSIS — I42.0 DILATED CARDIOMYOPATHY: ICD-10-CM

## 2021-09-20 DIAGNOSIS — Z78.9 STATIN INTOLERANCE: ICD-10-CM

## 2021-09-20 DIAGNOSIS — I42.0 DCM (DILATED CARDIOMYOPATHY): Primary | ICD-10-CM

## 2021-09-20 DIAGNOSIS — Z95.810 ICD (IMPLANTABLE CARDIOVERTER-DEFIBRILLATOR) IN PLACE: ICD-10-CM

## 2021-09-20 LAB
SARS-COV-2 RNA RESP QL NAA+PROBE: NOT DETECTED
SARS-COV-2- CYCLE NUMBER: NORMAL

## 2021-09-20 PROCEDURE — 99999 PR PBB SHADOW E&M-EST. PATIENT-LVL IV: ICD-10-PCS | Mod: PBBFAC,,, | Performed by: INTERNAL MEDICINE

## 2021-09-20 PROCEDURE — 99214 PR OFFICE/OUTPT VISIT, EST, LEVL IV, 30-39 MIN: ICD-10-PCS | Mod: S$PBB,,, | Performed by: INTERNAL MEDICINE

## 2021-09-20 PROCEDURE — 99214 OFFICE O/P EST MOD 30 MIN: CPT | Mod: PBBFAC | Performed by: INTERNAL MEDICINE

## 2021-09-20 PROCEDURE — 99214 OFFICE O/P EST MOD 30 MIN: CPT | Mod: S$PBB,,, | Performed by: INTERNAL MEDICINE

## 2021-09-20 PROCEDURE — 99999 PR PBB SHADOW E&M-EST. PATIENT-LVL IV: CPT | Mod: PBBFAC,,, | Performed by: INTERNAL MEDICINE

## 2021-09-22 ENCOUNTER — ANESTHESIA (OUTPATIENT)
Dept: ENDOSCOPY | Facility: HOSPITAL | Age: 71
End: 2021-09-22
Payer: MEDICARE

## 2021-09-22 ENCOUNTER — HOSPITAL ENCOUNTER (OUTPATIENT)
Facility: HOSPITAL | Age: 71
Discharge: HOME | End: 2021-09-22
Attending: INTERNAL MEDICINE | Admitting: INTERNAL MEDICINE
Payer: MEDICARE

## 2021-09-22 ENCOUNTER — ANESTHESIA EVENT (OUTPATIENT)
Dept: ENDOSCOPY | Facility: HOSPITAL | Age: 71
End: 2021-09-22
Payer: MEDICARE

## 2021-09-22 DIAGNOSIS — Z86.010 PERSONAL HISTORY OF COLONIC POLYPS: ICD-10-CM

## 2021-09-22 DIAGNOSIS — K62.89 RECTAL PAIN: Primary | ICD-10-CM

## 2021-09-22 DIAGNOSIS — Z12.11 ENCOUNTER FOR SCREENING COLONOSCOPY: ICD-10-CM

## 2021-09-22 PROBLEM — Z86.0100 PERSONAL HISTORY OF COLONIC POLYPS: Status: ACTIVE | Noted: 2021-04-20

## 2021-09-22 PROCEDURE — 37000008 HC ANESTHESIA 1ST 15 MINUTES: Performed by: INTERNAL MEDICINE

## 2021-09-22 PROCEDURE — 37000009 HC ANESTHESIA EA ADD 15 MINS: Performed by: INTERNAL MEDICINE

## 2021-09-22 PROCEDURE — 25000003 PHARM REV CODE 250: Performed by: ANESTHESIOLOGY

## 2021-09-22 PROCEDURE — 63600175 PHARM REV CODE 636 W HCPCS: Performed by: ANESTHESIOLOGY

## 2021-09-22 PROCEDURE — 45378 DIAGNOSTIC COLONOSCOPY: CPT | Performed by: INTERNAL MEDICINE

## 2021-09-22 PROCEDURE — 45378 PR COLONOSCOPY,DIAGNOSTIC: ICD-10-PCS | Mod: ,,, | Performed by: INTERNAL MEDICINE

## 2021-09-22 PROCEDURE — 45378 DIAGNOSTIC COLONOSCOPY: CPT | Mod: ,,, | Performed by: INTERNAL MEDICINE

## 2021-09-22 RX ORDER — ETOMIDATE 2 MG/ML
INJECTION INTRAVENOUS
Status: DISCONTINUED | OUTPATIENT
Start: 2021-09-22 | End: 2021-09-22

## 2021-09-22 RX ORDER — LIDOCAINE HCL/PF 100 MG/5ML
SYRINGE (ML) INTRAVENOUS
Status: DISCONTINUED | OUTPATIENT
Start: 2021-09-22 | End: 2021-09-22

## 2021-09-22 RX ORDER — SODIUM CHLORIDE, SODIUM LACTATE, POTASSIUM CHLORIDE, CALCIUM CHLORIDE 600; 310; 30; 20 MG/100ML; MG/100ML; MG/100ML; MG/100ML
INJECTION, SOLUTION INTRAVENOUS CONTINUOUS PRN
Status: DISCONTINUED | OUTPATIENT
Start: 2021-09-22 | End: 2021-09-22

## 2021-09-22 RX ORDER — SODIUM CHLORIDE, SODIUM LACTATE, POTASSIUM CHLORIDE, CALCIUM CHLORIDE 600; 310; 30; 20 MG/100ML; MG/100ML; MG/100ML; MG/100ML
INJECTION, SOLUTION INTRAVENOUS CONTINUOUS
Status: DISCONTINUED | OUTPATIENT
Start: 2021-09-22 | End: 2021-09-22 | Stop reason: HOSPADM

## 2021-09-22 RX ORDER — SODIUM CHLORIDE, SODIUM LACTATE, POTASSIUM CHLORIDE, CALCIUM CHLORIDE 600; 310; 30; 20 MG/100ML; MG/100ML; MG/100ML; MG/100ML
INJECTION, SOLUTION INTRAVENOUS CONTINUOUS
Status: CANCELLED | OUTPATIENT
Start: 2021-09-22

## 2021-09-22 RX ORDER — SODIUM CHLORIDE 0.9 % (FLUSH) 0.9 %
10 SYRINGE (ML) INJECTION
Status: CANCELLED | OUTPATIENT
Start: 2021-09-22

## 2021-09-22 RX ORDER — PROPOFOL 10 MG/ML
VIAL (ML) INTRAVENOUS
Status: DISCONTINUED | OUTPATIENT
Start: 2021-09-22 | End: 2021-09-22

## 2021-09-22 RX ADMIN — LIDOCAINE HYDROCHLORIDE 50 MG: 20 INJECTION, SOLUTION INTRAVENOUS at 11:09

## 2021-09-22 RX ADMIN — SODIUM CHLORIDE, SODIUM LACTATE, POTASSIUM CHLORIDE, AND CALCIUM CHLORIDE: .6; .31; .03; .02 INJECTION, SOLUTION INTRAVENOUS at 11:09

## 2021-09-22 RX ADMIN — ETOMIDATE 4 MG: 2 INJECTION, SOLUTION INTRAVENOUS at 11:09

## 2021-09-22 RX ADMIN — PROPOFOL 20 MG: 10 INJECTION, EMULSION INTRAVENOUS at 11:09

## 2021-09-22 RX ADMIN — PROPOFOL 50 MG: 10 INJECTION, EMULSION INTRAVENOUS at 11:09

## 2021-09-23 VITALS
WEIGHT: 153 LBS | BODY MASS INDEX: 23.19 KG/M2 | TEMPERATURE: 98 F | DIASTOLIC BLOOD PRESSURE: 79 MMHG | SYSTOLIC BLOOD PRESSURE: 117 MMHG | HEIGHT: 68 IN | RESPIRATION RATE: 18 BRPM | HEART RATE: 54 BPM | OXYGEN SATURATION: 99 %

## 2021-09-28 ENCOUNTER — PATIENT OUTREACH (OUTPATIENT)
Dept: ADMINISTRATIVE | Facility: OTHER | Age: 71
End: 2021-09-28

## 2021-09-29 ENCOUNTER — OFFICE VISIT (OUTPATIENT)
Dept: DERMATOLOGY | Facility: CLINIC | Age: 71
End: 2021-09-29
Payer: MEDICARE

## 2021-09-29 DIAGNOSIS — L90.5 SCAR CONDITIONS/SKIN FIBROSIS: Primary | ICD-10-CM

## 2021-09-29 DIAGNOSIS — L57.0 ACTINIC KERATOSES: ICD-10-CM

## 2021-09-29 DIAGNOSIS — Z85.828 HISTORY OF SKIN CANCER: ICD-10-CM

## 2021-09-29 DIAGNOSIS — Z12.83 SCREENING, MALIGNANT NEOPLASM, SKIN: ICD-10-CM

## 2021-09-29 DIAGNOSIS — L82.1 SEBORRHEIC KERATOSIS: ICD-10-CM

## 2021-09-29 PROCEDURE — 17000 DESTRUCT PREMALG LESION: CPT | Mod: PBBFAC | Performed by: DERMATOLOGY

## 2021-09-29 PROCEDURE — 99999 PR PBB SHADOW E&M-EST. PATIENT-LVL III: CPT | Mod: PBBFAC,,, | Performed by: DERMATOLOGY

## 2021-09-29 PROCEDURE — 99213 PR OFFICE/OUTPT VISIT, EST, LEVL III, 20-29 MIN: ICD-10-PCS | Mod: 25,S$PBB,, | Performed by: DERMATOLOGY

## 2021-09-29 PROCEDURE — 17000 PR DESTRUCTION(LASER SURGERY,CRYOSURGERY,CHEMOSURGERY),PREMALIGNANT LESIONS,FIRST LESION: ICD-10-PCS | Mod: S$PBB,,, | Performed by: DERMATOLOGY

## 2021-09-29 PROCEDURE — 99213 OFFICE O/P EST LOW 20 MIN: CPT | Mod: PBBFAC | Performed by: DERMATOLOGY

## 2021-09-29 PROCEDURE — 99999 PR PBB SHADOW E&M-EST. PATIENT-LVL III: ICD-10-PCS | Mod: PBBFAC,,, | Performed by: DERMATOLOGY

## 2021-09-29 PROCEDURE — 17000 DESTRUCT PREMALG LESION: CPT | Mod: S$PBB,,, | Performed by: DERMATOLOGY

## 2021-09-29 PROCEDURE — 99213 OFFICE O/P EST LOW 20 MIN: CPT | Mod: 25,S$PBB,, | Performed by: DERMATOLOGY

## 2021-10-11 ENCOUNTER — CLINICAL SUPPORT (OUTPATIENT)
Dept: PULMONOLOGY | Facility: CLINIC | Age: 71
End: 2021-10-11
Payer: MEDICARE

## 2021-10-11 DIAGNOSIS — G47.34 NOCTURNAL HYPOXEMIA: ICD-10-CM

## 2021-10-11 DIAGNOSIS — I27.20 PULMONARY HTN: ICD-10-CM

## 2021-10-11 PROCEDURE — 94762 N-INVAS EAR/PLS OXIMTRY CONT: CPT | Mod: PBBFAC

## 2021-10-11 PROCEDURE — 99999 PR PBB SHADOW E&M-EST. PATIENT-LVL I: CPT | Mod: PBBFAC,,,

## 2021-10-11 PROCEDURE — 99999 PR PBB SHADOW E&M-EST. PATIENT-LVL I: ICD-10-PCS | Mod: PBBFAC,,,

## 2021-10-11 PROCEDURE — 99211 OFF/OP EST MAY X REQ PHY/QHP: CPT | Mod: PBBFAC

## 2021-12-01 ENCOUNTER — OFFICE VISIT (OUTPATIENT)
Dept: CARDIOLOGY | Facility: CLINIC | Age: 71
End: 2021-12-01
Payer: MEDICARE

## 2021-12-01 VITALS
HEIGHT: 68 IN | WEIGHT: 160.5 LBS | HEART RATE: 53 BPM | DIASTOLIC BLOOD PRESSURE: 66 MMHG | OXYGEN SATURATION: 97 % | RESPIRATION RATE: 16 BRPM | SYSTOLIC BLOOD PRESSURE: 116 MMHG | BODY MASS INDEX: 24.32 KG/M2

## 2021-12-01 DIAGNOSIS — I42.0 DILATED CARDIOMYOPATHY: ICD-10-CM

## 2021-12-01 DIAGNOSIS — R00.2 PALPITATIONS: ICD-10-CM

## 2021-12-01 DIAGNOSIS — I42.0 DCM (DILATED CARDIOMYOPATHY): Primary | ICD-10-CM

## 2021-12-01 DIAGNOSIS — R07.9 CHEST PAIN, UNSPECIFIED TYPE: ICD-10-CM

## 2021-12-01 DIAGNOSIS — I27.20 PULMONARY HTN: ICD-10-CM

## 2021-12-01 DIAGNOSIS — Z78.9 STATIN INTOLERANCE: ICD-10-CM

## 2021-12-01 DIAGNOSIS — I50.20 HEART FAILURE WITH REDUCED EJECTION FRACTION, NYHA CLASS II: ICD-10-CM

## 2021-12-01 DIAGNOSIS — E78.00 HYPERCHOLESTEROLEMIA: ICD-10-CM

## 2021-12-01 PROCEDURE — 99999 PR PBB SHADOW E&M-EST. PATIENT-LVL IV: ICD-10-PCS | Mod: PBBFAC,,, | Performed by: INTERNAL MEDICINE

## 2021-12-01 PROCEDURE — 99214 OFFICE O/P EST MOD 30 MIN: CPT | Mod: S$PBB,,, | Performed by: INTERNAL MEDICINE

## 2021-12-01 PROCEDURE — 99214 OFFICE O/P EST MOD 30 MIN: CPT | Mod: PBBFAC | Performed by: INTERNAL MEDICINE

## 2021-12-01 PROCEDURE — 99999 PR PBB SHADOW E&M-EST. PATIENT-LVL IV: CPT | Mod: PBBFAC,,, | Performed by: INTERNAL MEDICINE

## 2021-12-01 PROCEDURE — 99214 PR OFFICE/OUTPT VISIT, EST, LEVL IV, 30-39 MIN: ICD-10-PCS | Mod: S$PBB,,, | Performed by: INTERNAL MEDICINE

## 2021-12-10 ENCOUNTER — HOSPITAL ENCOUNTER (OUTPATIENT)
Dept: RADIOLOGY | Facility: HOSPITAL | Age: 71
Discharge: HOME | End: 2021-12-10
Attending: INTERNAL MEDICINE
Payer: MEDICARE

## 2021-12-10 ENCOUNTER — HOSPITAL ENCOUNTER (OUTPATIENT)
Dept: CARDIOLOGY | Facility: HOSPITAL | Age: 71
Discharge: HOME | End: 2021-12-10
Attending: INTERNAL MEDICINE
Payer: MEDICARE

## 2021-12-10 DIAGNOSIS — R00.2 PALPITATIONS: ICD-10-CM

## 2021-12-10 DIAGNOSIS — E78.00 HYPERCHOLESTEROLEMIA: ICD-10-CM

## 2021-12-10 DIAGNOSIS — I50.20 HEART FAILURE WITH REDUCED EJECTION FRACTION, NYHA CLASS II: ICD-10-CM

## 2021-12-10 DIAGNOSIS — I42.0 DCM (DILATED CARDIOMYOPATHY): ICD-10-CM

## 2021-12-10 DIAGNOSIS — I42.0 DILATED CARDIOMYOPATHY: ICD-10-CM

## 2021-12-10 DIAGNOSIS — Z78.9 STATIN INTOLERANCE: ICD-10-CM

## 2021-12-10 DIAGNOSIS — I27.20 PULMONARY HTN: ICD-10-CM

## 2021-12-10 DIAGNOSIS — R07.9 CHEST PAIN, UNSPECIFIED TYPE: ICD-10-CM

## 2021-12-10 LAB
CV STRESS BASE HR: 59 BPM
DIASTOLIC BLOOD PRESSURE: 81 MMHG
NUC REST EJECTION FRACTION: 40
NUC STRESS EJECTION FRACTION: 33 %
OHS CV CPX 1 MINUTE RECOVERY HEART RATE: 115 BPM
OHS CV CPX 85 PERCENT MAX PREDICTED HEART RATE MALE: 127
OHS CV CPX ESTIMATED METS: 9
OHS CV CPX MAX PREDICTED HEART RATE: 149
OHS CV CPX PATIENT IS FEMALE: 0
OHS CV CPX PATIENT IS MALE: 1
OHS CV CPX PEAK DIASTOLIC BLOOD PRESSURE: 80 MMHG
OHS CV CPX PEAK HEAR RATE: 141 BPM
OHS CV CPX PEAK RATE PRESSURE PRODUCT: NORMAL
OHS CV CPX PEAK SYSTOLIC BLOOD PRESSURE: 179 MMHG
OHS CV CPX PERCENT MAX PREDICTED HEART RATE ACHIEVED: 95
OHS CV CPX RATE PRESSURE PRODUCT PRESENTING: 5841
STRESS ECHO POST EXERCISE DUR MIN: 7 MINUTES
STRESS ECHO POST EXERCISE DUR SEC: 20 SECONDS
SYSTOLIC BLOOD PRESSURE: 99 MMHG

## 2021-12-10 PROCEDURE — 93016 STRESS TEST WITH MYOCARDIAL PERFUSION (CUPID ONLY): ICD-10-PCS | Mod: ,,, | Performed by: INTERNAL MEDICINE

## 2021-12-10 PROCEDURE — 93017 CV STRESS TEST TRACING ONLY: CPT

## 2021-12-10 PROCEDURE — A9502 TC99M TETROFOSMIN: HCPCS

## 2021-12-10 PROCEDURE — 78452 HT MUSCLE IMAGE SPECT MULT: CPT | Mod: 26,,, | Performed by: INTERNAL MEDICINE

## 2021-12-10 PROCEDURE — 78452 STRESS TEST WITH MYOCARDIAL PERFUSION (CUPID ONLY): ICD-10-PCS | Mod: 26,,, | Performed by: INTERNAL MEDICINE

## 2021-12-10 PROCEDURE — 93018 STRESS TEST WITH MYOCARDIAL PERFUSION (CUPID ONLY): ICD-10-PCS | Mod: ,,, | Performed by: INTERNAL MEDICINE

## 2021-12-10 PROCEDURE — 93016 CV STRESS TEST SUPVJ ONLY: CPT | Mod: ,,, | Performed by: INTERNAL MEDICINE

## 2021-12-10 PROCEDURE — 93018 CV STRESS TEST I&R ONLY: CPT | Mod: ,,, | Performed by: INTERNAL MEDICINE

## 2021-12-10 PROCEDURE — 78452 HT MUSCLE IMAGE SPECT MULT: CPT

## 2021-12-10 RX ORDER — REGADENOSON 0.08 MG/ML
0.4 INJECTION, SOLUTION INTRAVENOUS ONCE
Status: DISCONTINUED | OUTPATIENT
Start: 2021-12-10 | End: 2021-12-10

## 2021-12-13 ENCOUNTER — PATIENT MESSAGE (OUTPATIENT)
Dept: CARDIOLOGY | Facility: CLINIC | Age: 71
End: 2021-12-13
Payer: MEDICARE

## 2021-12-14 ENCOUNTER — HOSPITAL ENCOUNTER (EMERGENCY)
Facility: HOSPITAL | Age: 71
Discharge: HOME | End: 2021-12-14
Attending: EMERGENCY MEDICINE
Payer: MEDICARE

## 2021-12-14 VITALS
SYSTOLIC BLOOD PRESSURE: 133 MMHG | HEART RATE: 60 BPM | BODY MASS INDEX: 24.55 KG/M2 | HEIGHT: 68 IN | TEMPERATURE: 98 F | RESPIRATION RATE: 18 BRPM | WEIGHT: 162 LBS | DIASTOLIC BLOOD PRESSURE: 82 MMHG | OXYGEN SATURATION: 98 %

## 2021-12-14 DIAGNOSIS — N45.1 EPIDIDYMITIS: Primary | ICD-10-CM

## 2021-12-14 DIAGNOSIS — N50.819 TESTALGIA: ICD-10-CM

## 2021-12-14 LAB
BACTERIA #/AREA URNS HPF: ABNORMAL /HPF
BILIRUB UR QL STRIP: NEGATIVE
CLARITY UR: CLEAR
COLOR UR: YELLOW
GLUCOSE UR QL STRIP: NEGATIVE
HGB UR QL STRIP: ABNORMAL
KETONES UR QL STRIP: NEGATIVE
LEUKOCYTE ESTERASE UR QL STRIP: ABNORMAL
MICROSCOPIC COMMENT: ABNORMAL
NITRITE UR QL STRIP: NEGATIVE
PH UR STRIP: 7 [PH] (ref 5–8)
PROT UR QL STRIP: NEGATIVE
RBC #/AREA URNS HPF: 0 /HPF (ref 0–4)
SP GR UR STRIP: 1.02 (ref 1–1.03)
URN SPEC COLLECT METH UR: ABNORMAL
UROBILINOGEN UR STRIP-ACNC: NEGATIVE EU/DL
WBC #/AREA URNS HPF: >100 /HPF (ref 0–5)

## 2021-12-14 PROCEDURE — 63700000 PHARM REV CODE 250 ALT 637 W/O HCPCS: Performed by: EMERGENCY MEDICINE

## 2021-12-14 PROCEDURE — 25000003 PHARM REV CODE 250: Performed by: EMERGENCY MEDICINE

## 2021-12-14 PROCEDURE — 87077 CULTURE AEROBIC IDENTIFY: CPT | Performed by: EMERGENCY MEDICINE

## 2021-12-14 PROCEDURE — 96372 THER/PROPH/DIAG INJ SC/IM: CPT

## 2021-12-14 PROCEDURE — 87088 URINE BACTERIA CULTURE: CPT | Performed by: EMERGENCY MEDICINE

## 2021-12-14 PROCEDURE — 87186 SC STD MICRODIL/AGAR DIL: CPT | Performed by: EMERGENCY MEDICINE

## 2021-12-14 PROCEDURE — 87086 URINE CULTURE/COLONY COUNT: CPT | Performed by: EMERGENCY MEDICINE

## 2021-12-14 PROCEDURE — 63600175 PHARM REV CODE 636 W HCPCS: Performed by: EMERGENCY MEDICINE

## 2021-12-14 PROCEDURE — 81000 URINALYSIS NONAUTO W/SCOPE: CPT | Performed by: EMERGENCY MEDICINE

## 2021-12-14 PROCEDURE — 99284 EMERGENCY DEPT VISIT MOD MDM: CPT | Mod: 25

## 2021-12-14 RX ORDER — CEFTRIAXONE 500 MG/1
500 INJECTION, POWDER, FOR SOLUTION INTRAMUSCULAR; INTRAVENOUS
Status: COMPLETED | OUTPATIENT
Start: 2021-12-14 | End: 2021-12-14

## 2021-12-14 RX ORDER — AZITHROMYCIN 250 MG/1
1000 TABLET, FILM COATED ORAL
Status: COMPLETED | OUTPATIENT
Start: 2021-12-14 | End: 2021-12-14

## 2021-12-14 RX ORDER — CIPROFLOXACIN 500 MG/1
500 TABLET ORAL 2 TIMES DAILY
Qty: 20 TABLET | Refills: 0 | Status: SHIPPED | OUTPATIENT
Start: 2021-12-14 | End: 2021-12-24

## 2021-12-14 RX ORDER — TRAMADOL HYDROCHLORIDE 50 MG/1
50 TABLET ORAL EVERY 6 HOURS PRN
Qty: 12 TABLET | Refills: 0 | Status: SHIPPED | OUTPATIENT
Start: 2021-12-14 | End: 2021-12-24

## 2021-12-14 RX ORDER — KETOROLAC TROMETHAMINE 10 MG/1
10 TABLET, FILM COATED ORAL
Status: COMPLETED | OUTPATIENT
Start: 2021-12-14 | End: 2021-12-14

## 2021-12-14 RX ADMIN — CEFTRIAXONE SODIUM 500 MG: 500 INJECTION, POWDER, FOR SOLUTION INTRAMUSCULAR; INTRAVENOUS at 11:12

## 2021-12-14 RX ADMIN — AZITHROMYCIN MONOHYDRATE 1000 MG: 250 TABLET ORAL at 11:12

## 2021-12-14 RX ADMIN — KETOROLAC TROMETHAMINE 10 MG: 10 TABLET, FILM COATED ORAL at 09:12

## 2021-12-18 LAB — BACTERIA UR CULT: ABNORMAL

## 2021-12-29 ENCOUNTER — OFFICE VISIT (OUTPATIENT)
Dept: INTERNAL MEDICINE | Facility: CLINIC | Age: 71
End: 2021-12-29
Payer: MEDICARE

## 2021-12-29 VITALS
OXYGEN SATURATION: 97 % | BODY MASS INDEX: 24.96 KG/M2 | WEIGHT: 164.69 LBS | DIASTOLIC BLOOD PRESSURE: 70 MMHG | HEART RATE: 60 BPM | SYSTOLIC BLOOD PRESSURE: 102 MMHG | HEIGHT: 68 IN | TEMPERATURE: 98 F

## 2021-12-29 DIAGNOSIS — N45.1 EPIDIDYMITIS: ICD-10-CM

## 2021-12-29 DIAGNOSIS — N39.0 URINARY TRACT INFECTION WITHOUT HEMATURIA, SITE UNSPECIFIED: Primary | ICD-10-CM

## 2021-12-29 PROCEDURE — 99213 OFFICE O/P EST LOW 20 MIN: CPT | Mod: S$PBB,,, | Performed by: PEDIATRICS

## 2021-12-29 PROCEDURE — 99213 OFFICE O/P EST LOW 20 MIN: CPT | Mod: PBBFAC | Performed by: PEDIATRICS

## 2021-12-29 PROCEDURE — 99213 PR OFFICE/OUTPT VISIT, EST, LEVL III, 20-29 MIN: ICD-10-PCS | Mod: S$PBB,,, | Performed by: PEDIATRICS

## 2021-12-29 PROCEDURE — 99999 PR PBB SHADOW E&M-EST. PATIENT-LVL III: ICD-10-PCS | Mod: PBBFAC,,, | Performed by: PEDIATRICS

## 2021-12-29 PROCEDURE — 99999 PR PBB SHADOW E&M-EST. PATIENT-LVL III: CPT | Mod: PBBFAC,,, | Performed by: PEDIATRICS

## 2022-01-04 ENCOUNTER — PATIENT OUTREACH (OUTPATIENT)
Dept: ADMINISTRATIVE | Facility: OTHER | Age: 72
End: 2022-01-04
Payer: MEDICARE

## 2022-01-04 NOTE — PROGRESS NOTES
Subjective:   Patient ID:  Mekhi Lambert is a 71 y.o. male who presents for follow-up of No chief complaint on file.  His recent ECHO preoperatively revealed EF 25% and underwent stress test which did not reveal any reversible defects. LifeVest placed prior to discharge. He was started on low dose Toprol XL with plans to optimize medical therapy as OP.    Recent cardiac echo shows LVEF about 30% and no significant changes.  Nuclear stress test showed evidence of fixed defect and LVEF in the 20-25% range.     Cardiac echo in July showed LVEF 30% will repeat it again this fall.  He is on metoprolol.  Refuses to take other medications.  Otherwise stable.  He is a former  and wishes to see if his heart function improves by itself.  He is exercising and uses is own blood pressure checks to check his pulse pressure.  Will continue to monitor and see if ejection fraction will improve current medications.  Patient has had no chest pain no fevers chills no syncope or near syncopal X episodes.  His exercise endurance he states is improving.  Long-term again discussed with him the necessity of AICD if his LV function does not improve.        Overall patient finds himself doing well exercising and oxygen saturations were improving.  Stable otherwise will go ahead with a nuclear stress test to evaluate for overall cardiac function.          Presents to the office after epididymitis and urinary infection.  He stabilized and improved.  He was to have heart catheterization but was interfered with due to the urinary tract infection.  Patient checks out fine today does have late exertional symptoms and for this reason heart catheterization will be done to re-evaluate the coronary arteries as there was significant disease in the left system.  Re-evaluation medications and will add nitrates.  LV function 40% by nuclear stress test.  Patient was hopefully would not need AICD.  We will go ahead with further evaluation of the  coronary vessels and possible further invasive strategy to improve his LV function.  Currently patient is feeling improved I will add Ranexa 500 mg b.i.d. to his current regimen we discussed the possibility of his symptoms improving.  Can always go back and do a heart catheterization as necessary.  He is over to doing that at this time.  Patient has had no active symptoms of chest discomfort he takes that the only time he has discomfort that the very end of exercise or in rest..  Otherwise stable this time.  Stress test did not show active ischemia but evidence of scar most likely he does have small-vessel disease as well.      Review of Systems   Constitutional: Positive for malaise/fatigue. Negative for chills, diaphoresis, night sweats, weight gain and weight loss.   HENT: Negative for congestion, hoarse voice, sore throat and stridor.    Eyes: Negative for double vision and pain.   Cardiovascular: Negative for chest pain, claudication, cyanosis, dyspnea on exertion, irregular heartbeat, leg swelling, near-syncope, orthopnea, palpitations, paroxysmal nocturnal dyspnea and syncope.   Respiratory: Negative for cough, hemoptysis, shortness of breath, sleep disturbances due to breathing, snoring, sputum production and wheezing.    Endocrine: Negative for cold intolerance, heat intolerance and polydipsia.   Hematologic/Lymphatic: Negative for bleeding problem. Does not bruise/bleed easily.   Skin: Negative for color change, dry skin and rash.   Musculoskeletal: Negative for joint swelling and muscle cramps.   Gastrointestinal: Negative for bloating, abdominal pain, constipation, diarrhea, dysphagia, melena, nausea and vomiting.   Genitourinary: Negative for flank pain and urgency.   Neurological: Negative for dizziness, focal weakness, headaches, light-headedness, loss of balance, seizures and weakness.   Psychiatric/Behavioral: Negative for altered mental status and memory loss. The patient is not nervous/anxious.       Family History   Problem Relation Age of Onset    Heart disease Mother         lupus related    Diabetes Mother     Cancer Father         prostate, bone, lung, skin cancers all seperate types    Diabetes Maternal Aunt      Past Medical History:   Diagnosis Date    Anticoagulant long-term use     Arthritis     Cancer     Coronary artery disease     Digestive disorder     Myocardial infarction     2020     Social History     Socioeconomic History    Marital status:    Tobacco Use    Smoking status: Never Smoker    Smokeless tobacco: Former User     Types: Chew   Substance and Sexual Activity    Alcohol use: Yes     Alcohol/week: 2.0 standard drinks     Types: 2 Glasses of wine per week     Comment: per week    Drug use: No     Current Outpatient Medications on File Prior to Visit   Medication Sig Dispense Refill    aspirin (ECOTRIN) 81 MG EC tablet Take 1 tablet (81 mg total) by mouth once daily.      lamoTRIgine (LAMICTAL) 25 MG tablet Take 2 tablets (50 mg total) by mouth once daily. 180 tablet 1    metoprolol succinate (TOPROL-XL) 25 MG 24 hr tablet Take 0.5 tablets (12.5 mg total) by mouth every evening. 15 tablet 11    mometasone 0.1% (ELOCON) 0.1 % cream AAA bid prn for psoriasis. Strong steroid.  Do not use on face, underarms or groin. 50 g 1    multivitamin capsule Take 1 capsule by mouth once daily.      nitroGLYCERIN (NITROSTAT) 0.4 MG SL tablet Place 1 tablet (0.4 mg total) under the tongue every 5 (five) minutes as needed for Chest pain. 30 tablet 1    triamcinolone acetonide 0.025% (KENALOG) 0.025 % cream Apply topically 2 (two) times daily as needed. Mild steroid.  Safe for face up to 2 weeks then stop. 30 g 1     No current facility-administered medications on file prior to visit.     Review of patient's allergies indicates:   Allergen Reactions    Statins-hmg-coa reductase inhibitors      SEVERE MYALGIAS AND GI SIDE EFFECTS       Objective:     Physical Exam  Eyes:       Pupils: Pupils are equal, round, and reactive to light.   Neck:      Trachea: No tracheal deviation.   Cardiovascular:      Rate and Rhythm: Normal rate and regular rhythm.      Pulses: Intact distal pulses.           Carotid pulses are 2+ on the right side and 2+ on the left side.       Radial pulses are 2+ on the right side and 2+ on the left side.        Femoral pulses are 2+ on the right side and 2+ on the left side.       Popliteal pulses are 2+ on the right side and 2+ on the left side.        Dorsalis pedis pulses are 2+ on the right side and 2+ on the left side.        Posterior tibial pulses are 2+ on the right side and 2+ on the left side.      Heart sounds: Normal heart sounds. No murmur heard.  No friction rub. No gallop.    Pulmonary:      Effort: Pulmonary effort is normal. No respiratory distress.      Breath sounds: Normal breath sounds. No stridor. No wheezing or rales.   Chest:      Chest wall: No tenderness.   Abdominal:      General: There is no distension.      Tenderness: There is no abdominal tenderness. There is no rebound.   Musculoskeletal:         General: No tenderness or edema.      Cervical back: Normal range of motion.   Skin:     General: Skin is warm and dry.   Neurological:      Mental Status: He is alert and oriented to person, place, and time.     Nuclear scan from 12/10/2021:    Abnormal myocardial perfusion scan.    There are two significant perfusion abnormalities.    Defect #1 - There is a moderate to severe intensity, large sized, fixed defect consistent with scar in the anterolateral and lateral wall(s).    Defect #2 - There is a large sized, moderate to severe intensity, fixed defect consistent with scar  in the inferior and inferolateral wall(s).    The gated perfusion images showed an ejection fraction of 40% at rest. The gated perfusion images showed an ejection fraction of 33% post stress.    There is moderate global hypokinesis at rest and stress.    The patient  reported no chest pain during the stress test.    During stress, occasional PVCs are noted.    Assessment:     1. DCM (dilated cardiomyopathy)    2. Heart failure with reduced ejection fraction, NYHA class II    3. Hypercholesterolemia    4. Palpitations    5. Chest pain, unspecified type        Plan:     DCM (dilated cardiomyopathy)    Heart failure with reduced ejection fraction, NYHA class II    Hypercholesterolemia    Palpitations    Chest pain, unspecified type    Impression 1 dilated cardiomyopathy improving and now EF of 40%.  2. Hypercholesterolemia stable  3. Palpitations resolved  4. Chest discomfort very mild at the end of exercise and stable add Ranexa.  He is exercise capacity is quite large at this time and continues to do fairly well will re-evaluate after Ranexa has been given for the next several months.  Can always go back and do a heart catheterization to re-evaluate the LAD and diagonal branch but they were not considered angioplasty at that time in 2020. All questions answered today the patient called his any exertional symptoms.  He continues on aspirin and beta-blockers.

## 2022-01-05 ENCOUNTER — OFFICE VISIT (OUTPATIENT)
Dept: CARDIOLOGY | Facility: CLINIC | Age: 72
End: 2022-01-05
Payer: MEDICARE

## 2022-01-05 VITALS
SYSTOLIC BLOOD PRESSURE: 110 MMHG | OXYGEN SATURATION: 97 % | DIASTOLIC BLOOD PRESSURE: 78 MMHG | WEIGHT: 158.5 LBS | BODY MASS INDEX: 24.1 KG/M2 | HEART RATE: 71 BPM

## 2022-01-05 DIAGNOSIS — R07.9 CHEST PAIN, UNSPECIFIED TYPE: Primary | ICD-10-CM

## 2022-01-05 DIAGNOSIS — R00.2 PALPITATIONS: ICD-10-CM

## 2022-01-05 DIAGNOSIS — E78.00 HYPERCHOLESTEROLEMIA: ICD-10-CM

## 2022-01-05 DIAGNOSIS — I42.0 DCM (DILATED CARDIOMYOPATHY): ICD-10-CM

## 2022-01-05 DIAGNOSIS — I50.20 HEART FAILURE WITH REDUCED EJECTION FRACTION, NYHA CLASS II: ICD-10-CM

## 2022-01-05 PROCEDURE — 99214 OFFICE O/P EST MOD 30 MIN: CPT | Mod: S$PBB,,, | Performed by: INTERNAL MEDICINE

## 2022-01-05 PROCEDURE — 99999 PR PBB SHADOW E&M-EST. PATIENT-LVL III: CPT | Mod: PBBFAC,,, | Performed by: INTERNAL MEDICINE

## 2022-01-05 PROCEDURE — 99214 PR OFFICE/OUTPT VISIT, EST, LEVL IV, 30-39 MIN: ICD-10-PCS | Mod: S$PBB,,, | Performed by: INTERNAL MEDICINE

## 2022-01-05 PROCEDURE — 99213 OFFICE O/P EST LOW 20 MIN: CPT | Mod: PBBFAC | Performed by: INTERNAL MEDICINE

## 2022-01-05 PROCEDURE — 99999 PR PBB SHADOW E&M-EST. PATIENT-LVL III: ICD-10-PCS | Mod: PBBFAC,,, | Performed by: INTERNAL MEDICINE

## 2022-01-05 RX ORDER — RANOLAZINE 500 MG/1
500 TABLET, EXTENDED RELEASE ORAL 2 TIMES DAILY
Qty: 60 TABLET | Refills: 11 | Status: SHIPPED | OUTPATIENT
Start: 2022-01-05 | End: 2022-09-23 | Stop reason: DRUGHIGH

## 2022-01-29 ENCOUNTER — HOSPITAL ENCOUNTER (EMERGENCY)
Facility: HOSPITAL | Age: 72
Discharge: HOME | End: 2022-01-30
Attending: EMERGENCY MEDICINE
Payer: MEDICARE

## 2022-01-29 DIAGNOSIS — N45.1 EPIDIDYMITIS: Primary | ICD-10-CM

## 2022-01-29 DIAGNOSIS — N50.819 TESTICULAR PAIN: ICD-10-CM

## 2022-01-29 LAB
BILIRUB UR QL STRIP: NEGATIVE
CLARITY UR: CLEAR
COLOR UR: YELLOW
GLUCOSE UR QL STRIP: NEGATIVE
HGB UR QL STRIP: ABNORMAL
KETONES UR QL STRIP: ABNORMAL
LEUKOCYTE ESTERASE UR QL STRIP: ABNORMAL
NITRITE UR QL STRIP: POSITIVE
PH UR STRIP: 6 [PH] (ref 5–8)
PROT UR QL STRIP: NEGATIVE
SP GR UR STRIP: 1.02 (ref 1–1.03)
URN SPEC COLLECT METH UR: ABNORMAL
UROBILINOGEN UR STRIP-ACNC: NEGATIVE EU/DL

## 2022-01-29 PROCEDURE — 87086 URINE CULTURE/COLONY COUNT: CPT | Performed by: NURSE PRACTITIONER

## 2022-01-29 PROCEDURE — 81000 URINALYSIS NONAUTO W/SCOPE: CPT | Performed by: NURSE PRACTITIONER

## 2022-01-29 PROCEDURE — 99284 EMERGENCY DEPT VISIT MOD MDM: CPT

## 2022-01-30 VITALS
HEART RATE: 64 BPM | DIASTOLIC BLOOD PRESSURE: 67 MMHG | RESPIRATION RATE: 17 BRPM | SYSTOLIC BLOOD PRESSURE: 128 MMHG | BODY MASS INDEX: 24.14 KG/M2 | TEMPERATURE: 98 F | WEIGHT: 159.31 LBS | HEIGHT: 68 IN | OXYGEN SATURATION: 98 %

## 2022-01-30 LAB
BACTERIA #/AREA URNS HPF: ABNORMAL /HPF
MICROSCOPIC COMMENT: ABNORMAL
RBC #/AREA URNS HPF: 17 /HPF (ref 0–4)
WBC #/AREA URNS HPF: 25 /HPF (ref 0–5)
WBC CLUMPS URNS QL MICRO: ABNORMAL

## 2022-01-30 PROCEDURE — 25000003 PHARM REV CODE 250: Performed by: NURSE PRACTITIONER

## 2022-01-30 RX ORDER — AMOXICILLIN 500 MG/1
500 CAPSULE ORAL
Status: COMPLETED | OUTPATIENT
Start: 2022-01-30 | End: 2022-01-30

## 2022-01-30 RX ORDER — AMOXICILLIN 500 MG/1
500 CAPSULE ORAL EVERY 12 HOURS
Qty: 20 CAPSULE | Refills: 0 | Status: SHIPPED | OUTPATIENT
Start: 2022-01-30 | End: 2022-02-09

## 2022-01-30 RX ORDER — LEVOFLOXACIN 500 MG/1
500 TABLET, FILM COATED ORAL DAILY
Qty: 10 TABLET | Refills: 0 | Status: SHIPPED | OUTPATIENT
Start: 2022-01-30 | End: 2022-01-30 | Stop reason: ALTCHOICE

## 2022-01-30 RX ORDER — LEVOFLOXACIN 500 MG/1
500 TABLET, FILM COATED ORAL
Status: DISCONTINUED | OUTPATIENT
Start: 2022-01-30 | End: 2022-01-30

## 2022-01-30 RX ADMIN — AMOXICILLIN 500 MG: 500 CAPSULE ORAL at 12:01

## 2022-01-30 NOTE — ED PROVIDER NOTES
Encounter Date: 1/29/2022       History     Chief Complaint   Patient presents with    Testicle Pain     A month ago pt was diagnosed with an infection of his R testicle and believes it has come back. Denies fevers.      Pt presents with c/o right testicular pain starting today.  Reports recent testicular infection about 1 month ago in the same testicle.  Reports it cleared up after antibiotics and never followed up with a urologist.  No distress noted at this time.  Denies any urinary symptoms at this time.  Denies any fever, nausea, and vomiting.        Review of patient's allergies indicates:   Allergen Reactions    Statins-hmg-coa reductase inhibitors      SEVERE MYALGIAS AND GI SIDE EFFECTS     Past Medical History:   Diagnosis Date    Anticoagulant long-term use     Arthritis     Cancer     Coronary artery disease     Digestive disorder     Myocardial infarction     2020     Past Surgical History:   Procedure Laterality Date    CARDIAC CATHETERIZATION      COLONOSCOPY N/A 9/22/2021    Procedure: COLONOSCOPY;  Surgeon: Meaghan Jaime MD;  Location: [facility] ENDO;  Service: Endoscopy;  Laterality: N/A;    CORONARY ANGIOGRAPHY INCLUDING BYPASS GRAFTS WITH CATHETERIZATION OF LEFT HEART N/A 3/3/2020    Procedure: ANGIOGRAM, CORONARY, INCLUDING BYPASS GRAFT, WITH LEFT HEART CATHETERIZATION;  Surgeon: Ute Melchor MD;  Location: [facility] CATH LAB;  Service: Cardiology;  Laterality: N/A;    JOINT REPLACEMENT Right     knee    KNEE SURGERY      LEFT HEART CATHETERIZATION Left 2/22/2020    Procedure: CATHETERIZATION, HEART, LEFT;  Surgeon: Ute Melchor MD;  Location: [facility] CATH LAB;  Service: Cardiology;  Laterality: Left;    PERCUTANEOUS TRANSLUMINAL BALLOON ANGIOPLASTY OF CORONARY ARTERY Left 2/22/2020    Procedure: Angioplasty-coronary;  Surgeon: Ute Melchor MD;  Location: [facility] CATH LAB;  Service: Cardiology;  Laterality: Left;    SHOULDER ARTHROSCOPY Right     Dr Bella    simple prostatectomy   06/06/2016    robotic assisted partial prostectomy due to BPH    TOTAL KNEE ARTHROPLASTY       Family History   Problem Relation Age of Onset    Heart disease Mother         lupus related    Diabetes Mother     Cancer Father         prostate, bone, lung, skin cancers all seperate types    Diabetes Maternal Aunt      Social History     Tobacco Use    Smoking status: Never Smoker    Smokeless tobacco: Former User     Types: Chew   Substance Use Topics    Alcohol use: Yes     Alcohol/week: 2.0 standard drinks     Types: 2 Glasses of wine per week     Comment: per week    Drug use: No     Review of Systems   Constitutional: Negative for fever.   HENT: Negative for sore throat.    Respiratory: Negative for shortness of breath.    Cardiovascular: Negative for chest pain.   Gastrointestinal: Negative for nausea.   Genitourinary: Positive for scrotal swelling and testicular pain. Negative for dysuria.   Musculoskeletal: Negative for back pain.   Skin: Negative for rash.   Neurological: Negative for weakness.   Hematological: Does not bruise/bleed easily.       Physical Exam     Initial Vitals [01/29/22 2204]   BP Pulse Resp Temp SpO2   113/72 69 18 97.7 °F (36.5 °C) 97 %      MAP       --         Physical Exam    Nursing note and vitals reviewed.  Constitutional: He appears well-developed and well-nourished.   HENT:   Head: Normocephalic and atraumatic.   Eyes: Conjunctivae and EOM are normal. Pupils are equal, round, and reactive to light.   Neck: Neck supple.   Normal range of motion.  Cardiovascular: Normal rate, regular rhythm, normal heart sounds and intact distal pulses.   Pulmonary/Chest: Breath sounds normal.   Abdominal: Abdomen is soft. Bowel sounds are normal.   Genitourinary:    Penis normal.   Right testis shows swelling and tenderness. Left testis shows no mass, no swelling and no tenderness. Left testis is descended. Circumcised.   Musculoskeletal:         General: Normal range of motion.       Cervical back: Normal range of motion and neck supple.     Neurological: He is alert and oriented to person, place, and time. He has normal strength and normal reflexes.   Skin: Skin is warm and dry. Capillary refill takes less than 2 seconds.   Psychiatric: He has a normal mood and affect. His behavior is normal. Judgment and thought content normal.         ED Course   Procedures  Labs Reviewed   URINALYSIS, REFLEX TO URINE CULTURE - Abnormal; Notable for the following components:       Result Value    Ketones, UA Trace (*)     Occult Blood UA Trace (*)     Nitrite, UA Positive (*)     Leukocytes, UA 3+ (*)     All other components within normal limits    Narrative:     Specimen Source->Urine   URINALYSIS MICROSCOPIC          Imaging Results          US Scrotum And Testicles (Final result)  Result time 01/29/22 23:01:20    Final result by Dereck Aquino MD (01/29/22 23:01:20)                 Impression:      Findings concerning for right epididymitis.  Correlation advised.    Otherwise unremarkable.      Electronically signed by: Dereck Aquino  Date:    01/29/2022  Time:    23:01             Narrative:    EXAMINATION:  US SCROTUM AND TESTICLES    CLINICAL HISTORY:  Testicular pain, unspecified    TECHNIQUE:  Sonography of the scrotum and testes.    COMPARISON:  None.    FINDINGS:  Right Testicle:    *Size: 3.9 x 1.7 x 3.1 cm  *Appearance: Normal.  *Flow: Normal arterial and venous flow  *Epididymis: Epididymal head cyst measuring up to 1 cm.  Hypervascularity of the epididymal tail.  *Hydrocele: None.  *Varicocele: None.  .    Left Testicle:    *Size: 4.6 x 2.0 x 3.0 cm  *Appearance: Normal.  *Flow: Normal arterial and venous flow  *Epididymis: 1.5 cm epididymal head cyst.  *Hydrocele: None.  *Varicocele: None.  .    Other findings: None.                                 Medications   levoFLOXacin tablet 500 mg (has no administration in time range)     Medical Decision Making:   ED Management:  Pt informed of lab  and ultrasound reports.  Pt instructed to take medications as prescribed and to follow up with urology for further evaluation and treatment.  Pt to return to the ED with any worsening of symptoms.                      Clinical Impression:   Final diagnoses:  [N50.819] Testicular pain  [N45.1] Epididymitis (Primary)          ED Disposition Condition    Discharge Stable        ED Prescriptions     Medication Sig Dispense Start Date End Date Auth. Provider    levoFLOXacin (LEVAQUIN) 500 MG tablet Take 1 tablet (500 mg total) by mouth once daily. for 10 days 10 tablet 1/30/2022 2/9/2022 Antonio Freitas NP        Follow-up Information     Follow up With Specialties Details Why Contact Info    Darek Mehta MD Urology In 3 days For wound re-check [id number]  [place] [address]  [phone]             Antonio Freitas NP  01/30/22 0006

## 2022-01-30 NOTE — ED NOTES
Assumed care of patient at this time, Pt here with  Right testicular pain states that he had an infection in the right  Testicle a month ago and he feels that the infection is back. Pt is  AAOx4, skin is w/d/i testicle red ultrasound currently at the bedside, afebrile, PERRLA, resp e/u, NAD noted wctm for further plan of care and assessment.

## 2022-01-31 LAB — BACTERIA UR CULT: NO GROWTH

## 2022-02-01 ENCOUNTER — OFFICE VISIT (OUTPATIENT)
Dept: GASTROENTEROLOGY | Facility: CLINIC | Age: 72
End: 2022-02-01
Payer: MEDICARE

## 2022-02-01 ENCOUNTER — TELEPHONE (OUTPATIENT)
Dept: GASTROENTEROLOGY | Facility: CLINIC | Age: 72
End: 2022-02-01
Payer: MEDICARE

## 2022-02-01 DIAGNOSIS — N39.0 RECURRENT URINARY TRACT INFECTION: ICD-10-CM

## 2022-02-01 DIAGNOSIS — K21.9 GASTROESOPHAGEAL REFLUX DISEASE, UNSPECIFIED WHETHER ESOPHAGITIS PRESENT: Primary | ICD-10-CM

## 2022-02-01 PROCEDURE — 99214 OFFICE O/P EST MOD 30 MIN: CPT | Mod: 95,,, | Performed by: INTERNAL MEDICINE

## 2022-02-01 PROCEDURE — 99214 PR OFFICE/OUTPT VISIT, EST, LEVL IV, 30-39 MIN: ICD-10-PCS | Mod: 95,,, | Performed by: INTERNAL MEDICINE

## 2022-02-01 RX ORDER — PANTOPRAZOLE SODIUM 40 MG/1
40 TABLET, DELAYED RELEASE ORAL DAILY
Qty: 30 TABLET | Refills: 3 | Status: SHIPPED | OUTPATIENT
Start: 2022-02-01 | End: 2023-04-24

## 2022-02-01 NOTE — PROGRESS NOTES
The patient location is: home  The chief complaint leading to consultation is: chronic uti    Visit type: audio only  Patient could not log into the virtual system thus is audio only.    Face to Face time with patient: 16 min  46 minutes of total time spent on the encounter, which includes face to face time and non-face to face time preparing to see the patient (eg, review of tests), Obtaining and/or reviewing separately obtained history, Documenting clinical information in the electronic or other health record, Independently interpreting results (not separately reported) and communicating results to the patient/family/caregiver, or Care coordination (not separately reported).         Each patient to whom he or she provides medical services by telemedicine is:  (1) informed of the relationship between the physician and patient and the respective role of any other health care provider with respect to management of the patient; and (2) notified that he or she may decline to receive medical services by telemedicine and may withdraw from such care at any time.         GASTROENTEROLOGY CLINIC NOTE    Reason for visit: The primary encounter diagnosis was Gastroesophageal reflux disease, unspecified whether esophagitis present. A diagnosis of Recurrent urinary tract infection was also pertinent to this visit.  Referring provider/PCP: ABIGAIL Lopez Jr, MD    HPI:  Mekhi Lambert is a 71 y.o. male here today for reflux and chronic uti.   He is new to me, established in our [place] office.  Could not log into system and thus appt was held over an hour after initial time planned.    He has chronic uti.  He is wondering why he continues to get urinary infections and wonders if it is coming from the colon.  He states he is next trying to plan to visit with his urologist.  There is no vomiting.  There is no abdominal pain.  He does have some on and off intermittent reflux.  It seems mainly when he has physical  stressors such as working out or mental stresses such as stress and anxiety P. These symptoms seem to worsen.  He takes Pepcid as needed and this does somewhat help the symptoms but not completely resolved them.  He usually has bouts of reflux that makes him not eat for multiple days at a time.  He has never had an upper endoscopy.  Recent colonoscopy done in [place] showed diverticular disease but no other significant findings.    Prior Endoscopy:  EGD:  Colon:  2021 [place]  Diverticulosis    (Portions of this note were dictated using voice recognition software and may contain dictation related errors in spelling/grammar/syntax not found on text review)    ROS    Past Medical History: has a past medical history of Anticoagulant long-term use, Arthritis, Cancer, Coronary artery disease, Digestive disorder, and Myocardial infarction.    Past Surgical History: has a past surgical history that includes Shoulder arthroscopy (Right); Knee surgery; Total knee arthroplasty; simple prostatectomy (06/06/2016); Cardiac catheterization; Left heart catheterization (Left, 2/22/2020); Percutaneous transluminal balloon angioplasty of coronary artery (Left, 2/22/2020); Coronary angiography including bypass grafts with catheterization of left heart (N/A, 3/3/2020); Joint replacement (Right); and Colonoscopy (N/A, 9/22/2021).    Family History:family history includes Cancer in his father; Diabetes in his maternal aunt and mother; Heart disease in his mother.    Allergies:   Review of patient's allergies indicates:   Allergen Reactions    Statins-hmg-coa reductase inhibitors      SEVERE MYALGIAS AND GI SIDE EFFECTS       Social History: reports that he has never smoked. He quit smokeless tobacco use about 17 years ago.  His smokeless tobacco use included chew. He reports current alcohol use of about 2.0 standard drinks of alcohol per week. He reports that he does not use drugs.    Home medications:   Current Outpatient  Medications on File Prior to Visit   Medication Sig Dispense Refill    amoxicillin (AMOXIL) 500 MG capsule Take 1 capsule (500 mg total) by mouth every 12 (twelve) hours. for 10 days 20 capsule 0    aspirin (ECOTRIN) 81 MG EC tablet Take 1 tablet (81 mg total) by mouth once daily.      metoprolol succinate (TOPROL-XL) 25 MG 24 hr tablet Take 0.5 tablets (12.5 mg total) by mouth every evening. 15 tablet 11    mometasone 0.1% (ELOCON) 0.1 % cream AAA bid prn for psoriasis. Strong steroid.  Do not use on face, underarms or groin. 50 g 1    multivitamin capsule Take 1 capsule by mouth once daily.      nitroGLYCERIN (NITROSTAT) 0.4 MG SL tablet Place 1 tablet (0.4 mg total) under the tongue every 5 (five) minutes as needed for Chest pain. 30 tablet 1    ranolazine (RANEXA) 500 MG Tb12 Take 1 tablet (500 mg total) by mouth 2 (two) times daily. 60 tablet 11    triamcinolone acetonide 0.025% (KENALOG) 0.025 % cream Apply topically 2 (two) times daily as needed. Mild steroid.  Safe for face up to 2 weeks then stop. 30 g 1     No current facility-administered medications on file prior to visit.       Vital signs:  There were no vitals taken for this visit.    Physical Exam    I have reviewed prior labs, imaging, notes from last month    Assessment:  1. Gastroesophageal reflux disease, unspecified whether esophagitis present    2. Recurrent urinary tract infection        Plan:  Orders Placed This Encounter    pantoprazole (PROTONIX) 40 MG tablet     Start protonix for gerd    Follow up with urology  I dont have a GI explanation for chronic UTI  If concern for fistula, a cystoscopy should help      RTC prn    Kieran Escobedo MD  [facility] [place]

## 2022-02-01 NOTE — TELEPHONE ENCOUNTER
Patient was scheduled for a virtual visit with Dr. Escobedo on 02/01/2022 @3pm. Patient never logged on.   Called patient to see if he wanted to do an audio call, if he wanted Gregg to call him. Patient did not answer, left him a VM

## 2022-02-03 ENCOUNTER — TELEPHONE (OUTPATIENT)
Dept: ADMINISTRATIVE | Facility: HOSPITAL | Age: 72
End: 2022-02-03
Payer: MEDICARE

## 2022-02-18 ENCOUNTER — PATIENT OUTREACH (OUTPATIENT)
Dept: ADMINISTRATIVE | Facility: OTHER | Age: 72
End: 2022-02-18
Payer: MEDICARE

## 2022-02-18 ENCOUNTER — OFFICE VISIT (OUTPATIENT)
Dept: UROLOGY | Facility: CLINIC | Age: 72
End: 2022-02-18
Payer: MEDICARE

## 2022-02-18 VITALS
HEIGHT: 68 IN | DIASTOLIC BLOOD PRESSURE: 74 MMHG | WEIGHT: 162.69 LBS | RESPIRATION RATE: 16 BRPM | TEMPERATURE: 97 F | HEART RATE: 51 BPM | SYSTOLIC BLOOD PRESSURE: 121 MMHG | BODY MASS INDEX: 24.66 KG/M2

## 2022-02-18 DIAGNOSIS — Z12.5 PROSTATE CANCER SCREENING: ICD-10-CM

## 2022-02-18 DIAGNOSIS — N40.1 BENIGN PROSTATIC HYPERPLASIA WITH WEAK URINARY STREAM: Primary | ICD-10-CM

## 2022-02-18 DIAGNOSIS — R39.12 BENIGN PROSTATIC HYPERPLASIA WITH WEAK URINARY STREAM: Primary | ICD-10-CM

## 2022-02-18 PROCEDURE — 99214 OFFICE O/P EST MOD 30 MIN: CPT | Mod: S$PBB,,, | Performed by: UROLOGY

## 2022-02-18 PROCEDURE — 99213 OFFICE O/P EST LOW 20 MIN: CPT | Mod: PBBFAC | Performed by: UROLOGY

## 2022-02-18 PROCEDURE — 99999 PR PBB SHADOW E&M-EST. PATIENT-LVL III: CPT | Mod: PBBFAC,,, | Performed by: UROLOGY

## 2022-02-18 PROCEDURE — 99999 PR PBB SHADOW E&M-EST. PATIENT-LVL III: ICD-10-PCS | Mod: PBBFAC,,, | Performed by: UROLOGY

## 2022-02-18 PROCEDURE — 99214 PR OFFICE/OUTPT VISIT, EST, LEVL IV, 30-39 MIN: ICD-10-PCS | Mod: S$PBB,,, | Performed by: UROLOGY

## 2022-02-18 NOTE — PROGRESS NOTES
Chief Complaint: UTIs, BPH    HPI:   02/18/2022 - patient returns today for follow-up, has had 2 episodes of epididymitis since December, these were accompanied by slow stream and dysuria, abx resolved his symptoms, since that time, he notes that his stream has greatly improved, he denies any further dysuria or gross hematuria     11/11/19: PSA remains low.  BPH not been a problem lately.  Has read a lot about finasteride and flomax.  Discussed finasteride a a preventative hasn't ever taken it.    11/5/18: Was having trouble a month ago with voiding.  Hasn't taken rapaflo in quite a while.  Had started buproprion prior to that and the LUTS came in to play after that mainly with hesitancy.  At day 4 he felt he couldn't get started at all.  He stopped the med, started rapaflo.  Then had constipation/gas and thought he had a UTI.  Been great for a week.  Stopped rapaflo again.  PSA normal.    5/24/17: 65 yo man in last two years had a TURP followed by a RA Simple Prostatectomy.  No LUTS at all.  No abd/pelvic pain and no exac/rel factors.  No hematuria.  No urolithiasis.  No urinary bother.  Normal sexual function.  Takes rapaflo.    Allergies:  Statins-hmg-coa reductase inhibitors    Medications:  has a current medication list which includes the following prescription(s): aspirin, metoprolol succinate, mometasone 0.1%, multivitamin, nitroglycerin, pantoprazole, ranolazine, and triamcinolone acetonide 0.025%.    Review of Systems:  General: No fever, chills  Skin: No rashes  Chest:  Denies cough and sputum production  Heart: Denies chest pain  Resp: Denies dyspnea  Abdomen: Denies diarrhea, abdominal pain, hematemesis, or blood in stool.  Musculoskeletal: No joint stiffness or swelling. Denies back pain.  : see HPI  Neuro: no dizziness or weakness      PMH:   has a past medical history of Anticoagulant long-term use, Arthritis, Cancer, Coronary artery disease, Digestive disorder, and Myocardial  infarction.    PSH:   has a past surgical history that includes Shoulder arthroscopy (Right); Knee surgery; Total knee arthroplasty; simple prostatectomy (06/06/2016); Cardiac catheterization; Left heart catheterization (Left, 2/22/2020); Percutaneous transluminal balloon angioplasty of coronary artery (Left, 2/22/2020); Coronary angiography including bypass grafts with catheterization of left heart (N/A, 3/3/2020); Joint replacement (Right); and Colonoscopy (N/A, 9/22/2021).    FamHx: family history includes Cancer in his father; Diabetes in his maternal aunt and mother; Heart disease in his mother.    SocHx:  reports that he has never smoked. He quit smokeless tobacco use about 17 years ago.  His smokeless tobacco use included chew. He reports current alcohol use of about 2.0 standard drinks of alcohol per week. He reports that he does not use drugs.      Physical Exam:  Vitals:    02/18/22 0914   BP: 121/74   Pulse: (!) 51   Resp: 16   Temp: 97.2 °F (36.2 °C)     General: awake, alert, cooperative  Head: NC/AT  Ears: external ears normal  Eyes: sclera normal  Lungs: normal inspiration, NAD  Heart: well-perfused  Abdomen: Soft, NT, ND  : Normal circ'd phallus, meatus normal in size and position, BL testicles palpable, no masses, nontender, scrotum normal  JOURDAN: Normal rectal tone, no hemorrhoids. Prostate smooth and normal, no nodules 40 gm SV not palpable. Perineum and anus normal.  Lymphatic: groin nodes negative  Skin: The skin is warm and dry  Ext: No c/c/e.  Neuro: grossly intact, AOx3    Imaging:  US SCROTUM AND TESTICLES     CLINICAL HISTORY:  Testicular pain, unspecified     TECHNIQUE:  Sonography of the scrotum and testes.     COMPARISON:  None.     FINDINGS:  Right Testicle:     *Size: 3.9 x 1.7 x 3.1 cm  *Appearance: Normal.  *Flow: Normal arterial and venous flow  *Epididymis: Epididymal head cyst measuring up to 1 cm.  Hypervascularity of the epididymal tail.  *Hydrocele: None.  *Varicocele:  None.  .     Left Testicle:     *Size: 4.6 x 2.0 x 3.0 cm  *Appearance: Normal.  *Flow: Normal arterial and venous flow  *Epididymis: 1.5 cm epididymal head cyst.  *Hydrocele: None.  *Varicocele: None.  .     Other findings: None.     Impression:     Findings concerning for right epididymitis.  Correlation advised.    Labs/Studies:   Lab Results   Component Value Date    WBC 7.02 07/26/2021    HGB 15.1 07/26/2021    HCT 43.8 07/26/2021     07/26/2021     07/26/2021    K 4.8 07/26/2021     07/26/2021    CREATININE 1.3 07/26/2021    BUN 21 07/26/2021    CO2 27 07/26/2021    TSH 1.607 02/14/2017    PSA 1.1 11/10/2020    INR 1.0 07/26/2021    HGBA1C 4.9 02/23/2020    CHOL 272 (H) 06/22/2020    TRIG 92 06/22/2020    HDL 73 06/22/2020    ALT 19 04/22/2021    AST 21 04/22/2021       Urinalysis performed in clinic today specific gravity 1.015 pH 5, negative for all parameters    PSA    2/17: 1.2    10/18: 1.8    11/19: 0.98    Impression/Plan:   Right epididymitis - resolved, monitor for further infections, if they continue to recur, will proceed with cystoscopy     BPH - patient has done well since simple prostatectomy    Prostate Cancer Screening - PSA and JOURDAN normal, continue annual screening    Clarence Harris MD

## 2022-02-25 ENCOUNTER — HOSPITAL ENCOUNTER (OUTPATIENT)
Facility: HOSPITAL | Age: 72
Discharge: HOME | End: 2022-02-27
Attending: EMERGENCY MEDICINE | Admitting: INTERNAL MEDICINE
Payer: MEDICARE

## 2022-02-25 DIAGNOSIS — I25.10 CAD (CORONARY ARTERY DISEASE): ICD-10-CM

## 2022-02-25 DIAGNOSIS — I42.0 DCM (DILATED CARDIOMYOPATHY): ICD-10-CM

## 2022-02-25 DIAGNOSIS — R07.9 CHEST PAIN: ICD-10-CM

## 2022-02-25 DIAGNOSIS — R00.2 PALPITATIONS: ICD-10-CM

## 2022-02-25 DIAGNOSIS — I21.4 NSTEMI (NON-ST ELEVATED MYOCARDIAL INFARCTION): ICD-10-CM

## 2022-02-25 DIAGNOSIS — R79.89 ELEVATED TROPONIN: Primary | ICD-10-CM

## 2022-02-25 DIAGNOSIS — I42.0 DILATED CARDIOMYOPATHY: ICD-10-CM

## 2022-02-25 LAB
ALBUMIN SERPL BCP-MCNC: 4 G/DL (ref 3.5–5.2)
ALP SERPL-CCNC: 55 U/L (ref 55–135)
ALT SERPL W/O P-5'-P-CCNC: 19 U/L (ref 10–44)
ANION GAP SERPL CALC-SCNC: 11 MMOL/L (ref 8–16)
APTT BLDCRRT: 26.2 SEC (ref 21–32)
AST SERPL-CCNC: 28 U/L (ref 10–40)
BASOPHILS # BLD AUTO: 0.04 K/UL (ref 0–0.2)
BASOPHILS NFR BLD: 0.7 % (ref 0–1.9)
BILIRUB SERPL-MCNC: 0.5 MG/DL (ref 0.1–1)
BNP SERPL-MCNC: 254 PG/ML (ref 0–99)
BUN SERPL-MCNC: 18 MG/DL (ref 8–23)
CALCIUM SERPL-MCNC: 9.4 MG/DL (ref 8.7–10.5)
CHLORIDE SERPL-SCNC: 109 MMOL/L (ref 95–110)
CO2 SERPL-SCNC: 23 MMOL/L (ref 23–29)
CREAT SERPL-MCNC: 1.2 MG/DL (ref 0.5–1.4)
CTP QC/QA: YES
D DIMER PPP IA.FEU-MCNC: <0.19 MG/L FEU
DIFFERENTIAL METHOD: ABNORMAL
EOSINOPHIL # BLD AUTO: 0.1 K/UL (ref 0–0.5)
EOSINOPHIL NFR BLD: 1.2 % (ref 0–8)
ERYTHROCYTE [DISTWIDTH] IN BLOOD BY AUTOMATED COUNT: 14.1 % (ref 11.5–14.5)
EST. GFR  (AFRICAN AMERICAN): >60 ML/MIN/1.73 M^2
EST. GFR  (NON AFRICAN AMERICAN): >60 ML/MIN/1.73 M^2
GLUCOSE SERPL-MCNC: 96 MG/DL (ref 70–110)
HCT VFR BLD AUTO: 43 % (ref 40–54)
HGB BLD-MCNC: 14.7 G/DL (ref 14–18)
IMM GRANULOCYTES # BLD AUTO: 0.01 K/UL (ref 0–0.04)
IMM GRANULOCYTES NFR BLD AUTO: 0.2 % (ref 0–0.5)
INR PPP: 0.9 (ref 0.8–1.2)
LYMPHOCYTES # BLD AUTO: 1.4 K/UL (ref 1–4.8)
LYMPHOCYTES NFR BLD: 24 % (ref 18–48)
MCH RBC QN AUTO: 30.4 PG (ref 27–31)
MCHC RBC AUTO-ENTMCNC: 34.2 G/DL (ref 32–36)
MCV RBC AUTO: 89 FL (ref 82–98)
MONOCYTES # BLD AUTO: 0.5 K/UL (ref 0.3–1)
MONOCYTES NFR BLD: 8.5 % (ref 4–15)
NEUTROPHILS # BLD AUTO: 3.7 K/UL (ref 1.8–7.7)
NEUTROPHILS NFR BLD: 65.4 % (ref 38–73)
NRBC BLD-RTO: 0 /100 WBC
PLATELET # BLD AUTO: 146 K/UL (ref 150–450)
PMV BLD AUTO: 9.6 FL (ref 9.2–12.9)
POTASSIUM SERPL-SCNC: 4.2 MMOL/L (ref 3.5–5.1)
PROT SERPL-MCNC: 6.3 G/DL (ref 6–8.4)
PROTHROMBIN TIME: 10.6 SEC (ref 9–12.5)
RBC # BLD AUTO: 4.83 M/UL (ref 4.6–6.2)
SARS-COV-2 RDRP RESP QL NAA+PROBE: NEGATIVE
SODIUM SERPL-SCNC: 143 MMOL/L (ref 136–145)
TROPONIN I SERPL DL<=0.01 NG/ML-MCNC: 0.03 NG/ML (ref 0–0.03)
WBC # BLD AUTO: 5.67 K/UL (ref 3.9–12.7)

## 2022-02-25 PROCEDURE — 93005 ELECTROCARDIOGRAM TRACING: CPT

## 2022-02-25 PROCEDURE — 85025 COMPLETE CBC W/AUTO DIFF WBC: CPT | Performed by: EMERGENCY MEDICINE

## 2022-02-25 PROCEDURE — 63600175 PHARM REV CODE 636 W HCPCS: Performed by: EMERGENCY MEDICINE

## 2022-02-25 PROCEDURE — 85730 THROMBOPLASTIN TIME PARTIAL: CPT | Performed by: EMERGENCY MEDICINE

## 2022-02-25 PROCEDURE — 83880 ASSAY OF NATRIURETIC PEPTIDE: CPT | Performed by: EMERGENCY MEDICINE

## 2022-02-25 PROCEDURE — 25000003 PHARM REV CODE 250: Performed by: INTERNAL MEDICINE

## 2022-02-25 PROCEDURE — 93010 ELECTROCARDIOGRAM REPORT: CPT | Mod: ,,, | Performed by: INTERNAL MEDICINE

## 2022-02-25 PROCEDURE — 84484 ASSAY OF TROPONIN QUANT: CPT | Performed by: EMERGENCY MEDICINE

## 2022-02-25 PROCEDURE — 80053 COMPREHEN METABOLIC PANEL: CPT | Performed by: EMERGENCY MEDICINE

## 2022-02-25 PROCEDURE — 96366 THER/PROPH/DIAG IV INF ADDON: CPT

## 2022-02-25 PROCEDURE — 85610 PROTHROMBIN TIME: CPT | Performed by: EMERGENCY MEDICINE

## 2022-02-25 PROCEDURE — 99291 CRITICAL CARE FIRST HOUR: CPT | Mod: 25,CS

## 2022-02-25 PROCEDURE — U0002 COVID-19 LAB TEST NON-CDC: HCPCS | Performed by: EMERGENCY MEDICINE

## 2022-02-25 PROCEDURE — G0378 HOSPITAL OBSERVATION PER HR: HCPCS

## 2022-02-25 PROCEDURE — 36415 COLL VENOUS BLD VENIPUNCTURE: CPT | Mod: 59 | Performed by: INTERNAL MEDICINE

## 2022-02-25 PROCEDURE — 85379 FIBRIN DEGRADATION QUANT: CPT | Performed by: EMERGENCY MEDICINE

## 2022-02-25 PROCEDURE — 93010 EKG 12-LEAD: ICD-10-PCS | Mod: ,,, | Performed by: INTERNAL MEDICINE

## 2022-02-25 PROCEDURE — 25000003 PHARM REV CODE 250: Performed by: EMERGENCY MEDICINE

## 2022-02-25 PROCEDURE — 84484 ASSAY OF TROPONIN QUANT: CPT | Mod: 91 | Performed by: INTERNAL MEDICINE

## 2022-02-25 PROCEDURE — 96365 THER/PROPH/DIAG IV INF INIT: CPT

## 2022-02-25 RX ORDER — ONDANSETRON 2 MG/ML
4 INJECTION INTRAMUSCULAR; INTRAVENOUS EVERY 8 HOURS PRN
Status: DISCONTINUED | OUTPATIENT
Start: 2022-02-25 | End: 2022-02-27 | Stop reason: HOSPADM

## 2022-02-25 RX ORDER — ACETAMINOPHEN 325 MG/1
650 TABLET ORAL EVERY 6 HOURS PRN
Status: DISCONTINUED | OUTPATIENT
Start: 2022-02-25 | End: 2022-02-27 | Stop reason: HOSPADM

## 2022-02-25 RX ORDER — HEPARIN SODIUM,PORCINE/D5W 25000/250
0-40 INTRAVENOUS SOLUTION INTRAVENOUS CONTINUOUS
Status: DISCONTINUED | OUTPATIENT
Start: 2022-02-25 | End: 2022-02-26

## 2022-02-25 RX ORDER — RANOLAZINE 500 MG/1
500 TABLET, EXTENDED RELEASE ORAL 2 TIMES DAILY
Status: DISCONTINUED | OUTPATIENT
Start: 2022-02-25 | End: 2022-02-27 | Stop reason: HOSPADM

## 2022-02-25 RX ORDER — ASPIRIN 325 MG
325 TABLET ORAL
Status: COMPLETED | OUTPATIENT
Start: 2022-02-25 | End: 2022-02-25

## 2022-02-25 RX ORDER — MORPHINE SULFATE 4 MG/ML
2 INJECTION, SOLUTION INTRAMUSCULAR; INTRAVENOUS EVERY 4 HOURS PRN
Status: DISCONTINUED | OUTPATIENT
Start: 2022-02-25 | End: 2022-02-27 | Stop reason: HOSPADM

## 2022-02-25 RX ORDER — ASPIRIN 81 MG/1
81 TABLET ORAL DAILY
Status: DISCONTINUED | OUTPATIENT
Start: 2022-02-26 | End: 2022-02-27 | Stop reason: HOSPADM

## 2022-02-25 RX ORDER — MAG HYDROX/ALUMINUM HYD/SIMETH 200-200-20
30 SUSPENSION, ORAL (FINAL DOSE FORM) ORAL EVERY 6 HOURS PRN
Status: DISCONTINUED | OUTPATIENT
Start: 2022-02-25 | End: 2022-02-27 | Stop reason: HOSPADM

## 2022-02-25 RX ORDER — GUAIFENESIN 100 MG/5ML
200 SOLUTION ORAL EVERY 4 HOURS PRN
Status: DISCONTINUED | OUTPATIENT
Start: 2022-02-25 | End: 2022-02-27 | Stop reason: HOSPADM

## 2022-02-25 RX ORDER — IPRATROPIUM BROMIDE AND ALBUTEROL SULFATE 2.5; .5 MG/3ML; MG/3ML
3 SOLUTION RESPIRATORY (INHALATION) EVERY 4 HOURS PRN
Status: DISCONTINUED | OUTPATIENT
Start: 2022-02-25 | End: 2022-02-27 | Stop reason: HOSPADM

## 2022-02-25 RX ORDER — MORPHINE SULFATE 4 MG/ML
4 INJECTION, SOLUTION INTRAMUSCULAR; INTRAVENOUS EVERY 4 HOURS PRN
Status: DISCONTINUED | OUTPATIENT
Start: 2022-02-25 | End: 2022-02-27 | Stop reason: HOSPADM

## 2022-02-25 RX ADMIN — METOPROLOL SUCCINATE 12.5 MG: 25 TABLET, EXTENDED RELEASE ORAL at 10:02

## 2022-02-25 RX ADMIN — ASPIRIN 325 MG ORAL TABLET 325 MG: 325 PILL ORAL at 06:02

## 2022-02-25 RX ADMIN — HEPARIN SODIUM 12 UNITS/KG/HR: 10000 INJECTION, SOLUTION INTRAVENOUS at 08:02

## 2022-02-25 NOTE — Clinical Note
ostium   left main. Hemodynamics were performed.  An angiography was performed of the left coronary arteries. Multiple views were taken.

## 2022-02-25 NOTE — Clinical Note
Diagnosis: Chest pain [826788]   Future Attending Provider: DAVID IQBAL [[id number]]   Admitting Provider:: DAVID IQBAL [[id number]]

## 2022-02-25 NOTE — Clinical Note
TR band removed. No active bleeding or hematoma to left radial access site.  Area gently cleaned with saline and gauze dressing and tegaderm applied.

## 2022-02-25 NOTE — Clinical Note
Removed the last 3 ml air from TR band. Upon removal of band, bleeding noted at site. TR band reapplied and inflated with 9 ml air.

## 2022-02-25 NOTE — Clinical Note
110 ml of contrast were injected throughout the case. 0 mL of contrast was the total wasted during the case. 110 mL was the total amount used during the case.

## 2022-02-25 NOTE — Clinical Note
ID band present and verified. Family is not present. Contact # : Lynn Lambert (wife) [phone]. Patient requests wife be contacted at the end of procedure by Dr. Melchor.

## 2022-02-25 NOTE — Clinical Note
The radial band was applied to the left radial artery. 11 cc's of air were inserted into the closure device.

## 2022-02-26 PROBLEM — I50.22 CHRONIC SYSTOLIC CHF (CONGESTIVE HEART FAILURE): Status: ACTIVE | Noted: 2022-02-26

## 2022-02-26 LAB
ALBUMIN SERPL BCP-MCNC: 3.7 G/DL (ref 3.5–5.2)
ALP SERPL-CCNC: 46 U/L (ref 55–135)
ALT SERPL W/O P-5'-P-CCNC: 19 U/L (ref 10–44)
ANION GAP SERPL CALC-SCNC: 9 MMOL/L (ref 8–16)
AORTIC ROOT ANNULUS: 3.92 CM
APTT BLDCRRT: 49.9 SEC (ref 21–32)
APTT BLDCRRT: 60.1 SEC (ref 21–32)
APTT BLDCRRT: 73.9 SEC (ref 21–32)
ASCENDING AORTA: 3.53 CM
AST SERPL-CCNC: 22 U/L (ref 10–40)
AV INDEX (PROSTH): 0.72
AV MEAN GRADIENT: 3 MMHG
AV PEAK GRADIENT: 7 MMHG
AV VALVE AREA: 3.39 CM2
AV VELOCITY RATIO: 0.73
BASOPHILS # BLD AUTO: 0.05 K/UL (ref 0–0.2)
BASOPHILS NFR BLD: 0.9 % (ref 0–1.9)
BILIRUB SERPL-MCNC: 0.9 MG/DL (ref 0.1–1)
BSA FOR ECHO PROCEDURE: 1.83 M2
BUN SERPL-MCNC: 13 MG/DL (ref 8–23)
CALCIUM SERPL-MCNC: 8.6 MG/DL (ref 8.7–10.5)
CATH EF QUANTITATIVE: 30 %
CHLORIDE SERPL-SCNC: 107 MMOL/L (ref 95–110)
CO2 SERPL-SCNC: 24 MMOL/L (ref 23–29)
CREAT SERPL-MCNC: 1 MG/DL (ref 0.5–1.4)
CV ECHO LV RWT: 0.61 CM
DIFFERENTIAL METHOD: ABNORMAL
DOP CALC AO PEAK VEL: 1.28 M/S
DOP CALC AO VTI: 30 CM
DOP CALC LVOT AREA: 4.7 CM2
DOP CALC LVOT DIAMETER: 2.45 CM
DOP CALC LVOT PEAK VEL: 0.94 M/S
DOP CALC LVOT STROKE VOLUME: 101.78 CM3
DOP CALC RVOT PEAK VEL: 0.52 M/S
DOP CALC RVOT VTI: 12.1 CM
DOP CALCLVOT PEAK VEL VTI: 21.6 CM
E WAVE DECELERATION TIME: 342.13 MSEC
E/A RATIO: 0.46
E/E' RATIO: 12.57 M/S
ECHO EF ESTIMATED: 26 %
ECHO LV POSTERIOR WALL: 1.49 CM (ref 0.6–1.1)
EJECTION FRACTION: 30 %
EOSINOPHIL # BLD AUTO: 0.1 K/UL (ref 0–0.5)
EOSINOPHIL NFR BLD: 1.4 % (ref 0–8)
ERYTHROCYTE [DISTWIDTH] IN BLOOD BY AUTOMATED COUNT: 13.9 % (ref 11.5–14.5)
EST. GFR  (AFRICAN AMERICAN): >60 ML/MIN/1.73 M^2
EST. GFR  (NON AFRICAN AMERICAN): >60 ML/MIN/1.73 M^2
FRACTIONAL SHORTENING: 12 % (ref 28–44)
GLUCOSE SERPL-MCNC: 94 MG/DL (ref 70–110)
HCT VFR BLD AUTO: 41.9 % (ref 40–54)
HGB BLD-MCNC: 14.4 G/DL (ref 14–18)
IMM GRANULOCYTES # BLD AUTO: 0.01 K/UL (ref 0–0.04)
IMM GRANULOCYTES NFR BLD AUTO: 0.2 % (ref 0–0.5)
INTERVENTRICULAR SEPTUM: 1.66 CM (ref 0.6–1.1)
IVC DIAMETER: 1.39 CM
IVRT: 71.36 MSEC
LA MAJOR: 4.49 CM
LA MINOR: 3.48 CM
LEFT ATRIUM SIZE: 3.36 CM
LEFT INTERNAL DIMENSION IN SYSTOLE: 4.28 CM (ref 2.1–4)
LEFT VENTRICLE DIASTOLIC VOLUME INDEX: 60.79 ML/M2
LEFT VENTRICLE DIASTOLIC VOLUME: 111.25 ML
LEFT VENTRICLE MASS INDEX: 182 G/M2
LEFT VENTRICLE SYSTOLIC VOLUME INDEX: 44.9 ML/M2
LEFT VENTRICLE SYSTOLIC VOLUME: 82.14 ML
LEFT VENTRICULAR INTERNAL DIMENSION IN DIASTOLE: 4.87 CM (ref 3.5–6)
LEFT VENTRICULAR MASS: 333.69 G
LV LATERAL E/E' RATIO: 14.67 M/S
LV SEPTAL E/E' RATIO: 11 M/S
LVOT MG: 1.89 MMHG
LVOT MV: 0.64 CM/S
LYMPHOCYTES # BLD AUTO: 1.6 K/UL (ref 1–4.8)
LYMPHOCYTES NFR BLD: 26.7 % (ref 18–48)
MAGNESIUM SERPL-MCNC: 1.9 MG/DL (ref 1.6–2.6)
MCH RBC QN AUTO: 30 PG (ref 27–31)
MCHC RBC AUTO-ENTMCNC: 34.4 G/DL (ref 32–36)
MCV RBC AUTO: 87 FL (ref 82–98)
MONOCYTES # BLD AUTO: 0.5 K/UL (ref 0.3–1)
MONOCYTES NFR BLD: 9.3 % (ref 4–15)
MV PEAK A VEL: 0.95 M/S
MV PEAK E VEL: 0.44 M/S
MV STENOSIS PRESSURE HALF TIME: 99.22 MS
MV VALVE AREA P 1/2 METHOD: 2.22 CM2
NEUTROPHILS # BLD AUTO: 3.6 K/UL (ref 1.8–7.7)
NEUTROPHILS NFR BLD: 61.5 % (ref 38–73)
NRBC BLD-RTO: 0 /100 WBC
PISA TR MAX VEL: 3.09 M/S
PLATELET # BLD AUTO: 144 K/UL (ref 150–450)
PMV BLD AUTO: 9.6 FL (ref 9.2–12.9)
POC ACTIVATED CLOTTING TIME K: 232 SEC (ref 74–137)
POC ACTIVATED CLOTTING TIME K: 255 SEC (ref 74–137)
POCT GLUCOSE: 124 MG/DL (ref 70–110)
POTASSIUM SERPL-SCNC: 3.7 MMOL/L (ref 3.5–5.1)
PROT SERPL-MCNC: 5.8 G/DL (ref 6–8.4)
PV MEAN GRADIENT: 0.59 MMHG
RA MAJOR: 5.07 CM
RA PRESSURE: 3 MMHG
RA WIDTH: 2.77 CM
RBC # BLD AUTO: 4.8 M/UL (ref 4.6–6.2)
SAMPLE: ABNORMAL
SAMPLE: ABNORMAL
SINUS: 3.83 CM
SODIUM SERPL-SCNC: 140 MMOL/L (ref 136–145)
STJ: 3.22 CM
TDI LATERAL: 0.03 M/S
TDI SEPTAL: 0.04 M/S
TDI: 0.04 M/S
TR MAX PG: 38 MMHG
TRICUSPID ANNULAR PLANE SYSTOLIC EXCURSION: 1.92 CM
TROPONIN I SERPL DL<=0.01 NG/ML-MCNC: 0.07 NG/ML (ref 0–0.03)
TROPONIN I SERPL DL<=0.01 NG/ML-MCNC: 0.09 NG/ML (ref 0–0.03)
TV REST PULMONARY ARTERY PRESSURE: 41 MMHG
WBC # BLD AUTO: 5.8 K/UL (ref 3.9–12.7)

## 2022-02-26 PROCEDURE — 92928 PRQ TCAT PLMT NTRAC ST 1 LES: CPT | Mod: LD,,, | Performed by: INTERNAL MEDICINE

## 2022-02-26 PROCEDURE — 99220 PR INITIAL OBSERVATION CARE,LEVL III: CPT | Mod: 25,,, | Performed by: INTERNAL MEDICINE

## 2022-02-26 PROCEDURE — C1887 CATHETER, GUIDING: HCPCS | Performed by: INTERNAL MEDICINE

## 2022-02-26 PROCEDURE — 96360 HYDRATION IV INFUSION INIT: CPT | Mod: 59

## 2022-02-26 PROCEDURE — C1769 GUIDE WIRE: HCPCS | Performed by: INTERNAL MEDICINE

## 2022-02-26 PROCEDURE — 93005 ELECTROCARDIOGRAM TRACING: CPT | Mod: 59

## 2022-02-26 PROCEDURE — 27201423 OPTIME MED/SURG SUP & DEVICES STERILE SUPPLY: Performed by: INTERNAL MEDICINE

## 2022-02-26 PROCEDURE — 83735 ASSAY OF MAGNESIUM: CPT | Performed by: INTERNAL MEDICINE

## 2022-02-26 PROCEDURE — 25000003 PHARM REV CODE 250: Performed by: INTERNAL MEDICINE

## 2022-02-26 PROCEDURE — 85025 COMPLETE CBC W/AUTO DIFF WBC: CPT | Performed by: EMERGENCY MEDICINE

## 2022-02-26 PROCEDURE — C9601 PERC DRUG-EL COR STENT BRAN: HCPCS | Mod: LD | Performed by: INTERNAL MEDICINE

## 2022-02-26 PROCEDURE — 36415 COLL VENOUS BLD VENIPUNCTURE: CPT | Performed by: INTERNAL MEDICINE

## 2022-02-26 PROCEDURE — 93458 L HRT ARTERY/VENTRICLE ANGIO: CPT | Mod: 59,51 | Performed by: INTERNAL MEDICINE

## 2022-02-26 PROCEDURE — 99152 PR MOD CONSCIOUS SEDATION, SAME PHYS, 5+ YRS, FIRST 15 MIN: ICD-10-PCS | Mod: ,,, | Performed by: INTERNAL MEDICINE

## 2022-02-26 PROCEDURE — 99152 MOD SED SAME PHYS/QHP 5/>YRS: CPT | Mod: ,,, | Performed by: INTERNAL MEDICINE

## 2022-02-26 PROCEDURE — 93458 L HRT ARTERY/VENTRICLE ANGIO: CPT | Mod: 26,59,51, | Performed by: INTERNAL MEDICINE

## 2022-02-26 PROCEDURE — 93010 ELECTROCARDIOGRAM REPORT: CPT | Mod: 59,,, | Performed by: INTERNAL MEDICINE

## 2022-02-26 PROCEDURE — 92928 PR STENT: ICD-10-PCS | Mod: LD,,, | Performed by: INTERNAL MEDICINE

## 2022-02-26 PROCEDURE — 96361 HYDRATE IV INFUSION ADD-ON: CPT

## 2022-02-26 PROCEDURE — 99220 PR INITIAL OBSERVATION CARE,LEVL III: ICD-10-PCS | Mod: 25,,, | Performed by: INTERNAL MEDICINE

## 2022-02-26 PROCEDURE — 85730 THROMBOPLASTIN TIME PARTIAL: CPT | Mod: 91 | Performed by: EMERGENCY MEDICINE

## 2022-02-26 PROCEDURE — C9600 PERC DRUG-EL COR STENT SING: HCPCS | Mod: LD | Performed by: INTERNAL MEDICINE

## 2022-02-26 PROCEDURE — 63600175 PHARM REV CODE 636 W HCPCS: Performed by: INTERNAL MEDICINE

## 2022-02-26 PROCEDURE — 85730 THROMBOPLASTIN TIME PARTIAL: CPT | Mod: 91 | Performed by: INTERNAL MEDICINE

## 2022-02-26 PROCEDURE — 25500020 PHARM REV CODE 255: Performed by: INTERNAL MEDICINE

## 2022-02-26 PROCEDURE — 93458 PR CATH PLACE/CORON ANGIO, IMG SUPER/INTERP,W LEFT HEART VENTRICULOGRAPHY: ICD-10-PCS | Mod: 26,59,51, | Performed by: INTERNAL MEDICINE

## 2022-02-26 PROCEDURE — 92929 PR STENT, ADD'L VESSEL: ICD-10-PCS | Mod: LD,,, | Performed by: INTERNAL MEDICINE

## 2022-02-26 PROCEDURE — G0378 HOSPITAL OBSERVATION PER HR: HCPCS

## 2022-02-26 PROCEDURE — C1725 CATH, TRANSLUMIN NON-LASER: HCPCS | Performed by: INTERNAL MEDICINE

## 2022-02-26 PROCEDURE — 92929 PR STENT, ADD'L VESSEL: CPT | Mod: LD,,, | Performed by: INTERNAL MEDICINE

## 2022-02-26 PROCEDURE — 25000003 PHARM REV CODE 250: Performed by: PHYSICIAN ASSISTANT

## 2022-02-26 PROCEDURE — 84484 ASSAY OF TROPONIN QUANT: CPT | Performed by: INTERNAL MEDICINE

## 2022-02-26 PROCEDURE — 80053 COMPREHEN METABOLIC PANEL: CPT | Performed by: INTERNAL MEDICINE

## 2022-02-26 PROCEDURE — 93010 EKG 12-LEAD: ICD-10-PCS | Mod: 59,,, | Performed by: INTERNAL MEDICINE

## 2022-02-26 PROCEDURE — 85347 COAGULATION TIME ACTIVATED: CPT | Performed by: INTERNAL MEDICINE

## 2022-02-26 PROCEDURE — 96366 THER/PROPH/DIAG IV INF ADDON: CPT

## 2022-02-26 PROCEDURE — C1894 INTRO/SHEATH, NON-LASER: HCPCS | Performed by: INTERNAL MEDICINE

## 2022-02-26 PROCEDURE — C1874 STENT, COATED/COV W/DEL SYS: HCPCS | Performed by: INTERNAL MEDICINE

## 2022-02-26 DEVICE — STENT RONYX30030UX RESOLUTE ONYX 3.00X30
Type: IMPLANTABLE DEVICE | Site: CORONARY | Status: FUNCTIONAL
Brand: RESOLUTE ONYX™

## 2022-02-26 DEVICE — STENT RONYX22518UX RESOLUTE ONYX 2.25X18
Type: IMPLANTABLE DEVICE | Site: CORONARY | Status: FUNCTIONAL
Brand: RESOLUTE ONYX™

## 2022-02-26 RX ORDER — ACETAMINOPHEN 325 MG/1
650 TABLET ORAL EVERY 4 HOURS PRN
Status: DISCONTINUED | OUTPATIENT
Start: 2022-02-26 | End: 2022-02-27 | Stop reason: HOSPADM

## 2022-02-26 RX ORDER — ONDANSETRON 8 MG/1
8 TABLET, ORALLY DISINTEGRATING ORAL EVERY 8 HOURS PRN
Status: DISCONTINUED | OUTPATIENT
Start: 2022-02-26 | End: 2022-02-27 | Stop reason: HOSPADM

## 2022-02-26 RX ORDER — VERAPAMIL HYDROCHLORIDE 2.5 MG/ML
INJECTION, SOLUTION INTRAVENOUS
Status: DISCONTINUED | OUTPATIENT
Start: 2022-02-26 | End: 2022-02-26 | Stop reason: HOSPADM

## 2022-02-26 RX ORDER — NITROGLYCERIN 5 MG/ML
INJECTION, SOLUTION INTRAVENOUS
Status: DISCONTINUED | OUTPATIENT
Start: 2022-02-26 | End: 2022-02-26 | Stop reason: HOSPADM

## 2022-02-26 RX ORDER — SODIUM CHLORIDE 9 MG/ML
INJECTION, SOLUTION INTRAVENOUS CONTINUOUS
Status: DISCONTINUED | OUTPATIENT
Start: 2022-02-26 | End: 2022-02-26

## 2022-02-26 RX ORDER — MIDAZOLAM HYDROCHLORIDE 1 MG/ML
INJECTION INTRAMUSCULAR; INTRAVENOUS
Status: DISCONTINUED | OUTPATIENT
Start: 2022-02-26 | End: 2022-02-26 | Stop reason: HOSPADM

## 2022-02-26 RX ORDER — SODIUM NITROPRUSSIDE 25 MG/ML
INJECTION INTRAVENOUS
Status: DISCONTINUED | OUTPATIENT
Start: 2022-02-26 | End: 2022-02-26 | Stop reason: HOSPADM

## 2022-02-26 RX ORDER — SODIUM CHLORIDE 9 MG/ML
INJECTION, SOLUTION INTRAVENOUS CONTINUOUS
Status: ACTIVE | OUTPATIENT
Start: 2022-02-26 | End: 2022-02-27

## 2022-02-26 RX ORDER — HEPARIN SODIUM 1000 [USP'U]/ML
INJECTION, SOLUTION INTRAVENOUS; SUBCUTANEOUS
Status: DISCONTINUED | OUTPATIENT
Start: 2022-02-26 | End: 2022-02-26 | Stop reason: HOSPADM

## 2022-02-26 RX ORDER — CEFAZOLIN SODIUM 1 G/3ML
INJECTION, POWDER, FOR SOLUTION INTRAMUSCULAR; INTRAVENOUS
Status: DISCONTINUED | OUTPATIENT
Start: 2022-02-26 | End: 2022-02-26 | Stop reason: HOSPADM

## 2022-02-26 RX ORDER — SODIUM CHLORIDE 9 MG/ML
INJECTION, SOLUTION INTRAVENOUS
Status: DISCONTINUED | OUTPATIENT
Start: 2022-02-26 | End: 2022-02-26

## 2022-02-26 RX ORDER — LIDOCAINE HYDROCHLORIDE 20 MG/ML
INJECTION, SOLUTION EPIDURAL; INFILTRATION; INTRACAUDAL; PERINEURAL
Status: DISCONTINUED | OUTPATIENT
Start: 2022-02-26 | End: 2022-02-26 | Stop reason: HOSPADM

## 2022-02-26 RX ORDER — PHENYLEPHRINE HYDROCHLORIDE 10 MG/ML
INJECTION INTRAVENOUS
Status: DISCONTINUED | OUTPATIENT
Start: 2022-02-26 | End: 2022-02-26 | Stop reason: HOSPADM

## 2022-02-26 RX ORDER — FENTANYL CITRATE 50 UG/ML
INJECTION, SOLUTION INTRAMUSCULAR; INTRAVENOUS
Status: DISCONTINUED | OUTPATIENT
Start: 2022-02-26 | End: 2022-02-26 | Stop reason: HOSPADM

## 2022-02-26 RX ADMIN — RANOLAZINE 500 MG: 500 TABLET, EXTENDED RELEASE ORAL at 08:02

## 2022-02-26 RX ADMIN — SODIUM CHLORIDE: 0.9 INJECTION, SOLUTION INTRAVENOUS at 08:02

## 2022-02-26 RX ADMIN — SODIUM CHLORIDE: 0.9 INJECTION, SOLUTION INTRAVENOUS at 12:02

## 2022-02-26 RX ADMIN — ASPIRIN 81 MG: 81 TABLET, COATED ORAL at 08:02

## 2022-02-26 RX ADMIN — TICAGRELOR 90 MG: 90 TABLET ORAL at 08:02

## 2022-02-26 RX ADMIN — METOPROLOL SUCCINATE 12.5 MG: 25 TABLET, EXTENDED RELEASE ORAL at 08:02

## 2022-02-26 NOTE — OP NOTE
INPATIENT Operative Note         SUMMARY     Surgery Date: 2/26/2022     Surgeon(s) and Role:     * Ute Melchor MD - Primary    ASSISTANT:none    Pre-op Diagnosis:  NSTEMI (non-ST elevated myocardial infarction) [I21.4]      Post-op Diagnosis:  NSTEMI (non-ST elevated myocardial infarction) [I21.4]    Procedure(s) (LRB):  CATHETERIZATION, HEART, LEFT (Left)  Angioplasty-coronary  Stent, Drug Eluting, Multi Vessel, Coronary    COMPLICATION:none    Anesthesia: Choice    Findings/Key Components:  Ld prox 90% with d2 bifurcation 50% requiring stenting with kissing angioplasty .  lcx patent   normAL RCA   Ef 305 ant wall apical akinesis     Estimated Blood Loss: < 50 ML.         SPECIMEN: NONE    Devices/Prostetics: resolute x2     PLAN:  Routine post stent care.

## 2022-02-26 NOTE — PHARMACY MED REC
"Admission Medication History     The home medication history was taken by Alejo Stewart.    You may go to "Admission" then "Reconcile Home Medications" tabs to review and/or act upon these items.      The home medication list has been updated by the Pharmacy department.    Please read ALL comments highlighted in yellow.    Please address this information as you see fit.     Feel free to contact us if you have any questions or require assistance.      The medications listed below were removed from the home medication list. Please reorder if appropriate:  Patient reports no longer taking the following medication(s):   TRIAMCINOLONE ACETONIDE 0.025 % CREAM    Medications listed below were obtained from: Patient/family and Analytic software- [employer]  (Not in a hospital admission)      Potential issues to be addressed PRIOR TO DISCHARGE: NONE      Alejo Stewart, [facility] [phone]      Current Outpatient Medications on File Prior to Encounter   Medication Sig Dispense Refill Last Dose    aspirin (ECOTRIN) 81 MG EC tablet Take 1 tablet (81 mg total) by mouth once daily.   2/25/2022    metoprolol succinate (TOPROL-XL) 25 MG 24 hr tablet Take 0.5 tablets (12.5 mg total) by mouth every evening. 15 tablet 11 2/24/2022 at Unknown time    mometasone 0.1% (ELOCON) 0.1 % cream AAA bid prn for psoriasis. Strong steroid.  Do not use on face, underarms or groin. 50 g 1 2/25/2022 at Unknown time    multivitamin capsule Take 1 capsule by mouth once daily.   2/24/2022    nitroGLYCERIN (NITROSTAT) 0.4 MG SL tablet Place 1 tablet (0.4 mg total) under the tongue every 5 (five) minutes as needed for Chest pain. 30 tablet 1 2/25/2022    pantoprazole (PROTONIX) 40 MG tablet Take 1 tablet (40 mg total) by mouth once daily. 30 tablet 3 2/24/2022 at Unknown time    ranolazine (RANEXA) 500 MG Tb12 Take 1 tablet (500 mg total) by mouth 2 (two) times daily. 60 tablet 11 2/25/2022 at Unknown time    [DISCONTINUED] " triamcinolone acetonide 0.025% (KENALOG) 0.025 % cream Apply topically 2 (two) times daily as needed. Mild steroid.  Safe for face up to 2 weeks then stop. 30 g 1                              .

## 2022-02-26 NOTE — ASSESSMENT & PLAN NOTE
Troponin 0.027, trend cardiac enzymes.  Currently chest pain-free.  Heparin drip.  Aspirin 325 mg p.o. x1 in the ED.  Continue home dose aspirin 81 mg.  Metoprolol.  Patient not on ACEI/ARB.  Patient intolerant to statins.

## 2022-02-26 NOTE — SUBJECTIVE & OBJECTIVE
Past Medical History:   Diagnosis Date    Anticoagulant long-term use     Arthritis     Cancer     Coronary artery disease     Digestive disorder     Myocardial infarction     2020       Past Surgical History:   Procedure Laterality Date    CARDIAC CATHETERIZATION      COLONOSCOPY N/A 9/22/2021    Procedure: COLONOSCOPY;  Surgeon: Meaghan Jaime MD;  Location: [facility] ENDO;  Service: Endoscopy;  Laterality: N/A;    CORONARY ANGIOGRAPHY INCLUDING BYPASS GRAFTS WITH CATHETERIZATION OF LEFT HEART N/A 3/3/2020    Procedure: ANGIOGRAM, CORONARY, INCLUDING BYPASS GRAFT, WITH LEFT HEART CATHETERIZATION;  Surgeon: Ute Melchor MD;  Location: [facility] CATH LAB;  Service: Cardiology;  Laterality: N/A;    JOINT REPLACEMENT Right     knee    KNEE SURGERY      LEFT HEART CATHETERIZATION Left 2/22/2020    Procedure: CATHETERIZATION, HEART, LEFT;  Surgeon: Ute Melchor MD;  Location: [facility] CATH LAB;  Service: Cardiology;  Laterality: Left;    PERCUTANEOUS TRANSLUMINAL BALLOON ANGIOPLASTY OF CORONARY ARTERY Left 2/22/2020    Procedure: Angioplasty-coronary;  Surgeon: Ute Melchor MD;  Location: [facility] CATH LAB;  Service: Cardiology;  Laterality: Left;    SHOULDER ARTHROSCOPY Right     Dr Bella    simple prostatectomy  06/06/2016    robotic assisted partial prostectomy due to BPH    TOTAL KNEE ARTHROPLASTY         Review of patient's allergies indicates:   Allergen Reactions    Statins-hmg-coa reductase inhibitors Other (See Comments)     SEVERE MYALGIAS AND GI SIDE EFFECTS       No current facility-administered medications on file prior to encounter.     Current Outpatient Medications on File Prior to Encounter   Medication Sig    aspirin (ECOTRIN) 81 MG EC tablet Take 1 tablet (81 mg total) by mouth once daily.    metoprolol succinate (TOPROL-XL) 25 MG 24 hr tablet Take 0.5 tablets (12.5 mg total) by mouth every evening.    mometasone 0.1% (ELOCON) 0.1 % cream AAA bid prn for psoriasis. Strong steroid.  Do not use on face,  underarms or groin.    multivitamin capsule Take 1 capsule by mouth once daily.    nitroGLYCERIN (NITROSTAT) 0.4 MG SL tablet Place 1 tablet (0.4 mg total) under the tongue every 5 (five) minutes as needed for Chest pain.    pantoprazole (PROTONIX) 40 MG tablet Take 1 tablet (40 mg total) by mouth once daily.    ranolazine (RANEXA) 500 MG Tb12 Take 1 tablet (500 mg total) by mouth 2 (two) times daily.    [DISCONTINUED] triamcinolone acetonide 0.025% (KENALOG) 0.025 % cream Apply topically 2 (two) times daily as needed. Mild steroid.  Safe for face up to 2 weeks then stop.     Family History       Problem Relation (Age of Onset)    Cancer Father    Diabetes Mother, Maternal Aunt    Heart disease Mother          Tobacco Use    Smoking status: Never Smoker    Smokeless tobacco: Former User     Types: Chew     Quit date: 6/3/2004   Substance and Sexual Activity    Alcohol use: Yes     Alcohol/week: 2.0 standard drinks     Types: 2 Glasses of wine per week     Comment: per week    Drug use: No    Sexual activity: Not on file     Review of Systems   Constitutional: Negative.  Negative for chills and fever.   HENT: Negative.  Negative for congestion, rhinorrhea, sore throat and trouble swallowing.    Eyes: Negative.  Negative for visual disturbance.   Respiratory: Negative.  Negative for cough, shortness of breath and wheezing.    Cardiovascular:  Positive for chest pain. Negative for palpitations.   Gastrointestinal:  Positive for nausea. Negative for abdominal pain, diarrhea and vomiting.   Endocrine: Negative.    Genitourinary: Negative.  Negative for dysuria and flank pain.   Musculoskeletal: Negative.  Negative for back pain.   Skin: Negative.  Negative for rash.   Allergic/Immunologic: Negative.    Neurological: Negative.  Negative for speech difficulty, weakness, numbness and headaches.   Hematological: Negative.    Psychiatric/Behavioral: Negative.  Negative for hallucinations.    All other systems reviewed and are  negative.  Objective:     Vital Signs (Most Recent):  Temp: 98.1 °F (36.7 °C) (02/25/22 1811)  Pulse: 62 (02/25/22 1902)  Resp: 18 (02/25/22 1902)  BP: 132/82 (02/25/22 1902)  SpO2: 98 % (02/25/22 1902) Vital Signs (24h Range):  Temp:  [98.1 °F (36.7 °C)] 98.1 °F (36.7 °C)  Pulse:  [56-62] 62  Resp:  [18] 18  SpO2:  [98 %-99 %] 98 %  BP: (132-158)/(82-93) 132/82     Weight: 70.3 kg (155 lb)  Body mass index is 23.57 kg/m².    Physical Exam  Vitals and nursing note reviewed.   Constitutional:       General: He is awake. He is not in acute distress.     Appearance: Normal appearance. He is not ill-appearing.   HENT:      Head: Normocephalic and atraumatic.      Mouth/Throat:      Mouth: Mucous membranes are moist.   Eyes:      General: No scleral icterus.     Conjunctiva/sclera: Conjunctivae normal.   Cardiovascular:      Rate and Rhythm: Normal rate and regular rhythm.      Heart sounds: No murmur heard.  Pulmonary:      Effort: Pulmonary effort is normal. No respiratory distress.      Breath sounds: Normal breath sounds. No wheezing.   Abdominal:      Palpations: Abdomen is soft.      Tenderness: There is no abdominal tenderness.   Musculoskeletal:         General: No swelling. Normal range of motion.      Cervical back: Normal range of motion and neck supple.   Skin:     General: Skin is warm.      Coloration: Skin is not jaundiced.   Neurological:      General: No focal deficit present.      Mental Status: He is alert and oriented to person, place, and time. Mental status is at baseline.   Psychiatric:         Attention and Perception: Attention normal.         Mood and Affect: Mood normal.         Speech: Speech normal.         Behavior: Behavior normal. Behavior is cooperative.           Significant Labs: All pertinent labs within the past 24 hours have been reviewed.  Recent Lab Results         02/25/22 1825        Albumin 4.0       Alkaline Phosphatase 55       ALT 19       Anion Gap 11       aPTT  26.2  Comment: aPTT therapeutic range = 39-69 seconds       AST 28       Baso # 0.04       Basophil % 0.7       BILIRUBIN TOTAL 0.5  Comment: For infants and newborns, interpretation of results should be based  on gestational age, weight and in agreement with clinical  observations.    Premature Infant recommended reference ranges:  Up to 24 hours.............<8.0 mg/dL  Up to 48 hours............<12.0 mg/dL  3-5 days..................<15.0 mg/dL  6-29 days.................<15.0 mg/dL           Comment: Values of less than 100 pg/ml are consistent with non-CHF populations.       BUN 18       Calcium 9.4       Chloride 109       CO2 23       Creatinine 1.2       D-Dimer <0.19  Comment: The quantitative D-dimer assay should be used as an aid in   the diagnosis of deep vein thrombosis and pulmonary embolism  in patients with the appropriate presentation and clinical  history. The upper limit of the reference interval and the clinical   cut off   point are identical. Causes of a positive (>0.50 mg/L FEU) D-Dimer   test  include, but are not limited to: DVT, PE, DIC, thrombolytic   therapy, anticoagulant therapy, recent surgery, trauma, or   pregnancy, disseminated malignancy, aortic aneurysm, cirrhosis,  and severe infection. False negative results may occur in   patients with distal DVT.         Differential Method Automated       eGFR if  >60       eGFR if non  >60  Comment: Calculation used to obtain the estimated glomerular filtration  rate (eGFR) is the CKD-EPI equation.          Eos # 0.1       Eosinophil % 1.2       Glucose 96       Gran # (ANC) 3.7       Gran % 65.4       Hematocrit 43.0       Hemoglobin 14.7       Immature Grans (Abs) 0.01  Comment: Mild elevation in immature granulocytes is non specific and   can be seen in a variety of conditions including stress response,   acute inflammation, trauma and pregnancy. Correlation with other   laboratory and clinical  findings is essential.         Immature Granulocytes 0.2       INR 0.9  Comment: Coumadin Therapy:  2.0 - 3.0 for INR for all indicators except mechanical heart valves  and antiphospholipid syndromes which should use 2.5 - 3.5.         Lymph # 1.4       Lymph % 24.0       MCH 30.4       MCHC 34.2       MCV 89       Mono # 0.5       Mono % 8.5       MPV 9.6       nRBC 0       Platelets 146       Potassium 4.2       PROTEIN TOTAL 6.3       Protime 10.6       RBC 4.83       RDW 14.1       Sodium 143       Troponin I 0.027  Comment: The reference interval for Troponin I represents the 99th percentile   cutoff   for our facility and is consistent with 3rd generation assay   performance.         WBC 5.67               Significant Imaging: I have reviewed all pertinent imaging results/findings within the past 24 hours.  I have reviewed and interpreted all pertinent imaging results/findings within the past 24 hours.  CXR: I have reviewed all pertinent results/findings within the past 24 hours and my personal findings are:     EKG: I have reviewed all pertinent results/findings within the past 24 hours and my personal findings are:      Imaging Results              X-Ray Chest AP Portable (Final result)  Result time 02/25/22 19:25:52      Final result by Dereck Aquino MD (02/25/22 19:25:52)                   Impression:      No acute abnormality.      Electronically signed by: Dereck Aquino  Date:    02/25/2022  Time:    19:25               Narrative:    EXAMINATION:  XR CHEST AP PORTABLE    CLINICAL HISTORY:  Chest Pain;    TECHNIQUE:  Single frontal view of the chest was performed.    COMPARISON:  Multiple priors.    FINDINGS:  The lungs are clear, with normal appearance of pulmonary vasculature and no pleural effusion or pneumothorax.    The cardiac silhouette is normal in size. The hilar and mediastinal contours are unremarkable.    Bones are intact.                                      I have independently reviewed  and interpreted the EKG. M    I have independently reviewed all pertinent labs within the past 24 hours.    I have independently reviewed, visualized and interpreted all pertinent imaging results within the past 24 hours and discussed the findings with the ED physician, Dr. Stevens

## 2022-02-26 NOTE — ED PROVIDER NOTES
SCRIBE #1 NOTE: I, Paul Villalobos, am scribing for, and in the presence of, Darek Stevens MD. I have scribed the entire note.       History     Chief Complaint   Patient presents with    Chest Pain     Nonradiating Left sided CP as tightness began this morning with SOB, lightheadedness, heartburn, tachycardia. Hx of MI and stent placement.      Review of patient's allergies indicates:   Allergen Reactions    Statins-hmg-coa reductase inhibitors      SEVERE MYALGIAS AND GI SIDE EFFECTS         History of Present Illness     HPI    2/25/2022, 6:49 PM  History obtained from the patient      History of Present Illness: Mekhi Lambert is a 71 y.o. male patient with a PMHx of CAD, cancer, digestive disorder, MI, stent placement who presents to the Emergency Department for evaluation of chest pain which onset abruptly today PTA. Pt reports his cardiologist put him on a new medication about a month ago but it caused him to have compressed/double beats so he stopped taking it. Pt states he works out every day but had his heart rate jump to an abnormally high rate today after walking (112 BPM), when his heart rate is typically low. Pt state his chest pain started today and he felt fine yesterday with similar activity. Symptoms are constant and moderate in severity. Walking is an exacerbating factor reported. Associated sxs include nausea. Patient denies any diaphoresis, SOB, vomiting, fever, chills, and all other sxs at this time. No prior Tx reported. No further complaints or concerns at this time.       Arrival mode: Personal vehicle    PCP: ABIGAIL Lopez Jr, MD        Past Medical History:  Past Medical History:   Diagnosis Date    Anticoagulant long-term use     Arthritis     Cancer     Coronary artery disease     Digestive disorder     Myocardial infarction     2020       Past Surgical History:  Past Surgical History:   Procedure Laterality Date    CARDIAC CATHETERIZATION      COLONOSCOPY N/A 9/22/2021     Procedure: COLONOSCOPY;  Surgeon: Meaghan Jaime MD;  Location: [facility] ENDO;  Service: Endoscopy;  Laterality: N/A;    CORONARY ANGIOGRAPHY INCLUDING BYPASS GRAFTS WITH CATHETERIZATION OF LEFT HEART N/A 3/3/2020    Procedure: ANGIOGRAM, CORONARY, INCLUDING BYPASS GRAFT, WITH LEFT HEART CATHETERIZATION;  Surgeon: Ute Melchor MD;  Location: [facility] CATH LAB;  Service: Cardiology;  Laterality: N/A;    JOINT REPLACEMENT Right     knee    KNEE SURGERY      LEFT HEART CATHETERIZATION Left 2/22/2020    Procedure: CATHETERIZATION, HEART, LEFT;  Surgeon: Ute Melchor MD;  Location: [facility] CATH LAB;  Service: Cardiology;  Laterality: Left;    PERCUTANEOUS TRANSLUMINAL BALLOON ANGIOPLASTY OF CORONARY ARTERY Left 2/22/2020    Procedure: Angioplasty-coronary;  Surgeon: Ute Melchor MD;  Location: [facility] CATH LAB;  Service: Cardiology;  Laterality: Left;    SHOULDER ARTHROSCOPY Right     Dr Bella    simple prostatectomy  06/06/2016    robotic assisted partial prostectomy due to BPH    TOTAL KNEE ARTHROPLASTY           Family History:  Family History   Problem Relation Age of Onset    Heart disease Mother         lupus related    Diabetes Mother     Cancer Father         prostate, bone, lung, skin cancers all seperate types    Diabetes Maternal Aunt        Social History:  Social History     Tobacco Use    Smoking status: Never Smoker    Smokeless tobacco: Former User     Types: Chew     Quit date: 6/3/2004   Substance and Sexual Activity    Alcohol use: Yes     Alcohol/week: 2.0 standard drinks     Types: 2 Glasses of wine per week     Comment: per week    Drug use: No    Sexual activity: Not on file        Review of Systems     Review of Systems   Constitutional: Negative for diaphoresis and fever.   HENT: Negative for sore throat.    Respiratory: Negative for shortness of breath.    Cardiovascular: Positive for chest pain.   Gastrointestinal: Positive for nausea. Negative for vomiting.    Genitourinary: Negative for dysuria.   Musculoskeletal: Negative for back pain.   Skin: Negative for rash.   Neurological: Negative for weakness.   Hematological: Does not bruise/bleed easily.   All other systems reviewed and are negative.     Physical Exam     Initial Vitals [02/25/22 1811]   BP Pulse Resp Temp SpO2   (!) 158/93 (!) 56 18 98.1 °F (36.7 °C) 99 %      MAP       --          Physical Exam  Nursing Notes and Vital Signs Reviewed.  Constitutional: Patient is in no acute distress. Well-developed and well-nourished.  Head: Atraumatic. Normocephalic.  Eyes: PERRL. EOM intact. Conjunctivae are not pale. No scleral icterus.  ENT: Mucous membranes are moist. Oropharynx is clear and symmetric.    Neck: Supple. Full ROM. No lymphadenopathy.  Cardiovascular: Regular rate. Regular rhythm. No murmurs, rubs, or gallops. Distal pulses are 2+ and symmetric.  Pulmonary/Chest: No respiratory distress. Clear to auscultation bilaterally. No wheezing or rales.  Abdominal: Soft and non-distended.  There is no tenderness.  No rebound, guarding, or rigidity. Good bowel sounds.  Genitourinary: No CVA tenderness  Musculoskeletal: Moves all extremities. No obvious deformities. No edema. No calf tenderness.  Skin: Warm and dry.  Neurological:  Alert, awake, and appropriate.  Normal speech.  No acute focal neurological deficits are appreciated.  Psychiatric: Normal affect. Good eye contact. Appropriate in content.     ED Course   Critical Care    Date/Time: 2/25/2022 7:19 PM  Performed by: Darek Stevens MD  Authorized by: Darek Stevens MD   Direct patient critical care time: 7 minutes  Additional history critical care time: 7 minutes  Ordering / reviewing critical care time: 7 minutes  Documentation critical care time: 9 minutes  Consulting other physicians critical care time: 8 minutes  Total critical care time (exclusive of procedural time) : 38 minutes  Critical care time was exclusive of separately billable procedures and  "treating other patients and teaching time.  Critical care was necessary to treat or prevent imminent or life-threatening deterioration of the following conditions: elevated troponin.  Critical care was time spent personally by me on the following activities: blood draw for specimens, development of treatment plan with patient or surrogate, discussions with consultants, interpretation of cardiac output measurements, evaluation of patient's response to treatment, examination of patient, obtaining history from patient or surrogate, ordering and performing treatments and interventions, ordering and review of laboratory studies, ordering and review of radiographic studies, pulse oximetry, re-evaluation of patient's condition and review of old charts.        ED Vital Signs:  Vitals:    02/25/22 1811 02/25/22 1817 02/25/22 1902   BP: (!) 158/93  132/82   Pulse: (!) 56 60 62   Resp: 18  18   Temp: 98.1 °F (36.7 °C)     TempSrc: Oral     SpO2: 99%  98%   Weight: 70.3 kg (155 lb)     Height: 5' 8" (1.727 m)         Abnormal Lab Results:  Labs Reviewed   CBC W/ AUTO DIFFERENTIAL - Abnormal; Notable for the following components:       Result Value    Platelets 146 (*)     All other components within normal limits   TROPONIN I - Abnormal; Notable for the following components:    Troponin I 0.027 (*)     All other components within normal limits   B-TYPE NATRIURETIC PEPTIDE - Abnormal; Notable for the following components:     (*)     All other components within normal limits   COMPREHENSIVE METABOLIC PANEL   D DIMER, QUANTITATIVE   TROPONIN I   APTT   PROTIME-INR   SARS-COV-2 RDRP GENE        All Lab Results:  Results for orders placed or performed during the hospital encounter of 02/25/22   CBC auto differential   Result Value Ref Range    WBC 5.67 3.90 - 12.70 K/uL    RBC 4.83 4.60 - 6.20 M/uL    Hemoglobin 14.7 14.0 - 18.0 g/dL    Hematocrit 43.0 40.0 - 54.0 %    MCV 89 82 - 98 fL    MCH 30.4 27.0 - 31.0 pg    MCHC " 34.2 32.0 - 36.0 g/dL    RDW 14.1 11.5 - 14.5 %    Platelets 146 (L) 150 - 450 K/uL    MPV 9.6 9.2 - 12.9 fL    Immature Granulocytes 0.2 0.0 - 0.5 %    Gran # (ANC) 3.7 1.8 - 7.7 K/uL    Immature Grans (Abs) 0.01 0.00 - 0.04 K/uL    Lymph # 1.4 1.0 - 4.8 K/uL    Mono # 0.5 0.3 - 1.0 K/uL    Eos # 0.1 0.0 - 0.5 K/uL    Baso # 0.04 0.00 - 0.20 K/uL    nRBC 0 0 /100 WBC    Gran % 65.4 38.0 - 73.0 %    Lymph % 24.0 18.0 - 48.0 %    Mono % 8.5 4.0 - 15.0 %    Eosinophil % 1.2 0.0 - 8.0 %    Basophil % 0.7 0.0 - 1.9 %    Differential Method Automated    Comprehensive metabolic panel   Result Value Ref Range    Sodium 143 136 - 145 mmol/L    Potassium 4.2 3.5 - 5.1 mmol/L    Chloride 109 95 - 110 mmol/L    CO2 23 23 - 29 mmol/L    Glucose 96 70 - 110 mg/dL    BUN 18 8 - 23 mg/dL    Creatinine 1.2 0.5 - 1.4 mg/dL    Calcium 9.4 8.7 - 10.5 mg/dL    Total Protein 6.3 6.0 - 8.4 g/dL    Albumin 4.0 3.5 - 5.2 g/dL    Total Bilirubin 0.5 0.1 - 1.0 mg/dL    Alkaline Phosphatase 55 55 - 135 U/L    AST 28 10 - 40 U/L    ALT 19 10 - 44 U/L    Anion Gap 11 8 - 16 mmol/L    eGFR if African American >60 >60 mL/min/1.73 m^2    eGFR if non African American >60 >60 mL/min/1.73 m^2   Troponin I #1   Result Value Ref Range    Troponin I 0.027 (H) 0.000 - 0.026 ng/mL   BNP   Result Value Ref Range     (H) 0 - 99 pg/mL   D dimer, quantitative   Result Value Ref Range    D-Dimer <0.19 <0.50 mg/L FEU         Imaging Results:  Imaging Results          X-Ray Chest AP Portable (In process)                  The EKG was ordered, reviewed, and independently interpreted by the ED provider.  Interpretation time: 1744  Rate: 59 BPM  Rhythm: sinus bradycardia  Interpretation: Low voltage QRS. Borderline abnormal EKG. No STEMI.             The Emergency Provider reviewed the vital signs and test results, which are outlined above.     ED Discussion       7:20 PM: Discussed case with Dr. Carol MD ([facility]). Dr. Medina agrees with current  care and management of pt and accepts admission.   Admitting Service: Hospital Medicine  Admitting Physician: Dr. Carol MD  Admit to: Obs Tele    7:20 PM: Re-evaluated pt. I have discussed test results, shared treatment plan, and the need for admission with patient and family at bedside. Pt and family express understanding at this time and agree with all information. All questions answered. Pt and family have no further questions or concerns at this time. Pt is ready for admit.         Medical Decision Making:   Clinical Tests:   Lab Tests: Ordered and Reviewed  Radiological Study: Ordered and Reviewed  Medical Tests: Ordered and Reviewed           ED Medication(s):  Medications   heparin 25,000 units in dextrose 5% (100 units/ml) IV bolus from bag INITIAL BOLUS (max bolus 4000 units) (has no administration in time range)   heparin 25,000 units in dextrose 5% 250 mL (100 units/mL) infusion LOW INTENSITY nomogram - [facility] (has no administration in time range)   heparin 25,000 units in dextrose 5% (100 units/ml) IV bolus from bag - ADDITIONAL PRN BOLUS - 60 units/kg (max bolus 4000 units) (has no administration in time range)   heparin 25,000 units in dextrose 5% (100 units/ml) IV bolus from bag - ADDITIONAL PRN BOLUS - 30 units/kg (max bolus 4000 units) (has no administration in time range)   acetaminophen tablet 650 mg (has no administration in time range)   ondansetron injection 4 mg (has no administration in time range)   guaiFENesin 100 mg/5 ml syrup 200 mg (has no administration in time range)   aluminum-magnesium hydroxide-simethicone 200-200-20 mg/5 mL suspension 30 mL (has no administration in time range)   albuterol-ipratropium 2.5 mg-0.5 mg/3 mL nebulizer solution 3 mL (has no administration in time range)   morphine injection 4 mg (has no administration in time range)   morphine injection 2 mg (has no administration in time range)   aspirin tablet 325 mg (325 mg Oral Given 2/25/22 1835)       New  Prescriptions    No medications on file               Scribe Attestation:   Scribe #1: I performed the above scribed service and the documentation accurately describes the services I performed. I attest to the accuracy of the note.     Attending:   Physician Attestation Statement for Scribe #1: I, Darek Stevens MD, personally performed the services described in this documentation, as scribed by Paul Villalobos, in my presence, and it is both accurate and complete.           Clinical Impression       ICD-10-CM ICD-9-CM   1. Elevated troponin  R77.8 790.6   2. Chest pain  R07.9 786.50       Disposition:   Disposition: Admitted  Condition: Stable         Darek Stevens MD  02/25/22 2128

## 2022-02-26 NOTE — SUBJECTIVE & OBJECTIVE
Past Medical History:   Diagnosis Date    Anticoagulant long-term use     Arthritis     Cancer     Coronary artery disease     Digestive disorder     Myocardial infarction     2020       Past Surgical History:   Procedure Laterality Date    CARDIAC CATHETERIZATION      COLONOSCOPY N/A 9/22/2021    Procedure: COLONOSCOPY;  Surgeon: Meaghan Jaime MD;  Location: [facility] ENDO;  Service: Endoscopy;  Laterality: N/A;    CORONARY ANGIOGRAPHY INCLUDING BYPASS GRAFTS WITH CATHETERIZATION OF LEFT HEART N/A 3/3/2020    Procedure: ANGIOGRAM, CORONARY, INCLUDING BYPASS GRAFT, WITH LEFT HEART CATHETERIZATION;  Surgeon: Ute Melchor MD;  Location: [facility] CATH LAB;  Service: Cardiology;  Laterality: N/A;    JOINT REPLACEMENT Right     knee    KNEE SURGERY      LEFT HEART CATHETERIZATION Left 2/22/2020    Procedure: CATHETERIZATION, HEART, LEFT;  Surgeon: Ute Melchor MD;  Location: [facility] CATH LAB;  Service: Cardiology;  Laterality: Left;    PERCUTANEOUS TRANSLUMINAL BALLOON ANGIOPLASTY OF CORONARY ARTERY Left 2/22/2020    Procedure: Angioplasty-coronary;  Surgeon: Ute Melchor MD;  Location: [facility] CATH LAB;  Service: Cardiology;  Laterality: Left;    SHOULDER ARTHROSCOPY Right     Dr Bella    simple prostatectomy  06/06/2016    robotic assisted partial prostectomy due to BPH    TOTAL KNEE ARTHROPLASTY         Review of patient's allergies indicates:   Allergen Reactions    Statins-hmg-coa reductase inhibitors Other (See Comments)     SEVERE MYALGIAS AND GI SIDE EFFECTS       No current facility-administered medications on file prior to encounter.     Current Outpatient Medications on File Prior to Encounter   Medication Sig    aspirin (ECOTRIN) 81 MG EC tablet Take 1 tablet (81 mg total) by mouth once daily.    metoprolol succinate (TOPROL-XL) 25 MG 24 hr tablet Take 0.5 tablets (12.5 mg total) by mouth every evening.    mometasone 0.1% (ELOCON) 0.1 % cream AAA bid prn for psoriasis. Strong steroid.  Do not use on face,  underarms or groin.    multivitamin capsule Take 1 capsule by mouth once daily.    nitroGLYCERIN (NITROSTAT) 0.4 MG SL tablet Place 1 tablet (0.4 mg total) under the tongue every 5 (five) minutes as needed for Chest pain.    pantoprazole (PROTONIX) 40 MG tablet Take 1 tablet (40 mg total) by mouth once daily.    ranolazine (RANEXA) 500 MG Tb12 Take 1 tablet (500 mg total) by mouth 2 (two) times daily.     Family History       Problem Relation (Age of Onset)    Cancer Father    Diabetes Mother, Maternal Aunt    Heart disease Mother          Tobacco Use    Smoking status: Never Smoker    Smokeless tobacco: Former User     Types: Chew     Quit date: 6/3/2004   Substance and Sexual Activity    Alcohol use: Yes     Alcohol/week: 2.0 standard drinks     Types: 2 Glasses of wine per week     Comment: per week    Drug use: No    Sexual activity: Not on file     Review of Systems   Constitutional: Positive for malaise/fatigue.   HENT: Negative.     Eyes: Negative.    Cardiovascular:  Positive for chest pain.        Periods of elevated HR   Respiratory: Negative.     Endocrine: Negative.    Hematologic/Lymphatic: Negative.    Skin: Negative.    Musculoskeletal: Negative.    Gastrointestinal: Negative.    Genitourinary: Negative.    Neurological: Negative.    Psychiatric/Behavioral: Negative.     Allergic/Immunologic: Negative.    Objective:     Vital Signs (Most Recent):  Temp: 97.9 °F (36.6 °C) (02/26/22 0722)  Pulse: (!) 58 (02/26/22 0722)  Resp: 16 (02/26/22 0722)  BP: 119/68 (02/26/22 0722)  SpO2: 98 % (02/26/22 0722)   Vital Signs (24h Range):  Temp:  [97.4 °F (36.3 °C)-98.1 °F (36.7 °C)] 97.9 °F (36.6 °C)  Pulse:  [53-62] 58  Resp:  [15-18] 16  SpO2:  [97 %-99 %] 98 %  BP: (107-158)/(66-93) 119/68     Weight: 69.9 kg (154 lb)  Body mass index is 23.42 kg/m².    SpO2: 98 %  O2 Device (Oxygen Therapy): room air      Intake/Output Summary (Last 24 hours) at 2/26/2022 1205  Last data filed at 2/26/2022 1000  Gross per 24  hour   Intake 0 ml   Output 1 ml   Net -1 ml       Lines/Drains/Airways       Peripheral Intravenous Line  Duration                  Peripheral IV - Single Lumen 02/25/22 1827 20 G Left Antecubital <1 day                    Physical Exam  Vitals and nursing note reviewed.   Constitutional:       General: He is not in acute distress.     Appearance: Normal appearance. He is well-developed. He is not diaphoretic.   HENT:      Head: Normocephalic and atraumatic.   Eyes:      General:         Right eye: No discharge.         Left eye: No discharge.      Pupils: Pupils are equal, round, and reactive to light.   Neck:      Thyroid: No thyromegaly.      Vascular: No JVD.      Trachea: No tracheal deviation.   Cardiovascular:      Rate and Rhythm: Normal rate and regular rhythm.      Heart sounds: Normal heart sounds, S1 normal and S2 normal. No murmur heard.  Pulmonary:      Effort: Pulmonary effort is normal. No respiratory distress.      Breath sounds: Normal breath sounds. No wheezing or rales.   Abdominal:      General: There is no distension.      Palpations: Abdomen is soft.      Tenderness: There is no rebound.   Musculoskeletal:      Cervical back: Neck supple.      Right lower leg: No edema.      Left lower leg: No edema.   Skin:     General: Skin is warm and dry.      Findings: No erythema.   Neurological:      General: No focal deficit present.      Mental Status: He is alert and oriented to person, place, and time.   Psychiatric:         Mood and Affect: Mood normal.         Behavior: Behavior normal.         Thought Content: Thought content normal.       Significant Labs: CMP   Recent Labs   Lab 02/25/22  1825 02/26/22  0612    140   K 4.2 3.7    107   CO2 23 24   GLU 96 94   BUN 18 13   CREATININE 1.2 1.0   CALCIUM 9.4 8.6*   PROT 6.3 5.8*   ALBUMIN 4.0 3.7   BILITOT 0.5 0.9   ALKPHOS 55 46*   AST 28 22   ALT 19 19   ANIONGAP 11 9   ESTGFRAFRICA >60 >60   EGFRNONAA >60 >60   , CBC   Recent Labs    Lab 02/25/22  1825 02/26/22  0612   WBC 5.67 5.80   HGB 14.7 14.4   HCT 43.0 41.9   * 144*   , Troponin   Recent Labs   Lab 02/25/22  1825 02/25/22  2321 02/26/22  0612   TROPONINI 0.027* 0.066* 0.091*   , and All pertinent lab results from the last 24 hours have been reviewed.    Significant Imaging: Echocardiogram: Transthoracic echo (TTE) complete (Cupid Only):   Results for orders placed or performed during the hospital encounter of 02/25/22   Echo   Result Value Ref Range    BSA 1.83 m2    TDI SEPTAL 0.04 m/s    LV LATERAL E/E' RATIO 14.67 m/s    LV SEPTAL E/E' RATIO 11.00 m/s    IVC diameter 1.39 cm    Left Ventricular Outflow Tract Mean Velocity 0.666408409442 cm/s    Left Ventricular Outflow Tract Mean Gradient 1.89 mmHg    TDI LATERAL 0.03 m/s    LVIDd 4.87 3.5 - 6.0 cm    IVS 1.66 (A) 0.6 - 1.1 cm    Posterior Wall 1.49 (A) 0.6 - 1.1 cm    Ao root annulus 3.92 cm    LVIDs 4.28 (A) 2.1 - 4.0 cm    FS 12 28 - 44 %    Sinus 3.83 cm    STJ 3.22 cm    Ascending aorta 3.53 cm    LV mass 333.69 g    LA size 3.36 cm    TAPSE 1.92 cm    Left Ventricle Relative Wall Thickness 0.61 cm    AV mean gradient 3 mmHg    AV valve area 3.39 cm2    AV Velocity Ratio 0.73     AV index (prosthetic) 0.72     MV valve area p 1/2 method 2.22 cm2    E/A ratio 0.46     Mean e' 0.04 m/s    E wave deceleration time 342.729893025722105 msec    IVRT 71.111767613172093 msec    LVOT diameter 2.45 cm    LVOT area 4.7 cm2    LVOT peak fredo 0.94 m/s    LVOT peak VTI 21.60 cm    Ao peak fredo 1.28 m/s    Ao VTI 30.0 cm    RVOT peak fredo 0.52 m/s    RVOT peak VTI 12.1 cm    LVOT stroke volume 101.78 cm3    AV peak gradient 7 mmHg    PV mean gradient 0.59 mmHg    E/E' ratio 12.57 m/s    MV Peak E Fredo 0.44 m/s    TR Max Fredo 3.09 m/s    MV stenosis pressure 1/2 time 99.268935625505427 ms    MV Peak A Fredo 0.95 m/s    LV Systolic Volume 82.14 mL    LV Systolic Volume Index 44.9 mL/m2    LV Diastolic Volume 111.25 mL    LV Diastolic Volume  Index 60.79 mL/m2    LV Mass Index 182 g/m2    Echo EF Estimated 26 %    RA Major Axis 5.07 cm    Left Atrium Minor Axis 3.48 cm    Left Atrium Major Axis 4.49 cm    Triscuspid Valve Regurgitation Peak Gradient 38 mmHg    RA Width 2.77 cm   , Stress Test: Reviewed, and X-Ray: CXR: X-Ray Chest 1 View (CXR): No results found for this visit on 02/25/22. and X-Ray Chest PA and Lateral (CXR): No results found for this visit on 02/25/22.

## 2022-02-26 NOTE — PROGRESS NOTES
[facility] ([facility] Medicine  Progress Note    Patient Name: Mekhi Lambert  MRN: [id number]  Patient Class: OP- Observation   Admission Date: 2/25/2022  Length of Stay: 0 days  Attending Physician: Nelson Benjamin, *  Primary Care Provider: ABIGAIL Lopez Jr, MD        Subjective:     Principal Problem:NSTEMI (non-ST elevated myocardial infarction)        HPI:  Mr. Lambert is a 71-year-old  male with PMH significant for CAD, cardiac stents in place, followed by Dr. Melchor as outpatient, HTN, presented to the ED complaining of chest pain that started after a walk earlier this morning.  Reports compliance with all his medications.  Denies shortness of breath, diaphoresis, but complained of nausea without vomiting.  EKG reveals mild sinus bradycardia.  Laboratory workup is unremarkable except for elevated troponin of 0.027.  Patient started on heparin drip.  ED physician discussed case with Cardiology on-call, recommended NPO past midnight for possible left heart catheterization in a.m..  Patient is currently chest pain-free.    Admitting diagnosis:  NSTEMI.      Overview/Hospital Course:  70 y/o WM admitted with a Dx of NSTEMI . Started on heparin drip , asa and BB . Cardiology consulted and plan for [facility] today       Interval History:     Review of Systems  Objective:     Vital Signs (Most Recent):  Temp: 98.1 °F (36.7 °C) (02/26/22 1100)  Pulse: (!) 58 (02/26/22 1100)  Resp: 18 (02/26/22 1100)  BP: 120/68 (02/26/22 1100)  SpO2: 99 % (02/26/22 1100)   Vital Signs (24h Range):  Temp:  [97.4 °F (36.3 °C)-98.1 °F (36.7 °C)] 98.1 °F (36.7 °C)  Pulse:  [53-62] 58  Resp:  [15-18] 18  SpO2:  [97 %-99 %] 99 %  BP: (107-158)/(66-93) 120/68     Weight: 69.9 kg (154 lb)  Body mass index is 23.42 kg/m².    Intake/Output Summary (Last 24 hours) at 2/26/2022 1422  Last data filed at 2/26/2022 1000  Gross per 24 hour   Intake 0 ml   Output 1 ml   Net -1 ml        Physical Exam  Vitals and nursing  note reviewed.   Constitutional:       General: He is not in acute distress.     Appearance: Normal appearance. He is well-developed. He is not diaphoretic.   HENT:      Head: Normocephalic and atraumatic.   Eyes:      General:         Right eye: No discharge.         Left eye: No discharge.      Pupils: Pupils are equal, round, and reactive to light.   Neck:      Thyroid: No thyromegaly.      Vascular: No JVD.      Trachea: No tracheal deviation.   Cardiovascular:      Rate and Rhythm: Normal rate and regular rhythm.      Heart sounds: Normal heart sounds, S1 normal and S2 normal. No murmur heard.  Pulmonary:      Effort: Pulmonary effort is normal. No respiratory distress.      Breath sounds: Normal breath sounds. No wheezing or rales.   Abdominal:      General: There is no distension.      Palpations: Abdomen is soft.      Tenderness: There is no rebound.   Musculoskeletal:      Cervical back: Neck supple.      Right lower leg: No edema.      Left lower leg: No edema.   Skin:     General: Skin is warm and dry.      Findings: No erythema.   Neurological:      General: No focal deficit present.      Mental Status: He is alert and oriented to person, place, and time.   Psychiatric:         Mood and Affect: Mood normal.         Behavior: Behavior normal.         Thought Content: Thought content normal.      Significant Labs: All pertinent labs within the past 24 hours have been reviewed.      Significant Imaging: I have reviewed all pertinent imaging results/findings within the past 24 hours.        Assessment/Plan:      * NSTEMI (non-ST elevated myocardial infarction)  Plan for [facility] today  Cont heparin , BB  And asa  Cardiology consulted       Chronic systolic CHF (congestive heart failure)  Patient is identified as having Systolic (HFrEF) heart failure that is Chronic. CHF is currently controlled. Latest ECHO performed and demonstrates- Results for orders placed during the hospital encounter of  02/25/22    Echo    Interpretation Summary  · The left ventricle is normal in size with moderate concentric hypertrophy and moderately decreased systolic function.  · The estimated ejection fraction is 30%.  · Grade I left ventricular diastolic dysfunction.  · There is left ventricular global hypokinesis.  · Normal right ventricular size with normal right ventricular systolic function.  · Normal central venous pressure (3 mmHg).  · The estimated PA systolic pressure is 41 mmHg.  · There is pulmonary hypertension.  · Trivial posterior pericardial effusion.  . Continue Beta Blocker and monitor clinical status closely. Monitor on telemetry. Patient is on CHF pathway.  Monitor strict Is&Os and daily weights.  Place on fluid restriction of 1.5 L. Continue to stress to patient importance of self efficacy and  on diet for CHF. Last BNP reviewed- and noted below   Recent Labs   Lab 02/25/22  1825   *   .      Coronary artery disease of native artery of native heart with  angina pectoris  History of cardiac stents about two years ago.  Followed by Dr. Melchor     Statin intolerance  Defer to Cardiology.        VTE Risk Mitigation (From admission, onward)         Ordered     heparin 25,000 units in dextrose 5% (100 units/ml) IV bolus from bag - ADDITIONAL PRN BOLUS - 60 units/kg (max bolus 4000 units)  As needed (PRN)        Question:  Heparin Infusion Adjustment (DO NOT MODIFY ANSWER)  Answer:  \\[email or web address]\Images\Pharmacy\HeparinInfusions\heparin LOW INTENSITY nomogram for [facility] [id number]    02/25/22 1916     heparin 25,000 units in dextrose 5% (100 units/ml) IV bolus from bag - ADDITIONAL PRN BOLUS - 30 units/kg (max bolus 4000 units)  As needed (PRN)        Question:  Heparin Infusion Adjustment (DO NOT MODIFY ANSWER)  Answer:  \\[email or web address]\Images\Pharmacy\HeparinInfusions\heparin LOW INTENSITY nomogram for [facility] [id number]    02/25/22 1916     heparin 25,000 units in dextrose 5% 250 mL (100 units/mL)  infusion LOW INTENSITY nomogram - [facility]  Continuous        Question Answer Comment   Heparin Infusion Adjustment (DO NOT MODIFY ANSWER) [email or web address]\Images\Pharmacy\HeparinInfusions\heparin LOW INTENSITY nomogram for [facility] [id number]    Begin at (in units/kg/hr) 12        02/25/22 1916     Place sequential compression device  Until discontinued         02/25/22 1919                Discharge Planning   KAILA:      Code Status: Prior   Is the patient medically ready for discharge?:     Reason for patient still in hospital (select all that apply): Treatment                     Nelson Benjamin MD  Department of Hospital Medicine   [facility] ([facility])

## 2022-02-26 NOTE — CONSULTS
[facility] - Telemetry ([facility])  Cardiology  Consult Note    Patient Name: Mekhi Lambert  MRN: [id number]  Admission Date: 2/25/2022  Hospital Length of Stay: 0 days  Code Status: Prior   Attending Provider: Nelson Benjamin, *   Consulting Provider: Leah Donahue PA-C  Primary Care Physician: ABIGAIL Lopez Jr, MD  Principal Problem:NSTEMI (non-ST elevated myocardial infarction)    Patient information was obtained from patient, past medical records and ER records.     Inpatient consult to Cardiology  Consult performed by: Leah Donahue PA-C  Consult ordered by: Quinton Medina MD        Subjective:     Chief Complaint:  Chest pain    HPI:   Mr. Lambert is a 71 year old male patient whose current medical conditions include CAD s/p prior MI with PCI of LAD and LCX in 2020 and chronic systolic CHF who presented to [facility] ED yesterday with a chief complaint of intermittent, exertional substernal chest pain over the past week. Associated symptoms included elevated HR. Patient denied any radiation of pain or any associated SOB, nausea, vomiting, diaphoresis, palpitations, near syncope, or syncope. Initial workup in ED revealed troponin of 0.027>0.066 and patient was subsequently admitted for further evaluation and treatment. Cardiology consulted to assist with management. Patient seen and examined today, resting in bed. Still having intermittent bouts of chest discomfort. Reports he is fairly active and was now having trouble completing his normal exercise  routine and noticed chest pain even when leisurely walking He reports compliance with his medications. Followed on OP basis by Dr. Sutton. Recent MPI stress test 12/21 negative for ischemia, +fixed defects. Echo pending. [facility] planned today by Dr. Melchor.       Past Medical History:   Diagnosis Date    Anticoagulant long-term use     Arthritis     Cancer     Coronary artery disease     Digestive disorder     Myocardial infarction     2020       Past  Surgical History:   Procedure Laterality Date    CARDIAC CATHETERIZATION      COLONOSCOPY N/A 9/22/2021    Procedure: COLONOSCOPY;  Surgeon: Meaghan Jaime MD;  Location: [facility] ENDO;  Service: Endoscopy;  Laterality: N/A;    CORONARY ANGIOGRAPHY INCLUDING BYPASS GRAFTS WITH CATHETERIZATION OF LEFT HEART N/A 3/3/2020    Procedure: ANGIOGRAM, CORONARY, INCLUDING BYPASS GRAFT, WITH LEFT HEART CATHETERIZATION;  Surgeon: Ute Melchor MD;  Location: [facility] CATH LAB;  Service: Cardiology;  Laterality: N/A;    JOINT REPLACEMENT Right     knee    KNEE SURGERY      LEFT HEART CATHETERIZATION Left 2/22/2020    Procedure: CATHETERIZATION, HEART, LEFT;  Surgeon: Ute Melchor MD;  Location: [facility] CATH LAB;  Service: Cardiology;  Laterality: Left;    PERCUTANEOUS TRANSLUMINAL BALLOON ANGIOPLASTY OF CORONARY ARTERY Left 2/22/2020    Procedure: Angioplasty-coronary;  Surgeon: Ute Melchor MD;  Location: [facility] CATH LAB;  Service: Cardiology;  Laterality: Left;    SHOULDER ARTHROSCOPY Right     Dr Bella    simple prostatectomy  06/06/2016    robotic assisted partial prostectomy due to BPH    TOTAL KNEE ARTHROPLASTY         Review of patient's allergies indicates:   Allergen Reactions    Statins-hmg-coa reductase inhibitors Other (See Comments)     SEVERE MYALGIAS AND GI SIDE EFFECTS       No current facility-administered medications on file prior to encounter.     Current Outpatient Medications on File Prior to Encounter   Medication Sig    aspirin (ECOTRIN) 81 MG EC tablet Take 1 tablet (81 mg total) by mouth once daily.    metoprolol succinate (TOPROL-XL) 25 MG 24 hr tablet Take 0.5 tablets (12.5 mg total) by mouth every evening.    mometasone 0.1% (ELOCON) 0.1 % cream AAA bid prn for psoriasis. Strong steroid.  Do not use on face, underarms or groin.    multivitamin capsule Take 1 capsule by mouth once daily.    nitroGLYCERIN (NITROSTAT) 0.4 MG SL tablet Place 1 tablet (0.4 mg total) under the tongue every  5 (five) minutes as needed for Chest pain.    pantoprazole (PROTONIX) 40 MG tablet Take 1 tablet (40 mg total) by mouth once daily.    ranolazine (RANEXA) 500 MG Tb12 Take 1 tablet (500 mg total) by mouth 2 (two) times daily.     Family History       Problem Relation (Age of Onset)    Cancer Father    Diabetes Mother, Maternal Aunt    Heart disease Mother          Tobacco Use    Smoking status: Never Smoker    Smokeless tobacco: Former User     Types: Chew     Quit date: 6/3/2004   Substance and Sexual Activity    Alcohol use: Yes     Alcohol/week: 2.0 standard drinks     Types: 2 Glasses of wine per week     Comment: per week    Drug use: No    Sexual activity: Not on file     Review of Systems   Constitutional: Positive for malaise/fatigue.   HENT: Negative.     Eyes: Negative.    Cardiovascular:  Positive for chest pain.        Periods of elevated HR   Respiratory: Negative.     Endocrine: Negative.    Hematologic/Lymphatic: Negative.    Skin: Negative.    Musculoskeletal: Negative.    Gastrointestinal: Negative.    Genitourinary: Negative.    Neurological: Negative.    Psychiatric/Behavioral: Negative.     Allergic/Immunologic: Negative.    Objective:     Vital Signs (Most Recent):  Temp: 97.9 °F (36.6 °C) (02/26/22 0722)  Pulse: (!) 58 (02/26/22 0722)  Resp: 16 (02/26/22 0722)  BP: 119/68 (02/26/22 0722)  SpO2: 98 % (02/26/22 0722)   Vital Signs (24h Range):  Temp:  [97.4 °F (36.3 °C)-98.1 °F (36.7 °C)] 97.9 °F (36.6 °C)  Pulse:  [53-62] 58  Resp:  [15-18] 16  SpO2:  [97 %-99 %] 98 %  BP: (107-158)/(66-93) 119/68     Weight: 69.9 kg (154 lb)  Body mass index is 23.42 kg/m².    SpO2: 98 %  O2 Device (Oxygen Therapy): room air      Intake/Output Summary (Last 24 hours) at 2/26/2022 1205  Last data filed at 2/26/2022 1000  Gross per 24 hour   Intake 0 ml   Output 1 ml   Net -1 ml       Lines/Drains/Airways       Peripheral Intravenous Line  Duration                  Peripheral IV - Single Lumen 02/25/22  1827 20 G Left Antecubital <1 day                    Physical Exam  Vitals and nursing note reviewed.   Constitutional:       General: He is not in acute distress.     Appearance: Normal appearance. He is well-developed. He is not diaphoretic.   HENT:      Head: Normocephalic and atraumatic.   Eyes:      General:         Right eye: No discharge.         Left eye: No discharge.      Pupils: Pupils are equal, round, and reactive to light.   Neck:      Thyroid: No thyromegaly.      Vascular: No JVD.      Trachea: No tracheal deviation.   Cardiovascular:      Rate and Rhythm: Normal rate and regular rhythm.      Heart sounds: Normal heart sounds, S1 normal and S2 normal. No murmur heard.  Pulmonary:      Effort: Pulmonary effort is normal. No respiratory distress.      Breath sounds: Normal breath sounds. No wheezing or rales.   Abdominal:      General: There is no distension.      Palpations: Abdomen is soft.      Tenderness: There is no rebound.   Musculoskeletal:      Cervical back: Neck supple.      Right lower leg: No edema.      Left lower leg: No edema.   Skin:     General: Skin is warm and dry.      Findings: No erythema.   Neurological:      General: No focal deficit present.      Mental Status: He is alert and oriented to person, place, and time.   Psychiatric:         Mood and Affect: Mood normal.         Behavior: Behavior normal.         Thought Content: Thought content normal.       Significant Labs: CMP   Recent Labs   Lab 02/25/22 1825 02/26/22  0612    140   K 4.2 3.7    107   CO2 23 24   GLU 96 94   BUN 18 13   CREATININE 1.2 1.0   CALCIUM 9.4 8.6*   PROT 6.3 5.8*   ALBUMIN 4.0 3.7   BILITOT 0.5 0.9   ALKPHOS 55 46*   AST 28 22   ALT 19 19   ANIONGAP 11 9   ESTGFRAFRICA >60 >60   EGFRNONAA >60 >60   , CBC   Recent Labs   Lab 02/25/22 1825 02/26/22  0612   WBC 5.67 5.80   HGB 14.7 14.4   HCT 43.0 41.9   * 144*   , Troponin   Recent Labs   Lab 02/25/22 1825 02/25/22  2321  02/26/22  0612   TROPONINI 0.027* 0.066* 0.091*   , and All pertinent lab results from the last 24 hours have been reviewed.    Significant Imaging: Echocardiogram: Transthoracic echo (TTE) complete (Cupid Only):   Results for orders placed or performed during the hospital encounter of 02/25/22   Echo   Result Value Ref Range    BSA 1.83 m2    TDI SEPTAL 0.04 m/s    LV LATERAL E/E' RATIO 14.67 m/s    LV SEPTAL E/E' RATIO 11.00 m/s    IVC diameter 1.39 cm    Left Ventricular Outflow Tract Mean Velocity 0.995793676844 cm/s    Left Ventricular Outflow Tract Mean Gradient 1.89 mmHg    TDI LATERAL 0.03 m/s    LVIDd 4.87 3.5 - 6.0 cm    IVS 1.66 (A) 0.6 - 1.1 cm    Posterior Wall 1.49 (A) 0.6 - 1.1 cm    Ao root annulus 3.92 cm    LVIDs 4.28 (A) 2.1 - 4.0 cm    FS 12 28 - 44 %    Sinus 3.83 cm    STJ 3.22 cm    Ascending aorta 3.53 cm    LV mass 333.69 g    LA size 3.36 cm    TAPSE 1.92 cm    Left Ventricle Relative Wall Thickness 0.61 cm    AV mean gradient 3 mmHg    AV valve area 3.39 cm2    AV Velocity Ratio 0.73     AV index (prosthetic) 0.72     MV valve area p 1/2 method 2.22 cm2    E/A ratio 0.46     Mean e' 0.04 m/s    E wave deceleration time 342.331201778058570 msec    IVRT 71.187788357283672 msec    LVOT diameter 2.45 cm    LVOT area 4.7 cm2    LVOT peak fredo 0.94 m/s    LVOT peak VTI 21.60 cm    Ao peak fredo 1.28 m/s    Ao VTI 30.0 cm    RVOT peak fredo 0.52 m/s    RVOT peak VTI 12.1 cm    LVOT stroke volume 101.78 cm3    AV peak gradient 7 mmHg    PV mean gradient 0.59 mmHg    E/E' ratio 12.57 m/s    MV Peak E Fredo 0.44 m/s    TR Max Fredo 3.09 m/s    MV stenosis pressure 1/2 time 99.289287593428971 ms    MV Peak A Fredo 0.95 m/s    LV Systolic Volume 82.14 mL    LV Systolic Volume Index 44.9 mL/m2    LV Diastolic Volume 111.25 mL    LV Diastolic Volume Index 60.79 mL/m2    LV Mass Index 182 g/m2    Echo EF Estimated 26 %    RA Major Axis 5.07 cm    Left Atrium Minor Axis 3.48 cm    Left Atrium Major Axis 4.49 cm     Triscuspid Valve Regurgitation Peak Gradient 38 mmHg    RA Width 2.77 cm   , Stress Test: Reviewed, and X-Ray: CXR: X-Ray Chest 1 View (CXR): No results found for this visit on 02/25/22. and X-Ray Chest PA and Lateral (CXR): No results found for this visit on 02/25/22.    Assessment and Plan:   Patient with history of CAD who presents with chest pain/NSTEMI. Continue OMT. Check echo. [facility] today. Further rec's to follow.    * NSTEMI (non-ST elevated myocardial infarction)  -Patient with history of CAD s/p prior PCI of LCX and LAD  -Presents with exertional chest pain, even with walking  -Troponin mildly elevated 0.027>0.066>0.091  -Continue ASA, heparin gtt, low dose BB  -Intolerant to statin therapy  -Echo pending  -[facility] planned today by Dr. Melchor. All risks, benefits, and treatment alternatives explained to patient in detail. All questions answered. He has agreed to proceed.    Chronic systolic CHF (congestive heart failure)  -Echo pending  -Continue low dose BB  -GDMT limited by hypotension/med intolerance  -Compensated        Coronary artery disease of native artery of native heart with  angina pectoris  -See plan under chest pain/NSTEMI    Statin intolerance  -Risk factor modification        VTE Risk Mitigation (From admission, onward)         Ordered     heparin 25,000 units in dextrose 5% (100 units/ml) IV bolus from bag - ADDITIONAL PRN BOLUS - 60 units/kg (max bolus 4000 units)  As needed (PRN)        Question:  Heparin Infusion Adjustment (DO NOT MODIFY ANSWER)  Answer:  \\[email or web address]\Images\Pharmacy\HeparinInfusions\heparin LOW INTENSITY nomogram for [facility] [id number].pdf    02/25/22 1916     heparin 25,000 units in dextrose 5% (100 units/ml) IV bolus from bag - ADDITIONAL PRN BOLUS - 30 units/kg (max bolus 4000 units)  As needed (PRN)        Question:  Heparin Infusion Adjustment (DO NOT MODIFY ANSWER)  Answer:  \[employer]\Reframe It\Images\Pharmacy\HeparinInfusions\heparin LOW INTENSITY nomogram for [facility]  [id number]    02/25/22 1916     heparin 25,000 units in dextrose 5% 250 mL (100 units/mL) infusion LOW INTENSITY nomogram - [facility]  Continuous        Question Answer Comment   Heparin Infusion Adjustment (DO NOT MODIFY ANSWER) \\[email or web address]\epic\Images\Pharmacy\HeparinInfusions\heparin LOW INTENSITY nomogram for [facility] [id number]    Begin at (in units/kg/hr) 12        02/25/22 1916     Place sequential compression device  Until discontinued         02/25/22 1919                Thank you for your consult. I will follow-up with patient. Please contact us if you have any additional questions.    Leah Donahue PA-C  Cardiology   [facility] - Telemetry ([facility])

## 2022-02-26 NOTE — HPI
Mr. Lambert is a 71-year-old  male with PMH significant for CAD, cardiac stents in place, followed by Dr. Melchor as outpatient, HTN, presented to the ED complaining of chest pain that started after a walk earlier this morning.  Reports compliance with all his medications.  Denies shortness of breath, diaphoresis, but complained of nausea without vomiting.  EKG reveals mild sinus bradycardia.  Laboratory workup is unremarkable except for elevated troponin of 0.027.  Patient started on heparin drip.  ED physician discussed case with Cardiology on-call, recommended NPO past midnight for possible left heart catheterization in a.m..  Patient is currently chest pain-free.    Admitting diagnosis:  NSTEMI.

## 2022-02-26 NOTE — SUBJECTIVE & OBJECTIVE
Interval History:     Review of Systems  Objective:     Vital Signs (Most Recent):  Temp: 98.1 °F (36.7 °C) (02/26/22 1100)  Pulse: (!) 58 (02/26/22 1100)  Resp: 18 (02/26/22 1100)  BP: 120/68 (02/26/22 1100)  SpO2: 99 % (02/26/22 1100)   Vital Signs (24h Range):  Temp:  [97.4 °F (36.3 °C)-98.1 °F (36.7 °C)] 98.1 °F (36.7 °C)  Pulse:  [53-62] 58  Resp:  [15-18] 18  SpO2:  [97 %-99 %] 99 %  BP: (107-158)/(66-93) 120/68     Weight: 69.9 kg (154 lb)  Body mass index is 23.42 kg/m².    Intake/Output Summary (Last 24 hours) at 2/26/2022 1422  Last data filed at 2/26/2022 1000  Gross per 24 hour   Intake 0 ml   Output 1 ml   Net -1 ml      Physical Exam    Significant Labs: All pertinent labs within the past 24 hours have been reviewed.      Significant Imaging: I have reviewed all pertinent imaging results/findings within the past 24 hours.

## 2022-02-26 NOTE — ASSESSMENT & PLAN NOTE
Patient is identified as having Systolic (HFrEF) heart failure that is Chronic. CHF is currently controlled. Latest ECHO performed and demonstrates- Results for orders placed during the hospital encounter of 02/25/22    Echo    Interpretation Summary  · The left ventricle is normal in size with moderate concentric hypertrophy and moderately decreased systolic function.  · The estimated ejection fraction is 30%.  · Grade I left ventricular diastolic dysfunction.  · There is left ventricular global hypokinesis.  · Normal right ventricular size with normal right ventricular systolic function.  · Normal central venous pressure (3 mmHg).  · The estimated PA systolic pressure is 41 mmHg.  · There is pulmonary hypertension.  · Trivial posterior pericardial effusion.  . Continue Beta Blocker and monitor clinical status closely. Monitor on telemetry. Patient is on CHF pathway.  Monitor strict Is&Os and daily weights.  Place on fluid restriction of 1.5 L. Continue to stress to patient importance of self efficacy and  on diet for CHF. Last BNP reviewed- and noted below   Recent Labs   Lab 02/25/22  7895   *   .

## 2022-02-26 NOTE — HOSPITAL COURSE
70 y/o WM admitted with a Dx of NSTEMI . Started on heparin drip , asa and BB . Cardiology consulted and plan for LHC today   2/27 Pt was seen and examined at bedside . He was determined to be  suitable for d/c   He  was admitted with a Dx of NSTEMI .  S/P LHC which show :Ld prox 90% with d2 bifurcation 50% requiring stenting with kissing angioplasty .  lcx patent , normAL RCA .Ef 30% ant wall apical akinesis . Cardiology rec brilinta and asa x 1 year . PT is ALLERGIC to STATIN .  There was gabrielle cue event since admission . He was advised to f/u with his cardiology and PCP in 1 to 2 weeks

## 2022-02-26 NOTE — INTERVAL H&P NOTE
The patient has been examined and the H&P has been reviewed:    I concur with the findings and no changes have occurred since H&P was written.    Procedure risks, benefits and alternative options discussed and understood by patient/family.          Active Hospital Problems    Diagnosis  POA    *NSTEMI (non-ST elevated myocardial infarction) [I21.4]  Yes    Chronic systolic CHF (congestive heart failure) [I50.22]  Yes    Statin intolerance [Z78.9]  Yes    Coronary artery disease of native artery of native heart with  angina pectoris [I25.118]  Yes      Resolved Hospital Problems   No resolved problems to display.

## 2022-02-26 NOTE — PLAN OF CARE
Patient AAOX 4 VSS  Patient remained afebrile throughout shift.  Heparin 12 units  Patient remained free of falls this shift  Patient 0/10 of pain this shift  Plan of care reviewed.  Patient verbalized understanding.  Patient moving/turning independently   Frequent weight shifting encouraged.  Patient NSR on monitor  Bed low, side rails up x2, wheels locked, call light in reach.  Bed alarm maintained for safety.  Patient instructed to call for assistance.  Hourly rounding completed.  Will continue to monitor.

## 2022-02-26 NOTE — ASSESSMENT & PLAN NOTE
-Echo pending  -Continue low dose BB  -GDMT limited by hypotension/med intolerance  -Compensated

## 2022-02-26 NOTE — H&P (VIEW-ONLY)
[facility] Tele[facility] ([facility])  Cardiology  Consult Note    Patient Name: Mekhi Lambert  MRN: [id number]  Admission Date: 2/25/2022  Hospital Length of Stay: 0 days  Code Status: Prior   Attending Provider: Nelson Benjamin, *   Consulting Provider: Leah Donahue PA-C  Primary Care Physician: ABIGAIL Lopez Jr, MD  Principal Problem:NSTEMI (non-ST elevated myocardial infarction)    Patient information was obtained from patient, past medical records and ER records.     Inpatient consult to Cardiology  Consult performed by: Leah Donahue PA-C  Consult ordered by: Quinton Medina MD        Subjective:     Chief Complaint:  Chest pain    HPI:   Mr. Lambert is a 71 year old male patient whose current medical conditions include CAD s/p prior MI with PCI of LAD and LCX in 2020 and chronic systolic CHF who presented to [facility] ED yesterday with a chief complaint of intermittent, exertional substernal chest pain over the past week. Associated symptoms included elevated HR. Patient denied any radiation of pain or any associated SOB, nausea, vomiting, diaphoresis, palpitations, near syncope, or syncope. Initial workup in ED revealed troponin of 0.027>0.066 and patient was subsequently admitted for further evaluation and treatment. Cardiology consulted to assist with management. Patient seen and examined today, resting in bed. Still having intermittent bouts of chest discomfort. Reports he is fairly active and was now having trouble completing his normal exercise  routine and noticed chest pain even when leisurely walking He reports compliance with his medications. Followed on OP basis by Dr. Sutton. Recent MPI stress test 12/21 negative for ischemia, +fixed defects. Echo pending. [facility] planned today by Dr. Melchor.       Past Medical History:   Diagnosis Date    Anticoagulant long-term use     Arthritis     Cancer     Coronary artery disease     Digestive disorder     Myocardial infarction     2020       Past  Surgical History:   Procedure Laterality Date    CARDIAC CATHETERIZATION      COLONOSCOPY N/A 9/22/2021    Procedure: COLONOSCOPY;  Surgeon: Meaghan Jaime MD;  Location: [facility] ENDO;  Service: Endoscopy;  Laterality: N/A;    CORONARY ANGIOGRAPHY INCLUDING BYPASS GRAFTS WITH CATHETERIZATION OF LEFT HEART N/A 3/3/2020    Procedure: ANGIOGRAM, CORONARY, INCLUDING BYPASS GRAFT, WITH LEFT HEART CATHETERIZATION;  Surgeon: Ute Melchor MD;  Location: [facility] CATH LAB;  Service: Cardiology;  Laterality: N/A;    JOINT REPLACEMENT Right     knee    KNEE SURGERY      LEFT HEART CATHETERIZATION Left 2/22/2020    Procedure: CATHETERIZATION, HEART, LEFT;  Surgeon: Ute Melchor MD;  Location: [facility] CATH LAB;  Service: Cardiology;  Laterality: Left;    PERCUTANEOUS TRANSLUMINAL BALLOON ANGIOPLASTY OF CORONARY ARTERY Left 2/22/2020    Procedure: Angioplasty-coronary;  Surgeon: Ute Melchor MD;  Location: [facility] CATH LAB;  Service: Cardiology;  Laterality: Left;    SHOULDER ARTHROSCOPY Right     Dr Bella    simple prostatectomy  06/06/2016    robotic assisted partial prostectomy due to BPH    TOTAL KNEE ARTHROPLASTY         Review of patient's allergies indicates:   Allergen Reactions    Statins-hmg-coa reductase inhibitors Other (See Comments)     SEVERE MYALGIAS AND GI SIDE EFFECTS       No current facility-administered medications on file prior to encounter.     Current Outpatient Medications on File Prior to Encounter   Medication Sig    aspirin (ECOTRIN) 81 MG EC tablet Take 1 tablet (81 mg total) by mouth once daily.    metoprolol succinate (TOPROL-XL) 25 MG 24 hr tablet Take 0.5 tablets (12.5 mg total) by mouth every evening.    mometasone 0.1% (ELOCON) 0.1 % cream AAA bid prn for psoriasis. Strong steroid.  Do not use on face, underarms or groin.    multivitamin capsule Take 1 capsule by mouth once daily.    nitroGLYCERIN (NITROSTAT) 0.4 MG SL tablet Place 1 tablet (0.4 mg total) under the tongue every  5 (five) minutes as needed for Chest pain.    pantoprazole (PROTONIX) 40 MG tablet Take 1 tablet (40 mg total) by mouth once daily.    ranolazine (RANEXA) 500 MG Tb12 Take 1 tablet (500 mg total) by mouth 2 (two) times daily.     Family History       Problem Relation (Age of Onset)    Cancer Father    Diabetes Mother, Maternal Aunt    Heart disease Mother          Tobacco Use    Smoking status: Never Smoker    Smokeless tobacco: Former User     Types: Chew     Quit date: 6/3/2004   Substance and Sexual Activity    Alcohol use: Yes     Alcohol/week: 2.0 standard drinks     Types: 2 Glasses of wine per week     Comment: per week    Drug use: No    Sexual activity: Not on file     Review of Systems   Constitutional: Positive for malaise/fatigue.   HENT: Negative.     Eyes: Negative.    Cardiovascular:  Positive for chest pain.        Periods of elevated HR   Respiratory: Negative.     Endocrine: Negative.    Hematologic/Lymphatic: Negative.    Skin: Negative.    Musculoskeletal: Negative.    Gastrointestinal: Negative.    Genitourinary: Negative.    Neurological: Negative.    Psychiatric/Behavioral: Negative.     Allergic/Immunologic: Negative.    Objective:     Vital Signs (Most Recent):  Temp: 97.9 °F (36.6 °C) (02/26/22 0722)  Pulse: (!) 58 (02/26/22 0722)  Resp: 16 (02/26/22 0722)  BP: 119/68 (02/26/22 0722)  SpO2: 98 % (02/26/22 0722)   Vital Signs (24h Range):  Temp:  [97.4 °F (36.3 °C)-98.1 °F (36.7 °C)] 97.9 °F (36.6 °C)  Pulse:  [53-62] 58  Resp:  [15-18] 16  SpO2:  [97 %-99 %] 98 %  BP: (107-158)/(66-93) 119/68     Weight: 69.9 kg (154 lb)  Body mass index is 23.42 kg/m².    SpO2: 98 %  O2 Device (Oxygen Therapy): room air      Intake/Output Summary (Last 24 hours) at 2/26/2022 1205  Last data filed at 2/26/2022 1000  Gross per 24 hour   Intake 0 ml   Output 1 ml   Net -1 ml       Lines/Drains/Airways       Peripheral Intravenous Line  Duration                  Peripheral IV - Single Lumen 02/25/22  1827 20 G Left Antecubital <1 day                    Physical Exam  Vitals and nursing note reviewed.   Constitutional:       General: He is not in acute distress.     Appearance: Normal appearance. He is well-developed. He is not diaphoretic.   HENT:      Head: Normocephalic and atraumatic.   Eyes:      General:         Right eye: No discharge.         Left eye: No discharge.      Pupils: Pupils are equal, round, and reactive to light.   Neck:      Thyroid: No thyromegaly.      Vascular: No JVD.      Trachea: No tracheal deviation.   Cardiovascular:      Rate and Rhythm: Normal rate and regular rhythm.      Heart sounds: Normal heart sounds, S1 normal and S2 normal. No murmur heard.  Pulmonary:      Effort: Pulmonary effort is normal. No respiratory distress.      Breath sounds: Normal breath sounds. No wheezing or rales.   Abdominal:      General: There is no distension.      Palpations: Abdomen is soft.      Tenderness: There is no rebound.   Musculoskeletal:      Cervical back: Neck supple.      Right lower leg: No edema.      Left lower leg: No edema.   Skin:     General: Skin is warm and dry.      Findings: No erythema.   Neurological:      General: No focal deficit present.      Mental Status: He is alert and oriented to person, place, and time.   Psychiatric:         Mood and Affect: Mood normal.         Behavior: Behavior normal.         Thought Content: Thought content normal.       Significant Labs: CMP   Recent Labs   Lab 02/25/22 1825 02/26/22  0612    140   K 4.2 3.7    107   CO2 23 24   GLU 96 94   BUN 18 13   CREATININE 1.2 1.0   CALCIUM 9.4 8.6*   PROT 6.3 5.8*   ALBUMIN 4.0 3.7   BILITOT 0.5 0.9   ALKPHOS 55 46*   AST 28 22   ALT 19 19   ANIONGAP 11 9   ESTGFRAFRICA >60 >60   EGFRNONAA >60 >60   , CBC   Recent Labs   Lab 02/25/22 1825 02/26/22  0612   WBC 5.67 5.80   HGB 14.7 14.4   HCT 43.0 41.9   * 144*   , Troponin   Recent Labs   Lab 02/25/22 1825 02/25/22  2321  02/26/22  0612   TROPONINI 0.027* 0.066* 0.091*   , and All pertinent lab results from the last 24 hours have been reviewed.    Significant Imaging: Echocardiogram: Transthoracic echo (TTE) complete ([facility] Only):   Results for orders placed or performed during the hospital encounter of 02/25/22   Echo   Result Value Ref Range    BSA 1.83 m2    TDI SEPTAL 0.04 m/s    LV LATERAL E/E' RATIO 14.67 m/s    LV SEPTAL E/E' RATIO 11.00 m/s    IVC diameter 1.39 cm    Left Ventricular Outflow Tract Mean Velocity 0.894894369752 cm/s    Left Ventricular Outflow Tract Mean Gradient 1.89 mmHg    TDI LATERAL 0.03 m/s    LVIDd 4.87 3.5 - 6.0 cm    IVS 1.66 (A) 0.6 - 1.1 cm    Posterior Wall 1.49 (A) 0.6 - 1.1 cm    Ao root annulus 3.92 cm    LVIDs 4.28 (A) 2.1 - 4.0 cm    FS 12 28 - 44 %    Sinus 3.83 cm    STJ 3.22 cm    Ascending aorta 3.53 cm    LV mass 333.69 g    LA size 3.36 cm    TAPSE 1.92 cm    Left Ventricle Relative Wall Thickness 0.61 cm    AV mean gradient 3 mmHg    AV valve area 3.39 cm2    AV Velocity Ratio 0.73     AV index (prosthetic) 0.72     MV valve area p 1/2 method 2.22 cm2    E/A ratio 0.46     Mean e' 0.04 m/s    E wave deceleration time 342.188823507513643 msec    IVRT 71.650528220576603 msec    LVOT diameter 2.45 cm    LVOT area 4.7 cm2    LVOT peak fredo 0.94 m/s    LVOT peak VTI 21.60 cm    Ao peak fredo 1.28 m/s    Ao VTI 30.0 cm    RVOT peak fredo 0.52 m/s    RVOT peak VTI 12.1 cm    LVOT stroke volume 101.78 cm3    AV peak gradient 7 mmHg    PV mean gradient 0.59 mmHg    E/E' ratio 12.57 m/s    MV Peak E Fredo 0.44 m/s    TR Max Fredo 3.09 m/s    MV stenosis pressure 1/2 time 99.728412251188126 ms    MV Peak A Fredo 0.95 m/s    LV Systolic Volume 82.14 mL    LV Systolic Volume Index 44.9 mL/m2    LV Diastolic Volume 111.25 mL    LV Diastolic Volume Index 60.79 mL/m2    LV Mass Index 182 g/m2    Echo EF Estimated 26 %    RA Major Axis 5.07 cm    Left Atrium Minor Axis 3.48 cm    Left Atrium Major Axis 4.49 cm     Triscuspid Valve Regurgitation Peak Gradient 38 mmHg    RA Width 2.77 cm   , Stress Test: Reviewed, and X-Ray: CXR: X-Ray Chest 1 View (CXR): No results found for this visit on 02/25/22. and X-Ray Chest PA and Lateral (CXR): No results found for this visit on 02/25/22.    Assessment and Plan:   Patient with history of CAD who presents with chest pain/NSTEMI. Continue OMT. Check echo. [facility] today. Further rec's to follow.    * NSTEMI (non-ST elevated myocardial infarction)  -Patient with history of CAD s/p prior PCI of LCX and LAD  -Presents with exertional chest pain, even with walking  -Troponin mildly elevated 0.027>0.066>0.091  -Continue ASA, heparin gtt, low dose BB  -Intolerant to statin therapy  -Echo pending  -[facility] planned today by Dr. Melchor. All risks, benefits, and treatment alternatives explained to patient in detail. All questions answered. He has agreed to proceed.    Chronic systolic CHF (congestive heart failure)  -Echo pending  -Continue low dose BB  -GDMT limited by hypotension/med intolerance  -Compensated        Coronary artery disease of native artery of native heart with  angina pectoris  -See plan under chest pain/NSTEMI    Statin intolerance  -Risk factor modification        VTE Risk Mitigation (From admission, onward)         Ordered     heparin 25,000 units in dextrose 5% (100 units/ml) IV bolus from bag - ADDITIONAL PRN BOLUS - 60 units/kg (max bolus 4000 units)  As needed (PRN)        Question:  Heparin Infusion Adjustment (DO NOT MODIFY ANSWER)  Answer:  \\[facility]\Images\Pharmacy\HeparinInfusions\heparin LOW INTENSITY nomogram for [facility] [id number]    02/25/22 1916     heparin 25,000 units in dextrose 5% (100 units/ml) IV bolus from bag - ADDITIONAL PRN BOLUS - 30 units/kg (max bolus 4000 units)  As needed (PRN)        Question:  Heparin Infusion Adjustment (DO NOT MODIFY ANSWER)  Answer:  \[employer]\Images\Pharmacy\HeparinInfusions\heparin LOW INTENSITY nomogram for [facility]  [id number]    02/25/22 1916     heparin 25,000 units in dextrose 5% 250 mL (100 units/mL) infusion LOW INTENSITY nomogram - [facility]  Continuous        Question Answer Comment   Heparin Infusion Adjustment (DO NOT MODIFY ANSWER) [email or web address]\Images\Pharmacy\HeparinInfusions\heparin LOW INTENSITY nomogram for [facility] [id number]    Begin at (in units/kg/hr) 12        02/25/22 1916     Place sequential compression device  Until discontinued         02/25/22 1919                Thank you for your consult. I will follow-up with patient. Please contact us if you have any additional questions.    Leah Donahue PA-C  Cardiology   [facility] - Telemetry ([facility])

## 2022-02-26 NOTE — PLAN OF CARE
Pt AAOx4. Sinus bradycardia on the heart monitor. On room air; tolerating well. Voids spontaneously without difficulty. Pt turned and repositioned independently. 20 G PIV in left AC and 20 G PIV in right forearm CDI with no redness, swelling, warmth, or drainage saline locked. Bed low, wheels locked, alarms audible. Plan of care reviewed with pt. Vital signs monitored. Fall precautions in place. Pain assessed. Safety promoted. Infection risks reduced. Pt sent to cath lab for cardiac catheterization.

## 2022-02-26 NOTE — H&P
[facility] - Emergency Dept.  [facility] Medicine  History & Physical    Patient Name: Mekhi Lambert  MRN: [id number]  Patient Class: OP- Observation  Admission Date: 2/25/2022  Attending Physician: Nelson Benjamin, *   Primary Care Provider: ABIGAIL Lopez Jr, MD         Patient information was obtained from patient, past medical records and ER records.     Subjective:     Principal Problem:NSTEMI (non-ST elevated myocardial infarction)    Chief Complaint:   Chief Complaint   Patient presents with    Chest Pain     Nonradiating Left sided CP as tightness began this morning with SOB, lightheadedness, heartburn, tachycardia. Hx of MI and stent placement.         HPI: Mr. Lambert is a 71-year-old  male with PMH significant for CAD, cardiac stents in place, followed by Dr. Melchor as outpatient, HTN, presented to the ED complaining of chest pain that started after a walk earlier this morning.  Reports compliance with all his medications.  Denies shortness of breath, diaphoresis, but complained of nausea without vomiting.  EKG reveals mild sinus bradycardia.  Laboratory workup is unremarkable except for elevated troponin of 0.027.  Patient started on heparin drip.  ED physician discussed case with Cardiology on-call, recommended NPO past midnight for possible left heart catheterization in a.m..  Patient is currently chest pain-free.    Admitting diagnosis:  NSTEMI.      Past Medical History:   Diagnosis Date    Anticoagulant long-term use     Arthritis     Cancer     Coronary artery disease     Digestive disorder     Myocardial infarction     2020       Past Surgical History:   Procedure Laterality Date    CARDIAC CATHETERIZATION      COLONOSCOPY N/A 9/22/2021    Procedure: COLONOSCOPY;  Surgeon: Meaghan Jaime MD;  Location: [facility];  Service: Endoscopy;  Laterality: N/A;    CORONARY ANGIOGRAPHY INCLUDING BYPASS GRAFTS WITH CATHETERIZATION OF LEFT HEART N/A 3/3/2020    Procedure: ANGIOGRAM,  CORONARY, INCLUDING BYPASS GRAFT, WITH LEFT HEART CATHETERIZATION;  Surgeon: Ute Melchor MD;  Location: [facility] CATH LAB;  Service: Cardiology;  Laterality: N/A;    JOINT REPLACEMENT Right     knee    KNEE SURGERY      LEFT HEART CATHETERIZATION Left 2/22/2020    Procedure: CATHETERIZATION, HEART, LEFT;  Surgeon: Ute Melchor MD;  Location: [facility] CATH LAB;  Service: Cardiology;  Laterality: Left;    PERCUTANEOUS TRANSLUMINAL BALLOON ANGIOPLASTY OF CORONARY ARTERY Left 2/22/2020    Procedure: Angioplasty-coronary;  Surgeon: Ute Melchor MD;  Location: [facility] CATH LAB;  Service: Cardiology;  Laterality: Left;    SHOULDER ARTHROSCOPY Right     Dr Bella    simple prostatectomy  06/06/2016    robotic assisted partial prostectomy due to BPH    TOTAL KNEE ARTHROPLASTY         Review of patient's allergies indicates:   Allergen Reactions    Statins-hmg-coa reductase inhibitors Other (See Comments)     SEVERE MYALGIAS AND GI SIDE EFFECTS       No current facility-administered medications on file prior to encounter.     Current Outpatient Medications on File Prior to Encounter   Medication Sig    aspirin (ECOTRIN) 81 MG EC tablet Take 1 tablet (81 mg total) by mouth once daily.    metoprolol succinate (TOPROL-XL) 25 MG 24 hr tablet Take 0.5 tablets (12.5 mg total) by mouth every evening.    mometasone 0.1% (ELOCON) 0.1 % cream AAA bid prn for psoriasis. Strong steroid.  Do not use on face, underarms or groin.    multivitamin capsule Take 1 capsule by mouth once daily.    nitroGLYCERIN (NITROSTAT) 0.4 MG SL tablet Place 1 tablet (0.4 mg total) under the tongue every 5 (five) minutes as needed for Chest pain.    pantoprazole (PROTONIX) 40 MG tablet Take 1 tablet (40 mg total) by mouth once daily.    ranolazine (RANEXA) 500 MG Tb12 Take 1 tablet (500 mg total) by mouth 2 (two) times daily.    [DISCONTINUED] triamcinolone acetonide 0.025% (KENALOG) 0.025 % cream Apply topically 2 (two) times daily as needed.  Mild steroid.  Safe for face up to 2 weeks then stop.     Family History       Problem Relation (Age of Onset)    Cancer Father    Diabetes Mother, Maternal Aunt    Heart disease Mother          Tobacco Use    Smoking status: Never Smoker    Smokeless tobacco: Former User     Types: Chew     Quit date: 6/3/2004   Substance and Sexual Activity    Alcohol use: Yes     Alcohol/week: 2.0 standard drinks     Types: 2 Glasses of wine per week     Comment: per week    Drug use: No    Sexual activity: Not on file     Review of Systems   Constitutional: Negative.  Negative for chills and fever.   HENT: Negative.  Negative for congestion, rhinorrhea, sore throat and trouble swallowing.    Eyes: Negative.  Negative for visual disturbance.   Respiratory: Negative.  Negative for cough, shortness of breath and wheezing.    Cardiovascular:  Positive for chest pain. Negative for palpitations.   Gastrointestinal:  Positive for nausea. Negative for abdominal pain, diarrhea and vomiting.   Endocrine: Negative.    Genitourinary: Negative.  Negative for dysuria and flank pain.   Musculoskeletal: Negative.  Negative for back pain.   Skin: Negative.  Negative for rash.   Allergic/Immunologic: Negative.    Neurological: Negative.  Negative for speech difficulty, weakness, numbness and headaches.   Hematological: Negative.    Psychiatric/Behavioral: Negative.  Negative for hallucinations.    All other systems reviewed and are negative.  Objective:     Vital Signs (Most Recent):  Temp: 98.1 °F (36.7 °C) (02/25/22 1811)  Pulse: 62 (02/25/22 1902)  Resp: 18 (02/25/22 1902)  BP: 132/82 (02/25/22 1902)  SpO2: 98 % (02/25/22 1902) Vital Signs (24h Range):  Temp:  [98.1 °F (36.7 °C)] 98.1 °F (36.7 °C)  Pulse:  [56-62] 62  Resp:  [18] 18  SpO2:  [98 %-99 %] 98 %  BP: (132-158)/(82-93) 132/82     Weight: 70.3 kg (155 lb)  Body mass index is 23.57 kg/m².    Physical Exam  Vitals and nursing note reviewed.   Constitutional:       General: He is  awake. He is not in acute distress.     Appearance: Normal appearance. He is not ill-appearing.   HENT:      Head: Normocephalic and atraumatic.      Mouth/Throat:      Mouth: Mucous membranes are moist.   Eyes:      General: No scleral icterus.     Conjunctiva/sclera: Conjunctivae normal.   Cardiovascular:      Rate and Rhythm: Normal rate and regular rhythm.      Heart sounds: No murmur heard.  Pulmonary:      Effort: Pulmonary effort is normal. No respiratory distress.      Breath sounds: Normal breath sounds. No wheezing.   Abdominal:      Palpations: Abdomen is soft.      Tenderness: There is no abdominal tenderness.   Musculoskeletal:         General: No swelling. Normal range of motion.      Cervical back: Normal range of motion and neck supple.   Skin:     General: Skin is warm.      Coloration: Skin is not jaundiced.   Neurological:      General: No focal deficit present.      Mental Status: He is alert and oriented to person, place, and time. Mental status is at baseline.   Psychiatric:         Attention and Perception: Attention normal.         Mood and Affect: Mood normal.         Speech: Speech normal.         Behavior: Behavior normal. Behavior is cooperative.           Significant Labs: All pertinent labs within the past 24 hours have been reviewed.  Recent Lab Results         02/25/22  1825        Albumin 4.0       Alkaline Phosphatase 55       ALT 19       Anion Gap 11       aPTT 26.2  Comment: aPTT therapeutic range = 39-69 seconds       AST 28       Baso # 0.04       Basophil % 0.7       BILIRUBIN TOTAL 0.5  Comment: For infants and newborns, interpretation of results should be based  on gestational age, weight and in agreement with clinical  observations.    Premature Infant recommended reference ranges:  Up to 24 hours.............<8.0 mg/dL  Up to 48 hours............<12.0 mg/dL  3-5 days..................<15.0 mg/dL  6-29 days.................<15.0 mg/dL           Comment: Values of  less than 100 pg/ml are consistent with non-CHF populations.       BUN 18       Calcium 9.4       Chloride 109       CO2 23       Creatinine 1.2       D-Dimer <0.19  Comment: The quantitative D-dimer assay should be used as an aid in   the diagnosis of deep vein thrombosis and pulmonary embolism  in patients with the appropriate presentation and clinical  history. The upper limit of the reference interval and the clinical   cut off   point are identical. Causes of a positive (>0.50 mg/L FEU) D-Dimer   test  include, but are not limited to: DVT, PE, DIC, thrombolytic   therapy, anticoagulant therapy, recent surgery, trauma, or   pregnancy, disseminated malignancy, aortic aneurysm, cirrhosis,  and severe infection. False negative results may occur in   patients with distal DVT.         Differential Method Automated       eGFR if  >60       eGFR if non  >60  Comment: Calculation used to obtain the estimated glomerular filtration  rate (eGFR) is the CKD-EPI equation.          Eos # 0.1       Eosinophil % 1.2       Glucose 96       Gran # (ANC) 3.7       Gran % 65.4       Hematocrit 43.0       Hemoglobin 14.7       Immature Grans (Abs) 0.01  Comment: Mild elevation in immature granulocytes is non specific and   can be seen in a variety of conditions including stress response,   acute inflammation, trauma and pregnancy. Correlation with other   laboratory and clinical findings is essential.         Immature Granulocytes 0.2       INR 0.9  Comment: Coumadin Therapy:  2.0 - 3.0 for INR for all indicators except mechanical heart valves  and antiphospholipid syndromes which should use 2.5 - 3.5.         Lymph # 1.4       Lymph % 24.0       MCH 30.4       MCHC 34.2       MCV 89       Mono # 0.5       Mono % 8.5       MPV 9.6       nRBC 0       Platelets 146       Potassium 4.2       PROTEIN TOTAL 6.3       Protime 10.6       RBC 4.83       RDW 14.1       Sodium 143       Troponin I  0.027  Comment: The reference interval for Troponin I represents the 99th percentile   cutoff   for our facility and is consistent with 3rd generation assay   performance.         WBC 5.67               Significant Imaging: I have reviewed all pertinent imaging results/findings within the past 24 hours.  I have reviewed and interpreted all pertinent imaging results/findings within the past 24 hours.  CXR: I have reviewed all pertinent results/findings within the past 24 hours and my personal findings are:     EKG: I have reviewed all pertinent results/findings within the past 24 hours and my personal findings are:      Imaging Results              X-Ray Chest AP Portable (Final result)  Result time 02/25/22 19:25:52      Final result by Dereck Aquino MD (02/25/22 19:25:52)                   Impression:      No acute abnormality.      Electronically signed by: Dereck Aquino  Date:    02/25/2022  Time:    19:25               Narrative:    EXAMINATION:  XR CHEST AP PORTABLE    CLINICAL HISTORY:  Chest Pain;    TECHNIQUE:  Single frontal view of the chest was performed.    COMPARISON:  Multiple priors.    FINDINGS:  The lungs are clear, with normal appearance of pulmonary vasculature and no pleural effusion or pneumothorax.    The cardiac silhouette is normal in size. The hilar and mediastinal contours are unremarkable.    Bones are intact.                                      I have independently reviewed and interpreted the EKG. M    I have independently reviewed all pertinent labs within the past 24 hours.    I have independently reviewed, visualized and interpreted all pertinent imaging results within the past 24 hours and discussed the findings with the ED physician, Dr. Stevens          Assessment/Plan:     * NSTEMI (non-ST elevated myocardial infarction)  Troponin 0.027, trend cardiac enzymes.  Currently chest pain-free.  Heparin drip.  Aspirin 325 mg p.o. x1 in the ED.  Continue home dose aspirin 81  mg.  Metoprolol.  Patient not on ACEI/ARB.  Patient intolerant to statins.      Coronary artery disease of native artery of native heart with  angina pectoris  History of cardiac stents about two years ago.  Followed by Dr. Melchor     Statin intolerance  Defer to Cardiology.        VTE Risk Mitigation (From admission, onward)         Ordered     heparin 25,000 units in dextrose 5% (100 units/ml) IV bolus from bag - ADDITIONAL PRN BOLUS - 60 units/kg (max bolus 4000 units)  As needed (PRN)        Question:  Heparin Infusion Adjustment (DO NOT MODIFY ANSWER)  Answer:  [email or web address]\epic\Images\Pharmacy\HeparinInfusions\heparin LOW INTENSITY nomogram for [facility] [id number].pdf    02/25/22 1916     heparin 25,000 units in dextrose 5% (100 units/ml) IV bolus from bag - ADDITIONAL PRN BOLUS - 30 units/kg (max bolus 4000 units)  As needed (PRN)        Question:  Heparin Infusion Adjustment (DO NOT MODIFY ANSWER)  Answer:  \\[email or web address]\epic\Images\Pharmacy\HeparinInfusions\heparin LOW INTENSITY nomogram for [facility] [id number].pdf    02/25/22 1916     heparin 25,000 units in dextrose 5% 250 mL (100 units/mL) infusion LOW INTENSITY nomogram - [facility]  Continuous        Question Answer Comment   Heparin Infusion Adjustment (DO NOT MODIFY ANSWER) \\[email or web address]\epic\Images\Pharmacy\HeparinInfusions\heparin LOW INTENSITY nomogram for [facility] [id number].pdf    Begin at (in units/kg/hr) 12        02/25/22 1916     Place sequential compression device  Until discontinued         02/25/22 1919                 The patient is placed in OBSERVATION status.    Quinton Medina MD  Department of Hospital Medicine   [facility] - Emergency Dept.

## 2022-02-26 NOTE — HPI
Mr. Lambert is a 71 year old male patient whose current medical conditions include CAD s/p prior MI with PCI of LAD and LCX in 2020 and chronic systolic CHF who presented to [facility] ED yesterday with a chief complaint of intermittent, exertional substernal chest pain over the past week. Associated symptoms included elevated HR. Patient denied any radiation of pain or any associated SOB, nausea, vomiting, diaphoresis, palpitations, near syncope, or syncope. Initial workup in ED revealed troponin of 0.027>0.066 and patient was subsequently admitted for further evaluation and treatment. Cardiology consulted to assist with management. Patient seen and examined today, resting in bed. Still having intermittent bouts of chest discomfort. Reports he is fairly active and was now having trouble completing his normal exercise  routine and noticed chest pain even when leisurely walking He reports compliance with his medications. Followed on OP basis by Dr. Sutton. Recent MPI stress test 12/21 negative for ischemia, +fixed defects. Echo pending. [facility] planned today by Dr. Melchor.

## 2022-02-26 NOTE — ASSESSMENT & PLAN NOTE
-Patient with history of CAD s/p prior PCI of LCX and LAD  -Presents with exertional chest pain, even with walking  -Troponin mildly elevated 0.027>0.066>0.091  -Continue ASA, heparin gtt, low dose BB  -Intolerant to statin therapy  -Echo pending  -[facility] planned today by Dr. Melchor. All risks, benefits, and treatment alternatives explained to patient in detail. All questions answered. He has agreed to proceed.

## 2022-02-27 ENCOUNTER — TELEPHONE (OUTPATIENT)
Dept: CARDIOLOGY | Facility: HOSPITAL | Age: 72
End: 2022-02-27

## 2022-02-27 VITALS
OXYGEN SATURATION: 98 % | RESPIRATION RATE: 20 BRPM | HEART RATE: 64 BPM | DIASTOLIC BLOOD PRESSURE: 77 MMHG | BODY MASS INDEX: 23.34 KG/M2 | WEIGHT: 154 LBS | SYSTOLIC BLOOD PRESSURE: 115 MMHG | HEIGHT: 68 IN | TEMPERATURE: 97 F

## 2022-02-27 LAB
ANION GAP SERPL CALC-SCNC: 10 MMOL/L (ref 8–16)
BASOPHILS # BLD AUTO: 0.04 K/UL (ref 0–0.2)
BASOPHILS # BLD AUTO: 0.04 K/UL (ref 0–0.2)
BASOPHILS NFR BLD: 0.5 % (ref 0–1.9)
BASOPHILS NFR BLD: 0.5 % (ref 0–1.9)
BUN SERPL-MCNC: 13 MG/DL (ref 8–23)
CALCIUM SERPL-MCNC: 8.3 MG/DL (ref 8.7–10.5)
CHLORIDE SERPL-SCNC: 111 MMOL/L (ref 95–110)
CO2 SERPL-SCNC: 20 MMOL/L (ref 23–29)
CREAT SERPL-MCNC: 0.8 MG/DL (ref 0.5–1.4)
DIFFERENTIAL METHOD: ABNORMAL
DIFFERENTIAL METHOD: ABNORMAL
EOSINOPHIL # BLD AUTO: 0.1 K/UL (ref 0–0.5)
EOSINOPHIL # BLD AUTO: 0.1 K/UL (ref 0–0.5)
EOSINOPHIL NFR BLD: 1 % (ref 0–8)
EOSINOPHIL NFR BLD: 1 % (ref 0–8)
ERYTHROCYTE [DISTWIDTH] IN BLOOD BY AUTOMATED COUNT: 14.1 % (ref 11.5–14.5)
ERYTHROCYTE [DISTWIDTH] IN BLOOD BY AUTOMATED COUNT: 14.1 % (ref 11.5–14.5)
EST. GFR  (AFRICAN AMERICAN): >60 ML/MIN/1.73 M^2
EST. GFR  (NON AFRICAN AMERICAN): >60 ML/MIN/1.73 M^2
GLUCOSE SERPL-MCNC: 80 MG/DL (ref 70–110)
HCT VFR BLD AUTO: 39.9 % (ref 40–54)
HCT VFR BLD AUTO: 39.9 % (ref 40–54)
HGB BLD-MCNC: 13.7 G/DL (ref 14–18)
HGB BLD-MCNC: 13.7 G/DL (ref 14–18)
IMM GRANULOCYTES # BLD AUTO: 0.02 K/UL (ref 0–0.04)
IMM GRANULOCYTES # BLD AUTO: 0.02 K/UL (ref 0–0.04)
IMM GRANULOCYTES NFR BLD AUTO: 0.3 % (ref 0–0.5)
IMM GRANULOCYTES NFR BLD AUTO: 0.3 % (ref 0–0.5)
LYMPHOCYTES # BLD AUTO: 1.4 K/UL (ref 1–4.8)
LYMPHOCYTES # BLD AUTO: 1.4 K/UL (ref 1–4.8)
LYMPHOCYTES NFR BLD: 17.5 % (ref 18–48)
LYMPHOCYTES NFR BLD: 17.5 % (ref 18–48)
MCH RBC QN AUTO: 30 PG (ref 27–31)
MCH RBC QN AUTO: 30 PG (ref 27–31)
MCHC RBC AUTO-ENTMCNC: 34.3 G/DL (ref 32–36)
MCHC RBC AUTO-ENTMCNC: 34.3 G/DL (ref 32–36)
MCV RBC AUTO: 87 FL (ref 82–98)
MCV RBC AUTO: 87 FL (ref 82–98)
MONOCYTES # BLD AUTO: 0.7 K/UL (ref 0.3–1)
MONOCYTES # BLD AUTO: 0.7 K/UL (ref 0.3–1)
MONOCYTES NFR BLD: 9 % (ref 4–15)
MONOCYTES NFR BLD: 9 % (ref 4–15)
NEUTROPHILS # BLD AUTO: 5.6 K/UL (ref 1.8–7.7)
NEUTROPHILS # BLD AUTO: 5.6 K/UL (ref 1.8–7.7)
NEUTROPHILS NFR BLD: 71.7 % (ref 38–73)
NEUTROPHILS NFR BLD: 71.7 % (ref 38–73)
NRBC BLD-RTO: 0 /100 WBC
NRBC BLD-RTO: 0 /100 WBC
PLATELET # BLD AUTO: 126 K/UL (ref 150–450)
PLATELET # BLD AUTO: 126 K/UL (ref 150–450)
PMV BLD AUTO: 10.1 FL (ref 9.2–12.9)
PMV BLD AUTO: 10.1 FL (ref 9.2–12.9)
POCT GLUCOSE: 81 MG/DL (ref 70–110)
POCT GLUCOSE: 82 MG/DL (ref 70–110)
POTASSIUM SERPL-SCNC: 3.7 MMOL/L (ref 3.5–5.1)
RBC # BLD AUTO: 4.57 M/UL (ref 4.6–6.2)
RBC # BLD AUTO: 4.57 M/UL (ref 4.6–6.2)
SODIUM SERPL-SCNC: 141 MMOL/L (ref 136–145)
WBC # BLD AUTO: 7.77 K/UL (ref 3.9–12.7)
WBC # BLD AUTO: 7.77 K/UL (ref 3.9–12.7)

## 2022-02-27 PROCEDURE — 25000003 PHARM REV CODE 250: Performed by: INTERNAL MEDICINE

## 2022-02-27 PROCEDURE — 99225 PR SUBSEQUENT OBSERVATION CARE,LEVEL II: CPT | Mod: ,,, | Performed by: INTERNAL MEDICINE

## 2022-02-27 PROCEDURE — 85025 COMPLETE CBC W/AUTO DIFF WBC: CPT | Performed by: INTERNAL MEDICINE

## 2022-02-27 PROCEDURE — G0378 HOSPITAL OBSERVATION PER HR: HCPCS

## 2022-02-27 PROCEDURE — 36415 COLL VENOUS BLD VENIPUNCTURE: CPT | Performed by: INTERNAL MEDICINE

## 2022-02-27 PROCEDURE — 96361 HYDRATE IV INFUSION ADD-ON: CPT

## 2022-02-27 PROCEDURE — 93010 EKG 12-LEAD: ICD-10-PCS | Mod: ,,, | Performed by: INTERNAL MEDICINE

## 2022-02-27 PROCEDURE — 93005 ELECTROCARDIOGRAM TRACING: CPT

## 2022-02-27 PROCEDURE — 80048 BASIC METABOLIC PNL TOTAL CA: CPT | Performed by: INTERNAL MEDICINE

## 2022-02-27 PROCEDURE — 99225 PR SUBSEQUENT OBSERVATION CARE,LEVEL II: ICD-10-PCS | Mod: ,,, | Performed by: INTERNAL MEDICINE

## 2022-02-27 PROCEDURE — 93010 ELECTROCARDIOGRAM REPORT: CPT | Mod: ,,, | Performed by: INTERNAL MEDICINE

## 2022-02-27 RX ORDER — ASPIRIN 81 MG/1
81 TABLET ORAL DAILY
Qty: 90 TABLET | Refills: 3 | Status: SHIPPED | OUTPATIENT
Start: 2022-02-27 | End: 2023-03-24

## 2022-02-27 RX ORDER — METOPROLOL SUCCINATE 25 MG/1
12.5 TABLET, EXTENDED RELEASE ORAL NIGHTLY
Qty: 45 TABLET | Refills: 3 | Status: SHIPPED | OUTPATIENT
Start: 2022-02-27 | End: 2023-03-09 | Stop reason: SDUPTHER

## 2022-02-27 RX ORDER — NITROGLYCERIN 0.4 MG/1
0.4 TABLET SUBLINGUAL EVERY 5 MIN PRN
Qty: 30 TABLET | Refills: 1 | Status: SHIPPED | OUTPATIENT
Start: 2022-02-27 | End: 2023-03-09

## 2022-02-27 RX ADMIN — ASPIRIN 81 MG: 81 TABLET, COATED ORAL at 09:02

## 2022-02-27 RX ADMIN — TICAGRELOR 90 MG: 90 TABLET ORAL at 09:02

## 2022-02-27 RX ADMIN — RANOLAZINE 500 MG: 500 TABLET, EXTENDED RELEASE ORAL at 09:02

## 2022-02-27 NOTE — ASSESSMENT & PLAN NOTE
-Patient with history of CAD s/p prior PCI of LCX and LAD  -Presents with exertional chest pain, even with walking  -Troponin mildly elevated 0.027>0.066>0.091  -Continue ASA, heparin gtt, low dose BB  -Intolerant to statin therapy  -Echo pending  -[facility] planned today by Dr. Melchor. All risks, benefits, and treatment alternatives explained to patient in detail. All questions answered. He has agreed to proceed.    2/27/22  -s/p PCI of LAD and diagonal  -Stable  -Continue ASA, Brilinta, BB, Ranexa  -Follow-up in clinic

## 2022-02-27 NOTE — PLAN OF CARE
[facility] - Telemetry (Hospital)  Discharge Final Note    Primary Care Provider: ABIGAIL Lopez Jr, MD    Expected Discharge Date: 2/27/2022    Final Discharge Note (most recent)     Final Note - 02/27/22 0949        Final Note    Assessment Type Final Discharge Note     Anticipated Discharge Disposition Home or Self Care        Post-Acute Status    Discharge Delays None known at this time                 Important Message from Medicare

## 2022-02-27 NOTE — SUBJECTIVE & OBJECTIVE
Review of Systems   Constitutional: Negative.   HENT: Negative.     Eyes: Negative.    Cardiovascular: Negative.    Respiratory: Negative.     Endocrine: Negative.    Hematologic/Lymphatic: Negative.    Skin: Negative.    Musculoskeletal: Negative.    Gastrointestinal: Negative.    Genitourinary: Negative.    Neurological: Negative.    Psychiatric/Behavioral: Negative.     Allergic/Immunologic: Negative.    Objective:     Vital Signs (Most Recent):  Temp: 97.4 °F (36.3 °C) (02/27/22 1117)  Pulse: 64 (02/27/22 1117)  Resp: 20 (02/27/22 1117)  BP: 115/77 (02/27/22 1117)  SpO2: 98 % (02/27/22 1117) Vital Signs (24h Range):  Temp:  [97.3 °F (36.3 °C)-98.2 °F (36.8 °C)] 97.4 °F (36.3 °C)  Pulse:  [52-64] 64  Resp:  [18-20] 20  SpO2:  [98 %-100 %] 98 %  BP: (108-127)/(63-79) 115/77     Weight: 69.9 kg (154 lb)  Body mass index is 23.42 kg/m².     SpO2: 98 %  O2 Device (Oxygen Therapy): room air      Intake/Output Summary (Last 24 hours) at 2/27/2022 1353  Last data filed at 2/27/2022 1000  Gross per 24 hour   Intake 452.71 ml   Output --   Net 452.71 ml       Lines/Drains/Airways       None                   Physical Exam  Vitals and nursing note reviewed.   Constitutional:       General: He is not in acute distress.     Appearance: Normal appearance. He is well-developed. He is not diaphoretic.   HENT:      Head: Normocephalic and atraumatic.   Eyes:      General:         Right eye: No discharge.         Left eye: No discharge.      Pupils: Pupils are equal, round, and reactive to light.   Neck:      Thyroid: No thyromegaly.      Vascular: No JVD.      Trachea: No tracheal deviation.   Cardiovascular:      Rate and Rhythm: Normal rate and regular rhythm.      Heart sounds: Normal heart sounds, S1 normal and S2 normal. No murmur heard.  Pulmonary:      Effort: Pulmonary effort is normal. No respiratory distress.      Breath sounds: Normal breath sounds. No wheezing or rales.   Abdominal:      General: There is no  distension.      Tenderness: There is no abdominal tenderness. There is no rebound.   Musculoskeletal:      Cervical back: Neck supple.      Right lower leg: No edema.      Left lower leg: No edema.   Skin:     General: Skin is warm and dry.      Findings: No erythema.      Comments: Left radial access site C/D/I; no bleeding erythema or drainage   Neurological:      General: No focal deficit present.      Mental Status: He is alert and oriented to person, place, and time.   Psychiatric:         Mood and Affect: Mood normal.         Behavior: Behavior normal.         Thought Content: Thought content normal.       Significant Labs: CMP   Recent Labs   Lab 02/25/22 1825 02/26/22 0612 02/27/22 0407    140 141   K 4.2 3.7 3.7    107 111*   CO2 23 24 20*   GLU 96 94 80   BUN 18 13 13   CREATININE 1.2 1.0 0.8   CALCIUM 9.4 8.6* 8.3*   PROT 6.3 5.8*  --    ALBUMIN 4.0 3.7  --    BILITOT 0.5 0.9  --    ALKPHOS 55 46*  --    AST 28 22  --    ALT 19 19  --    ANIONGAP 11 9 10   ESTGFRAFRICA >60 >60 >60   EGFRNONAA >60 >60 >60   , CBC   Recent Labs   Lab 02/25/22 1825 02/26/22 0612 02/27/22  0407   WBC 5.67 5.80 7.77  7.77   HGB 14.7 14.4 13.7*  13.7*   HCT 43.0 41.9 39.9*  39.9*   * 144* 126*  126*   , Troponin   Recent Labs   Lab 02/25/22 1825 02/25/22 2321 02/26/22 0612   TROPONINI 0.027* 0.066* 0.091*   , and All pertinent lab results from the last 24 hours have been reviewed.    Significant Imaging: Echocardiogram: Transthoracic echo (TTE) complete ([facility] Only):   Results for orders placed or performed during the hospital encounter of 02/25/22   Echo   Result Value Ref Range    BSA 1.83 m2    TDI SEPTAL 0.04 m/s    LV LATERAL E/E' RATIO 14.67 m/s    LV SEPTAL E/E' RATIO 11.00 m/s    IVC diameter 1.39 cm    Left Ventricular Outflow Tract Mean Velocity 0.469633686530 cm/s    Left Ventricular Outflow Tract Mean Gradient 1.89 mmHg    TDI LATERAL 0.03 m/s    LVIDd 4.87 3.5 - 6.0 cm    IVS 1.66  (A) 0.6 - 1.1 cm    Posterior Wall 1.49 (A) 0.6 - 1.1 cm    Ao root annulus 3.92 cm    LVIDs 4.28 (A) 2.1 - 4.0 cm    FS 12 28 - 44 %    Sinus 3.83 cm    STJ 3.22 cm    Ascending aorta 3.53 cm    LV mass 333.69 g    LA size 3.36 cm    TAPSE 1.92 cm    Left Ventricle Relative Wall Thickness 0.61 cm    AV mean gradient 3 mmHg    AV valve area 3.39 cm2    AV Velocity Ratio 0.73     AV index (prosthetic) 0.72     MV valve area p 1/2 method 2.22 cm2    E/A ratio 0.46     Mean e' 0.04 m/s    E wave deceleration time 342.529435234060132 msec    IVRT 71.756118911600971 msec    LVOT diameter 2.45 cm    LVOT area 4.7 cm2    LVOT peak fredo 0.94 m/s    LVOT peak VTI 21.60 cm    Ao peak fredo 1.28 m/s    Ao VTI 30.0 cm    RVOT peak fredo 0.52 m/s    RVOT peak VTI 12.1 cm    LVOT stroke volume 101.78 cm3    AV peak gradient 7 mmHg    PV mean gradient 0.59 mmHg    E/E' ratio 12.57 m/s    MV Peak E Fredo 0.44 m/s    TR Max Fredo 3.09 m/s    MV stenosis pressure 1/2 time 99.260858040181503 ms    MV Peak A Fredo 0.95 m/s    LV Systolic Volume 82.14 mL    LV Systolic Volume Index 44.9 mL/m2    LV Diastolic Volume 111.25 mL    LV Diastolic Volume Index 60.79 mL/m2    LV Mass Index 182 g/m2    Echo EF Estimated 26 %    RA Major Axis 5.07 cm    Left Atrium Minor Axis 3.48 cm    Left Atrium Major Axis 4.49 cm    Triscuspid Valve Regurgitation Peak Gradient 38 mmHg    RA Width 2.77 cm    Right Atrial Pressure (from IVC) 3 mmHg    EF 30 %    TV rest pulmonary artery pressure 41 mmHg    Narrative    · The left ventricle is normal in size with moderate concentric   hypertrophy and moderately decreased systolic function.  · The estimated ejection fraction is 30%.  · Grade I left ventricular diastolic dysfunction.  · There is left ventricular global hypokinesis.  · Normal right ventricular size with normal right ventricular systolic   function.  · Normal central venous pressure (3 mmHg).  · The estimated PA systolic pressure is 41 mmHg.  · There is  pulmonary hypertension.  · Trivial posterior pericardial effusion.      , EKG: Reiewed, and X-Ray: CXR: X-Ray Chest 1 View (CXR): No results found for this visit on 02/25/22. and X-Ray Chest PA and Lateral (CXR): No results found for this visit on 02/25/22.

## 2022-02-27 NOTE — DISCHARGE SUMMARY
'[facility] Medicine  Discharge Summary      Patient Name: Mekhi Lambert  MRN: [id number]  Patient Class: OP- Observation  Admission Date: 2/25/2022  Hospital Length of Stay: 0 days  Discharge Date and Time:  02/27/2022 9:55 AM  Attending Physician: Nelson Benjamin, *   Discharging Provider: Nelson Benjamin MD  Primary Care Provider: ABIGAIL Lopez Jr, MD      HPI:   Mr. Lambert is a 71-year-old  male with PMH significant for CAD, cardiac stents in place, followed by Dr. Melchor as outpatient, HTN, presented to the ED complaining of chest pain that started after a walk earlier this morning.  Reports compliance with all his medications.  Denies shortness of breath, diaphoresis, but complained of nausea without vomiting.  EKG reveals mild sinus bradycardia.  Laboratory workup is unremarkable except for elevated troponin of 0.027.  Patient started on heparin drip.  ED physician discussed case with Cardiology on-call, recommended NPO past midnight for possible left heart catheterization in a.m..  Patient is currently chest pain-free.    Admitting diagnosis:  NSTEMI.      Procedure(s) (LRB):  CATHETERIZATION, HEART, LEFT (Left)  Angioplasty-coronary  Stent, Drug Eluting, Multi Vessel, Coronary      Hospital Course:   70 y/o WM admitted with a Dx of NSTEMI . Started on heparin drip , asa and BB . Cardiology consulted and plan for LHC today   2/27 Pt was seen and examined at bedside . He was determined to be  suitable for d/c   He  was admitted with a Dx of NSTEMI .  S/P LHC which show :Ld prox 90% with d2 bifurcation 50% requiring stenting with kissing angioplasty .  lcx patent , normAL RCA .Ef 30% ant wall apical akinesis . Cardiology rec brilinta and asa x 1 year . PT is ALLERGIC to STATIN .  There was gabrielle cue event since admission . He was advised to f/u with his cardiology and PCP in 1 to 2 weeks        Goals of Care Treatment Preferences:  Code Status: Full  Code      Consults:   Consults (From admission, onward)        Status Ordering Provider     Inpatient consult to Cardiology  Once        Provider:  Ute Melchor MD    Completed DAVID IQBAL          No new Assessment & Plan notes have been filed under this hospital service since the last note was generated.  Service: Hospital Medicine    Final Active Diagnoses:    Diagnosis Date Noted POA    PRINCIPAL PROBLEM:  NSTEMI (non-ST elevated myocardial infarction) [I21.4] 02/25/2022 Yes    Chronic systolic CHF (congestive heart failure) [I50.22] 02/26/2022 Yes    Statin intolerance [Z78.9] 02/26/2020 Yes    Coronary artery disease of native artery of native heart with  angina pectoris [I25.118] 02/26/2020 Yes      Problems Resolved During this Admission:       Discharged Condition: stable    Disposition: Home or Self Care    Follow Up:   Follow-up Information     E Kayden Lopez Jr, MD Follow up in 1 week(s).    Specialties: Internal Medicine, Pediatrics  Contact information:  [id number]  [place] [address]  [phone]             Francis Melchor MD Follow up in 2 week(s).    Specialties: Interventional Cardiology, Cardiology  Contact information:  [id number]  [place] [address]  [phone]                       Patient Instructions:      Diet Cardiac     Activity as tolerated       Significant Diagnostic Studies: Labs:   BMP:   Recent Labs   Lab 02/25/22  1825 02/26/22  0612 02/27/22  0407   GLU 96 94 80    140 141   K 4.2 3.7 3.7    107 111*   CO2 23 24 20*   BUN 18 13 13   CREATININE 1.2 1.0 0.8   CALCIUM 9.4 8.6* 8.3*   MG  --  1.9  --    , CMP   Recent Labs   Lab 02/25/22  1825 02/26/22  0612 02/27/22  0407    140 141   K 4.2 3.7 3.7    107 111*   CO2 23 24 20*   GLU 96 94 80   BUN 18 13 13   CREATININE 1.2 1.0 0.8   CALCIUM 9.4 8.6* 8.3*   PROT 6.3 5.8*  --    ALBUMIN 4.0 3.7  --    BILITOT 0.5 0.9  --    ALKPHOS 55 46*  --    AST 28 22  --    ALT 19 19  --     ANIONGAP 11 9 10   ESTGFRAFRICA >60 >60 >60   EGFRNONAA >60 >60 >60    and CBC   Recent Labs   Lab 02/25/22 1825 02/26/22  0612 02/27/22  0407   WBC 5.67 5.80 7.77  7.77   HGB 14.7 14.4 13.7*  13.7*   HCT 43.0 41.9 39.9*  39.9*   * 144* 126*  126*     Microbiology: Blood Culture No results found for: LABBLOO    Pending Diagnostic Studies:     Procedure Component Value Units Date/Time    Troponin I #2 [[id number]] Collected: 02/25/22 1825    Order Status: Sent Lab Status: In process Updated: 02/25/22 1825    Specimen: Blood          Medications:  Reconciled Home Medications:      Medication List      START taking these medications    ticagrelor 90 mg tablet  Commonly known as: BRILINTA  Take 1 tablet (90 mg total) by mouth 2 (two) times a day.        CONTINUE taking these medications    aspirin 81 MG EC tablet  Commonly known as: ECOTRIN  Take 1 tablet (81 mg total) by mouth once daily.     metoprolol succinate 25 MG 24 hr tablet  Commonly known as: TOPROL-XL  Take 0.5 tablets (12.5 mg total) by mouth every evening.     mometasone 0.1% 0.1 % cream  Commonly known as: ELOCON  AAA bid prn for psoriasis. Strong steroid.  Do not use on face, underarms or groin.     multivitamin capsule  Take 1 capsule by mouth once daily.     nitroGLYCERIN 0.4 MG SL tablet  Commonly known as: NITROSTAT  Place 1 tablet (0.4 mg total) under the tongue every 5 (five) minutes as needed for Chest pain.     pantoprazole 40 MG tablet  Commonly known as: PROTONIX  Take 1 tablet (40 mg total) by mouth once daily.     ranolazine 500 MG Tb12  Commonly known as: RANEXA  Take 1 tablet (500 mg total) by mouth 2 (two) times daily.            Indwelling Lines/Drains at time of discharge:   Lines/Drains/Airways     None                 Time spent on the discharge of patient: 31 minutes         Nelson Benjamin MD  Department of Hospital Medicine  [facility] ([facility])

## 2022-02-27 NOTE — PROGRESS NOTES
[facility] - [facility] ([facility])  Cardiology  Progress Note    Patient Name: Mekhi Lambert  MRN: [id number]  Admission Date: 2/25/2022  Hospital Length of Stay: 0 days  Code Status: Prior   Attending Physician: No att. providers found   Primary Care Physician: ABIGAIL Lopez Jr, MD  Expected Discharge Date: 2/27/2022  Principal Problem:NSTEMI (non-ST elevated myocardial infarction)    Subjective:   HPI:  Mr. Lambert is a 71 year old male patient whose current medical conditions include CAD s/p prior MI with PCI of LAD and LCX in 2020 and chronic systolic CHF who presented to [facility] ED yesterday with a chief complaint of intermittent, exertional substernal chest pain over the past week. Associated symptoms included elevated HR. Patient denied any radiation of pain or any associated SOB, nausea, vomiting, diaphoresis, palpitations, near syncope, or syncope. Initial workup in ED revealed troponin of 0.027>0.066 and patient was subsequently admitted for further evaluation and treatment. Cardiology consulted to assist with management. Patient seen and examined today, resting in bed. Still having intermittent bouts of chest discomfort. Reports he is fairly active and was now having trouble completing his normal exercise  routine and noticed chest pain even when leisurely walking He reports compliance with his medications. Followed on OP basis by Dr. Sutton. Recent MPI stress test 12/21 negative for ischemia, +fixed defects. Echo pending. [facility] planned today by Dr. Melchor.     Hospital Course:   2/27/22-Patient seen and examined today, s/p [facility] yesterday with successful PCI of LAD and diagonal. Feels well. No CV complaints. Labs stable.          Review of Systems   Constitutional: Negative.   HENT: Negative.     Eyes: Negative.    Cardiovascular: Negative.    Respiratory: Negative.     Endocrine: Negative.    Hematologic/Lymphatic: Negative.    Skin: Negative.    Musculoskeletal: Negative.    Gastrointestinal: Negative.     Genitourinary: Negative.    Neurological: Negative.    Psychiatric/Behavioral: Negative.     Allergic/Immunologic: Negative.    Objective:     Vital Signs (Most Recent):  Temp: 97.4 °F (36.3 °C) (02/27/22 1117)  Pulse: 64 (02/27/22 1117)  Resp: 20 (02/27/22 1117)  BP: 115/77 (02/27/22 1117)  SpO2: 98 % (02/27/22 1117) Vital Signs (24h Range):  Temp:  [97.3 °F (36.3 °C)-98.2 °F (36.8 °C)] 97.4 °F (36.3 °C)  Pulse:  [52-64] 64  Resp:  [18-20] 20  SpO2:  [98 %-100 %] 98 %  BP: (108-127)/(63-79) 115/77     Weight: 69.9 kg (154 lb)  Body mass index is 23.42 kg/m².     SpO2: 98 %  O2 Device (Oxygen Therapy): room air      Intake/Output Summary (Last 24 hours) at 2/27/2022 1353  Last data filed at 2/27/2022 1000  Gross per 24 hour   Intake 452.71 ml   Output --   Net 452.71 ml       Lines/Drains/Airways       None                   Physical Exam  Vitals and nursing note reviewed.   Constitutional:       General: He is not in acute distress.     Appearance: Normal appearance. He is well-developed. He is not diaphoretic.   HENT:      Head: Normocephalic and atraumatic.   Eyes:      General:         Right eye: No discharge.         Left eye: No discharge.      Pupils: Pupils are equal, round, and reactive to light.   Neck:      Thyroid: No thyromegaly.      Vascular: No JVD.      Trachea: No tracheal deviation.   Cardiovascular:      Rate and Rhythm: Normal rate and regular rhythm.      Heart sounds: Normal heart sounds, S1 normal and S2 normal. No murmur heard.  Pulmonary:      Effort: Pulmonary effort is normal. No respiratory distress.      Breath sounds: Normal breath sounds. No wheezing or rales.   Abdominal:      General: There is no distension.      Tenderness: There is no abdominal tenderness. There is no rebound.   Musculoskeletal:      Cervical back: Neck supple.      Right lower leg: No edema.      Left lower leg: No edema.   Skin:     General: Skin is warm and dry.      Findings: No erythema.      Comments: Left  radial access site C/D/I; no bleeding erythema or drainage   Neurological:      General: No focal deficit present.      Mental Status: He is alert and oriented to person, place, and time.   Psychiatric:         Mood and Affect: Mood normal.         Behavior: Behavior normal.         Thought Content: Thought content normal.       Significant Labs: CMP   Recent Labs   Lab 02/25/22 1825 02/26/22 0612 02/27/22  0407    140 141   K 4.2 3.7 3.7    107 111*   CO2 23 24 20*   GLU 96 94 80   BUN 18 13 13   CREATININE 1.2 1.0 0.8   CALCIUM 9.4 8.6* 8.3*   PROT 6.3 5.8*  --    ALBUMIN 4.0 3.7  --    BILITOT 0.5 0.9  --    ALKPHOS 55 46*  --    AST 28 22  --    ALT 19 19  --    ANIONGAP 11 9 10   ESTGFRAFRICA >60 >60 >60   EGFRNONAA >60 >60 >60   , CBC   Recent Labs   Lab 02/25/22 1825 02/26/22 0612 02/27/22  0407   WBC 5.67 5.80 7.77  7.77   HGB 14.7 14.4 13.7*  13.7*   HCT 43.0 41.9 39.9*  39.9*   * 144* 126*  126*   , Troponin   Recent Labs   Lab 02/25/22 1825 02/25/22 2321 02/26/22 0612   TROPONINI 0.027* 0.066* 0.091*   , and All pertinent lab results from the last 24 hours have been reviewed.    Significant Imaging: Echocardiogram: Transthoracic echo (TTE) complete ([facility] Only):   Results for orders placed or performed during the hospital encounter of 02/25/22   Echo   Result Value Ref Range    BSA 1.83 m2    TDI SEPTAL 0.04 m/s    LV LATERAL E/E' RATIO 14.67 m/s    LV SEPTAL E/E' RATIO 11.00 m/s    IVC diameter 1.39 cm    Left Ventricular Outflow Tract Mean Velocity 0.571453484515 cm/s    Left Ventricular Outflow Tract Mean Gradient 1.89 mmHg    TDI LATERAL 0.03 m/s    LVIDd 4.87 3.5 - 6.0 cm    IVS 1.66 (A) 0.6 - 1.1 cm    Posterior Wall 1.49 (A) 0.6 - 1.1 cm    Ao root annulus 3.92 cm    LVIDs 4.28 (A) 2.1 - 4.0 cm    FS 12 28 - 44 %    Sinus 3.83 cm    STJ 3.22 cm    Ascending aorta 3.53 cm    LV mass 333.69 g    LA size 3.36 cm    TAPSE 1.92 cm    Left Ventricle Relative Wall Thickness  0.61 cm    AV mean gradient 3 mmHg    AV valve area 3.39 cm2    AV Velocity Ratio 0.73     AV index (prosthetic) 0.72     MV valve area p 1/2 method 2.22 cm2    E/A ratio 0.46     Mean e' 0.04 m/s    E wave deceleration time 342.604342905244815 msec    IVRT 71.113553512625662 msec    LVOT diameter 2.45 cm    LVOT area 4.7 cm2    LVOT peak fredo 0.94 m/s    LVOT peak VTI 21.60 cm    Ao peak fredo 1.28 m/s    Ao VTI 30.0 cm    RVOT peak fredo 0.52 m/s    RVOT peak VTI 12.1 cm    LVOT stroke volume 101.78 cm3    AV peak gradient 7 mmHg    PV mean gradient 0.59 mmHg    E/E' ratio 12.57 m/s    MV Peak E Fredo 0.44 m/s    TR Max Fredo 3.09 m/s    MV stenosis pressure 1/2 time 99.683076279553763 ms    MV Peak A Fredo 0.95 m/s    LV Systolic Volume 82.14 mL    LV Systolic Volume Index 44.9 mL/m2    LV Diastolic Volume 111.25 mL    LV Diastolic Volume Index 60.79 mL/m2    LV Mass Index 182 g/m2    Echo EF Estimated 26 %    RA Major Axis 5.07 cm    Left Atrium Minor Axis 3.48 cm    Left Atrium Major Axis 4.49 cm    Triscuspid Valve Regurgitation Peak Gradient 38 mmHg    RA Width 2.77 cm    Right Atrial Pressure (from IVC) 3 mmHg    EF 30 %    TV rest pulmonary artery pressure 41 mmHg    Narrative    · The left ventricle is normal in size with moderate concentric   hypertrophy and moderately decreased systolic function.  · The estimated ejection fraction is 30%.  · Grade I left ventricular diastolic dysfunction.  · There is left ventricular global hypokinesis.  · Normal right ventricular size with normal right ventricular systolic   function.  · Normal central venous pressure (3 mmHg).  · The estimated PA systolic pressure is 41 mmHg.  · There is pulmonary hypertension.  · Trivial posterior pericardial effusion.      , EKG: Reiewed, and X-Ray: CXR: X-Ray Chest 1 View (CXR): No results found for this visit on 02/25/22. and X-Ray Chest PA and Lateral (CXR): No results found for this visit on 02/25/22.    Assessment and Plan:   Patient who  presents with chest pain/NSTEMI s/p [facility] with successful PCI of LAD and diagonal. No issues, chest pain free. Continue OMT. Follow-up in clinic.    * NSTEMI (non-ST elevated myocardial infarction)  -Patient with history of CAD s/p prior PCI of LCX and LAD  -Presents with exertional chest pain, even with walking  -Troponin mildly elevated 0.027>0.066>0.091  -Continue ASA, heparin gtt, low dose BB  -Intolerant to statin therapy  -Echo pending  -[facility] planned today by Dr. Melchor. All risks, benefits, and treatment alternatives explained to patient in detail. All questions answered. He has agreed to proceed.    2/27/22  -s/p PCI of LAD and diagonal  -Stable  -Continue ASA, Brilinta, BB, Ranexa  -Follow-up in clinic    Chronic systolic CHF (congestive heart failure)  -Echo pending  -Continue low dose BB  -GDMT limited by hypotension/med intolerance  -Compensated        Coronary artery disease of native artery of native heart with  angina pectoris  -See plan under chest pain/NSTEMI    Statin intolerance  -Risk factor modification        VTE Risk Mitigation (From admission, onward)         Ordered     Place sequential compression device  Until discontinued         02/25/22 1919                Leah Donahue PA-C  Cardiology  [facility] - Tele[facility] ([facility])

## 2022-02-27 NOTE — PLAN OF CARE
Went over discharge instructions with patient.   Stressed importance of making and keeping all follow ups as well as making prescribed medication changes.   Prescriptions sent to pt's requested pharmacy.  Patient verbalized understanding and has had all questions in regards to discharge answered to satisfaction.  IV removed without complications.  Telemetry box removed and returned to monitor tech.  Patient transported via wheelchair to personal transportation at the front of building.

## 2022-02-27 NOTE — HOSPITAL COURSE
2/27/22-Patient seen and examined today, s/p [facility] yesterday with successful PCI of LAD and diagonal. Feels well. No CV complaints. Labs stable.

## 2022-02-28 ENCOUNTER — TELEPHONE (OUTPATIENT)
Dept: CARDIOLOGY | Facility: CLINIC | Age: 72
End: 2022-02-28
Payer: MEDICARE

## 2022-02-28 NOTE — TELEPHONE ENCOUNTER
Patient scheduled for a  hospital f/u with Dr. Sutton next week. The patient stated understanding with no questions or concerns.

## 2022-03-01 NOTE — PROGRESS NOTES
Subjective:   Patient ID:  Mekhi Lambert is a 71 y.o. male who presents for follow-up of No chief complaint on file.  Post hospitalization:  72 y/o WM admitted with a Dx of NSTEMI . Started on heparin drip , asa and BB . Cardiology consulted and plan for LHC today   2/27 Pt was seen and examined at bedside . He was determined to be  suitable for d/c   He  was admitted with a Dx of NSTEMI .  S/P LHC which show :Ld prox 90% with d2 bifurcation 50% requiring stenting with kissing angioplasty .  lcx patent , normAL RCA .Ef 30% ant wall apical akinesis . Cardiology rec brilinta and asa x 1 year . PT is ALLERGIC to STATIN .  There was gabrielle cue event since admission . He was advised to f/u with his cardiology and PCP in 1 to 2 weeks     Vis fu echo.  Will re-evaluate in the near future after stent hopef of the heart.  Clinically otherwise stable having no to try his own rehab.  He will continue with antiplatelet medications and metoprolol.  He has been reluctant to take additional medications would like to exercise before making further decisions.  I have offered him the use of cholesterol profile and have a discussion about Repatha injectable medications lower cholesterol levels.  Will have a cholesterol profile near future, also a repeat cardiac echo be done  Discussion will be made in the future about giving him Repatha.  Discussion will be made the future about ACE-inhibitor or Entresto in the near future.  Patient is reluctant take these medications and would like to wait to the next visit.  The patient is an  and very thoughtful about the potential use of new medications.  He understands now the guideline directed medical therapy includes the use of cholesterol lowering agents, Ace inhibitors or Entresto medications as well as beta-blockers and the use of antiplatelet medications for the stent.  We will continue to have these discussions in the future and hopefully he will take additional medications as  necessary.  I would like him to take an ACE-inhibitor and Repatha medication.      Review of Systems   Constitutional: Positive for malaise/fatigue. Negative for chills, diaphoresis, night sweats, weight gain and weight loss.   HENT: Negative for congestion, hoarse voice, sore throat and stridor.    Eyes: Negative for double vision and pain.   Cardiovascular: Negative for chest pain, claudication, cyanosis, dyspnea on exertion, irregular heartbeat, leg swelling, near-syncope, orthopnea, palpitations, paroxysmal nocturnal dyspnea and syncope.   Respiratory: Negative for cough, hemoptysis, shortness of breath, sleep disturbances due to breathing, snoring, sputum production and wheezing.    Endocrine: Negative for cold intolerance, heat intolerance and polydipsia.   Hematologic/Lymphatic: Negative for bleeding problem. Does not bruise/bleed easily.   Skin: Negative for color change, dry skin and rash.   Musculoskeletal: Negative for joint swelling and muscle cramps.   Gastrointestinal: Negative for bloating, abdominal pain, constipation, diarrhea, dysphagia, melena, nausea and vomiting.   Genitourinary: Negative for flank pain and urgency.   Neurological: Negative for dizziness, focal weakness, headaches, light-headedness, loss of balance, seizures and weakness.   Psychiatric/Behavioral: Negative for altered mental status and memory loss. The patient is not nervous/anxious.      Family History   Problem Relation Age of Onset    Heart disease Mother         lupus related    Diabetes Mother     Cancer Father         prostate, bone, lung, skin cancers all seperate types    Diabetes Maternal Aunt      Past Medical History:   Diagnosis Date    Anticoagulant long-term use     Arthritis     Cancer     Chronic systolic CHF (congestive heart failure) 2/26/2022    Coronary artery disease     Digestive disorder     Myocardial infarction     2020     Social History     Socioeconomic History    Marital status:     Tobacco Use    Smoking status: Never Smoker    Smokeless tobacco: Former User     Types: Chew     Quit date: 6/3/2004   Substance and Sexual Activity    Alcohol use: Yes     Alcohol/week: 2.0 standard drinks     Types: 2 Glasses of wine per week     Comment: per week    Drug use: No     Current Outpatient Medications on File Prior to Visit   Medication Sig Dispense Refill    aspirin (ECOTRIN) 81 MG EC tablet Take 1 tablet (81 mg total) by mouth once daily. 90 tablet 3    metoprolol succinate (TOPROL-XL) 25 MG 24 hr tablet Take 0.5 tablets (12.5 mg total) by mouth every evening. 45 tablet 3    mometasone 0.1% (ELOCON) 0.1 % cream AAA bid prn for psoriasis. Strong steroid.  Do not use on face, underarms or groin. 50 g 1    multivitamin capsule Take 1 capsule by mouth once daily.      nitroGLYCERIN (NITROSTAT) 0.4 MG SL tablet Place 1 tablet (0.4 mg total) under the tongue every 5 (five) minutes as needed for Chest pain. 30 tablet 1    pantoprazole (PROTONIX) 40 MG tablet Take 1 tablet (40 mg total) by mouth once daily. 30 tablet 3    ranolazine (RANEXA) 500 MG Tb12 Take 1 tablet (500 mg total) by mouth 2 (two) times daily. 60 tablet 11    ticagrelor (BRILINTA) 90 mg tablet Take 1 tablet (90 mg total) by mouth 2 (two) times a day. 180 tablet 1     No current facility-administered medications on file prior to visit.     Review of patient's allergies indicates:   Allergen Reactions    Statins-hmg-coa reductase inhibitors Other (See Comments)     SEVERE MYALGIAS AND GI SIDE EFFECTS       Objective:     Physical Exam  Eyes:      Pupils: Pupils are equal, round, and reactive to light.   Neck:      Trachea: No tracheal deviation.   Cardiovascular:      Rate and Rhythm: Normal rate and regular rhythm.      Pulses: Intact distal pulses.           Carotid pulses are 2+ on the right side and 2+ on the left side.       Radial pulses are 2+ on the right side and 2+ on the left side.        Femoral pulses are 2+ on  the right side and 2+ on the left side.       Popliteal pulses are 2+ on the right side and 2+ on the left side.        Dorsalis pedis pulses are 2+ on the right side and 2+ on the left side.        Posterior tibial pulses are 2+ on the right side and 2+ on the left side.      Heart sounds: Normal heart sounds. No murmur heard.    No friction rub. No gallop.   Pulmonary:      Effort: Pulmonary effort is normal. No respiratory distress.      Breath sounds: Normal breath sounds. No stridor. No wheezing or rales.   Chest:      Chest wall: No tenderness.   Abdominal:      General: There is no distension.      Tenderness: There is no abdominal tenderness. There is no rebound.   Musculoskeletal:         General: No tenderness.      Cervical back: Normal range of motion.   Skin:     General: Skin is warm and dry.   Neurological:      Mental Status: He is alert and oriented to person, place, and time.         Assessment:     1. Chest pain, unspecified type    2. Palpitations    3. Hypercholesterolemia    4. NSTEMI (non-ST elevated myocardial infarction)    5. DCM (dilated cardiomyopathy)    6. Statin intolerance    7. Coronary artery disease, unspecified vessel or lesion type, unspecified whether angina present, unspecified whether native or transplanted heart        Plan:     Chest pain, unspecified type    Palpitations    Hypercholesterolemia    NSTEMI (non-ST elevated myocardial infarction)    DCM (dilated cardiomyopathy)    Statin intolerance    Coronary artery disease, unspecified vessel or lesion type, unspecified whether angina present, unspecified whether native or transplanted heart      Impression 1. NSTEMI:  Stable with stenting of the LAD and diagonal branch.  He continues on Brilinta and aspirin.  He could.  He continues on beta-blocker  2. Statin intolerance will repeat cholesterol profile hopefully he will be able to allow himself to take Repatha medication  3. Dilated cardiomyopathy will repeat cardiac echo  and hopefully the patient can be convinced to take additional medications such as Ace inhibitor or Entresto.  Follow-up evaluation within the next month to 2 months.

## 2022-03-02 ENCOUNTER — OFFICE VISIT (OUTPATIENT)
Dept: CARDIOLOGY | Facility: CLINIC | Age: 72
End: 2022-03-02
Payer: MEDICARE

## 2022-03-02 VITALS
HEIGHT: 68 IN | HEART RATE: 62 BPM | BODY MASS INDEX: 24.09 KG/M2 | WEIGHT: 158.94 LBS | SYSTOLIC BLOOD PRESSURE: 122 MMHG | OXYGEN SATURATION: 96 % | DIASTOLIC BLOOD PRESSURE: 90 MMHG

## 2022-03-02 DIAGNOSIS — I25.10 CORONARY ARTERY DISEASE, UNSPECIFIED VESSEL OR LESION TYPE, UNSPECIFIED WHETHER ANGINA PRESENT, UNSPECIFIED WHETHER NATIVE OR TRANSPLANTED HEART: ICD-10-CM

## 2022-03-02 DIAGNOSIS — I42.0 DCM (DILATED CARDIOMYOPATHY): ICD-10-CM

## 2022-03-02 DIAGNOSIS — E78.00 HYPERCHOLESTEROLEMIA: Primary | ICD-10-CM

## 2022-03-02 DIAGNOSIS — R00.2 PALPITATIONS: ICD-10-CM

## 2022-03-02 DIAGNOSIS — I21.4 NSTEMI (NON-ST ELEVATED MYOCARDIAL INFARCTION): ICD-10-CM

## 2022-03-02 DIAGNOSIS — Z78.9 STATIN INTOLERANCE: ICD-10-CM

## 2022-03-02 DIAGNOSIS — R07.9 CHEST PAIN, UNSPECIFIED TYPE: ICD-10-CM

## 2022-03-02 PROCEDURE — 99999 PR PBB SHADOW E&M-EST. PATIENT-LVL III: ICD-10-PCS | Mod: PBBFAC,,, | Performed by: INTERNAL MEDICINE

## 2022-03-02 PROCEDURE — 99214 PR OFFICE/OUTPT VISIT, EST, LEVL IV, 30-39 MIN: ICD-10-PCS | Mod: S$PBB,,, | Performed by: INTERNAL MEDICINE

## 2022-03-02 PROCEDURE — 99999 PR PBB SHADOW E&M-EST. PATIENT-LVL III: CPT | Mod: PBBFAC,,, | Performed by: INTERNAL MEDICINE

## 2022-03-02 PROCEDURE — 99213 OFFICE O/P EST LOW 20 MIN: CPT | Mod: PBBFAC | Performed by: INTERNAL MEDICINE

## 2022-03-02 PROCEDURE — 99214 OFFICE O/P EST MOD 30 MIN: CPT | Mod: S$PBB,,, | Performed by: INTERNAL MEDICINE

## 2022-03-16 ENCOUNTER — LAB VISIT (OUTPATIENT)
Dept: LAB | Facility: HOSPITAL | Age: 72
End: 2022-03-16
Attending: INTERNAL MEDICINE
Payer: MEDICARE

## 2022-03-16 DIAGNOSIS — Z78.9 STATIN INTOLERANCE: ICD-10-CM

## 2022-03-16 DIAGNOSIS — E78.00 HYPERCHOLESTEROLEMIA: ICD-10-CM

## 2022-03-16 DIAGNOSIS — R07.9 CHEST PAIN, UNSPECIFIED TYPE: ICD-10-CM

## 2022-03-16 DIAGNOSIS — I42.0 DCM (DILATED CARDIOMYOPATHY): ICD-10-CM

## 2022-03-16 DIAGNOSIS — I21.4 NSTEMI (NON-ST ELEVATED MYOCARDIAL INFARCTION): ICD-10-CM

## 2022-03-16 DIAGNOSIS — R00.2 PALPITATIONS: ICD-10-CM

## 2022-03-16 DIAGNOSIS — I25.10 CORONARY ARTERY DISEASE, UNSPECIFIED VESSEL OR LESION TYPE, UNSPECIFIED WHETHER ANGINA PRESENT, UNSPECIFIED WHETHER NATIVE OR TRANSPLANTED HEART: ICD-10-CM

## 2022-03-16 LAB
CHOLEST SERPL-MCNC: 299 MG/DL (ref 120–199)
CHOLEST/HDLC SERPL: 3.8 {RATIO} (ref 2–5)
HDLC SERPL-MCNC: 78 MG/DL (ref 40–75)
HDLC SERPL: 26.1 % (ref 20–50)
LDLC SERPL CALC-MCNC: 204.6 MG/DL (ref 63–159)
NONHDLC SERPL-MCNC: 221 MG/DL
TRIGL SERPL-MCNC: 82 MG/DL (ref 30–150)

## 2022-03-16 PROCEDURE — 36415 COLL VENOUS BLD VENIPUNCTURE: CPT | Mod: PN | Performed by: INTERNAL MEDICINE

## 2022-03-16 PROCEDURE — 80061 LIPID PANEL: CPT | Performed by: INTERNAL MEDICINE

## 2022-03-17 ENCOUNTER — TELEPHONE (OUTPATIENT)
Dept: CARDIOLOGY | Facility: CLINIC | Age: 72
End: 2022-03-17
Payer: MEDICARE

## 2022-03-17 NOTE — TELEPHONE ENCOUNTER
Spoke with pt regarding test results, informed pt that  would strongly recommend statin therapy if pt agrees. Pt declined statins and said he would rather discuss first with  at his next visit. Pt verbalized understanding of his results.

## 2022-03-17 NOTE — TELEPHONE ENCOUNTER
----- Message from Kiko Sutton MD sent at 3/17/2022 10:17 AM CDT -----  Lipid levels elevated would strongly advise statin medications, I will prescribe them once he agrees

## 2022-03-18 ENCOUNTER — PATIENT MESSAGE (OUTPATIENT)
Dept: CARDIOLOGY | Facility: CLINIC | Age: 72
End: 2022-03-18
Payer: MEDICARE

## 2022-04-10 ENCOUNTER — PATIENT MESSAGE (OUTPATIENT)
Dept: CARDIOLOGY | Facility: CLINIC | Age: 72
End: 2022-04-10
Payer: MEDICARE

## 2022-04-25 ENCOUNTER — HOSPITAL ENCOUNTER (OUTPATIENT)
Dept: CARDIOLOGY | Facility: HOSPITAL | Age: 72
Discharge: HOME | DRG: 329 | End: 2022-04-25
Attending: INTERNAL MEDICINE
Payer: MEDICARE

## 2022-04-25 ENCOUNTER — PATIENT MESSAGE (OUTPATIENT)
Dept: CARDIOLOGY | Facility: CLINIC | Age: 72
End: 2022-04-25

## 2022-04-25 VITALS
WEIGHT: 158 LBS | DIASTOLIC BLOOD PRESSURE: 90 MMHG | HEIGHT: 68 IN | BODY MASS INDEX: 23.95 KG/M2 | SYSTOLIC BLOOD PRESSURE: 122 MMHG

## 2022-04-25 DIAGNOSIS — Z78.9 STATIN INTOLERANCE: ICD-10-CM

## 2022-04-25 DIAGNOSIS — I42.0 DCM (DILATED CARDIOMYOPATHY): ICD-10-CM

## 2022-04-25 DIAGNOSIS — I25.10 CORONARY ARTERY DISEASE, UNSPECIFIED VESSEL OR LESION TYPE, UNSPECIFIED WHETHER ANGINA PRESENT, UNSPECIFIED WHETHER NATIVE OR TRANSPLANTED HEART: ICD-10-CM

## 2022-04-25 DIAGNOSIS — I21.4 NSTEMI (NON-ST ELEVATED MYOCARDIAL INFARCTION): ICD-10-CM

## 2022-04-25 DIAGNOSIS — E78.00 HYPERCHOLESTEROLEMIA: ICD-10-CM

## 2022-04-25 DIAGNOSIS — R07.9 CHEST PAIN, UNSPECIFIED TYPE: ICD-10-CM

## 2022-04-25 DIAGNOSIS — R00.2 PALPITATIONS: ICD-10-CM

## 2022-04-25 LAB
AORTIC ROOT ANNULUS: 3.54 CM
ASCENDING AORTA: 3.52 CM
AV INDEX (PROSTH): 0.66
AV MEAN GRADIENT: 4 MMHG
AV PEAK GRADIENT: 7 MMHG
AV VALVE AREA: 2.09 CM2
AV VELOCITY RATIO: 0.58
BSA FOR ECHO PROCEDURE: 1.85 M2
CV ECHO LV RWT: 0.3 CM
DOP CALC AO PEAK VEL: 1.32 M/S
DOP CALC AO VTI: 30.1 CM
DOP CALC LVOT AREA: 3.2 CM2
DOP CALC LVOT DIAMETER: 2.01 CM
DOP CALC LVOT PEAK VEL: 0.77 M/S
DOP CALC LVOT STROKE VOLUME: 62.8 CM3
DOP CALCLVOT PEAK VEL VTI: 19.8 CM
E WAVE DECELERATION TIME: 276.79 MSEC
E/A RATIO: 0.47
ECHO EF ESTIMATED: 44 %
ECHO LV POSTERIOR WALL: 0.81 CM (ref 0.6–1.1)
EJECTION FRACTION: 35 %
FRACTIONAL SHORTENING: 22 % (ref 28–44)
INTERVENTRICULAR SEPTUM: 0.94 CM (ref 0.6–1.1)
IVRT: 114.18 MSEC
LA WIDTH: 5.29 CM
LEFT ATRIUM SIZE: 3.73 CM
LEFT ATRIUM VOLUME INDEX MOD: 7.8 ML/M2
LEFT ATRIUM VOLUME MOD: 14.42 CM3
LEFT INTERNAL DIMENSION IN SYSTOLE: 4.17 CM (ref 2.1–4)
LEFT VENTRICLE DIASTOLIC VOLUME INDEX: 74.43 ML/M2
LEFT VENTRICLE DIASTOLIC VOLUME: 137.7 ML
LEFT VENTRICLE MASS INDEX: 92 G/M2
LEFT VENTRICLE SYSTOLIC VOLUME INDEX: 41.7 ML/M2
LEFT VENTRICLE SYSTOLIC VOLUME: 77.06 ML
LEFT VENTRICULAR INTERNAL DIMENSION IN DIASTOLE: 5.34 CM (ref 3.5–6)
LEFT VENTRICULAR MASS: 170.43 G
LVOT MG: 1.15 MMHG
LVOT MV: 0.49 CM/S
MV PEAK A VEL: 1 M/S
MV PEAK E VEL: 0.47 M/S
MV STENOSIS PRESSURE HALF TIME: 80.27 MS
MV VALVE AREA P 1/2 METHOD: 2.74 CM2
PISA TR MAX VEL: 2.7 M/S
PULM VEIN S/D RATIO: 1.54
PV MV: 0.64 M/S
PV PEAK D VEL: 0.41 M/S
PV PEAK S VEL: 0.63 M/S
PV PEAK VELOCITY: 1.06 CM/S
RA MAJOR: 5.05 CM
RA PRESSURE: 3 MMHG
RIGHT VENTRICULAR END-DIASTOLIC DIMENSION: 2.84 CM
STJ: 3.38 CM
TR MAX PG: 29 MMHG
TV REST PULMONARY ARTERY PRESSURE: 32 MMHG

## 2022-04-25 PROCEDURE — 93306 TTE W/DOPPLER COMPLETE: CPT | Mod: 26,,, | Performed by: INTERNAL MEDICINE

## 2022-04-25 PROCEDURE — 93306 ECHO (CUPID ONLY): ICD-10-PCS | Mod: 26,,, | Performed by: INTERNAL MEDICINE

## 2022-04-25 PROCEDURE — 93306 TTE W/DOPPLER COMPLETE: CPT

## 2022-04-26 ENCOUNTER — HOSPITAL ENCOUNTER (INPATIENT)
Facility: HOSPITAL | Age: 72
LOS: 4 days | Discharge: HOME | DRG: 329 | End: 2022-05-01
Attending: EMERGENCY MEDICINE | Admitting: SURGERY
Payer: MEDICARE

## 2022-04-26 DIAGNOSIS — I21.4 NSTEMI (NON-ST ELEVATED MYOCARDIAL INFARCTION): ICD-10-CM

## 2022-04-26 DIAGNOSIS — I25.2 H/O NON-ST ELEVATION MYOCARDIAL INFARCTION (NSTEMI): ICD-10-CM

## 2022-04-26 DIAGNOSIS — E78.00 HYPERCHOLESTEROLEMIA: ICD-10-CM

## 2022-04-26 DIAGNOSIS — K56.2 VOLVULUS: Primary | ICD-10-CM

## 2022-04-26 DIAGNOSIS — I25.118 CORONARY ARTERY DISEASE OF NATIVE ARTERY OF NATIVE HEART WITH STABLE ANGINA PECTORIS: ICD-10-CM

## 2022-04-26 DIAGNOSIS — K56.2 CECAL VOLVULUS: ICD-10-CM

## 2022-04-26 DIAGNOSIS — I50.22 CHRONIC SYSTOLIC CHF (CONGESTIVE HEART FAILURE): ICD-10-CM

## 2022-04-26 LAB
BASOPHILS # BLD AUTO: 0.07 K/UL (ref 0–0.2)
BASOPHILS NFR BLD: 0.7 % (ref 0–1.9)
DIFFERENTIAL METHOD: ABNORMAL
EOSINOPHIL # BLD AUTO: 0.1 K/UL (ref 0–0.5)
EOSINOPHIL NFR BLD: 1.3 % (ref 0–8)
ERYTHROCYTE [DISTWIDTH] IN BLOOD BY AUTOMATED COUNT: 14.8 % (ref 11.5–14.5)
HCT VFR BLD AUTO: 41.7 % (ref 40–54)
HGB BLD-MCNC: 14.6 G/DL (ref 14–18)
IMM GRANULOCYTES # BLD AUTO: 0.03 K/UL (ref 0–0.04)
IMM GRANULOCYTES NFR BLD AUTO: 0.3 % (ref 0–0.5)
LYMPHOCYTES # BLD AUTO: 1.6 K/UL (ref 1–4.8)
LYMPHOCYTES NFR BLD: 16.1 % (ref 18–48)
MCH RBC QN AUTO: 31.1 PG (ref 27–31)
MCHC RBC AUTO-ENTMCNC: 35 G/DL (ref 32–36)
MCV RBC AUTO: 89 FL (ref 82–98)
MONOCYTES # BLD AUTO: 1 K/UL (ref 0.3–1)
MONOCYTES NFR BLD: 9.7 % (ref 4–15)
NEUTROPHILS # BLD AUTO: 7.2 K/UL (ref 1.8–7.7)
NEUTROPHILS NFR BLD: 71.9 % (ref 38–73)
NRBC BLD-RTO: 0 /100 WBC
PLATELET # BLD AUTO: 164 K/UL (ref 150–450)
PMV BLD AUTO: 9.5 FL (ref 9.2–12.9)
RBC # BLD AUTO: 4.69 M/UL (ref 4.6–6.2)
WBC # BLD AUTO: 10.01 K/UL (ref 3.9–12.7)

## 2022-04-26 PROCEDURE — 80053 COMPREHEN METABOLIC PANEL: CPT | Performed by: EMERGENCY MEDICINE

## 2022-04-26 PROCEDURE — 25000003 PHARM REV CODE 250: Performed by: EMERGENCY MEDICINE

## 2022-04-26 PROCEDURE — 83690 ASSAY OF LIPASE: CPT | Performed by: EMERGENCY MEDICINE

## 2022-04-26 PROCEDURE — 99285 EMERGENCY DEPT VISIT HI MDM: CPT | Mod: 25

## 2022-04-26 PROCEDURE — 85025 COMPLETE CBC W/AUTO DIFF WBC: CPT | Performed by: EMERGENCY MEDICINE

## 2022-04-26 RX ORDER — LIDOCAINE HYDROCHLORIDE 20 MG/ML
10 SOLUTION OROPHARYNGEAL ONCE
Status: DISCONTINUED | OUTPATIENT
Start: 2022-04-27 | End: 2022-04-26

## 2022-04-26 RX ORDER — FAMOTIDINE 20 MG/1
20 TABLET, FILM COATED ORAL
Status: COMPLETED | OUTPATIENT
Start: 2022-04-26 | End: 2022-04-26

## 2022-04-26 RX ORDER — MAG HYDROX/ALUMINUM HYD/SIMETH 200-200-20
30 SUSPENSION, ORAL (FINAL DOSE FORM) ORAL ONCE
Status: COMPLETED | OUTPATIENT
Start: 2022-04-26 | End: 2022-04-26

## 2022-04-26 RX ORDER — MAG HYDROX/ALUMINUM HYD/SIMETH 200-200-20
30 SUSPENSION, ORAL (FINAL DOSE FORM) ORAL ONCE
Status: DISCONTINUED | OUTPATIENT
Start: 2022-04-26 | End: 2022-04-26

## 2022-04-26 RX ORDER — LIDOCAINE HYDROCHLORIDE 20 MG/ML
10 SOLUTION OROPHARYNGEAL ONCE
Status: COMPLETED | OUTPATIENT
Start: 2022-04-26 | End: 2022-04-26

## 2022-04-26 RX ORDER — MAG HYDROX/ALUMINUM HYD/SIMETH 200-200-20
30 SUSPENSION, ORAL (FINAL DOSE FORM) ORAL ONCE
Status: DISCONTINUED | OUTPATIENT
Start: 2022-04-27 | End: 2022-04-26

## 2022-04-26 RX ADMIN — MAGNESIUM HYDROXIDE/ALUMINUM HYDROXICE/SIMETHICONE 30 ML: 120; 1200; 1200 SUSPENSION ORAL at 11:04

## 2022-04-26 RX ADMIN — LIDOCAINE HYDROCHLORIDE 10 ML: 20 SOLUTION ORAL; TOPICAL at 11:04

## 2022-04-26 RX ADMIN — FAMOTIDINE 20 MG: 20 TABLET, FILM COATED ORAL at 11:04

## 2022-04-27 ENCOUNTER — ANESTHESIA EVENT (OUTPATIENT)
Dept: SURGERY | Facility: HOSPITAL | Age: 72
DRG: 329 | End: 2022-04-27
Payer: MEDICARE

## 2022-04-27 ENCOUNTER — ANESTHESIA (OUTPATIENT)
Dept: SURGERY | Facility: HOSPITAL | Age: 72
DRG: 329 | End: 2022-04-27
Payer: MEDICARE

## 2022-04-27 PROBLEM — K56.2 CECAL VOLVULUS: Status: ACTIVE | Noted: 2022-04-27

## 2022-04-27 LAB
ABO + RH BLD: NORMAL
ALBUMIN SERPL BCP-MCNC: 3.8 G/DL (ref 3.5–5.2)
ALP SERPL-CCNC: 55 U/L (ref 55–135)
ALT SERPL W/O P-5'-P-CCNC: 17 U/L (ref 10–44)
ANION GAP SERPL CALC-SCNC: 13 MMOL/L (ref 8–16)
AST SERPL-CCNC: 18 U/L (ref 10–40)
BILIRUB SERPL-MCNC: 0.6 MG/DL (ref 0.1–1)
BLD GP AB SCN CELLS X3 SERPL QL: NORMAL
BUN SERPL-MCNC: 20 MG/DL (ref 8–23)
CALCIUM SERPL-MCNC: 9.6 MG/DL (ref 8.7–10.5)
CHLORIDE SERPL-SCNC: 104 MMOL/L (ref 95–110)
CO2 SERPL-SCNC: 25 MMOL/L (ref 23–29)
CREAT SERPL-MCNC: 1.2 MG/DL (ref 0.5–1.4)
EST. GFR  (AFRICAN AMERICAN): >60 ML/MIN/1.73 M^2
EST. GFR  (NON AFRICAN AMERICAN): >60 ML/MIN/1.73 M^2
GLUCOSE SERPL-MCNC: 99 MG/DL (ref 70–110)
LIPASE SERPL-CCNC: 35 U/L (ref 4–60)
POTASSIUM SERPL-SCNC: 3.9 MMOL/L (ref 3.5–5.1)
PROT SERPL-MCNC: 5.9 G/DL (ref 6–8.4)
SARS-COV-2 RDRP RESP QL NAA+PROBE: NEGATIVE
SODIUM SERPL-SCNC: 142 MMOL/L (ref 136–145)

## 2022-04-27 PROCEDURE — 37000009 HC ANESTHESIA EA ADD 15 MINS: Performed by: SURGERY

## 2022-04-27 PROCEDURE — 63600175 PHARM REV CODE 636 W HCPCS: Performed by: NURSE ANESTHETIST, CERTIFIED REGISTERED

## 2022-04-27 PROCEDURE — 27201423 OPTIME MED/SURG SUP & DEVICES STERILE SUPPLY: Performed by: SURGERY

## 2022-04-27 PROCEDURE — 88307 TISSUE EXAM BY PATHOLOGIST: CPT | Mod: 26,,, | Performed by: PATHOLOGY

## 2022-04-27 PROCEDURE — S0030 INJECTION, METRONIDAZOLE: HCPCS | Performed by: NURSE ANESTHETIST, CERTIFIED REGISTERED

## 2022-04-27 PROCEDURE — 44160 REMOVAL OF COLON: CPT | Mod: ,,, | Performed by: SURGERY

## 2022-04-27 PROCEDURE — 88307 PR  SURG PATH,LEVEL V: ICD-10-PCS | Mod: 26,,, | Performed by: PATHOLOGY

## 2022-04-27 PROCEDURE — 11000001 HC ACUTE MED/SURG PRIVATE ROOM

## 2022-04-27 PROCEDURE — 25000003 PHARM REV CODE 250: Performed by: SURGERY

## 2022-04-27 PROCEDURE — 25000003 PHARM REV CODE 250: Performed by: EMERGENCY MEDICINE

## 2022-04-27 PROCEDURE — 96374 THER/PROPH/DIAG INJ IV PUSH: CPT

## 2022-04-27 PROCEDURE — C9290 INJ, BUPIVACAINE LIPOSOME: HCPCS | Performed by: SURGERY

## 2022-04-27 PROCEDURE — 21400001 HC TELEMETRY ROOM

## 2022-04-27 PROCEDURE — 25000003 PHARM REV CODE 250: Performed by: NURSE ANESTHETIST, CERTIFIED REGISTERED

## 2022-04-27 PROCEDURE — 36000708 HC OR TIME LEV III 1ST 15 MIN: Performed by: SURGERY

## 2022-04-27 PROCEDURE — 63600175 PHARM REV CODE 636 W HCPCS: Performed by: EMERGENCY MEDICINE

## 2022-04-27 PROCEDURE — 99223 PR INITIAL HOSPITAL CARE,LEVL III: ICD-10-PCS | Mod: 57,AI,, | Performed by: SURGERY

## 2022-04-27 PROCEDURE — 99223 1ST HOSP IP/OBS HIGH 75: CPT | Mod: 57,AI,, | Performed by: SURGERY

## 2022-04-27 PROCEDURE — U0002 COVID-19 LAB TEST NON-CDC: HCPCS | Performed by: EMERGENCY MEDICINE

## 2022-04-27 PROCEDURE — 86901 BLOOD TYPING SEROLOGIC RH(D): CPT | Performed by: SURGERY

## 2022-04-27 PROCEDURE — 94799 UNLISTED PULMONARY SVC/PX: CPT

## 2022-04-27 PROCEDURE — 37000008 HC ANESTHESIA 1ST 15 MINUTES: Performed by: SURGERY

## 2022-04-27 PROCEDURE — 88305 TISSUE EXAM BY PATHOLOGIST: ICD-10-PCS | Mod: 26,,, | Performed by: PATHOLOGY

## 2022-04-27 PROCEDURE — 63600175 PHARM REV CODE 636 W HCPCS: Performed by: SURGERY

## 2022-04-27 PROCEDURE — 71000033 HC RECOVERY, INTIAL HOUR: Performed by: SURGERY

## 2022-04-27 PROCEDURE — 36000709 HC OR TIME LEV III EA ADD 15 MIN: Performed by: SURGERY

## 2022-04-27 PROCEDURE — 88305 TISSUE EXAM BY PATHOLOGIST: CPT | Performed by: PATHOLOGY

## 2022-04-27 PROCEDURE — 36415 COLL VENOUS BLD VENIPUNCTURE: CPT | Performed by: SURGERY

## 2022-04-27 PROCEDURE — 88305 TISSUE EXAM BY PATHOLOGIST: CPT | Mod: 26,,, | Performed by: PATHOLOGY

## 2022-04-27 PROCEDURE — 44160 PR REMVL COLON & TERM ILEUM W/ILEOCOLOSTOMY: ICD-10-PCS | Mod: ,,, | Performed by: SURGERY

## 2022-04-27 PROCEDURE — 86920 COMPATIBILITY TEST SPIN: CPT | Performed by: FAMILY MEDICINE

## 2022-04-27 PROCEDURE — 88307 TISSUE EXAM BY PATHOLOGIST: CPT | Mod: 59 | Performed by: PATHOLOGY

## 2022-04-27 RX ORDER — ONDANSETRON 2 MG/ML
4 INJECTION INTRAMUSCULAR; INTRAVENOUS ONCE AS NEEDED
Status: DISCONTINUED | OUTPATIENT
Start: 2022-04-27 | End: 2022-04-27

## 2022-04-27 RX ORDER — HYDROCODONE BITARTRATE AND ACETAMINOPHEN 500; 5 MG/1; MG/1
TABLET ORAL
Status: DISCONTINUED | OUTPATIENT
Start: 2022-04-27 | End: 2022-04-27

## 2022-04-27 RX ORDER — HYDROCODONE BITARTRATE AND ACETAMINOPHEN 5; 325 MG/1; MG/1
1 TABLET ORAL
Status: DISCONTINUED | OUTPATIENT
Start: 2022-04-27 | End: 2022-04-27

## 2022-04-27 RX ORDER — ONDANSETRON 2 MG/ML
4 INJECTION INTRAMUSCULAR; INTRAVENOUS EVERY 12 HOURS PRN
Status: DISCONTINUED | OUTPATIENT
Start: 2022-04-27 | End: 2022-05-01 | Stop reason: HOSPADM

## 2022-04-27 RX ORDER — EPHEDRINE SULFATE 50 MG/ML
INJECTION, SOLUTION INTRAVENOUS
Status: DISCONTINUED | OUTPATIENT
Start: 2022-04-27 | End: 2022-04-27

## 2022-04-27 RX ORDER — SODIUM CHLORIDE, SODIUM LACTATE, POTASSIUM CHLORIDE, CALCIUM CHLORIDE 600; 310; 30; 20 MG/100ML; MG/100ML; MG/100ML; MG/100ML
INJECTION, SOLUTION INTRAVENOUS CONTINUOUS
Status: DISCONTINUED | OUTPATIENT
Start: 2022-04-27 | End: 2022-04-30

## 2022-04-27 RX ORDER — FENTANYL CITRATE 50 UG/ML
25 INJECTION, SOLUTION INTRAMUSCULAR; INTRAVENOUS EVERY 5 MIN PRN
Status: DISCONTINUED | OUTPATIENT
Start: 2022-04-27 | End: 2022-04-27

## 2022-04-27 RX ORDER — PANTOPRAZOLE SODIUM 40 MG/1
40 TABLET, DELAYED RELEASE ORAL DAILY
Status: DISCONTINUED | OUTPATIENT
Start: 2022-04-27 | End: 2022-05-01 | Stop reason: HOSPADM

## 2022-04-27 RX ORDER — AMOXICILLIN 250 MG
1 CAPSULE ORAL 2 TIMES DAILY
Status: DISCONTINUED | OUTPATIENT
Start: 2022-04-27 | End: 2022-05-01 | Stop reason: HOSPADM

## 2022-04-27 RX ORDER — CHLORHEXIDINE GLUCONATE ORAL RINSE 1.2 MG/ML
10 SOLUTION DENTAL 2 TIMES DAILY
Status: DISCONTINUED | OUTPATIENT
Start: 2022-04-27 | End: 2022-05-01 | Stop reason: HOSPADM

## 2022-04-27 RX ORDER — SODIUM CHLORIDE 0.9 % (FLUSH) 0.9 %
10 SYRINGE (ML) INJECTION
Status: DISCONTINUED | OUTPATIENT
Start: 2022-04-27 | End: 2022-05-01 | Stop reason: HOSPADM

## 2022-04-27 RX ORDER — ACETAMINOPHEN 325 MG/1
650 TABLET ORAL EVERY 8 HOURS PRN
Status: DISCONTINUED | OUTPATIENT
Start: 2022-04-27 | End: 2022-05-01 | Stop reason: HOSPADM

## 2022-04-27 RX ORDER — MORPHINE SULFATE 2 MG/ML
2 INJECTION, SOLUTION INTRAMUSCULAR; INTRAVENOUS
Status: DISCONTINUED | OUTPATIENT
Start: 2022-04-27 | End: 2022-05-01 | Stop reason: HOSPADM

## 2022-04-27 RX ORDER — ACETAMINOPHEN 325 MG/1
650 TABLET ORAL EVERY 6 HOURS PRN
Status: DISCONTINUED | OUTPATIENT
Start: 2022-04-27 | End: 2022-05-01 | Stop reason: HOSPADM

## 2022-04-27 RX ORDER — CLONIDINE HYDROCHLORIDE 0.1 MG/1
0.1 TABLET ORAL EVERY 6 HOURS PRN
Status: DISCONTINUED | OUTPATIENT
Start: 2022-04-27 | End: 2022-05-01 | Stop reason: HOSPADM

## 2022-04-27 RX ORDER — METRONIDAZOLE 500 MG/100ML
INJECTION, SOLUTION INTRAVENOUS
Status: DISCONTINUED | OUTPATIENT
Start: 2022-04-27 | End: 2022-04-27

## 2022-04-27 RX ORDER — FAMOTIDINE 10 MG/ML
20 INJECTION INTRAVENOUS EVERY 12 HOURS
Status: DISCONTINUED | OUTPATIENT
Start: 2022-04-27 | End: 2022-05-01 | Stop reason: HOSPADM

## 2022-04-27 RX ORDER — TALC
9 POWDER (GRAM) TOPICAL NIGHTLY PRN
Status: DISCONTINUED | OUTPATIENT
Start: 2022-04-27 | End: 2022-05-01 | Stop reason: HOSPADM

## 2022-04-27 RX ORDER — KETOROLAC TROMETHAMINE 30 MG/ML
15 INJECTION, SOLUTION INTRAMUSCULAR; INTRAVENOUS
Status: COMPLETED | OUTPATIENT
Start: 2022-04-27 | End: 2022-04-27

## 2022-04-27 RX ORDER — SODIUM CHLORIDE 9 MG/ML
INJECTION, SOLUTION INTRAVENOUS CONTINUOUS
Status: DISCONTINUED | OUTPATIENT
Start: 2022-04-27 | End: 2022-04-27

## 2022-04-27 RX ORDER — TALC
6 POWDER (GRAM) TOPICAL NIGHTLY PRN
Status: DISCONTINUED | OUTPATIENT
Start: 2022-04-27 | End: 2022-04-27

## 2022-04-27 RX ORDER — DIPHENHYDRAMINE HYDROCHLORIDE 50 MG/ML
25 INJECTION INTRAMUSCULAR; INTRAVENOUS EVERY 6 HOURS PRN
Status: DISCONTINUED | OUTPATIENT
Start: 2022-04-27 | End: 2022-04-27

## 2022-04-27 RX ORDER — ROCURONIUM BROMIDE 10 MG/ML
INJECTION, SOLUTION INTRAVENOUS
Status: DISCONTINUED | OUTPATIENT
Start: 2022-04-27 | End: 2022-04-27

## 2022-04-27 RX ORDER — SODIUM CHLORIDE 9 MG/ML
1000 INJECTION, SOLUTION INTRAVENOUS
Status: COMPLETED | OUTPATIENT
Start: 2022-04-27 | End: 2022-04-27

## 2022-04-27 RX ORDER — FENTANYL CITRATE 50 UG/ML
INJECTION, SOLUTION INTRAMUSCULAR; INTRAVENOUS
Status: DISCONTINUED | OUTPATIENT
Start: 2022-04-27 | End: 2022-04-27

## 2022-04-27 RX ORDER — DEXAMETHASONE SODIUM PHOSPHATE 4 MG/ML
INJECTION, SOLUTION INTRA-ARTICULAR; INTRALESIONAL; INTRAMUSCULAR; INTRAVENOUS; SOFT TISSUE
Status: DISCONTINUED | OUTPATIENT
Start: 2022-04-27 | End: 2022-04-27

## 2022-04-27 RX ORDER — OXYCODONE HYDROCHLORIDE 5 MG/1
10 TABLET ORAL EVERY 4 HOURS PRN
Status: DISCONTINUED | OUTPATIENT
Start: 2022-04-27 | End: 2022-05-01 | Stop reason: HOSPADM

## 2022-04-27 RX ORDER — ONDANSETRON 2 MG/ML
4 INJECTION INTRAMUSCULAR; INTRAVENOUS
Status: COMPLETED | OUTPATIENT
Start: 2022-04-27 | End: 2022-04-27

## 2022-04-27 RX ORDER — ALBUTEROL SULFATE 0.83 MG/ML
2.5 SOLUTION RESPIRATORY (INHALATION) EVERY 4 HOURS PRN
Status: DISCONTINUED | OUTPATIENT
Start: 2022-04-27 | End: 2022-05-01 | Stop reason: HOSPADM

## 2022-04-27 RX ORDER — MORPHINE SULFATE 4 MG/ML
4 INJECTION, SOLUTION INTRAMUSCULAR; INTRAVENOUS
Status: COMPLETED | OUTPATIENT
Start: 2022-04-27 | End: 2022-04-27

## 2022-04-27 RX ORDER — MIDAZOLAM HYDROCHLORIDE 1 MG/ML
INJECTION, SOLUTION INTRAMUSCULAR; INTRAVENOUS
Status: DISCONTINUED | OUTPATIENT
Start: 2022-04-27 | End: 2022-04-27

## 2022-04-27 RX ORDER — ASPIRIN 81 MG/1
81 TABLET ORAL DAILY
Status: DISCONTINUED | OUTPATIENT
Start: 2022-04-27 | End: 2022-05-01 | Stop reason: HOSPADM

## 2022-04-27 RX ORDER — ENOXAPARIN SODIUM 100 MG/ML
40 INJECTION SUBCUTANEOUS EVERY 24 HOURS
Status: DISCONTINUED | OUTPATIENT
Start: 2022-04-27 | End: 2022-04-28

## 2022-04-27 RX ORDER — PROPOFOL 10 MG/ML
VIAL (ML) INTRAVENOUS
Status: DISCONTINUED | OUTPATIENT
Start: 2022-04-27 | End: 2022-04-27

## 2022-04-27 RX ORDER — ONDANSETRON 2 MG/ML
INJECTION INTRAMUSCULAR; INTRAVENOUS
Status: DISCONTINUED | OUTPATIENT
Start: 2022-04-27 | End: 2022-04-27

## 2022-04-27 RX ORDER — MORPHINE SULFATE 2 MG/ML
2 INJECTION, SOLUTION INTRAMUSCULAR; INTRAVENOUS EVERY 4 HOURS PRN
Status: DISCONTINUED | OUTPATIENT
Start: 2022-04-27 | End: 2022-05-01 | Stop reason: HOSPADM

## 2022-04-27 RX ORDER — BUPIVACAINE HYDROCHLORIDE 2.5 MG/ML
INJECTION, SOLUTION EPIDURAL; INFILTRATION; INTRACAUDAL
Status: DISCONTINUED | OUTPATIENT
Start: 2022-04-27 | End: 2022-04-27 | Stop reason: HOSPADM

## 2022-04-27 RX ORDER — MORPHINE SULFATE 4 MG/ML
4 INJECTION, SOLUTION INTRAMUSCULAR; INTRAVENOUS EVERY 4 HOURS PRN
Status: DISCONTINUED | OUTPATIENT
Start: 2022-04-27 | End: 2022-05-01 | Stop reason: HOSPADM

## 2022-04-27 RX ORDER — ONDANSETRON 2 MG/ML
4 INJECTION INTRAMUSCULAR; INTRAVENOUS EVERY 12 HOURS PRN
Status: DISCONTINUED | OUTPATIENT
Start: 2022-04-27 | End: 2022-04-27

## 2022-04-27 RX ORDER — DIPHENHYDRAMINE HYDROCHLORIDE 50 MG/ML
25 INJECTION INTRAMUSCULAR; INTRAVENOUS EVERY 4 HOURS PRN
Status: DISCONTINUED | OUTPATIENT
Start: 2022-04-27 | End: 2022-05-01 | Stop reason: HOSPADM

## 2022-04-27 RX ORDER — LIDOCAINE HYDROCHLORIDE 10 MG/ML
INJECTION, SOLUTION EPIDURAL; INFILTRATION; INTRACAUDAL; PERINEURAL
Status: DISCONTINUED | OUTPATIENT
Start: 2022-04-27 | End: 2022-04-27

## 2022-04-27 RX ADMIN — EPHEDRINE SULFATE 10 MG: 50 INJECTION INTRAVENOUS at 07:04

## 2022-04-27 RX ADMIN — EPHEDRINE SULFATE 5 MG: 50 INJECTION INTRAVENOUS at 06:04

## 2022-04-27 RX ADMIN — ENOXAPARIN SODIUM 40 MG: 40 INJECTION SUBCUTANEOUS at 04:04

## 2022-04-27 RX ADMIN — CEFTRIAXONE SODIUM 2 G: 1 INJECTION, SOLUTION INTRAVENOUS at 05:04

## 2022-04-27 RX ADMIN — FENTANYL CITRATE 50 MCG: 50 INJECTION, SOLUTION INTRAMUSCULAR; INTRAVENOUS at 06:04

## 2022-04-27 RX ADMIN — METRONIDAZOLE 500 MG: 500 SOLUTION INTRAVENOUS at 05:04

## 2022-04-27 RX ADMIN — MORPHINE SULFATE 4 MG: 4 INJECTION INTRAVENOUS at 02:04

## 2022-04-27 RX ADMIN — DEXAMETHASONE SODIUM PHOSPHATE 8 MG: 4 INJECTION, SOLUTION INTRAMUSCULAR; INTRAVENOUS at 06:04

## 2022-04-27 RX ADMIN — SODIUM CHLORIDE 1000 ML: 0.9 INJECTION, SOLUTION INTRAVENOUS at 02:04

## 2022-04-27 RX ADMIN — FAMOTIDINE 20 MG: 10 INJECTION, SOLUTION INTRAVENOUS at 09:04

## 2022-04-27 RX ADMIN — MIDAZOLAM 2 MG: 1 INJECTION INTRAMUSCULAR; INTRAVENOUS at 05:04

## 2022-04-27 RX ADMIN — EPHEDRINE SULFATE 5 MG: 50 INJECTION INTRAVENOUS at 07:04

## 2022-04-27 RX ADMIN — ASPIRIN 81 MG: 81 TABLET, COATED ORAL at 04:04

## 2022-04-27 RX ADMIN — EPHEDRINE SULFATE 10 MG: 50 INJECTION INTRAVENOUS at 06:04

## 2022-04-27 RX ADMIN — LIDOCAINE HYDROCHLORIDE 50 MG: 10 INJECTION, SOLUTION EPIDURAL; INFILTRATION; INTRACAUDAL; PERINEURAL at 06:04

## 2022-04-27 RX ADMIN — ONDANSETRON 4 MG: 2 INJECTION, SOLUTION INTRAMUSCULAR; INTRAVENOUS at 06:04

## 2022-04-27 RX ADMIN — CHLORHEXIDINE GLUCONATE 0.12% ORAL RINSE 10 ML: 1.2 LIQUID ORAL at 09:04

## 2022-04-27 RX ADMIN — SENNOSIDES AND DOCUSATE SODIUM 1 TABLET: 50; 8.6 TABLET ORAL at 09:04

## 2022-04-27 RX ADMIN — METOPROLOL SUCCINATE 12.5 MG: 25 TABLET, EXTENDED RELEASE ORAL at 09:04

## 2022-04-27 RX ADMIN — SODIUM CHLORIDE: 9 INJECTION, SOLUTION INTRAVENOUS at 07:04

## 2022-04-27 RX ADMIN — SUGAMMADEX 200 MG: 100 INJECTION, SOLUTION INTRAVENOUS at 07:04

## 2022-04-27 RX ADMIN — TICAGRELOR 90 MG: 90 TABLET ORAL at 09:04

## 2022-04-27 RX ADMIN — FENTANYL CITRATE 50 MCG: 50 INJECTION, SOLUTION INTRAMUSCULAR; INTRAVENOUS at 07:04

## 2022-04-27 RX ADMIN — PROPOFOL 100 MG: 10 INJECTION, EMULSION INTRAVENOUS at 06:04

## 2022-04-27 RX ADMIN — ROCURONIUM BROMIDE 10 MG: 10 INJECTION, SOLUTION INTRAVENOUS at 07:04

## 2022-04-27 RX ADMIN — SODIUM CHLORIDE, SODIUM LACTATE, POTASSIUM CHLORIDE, AND CALCIUM CHLORIDE: .6; .31; .03; .02 INJECTION, SOLUTION INTRAVENOUS at 05:04

## 2022-04-27 RX ADMIN — SODIUM CHLORIDE, SODIUM LACTATE, POTASSIUM CHLORIDE, AND CALCIUM CHLORIDE: .6; .31; .03; .02 INJECTION, SOLUTION INTRAVENOUS at 10:04

## 2022-04-27 RX ADMIN — KETOROLAC TROMETHAMINE 15 MG: 30 INJECTION, SOLUTION INTRAMUSCULAR at 12:04

## 2022-04-27 RX ADMIN — SODIUM CHLORIDE, SODIUM LACTATE, POTASSIUM CHLORIDE, AND CALCIUM CHLORIDE: .6; .31; .03; .02 INJECTION, SOLUTION INTRAVENOUS at 09:04

## 2022-04-27 RX ADMIN — ONDANSETRON 4 MG: 2 INJECTION INTRAMUSCULAR; INTRAVENOUS at 02:04

## 2022-04-27 RX ADMIN — PANTOPRAZOLE SODIUM 40 MG: 40 TABLET, DELAYED RELEASE ORAL at 09:04

## 2022-04-27 RX ADMIN — ROCURONIUM BROMIDE 50 MG: 10 INJECTION, SOLUTION INTRAVENOUS at 06:04

## 2022-04-27 NOTE — HPI
Mekhi Lambert is a 71-year-old male with PMHX of Cancer, Long-term anticoagulation, CHF, CAD, Digestive disorder, and MI (2/2022) who was referred to General Surgery for volvulus.  Patient reports epigastric abdominal discomfort with abdominal bloating. Associated symptoms include a decreased sensation and difficulty with needing to have a bowel movement for the past few months.  He also reports intentional weight loss of approximately 30 lbs.  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 Generally visceral aneurysms are treated greater than 2 cm. ER discussed findings with General Surgery and pt taken to OR for Exploratory laparotomy with Right hemicolectomy mobilization of hepatic flexure. Pt reports no immediate complication. Hospital Medicine contacted for medical management post procedure. Pt admitted to Medical Surgical Unit for further evaluation.

## 2022-04-27 NOTE — ASSESSMENT & PLAN NOTE
-General Surgery- primary team  -s/p Exploratory laparotomy- Right hemicolectomy mobilization of hepatic flexure  -gentle hydration   -clear liquid diet   -analgesia as needed   -antiemetics as needed

## 2022-04-27 NOTE — ANESTHESIA PROCEDURE NOTES
Intubation    Date/Time: 4/27/2022 6:08 AM  Performed by: Olive Mead CRNA  Authorized by: Day Monique MD     Intubation:     Induction:  Intravenous    Intubated:  Postinduction    Mask Ventilation:  Easy mask    Attempts:  1    Attempted By:  CRNA    Method of Intubation:  Direct    Blade:  Mk 3    Laryngeal View Grade: Grade I - full view of cords      Difficult Airway Encountered?: No      Complications:  None    Airway Device:  Oral endotracheal tube    Airway Device Size:  8.0    Style/Cuff Inflation:  Cuffed (inflated to minimal occlusive pressure)    Tube secured:  21    Secured at:  The lips    Placement Verified By:  Capnometry and Revisualization with laryngoscopy    Complicating Factors:  None    Findings Post-Intubation:  BS equal bilateral

## 2022-04-27 NOTE — ED PROVIDER NOTES
SCRIBE #1 NOTE: I, Mackenzie Foster, am scribing for, and in the presence of, Arturo Coronado Jr., MD. I have scribed the entire note.       History     Chief Complaint   Patient presents with    Abdominal Pain     Abdominal pain x1 day denies n/v/d     Review of patient's allergies indicates:   Allergen Reactions    Statins-hmg-coa reductase inhibitors Other (See Comments)     SEVERE MYALGIAS AND GI SIDE EFFECTS         History of Present Illness     HPI    4/26/2022, 11:32 PM  History obtained from the patient      History of Present Illness: Mekhi Lambert is a 71 y.o. male patient who presents to the Emergency Department for evaluation of generalized abdominal pain which onset gradually 1 day ago. Pt is on protonix once a day but did not take it yesterday, and now feels like he is having trouble with stomach acid. Symptoms are constant and moderate in severity. No mitigating or exacerbating factors reported. No associated sxs reported. Patient denies any n/v/d, constipation, blood in stool, dysuria, hematuria, fever, chills, and all other sxs at this time. Pt took pepto bismol with no relief. No further complaints or concerns at this time.       Arrival mode: Personal vehicle    PCP: ABIGAIL Lopez Jr, MD        Past Medical History:  Past Medical History:   Diagnosis Date    Anticoagulant long-term use     Arthritis     Cancer     Chronic systolic CHF (congestive heart failure) 02/26/2022    Coronary artery disease     Digestive disorder     Myocardial infarction     2/20/22       Past Surgical History:  Past Surgical History:   Procedure Laterality Date    CARDIAC CATHETERIZATION      COLONOSCOPY N/A 9/22/2021    Procedure: COLONOSCOPY;  Surgeon: Meaghan Jaime MD;  Location: [facility];  Service: Endoscopy;  Laterality: N/A;    CORONARY ANGIOGRAPHY INCLUDING BYPASS GRAFTS WITH CATHETERIZATION OF LEFT HEART N/A 3/3/2020    Procedure: ANGIOGRAM, CORONARY, INCLUDING BYPASS GRAFT, WITH LEFT  HEART CATHETERIZATION;  Surgeon: Ute Melchor MD;  Location: [facility] CATH LAB;  Service: Cardiology;  Laterality: N/A;    JOINT REPLACEMENT Right     knee    KNEE SURGERY      LEFT HEART CATHETERIZATION Left 2/22/2020    Procedure: CATHETERIZATION, HEART, LEFT;  Surgeon: Ute Melchor MD;  Location: [facility] CATH LAB;  Service: Cardiology;  Laterality: Left;    LEFT HEART CATHETERIZATION Left 2/26/2022    Procedure: CATHETERIZATION, HEART, LEFT;  Surgeon: Ute Melhcor MD;  Location: [facility] CATH LAB;  Service: Cardiology;  Laterality: Left;    PERCUTANEOUS TRANSLUMINAL BALLOON ANGIOPLASTY OF CORONARY ARTERY Left 2/22/2020    Procedure: Angioplasty-coronary;  Surgeon: Ute Melchor MD;  Location: [facility] CATH LAB;  Service: Cardiology;  Laterality: Left;    PERCUTANEOUS TRANSLUMINAL BALLOON ANGIOPLASTY OF CORONARY ARTERY  2/26/2022    Procedure: Angioplasty-coronary;  Surgeon: Ute Melchor MD;  Location: [facility] CATH LAB;  Service: Cardiology;;    SHOULDER ARTHROSCOPY Right     Dr Bella    simple prostatectomy  06/06/2016    robotic assisted partial prostectomy due to BPH    TOTAL KNEE ARTHROPLASTY           Family History:  Family History   Problem Relation Age of Onset    Heart disease Mother         lupus related    Diabetes Mother     Cancer Father         prostate, bone, lung, skin cancers all seperate types    Diabetes Maternal Aunt        Social History:  Social History     Tobacco Use    Smoking status: Never Smoker    Smokeless tobacco: Former User     Types: Chew     Quit date: 6/3/2004   Substance and Sexual Activity    Alcohol use: Yes     Alcohol/week: 2.0 standard drinks     Types: 2 Glasses of wine per week     Comment: per week    Drug use: No    Sexual activity: Not on file        Review of Systems     Review of Systems   Constitutional: Negative for chills and fever.   HENT: Negative for sore throat.    Respiratory: Negative for shortness of breath.    Cardiovascular: Negative for  chest pain.   Gastrointestinal: Positive for abdominal pain (generalized). Negative for blood in stool, constipation, diarrhea, nausea and vomiting.   Genitourinary: Negative for dysuria and hematuria.   Musculoskeletal: Negative for back pain.   Skin: Negative for rash.   Neurological: Negative for weakness.   Hematological: Does not bruise/bleed easily.   All other systems reviewed and are negative.     Physical Exam     Initial Vitals [04/26/22 2319]   BP Pulse Resp Temp SpO2   136/87 (!) 55 18 97.4 °F (36.3 °C) 100 %      MAP       --          Physical Exam  Nursing Notes and Vital Signs Reviewed.  Constitutional: Patient is in no acute distress. Well-developed and well-nourished.  Head: Atraumatic. Normocephalic.  Eyes:  EOM intact.  No scleral icterus.  ENT: Mucous membranes are moist.  Nares clear   Neck:  Full ROM. No JVD.  Cardiovascular: Regular rate. Regular rhythm No murmurs, rubs, or gallops. Distal pulses are 2+ and symmetric  Pulmonary/Chest: No respiratory distress. Clear to auscultation bilaterally. No wheezing or rales.  Equal chest wall rise bilaterally  Abdominal: Soft and non-distended.    Mild epigastric to right upper quadrant tenderness.  No guarding or rebound  Genitourinary: No CVA tenderness.  No suprapubic tenderness  Musculoskeletal: Moves all extremities. No obvious deformities.  5 x 5 strength in all extremities   Skin: Warm and dry.  Neurological:  Alert, awake, and appropriate.  Normal speech.  No acute focal neurological deficits are appreciated.  Two through 12 intact bilaterally.  Psychiatric: Normal affect. Good eye contact. Appropriate in content.     ED Course   Procedures  ED Vital Signs:  Vitals:    04/26/22 2319 04/27/22 0130 04/27/22 0255   BP: 136/87 126/78    Pulse: (!) 55 (!) 57    Resp: 18 18 18   Temp: 97.4 °F (36.3 °C)     TempSrc: Oral     SpO2: 100% 100%    Weight: 70.5 kg (155 lb 5 oz)         Abnormal Lab Results:  Labs Reviewed   CBC W/ AUTO DIFFERENTIAL -  Abnormal; Notable for the following components:       Result Value    MCH 31.1 (*)     RDW 14.8 (*)     Lymph % 16.1 (*)     All other components within normal limits   COMPREHENSIVE METABOLIC PANEL - Abnormal; Notable for the following components:    Total Protein 5.9 (*)     All other components within normal limits   LIPASE   SARS-COV-2 RNA AMPLIFICATION, QUAL        All Lab Results:  Results for orders placed or performed during the hospital encounter of 04/26/22   CBC auto differential   Result Value Ref Range    WBC 10.01 3.90 - 12.70 K/uL    RBC 4.69 4.60 - 6.20 M/uL    Hemoglobin 14.6 14.0 - 18.0 g/dL    Hematocrit 41.7 40.0 - 54.0 %    MCV 89 82 - 98 fL    MCH 31.1 (H) 27.0 - 31.0 pg    MCHC 35.0 32.0 - 36.0 g/dL    RDW 14.8 (H) 11.5 - 14.5 %    Platelets 164 150 - 450 K/uL    MPV 9.5 9.2 - 12.9 fL    Immature Granulocytes 0.3 0.0 - 0.5 %    Gran # (ANC) 7.2 1.8 - 7.7 K/uL    Immature Grans (Abs) 0.03 0.00 - 0.04 K/uL    Lymph # 1.6 1.0 - 4.8 K/uL    Mono # 1.0 0.3 - 1.0 K/uL    Eos # 0.1 0.0 - 0.5 K/uL    Baso # 0.07 0.00 - 0.20 K/uL    nRBC 0 0 /100 WBC    Gran % 71.9 38.0 - 73.0 %    Lymph % 16.1 (L) 18.0 - 48.0 %    Mono % 9.7 4.0 - 15.0 %    Eosinophil % 1.3 0.0 - 8.0 %    Basophil % 0.7 0.0 - 1.9 %    Differential Method Automated    Comprehensive metabolic panel   Result Value Ref Range    Sodium 142 136 - 145 mmol/L    Potassium 3.9 3.5 - 5.1 mmol/L    Chloride 104 95 - 110 mmol/L    CO2 25 23 - 29 mmol/L    Glucose 99 70 - 110 mg/dL    BUN 20 8 - 23 mg/dL    Creatinine 1.2 0.5 - 1.4 mg/dL    Calcium 9.6 8.7 - 10.5 mg/dL    Total Protein 5.9 (L) 6.0 - 8.4 g/dL    Albumin 3.8 3.5 - 5.2 g/dL    Total Bilirubin 0.6 0.1 - 1.0 mg/dL    Alkaline Phosphatase 55 55 - 135 U/L    AST 18 10 - 40 U/L    ALT 17 10 - 44 U/L    Anion Gap 13 8 - 16 mmol/L    eGFR if African American >60 >60 mL/min/1.73 m^2    eGFR if non African American >60 >60 mL/min/1.73 m^2   Lipase   Result Value Ref Range    Lipase 35 4 - 60  U/L       Imaging Results:  Imaging Results          CT Abdomen Pelvis  Without Contrast (In process)                2:23 AM: Per STAT radiology, pt's CT Abdomen Pelvis results: Markedly dilated loop of: in the upper abdomen. This is associated with marked distortion and swirling of the bowel and mesentery. This likely represents a volvulus involving the right colon.            The Emergency Provider reviewed the vital signs and test results, which are outlined above.     ED Discussion     2:28 AM: Discussed pt's case with Dr. Dominguez (general surgery) who recommends admit, make NPO, IVF, general surgery will see pt in a little while, and to see if the pt will need surgery.    2:33 AM: Discussed case with See Dominguez MD (general surgery). Dr. Dominguez agrees with current care and management of pt and accepts admission.   Admitting Service: general surgery  Admitting Physician: Dr. Dominguez  Admit to: inpatient med surg    2:33 AM: Re-evaluated pt. I have discussed test results, shared treatment plan, and the need for admission with patient and family at bedside. Pt and family express understanding at this time and agree with all information. All questions answered. Pt and family have no further questions or concerns at this time. Pt is ready for admit.       Medical Decision Making:   Clinical Tests:   Lab Tests: Ordered and Reviewed  Radiological Study: Ordered and Reviewed           ED Medication(s):  Medications   famotidine tablet 20 mg (20 mg Oral Given 4/26/22 2345)   aluminum-magnesium hydroxide-simethicone 200-200-20 mg/5 mL suspension 30 mL (30 mLs Oral Given 4/26/22 2348)     And   LIDOcaine HCl 2% oral solution 10 mL (10 mLs Oral Given 4/26/22 2348)   ketorolac injection 15 mg (15 mg Intravenous Given 4/27/22 0030)   ondansetron injection 4 mg (4 mg Intravenous Given 4/27/22 0254)   morphine injection 4 mg (4 mg Intravenous Given 4/27/22 0255)   0.9%  NaCl infusion (1,000 mLs Intravenous New Bag 4/27/22 0254)        New Prescriptions    No medications on file               Scribe Attestation:   Scribe #1: I performed the above scribed service and the documentation accurately describes the services I performed. I attest to the accuracy of the note.     Attending:   Physician Attestation Statement for Scribe #1: I, Arturo Coronado Jr., MD, personally performed the services described in this documentation, as scribed by Mackenzie Foster, in my presence, and it is both accurate and complete.           Clinical Impression       ICD-10-CM ICD-9-CM   1. Volvulus  K56.2 560.2       Disposition:   Disposition: Admitted  Condition: Fair         Arturo Coronado Jr., MD  04/27/22 0258

## 2022-04-27 NOTE — SUBJECTIVE & OBJECTIVE
Past Medical History:   Diagnosis Date    Anticoagulant long-term use     Arthritis     Cancer     Chronic systolic CHF (congestive heart failure) 02/26/2022    Coronary artery disease     Digestive disorder     Myocardial infarction     2/20/22       Past Surgical History:   Procedure Laterality Date    CARDIAC CATHETERIZATION      COLONOSCOPY N/A 9/22/2021    Procedure: COLONOSCOPY;  Surgeon: Meaghan Jaime MD;  Location: [facility] ENDO;  Service: Endoscopy;  Laterality: N/A;    CORONARY ANGIOGRAPHY INCLUDING BYPASS GRAFTS WITH CATHETERIZATION OF LEFT HEART N/A 3/3/2020    Procedure: ANGIOGRAM, CORONARY, INCLUDING BYPASS GRAFT, WITH LEFT HEART CATHETERIZATION;  Surgeon: Ute Melchor MD;  Location: [facility] CATH LAB;  Service: Cardiology;  Laterality: N/A;    JOINT REPLACEMENT Right     knee    KNEE SURGERY      LEFT HEART CATHETERIZATION Left 2/22/2020    Procedure: CATHETERIZATION, HEART, LEFT;  Surgeon: Ute Melchor MD;  Location: [facility] CATH LAB;  Service: Cardiology;  Laterality: Left;    LEFT HEART CATHETERIZATION Left 2/26/2022    Procedure: CATHETERIZATION, HEART, LEFT;  Surgeon: Ute Melchor MD;  Location: [facility] CATH LAB;  Service: Cardiology;  Laterality: Left;    PERCUTANEOUS TRANSLUMINAL BALLOON ANGIOPLASTY OF CORONARY ARTERY Left 2/22/2020    Procedure: Angioplasty-coronary;  Surgeon: Ute Melchor MD;  Location: [facility] CATH LAB;  Service: Cardiology;  Laterality: Left;    PERCUTANEOUS TRANSLUMINAL BALLOON ANGIOPLASTY OF CORONARY ARTERY  2/26/2022    Procedure: Angioplasty-coronary;  Surgeon: Ute Melchor MD;  Location: [facility] CATH LAB;  Service: Cardiology;;    SHOULDER ARTHROSCOPY Right     Dr Bella    simple prostatectomy  06/06/2016    robotic assisted partial prostectomy due to BPH    TOTAL KNEE ARTHROPLASTY         Review of patient's allergies indicates:   Allergen Reactions    Statins-hmg-coa reductase inhibitors Other (See Comments)     SEVERE MYALGIAS AND GI SIDE EFFECTS       No current  facility-administered medications on file prior to encounter.     Current Outpatient Medications on File Prior to Encounter   Medication Sig    aspirin (ECOTRIN) 81 MG EC tablet Take 1 tablet (81 mg total) by mouth once daily.    metoprolol succinate (TOPROL-XL) 25 MG 24 hr tablet Take 0.5 tablets (12.5 mg total) by mouth every evening.    mometasone 0.1% (ELOCON) 0.1 % cream AAA bid prn for psoriasis. Strong steroid.  Do not use on face, underarms or groin.    multivitamin capsule Take 1 capsule by mouth once daily.    nitroGLYCERIN (NITROSTAT) 0.4 MG SL tablet Place 1 tablet (0.4 mg total) under the tongue every 5 (five) minutes as needed for Chest pain.    pantoprazole (PROTONIX) 40 MG tablet Take 1 tablet (40 mg total) by mouth once daily.    ranolazine (RANEXA) 500 MG Tb12 Take 1 tablet (500 mg total) by mouth 2 (two) times daily.    ticagrelor (BRILINTA) 90 mg tablet Take 1 tablet (90 mg total) by mouth 2 (two) times a day.     Family History       Problem Relation (Age of Onset)    Cancer Father    Diabetes Mother, Maternal Aunt    Heart disease Mother          Tobacco Use    Smoking status: Never Smoker    Smokeless tobacco: Former User     Types: Chew     Quit date: 6/3/2004   Substance and Sexual Activity    Alcohol use: Yes     Alcohol/week: 2.0 standard drinks     Types: 2 Glasses of wine per week     Comment: per week    Drug use: No    Sexual activity: Not on file     Review of Systems   Constitutional:  Positive for activity change, appetite change and unexpected weight change (30#-intentional). Negative for chills and fatigue.   HENT:  Negative for congestion, postnasal drip, sinus pressure, sore throat and trouble swallowing.    Respiratory:  Negative for cough, shortness of breath and wheezing.    Cardiovascular:  Negative for chest pain, palpitations and leg swelling.   Gastrointestinal:  Positive for abdominal distention and abdominal pain. Negative for diarrhea, nausea and vomiting.         Decreased bowel sensation    Genitourinary:  Negative for difficulty urinating, dysuria, frequency and urgency.   Musculoskeletal:  Negative for arthralgias, back pain, myalgias and neck pain.   Skin:  Negative for color change and wound.   Neurological:  Negative for dizziness, weakness and headaches.   Psychiatric/Behavioral:  Negative for agitation, confusion and sleep disturbance. The patient is not nervous/anxious.    Objective:     Vital Signs (Most Recent):  Temp: 97.3 °F (36.3 °C) (04/27/22 1254)  Pulse: (!) 55 (04/27/22 1254)  Resp: 18 (04/27/22 1254)  BP: 110/63 (04/27/22 1254)  SpO2: 98 % (04/27/22 1254)   Vital Signs (24h Range):  Temp:  [96.7 °F (35.9 °C)-97.5 °F (36.4 °C)] 97.3 °F (36.3 °C)  Pulse:  [51-64] 55  Resp:  [10-18] 18  SpO2:  [98 %-100 %] 98 %  BP: (110-145)/(63-87) 110/63     Weight: 70.5 kg (155 lb 5 oz)  Body mass index is 23.62 kg/m².    Physical Exam  HENT:      Head: Normocephalic.      Nose: Nose normal.      Mouth/Throat:      Mouth: Mucous membranes are dry.      Pharynx: Oropharynx is clear.   Cardiovascular:      Rate and Rhythm: Normal rate and regular rhythm.      Pulses: Normal pulses.      Heart sounds: Normal heart sounds.   Pulmonary:      Effort: Pulmonary effort is normal.      Breath sounds: Normal breath sounds.   Abdominal:      General: There is no distension.      Palpations: Abdomen is soft.      Tenderness: There is abdominal tenderness (at surgical site).   Genitourinary:     Comments: Deferred   Musculoskeletal:         General: Normal range of motion.      Cervical back: Normal range of motion and neck supple.   Skin:     General: Skin is warm and dry.      Capillary Refill: Capillary refill takes less than 2 seconds.      Comments: Midsternal incision with liquid dressing intact and edges well-approximated    Neurological:      Mental Status: He is alert and oriented to person, place, and time.   Psychiatric:         Mood and Affect: Mood normal.          Behavior: Behavior normal.       Significant Labs: All pertinent labs within the past 24 hours have been reviewed.  CBC:   Recent Labs   Lab 04/26/22 2344   WBC 10.01   HGB 14.6   HCT 41.7        CMP:   Recent Labs   Lab 04/26/22 2344      K 3.9      CO2 25   GLU 99   BUN 20   CREATININE 1.2   CALCIUM 9.6   PROT 5.9*   ALBUMIN 3.8   BILITOT 0.6   ALKPHOS 55   AST 18   ALT 17   ANIONGAP 13   EGFRNONAA >60       Significant Imaging: I have reviewed all pertinent imaging results/findings within the past 24 hours.    Imaging Results              CT Abdomen Pelvis  Without Contrast (Final result)  Result time 04/27/22 08:53:44      Final result by Kayden Yañez MD (04/27/22 08:53:44)                   Impression:      Findings consistent with cecal volvulus.  Findings were reported by [facility] radiologist at 02:16.    19 mm splenic artery aneurysm noted which is slightly larger than was seen on prior exam 06/22/2020. Generally visceral aneurysms are treated greater than 2 cm. Recommend annual surveillance imaging.    All CT scans at this facility use dose modulation, iterative reconstruction, and/or weight based dosing when appropriate to reduce radiation dose to as low as reasonable achievable.      Electronically signed by: Kayden Yañez MD  Date:    04/27/2022  Time:    08:53               Narrative:    EXAMINATION:  CT ABDOMEN PELVIS WITHOUT CONTRAST    CLINICAL HISTORY:  Abdominal abscess/infection suspected;right upper quadrant pain;    TECHNIQUE:  Low dose axial images, sagittal and coronal reformations were obtained from the lung bases to the pubic symphysis.  Oral contrast was not administered.    COMPARISON:  08/31/2021    FINDINGS:  Heart: Cardiomegaly.  Trace effusion.    Lung Bases: Clear.    Liver: Normal size and attenuation. No focal lesions. Small amount of perihepatic ascites.    Gallbladder: No calcified gallstones.    Bile Ducts: Common bile duct is prominent measuring dilated  measuring up to 9 mm.   no significant intrahepatic ductal dilatation .  Correlation with LFTs.    Pancreas: No obvious mass. No peripancreatic fat stranding.    Spleen: Normal.    Adrenals: Normal.    Kidneys/Ureters: No mass, hydroureteronephrosis, or nephroureterolithiasis.  Punctate nonobstructing stone upper and lower pole left kidney measuring up to 4 mm.  Punctate 2 mm stone lower pole right kidney.  Small left-sided parapelvic cysts..    Bladder: No wall thickening.    Reproductive organs: Benign appearing prostate calcifications.  Patient appears to be status post TURP procedure.  Mild irregularity within the central prostate.  Prostate is not significantly enlarged.  Correlation with PSA is recommended.    GI Tract/Mesentery: Small hiatal hernia.  Mild thickening at the GE junction could reflect reflux disease.  The cecum dilated measuring up to 7 mm in diameter and is located within the mid abdomen consistent with cecal volvulus.  Terminal ileum is tethered to the cecum in abnormal configuration.  Shotty nodes are seen within the mesentery mild mesenteric edema also noted.  Diverticulosis of the descending and sigmoid colon without evidence of diverticulitis.    Peritoneal Space: Scattered minimal ascites.  No free air.    Retroperitoneum: No significant adenopathy.    Abdominal wall: Normal.    Vasculature: No aneurysm.  Aorta demonstrates atherosclerotic disease.  19 mm splenic artery aneurysm noted which is slightly larger than was seen on prior exam 06/22/2020.  Generally visceral aneurysms are treated greater than 2 cm.  Recommend annual surveillance imaging.    Bones: No acute fracture. No suspicious lytic or sclerotic lesions.

## 2022-04-27 NOTE — ASSESSMENT & PLAN NOTE
-recent in 2/2022  -ASA, Lopressor, and Brilinta continued   -Echo on 4/25/22 showed   · left ventricle is normal in size with moderately decreased systolic function.  · The estimated ejection fraction is 35%.  · Grade I left ventricular diastolic dysfunction.  · There are segmental left ventricular wall motion abnormalities.  · Mild mitral regurgitation.  · Normal central venous pressure (3 mmHg).  · The estimated PA systolic pressure is 32 mmHg.  · With low normal right ventricular systolic function.    -higher risk for bleeding post procedure discussed with patient per General Surgery   -will monitor H/H and transfuse as needed   -will resume Ranexa when appropriate

## 2022-04-27 NOTE — CONSULTS
[facility] Medicine  Consult Note    Patient Name: Mekhi Lambert  MRN: [id number]  Admission Date: 4/26/2022  Hospital Length of Stay: 0 days  Attending Physician: See Dominguez MD   Primary Care Provider: ABIGAIL Lopez Jr, MD           Patient information was obtained from patient, past medical records and ER records.     Consults  Subjective:     Principal Problem: Cecal volvulus    Chief Complaint:   Chief Complaint   Patient presents with    Abdominal Pain     Abdominal pain x1 day denies n/v/d        HPI: Mekhi Lambert is a 71-year-old male with PMHX of Cancer, Long-term anticoagulation, CHF, CAD, Digestive disorder, and MI (2/2022) who was referred to General Surgery for volvulus.  Patient reports epigastric abdominal discomfort with abdominal bloating. Associated symptoms include a decreased sensation and difficulty with needing to have a bowel movement for the past few months.  He also reports intentional weight loss of approximately 30 lbs.  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 Generally visceral aneurysms are treated greater than 2 cm. ER discussed findings with General Surgery and pt taken to OR for Exploratory laparotomy with Right hemicolectomy mobilization of hepatic flexure. Pt reports no immediate complication. Hospital Medicine contacted for medical management post procedure. Pt admitted to Medical Surgical Unit for further evaluation.          Past Medical History:   Diagnosis Date    Anticoagulant long-term use     Arthritis     Cancer     Chronic systolic CHF (congestive heart failure) 02/26/2022    Coronary artery disease     Digestive disorder     Myocardial infarction     2/20/22       Past Surgical History:   Procedure Laterality Date    CARDIAC CATHETERIZATION      COLONOSCOPY N/A 9/22/2021    Procedure: COLONOSCOPY;  Surgeon: Meaghan Jaime MD;   Location: [facility] ENDO;  Service: Endoscopy;  Laterality: N/A;    CORONARY ANGIOGRAPHY INCLUDING BYPASS GRAFTS WITH CATHETERIZATION OF LEFT HEART N/A 3/3/2020    Procedure: ANGIOGRAM, CORONARY, INCLUDING BYPASS GRAFT, WITH LEFT HEART CATHETERIZATION;  Surgeon: Ute Melchor MD;  Location: [facility] CATH LAB;  Service: Cardiology;  Laterality: N/A;    JOINT REPLACEMENT Right     knee    KNEE SURGERY      LEFT HEART CATHETERIZATION Left 2/22/2020    Procedure: CATHETERIZATION, HEART, LEFT;  Surgeon: Ute Melchor MD;  Location: [facility] CATH LAB;  Service: Cardiology;  Laterality: Left;    LEFT HEART CATHETERIZATION Left 2/26/2022    Procedure: CATHETERIZATION, HEART, LEFT;  Surgeon: Ute Melchor MD;  Location: [facility] CATH LAB;  Service: Cardiology;  Laterality: Left;    PERCUTANEOUS TRANSLUMINAL BALLOON ANGIOPLASTY OF CORONARY ARTERY Left 2/22/2020    Procedure: Angioplasty-coronary;  Surgeon: Ute Melchor MD;  Location: [facility] CATH LAB;  Service: Cardiology;  Laterality: Left;    PERCUTANEOUS TRANSLUMINAL BALLOON ANGIOPLASTY OF CORONARY ARTERY  2/26/2022    Procedure: Angioplasty-coronary;  Surgeon: Ute Melchor MD;  Location: [facility] CATH LAB;  Service: Cardiology;;    SHOULDER ARTHROSCOPY Right     Dr Bella    simple prostatectomy  06/06/2016    robotic assisted partial prostectomy due to BPH    TOTAL KNEE ARTHROPLASTY         Review of patient's allergies indicates:   Allergen Reactions    Statins-hmg-coa reductase inhibitors Other (See Comments)     SEVERE MYALGIAS AND GI SIDE EFFECTS       No current facility-administered medications on file prior to encounter.     Current Outpatient Medications on File Prior to Encounter   Medication Sig    aspirin (ECOTRIN) 81 MG EC tablet Take 1 tablet (81 mg total) by mouth once daily.    metoprolol succinate (TOPROL-XL) 25 MG 24 hr tablet Take 0.5 tablets (12.5 mg total) by mouth every evening.    mometasone 0.1% (ELOCON) 0.1 % cream AAA bid prn for psoriasis.  Strong steroid.  Do not use on face, underarms or groin.    multivitamin capsule Take 1 capsule by mouth once daily.    nitroGLYCERIN (NITROSTAT) 0.4 MG SL tablet Place 1 tablet (0.4 mg total) under the tongue every 5 (five) minutes as needed for Chest pain.    pantoprazole (PROTONIX) 40 MG tablet Take 1 tablet (40 mg total) by mouth once daily.    ranolazine (RANEXA) 500 MG Tb12 Take 1 tablet (500 mg total) by mouth 2 (two) times daily.    ticagrelor (BRILINTA) 90 mg tablet Take 1 tablet (90 mg total) by mouth 2 (two) times a day.     Family History       Problem Relation (Age of Onset)    Cancer Father    Diabetes Mother, Maternal Aunt    Heart disease Mother          Tobacco Use    Smoking status: Never Smoker    Smokeless tobacco: Former User     Types: Chew     Quit date: 6/3/2004   Substance and Sexual Activity    Alcohol use: Yes     Alcohol/week: 2.0 standard drinks     Types: 2 Glasses of wine per week     Comment: per week    Drug use: No    Sexual activity: Not on file     Review of Systems   Constitutional:  Positive for activity change, appetite change and unexpected weight change (30#-intentional). Negative for chills and fatigue.   HENT:  Negative for congestion, postnasal drip, sinus pressure, sore throat and trouble swallowing.    Respiratory:  Negative for cough, shortness of breath and wheezing.    Cardiovascular:  Negative for chest pain, palpitations and leg swelling.   Gastrointestinal:  Positive for abdominal distention and abdominal pain. Negative for diarrhea, nausea and vomiting.        Decreased bowel sensation    Genitourinary:  Negative for difficulty urinating, dysuria, frequency and urgency.   Musculoskeletal:  Negative for arthralgias, back pain, myalgias and neck pain.   Skin:  Negative for color change and wound.   Neurological:  Negative for dizziness, weakness and headaches.   Psychiatric/Behavioral:  Negative for agitation, confusion and sleep disturbance. The patient  is not nervous/anxious.    Objective:     Vital Signs (Most Recent):  Temp: 97.3 °F (36.3 °C) (04/27/22 1254)  Pulse: (!) 55 (04/27/22 1254)  Resp: 18 (04/27/22 1254)  BP: 110/63 (04/27/22 1254)  SpO2: 98 % (04/27/22 1254)   Vital Signs (24h Range):  Temp:  [96.7 °F (35.9 °C)-97.5 °F (36.4 °C)] 97.3 °F (36.3 °C)  Pulse:  [51-64] 55  Resp:  [10-18] 18  SpO2:  [98 %-100 %] 98 %  BP: (110-145)/(63-87) 110/63     Weight: 70.5 kg (155 lb 5 oz)  Body mass index is 23.62 kg/m².    Physical Exam  HENT:      Head: Normocephalic.      Nose: Nose normal.      Mouth/Throat:      Mouth: Mucous membranes are dry.      Pharynx: Oropharynx is clear.   Cardiovascular:      Rate and Rhythm: Normal rate and regular rhythm.      Pulses: Normal pulses.      Heart sounds: Normal heart sounds.   Pulmonary:      Effort: Pulmonary effort is normal.      Breath sounds: Normal breath sounds.   Abdominal:      General: There is no distension.      Palpations: Abdomen is soft.      Tenderness: There is abdominal tenderness (at surgical site).   Genitourinary:     Comments: Deferred   Musculoskeletal:         General: Normal range of motion.      Cervical back: Normal range of motion and neck supple.   Skin:     General: Skin is warm and dry.      Capillary Refill: Capillary refill takes less than 2 seconds.      Comments: Midsternal incision with liquid dressing intact and edges well-approximated    Neurological:      Mental Status: He is alert and oriented to person, place, and time.   Psychiatric:         Mood and Affect: Mood normal.         Behavior: Behavior normal.       Significant Labs: All pertinent labs within the past 24 hours have been reviewed.  CBC:   Recent Labs   Lab 04/26/22  2344   WBC 10.01   HGB 14.6   HCT 41.7        CMP:   Recent Labs   Lab 04/26/22  2344      K 3.9      CO2 25   GLU 99   BUN 20   CREATININE 1.2   CALCIUM 9.6   PROT 5.9*   ALBUMIN 3.8   BILITOT 0.6   ALKPHOS 55   AST 18   ALT 17    ANIONGAP 13   EGFRNONAA >60       Significant Imaging: I have reviewed all pertinent imaging results/findings within the past 24 hours.    Imaging Results              CT Abdomen Pelvis  Without Contrast (Final result)  Result time 04/27/22 08:53:44      Final result by Kayden Yañez MD (04/27/22 08:53:44)                   Impression:      Findings consistent with cecal volvulus.  Findings were reported by [facility] radiologist at 02:16.    19 mm splenic artery aneurysm noted which is slightly larger than was seen on prior exam 06/22/2020. Generally visceral aneurysms are treated greater than 2 cm. Recommend annual surveillance imaging.    All CT scans at this facility use dose modulation, iterative reconstruction, and/or weight based dosing when appropriate to reduce radiation dose to as low as reasonable achievable.      Electronically signed by: Kayden Yañez MD  Date:    04/27/2022  Time:    08:53               Narrative:    EXAMINATION:  CT ABDOMEN PELVIS WITHOUT CONTRAST    CLINICAL HISTORY:  Abdominal abscess/infection suspected;right upper quadrant pain;    TECHNIQUE:  Low dose axial images, sagittal and coronal reformations were obtained from the lung bases to the pubic symphysis.  Oral contrast was not administered.    COMPARISON:  08/31/2021    FINDINGS:  Heart: Cardiomegaly.  Trace effusion.    Lung Bases: Clear.    Liver: Normal size and attenuation. No focal lesions. Small amount of perihepatic ascites.    Gallbladder: No calcified gallstones.    Bile Ducts: Common bile duct is prominent measuring dilated measuring up to 9 mm.   no significant intrahepatic ductal dilatation .  Correlation with LFTs.    Pancreas: No obvious mass. No peripancreatic fat stranding.    Spleen: Normal.    Adrenals: Normal.    Kidneys/Ureters: No mass, hydroureteronephrosis, or nephroureterolithiasis.  Punctate nonobstructing stone upper and lower pole left kidney measuring up to 4 mm.  Punctate 2 mm stone lower pole  right kidney.  Small left-sided parapelvic cysts..    Bladder: No wall thickening.    Reproductive organs: Benign appearing prostate calcifications.  Patient appears to be status post TURP procedure.  Mild irregularity within the central prostate.  Prostate is not significantly enlarged.  Correlation with PSA is recommended.    GI Tract/Mesentery: Small hiatal hernia.  Mild thickening at the GE junction could reflect reflux disease.  The cecum dilated measuring up to 7 mm in diameter and is located within the mid abdomen consistent with cecal volvulus.  Terminal ileum is tethered to the cecum in abnormal configuration.  Shotty nodes are seen within the mesentery mild mesenteric edema also noted.  Diverticulosis of the descending and sigmoid colon without evidence of diverticulitis.    Peritoneal Space: Scattered minimal ascites.  No free air.    Retroperitoneum: No significant adenopathy.    Abdominal wall: Normal.    Vasculature: No aneurysm.  Aorta demonstrates atherosclerotic disease.  19 mm splenic artery aneurysm noted which is slightly larger than was seen on prior exam 06/22/2020.  Generally visceral aneurysms are treated greater than 2 cm.  Recommend annual surveillance imaging.    Bones: No acute fracture. No suspicious lytic or sclerotic lesions.                                       Assessment/Plan:     * Cecal volvulus  -General Surgery- primary team  -s/p Exploratory laparotomy- Right hemicolectomy mobilization of hepatic flexure  -gentle hydration   -clear liquid diet   -analgesia as needed   -antiemetics as needed         Chronic systolic CHF (congestive heart failure)  Patient is identified as having Combined Systolic and Diastolic heart failure that is Chronic. CHF is currently controlled. Latest ECHO performed and demonstrates- Results for orders placed during the hospital encounter of 04/25/22    Echo    Interpretation Summary  · The left ventricle is normal in size with moderately decreased  systolic function.  · The estimated ejection fraction is 35%.  · Grade I left ventricular diastolic dysfunction.  · There are segmental left ventricular wall motion abnormalities.  · Mild mitral regurgitation.  · Normal central venous pressure (3 mmHg).  · The estimated PA systolic pressure is 32 mmHg.  · With low normal right ventricular systolic function.  . Continue Beta Blocker and monitor clinical status closely. Monitor on telemetry. Patient is off CHF pathway.  Monitor strict Is&Os and daily weights.  Place on fluid restriction of 2 L. Continue to stress to patient importance of self efficacy and  on diet for CHF. Last BNP reviewed.  -supplemental oxygen as needed   -gentle hydration as needed post procedure   -monitor for volume overload and diurese as needed       H/O non-ST elevation myocardial infarction (NSTEMI)  -recent in 2/2022  -ASA, Lopressor, and Brilinta continued   -Echo on 4/25/22 showed   · left ventricle is normal in size with moderately decreased systolic function.  · The estimated ejection fraction is 35%.  · Grade I left ventricular diastolic dysfunction.  · There are segmental left ventricular wall motion abnormalities.  · Mild mitral regurgitation.  · Normal central venous pressure (3 mmHg).  · The estimated PA systolic pressure is 32 mmHg.  · With low normal right ventricular systolic function.    -higher risk for bleeding post procedure discussed with patient per General Surgery   -will monitor H/H and transfuse as needed   -will resume Ranexa when appropriate   -will consider need for Cardiology input if needed     Hypercholesterolemia  -stable         VTE Risk Mitigation (From admission, onward)         Ordered     enoxaparin injection 40 mg  Daily         04/27/22 0915     Place sequential compression device  Until discontinued         04/27/22 0914     IP VTE HIGH RISK PATIENT  Once         04/27/22 0914     Place sequential compression device  Until discontinued          04/27/22 0914                    Thank you for your consult. I will follow-up with patient. Please contact us if you have any additional questions.    Melva Huntley NP  Department of Hospital Medicine   '[facility] - Med S[facility]

## 2022-04-27 NOTE — ASSESSMENT & PLAN NOTE
Exploratory laparotomy/partial colectomy  NPO, IV fluids  Risks and benefits discussed with patient including but not limited to:  Pain, bleeding, infection, injury to underlying abdominal organs, leak/stricturing, possible ostomy, findings of no volvulus at the time of surgery or alternative findings found, need for further procedure

## 2022-04-27 NOTE — SUBJECTIVE & OBJECTIVE
No current facility-administered medications on file prior to encounter.     Current Outpatient Medications on File Prior to Encounter   Medication Sig    aspirin (ECOTRIN) 81 MG EC tablet Take 1 tablet (81 mg total) by mouth once daily.    metoprolol succinate (TOPROL-XL) 25 MG 24 hr tablet Take 0.5 tablets (12.5 mg total) by mouth every evening.    mometasone 0.1% (ELOCON) 0.1 % cream AAA bid prn for psoriasis. Strong steroid.  Do not use on face, underarms or groin.    multivitamin capsule Take 1 capsule by mouth once daily.    nitroGLYCERIN (NITROSTAT) 0.4 MG SL tablet Place 1 tablet (0.4 mg total) under the tongue every 5 (five) minutes as needed for Chest pain.    pantoprazole (PROTONIX) 40 MG tablet Take 1 tablet (40 mg total) by mouth once daily.    ranolazine (RANEXA) 500 MG Tb12 Take 1 tablet (500 mg total) by mouth 2 (two) times daily.    ticagrelor (BRILINTA) 90 mg tablet Take 1 tablet (90 mg total) by mouth 2 (two) times a day.       Review of patient's allergies indicates:   Allergen Reactions    Statins-hmg-coa reductase inhibitors Other (See Comments)     SEVERE MYALGIAS AND GI SIDE EFFECTS       Past Medical History:   Diagnosis Date    Anticoagulant long-term use     Arthritis     Cancer     Chronic systolic CHF (congestive heart failure) 02/26/2022    Coronary artery disease     Digestive disorder     Myocardial infarction     2/20/22     Past Surgical History:   Procedure Laterality Date    CARDIAC CATHETERIZATION      COLONOSCOPY N/A 9/22/2021    Procedure: COLONOSCOPY;  Surgeon: Meaghan Jaime MD;  Location: [facility] ENDO;  Service: Endoscopy;  Laterality: N/A;    CORONARY ANGIOGRAPHY INCLUDING BYPASS GRAFTS WITH CATHETERIZATION OF LEFT HEART N/A 3/3/2020    Procedure: ANGIOGRAM, CORONARY, INCLUDING BYPASS GRAFT, WITH LEFT HEART CATHETERIZATION;  Surgeon: Ute Melchor MD;  Location: [facility] CATH LAB;  Service: Cardiology;  Laterality: N/A;    JOINT REPLACEMENT Right     knee    KNEE SURGERY       LEFT HEART CATHETERIZATION Left 2/22/2020    Procedure: CATHETERIZATION, HEART, LEFT;  Surgeon: Ute Melchor MD;  Location: [facility] CATH LAB;  Service: Cardiology;  Laterality: Left;    LEFT HEART CATHETERIZATION Left 2/26/2022    Procedure: CATHETERIZATION, HEART, LEFT;  Surgeon: Ute Melchor MD;  Location: [facility] CATH LAB;  Service: Cardiology;  Laterality: Left;    PERCUTANEOUS TRANSLUMINAL BALLOON ANGIOPLASTY OF CORONARY ARTERY Left 2/22/2020    Procedure: Angioplasty-coronary;  Surgeon: Ute Melchor MD;  Location: [facility] CATH LAB;  Service: Cardiology;  Laterality: Left;    PERCUTANEOUS TRANSLUMINAL BALLOON ANGIOPLASTY OF CORONARY ARTERY  2/26/2022    Procedure: Angioplasty-coronary;  Surgeon: Ute Melchor MD;  Location: [facility] CATH LAB;  Service: Cardiology;;    SHOULDER ARTHROSCOPY Right     Dr Bella    simple prostatectomy  06/06/2016    robotic assisted partial prostectomy due to BPH    TOTAL KNEE ARTHROPLASTY       Family History       Problem Relation (Age of Onset)    Cancer Father    Diabetes Mother, Maternal Aunt    Heart disease Mother          Tobacco Use    Smoking status: Never Smoker    Smokeless tobacco: Former User     Types: Chew     Quit date: 6/3/2004   Substance and Sexual Activity    Alcohol use: Yes     Alcohol/week: 2.0 standard drinks     Types: 2 Glasses of wine per week     Comment: per week    Drug use: No    Sexual activity: Not on file     Review of Systems   Constitutional:  Negative for chills, fever and unexpected weight change.   HENT:  Negative for congestion.    Eyes:  Negative for visual disturbance.   Respiratory:  Negative for shortness of breath.    Cardiovascular:  Negative for chest pain.   Gastrointestinal:  Positive for abdominal distention and abdominal pain. Negative for constipation, nausea, rectal pain and vomiting.   Genitourinary:  Negative for dysuria.   Musculoskeletal:  Negative for arthralgias.   Skin:  Negative for rash.   Neurological:  Negative for  light-headedness.   Hematological:  Negative for adenopathy.   Objective:     Vital Signs (Most Recent):  Temp: 96.7 °F (35.9 °C) (04/27/22 0321)  Pulse: (!) 51 (04/27/22 0321)  Resp: 18 (04/27/22 0321)  BP: (!) 145/79 (04/27/22 0321)  SpO2: 99 % (04/27/22 0321)   Vital Signs (24h Range):  Temp:  [96.7 °F (35.9 °C)-97.4 °F (36.3 °C)] 96.7 °F (35.9 °C)  Pulse:  [51-57] 51  Resp:  [18] 18  SpO2:  [99 %-100 %] 99 %  BP: (124-145)/(70-87) 145/79     Weight: 70.5 kg (155 lb 5 oz)  Body mass index is 23.62 kg/m².    Physical Exam  Vitals reviewed.   Constitutional:       Appearance: He is well-developed.   HENT:      Head: Normocephalic and atraumatic.   Cardiovascular:      Rate and Rhythm: Normal rate and regular rhythm.   Pulmonary:      Effort: Pulmonary effort is normal.      Breath sounds: Normal breath sounds.   Abdominal:      General: There is distension (Right upper abdominal distension).      Palpations: Abdomen is soft.      Tenderness: There is abdominal tenderness (minimal).      Comments: Small surgical scar near umbilicus from prior prostate surgery  Tympanic    Musculoskeletal:      Cervical back: Normal range of motion and neck supple.   Skin:     General: Skin is warm and dry.   Neurological:      Mental Status: He is alert and oriented to person, place, and time.       Significant Labs:  I have reviewed all pertinent lab results within the past 24 hours.    CBC:   Recent Labs   Lab 04/26/22 2344   WBC 10.01   RBC 4.69   HGB 14.6   HCT 41.7      MCV 89   MCH 31.1*   MCHC 35.0     CMP:   Recent Labs   Lab 04/26/22 2344   GLU 99   CALCIUM 9.6   ALBUMIN 3.8   PROT 5.9*      K 3.9   CO2 25      BUN 20   CREATININE 1.2   ALKPHOS 55   ALT 17   AST 18   BILITOT 0.6       Significant Diagnostics:  CT:  Consistent with cecal volvulus

## 2022-04-27 NOTE — OP NOTE
[facility] Surg  Surgery Department  Operative Note    SUMMARY     Date of Procedure: 4/27/2022     Procedure:   Exploratory laparotomy  Right hemicolectomy mobilization of hepatic flexure    Surgeon(s) and Role:     * See Dominguez MD - Primary    Assisting Surgeon: None    Pre-Operative Diagnosis: Volvulus [K56.2]    Post-Operative Diagnosis: Post-Op Diagnosis Codes:     * Volvulus [K56.2]    Anesthesia: General    Operative Findings (including complications, if any): Exploratory laparotomy  Right hemicolectomy    Description of Technical Procedures:  Cecal volvulus      Estimated Blood Loss (EBL): 20 mL           Implants: * No implants in log *    Specimens:   Specimen (24h ago, onward)             Start     Ordered    04/27/22 0652  Specimen to Pathology, Surgery General Surgery  Once        Comments: Pre-op Diagnosis: Volvulus [K56.2]Procedure(s):LAPAROTOMY, EXPLORATORY Number of specimens: 2Name of specimens:  1) Right colon and Terminal Illeum;  2) Anastimosis;     References:    Click here for ordering Quick Tip   Question Answer Comment   Procedure Type: General Surgery    Release to patient Immediate        04/27/22 0712                        Condition: Good    Disposition: PACU - hemodynamically stable.    Procedure in detail:  Patient was brought to the OR and underwent general anesthesia.  His abdomen was prepped draped in usual sterile fashion.  An incision was made in the epigastric portion of his abdomen extending down just below the umbilicus.  Bovie electrocautery was used dissect down to the fascia.  The abdomen was entered.  Upon further inspection the patient was confirmed to have a cecal volvulus.  This segment of colon appeared poorly perfused and ischemic.  The ascending colon and hepatic flexure were further mobilized along the white line of Toldt using Bovie electrocautery and the EnSeal Super jaw.  Once adequately mobilized to healthy-appearing colon and ileum the ischemic portion was  resected by dividing the terminal ileum and transverse colon using a MOSES 75 mm stapler with blue load.  The mesentery was divided using the EnSeal Super jaw.  The specimen was passed off for pathology.  A side-to-side ileocolonic anastomosis was performed using the MOSES 75 mm stapler with blue loads.  The staple line was oversewn using 3-0 silk sutures.  The mesenteric defect was closed with a 3-0 Vicryl suture.  The abdomen was thoroughly irrigated.  Hemostasis was noted.  Exparel was injected into the fascia.  The fascia was closed using 1. Looped PDS.  The subcutaneous space was reapproximated with 3-0 Vicryl sutures.  The incision was closed using 4-0 Monocryl in a subcuticular fashion.  Dermabond was applied.  The patient was transferred to recovery in stable satisfactory condition.

## 2022-04-27 NOTE — H&P
[facility] Surg  General Surgery  History & Physical    Patient Name: Mekhi Lambert  MRN: [id number]  Admission Date: 4/26/2022  Attending Physician: See Dominguez MD   Primary Care Provider: ABIGAIL Lopez Jr, MD    Patient information was obtained from patient.     Subjective:     Chief Complaint/Reason for Admission:  Cecal volvulus    History of Present Illness: 71-year-old male referred for volvulus.  Patient reports epigastric abdominal discomfort with abdominal bloating.  He reports having decrease sensation and difficulty with sensation of needing to have a bowel movement for the past few months.  He also reports intentional weight loss of approximately 30 lbs.  CT scan done in ER concerning for volvulus      No current facility-administered medications on file prior to encounter.     Current Outpatient Medications on File Prior to Encounter   Medication Sig    aspirin (ECOTRIN) 81 MG EC tablet Take 1 tablet (81 mg total) by mouth once daily.    metoprolol succinate (TOPROL-XL) 25 MG 24 hr tablet Take 0.5 tablets (12.5 mg total) by mouth every evening.    mometasone 0.1% (ELOCON) 0.1 % cream AAA bid prn for psoriasis. Strong steroid.  Do not use on face, underarms or groin.    multivitamin capsule Take 1 capsule by mouth once daily.    nitroGLYCERIN (NITROSTAT) 0.4 MG SL tablet Place 1 tablet (0.4 mg total) under the tongue every 5 (five) minutes as needed for Chest pain.    pantoprazole (PROTONIX) 40 MG tablet Take 1 tablet (40 mg total) by mouth once daily.    ranolazine (RANEXA) 500 MG Tb12 Take 1 tablet (500 mg total) by mouth 2 (two) times daily.    ticagrelor (BRILINTA) 90 mg tablet Take 1 tablet (90 mg total) by mouth 2 (two) times a day.       Review of patient's allergies indicates:   Allergen Reactions    Statins-hmg-coa reductase inhibitors Other (See Comments)     SEVERE MYALGIAS AND GI SIDE EFFECTS       Past Medical History:   Diagnosis Date    Anticoagulant long-term use      Arthritis     Cancer     Chronic systolic CHF (congestive heart failure) 02/26/2022    Coronary artery disease     Digestive disorder     Myocardial infarction     2/20/22     Past Surgical History:   Procedure Laterality Date    CARDIAC CATHETERIZATION      COLONOSCOPY N/A 9/22/2021    Procedure: COLONOSCOPY;  Surgeon: Meaghan Jaime MD;  Location: [facility] ENDO;  Service: Endoscopy;  Laterality: N/A;    CORONARY ANGIOGRAPHY INCLUDING BYPASS GRAFTS WITH CATHETERIZATION OF LEFT HEART N/A 3/3/2020    Procedure: ANGIOGRAM, CORONARY, INCLUDING BYPASS GRAFT, WITH LEFT HEART CATHETERIZATION;  Surgeon: Ute Melchor MD;  Location: [facility] CATH LAB;  Service: Cardiology;  Laterality: N/A;    JOINT REPLACEMENT Right     knee    KNEE SURGERY      LEFT HEART CATHETERIZATION Left 2/22/2020    Procedure: CATHETERIZATION, HEART, LEFT;  Surgeon: Ute Melchor MD;  Location: [facility] CATH LAB;  Service: Cardiology;  Laterality: Left;    LEFT HEART CATHETERIZATION Left 2/26/2022    Procedure: CATHETERIZATION, HEART, LEFT;  Surgeon: Ute Melchor MD;  Location: [facility] CATH LAB;  Service: Cardiology;  Laterality: Left;    PERCUTANEOUS TRANSLUMINAL BALLOON ANGIOPLASTY OF CORONARY ARTERY Left 2/22/2020    Procedure: Angioplasty-coronary;  Surgeon: Ute Melchor MD;  Location: [facility] CATH LAB;  Service: Cardiology;  Laterality: Left;    PERCUTANEOUS TRANSLUMINAL BALLOON ANGIOPLASTY OF CORONARY ARTERY  2/26/2022    Procedure: Angioplasty-coronary;  Surgeon: Ute Melchor MD;  Location: [facility] CATH LAB;  Service: Cardiology;;    SHOULDER ARTHROSCOPY Right     Dr Bella    simple prostatectomy  06/06/2016    robotic assisted partial prostectomy due to BPH    TOTAL KNEE ARTHROPLASTY       Family History       Problem Relation (Age of Onset)    Cancer Father    Diabetes Mother, Maternal Aunt    Heart disease Mother          Tobacco Use    Smoking status: Never Smoker    Smokeless tobacco: Former User     Types: Chew     Quit  date: 6/3/2004   Substance and Sexual Activity    Alcohol use: Yes     Alcohol/week: 2.0 standard drinks     Types: 2 Glasses of wine per week     Comment: per week    Drug use: No    Sexual activity: Not on file     Review of Systems   Constitutional:  Negative for chills, fever and unexpected weight change.   HENT:  Negative for congestion.    Eyes:  Negative for visual disturbance.   Respiratory:  Negative for shortness of breath.    Cardiovascular:  Negative for chest pain.   Gastrointestinal:  Positive for abdominal distention and abdominal pain. Negative for constipation, nausea, rectal pain and vomiting.   Genitourinary:  Negative for dysuria.   Musculoskeletal:  Negative for arthralgias.   Skin:  Negative for rash.   Neurological:  Negative for light-headedness.   Hematological:  Negative for adenopathy.   Objective:     Vital Signs (Most Recent):  Temp: 96.7 °F (35.9 °C) (04/27/22 0321)  Pulse: (!) 51 (04/27/22 0321)  Resp: 18 (04/27/22 0321)  BP: (!) 145/79 (04/27/22 0321)  SpO2: 99 % (04/27/22 0321)   Vital Signs (24h Range):  Temp:  [96.7 °F (35.9 °C)-97.4 °F (36.3 °C)] 96.7 °F (35.9 °C)  Pulse:  [51-57] 51  Resp:  [18] 18  SpO2:  [99 %-100 %] 99 %  BP: (124-145)/(70-87) 145/79     Weight: 70.5 kg (155 lb 5 oz)  Body mass index is 23.62 kg/m².    Physical Exam  Vitals reviewed.   Constitutional:       Appearance: He is well-developed.   HENT:      Head: Normocephalic and atraumatic.   Cardiovascular:      Rate and Rhythm: Normal rate and regular rhythm.   Pulmonary:      Effort: Pulmonary effort is normal.      Breath sounds: Normal breath sounds.   Abdominal:      General: There is distension (Right upper abdominal distension).      Palpations: Abdomen is soft.      Tenderness: There is abdominal tenderness (minimal).      Comments: Small surgical scar near umbilicus from prior prostate surgery  Tympanic    Musculoskeletal:      Cervical back: Normal range of motion and neck supple.   Skin:      General: Skin is warm and dry.   Neurological:      Mental Status: He is alert and oriented to person, place, and time.       Significant Labs:  I have reviewed all pertinent lab results within the past 24 hours.    CBC:   Recent Labs   Lab 04/26/22  2344   WBC 10.01   RBC 4.69   HGB 14.6   HCT 41.7      MCV 89   MCH 31.1*   MCHC 35.0     CMP:   Recent Labs   Lab 04/26/22  2344   GLU 99   CALCIUM 9.6   ALBUMIN 3.8   PROT 5.9*      K 3.9   CO2 25      BUN 20   CREATININE 1.2   ALKPHOS 55   ALT 17   AST 18   BILITOT 0.6       Significant Diagnostics:  CT:  Consistent with cecal volvulus      Assessment/Plan:     Cecal volvulus  Exploratory laparotomy/partial colectomy  NPO, IV fluids  Risks and benefits discussed with patient including but not limited to:  Pain, bleeding, infection, injury to underlying abdominal organs, leak/stricturing, possible ostomy, findings of no volvulus at the time of surgery or alternative findings found, need for further procedure    Chronic systolic CHF (congestive heart failure)  EF 30%  Currently stable/cardiac monitoring  Medical management    H/O non-ST elevation myocardial infarction (NSTEMI)  Prior cardiac stents  Currently on Brilinta  Discussed with patient that he is at a higher risk for bleeding  Will have blood products available for surgery and postoperative.  Cardiac monitoring  Medical management    Hypercholesterolemia  Stable  Medical management/dietary modifications      VTE Risk Mitigation (From admission, onward)    None        60 minutes of total time spent on the encounter, which includes face to face time and non-face to face time preparing to see the patient (eg, review of tests), Obtaining and/or reviewing separately obtained history, Documenting clinical information in the electronic or other health record, Independently interpreting results (not separately reported) and communicating results to the patient/family/caregiver, or Care coordination  (not separately reported).     See Dominguez MD  General Surgery  [facility] - Med S[facility]

## 2022-04-27 NOTE — PLAN OF CARE
Patient remains free from falls/injury this shift. Safety precautions maintained. Patient has no pain complaints this shift. He is very eager to walk. No signs and symptoms of acute distress noted. Will continue to monitor. Chart check complete.

## 2022-04-27 NOTE — ASSESSMENT & PLAN NOTE
Prior cardiac stents  Currently on Brilinta  Discussed with patient that he is at a higher risk for bleeding  Will have blood products available for surgery and postoperative.  Cardiac monitoring  Medical management

## 2022-04-27 NOTE — TRANSFER OF CARE
Anesthesia Transfer of Care Note    Patient: Mekhi Lambert    Procedure(s) Performed: Procedure(s) (LRB):  LAPAROTOMY, EXPLORATORY (N/A)  HEMICOLECTOMY, RIGHT (N/A)    Patient location: PACU    Anesthesia Type: general    Transport from OR: Transported from OR on room air with adequate spontaneous ventilation    Post pain: adequate analgesia    Post assessment: no apparent anesthetic complications    Post vital signs: stable    Level of consciousness: sedated    Nausea/Vomiting: no nausea/vomiting    Complications: none    Transfer of care protocol was followed      Last vitals:   Visit Vitals  BP (!) 145/79 (BP Location: Right arm, Patient Position: Lying)   Pulse (!) 51   Temp 35.9 °C (96.7 °F) (Oral)   Resp 18   Wt 70.5 kg (155 lb 5 oz)   SpO2 99%   BMI 23.62 kg/m²

## 2022-04-27 NOTE — PLAN OF CARE
Patient remained free from injury. NPO status for AM surgery. Pre-op checklist completed. CHG bath completed.

## 2022-04-27 NOTE — ASSESSMENT & PLAN NOTE
Patient is identified as having Combined Systolic and Diastolic heart failure that is Chronic. CHF is currently controlled. Latest ECHO performed and demonstrates- Results for orders placed during the hospital encounter of 04/25/22    Echo    Interpretation Summary  · The left ventricle is normal in size with moderately decreased systolic function.  · The estimated ejection fraction is 35%.  · Grade I left ventricular diastolic dysfunction.  · There are segmental left ventricular wall motion abnormalities.  · Mild mitral regurgitation.  · Normal central venous pressure (3 mmHg).  · The estimated PA systolic pressure is 32 mmHg.  · With low normal right ventricular systolic function.  . Continue Beta Blocker and monitor clinical status closely. Monitor on telemetry. Patient is off CHF pathway.  Monitor strict Is&Os and daily weights.  Place on fluid restriction of 2 L. Continue to stress to patient importance of self efficacy and  on diet for CHF. Last BNP reviewed.  -supplemental oxygen as needed   -gentle hydration as needed post procedure   -monitor for volume overload and diurese as needed

## 2022-04-27 NOTE — ANESTHESIA POSTPROCEDURE EVALUATION
Anesthesia Post Evaluation    Patient: Mekhi Lambert    Procedure(s) Performed: Procedure(s) (LRB):  LAPAROTOMY, EXPLORATORY (N/A)  HEMICOLECTOMY, RIGHT (N/A)    Final Anesthesia Type: general      Patient location during evaluation: PACU  Patient participation: Yes- Able to Participate  Level of consciousness: awake and alert  Post-procedure vital signs: reviewed and stable  Pain management: adequate  Airway patency: patent  TIMA mitigation strategies: Verification of full reversal of neuromuscular block  PONV status at discharge: No PONV  Anesthetic complications: no      Cardiovascular status: hemodynamically stable  Respiratory status: spontaneous ventilation  Hydration status: euvolemic  Follow-up not needed.          Vitals Value Taken Time   /67 04/27/22 0900   Temp 36.4 °C (97.5 °F) 04/27/22 0900   Pulse 59 04/27/22 0902   Resp 11 04/27/22 0901   SpO2 98 % 04/27/22 0902   Vitals shown include unvalidated device data.      Event Time   Out of Recovery 04/27/2022 09:04:31         Pain/Kristy Score: Pain Rating Prior to Med Admin: 4 (4/27/2022  2:55 AM)  Kristy Score: 9 (4/27/2022  8:45 AM)

## 2022-04-27 NOTE — HPI
71-year-old male referred for volvulus.  Patient reports epigastric abdominal discomfort with abdominal bloating.  He reports having decrease sensation and difficulty with sensation of needing to have a bowel movement for the past few months.  He also reports intentional weight loss of approximately 30 lbs.  CT scan done in ER concerning for volvulus

## 2022-04-28 PROBLEM — D62 ACUTE BLOOD LOSS ANEMIA: Status: ACTIVE | Noted: 2022-04-28

## 2022-04-28 LAB
ALBUMIN SERPL BCP-MCNC: 2.7 G/DL (ref 3.5–5.2)
ALP SERPL-CCNC: 29 U/L (ref 55–135)
ALT SERPL W/O P-5'-P-CCNC: 14 U/L (ref 10–44)
ANION GAP SERPL CALC-SCNC: 6 MMOL/L (ref 8–16)
ANION GAP SERPL CALC-SCNC: 6 MMOL/L (ref 8–16)
APTT BLDCRRT: 25.7 SEC (ref 21–32)
AST SERPL-CCNC: 15 U/L (ref 10–40)
BASOPHILS # BLD AUTO: 0.04 K/UL (ref 0–0.2)
BASOPHILS # BLD AUTO: 0.07 K/UL (ref 0–0.2)
BASOPHILS NFR BLD: 0.3 % (ref 0–1.9)
BASOPHILS NFR BLD: 0.7 % (ref 0–1.9)
BILIRUB SERPL-MCNC: 1 MG/DL (ref 0.1–1)
BLD PROD TYP BPU: NORMAL
BLOOD UNIT EXPIRATION DATE: NORMAL
BLOOD UNIT TYPE CODE: 5100
BLOOD UNIT TYPE CODE: 5100
BLOOD UNIT TYPE CODE: 7300
BLOOD UNIT TYPE: NORMAL
BNP SERPL-MCNC: 180 PG/ML (ref 0–99)
BUN SERPL-MCNC: 15 MG/DL (ref 8–23)
BUN SERPL-MCNC: 15 MG/DL (ref 8–23)
CALCIUM SERPL-MCNC: 7.8 MG/DL (ref 8.7–10.5)
CALCIUM SERPL-MCNC: 7.8 MG/DL (ref 8.7–10.5)
CHLORIDE SERPL-SCNC: 107 MMOL/L (ref 95–110)
CHLORIDE SERPL-SCNC: 107 MMOL/L (ref 95–110)
CO2 SERPL-SCNC: 26 MMOL/L (ref 23–29)
CO2 SERPL-SCNC: 26 MMOL/L (ref 23–29)
CODING SYSTEM: NORMAL
CREAT SERPL-MCNC: 0.9 MG/DL (ref 0.5–1.4)
CREAT SERPL-MCNC: 0.9 MG/DL (ref 0.5–1.4)
DIFFERENTIAL METHOD: ABNORMAL
DIFFERENTIAL METHOD: ABNORMAL
DISPENSE STATUS: NORMAL
EOSINOPHIL # BLD AUTO: 0 K/UL (ref 0–0.5)
EOSINOPHIL # BLD AUTO: 0.1 K/UL (ref 0–0.5)
EOSINOPHIL NFR BLD: 0.2 % (ref 0–8)
EOSINOPHIL NFR BLD: 0.8 % (ref 0–8)
ERYTHROCYTE [DISTWIDTH] IN BLOOD BY AUTOMATED COUNT: 14.8 % (ref 11.5–14.5)
ERYTHROCYTE [DISTWIDTH] IN BLOOD BY AUTOMATED COUNT: 14.8 % (ref 11.5–14.5)
ERYTHROCYTE [DISTWIDTH] IN BLOOD BY AUTOMATED COUNT: 15 % (ref 11.5–14.5)
EST. GFR  (AFRICAN AMERICAN): >60 ML/MIN/1.73 M^2
EST. GFR  (AFRICAN AMERICAN): >60 ML/MIN/1.73 M^2
EST. GFR  (NON AFRICAN AMERICAN): >60 ML/MIN/1.73 M^2
EST. GFR  (NON AFRICAN AMERICAN): >60 ML/MIN/1.73 M^2
GLUCOSE SERPL-MCNC: 132 MG/DL (ref 70–110)
GLUCOSE SERPL-MCNC: 132 MG/DL (ref 70–110)
HCT VFR BLD AUTO: 25.4 % (ref 40–54)
HCT VFR BLD AUTO: 25.4 % (ref 40–54)
HCT VFR BLD AUTO: 27.6 % (ref 40–54)
HCT VFR BLD AUTO: 29.2 % (ref 40–54)
HCT VFR BLD AUTO: 29.2 % (ref 40–54)
HCT VFR BLD AUTO: 34.9 % (ref 40–54)
HCT VFR BLD AUTO: 35.2 % (ref 40–54)
HGB BLD-MCNC: 11.8 G/DL (ref 14–18)
HGB BLD-MCNC: 11.9 G/DL (ref 14–18)
HGB BLD-MCNC: 8.5 G/DL (ref 14–18)
HGB BLD-MCNC: 8.5 G/DL (ref 14–18)
HGB BLD-MCNC: 9.1 G/DL (ref 14–18)
HGB BLD-MCNC: 9.7 G/DL (ref 14–18)
HGB BLD-MCNC: 9.7 G/DL (ref 14–18)
IMM GRANULOCYTES # BLD AUTO: 0.03 K/UL (ref 0–0.04)
IMM GRANULOCYTES # BLD AUTO: 0.06 K/UL (ref 0–0.04)
IMM GRANULOCYTES NFR BLD AUTO: 0.3 % (ref 0–0.5)
IMM GRANULOCYTES NFR BLD AUTO: 0.5 % (ref 0–0.5)
INR PPP: 1 (ref 0.8–1.2)
LYMPHOCYTES # BLD AUTO: 0.9 K/UL (ref 1–4.8)
LYMPHOCYTES # BLD AUTO: 1 K/UL (ref 1–4.8)
LYMPHOCYTES NFR BLD: 7.9 % (ref 18–48)
LYMPHOCYTES NFR BLD: 9.3 % (ref 18–48)
MCH RBC QN AUTO: 30.8 PG (ref 27–31)
MCH RBC QN AUTO: 30.9 PG (ref 27–31)
MCH RBC QN AUTO: 30.9 PG (ref 27–31)
MCHC RBC AUTO-ENTMCNC: 33.2 G/DL (ref 32–36)
MCHC RBC AUTO-ENTMCNC: 33.2 G/DL (ref 32–36)
MCHC RBC AUTO-ENTMCNC: 33.5 G/DL (ref 32–36)
MCV RBC AUTO: 92 FL (ref 82–98)
MCV RBC AUTO: 93 FL (ref 82–98)
MCV RBC AUTO: 93 FL (ref 82–98)
MONOCYTES # BLD AUTO: 0.9 K/UL (ref 0.3–1)
MONOCYTES # BLD AUTO: 1.4 K/UL (ref 0.3–1)
MONOCYTES NFR BLD: 10.9 % (ref 4–15)
MONOCYTES NFR BLD: 9 % (ref 4–15)
NEUTROPHILS # BLD AUTO: 10 K/UL (ref 1.8–7.7)
NEUTROPHILS # BLD AUTO: 7.7 K/UL (ref 1.8–7.7)
NEUTROPHILS NFR BLD: 79.6 % (ref 38–73)
NEUTROPHILS NFR BLD: 80.5 % (ref 38–73)
NRBC BLD-RTO: 0 /100 WBC
NRBC BLD-RTO: 0 /100 WBC
NUM UNITS TRANS PACKED RBC: NORMAL
PLATELET # BLD AUTO: 153 K/UL (ref 150–450)
PLATELET # BLD AUTO: 153 K/UL (ref 150–450)
PLATELET # BLD AUTO: 183 K/UL (ref 150–450)
PMV BLD AUTO: 10 FL (ref 9.2–12.9)
PMV BLD AUTO: 10 FL (ref 9.2–12.9)
PMV BLD AUTO: 9.7 FL (ref 9.2–12.9)
POTASSIUM SERPL-SCNC: 4.3 MMOL/L (ref 3.5–5.1)
POTASSIUM SERPL-SCNC: 4.3 MMOL/L (ref 3.5–5.1)
PROT SERPL-MCNC: 4 G/DL (ref 6–8.4)
PROTHROMBIN TIME: 10.9 SEC (ref 9–12.5)
RBC # BLD AUTO: 3.14 M/UL (ref 4.6–6.2)
RBC # BLD AUTO: 3.14 M/UL (ref 4.6–6.2)
RBC # BLD AUTO: 3.83 M/UL (ref 4.6–6.2)
SODIUM SERPL-SCNC: 139 MMOL/L (ref 136–145)
SODIUM SERPL-SCNC: 139 MMOL/L (ref 136–145)
TROPONIN I SERPL DL<=0.01 NG/ML-MCNC: <0.006 NG/ML (ref 0–0.03)
UNIT NUMBER: NORMAL
UNIT NUMBER: NORMAL
WBC # BLD AUTO: 12.58 K/UL (ref 3.9–12.7)
WBC # BLD AUTO: 9.6 K/UL (ref 3.9–12.7)
WBC # BLD AUTO: 9.6 K/UL (ref 3.9–12.7)

## 2022-04-28 PROCEDURE — 80053 COMPREHEN METABOLIC PANEL: CPT | Performed by: SURGERY

## 2022-04-28 PROCEDURE — 63600175 PHARM REV CODE 636 W HCPCS: Performed by: NURSE PRACTITIONER

## 2022-04-28 PROCEDURE — 25000003 PHARM REV CODE 250: Performed by: EMERGENCY MEDICINE

## 2022-04-28 PROCEDURE — 25000003 PHARM REV CODE 250: Performed by: SURGERY

## 2022-04-28 PROCEDURE — 85018 HEMOGLOBIN: CPT | Mod: 91 | Performed by: SURGERY

## 2022-04-28 PROCEDURE — 36415 COLL VENOUS BLD VENIPUNCTURE: CPT | Performed by: NURSE PRACTITIONER

## 2022-04-28 PROCEDURE — 85014 HEMATOCRIT: CPT | Mod: 91 | Performed by: SURGERY

## 2022-04-28 PROCEDURE — 21400001 HC TELEMETRY ROOM

## 2022-04-28 PROCEDURE — 85014 HEMATOCRIT: CPT | Mod: 91

## 2022-04-28 PROCEDURE — 99233 PR SUBSEQUENT HOSPITAL CARE,LEVL III: ICD-10-PCS | Mod: ICN,CMP,, | Performed by: PHYSICIAN ASSISTANT

## 2022-04-28 PROCEDURE — 85018 HEMOGLOBIN: CPT | Mod: 91

## 2022-04-28 PROCEDURE — 36415 COLL VENOUS BLD VENIPUNCTURE: CPT | Performed by: SURGERY

## 2022-04-28 PROCEDURE — 94761 N-INVAS EAR/PLS OXIMETRY MLT: CPT

## 2022-04-28 PROCEDURE — 85730 THROMBOPLASTIN TIME PARTIAL: CPT | Performed by: FAMILY MEDICINE

## 2022-04-28 PROCEDURE — 36415 COLL VENOUS BLD VENIPUNCTURE: CPT | Performed by: FAMILY MEDICINE

## 2022-04-28 PROCEDURE — 85610 PROTHROMBIN TIME: CPT | Performed by: FAMILY MEDICINE

## 2022-04-28 PROCEDURE — 85014 HEMATOCRIT: CPT | Performed by: NURSE PRACTITIONER

## 2022-04-28 PROCEDURE — 97161 PT EVAL LOW COMPLEX 20 MIN: CPT

## 2022-04-28 PROCEDURE — 63600175 PHARM REV CODE 636 W HCPCS: Performed by: SURGERY

## 2022-04-28 PROCEDURE — 85025 COMPLETE CBC W/AUTO DIFF WBC: CPT | Mod: 91 | Performed by: SURGERY

## 2022-04-28 PROCEDURE — 94799 UNLISTED PULMONARY SVC/PX: CPT

## 2022-04-28 PROCEDURE — 36430 TRANSFUSION BLD/BLD COMPNT: CPT

## 2022-04-28 PROCEDURE — 25000003 PHARM REV CODE 250: Performed by: NURSE PRACTITIONER

## 2022-04-28 PROCEDURE — P9016 RBC LEUKOCYTES REDUCED: HCPCS | Performed by: FAMILY MEDICINE

## 2022-04-28 PROCEDURE — 85014 HEMATOCRIT: CPT | Mod: 91 | Performed by: FAMILY MEDICINE

## 2022-04-28 PROCEDURE — 83880 ASSAY OF NATRIURETIC PEPTIDE: CPT | Performed by: SURGERY

## 2022-04-28 PROCEDURE — 85018 HEMOGLOBIN: CPT | Mod: 91 | Performed by: FAMILY MEDICINE

## 2022-04-28 PROCEDURE — 85018 HEMOGLOBIN: CPT | Performed by: NURSE PRACTITIONER

## 2022-04-28 PROCEDURE — 99900035 HC TECH TIME PER 15 MIN (STAT)

## 2022-04-28 PROCEDURE — 99233 SBSQ HOSP IP/OBS HIGH 50: CPT | Mod: ICN,CMP,, | Performed by: PHYSICIAN ASSISTANT

## 2022-04-28 PROCEDURE — 84484 ASSAY OF TROPONIN QUANT: CPT | Performed by: INTERNAL MEDICINE

## 2022-04-28 PROCEDURE — 36415 COLL VENOUS BLD VENIPUNCTURE: CPT

## 2022-04-28 PROCEDURE — P9035 PLATELET PHERES LEUKOREDUCED: HCPCS

## 2022-04-28 RX ORDER — METOPROLOL TARTRATE 25 MG/1
12.5 TABLET ORAL NIGHTLY
Status: DISCONTINUED | OUTPATIENT
Start: 2022-04-28 | End: 2022-05-01 | Stop reason: HOSPADM

## 2022-04-28 RX ORDER — FUROSEMIDE 10 MG/ML
20 INJECTION INTRAMUSCULAR; INTRAVENOUS ONCE AS NEEDED
Status: COMPLETED | OUTPATIENT
Start: 2022-04-28 | End: 2022-04-28

## 2022-04-28 RX ORDER — HYDROCODONE BITARTRATE AND ACETAMINOPHEN 500; 5 MG/1; MG/1
TABLET ORAL
Status: DISCONTINUED | OUTPATIENT
Start: 2022-04-28 | End: 2022-05-01 | Stop reason: HOSPADM

## 2022-04-28 RX ADMIN — SENNOSIDES AND DOCUSATE SODIUM 1 TABLET: 50; 8.6 TABLET ORAL at 08:04

## 2022-04-28 RX ADMIN — FAMOTIDINE 20 MG: 10 INJECTION, SOLUTION INTRAVENOUS at 09:04

## 2022-04-28 RX ADMIN — FUROSEMIDE 20 MG: 10 INJECTION, SOLUTION INTRAMUSCULAR; INTRAVENOUS at 07:04

## 2022-04-28 RX ADMIN — SODIUM CHLORIDE, SODIUM LACTATE, POTASSIUM CHLORIDE, AND CALCIUM CHLORIDE: .6; .31; .03; .02 INJECTION, SOLUTION INTRAVENOUS at 02:04

## 2022-04-28 RX ADMIN — PANTOPRAZOLE SODIUM 40 MG: 40 TABLET, DELAYED RELEASE ORAL at 08:04

## 2022-04-28 RX ADMIN — CHLORHEXIDINE GLUCONATE 0.12% ORAL RINSE 10 ML: 1.2 LIQUID ORAL at 08:04

## 2022-04-28 RX ADMIN — TICAGRELOR 90 MG: 90 TABLET ORAL at 08:04

## 2022-04-28 RX ADMIN — METOPROLOL TARTRATE 12.5 MG: 25 TABLET, FILM COATED ORAL at 08:04

## 2022-04-28 RX ADMIN — ACETAMINOPHEN 650 MG: 325 TABLET ORAL at 07:04

## 2022-04-28 RX ADMIN — ACETAMINOPHEN 650 MG: 325 TABLET ORAL at 12:04

## 2022-04-28 RX ADMIN — FAMOTIDINE 20 MG: 10 INJECTION, SOLUTION INTRAVENOUS at 08:04

## 2022-04-28 NOTE — ASSESSMENT & PLAN NOTE
S/p right hemicolectomy secondary to volvulus    Clears will advance diet as bowel function returns  Pain control  Ambulate  Monitor h/h, bloody bowel movements   Cards recommends not holding brilinta and asa due to heart/stent thrombosis risks  Transfuse platelets now and blood products as needed

## 2022-04-28 NOTE — NURSING
"Pt noted with bloody stool with foul odor. This was brought to the nurse attention this morning at 0030. Messaged the MD who responded "Nice to know this information now". This was not a complaint noted upon change of shift. Advised the MD once I was aware of the situation, I made him aware. Around 0540 noted the foul smell within the hallway. When entered the room noted the commode full of bloody stool. Made MD aware who noted "Ok". Pt is concern of his symptoms. H&H 11.8/34.9. Ordered for morning lab to be completed now. Pt remain AAOx4 in stable condition. Pt is now complaining of weakness and dizziness. Will continue POC    "

## 2022-04-28 NOTE — PLAN OF CARE
[facility]  Initial Discharge Assessment       Primary Care Provider: ABIGAIL Lopez Jr, MD    Admission Diagnosis: Volvulus [K56.2]    Admission Date: 4/26/2022  Expected Discharge Date:     Discharge Barriers Identified: None    Payor: MEDICARE / Plan: MEDICARE PART A & B / Product Type: Government /     Extended Emergency Contact Information  Primary Emergency Contact: Lynn Lambert  Address: [address] [place] [address]           [place] [address] [facility]  Home Phone: [phone]  Mobile Phone: [phone]  Relation: Spouse    Discharge Plan A: Home with family         [id number] - [place] - [id number]  [address] [place] [address]  Phone: [phone] Fax: [phone]      Initial Assessment (most recent)     Adult Discharge Assessment - 04/28/22 1022        Discharge Assessment    Assessment Type Discharge Planning Assessment     Confirmed/corrected address, phone number and insurance Yes     Confirmed Demographics Correct on Facesheet     Source of Information patient     Does patient/caregiver understand observation status Yes     Communicated KAILA with patient/caregiver Date not available/Unable to determine     Reason For Admission cecal volvulus     Lives With spouse     Facility Arrived From: home     Do you expect to return to your current living situation? Yes     Do you have help at home or someone to help you manage your care at home? Yes     Who are your caregiver(s) and their phone number(s)? Lynn Lambert(spouse) [phone]     Prior to hospitilization cognitive status: Alert/Oriented     Current cognitive status: Alert/Oriented     Walking or Climbing Stairs Difficulty none     Dressing/Bathing Difficulty none     Home Layout Able to live on 1st floor     Equipment Currently Used at Home none     Readmission within 30 days? No     Patient currently being followed by outpatient case management? No     Do you currently have service(s) that help you  manage your care at home? No     Do you take prescription medications? Yes     Do you have prescription coverage? Yes     Do you have any problems affording any of your prescribed medications? No     Is the patient taking medications as prescribed? yes     Who is going to help you get home at discharge? family     How do you get to doctors appointments? car, drives self     Are you on dialysis? No     Do you take coumadin? No     Discharge Plan A Home with family     DME Needed Upon Discharge  none     Discharge Plan discussed with: Patient     Discharge Barriers Identified None        Relationship/Environment    Name(s) of Who Lives With Patient Lynn Lambert(spouse) [phone]                  Case management met with pt at the bedside to assess for discharge needs and explained case management role.CM provided a transitional care folder, information on advanced directives, information on pharmacy bedside delivery, and discharge planning begins on admission with contact information for any needs/questions.

## 2022-04-28 NOTE — ASSESSMENT & PLAN NOTE
-recent in 2/2022  -ASA, Lopressor, and Brilinta continued   -Echo on 4/25/22 showed   · left ventricle is normal in size with moderately decreased systolic function.  · The estimated ejection fraction is 35%.  · Grade I left ventricular diastolic dysfunction.  · There are segmental left ventricular wall motion abnormalities.  · Mild mitral regurgitation.  · Normal central venous pressure (3 mmHg).  · The estimated PA systolic pressure is 32 mmHg.  · With low normal right ventricular systolic function.    -higher risk for bleeding post procedure discussed with patient per General Surgery   -will monitor H/H with downward trend noted and PRBCs x 1 unit transfused     -will resume Ranexa when appropriate

## 2022-04-28 NOTE — PROGRESS NOTES
[facility] Medicine  Progress Note    Patient Name: Mekhi Lambert  MRN: [id number]  Patient Class: IP- Inpatient   Admission Date: 4/26/2022  Length of Stay: 1 days  Attending Physician: See Dominguez MD  Primary Care Provider: ABIGAIL Lopez Jr, MD        Subjective:     Principal Problem:Cecal volvulus        HPI:  Mekhi Lambert is a 71-year-old male with PMHX of Cancer, Long-term anticoagulation, CHF, CAD, Digestive disorder, and MI (2/2022) who was referred to General Surgery for volvulus.  Patient reports epigastric abdominal discomfort with abdominal bloating. Associated symptoms include a decreased sensation and difficulty with needing to have a bowel movement for the past few months.  He also reports intentional weight loss of approximately 30 lbs.  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 Generally visceral aneurysms are treated greater than 2 cm. ER discussed findings with General Surgery and pt taken to OR for Exploratory laparotomy with Right hemicolectomy mobilization of hepatic flexure. Pt reports no immediate complication. Hospital Medicine contacted for medical management post procedure. Pt admitted to Medical Surgical Unit for further evaluation.          Overview/Hospital Course:  Pt admitted to Medical Surgical unit s/p Exploratory laparotomy- Right hemicolectomy mobilization of hepatic flexure on 4/27/22. CT showed cecal volvulus and 19 mm splenic artery aneurysm noted which is slightly larger than was seen on prior exam 06/22/2020. Generally visceral aneurysms are treated greater than 2 cm.  Hsospital Medicine consulted post procedure for medical management. Pt remained on ASA and Brilinta due to recent NSTEMI and stent placement 2/2022 with high risk of bleeding discussed with patient prior to procedure. Pt had multiple stools with blood clots post procedure. H/H trending  down with serial results in progress. Echo 4/21 showed EF of 35%, DD. BNP stable at 180. Cardiology consulted with medications continued due to risk of stent thrombosis, recommend continuation of ASA and Brilinta and would avoid discontinuation if possible. Pt H/H declining with pt verbalized dizziness, weakness, and hypotension of note and PRBCs ordered for transfusion. IVF decreased and Lasix given with BNP elevated in the setting of CHF with EF 25-30%. Case discussed with primary team.       Interval History: pt in bed upon exam and reports HA, dizziness, and weakness. Hypotension noted in the setting of episodes of bloody stool. H/H trending down from 11.9>8.5/34.9>25.4. Serial results in progress. IVF decreased and Lasix given in the setting of CHF (EF 25%-30%). PRBCs x 1 unit ordered for transfusion. Pt seen by Provider multiple times today and case discussed with General Surgery and Cardiology consulted with recommendations to continued ASA and Brilinta due to risk for thrombosis in light of recent NSTEMI and stent placement.     Review of Systems   Constitutional:  Positive for activity change, appetite change and unexpected weight change (30#-intentional). Negative for chills and fatigue.   HENT:  Negative for congestion, postnasal drip, sinus pressure, sore throat and trouble swallowing.    Eyes:  Positive for visual disturbance.   Respiratory:  Negative for cough, shortness of breath and wheezing.    Cardiovascular:  Negative for chest pain, palpitations and leg swelling.   Gastrointestinal:  Positive for abdominal distention, abdominal pain and blood in stool. Negative for diarrhea, nausea and vomiting.        Decreased bowel sensation    Genitourinary:  Negative for difficulty urinating, dysuria, frequency and urgency.   Musculoskeletal:  Negative for arthralgias, back pain, myalgias and neck pain.   Skin:  Negative for color change and wound.   Neurological:  Positive for dizziness, weakness and  headaches.   Psychiatric/Behavioral:  Negative for agitation, confusion and sleep disturbance. The patient is not nervous/anxious.    Objective:     Vital Signs (Most Recent):  Temp: 98.1 °F (36.7 °C) (04/28/22 1739)  Pulse: 67 (04/28/22 1739)  Resp: 16 (04/28/22 1739)  BP: (!) 114/57 (04/28/22 1739)  SpO2: 97 % (04/28/22 1739)   Vital Signs (24h Range):  Temp:  [97.4 °F (36.3 °C)-98.7 °F (37.1 °C)] 98.1 °F (36.7 °C)  Pulse:  [60-70] 67  Resp:  [16-18] 16  SpO2:  [90 %-99 %] 97 %  BP: ()/(52-66) 114/57     Weight: 74.2 kg (163 lb 9.3 oz)  Body mass index is 24.87 kg/m².    Intake/Output Summary (Last 24 hours) at 4/28/2022 1741  Last data filed at 4/27/2022 2200  Gross per 24 hour   Intake 840 ml   Output 902 ml   Net -62 ml      Physical Exam  HENT:      Head: Normocephalic.      Nose: Nose normal.      Mouth/Throat:      Mouth: Mucous membranes are dry.      Pharynx: Oropharynx is clear.   Cardiovascular:      Rate and Rhythm: Normal rate and regular rhythm.      Pulses: Normal pulses.      Heart sounds: Normal heart sounds.   Pulmonary:      Effort: Pulmonary effort is normal.      Breath sounds: Normal breath sounds.   Abdominal:      General: There is no distension.      Palpations: Abdomen is soft.      Tenderness: There is abdominal tenderness (at surgical site).   Genitourinary:     Comments: Deferred   Musculoskeletal:         General: Normal range of motion.      Cervical back: Normal range of motion and neck supple.   Skin:     General: Skin is warm and dry.      Capillary Refill: Capillary refill takes less than 2 seconds.      Findings: Bruising (at incision site) present.      Comments: Midabdominal incision with liquid dressing intact and edges well-approximated    Neurological:      Mental Status: He is alert and oriented to person, place, and time.   Psychiatric:         Mood and Affect: Mood normal.         Behavior: Behavior normal.       Significant Labs: All pertinent labs within the past  24 hours have been reviewed.  CBC:   Recent Labs   Lab 04/26/22  2344 04/28/22  0116 04/28/22  0557 04/28/22  0935 04/28/22  1341   WBC 10.01 12.58 9.60  9.60  --   --    HGB 14.6 11.8*  11.9* 9.7*  9.7* 9.1* 8.5*   HCT 41.7 35.2*  34.9* 29.2*  29.2* 27.6* 25.4*    183 153  153  --   --      CMP:   Recent Labs   Lab 04/26/22  2344 04/28/22  0557    139  139   K 3.9 4.3  4.3    107  107   CO2 25 26  26   GLU 99 132*  132*   BUN 20 15  15   CREATININE 1.2 0.9  0.9   CALCIUM 9.6 7.8*  7.8*   PROT 5.9* 4.0*   ALBUMIN 3.8 2.7*   BILITOT 0.6 1.0   ALKPHOS 55 29*   AST 18 15   ALT 17 14   ANIONGAP 13 6*  6*   EGFRNONAA >60 >60  >60       Significant Imaging: I have reviewed all pertinent imaging results/findings within the past 24 hours.      Assessment/Plan:      * Cecal volvulus  -General Surgery- primary team  -s/p Exploratory laparotomy- Right hemicolectomy mobilization of hepatic flexure  -gentle hydration   -clear liquid diet   -analgesia as needed   -antiemetics as needed           Acute blood loss anemia  -in the setting of cecal volvulus repair on 4/27/22 and recent NSTEMI with stent placement on 2/2022  -ASA and Brilinta continued due to risk of thrombosis  -H/H trending down -11.9>8.5/34.9>25.4  -serial H/H in progress and post transfusion with PRBC s x1 unit ordered for transfusion     Chronic systolic CHF (congestive heart failure)  Patient is identified as having Combined Systolic and Diastolic heart failure that is Chronic. CHF is currently controlled. Latest ECHO performed and demonstrates- Results for orders placed during the hospital encounter of 04/25/22    Echo    Interpretation Summary  · The left ventricle is normal in size with moderately decreased systolic function.  · The estimated ejection fraction is 35%.  · Grade I left ventricular diastolic dysfunction.  · There are segmental left ventricular wall motion abnormalities.  · Mild mitral regurgitation.  · Normal  central venous pressure (3 mmHg).  · The estimated PA systolic pressure is 32 mmHg.  · With low normal right ventricular systolic function.  . Continue Beta Blocker and monitor clinical status closely. Monitor on telemetry. Patient is off CHF pathway.  Monitor strict Is&Os and daily weights.  Place on fluid restriction of 2 L. Continue to stress to patient importance of self efficacy and  on diet for CHF. Last BNP reviewed.  -supplemental oxygen as needed   -gentle hydration as needed post procedure   -monitor for volume overload and diurese as needed   -4/28/22- Lopressor held due to hypotension- BNP elevated- IVF decreased and Lasix to be given       Coronary artery disease of native artery of native heart with  angina pectoris  -NSTEMI and stents placed on 2/2022  -Home medications resumed   -Will resume Ranexa when appropriate   -Cardiology following       H/O non-ST elevation myocardial infarction (NSTEMI)  -recent in 2/2022  -ASA, Lopressor, and Brilinta continued   -Echo on 4/25/22 showed   · left ventricle is normal in size with moderately decreased systolic function.  · The estimated ejection fraction is 35%.  · Grade I left ventricular diastolic dysfunction.  · There are segmental left ventricular wall motion abnormalities.  · Mild mitral regurgitation.  · Normal central venous pressure (3 mmHg).  · The estimated PA systolic pressure is 32 mmHg.  · With low normal right ventricular systolic function.    -higher risk for bleeding post procedure discussed with patient per General Surgery   -will monitor H/H with downward trend noted and PRBCs x 1 unit transfused     -will resume Ranexa when appropriate     Hypercholesterolemia  -stable   -intolerant to statin therapy         VTE Risk Mitigation (From admission, onward)         Ordered     Place sequential compression device  Until discontinued         04/27/22 0914     IP VTE HIGH RISK PATIENT  Once         04/27/22 0914     Place sequential  compression device  Until discontinued         04/27/22 0914                Discharge Planning   KAILA:      Code Status: Full Code   Is the patient medically ready for discharge?:     Reason for patient still in hospital (select all that apply): Patient trending condition, Laboratory test, Treatment and Consult recommendations  Discharge Plan A: Home with family                  Melva Huntley NP  Department of Hospital Medicine   [facility]

## 2022-04-28 NOTE — SUBJECTIVE & OBJECTIVE
Interval History: pt in bed upon exam and reports HA, dizziness, and weakness. Hypotension noted in the setting of episodes of bloody stool. H/H trending down from 11.9>8.5/34.9>25.4. Serial results in progress. IVF decreased and Lasix given in the setting of CHF (EF 25%-30%). PRBCs x 1 unit ordered for transfusion. Case discussed with General Surgery and Cardiology consulted with recommendations to continued ASA and Brilinta due to risk for thrombosis in light of recent NSTEMI and stent placement.     Review of Systems   Constitutional:  Positive for activity change, appetite change and unexpected weight change (30#-intentional). Negative for chills and fatigue.   HENT:  Negative for congestion, postnasal drip, sinus pressure, sore throat and trouble swallowing.    Eyes:  Positive for visual disturbance.   Respiratory:  Negative for cough, shortness of breath and wheezing.    Cardiovascular:  Negative for chest pain, palpitations and leg swelling.   Gastrointestinal:  Positive for abdominal distention, abdominal pain and blood in stool. Negative for diarrhea, nausea and vomiting.        Decreased bowel sensation    Genitourinary:  Negative for difficulty urinating, dysuria, frequency and urgency.   Musculoskeletal:  Negative for arthralgias, back pain, myalgias and neck pain.   Skin:  Negative for color change and wound.   Neurological:  Positive for dizziness, weakness and headaches.   Psychiatric/Behavioral:  Negative for agitation, confusion and sleep disturbance. The patient is not nervous/anxious.    Objective:     Vital Signs (Most Recent):  Temp: 98.1 °F (36.7 °C) (04/28/22 1739)  Pulse: 67 (04/28/22 1739)  Resp: 16 (04/28/22 1739)  BP: (!) 114/57 (04/28/22 1739)  SpO2: 97 % (04/28/22 1739)   Vital Signs (24h Range):  Temp:  [97.4 °F (36.3 °C)-98.7 °F (37.1 °C)] 98.1 °F (36.7 °C)  Pulse:  [60-70] 67  Resp:  [16-18] 16  SpO2:  [90 %-99 %] 97 %  BP: ()/(52-66) 114/57     Weight: 74.2 kg (163 lb 9.3  oz)  Body mass index is 24.87 kg/m².    Intake/Output Summary (Last 24 hours) at 4/28/2022 1741  Last data filed at 4/27/2022 2200  Gross per 24 hour   Intake 840 ml   Output 902 ml   Net -62 ml      Physical Exam  HENT:      Head: Normocephalic.      Nose: Nose normal.      Mouth/Throat:      Mouth: Mucous membranes are dry.      Pharynx: Oropharynx is clear.   Cardiovascular:      Rate and Rhythm: Normal rate and regular rhythm.      Pulses: Normal pulses.      Heart sounds: Normal heart sounds.   Pulmonary:      Effort: Pulmonary effort is normal.      Breath sounds: Normal breath sounds.   Abdominal:      General: There is no distension.      Palpations: Abdomen is soft.      Tenderness: There is abdominal tenderness (at surgical site).   Genitourinary:     Comments: Deferred   Musculoskeletal:         General: Normal range of motion.      Cervical back: Normal range of motion and neck supple.   Skin:     General: Skin is warm and dry.      Capillary Refill: Capillary refill takes less than 2 seconds.      Findings: Bruising (at incision site) present.      Comments: Midabdominal incision with liquid dressing intact and edges well-approximated    Neurological:      Mental Status: He is alert and oriented to person, place, and time.   Psychiatric:         Mood and Affect: Mood normal.         Behavior: Behavior normal.       Significant Labs: All pertinent labs within the past 24 hours have been reviewed.  CBC:   Recent Labs   Lab 04/26/22 2344 04/28/22  0116 04/28/22  0557 04/28/22  0935 04/28/22  1341   WBC 10.01 12.58 9.60  9.60  --   --    HGB 14.6 11.8*  11.9* 9.7*  9.7* 9.1* 8.5*   HCT 41.7 35.2*  34.9* 29.2*  29.2* 27.6* 25.4*    183 153  153  --   --      CMP:   Recent Labs   Lab 04/26/22  2344 04/28/22  0557    139  139   K 3.9 4.3  4.3    107  107   CO2 25 26  26   GLU 99 132*  132*   BUN 20 15  15   CREATININE 1.2 0.9  0.9   CALCIUM 9.6 7.8*  7.8*   PROT 5.9* 4.0*    ALBUMIN 3.8 2.7*   BILITOT 0.6 1.0   ALKPHOS 55 29*   AST 18 15   ALT 17 14   ANIONGAP 13 6*  6*   EGFRNONAA >60 >60  >60       Significant Imaging: I have reviewed all pertinent imaging results/findings within the past 24 hours.

## 2022-04-28 NOTE — PT/OT/SLP EVAL
Physical Therapy Evaluation    Patient Name:  Mekhi Lambert   MRN:  [id number]    Recommendations:     Discharge Recommendations:   (TBD)   Discharge Equipment Recommendations: none   Barriers to discharge: None    Assessment:     Mekhi Lambert is a 71 y.o. male admitted with a medical diagnosis of Cecal volvulus.  He presents with the following impairments/functional limitations:  weakness, impaired endurance, impaired functional mobilty, gait instability, impaired balance, pain .    Rehab Prognosis: Good; patient would benefit from acute skilled PT services to address these deficits and reach maximum level of function.    Recent Surgery: Procedure(s) (LRB):  LAPAROTOMY, EXPLORATORY (N/A)  HEMICOLECTOMY, RIGHT (N/A) 1 Day Post-Op    Plan:     During this hospitalization, patient to be seen 3 x/week to address the identified rehab impairments via gait training, therapeutic activities, therapeutic exercises and progress toward the following goals:    · Plan of Care Expires:  05/12/22    Subjective     Chief Complaint: WEAKNESS AND DIZZINESS   Patient/Family Comments/goals: INC MOBILITY   Pain/Comfort:  · Pain Rating 1: 1/10  · Location 1: abdomen  · Pain Rating Post-Intervention 1: 1/10    Patients cultural, spiritual, Yarsanism conflicts given the current situation:      Living Environment:  PT LIVES AT HOME WITH WIFE IN A 2 STORY HOME WITH BED AND BATH DOWN STAIRS   Prior to admission, patients level of function was IND , DRIVES .  Equipment used at home: none.  DME owned (not currently used): rolling walker.  Upon discharge, patient will have assistance from WIFE .    Objective:     Communicated with  NURSE TEMI BAILEY AND Epic CHART REVIEW  prior to session.  Patient found supine with peripheral IV  upon PT entry to room.    General Precautions: Standard,     Orthopedic Precautions:N/A   Braces: N/A  Respiratory Status: Room air    Exams:  · RLE ROM: WFL  · RLE Strength: WFL  · LLE ROM: WFL  · LLE  Strength: WFL    Functional Mobility:  · Bed Mobility:     · Supine to Sit: independence  · Sit to Supine: independence  · Transfers:     · Sit to Stand:  UNABLE with no AD  · Gait: UNABLE AT THIS TIME    Therapeutic Activities and Exercises:   PT EDUCATED ON BED TE TO COMPLETE AP, HS AND HIP ADD/ABD AS PT BP LOW THIS AM AND UNABLE TO TOLERATE OOB AT THIS TIME.     AM-PAC 6 CLICK MOBILITY  Total Score:12     Patient left supine with call button in reach.    GOALS:   Multidisciplinary Problems     Physical Therapy Goals        Problem: Physical Therapy    Goal Priority Disciplines Outcome Goal Variances Interventions   Physical Therapy Goal     PT, PT/OT      Description: LT22  1.PT WILL COMPLETE BED MOBILITY IND  2. PT WILL T/F TO CHAIR IND  3. PT WILL GT TRAIN X 250' IND                   History:     Past Medical History:   Diagnosis Date    Anticoagulant long-term use     Arthritis     Cancer     Chronic systolic CHF (congestive heart failure) 2022    Coronary artery disease     Digestive disorder     Myocardial infarction     22       Past Surgical History:   Procedure Laterality Date    CARDIAC CATHETERIZATION      COLONOSCOPY N/A 2021    Procedure: COLONOSCOPY;  Surgeon: Meaghan Jaime MD;  Location: [facility] ENDO;  Service: Endoscopy;  Laterality: N/A;    CORONARY ANGIOGRAPHY INCLUDING BYPASS GRAFTS WITH CATHETERIZATION OF LEFT HEART N/A 3/3/2020    Procedure: ANGIOGRAM, CORONARY, INCLUDING BYPASS GRAFT, WITH LEFT HEART CATHETERIZATION;  Surgeon: Ute Melchor MD;  Location: [facility] CATH LAB;  Service: Cardiology;  Laterality: N/A;    JOINT REPLACEMENT Right     knee    KNEE SURGERY      LEFT HEART CATHETERIZATION Left 2020    Procedure: CATHETERIZATION, HEART, LEFT;  Surgeon: Ute Melchor MD;  Location: [facility] CATH LAB;  Service: Cardiology;  Laterality: Left;    LEFT HEART CATHETERIZATION Left 2022    Procedure: CATHETERIZATION, HEART, LEFT;  Surgeon: Ute  MEAGHAN Melchor MD;  Location: [facility] CATH LAB;  Service: Cardiology;  Laterality: Left;    PERCUTANEOUS TRANSLUMINAL BALLOON ANGIOPLASTY OF CORONARY ARTERY Left 2/22/2020    Procedure: Angioplasty-coronary;  Surgeon: Ute Melchor MD;  Location: [facility] CATH LAB;  Service: Cardiology;  Laterality: Left;    PERCUTANEOUS TRANSLUMINAL BALLOON ANGIOPLASTY OF CORONARY ARTERY  2/26/2022    Procedure: Angioplasty-coronary;  Surgeon: Ute Melchor MD;  Location: [facility] CATH LAB;  Service: Cardiology;;    RIGHT HEMICOLECTOMY N/A 4/27/2022    Procedure: HEMICOLECTOMY, RIGHT;  Surgeon: See Dominguez MD;  Location: [facility] OR;  Service: General;  Laterality: N/A;    SHOULDER ARTHROSCOPY Right     Dr Bella    simple prostatectomy  06/06/2016    robotic assisted partial prostectomy due to BPH    TOTAL KNEE ARTHROPLASTY         Time Tracking:     PT Received On: 04/28/22  PT Start Time: 0825     PT Stop Time: 0840  PT Total Time (min): 15 min     Billable Minutes: Evaluation 15      04/28/2022

## 2022-04-28 NOTE — SUBJECTIVE & OBJECTIVE
Interval History: patient having bloody bowel movements overnight, transfuse platelets today, cardiology recommending to continue Brilinta, pain controlled, tolerating liquids    Medications:  Continuous Infusions:   lactated ringers 125 mL/hr at 04/28/22 1439     Scheduled Meds:   aspirin  81 mg Oral Daily    chlorhexidine  10 mL Mouth/Throat BID    famotidine (PF)  20 mg Intravenous Q12H    pantoprazole  40 mg Oral Daily    senna-docusate 8.6-50 mg  1 tablet Oral BID    ticagrelor  90 mg Oral BID     PRN Meds:sodium chloride, sodium chloride, acetaminophen, acetaminophen, albuterol sulfate, cloNIDine, diphenhydrAMINE, melatonin, morphine, morphine, morphine, ondansetron, oxyCODONE, sodium chloride 0.9%     Review of patient's allergies indicates:   Allergen Reactions    Statins-hmg-coa reductase inhibitors Other (See Comments)     SEVERE MYALGIAS AND GI SIDE EFFECTS     Objective:     Vital Signs (Most Recent):  Temp: 98.1 °F (36.7 °C) (04/28/22 1739)  Pulse: 67 (04/28/22 1739)  Resp: 16 (04/28/22 1739)  BP: (!) 114/57 (04/28/22 1739)  SpO2: 97 % (04/28/22 1739)   Vital Signs (24h Range):  Temp:  [97.4 °F (36.3 °C)-98.7 °F (37.1 °C)] 98.1 °F (36.7 °C)  Pulse:  [60-70] 67  Resp:  [16-18] 16  SpO2:  [90 %-99 %] 97 %  BP: ()/(52-66) 114/57     Weight: 74.2 kg (163 lb 9.3 oz)  Body mass index is 24.87 kg/m².    Intake/Output - Last 3 Shifts         04/26 0700 04/27 0659 04/27 0700 04/28 0659 04/28 0700 04/29 0659    P.O.  1860     IV Piggyback  1000     Total Intake(mL/kg)  2860 (38.5)     Urine (mL/kg/hr) 200 2055 (1.2)     Emesis/NG output  2     Blood  20     Total Output 200 2077     Net -200 +783            Urine Occurrence  1 x     Stool Occurrence  1 x 1 x            Physical Exam  Vitals reviewed.   Constitutional:       Appearance: He is well-developed.   HENT:      Head: Normocephalic and atraumatic.   Cardiovascular:      Rate and Rhythm: Normal rate and regular rhythm.   Pulmonary:      Effort:  Pulmonary effort is normal.      Breath sounds: Normal breath sounds.   Abdominal:      General: There is no distension.      Palpations: Abdomen is soft.      Tenderness: There is abdominal tenderness (Incisional).      Comments: Incision clean dry and intact  Small amount of bruising surrounding incision   Musculoskeletal:      Cervical back: Normal range of motion and neck supple.   Skin:     General: Skin is warm and dry.   Neurological:      Mental Status: He is alert and oriented to person, place, and time.       Significant Labs:  I have reviewed all pertinent lab results within the past 24 hours.  CBC:   Recent Labs   Lab 04/28/22  0557 04/28/22  0935 04/28/22  1341   WBC 9.60  9.60  --   --    RBC 3.14*  3.14*  --   --    HGB 9.7*  9.7*   < > 8.5*   HCT 29.2*  29.2*   < > 25.4*     153  --   --    MCV 93  93  --   --    MCH 30.9  30.9  --   --    MCHC 33.2  33.2  --   --     < > = values in this interval not displayed.     BMP:   Recent Labs   Lab 04/28/22  0557   *  132*     139   K 4.3  4.3     107   CO2 26  26   BUN 15  15   CREATININE 0.9  0.9   CALCIUM 7.8*  7.8*       Significant Diagnostics:

## 2022-04-28 NOTE — ASSESSMENT & PLAN NOTE
-Patient with prior NSTEMI 2/26/22 with successful PCI of LAD/diagonal, on ASA and Brilinta as OP  -Given recent MI and stent placement, recommend continuation of ASA and Brilinta given risk of stent thrombosis

## 2022-04-28 NOTE — PLAN OF CARE
Patient remains free from falls and injuries this shift. Safety precautions maintained. Pain managed with tylenol. No s/s of acute distress noted. Will continue to monitor. Chart check complete.

## 2022-04-28 NOTE — HPI
Mr. Lambert is a 71 year old male patient whose current medical conditions include CAD s/p prior MI with PCI of LAD and LCX in 2020 and recent NSTEMI in 2/22 with PCI of LAD/diagonal, hyperlipidemia, and chronic systolic CHF/ICM with EF of 35% who presented to [facility] ED on 4/26/22 with a chief complaint of worsening epigastric/abdominal pain over the past day. Associated symptoms included abdominal bloating and unintentional weight loss. Patient denied any associated diane chest pain, SOB, fever, or chills. CT scan done in ER showed cecal volvulus and 19 mm splenic artery aneurysm noted which is slightly larger than was seen on prior exam 06/22/2020 and patient admitted for further evaluation and treatment. General surgery performed ex-lap with right hemicolectomy yesterday. Overnight, patient was noted to have bloody BM's and caridology was consulted regarding anti-platelet recommendations. Patietn seen and examined today, resting in bed. Feels ok. Reports weakness, intermittent lightheadedness and dizziness. No chest pain or SOB. No bloody BM noted today. Followed routinely in clinic by Dr. Sutton, on ASA and Brilinta as OP. Echo 4/21 showed EF of 35%, DD. BNP stable at 180. H/H dipped to 9.1/27.6.

## 2022-04-28 NOTE — HOSPITAL COURSE
Pt admitted to Medical Surgical unit s/p Exploratory laparotomy- Right hemicolectomy mobilization of hepatic flexure on 4/27/22. CT showed cecal volvulus and 19 mm splenic artery aneurysm noted which is slightly larger than was seen on prior exam 06/22/2020. Generally visceral aneurysms are treated greater than 2 cm.  Hsospital Medicine consulted post procedure for medical management. Pt remained on ASA and Brilinta due to recent NSTEMI and stent placement 2/2022 with high risk of bleeding discussed with patient prior to procedure. Pt had multiple stools with blood clots post procedure. H/H trending down with serial results in progress. Echo 4/21 showed EF of 35%, DD. BNP stable at 180. Cardiology consulted with medications continued due to risk of stent thrombosis, recommend continuation of ASA and Brilinta and would avoid discontinuation if possible. Pt H/H declining with pt verbalized dizziness, weakness, and hypotension of note and PRBCs ordered for transfusion. IVF decreased and Lasix given with BNP elevated in the setting of CHF with EF 25-30%. Lopressor held. Case discussed with primary team.     4/29 Hbg stable this morning. Patient reports 1 episode stool mixed with bright red and dark red blood over night. Hemodynamics stable. Patient reports resolution of dizziness and weakness. No further surgical intervention planned. Pt to continue ASA and Brilinta. He discussed need for dual DAPT at length with cardiology. Repeat Hbg/Hct this afternoon. Will monitor overnight. Anticipate dc 4/30/22.    4/30 patient doing well. Denies complaints. Reports scant melena noted in stool overnight. Hbg stable.     05/01/2022  Doing well. Patient has no complaints. Had a normal bowel movement this morning. Labs and vitals reviewed. Patient is stable and appropriate for discharge form a medicine perspective. Discussed with Dr. Irene- [facility] req completed for discharge.

## 2022-04-28 NOTE — HOSPITAL COURSE
Status post right hemicolectomy secondary to volvulus    Postop day 1 patient having bloody bowel movements overnight, transfuse platelets today, cardiology recommending to continue Brilinta, pain controlled, tolerating liquids    Postop day 2 patient had a liquid bowel movement today.  Stated no acute blood within it.  Had 1 episode of shortness of breath yesterday but none since.  Tolerated regular diet today.  Has been ambulating and using his incentive spirometry.  Afebrile, vital signs stable.  Incision looks good without signs of any infection nor any further bleeding.  H&H and platelets are stable.  Will recheck labs in a.m..  Anticipate discharge Sunday or Monday.    Postop day 3. Patient states he had a good night.  No complaints.  Had a normal bowel movement.  No blood seen.  Tolerated regular diet.  Afebrile, vital signs stable, platelet count and H&H are stable.  White blood cell count within normal limits.  Incision looks good without signs of any infection, bleeding or dehiscence.  Hospitalist has released patient for discharge.  Will discharge today.

## 2022-04-28 NOTE — ASSESSMENT & PLAN NOTE
-in the setting of cecal volvulus repair on 4/27/22 and recent NSTEMI with stent placement on 2/2022  -ASA and Brilinta continued due to risk of thrombosis  -H/H trending down -11.9>8.5/34.9>25.4  -serial H/H in progress and post transfusion with PRBC s x1 unit ordered for transfusion

## 2022-04-28 NOTE — PROGRESS NOTES
[facility] Surg  General Surgery  Progress Note    Subjective:     History of Present Illness:  71-year-old male referred for volvulus.  Patient reports epigastric abdominal discomfort with abdominal bloating.  He reports having decrease sensation and difficulty with sensation of needing to have a bowel movement for the past few months.  He also reports intentional weight loss of approximately 30 lbs.  CT scan done in ER concerning for volvulus      Post-Op Info:  Procedure(s) (LRB):  LAPAROTOMY, EXPLORATORY (N/A)  HEMICOLECTOMY, RIGHT (N/A)   1 Day Post-Op     Interval History: patient having bloody bowel movements overnight, transfuse platelets today, cardiology recommending to continue Brilinta, pain controlled, tolerating liquids    Medications:  Continuous Infusions:   lactated ringers 125 mL/hr at 04/28/22 1439     Scheduled Meds:   aspirin  81 mg Oral Daily    chlorhexidine  10 mL Mouth/Throat BID    famotidine (PF)  20 mg Intravenous Q12H    pantoprazole  40 mg Oral Daily    senna-docusate 8.6-50 mg  1 tablet Oral BID    ticagrelor  90 mg Oral BID     PRN Meds:sodium chloride, sodium chloride, acetaminophen, acetaminophen, albuterol sulfate, cloNIDine, diphenhydrAMINE, melatonin, morphine, morphine, morphine, ondansetron, oxyCODONE, sodium chloride 0.9%     Review of patient's allergies indicates:   Allergen Reactions    Statins-hmg-coa reductase inhibitors Other (See Comments)     SEVERE MYALGIAS AND GI SIDE EFFECTS     Objective:     Vital Signs (Most Recent):  Temp: 98.1 °F (36.7 °C) (04/28/22 1739)  Pulse: 67 (04/28/22 1739)  Resp: 16 (04/28/22 1739)  BP: (!) 114/57 (04/28/22 1739)  SpO2: 97 % (04/28/22 1739)   Vital Signs (24h Range):  Temp:  [97.4 °F (36.3 °C)-98.7 °F (37.1 °C)] 98.1 °F (36.7 °C)  Pulse:  [60-70] 67  Resp:  [16-18] 16  SpO2:  [90 %-99 %] 97 %  BP: ()/(52-66) 114/57     Weight: 74.2 kg (163 lb 9.3 oz)  Body mass index is 24.87 kg/m².    Intake/Output - Last 3 Shifts          04/26 0700  04/27 0659 04/27 0700  04/28 0659 04/28 0700 04/29 0659    P.O.  1860     IV Piggyback  1000     Total Intake(mL/kg)  2860 (38.5)     Urine (mL/kg/hr) 200 2055 (1.2)     Emesis/NG output  2     Blood  20     Total Output 200 2077     Net -200 +783            Urine Occurrence  1 x     Stool Occurrence  1 x 1 x            Physical Exam  Vitals reviewed.   Constitutional:       Appearance: He is well-developed.   HENT:      Head: Normocephalic and atraumatic.   Cardiovascular:      Rate and Rhythm: Normal rate and regular rhythm.   Pulmonary:      Effort: Pulmonary effort is normal.      Breath sounds: Normal breath sounds.   Abdominal:      General: There is no distension.      Palpations: Abdomen is soft.      Tenderness: There is abdominal tenderness (Incisional).      Comments: Incision clean dry and intact  Small amount of bruising surrounding incision   Musculoskeletal:      Cervical back: Normal range of motion and neck supple.   Skin:     General: Skin is warm and dry.   Neurological:      Mental Status: He is alert and oriented to person, place, and time.       Significant Labs:  I have reviewed all pertinent lab results within the past 24 hours.  CBC:   Recent Labs   Lab 04/28/22  0557 04/28/22  0935 04/28/22  1341   WBC 9.60  9.60  --   --    RBC 3.14*  3.14*  --   --    HGB 9.7*  9.7*   < > 8.5*   HCT 29.2*  29.2*   < > 25.4*     153  --   --    MCV 93  93  --   --    MCH 30.9  30.9  --   --    MCHC 33.2  33.2  --   --     < > = values in this interval not displayed.     BMP:   Recent Labs   Lab 04/28/22  0557   *  132*     139   K 4.3  4.3     107   CO2 26  26   BUN 15  15   CREATININE 0.9  0.9   CALCIUM 7.8*  7.8*       Significant Diagnostics:      Assessment/Plan:     * Cecal volvulus  S/p right hemicolectomy secondary to volvulus    Clears will advance diet as bowel function returns  Pain control  Ambulate  Monitor h/h, bloody bowel  movements   Cards recommends not holding brilinta and asa due to heart/stent thrombosis risks  Transfuse platelets now and blood products as needed      Chronic systolic CHF (congestive heart failure)  EF 30%  Currently stable/cardiac monitoring  Medical management    H/O non-ST elevation myocardial infarction (NSTEMI)  Prior cardiac stents  Currently on Brilinta  Discussed with patient that he is at a higher risk for bleeding  Will have blood products available for surgery and postoperative.  Cardiac monitoring  Medical management    Hypercholesterolemia  Stable  Medical management/dietary modifications        See Dominguez MD  General Surgery  [facility]

## 2022-04-28 NOTE — CONSULTS
[facility] - Med [facility]  Cardiology  Consult Note    Patient Name: Mekhi Lambert  MRN: [id number]  Admission Date: 4/26/2022  Hospital Length of Stay: 1 days  Code Status: Full Code   Attending Provider: See Dominguez MD   Consulting Provider: Leah Donahue PA-C  Primary Care Physician: ABIGAIL Lopez Jr, MD  Principal Problem:Cecal volvulus    Patient information was obtained from patient, past medical records and ER records.     Inpatient consult to Cardiology  Consult performed by: Leah Donahue PA-C  Consult ordered by: Patty Judd PA-C        Subjective:     Chief Complaint:  Abdominal pain    HPI:   Mr. Lambert is a 71 year old male patient whose current medical conditions include CAD s/p prior MI with PCI of LAD and LCX in 2020 and recent NSTEMI in 2/22 with PCI of LAD/diagonal, hyperlipidemia, and chronic systolic CHF/ICM with EF of 35% who presented to [facility] ED on 4/26/22 with a chief complaint of worsening epigastric/abdominal pain over the past day. Associated symptoms included abdominal bloating and unintentional weight loss. Patient denied any associated diane chest pain, SOB, fever, or chills. CT scan done in ER showed cecal volvulus and 19 mm splenic artery aneurysm noted which is slightly larger than was seen on prior exam 06/22/2020 and patient admitted for further evaluation and treatment. General surgery performed ex-lap with right hemicolectomy yesterday. Overnight, patient was noted to have bloody BM's and caridology was consulted regarding anti-platelet recommendations. Patietn seen and examined today, resting in bed. Feels ok. Reports weakness, intermittent lightheadedness and dizziness. No chest pain or SOB. No bloody BM noted today. Followed routinely in clinic by Dr. Sutton, on ASA and Brilinta as OP. Echo 4/21 showed EF of 35%, DD. BNP stable at 180. H/H dipped to 9.1/27.6.         Past Medical History:   Diagnosis Date    Anticoagulant long-term use     Arthritis     Cancer      Chronic systolic CHF (congestive heart failure) 02/26/2022    Coronary artery disease     Digestive disorder     Myocardial infarction     2/20/22       Past Surgical History:   Procedure Laterality Date    CARDIAC CATHETERIZATION      COLONOSCOPY N/A 9/22/2021    Procedure: COLONOSCOPY;  Surgeon: Meaghan Jaime MD;  Location: [facility] ENDO;  Service: Endoscopy;  Laterality: N/A;    CORONARY ANGIOGRAPHY INCLUDING BYPASS GRAFTS WITH CATHETERIZATION OF LEFT HEART N/A 3/3/2020    Procedure: ANGIOGRAM, CORONARY, INCLUDING BYPASS GRAFT, WITH LEFT HEART CATHETERIZATION;  Surgeon: Ute Melchor MD;  Location: [facility] CATH LAB;  Service: Cardiology;  Laterality: N/A;    JOINT REPLACEMENT Right     knee    KNEE SURGERY      LEFT HEART CATHETERIZATION Left 2/22/2020    Procedure: CATHETERIZATION, HEART, LEFT;  Surgeon: Ute Melchor MD;  Location: [facility] CATH LAB;  Service: Cardiology;  Laterality: Left;    LEFT HEART CATHETERIZATION Left 2/26/2022    Procedure: CATHETERIZATION, HEART, LEFT;  Surgeon: Ute Melchor MD;  Location: [facility] CATH LAB;  Service: Cardiology;  Laterality: Left;    PERCUTANEOUS TRANSLUMINAL BALLOON ANGIOPLASTY OF CORONARY ARTERY Left 2/22/2020    Procedure: Angioplasty-coronary;  Surgeon: Ute Melchor MD;  Location: [facility] CATH LAB;  Service: Cardiology;  Laterality: Left;    PERCUTANEOUS TRANSLUMINAL BALLOON ANGIOPLASTY OF CORONARY ARTERY  2/26/2022    Procedure: Angioplasty-coronary;  Surgeon: Ute Melchor MD;  Location: [facility] CATH LAB;  Service: Cardiology;;    SHOULDER ARTHROSCOPY Right     Dr Bella    simple prostatectomy  06/06/2016    robotic assisted partial prostectomy due to BPH    TOTAL KNEE ARTHROPLASTY         Review of patient's allergies indicates:   Allergen Reactions    Statins-hmg-coa reductase inhibitors Other (See Comments)     SEVERE MYALGIAS AND GI SIDE EFFECTS       No current facility-administered medications on file prior to encounter.     Current  Outpatient Medications on File Prior to Encounter   Medication Sig    aspirin (ECOTRIN) 81 MG EC tablet Take 1 tablet (81 mg total) by mouth once daily.    metoprolol succinate (TOPROL-XL) 25 MG 24 hr tablet Take 0.5 tablets (12.5 mg total) by mouth every evening.    mometasone 0.1% (ELOCON) 0.1 % cream AAA bid prn for psoriasis. Strong steroid.  Do not use on face, underarms or groin.    multivitamin capsule Take 1 capsule by mouth once daily.    nitroGLYCERIN (NITROSTAT) 0.4 MG SL tablet Place 1 tablet (0.4 mg total) under the tongue every 5 (five) minutes as needed for Chest pain.    pantoprazole (PROTONIX) 40 MG tablet Take 1 tablet (40 mg total) by mouth once daily.    ranolazine (RANEXA) 500 MG Tb12 Take 1 tablet (500 mg total) by mouth 2 (two) times daily.    ticagrelor (BRILINTA) 90 mg tablet Take 1 tablet (90 mg total) by mouth 2 (two) times a day.     Family History       Problem Relation (Age of Onset)    Cancer Father    Diabetes Mother, Maternal Aunt    Heart disease Mother          Tobacco Use    Smoking status: Never Smoker    Smokeless tobacco: Former User     Types: Chew     Quit date: 6/3/2004   Substance and Sexual Activity    Alcohol use: Yes     Alcohol/week: 2.0 standard drinks     Types: 2 Glasses of wine per week     Comment: per week    Drug use: No    Sexual activity: Not on file     Review of Systems   Constitutional: Negative.   HENT: Negative.     Eyes: Negative.    Cardiovascular: Negative.    Respiratory: Negative.     Endocrine: Negative.    Hematologic/Lymphatic: Negative.    Skin: Negative.    Musculoskeletal: Negative.    Gastrointestinal:  Positive for bloating and abdominal pain.        Bloody BM's     Genitourinary: Negative.    Neurological: Negative.    Psychiatric/Behavioral: Negative.     Allergic/Immunologic: Negative.    Objective:     Vital Signs (Most Recent):  Temp: 97.6 °F (36.4 °C) (04/28/22 0827)  Pulse: 60 (04/28/22 0827)  Resp: 18 (04/28/22 0827)  BP:  (!) 88/55 (04/28/22 0827)  SpO2: 99 % (04/28/22 0827)   Vital Signs (24h Range):  Temp:  [97.3 °F (36.3 °C)-98.7 °F (37.1 °C)] 97.6 °F (36.4 °C)  Pulse:  [55-70] 60  Resp:  [17-18] 18  SpO2:  [90 %-99 %] 99 %  BP: ()/(52-66) 88/55     Weight: 74.2 kg (163 lb 9.3 oz)  Body mass index is 24.87 kg/m².    SpO2: 99 %  O2 Device (Oxygen Therapy): room air      Intake/Output Summary (Last 24 hours) at 4/28/2022 0912  Last data filed at 4/27/2022 2200  Gross per 24 hour   Intake 1860 ml   Output 2002 ml   Net -142 ml       Lines/Drains/Airways       Peripheral Intravenous Line  Duration                  Peripheral IV - Single Lumen 04/27/22 0025 20 G Left Antecubital 1 day                    Physical Exam  Vitals and nursing note reviewed.   Constitutional:       General: He is not in acute distress.     Appearance: Normal appearance. He is well-developed. He is not diaphoretic.   HENT:      Head: Normocephalic and atraumatic.   Eyes:      General:         Right eye: No discharge.         Left eye: No discharge.      Pupils: Pupils are equal, round, and reactive to light.   Neck:      Thyroid: No thyromegaly.      Vascular: No JVD.      Trachea: No tracheal deviation.   Cardiovascular:      Rate and Rhythm: Normal rate and regular rhythm.      Heart sounds: Normal heart sounds, S1 normal and S2 normal. No murmur heard.  Pulmonary:      Effort: Pulmonary effort is normal. No respiratory distress.      Breath sounds: Normal breath sounds. No wheezing or rales.   Abdominal:      General: There is no distension.      Tenderness: There is no rebound.   Musculoskeletal:      Cervical back: Neck supple.      Right lower leg: No edema.      Left lower leg: No edema.   Skin:     General: Skin is warm and dry.      Findings: No erythema.   Neurological:      General: No focal deficit present.      Mental Status: He is alert and oriented to person, place, and time.   Psychiatric:         Mood and Affect: Mood normal.          Behavior: Behavior normal.         Thought Content: Thought content normal.       Significant Labs:   CMP   Recent Labs   Lab 04/26/22  2344 04/28/22  0557    139  139   K 3.9 4.3  4.3    107  107   CO2 25 26  26   GLU 99 132*  132*   BUN 20 15  15   CREATININE 1.2 0.9  0.9   CALCIUM 9.6 7.8*  7.8*   PROT 5.9* 4.0*   ALBUMIN 3.8 2.7*   BILITOT 0.6 1.0   ALKPHOS 55 29*   AST 18 15   ALT 17 14   ANIONGAP 13 6*  6*   ESTGFRAFRICA >60 >60  >60   EGFRNONAA >60 >60  >60   , CBC   Recent Labs   Lab 04/26/22  2344 04/28/22  0116 04/28/22  0557   WBC 10.01 12.58 9.60  9.60   HGB 14.6 11.8*  11.9* 9.7*  9.7*   HCT 41.7 35.2*  34.9* 29.2*  29.2*    183 153  153   , INR No results for input(s): INR, PROTIME in the last 48 hours., Troponin No results for input(s): TROPONINI in the last 48 hours., and All pertinent lab results from the last 24 hours have been reviewed.  Significant Imaging: Echocardiogram: Transthoracic echo (TTE) complete (Cupid Only):   Results for orders placed or performed during the hospital encounter of 04/25/22   Echo   Result Value Ref Range    BSA 1.85 m2    LA WIDTH 5.29 cm    Left Ventricular Outflow Tract Mean Velocity 0.859318153697607 cm/s    Left Ventricular Outflow Tract Mean Gradient 1.15 mmHg    Pulmonary Valve Mean Velocity 0.64 m/s    PV PEAK VELOCITY 1.06 cm/s    LVIDd 5.34 3.5 - 6.0 cm    IVS 0.94 0.6 - 1.1 cm    Posterior Wall 0.81 0.6 - 1.1 cm    Ao root annulus 3.54 cm    LVIDs 4.17 (A) 2.1 - 4.0 cm    FS 22 28 - 44 %    STJ 3.38 cm    Ascending aorta 3.52 cm    LV mass 170.43 g    LA size 3.73 cm    RVDD 2.84 cm    Left Ventricle Relative Wall Thickness 0.30 cm    AV mean gradient 4 mmHg    AV valve area 2.09 cm2    AV Velocity Ratio 0.58     AV index (prosthetic) 0.66     MV valve area p 1/2 method 2.74 cm2    E/A ratio 0.47     E wave deceleration time [id number] msec    IVRT 114.583044075513429 msec    Pulm vein S/D ratio 1.54     LVOT  diameter 2.01 cm    LVOT area 3.2 cm2    LVOT peak fredo 0.77 m/s    LVOT peak VTI 19.80 cm    Ao peak fredo 1.32 m/s    Ao VTI 30.1 cm    LVOT stroke volume 62.80 cm3    AV peak gradient 7 mmHg    MV Peak E Fredo 0.47 m/s    TR Max Fredo 2.70 m/s    MV stenosis pressure 1/2 time 80.301934211219511 ms    MV Peak A Fredo 1.00 m/s    PV Peak S Fredo 0.855403037287306 m/s    PV Peak D Fredo 0.41 m/s    LV Systolic Volume 77.06 mL    LV Systolic Volume Index 41.7 mL/m2    LV Diastolic Volume 137.70 mL    LV Diastolic Volume Index 74.43 mL/m2    LV Mass Index 92 g/m2    Echo EF Estimated 44 %    RA Major Axis 5.05 cm    Triscuspid Valve Regurgitation Peak Gradient 29 mmHg    LA Volume Index (Mod) 7.8 mL/m2    LA volume (mod) 14.42 cm3    Right Atrial Pressure (from IVC) 3 mmHg    EF 35 %    TV rest pulmonary artery pressure 32 mmHg    Narrative    · The left ventricle is normal in size with moderately decreased systolic   function.  · The estimated ejection fraction is 35%.  · Grade I left ventricular diastolic dysfunction.  · There are segmental left ventricular wall motion abnormalities.  · Mild mitral regurgitation.  · Normal central venous pressure (3 mmHg).  · The estimated PA systolic pressure is 44 mmHg.  · There is pulmonary hypertension.  · With low normal right ventricular systolic function.      , EKG: Reviewed, and X-Ray: CXR: X-Ray Chest 1 View (CXR): No results found for this visit on 04/26/22. and X-Ray Chest PA and Lateral (CXR): No results found for this visit on 04/26/22.    Assessment and Plan:   Patient who presents with cecal volvulus s/p recent ex-lap and right hemicolectomy. Noted to have bloody BM's overnight, none today. Given recent NSTEMI and stent placement in 2/22 and risk of stent thrombosis, recommend continuation of ASA and Brilinta. Would avoid discontinuation if at all possible.     * Cecal volvulus  -s/p ex-lap and right hemicolectomy   -Post-op mgmt as per general surgery    Chronic systolic CHF  (congestive heart failure)  -Clinically compensated  -Continue low dose BB as BP permits  -Patient previously declined initiation of Entresto/ARB  -Echo 4/22 showed EF of 35%  Coronary artery disease of native artery of native heart with  angina pectoris  -See plan above    H/O non-ST elevation myocardial infarction (NSTEMI)  -Patient with prior NSTEMI 2/26/22 with successful PCI of LAD/diagonal, on ASA and Brilinta as OP  -Given recent MI and stent placement, recommend continuation of ASA and Brilinta given risk of stent thrombosis     Hypercholesterolemia  -Intolerant to statin therapy          VTE Risk Mitigation (From admission, onward)         Ordered     enoxaparin injection 40 mg  Daily         04/27/22 0915     Place sequential compression device  Until discontinued         04/27/22 0914     IP VTE HIGH RISK PATIENT  Once         04/27/22 0914     Place sequential compression device  Until discontinued         04/27/22 0914                Thank you for your consult. I will follow-up with patient. Please contact us if you have any additional questions.    Leah Donahue PA-C  Cardiology   [facility]

## 2022-04-28 NOTE — SUBJECTIVE & OBJECTIVE
Past Medical History:   Diagnosis Date    Anticoagulant long-term use     Arthritis     Cancer     Chronic systolic CHF (congestive heart failure) 02/26/2022    Coronary artery disease     Digestive disorder     Myocardial infarction     2/20/22       Past Surgical History:   Procedure Laterality Date    CARDIAC CATHETERIZATION      COLONOSCOPY N/A 9/22/2021    Procedure: COLONOSCOPY;  Surgeon: Meaghan Jaime MD;  Location: [facility] ENDO;  Service: Endoscopy;  Laterality: N/A;    CORONARY ANGIOGRAPHY INCLUDING BYPASS GRAFTS WITH CATHETERIZATION OF LEFT HEART N/A 3/3/2020    Procedure: ANGIOGRAM, CORONARY, INCLUDING BYPASS GRAFT, WITH LEFT HEART CATHETERIZATION;  Surgeon: Ute Melchor MD;  Location: [facility] CATH LAB;  Service: Cardiology;  Laterality: N/A;    JOINT REPLACEMENT Right     knee    KNEE SURGERY      LEFT HEART CATHETERIZATION Left 2/22/2020    Procedure: CATHETERIZATION, HEART, LEFT;  Surgeon: Ute Melchor MD;  Location: [facility] CATH LAB;  Service: Cardiology;  Laterality: Left;    LEFT HEART CATHETERIZATION Left 2/26/2022    Procedure: CATHETERIZATION, HEART, LEFT;  Surgeon: Ute Melchor MD;  Location: [facility] CATH LAB;  Service: Cardiology;  Laterality: Left;    PERCUTANEOUS TRANSLUMINAL BALLOON ANGIOPLASTY OF CORONARY ARTERY Left 2/22/2020    Procedure: Angioplasty-coronary;  Surgeon: Ute Melchor MD;  Location: [facility] CATH LAB;  Service: Cardiology;  Laterality: Left;    PERCUTANEOUS TRANSLUMINAL BALLOON ANGIOPLASTY OF CORONARY ARTERY  2/26/2022    Procedure: Angioplasty-coronary;  Surgeon: Ute Melchor MD;  Location: [facility] CATH LAB;  Service: Cardiology;;    SHOULDER ARTHROSCOPY Right     Dr Bella    simple prostatectomy  06/06/2016    robotic assisted partial prostectomy due to BPH    TOTAL KNEE ARTHROPLASTY         Review of patient's allergies indicates:   Allergen Reactions    Statins-hmg-coa reductase inhibitors Other (See Comments)     SEVERE MYALGIAS AND GI SIDE EFFECTS       No current  facility-administered medications on file prior to encounter.     Current Outpatient Medications on File Prior to Encounter   Medication Sig    aspirin (ECOTRIN) 81 MG EC tablet Take 1 tablet (81 mg total) by mouth once daily.    metoprolol succinate (TOPROL-XL) 25 MG 24 hr tablet Take 0.5 tablets (12.5 mg total) by mouth every evening.    mometasone 0.1% (ELOCON) 0.1 % cream AAA bid prn for psoriasis. Strong steroid.  Do not use on face, underarms or groin.    multivitamin capsule Take 1 capsule by mouth once daily.    nitroGLYCERIN (NITROSTAT) 0.4 MG SL tablet Place 1 tablet (0.4 mg total) under the tongue every 5 (five) minutes as needed for Chest pain.    pantoprazole (PROTONIX) 40 MG tablet Take 1 tablet (40 mg total) by mouth once daily.    ranolazine (RANEXA) 500 MG Tb12 Take 1 tablet (500 mg total) by mouth 2 (two) times daily.    ticagrelor (BRILINTA) 90 mg tablet Take 1 tablet (90 mg total) by mouth 2 (two) times a day.     Family History       Problem Relation (Age of Onset)    Cancer Father    Diabetes Mother, Maternal Aunt    Heart disease Mother          Tobacco Use    Smoking status: Never Smoker    Smokeless tobacco: Former User     Types: Chew     Quit date: 6/3/2004   Substance and Sexual Activity    Alcohol use: Yes     Alcohol/week: 2.0 standard drinks     Types: 2 Glasses of wine per week     Comment: per week    Drug use: No    Sexual activity: Not on file     Review of Systems   Constitutional: Negative.   HENT: Negative.     Eyes: Negative.    Cardiovascular: Negative.    Respiratory: Negative.     Endocrine: Negative.    Hematologic/Lymphatic: Negative.    Skin: Negative.    Musculoskeletal: Negative.    Gastrointestinal:  Positive for bloating and abdominal pain.        Bloody BM's     Genitourinary: Negative.    Neurological: Negative.    Psychiatric/Behavioral: Negative.     Allergic/Immunologic: Negative.    Objective:     Vital Signs (Most Recent):  Temp: 97.6 °F (36.4 °C) (04/28/22  0827)  Pulse: 60 (04/28/22 0827)  Resp: 18 (04/28/22 0827)  BP: (!) 88/55 (04/28/22 0827)  SpO2: 99 % (04/28/22 0827)   Vital Signs (24h Range):  Temp:  [97.3 °F (36.3 °C)-98.7 °F (37.1 °C)] 97.6 °F (36.4 °C)  Pulse:  [55-70] 60  Resp:  [17-18] 18  SpO2:  [90 %-99 %] 99 %  BP: ()/(52-66) 88/55     Weight: 74.2 kg (163 lb 9.3 oz)  Body mass index is 24.87 kg/m².    SpO2: 99 %  O2 Device (Oxygen Therapy): room air      Intake/Output Summary (Last 24 hours) at 4/28/2022 0912  Last data filed at 4/27/2022 2200  Gross per 24 hour   Intake 1860 ml   Output 2002 ml   Net -142 ml       Lines/Drains/Airways       Peripheral Intravenous Line  Duration                  Peripheral IV - Single Lumen 04/27/22 0025 20 G Left Antecubital 1 day                    Physical Exam  Vitals and nursing note reviewed.   Constitutional:       General: He is not in acute distress.     Appearance: Normal appearance. He is well-developed. He is not diaphoretic.   HENT:      Head: Normocephalic and atraumatic.   Eyes:      General:         Right eye: No discharge.         Left eye: No discharge.      Pupils: Pupils are equal, round, and reactive to light.   Neck:      Thyroid: No thyromegaly.      Vascular: No JVD.      Trachea: No tracheal deviation.   Cardiovascular:      Rate and Rhythm: Normal rate and regular rhythm.      Heart sounds: Normal heart sounds, S1 normal and S2 normal. No murmur heard.  Pulmonary:      Effort: Pulmonary effort is normal. No respiratory distress.      Breath sounds: Normal breath sounds. No wheezing or rales.   Abdominal:      General: There is no distension.      Tenderness: There is no rebound.   Musculoskeletal:      Cervical back: Neck supple.      Right lower leg: No edema.      Left lower leg: No edema.   Skin:     General: Skin is warm and dry.      Findings: No erythema.   Neurological:      General: No focal deficit present.      Mental Status: He is alert and oriented to person, place, and time.    Psychiatric:         Mood and Affect: Mood normal.         Behavior: Behavior normal.         Thought Content: Thought content normal.       Significant Labs:   CMP   Recent Labs   Lab 04/26/22  2344 04/28/22  0557    139  139   K 3.9 4.3  4.3    107  107   CO2 25 26  26   GLU 99 132*  132*   BUN 20 15  15   CREATININE 1.2 0.9  0.9   CALCIUM 9.6 7.8*  7.8*   PROT 5.9* 4.0*   ALBUMIN 3.8 2.7*   BILITOT 0.6 1.0   ALKPHOS 55 29*   AST 18 15   ALT 17 14   ANIONGAP 13 6*  6*   ESTGFRAFRICA >60 >60  >60   EGFRNONAA >60 >60  >60   , CBC   Recent Labs   Lab 04/26/22  2344 04/28/22  0116 04/28/22  0557   WBC 10.01 12.58 9.60  9.60   HGB 14.6 11.8*  11.9* 9.7*  9.7*   HCT 41.7 35.2*  34.9* 29.2*  29.2*    183 153  153   , INR No results for input(s): INR, PROTIME in the last 48 hours., Troponin No results for input(s): TROPONINI in the last 48 hours., and All pertinent lab results from the last 24 hours have been reviewed.  Significant Imaging: Echocardiogram: Transthoracic echo (TTE) complete (Cupid Only):   Results for orders placed or performed during the hospital encounter of 04/25/22   Echo   Result Value Ref Range    BSA 1.85 m2    LA WIDTH 5.29 cm    Left Ventricular Outflow Tract Mean Velocity 0.505354722077573 cm/s    Left Ventricular Outflow Tract Mean Gradient 1.15 mmHg    Pulmonary Valve Mean Velocity 0.64 m/s    PV PEAK VELOCITY 1.06 cm/s    LVIDd 5.34 3.5 - 6.0 cm    IVS 0.94 0.6 - 1.1 cm    Posterior Wall 0.81 0.6 - 1.1 cm    Ao root annulus 3.54 cm    LVIDs 4.17 (A) 2.1 - 4.0 cm    FS 22 28 - 44 %    STJ 3.38 cm    Ascending aorta 3.52 cm    LV mass 170.43 g    LA size 3.73 cm    RVDD 2.84 cm    Left Ventricle Relative Wall Thickness 0.30 cm    AV mean gradient 4 mmHg    AV valve area 2.09 cm2    AV Velocity Ratio 0.58     AV index (prosthetic) 0.66     MV valve area p 1/2 method 2.74 cm2    E/A ratio 0.47     E wave deceleration time 276.463267131111327 msec    IVRT  114.351978469571675 msec    Pulm vein S/D ratio 1.54     LVOT diameter 2.01 cm    LVOT area 3.2 cm2    LVOT peak fredo 0.77 m/s    LVOT peak VTI 19.80 cm    Ao peak fredo 1.32 m/s    Ao VTI 30.1 cm    LVOT stroke volume 62.80 cm3    AV peak gradient 7 mmHg    MV Peak E Fredo 0.47 m/s    TR Max Fredo 2.70 m/s    MV stenosis pressure 1/2 time 80.952826975121267 ms    MV Peak A Fredo 1.00 m/s    PV Peak S Fredo 0.123644507551079 m/s    PV Peak D Fredo 0.41 m/s    LV Systolic Volume 77.06 mL    LV Systolic Volume Index 41.7 mL/m2    LV Diastolic Volume 137.70 mL    LV Diastolic Volume Index 74.43 mL/m2    LV Mass Index 92 g/m2    Echo EF Estimated 44 %    RA Major Axis 5.05 cm    Triscuspid Valve Regurgitation Peak Gradient 29 mmHg    LA Volume Index (Mod) 7.8 mL/m2    LA volume (mod) 14.42 cm3    Right Atrial Pressure (from IVC) 3 mmHg    EF 35 %    TV rest pulmonary artery pressure 32 mmHg    Narrative    · The left ventricle is normal in size with moderately decreased systolic   function.  · The estimated ejection fraction is 35%.  · Grade I left ventricular diastolic dysfunction.  · There are segmental left ventricular wall motion abnormalities.  · Mild mitral regurgitation.  · Normal central venous pressure (3 mmHg).  · The estimated PA systolic pressure is 44 mmHg.  · There is pulmonary hypertension.  · With low normal right ventricular systolic function.      , EKG: Reviewed, and X-Ray: CXR: X-Ray Chest 1 View (CXR): No results found for this visit on 04/26/22. and X-Ray Chest PA and Lateral (CXR): No results found for this visit on 04/26/22.

## 2022-04-28 NOTE — ASSESSMENT & PLAN NOTE
Patient is identified as having Combined Systolic and Diastolic heart failure that is Chronic. CHF is currently controlled. Latest ECHO performed and demonstrates- Results for orders placed during the hospital encounter of 04/25/22    Echo    Interpretation Summary  · The left ventricle is normal in size with moderately decreased systolic function.  · The estimated ejection fraction is 35%.  · Grade I left ventricular diastolic dysfunction.  · There are segmental left ventricular wall motion abnormalities.  · Mild mitral regurgitation.  · Normal central venous pressure (3 mmHg).  · The estimated PA systolic pressure is 32 mmHg.  · With low normal right ventricular systolic function.  . Continue Beta Blocker and monitor clinical status closely. Monitor on telemetry. Patient is off CHF pathway.  Monitor strict Is&Os and daily weights.  Place on fluid restriction of 2 L. Continue to stress to patient importance of self efficacy and  on diet for CHF. Last BNP reviewed.  -supplemental oxygen as needed   -gentle hydration as needed post procedure   -monitor for volume overload and diurese as needed   -4/28/22- Lopressor held due to hypotension- BNP elevated- IVF decreased and Lasix to be given

## 2022-04-28 NOTE — PLAN OF CARE
PT SEATED EOB IND HOWEVER BECAME WEAK AND REPORTED SEEING FUZZY AS BP WAS LOW THIS MORNING. PT RETURNED SUP IN BED

## 2022-04-28 NOTE — NURSING
Received pt AAOx4 in stable condition. No s/s of acute distress noted. No complaint of any pain thus far. Pt resting comfortably in bed. Comfort and safety measures in place. Will continue cardiac monitoring. Continue POC.

## 2022-04-28 NOTE — NURSING
Nurse called into the pt room by PCT. Pt have stool with blood clots on the floor. When assessed pt stated he have been pooping clots all day. This was the first occurrences throughout the night. MD made aware.

## 2022-04-28 NOTE — ASSESSMENT & PLAN NOTE
-NSTEMI and stents placed on 2/2022  -Home medications resumed   -Will resume Ranexa when appropriate   -Cardiology following

## 2022-04-28 NOTE — ASSESSMENT & PLAN NOTE
-Clinically compensated  -Continue low dose BB as BP permits  -Patient previously declined initiation of Entresto/ARB  -Echo 4/22 showed EF of 35%    .

## 2022-04-29 LAB
ALBUMIN SERPL BCP-MCNC: 3 G/DL (ref 3.5–5.2)
ALP SERPL-CCNC: 33 U/L (ref 55–135)
ALT SERPL W/O P-5'-P-CCNC: 14 U/L (ref 10–44)
ANION GAP SERPL CALC-SCNC: 4 MMOL/L (ref 8–16)
AST SERPL-CCNC: 19 U/L (ref 10–40)
BASOPHILS # BLD AUTO: 0.04 K/UL (ref 0–0.2)
BASOPHILS NFR BLD: 0.6 % (ref 0–1.9)
BILIRUB SERPL-MCNC: 1 MG/DL (ref 0.1–1)
BUN SERPL-MCNC: 13 MG/DL (ref 8–23)
CALCIUM SERPL-MCNC: 8.4 MG/DL (ref 8.7–10.5)
CHLORIDE SERPL-SCNC: 109 MMOL/L (ref 95–110)
CO2 SERPL-SCNC: 30 MMOL/L (ref 23–29)
CREAT SERPL-MCNC: 0.9 MG/DL (ref 0.5–1.4)
DIFFERENTIAL METHOD: ABNORMAL
EOSINOPHIL # BLD AUTO: 0.2 K/UL (ref 0–0.5)
EOSINOPHIL NFR BLD: 2.8 % (ref 0–8)
ERYTHROCYTE [DISTWIDTH] IN BLOOD BY AUTOMATED COUNT: 16 % (ref 11.5–14.5)
EST. GFR  (AFRICAN AMERICAN): >60 ML/MIN/1.73 M^2
EST. GFR  (NON AFRICAN AMERICAN): >60 ML/MIN/1.73 M^2
GLUCOSE SERPL-MCNC: 103 MG/DL (ref 70–110)
HCT VFR BLD AUTO: 26.7 % (ref 40–54)
HCT VFR BLD AUTO: 28.6 % (ref 40–54)
HCT VFR BLD AUTO: 28.6 % (ref 40–54)
HCT VFR BLD AUTO: 30.1 % (ref 40–54)
HCT VFR BLD AUTO: 32.7 % (ref 40–54)
HGB BLD-MCNC: 10.3 G/DL (ref 14–18)
HGB BLD-MCNC: 10.4 G/DL (ref 14–18)
HGB BLD-MCNC: 9 G/DL (ref 14–18)
HGB BLD-MCNC: 9.6 G/DL (ref 14–18)
HGB BLD-MCNC: 9.6 G/DL (ref 14–18)
IMM GRANULOCYTES # BLD AUTO: 0.05 K/UL (ref 0–0.04)
IMM GRANULOCYTES NFR BLD AUTO: 0.7 % (ref 0–0.5)
INR PPP: 1 (ref 0.8–1.2)
LYMPHOCYTES # BLD AUTO: 1 K/UL (ref 1–4.8)
LYMPHOCYTES NFR BLD: 13.8 % (ref 18–48)
MCH RBC QN AUTO: 30.8 PG (ref 27–31)
MCHC RBC AUTO-ENTMCNC: 33.6 G/DL (ref 32–36)
MCV RBC AUTO: 92 FL (ref 82–98)
MONOCYTES # BLD AUTO: 0.6 K/UL (ref 0.3–1)
MONOCYTES NFR BLD: 8.8 % (ref 4–15)
NEUTROPHILS # BLD AUTO: 5.2 K/UL (ref 1.8–7.7)
NEUTROPHILS NFR BLD: 73.3 % (ref 38–73)
NRBC BLD-RTO: 0 /100 WBC
PLATELET # BLD AUTO: 153 K/UL (ref 150–450)
PMV BLD AUTO: 9.9 FL (ref 9.2–12.9)
POTASSIUM SERPL-SCNC: 4.1 MMOL/L (ref 3.5–5.1)
PROT SERPL-MCNC: 4.7 G/DL (ref 6–8.4)
PROTHROMBIN TIME: 10.5 SEC (ref 9–12.5)
RBC # BLD AUTO: 3.12 M/UL (ref 4.6–6.2)
SODIUM SERPL-SCNC: 143 MMOL/L (ref 136–145)
WBC # BLD AUTO: 7.04 K/UL (ref 3.9–12.7)

## 2022-04-29 PROCEDURE — 25000003 PHARM REV CODE 250: Performed by: SURGERY

## 2022-04-29 PROCEDURE — 99900035 HC TECH TIME PER 15 MIN (STAT)

## 2022-04-29 PROCEDURE — 85018 HEMOGLOBIN: CPT | Mod: 91 | Performed by: NURSE PRACTITIONER

## 2022-04-29 PROCEDURE — 97110 THERAPEUTIC EXERCISES: CPT

## 2022-04-29 PROCEDURE — 85018 HEMOGLOBIN: CPT | Performed by: FAMILY MEDICINE

## 2022-04-29 PROCEDURE — 99233 SBSQ HOSP IP/OBS HIGH 50: CPT | Mod: ICN,CMP,, | Performed by: PHYSICIAN ASSISTANT

## 2022-04-29 PROCEDURE — 80053 COMPREHEN METABOLIC PANEL: CPT | Performed by: NURSE PRACTITIONER

## 2022-04-29 PROCEDURE — 99233 PR SUBSEQUENT HOSPITAL CARE,LEVL III: ICD-10-PCS | Mod: ICN,CMP,, | Performed by: PHYSICIAN ASSISTANT

## 2022-04-29 PROCEDURE — 36415 COLL VENOUS BLD VENIPUNCTURE: CPT | Performed by: NURSE PRACTITIONER

## 2022-04-29 PROCEDURE — 21400001 HC TELEMETRY ROOM

## 2022-04-29 PROCEDURE — 36415 COLL VENOUS BLD VENIPUNCTURE: CPT | Performed by: FAMILY MEDICINE

## 2022-04-29 PROCEDURE — 94761 N-INVAS EAR/PLS OXIMETRY MLT: CPT

## 2022-04-29 PROCEDURE — 97116 GAIT TRAINING THERAPY: CPT

## 2022-04-29 PROCEDURE — 25000003 PHARM REV CODE 250: Performed by: EMERGENCY MEDICINE

## 2022-04-29 PROCEDURE — 85014 HEMATOCRIT: CPT | Mod: 91 | Performed by: NURSE PRACTITIONER

## 2022-04-29 PROCEDURE — 85025 COMPLETE CBC W/AUTO DIFF WBC: CPT | Performed by: NURSE PRACTITIONER

## 2022-04-29 PROCEDURE — 85014 HEMATOCRIT: CPT | Performed by: FAMILY MEDICINE

## 2022-04-29 PROCEDURE — 25000003 PHARM REV CODE 250: Performed by: NURSE PRACTITIONER

## 2022-04-29 PROCEDURE — 94799 UNLISTED PULMONARY SVC/PX: CPT

## 2022-04-29 PROCEDURE — 85610 PROTHROMBIN TIME: CPT | Performed by: NURSE PRACTITIONER

## 2022-04-29 RX ADMIN — CHLORHEXIDINE GLUCONATE 0.12% ORAL RINSE 10 ML: 1.2 LIQUID ORAL at 09:04

## 2022-04-29 RX ADMIN — METOPROLOL TARTRATE 12.5 MG: 25 TABLET, FILM COATED ORAL at 09:04

## 2022-04-29 RX ADMIN — PANTOPRAZOLE SODIUM 40 MG: 40 TABLET, DELAYED RELEASE ORAL at 09:04

## 2022-04-29 RX ADMIN — CHLORHEXIDINE GLUCONATE 0.12% ORAL RINSE 10 ML: 1.2 LIQUID ORAL at 08:04

## 2022-04-29 RX ADMIN — FAMOTIDINE 20 MG: 10 INJECTION, SOLUTION INTRAVENOUS at 09:04

## 2022-04-29 RX ADMIN — FAMOTIDINE 20 MG: 10 INJECTION, SOLUTION INTRAVENOUS at 08:04

## 2022-04-29 RX ADMIN — ASPIRIN 81 MG: 81 TABLET, COATED ORAL at 09:04

## 2022-04-29 RX ADMIN — TICAGRELOR 90 MG: 90 TABLET ORAL at 09:04

## 2022-04-29 RX ADMIN — ACETAMINOPHEN 650 MG: 325 TABLET ORAL at 08:04

## 2022-04-29 RX ADMIN — OXYCODONE HYDROCHLORIDE 5 MG: 5 TABLET ORAL at 04:04

## 2022-04-29 RX ADMIN — OXYCODONE HYDROCHLORIDE 10 MG: 5 TABLET ORAL at 09:04

## 2022-04-29 RX ADMIN — Medication 9 MG: at 09:04

## 2022-04-29 RX ADMIN — ACETAMINOPHEN 650 MG: 325 TABLET ORAL at 10:04

## 2022-04-29 NOTE — SUBJECTIVE & OBJECTIVE
Review of Systems   Constitutional: Positive for malaise/fatigue.   HENT: Negative.     Eyes: Negative.    Cardiovascular: Negative.    Respiratory: Negative.     Endocrine: Negative.    Hematologic/Lymphatic: Negative.    Skin: Negative.    Musculoskeletal: Negative.    Gastrointestinal: Negative.    Genitourinary: Negative.    Neurological:  Positive for dizziness and light-headedness.   Psychiatric/Behavioral: Negative.     Allergic/Immunologic: Negative.    Objective:     Vital Signs (Most Recent):  Temp: 97.6 °F (36.4 °C) (04/29/22 0710)  Pulse: 70 (04/29/22 0710)  Resp: 18 (04/29/22 0710)  BP: 133/72 (04/29/22 0710)  SpO2: 95 % (04/29/22 0710) Vital Signs (24h Range):  Temp:  [97.5 °F (36.4 °C)-98.8 °F (37.1 °C)] 97.6 °F (36.4 °C)  Pulse:  [59-80] 70  Resp:  [14-19] 18  SpO2:  [95 %-99 %] 95 %  BP: ()/(54-72) 133/72     Weight: 70.7 kg (155 lb 13.8 oz)  Body mass index is 23.7 kg/m².     SpO2: 95 %  O2 Device (Oxygen Therapy): room air      Intake/Output Summary (Last 24 hours) at 4/29/2022 1157  Last data filed at 4/29/2022 0900  Gross per 24 hour   Intake 1178.02 ml   Output --   Net 1178.02 ml       Lines/Drains/Airways       Peripheral Intravenous Line  Duration                  Peripheral IV - Single Lumen 04/29/22 0320 20 G Posterior;Right Forearm <1 day                    Physical Exam  Vitals and nursing note reviewed.   Constitutional:       General: He is not in acute distress.     Appearance: Normal appearance. He is well-developed. He is not diaphoretic.   HENT:      Head: Normocephalic and atraumatic.   Eyes:      General:         Right eye: No discharge.         Left eye: No discharge.      Pupils: Pupils are equal, round, and reactive to light.   Neck:      Thyroid: No thyromegaly.      Vascular: No JVD.      Trachea: No tracheal deviation.   Cardiovascular:      Rate and Rhythm: Normal rate and regular rhythm.      Heart sounds: Normal heart sounds, S1 normal and S2 normal. No murmur  heard.  Pulmonary:      Effort: Pulmonary effort is normal. No respiratory distress.      Breath sounds: Normal breath sounds. No wheezing or rales.   Abdominal:      General: There is no distension.      Palpations: Abdomen is soft.      Tenderness: There is no rebound.   Musculoskeletal:      Cervical back: Neck supple.      Right lower leg: No edema.      Left lower leg: No edema.   Skin:     General: Skin is warm and dry.      Findings: No erythema.   Neurological:      Mental Status: He is alert and oriented to person, place, and time.   Psychiatric:         Mood and Affect: Mood normal.         Behavior: Behavior normal.         Thought Content: Thought content normal.       Significant Labs: CMP   Recent Labs   Lab 04/28/22  0557 04/29/22  0529     139 143   K 4.3  4.3 4.1     107 109   CO2 26  26 30*   *  132* 103   BUN 15  15 13   CREATININE 0.9  0.9 0.9   CALCIUM 7.8*  7.8* 8.4*   PROT 4.0* 4.7*   ALBUMIN 2.7* 3.0*   BILITOT 1.0 1.0   ALKPHOS 29* 33*   AST 15 19   ALT 14 14   ANIONGAP 6*  6* 4*   ESTGFRAFRICA >60  >60 >60   EGFRNONAA >60  >60 >60   , CBC   Recent Labs   Lab 04/28/22  0116 04/28/22  0557 04/28/22  0935 04/28/22  1838 04/28/22  2351 04/29/22  0529   WBC 12.58 9.60  9.60  --   --   --  7.04   HGB 11.8*  11.9* 9.7*  9.7*   < > 8.5* 9.0* 9.6*  9.6*   HCT 35.2*  34.9* 29.2*  29.2*   < > 25.4* 26.7* 28.6*  28.6*    153  153  --   --   --  153    < > = values in this interval not displayed.   , Troponin   Recent Labs   Lab 04/28/22  0935   TROPONINI <0.006   , and All pertinent lab results from the last 24 hours have been reviewed.    Significant Imaging: Echocardiogram: Transthoracic echo (TTE) complete (Cupid Only):   Results for orders placed or performed during the hospital encounter of 04/25/22   Echo   Result Value Ref Range    BSA 1.85 m2    LA WIDTH 5.29 cm    Left Ventricular Outflow Tract Mean Velocity 0.930364080044983 cm/s    Left  Ventricular Outflow Tract Mean Gradient 1.15 mmHg    Pulmonary Valve Mean Velocity 0.64 m/s    PV PEAK VELOCITY 1.06 cm/s    LVIDd 5.34 3.5 - 6.0 cm    IVS 0.94 0.6 - 1.1 cm    Posterior Wall 0.81 0.6 - 1.1 cm    Ao root annulus 3.54 cm    LVIDs 4.17 (A) 2.1 - 4.0 cm    FS 22 28 - 44 %    STJ 3.38 cm    Ascending aorta 3.52 cm    LV mass 170.43 g    LA size 3.73 cm    RVDD 2.84 cm    Left Ventricle Relative Wall Thickness 0.30 cm    AV mean gradient 4 mmHg    AV valve area 2.09 cm2    AV Velocity Ratio 0.58     AV index (prosthetic) 0.66     MV valve area p 1/2 method 2.74 cm2    E/A ratio 0.47     E wave deceleration time 276.597866346134808 msec    IVRT [id number] msec    Pulm vein S/D ratio 1.54     LVOT diameter 2.01 cm    LVOT area 3.2 cm2    LVOT peak fredo 0.77 m/s    LVOT peak VTI 19.80 cm    Ao peak fredo 1.32 m/s    Ao VTI 30.1 cm    LVOT stroke volume 62.80 cm3    AV peak gradient 7 mmHg    MV Peak E Fredo 0.47 m/s    TR Max Fredo 2.70 m/s    MV stenosis pressure 1/2 time 80.125052144313150 ms    MV Peak A Fredo 1.00 m/s    PV Peak S Fredo 0.992199242555691 m/s    PV Peak D Fredo 0.41 m/s    LV Systolic Volume 77.06 mL    LV Systolic Volume Index 41.7 mL/m2    LV Diastolic Volume 137.70 mL    LV Diastolic Volume Index 74.43 mL/m2    LV Mass Index 92 g/m2    Echo EF Estimated 44 %    RA Major Axis 5.05 cm    Triscuspid Valve Regurgitation Peak Gradient 29 mmHg    LA Volume Index (Mod) 7.8 mL/m2    LA volume (mod) 14.42 cm3    Right Atrial Pressure (from IVC) 3 mmHg    EF 35 %    TV rest pulmonary artery pressure 32 mmHg    Narrative    · The left ventricle is normal in size with moderately decreased systolic   function.  · The estimated ejection fraction is 35%.  · Grade I left ventricular diastolic dysfunction.  · There are segmental left ventricular wall motion abnormalities.  · Mild mitral regurgitation.  · Normal central venous pressure (3 mmHg).  · The estimated PA systolic pressure is 44 mmHg.  · There is  pulmonary hypertension.  · With low normal right ventricular systolic function.      , EKG: Reviewed, and X-Ray: CXR: X-Ray Chest 1 View (CXR):   Results for orders placed or performed during the hospital encounter of 04/26/22   X-Ray Chest 1 View    Narrative    EXAMINATION:  XR CHEST 1 VIEW    CLINICAL HISTORY:  fluid overload;    FINDINGS:  Single view of the chest.    Comparison 04/27/2022.    Cardiac silhouette is enlarged but stable.  The lungs demonstrate no evidence of active disease.  No evidence of pleural effusion or pneumothorax.  Bones appear intact.  Scattered degenerative change.  Aorta demonstrates atherosclerotic disease.    Air beneath the right hemidiaphragm thought to be postoperative from surgery 4/27/22.      Impression    Free air beneath the right hemidiaphragm thought to be postoperative in nature.  Continued follow-up is recommended.      Electronically signed by: Kayden Yañez MD  Date:    04/28/2022  Time:    10:37    and X-Ray Chest PA and Lateral (CXR): No results found for this visit on 04/26/22.

## 2022-04-29 NOTE — ASSESSMENT & PLAN NOTE
-s/p ex-lap and right hemicolectomy   -Post-op mgmt as per general surgery    4/29/22  -Mgmt as per general surgery

## 2022-04-29 NOTE — PROGRESS NOTES
[facility]  Cardiology  Progress Note    Patient Name: Mekhi Lambert  MRN: [id number]  Admission Date: 4/26/2022  Hospital Length of Stay: 2 days  Code Status: Full Code   Attending Physician: See Dominguez MD   Primary Care Physician: ABIGAIL Lopez Jr, MD  Expected Discharge Date:   Principal Problem:Cecal volvulus    Subjective:   HPI:  Mr. Lambert is a 71 year old male patient whose current medical conditions include CAD s/p prior MI with PCI of LAD and LCX in 2020 and recent NSTEMI in 2/22 with PCI of LAD/diagonal, hyperlipidemia, and chronic systolic CHF/ICM with EF of 35% who presented to [facility] ED on 4/26/22 with a chief complaint of worsening epigastric/abdominal pain over the past day. Associated symptoms included abdominal bloating and unintentional weight loss. Patient denied any associated diane chest pain, SOB, fever, or chills. CT scan done in ER showed cecal volvulus and 19 mm splenic artery aneurysm noted which is slightly larger than was seen on prior exam 06/22/2020 and patient admitted for further evaluation and treatment. General surgery performed ex-lap with right hemicolectomy yesterday. Overnight, patient was noted to have bloody BM's and caridology was consulted regarding anti-platelet recommendations. Patietn seen and examined today, resting in bed. Feels ok. Reports weakness, intermittent lightheadedness and dizziness. No chest pain or SOB. No bloody BM noted today. Followed routinely in clinic by Dr. Sutton, on ASA and Brilinta as OP. Echo 4/21 showed EF of 35%, DD. BNP stable at 180. H/H dipped to 9.1/27.6.     Hospital Course:   4/29/22-Patient seen and examined today, lying in bed. Admits to fatigue/weakness. Still having bloody BM per his report. No chest pain or SOB. Transfused overnight. H/H 9.6/28.6. Prolonged discussion with patient regarding importance of continuation of DAPT in light of recent NSTEMI/LAD stent.         Review of Systems   Constitutional: Positive for  malaise/fatigue.   HENT: Negative.     Eyes: Negative.    Cardiovascular: Negative.    Respiratory: Negative.     Endocrine: Negative.    Hematologic/Lymphatic: Negative.    Skin: Negative.    Musculoskeletal: Negative.    Gastrointestinal: Negative.    Genitourinary: Negative.    Neurological:  Positive for dizziness and light-headedness.   Psychiatric/Behavioral: Negative.     Allergic/Immunologic: Negative.    Objective:     Vital Signs (Most Recent):  Temp: 97.6 °F (36.4 °C) (04/29/22 0710)  Pulse: 70 (04/29/22 0710)  Resp: 18 (04/29/22 0710)  BP: 133/72 (04/29/22 0710)  SpO2: 95 % (04/29/22 0710) Vital Signs (24h Range):  Temp:  [97.5 °F (36.4 °C)-98.8 °F (37.1 °C)] 97.6 °F (36.4 °C)  Pulse:  [59-80] 70  Resp:  [14-19] 18  SpO2:  [95 %-99 %] 95 %  BP: ()/(54-72) 133/72     Weight: 70.7 kg (155 lb 13.8 oz)  Body mass index is 23.7 kg/m².     SpO2: 95 %  O2 Device (Oxygen Therapy): room air      Intake/Output Summary (Last 24 hours) at 4/29/2022 1157  Last data filed at 4/29/2022 0900  Gross per 24 hour   Intake 1178.02 ml   Output --   Net 1178.02 ml       Lines/Drains/Airways       Peripheral Intravenous Line  Duration                  Peripheral IV - Single Lumen 04/29/22 0320 20 G Posterior;Right Forearm <1 day                    Physical Exam  Vitals and nursing note reviewed.   Constitutional:       General: He is not in acute distress.     Appearance: Normal appearance. He is well-developed. He is not diaphoretic.   HENT:      Head: Normocephalic and atraumatic.   Eyes:      General:         Right eye: No discharge.         Left eye: No discharge.      Pupils: Pupils are equal, round, and reactive to light.   Neck:      Thyroid: No thyromegaly.      Vascular: No JVD.      Trachea: No tracheal deviation.   Cardiovascular:      Rate and Rhythm: Normal rate and regular rhythm.      Heart sounds: Normal heart sounds, S1 normal and S2 normal. No murmur heard.  Pulmonary:      Effort: Pulmonary effort is  normal. No respiratory distress.      Breath sounds: Normal breath sounds. No wheezing or rales.   Abdominal:      General: There is no distension.      Palpations: Abdomen is soft.      Tenderness: There is no rebound.   Musculoskeletal:      Cervical back: Neck supple.      Right lower leg: No edema.      Left lower leg: No edema.   Skin:     General: Skin is warm and dry.      Findings: No erythema.   Neurological:      Mental Status: He is alert and oriented to person, place, and time.   Psychiatric:         Mood and Affect: Mood normal.         Behavior: Behavior normal.         Thought Content: Thought content normal.       Significant Labs: CMP   Recent Labs   Lab 04/28/22  0557 04/29/22  0529     139 143   K 4.3  4.3 4.1     107 109   CO2 26  26 30*   *  132* 103   BUN 15  15 13   CREATININE 0.9  0.9 0.9   CALCIUM 7.8*  7.8* 8.4*   PROT 4.0* 4.7*   ALBUMIN 2.7* 3.0*   BILITOT 1.0 1.0   ALKPHOS 29* 33*   AST 15 19   ALT 14 14   ANIONGAP 6*  6* 4*   ESTGFRAFRICA >60  >60 >60   EGFRNONAA >60  >60 >60   , CBC   Recent Labs   Lab 04/28/22  0116 04/28/22  0557 04/28/22  0935 04/28/22  1838 04/28/22  2351 04/29/22  0529   WBC 12.58 9.60  9.60  --   --   --  7.04   HGB 11.8*  11.9* 9.7*  9.7*   < > 8.5* 9.0* 9.6*  9.6*   HCT 35.2*  34.9* 29.2*  29.2*   < > 25.4* 26.7* 28.6*  28.6*    153  153  --   --   --  153    < > = values in this interval not displayed.   , Troponin   Recent Labs   Lab 04/28/22  0935   TROPONINI <0.006   , and All pertinent lab results from the last 24 hours have been reviewed.    Significant Imaging: Echocardiogram: Transthoracic echo (TTE) complete (Cupid Only):   Results for orders placed or performed during the hospital encounter of 04/25/22   Echo   Result Value Ref Range    BSA 1.85 m2    LA WIDTH 5.29 cm    Left Ventricular Outflow Tract Mean Velocity 0.207493534047436 cm/s    Left Ventricular Outflow Tract Mean Gradient 1.15 mmHg    Pulmonary  Valve Mean Velocity 0.64 m/s    PV PEAK VELOCITY 1.06 cm/s    LVIDd 5.34 3.5 - 6.0 cm    IVS 0.94 0.6 - 1.1 cm    Posterior Wall 0.81 0.6 - 1.1 cm    Ao root annulus 3.54 cm    LVIDs 4.17 (A) 2.1 - 4.0 cm    FS 22 28 - 44 %    STJ 3.38 cm    Ascending aorta 3.52 cm    LV mass 170.43 g    LA size 3.73 cm    RVDD 2.84 cm    Left Ventricle Relative Wall Thickness 0.30 cm    AV mean gradient 4 mmHg    AV valve area 2.09 cm2    AV Velocity Ratio 0.58     AV index (prosthetic) 0.66     MV valve area p 1/2 method 2.74 cm2    E/A ratio 0.47     E wave deceleration time 276.456761585325113 msec    IVRT 114.232748339085748 msec    Pulm vein S/D ratio 1.54     LVOT diameter 2.01 cm    LVOT area 3.2 cm2    LVOT peak fredo 0.77 m/s    LVOT peak VTI 19.80 cm    Ao peak fredo 1.32 m/s    Ao VTI 30.1 cm    LVOT stroke volume 62.80 cm3    AV peak gradient 7 mmHg    MV Peak E Fredo 0.47 m/s    TR Max Fredo 2.70 m/s    MV stenosis pressure 1/2 time 80.966535699646656 ms    MV Peak A Fredo 1.00 m/s    PV Peak S Fredo 0.539732786115569 m/s    PV Peak D Fredo 0.41 m/s    LV Systolic Volume 77.06 mL    LV Systolic Volume Index 41.7 mL/m2    LV Diastolic Volume 137.70 mL    LV Diastolic Volume Index 74.43 mL/m2    LV Mass Index 92 g/m2    Echo EF Estimated 44 %    RA Major Axis 5.05 cm    Triscuspid Valve Regurgitation Peak Gradient 29 mmHg    LA Volume Index (Mod) 7.8 mL/m2    LA volume (mod) 14.42 cm3    Right Atrial Pressure (from IVC) 3 mmHg    EF 35 %    TV rest pulmonary artery pressure 32 mmHg    Narrative    · The left ventricle is normal in size with moderately decreased systolic   function.  · The estimated ejection fraction is 35%.  · Grade I left ventricular diastolic dysfunction.  · There are segmental left ventricular wall motion abnormalities.  · Mild mitral regurgitation.  · Normal central venous pressure (3 mmHg).  · The estimated PA systolic pressure is 44 mmHg.  · There is pulmonary hypertension.  · With low normal right ventricular  systolic function.      , EKG: Reviewed, and X-Ray: CXR: X-Ray Chest 1 View (CXR):   Results for orders placed or performed during the hospital encounter of 04/26/22   X-Ray Chest 1 View    Narrative    EXAMINATION:  XR CHEST 1 VIEW    CLINICAL HISTORY:  fluid overload;    FINDINGS:  Single view of the chest.    Comparison 04/27/2022.    Cardiac silhouette is enlarged but stable.  The lungs demonstrate no evidence of active disease.  No evidence of pleural effusion or pneumothorax.  Bones appear intact.  Scattered degenerative change.  Aorta demonstrates atherosclerotic disease.    Air beneath the right hemidiaphragm thought to be postoperative from surgery 4/27/22.      Impression    Free air beneath the right hemidiaphragm thought to be postoperative in nature.  Continued follow-up is recommended.      Electronically signed by: Kayden Yañez MD  Date:    04/28/2022  Time:    10:37    and X-Ray Chest PA and Lateral (CXR): No results found for this visit on 04/26/22.    Assessment and Plan:   Patient who presents with cecal volvulus s/p right hemicolectomy. On DAPT as OP due to recent NSTEMI/stent placement. Explained would continue DAPT if at all possible and only stop if bleeding was life-threatening. Discussed in detail with patient and general surgeyr.    * Cecal volvulus  -s/p ex-lap and right hemicolectomy   -Post-op mgmt as per general surgery    4/29/22  -Mgmt as per general surgery      Acute blood loss anemia  -Transfused overnight  -Mgmt as per hospital medicine    Chronic systolic CHF (congestive heart failure)  -Clinically compensated  -Continue low dose BB as BP permits  -Patient previously declined initiation of Entresto/ARB  -Echo 4/22 showed EF of 35%    Coronary artery disease of native artery of native heart with  angina pectoris  -See plan above    H/O non-ST elevation myocardial infarction (NSTEMI)  -Patient with prior NSTEMI 2/26/22 with successful PCI of LAD/diagonal, on ASA and Brilinta as  OP  -Given recent MI and stent placement, recommend continuation of ASA and Brilinta given risk of stent thrombosis     4/29/22  -Prolonged discussion with patient regarding need to continue DAPT if at all possible given recent stent placement/risk of stent thrombosis   -Explained would only stop DAPT if bleeding was life-threatening  -Discussed with general surgery as well    Hypercholesterolemia  -Intolerant to statin therapy          VTE Risk Mitigation (From admission, onward)         Ordered     Place sequential compression device  Until discontinued         04/27/22 0914     IP VTE HIGH RISK PATIENT  Once         04/27/22 0914     Place sequential compression device  Until discontinued         04/27/22 0914                Leah Donahue PA-C  Cardiology  [facility]

## 2022-04-29 NOTE — ASSESSMENT & PLAN NOTE
-Patient with prior NSTEMI 2/26/22 with successful PCI of LAD/diagonal, on ASA and Brilinta as OP  -Given recent MI and stent placement, recommend continuation of ASA and Brilinta given risk of stent thrombosis     4/29/22  -Prolonged discussion with patient regarding need to continue DAPT if at all possible given recent stent placement/risk of stent thrombosis   -Explained would only stop DAPT if bleeding was life-threatening  -Discussed with general surgery as well

## 2022-04-29 NOTE — PROGRESS NOTES
[facility] Medicine  Progress Note    Patient Name: Mekhi Lambert  MRN: [id number]  Patient Class: IP- Inpatient   Admission Date: 4/26/2022  Length of Stay: 2 days  Attending Physician: See Dominguez MD  Primary Care Provider: ABIGAIL Lopez Jr, MD        Subjective:     Principal Problem:Cecal volvulus        HPI:  Mekhi Lambert is a 71-year-old male with PMHX of Cancer, Long-term anticoagulation, CHF, CAD, Digestive disorder, and MI (2/2022) who was referred to General Surgery for volvulus.  Patient reports epigastric abdominal discomfort with abdominal bloating. Associated symptoms include a decreased sensation and difficulty with needing to have a bowel movement for the past few months.  He also reports intentional weight loss of approximately 30 lbs.  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 Generally visceral aneurysms are treated greater than 2 cm. ER discussed findings with General Surgery and pt taken to OR for Exploratory laparotomy with Right hemicolectomy mobilization of hepatic flexure. Pt reports no immediate complication. Hospital Medicine contacted for medical management post procedure. Pt admitted to Medical Surgical Unit for further evaluation.          Overview/Hospital Course:  Pt admitted to Medical Surgical unit s/p Exploratory laparotomy- Right hemicolectomy mobilization of hepatic flexure on 4/27/22. CT showed cecal volvulus and 19 mm splenic artery aneurysm noted which is slightly larger than was seen on prior exam 06/22/2020. Generally visceral aneurysms are treated greater than 2 cm.  Hsospital Medicine consulted post procedure for medical management. Pt remained on ASA and Brilinta due to recent NSTEMI and stent placement 2/2022 with high risk of bleeding discussed with patient prior to procedure. Pt had multiple stools with blood clots post procedure. H/H trending  down with serial results in progress. Echo 4/21 showed EF of 35%, DD. BNP stable at 180. Cardiology consulted with medications continued due to risk of stent thrombosis, recommend continuation of ASA and Brilinta and would avoid discontinuation if possible. Pt H/H declining with pt verbalized dizziness, weakness, and hypotension of note and PRBCs ordered for transfusion. IVF decreased and Lasix given with BNP elevated in the setting of CHF with EF 25-30%. Lopressor held. Case discussed with primary team.     4/29 Hbg stable this morning. Patient reports 1 episode stool mixed with bright red and dark red blood over night. Hemodynamics stable. Patient reports resolution of dizziness and weakness. No further surgical intervention planned. Pt to continue ASA and Brilinta. He discussed need for dual DAPT at length with cardiology. Repeat Hbg/Hct this afternoon. Will monitor overnight. Anticipate dc 4/30/22.      Interval History: see hospital course     Review of Systems   Constitutional:  Negative for chills and fever.   Respiratory:  Negative for shortness of breath.    Cardiovascular:  Negative for chest pain.   Gastrointestinal:  Positive for blood in stool. Negative for abdominal pain, diarrhea, nausea and vomiting.   Neurological:  Negative for syncope, weakness and light-headedness.   Objective:     Vital Signs (Most Recent):  Temp: 98.7 °F (37.1 °C) (04/29/22 1604)  Pulse: 71 (04/29/22 1604)  Resp: 18 (04/29/22 1604)  BP: 117/63 (04/29/22 1604)  SpO2: 100 % (04/29/22 1604) Vital Signs (24h Range):  Temp:  [97.6 °F (36.4 °C)-98.8 °F (37.1 °C)] 98.7 °F (37.1 °C)  Pulse:  [59-80] 71  Resp:  [14-19] 18  SpO2:  [95 %-100 %] 100 %  BP: (109-133)/(57-72) 117/63     Weight: 70.7 kg (155 lb 13.8 oz)  Body mass index is 23.7 kg/m².    Intake/Output Summary (Last 24 hours) at 4/29/2022 7759  Last data filed at 4/29/2022 1400  Gross per 24 hour   Intake 1418.02 ml   Output --   Net 1418.02 ml      Physical  Exam  Constitutional:       General: He is not in acute distress.     Appearance: He is well-developed. He is not ill-appearing.      Comments: Anxious     HENT:      Head: Normocephalic and atraumatic.   Eyes:      Conjunctiva/sclera: Conjunctivae normal.      Pupils: Pupils are equal, round, and reactive to light.   Neck:      Vascular: No JVD.   Cardiovascular:      Rate and Rhythm: Normal rate and regular rhythm.      Heart sounds: Normal heart sounds.   Pulmonary:      Effort: Pulmonary effort is normal. No respiratory distress.      Breath sounds: Normal breath sounds. No wheezing.   Abdominal:      General: Bowel sounds are normal. There is no distension.      Palpations: Abdomen is soft.      Tenderness: There is no abdominal tenderness. There is no guarding.      Comments: Abdomen soft, abdominal incision healing well    Musculoskeletal:         General: No tenderness. Normal range of motion.      Cervical back: Normal range of motion and neck supple.   Skin:     General: Skin is warm and dry.      Capillary Refill: Capillary refill takes less than 2 seconds.      Findings: No erythema.   Neurological:      Mental Status: He is alert and oriented to person, place, and time.   Psychiatric:         Behavior: Behavior normal.       Significant Labs: All pertinent labs within the past 24 hours have been reviewed.  CBC:   Recent Labs   Lab 04/28/22  0116 04/28/22  0557 04/28/22  0935 04/28/22  2351 04/29/22  0529 04/29/22  1203   WBC 12.58 9.60  9.60  --   --  7.04  --    HGB 11.8*  11.9* 9.7*  9.7*   < > 9.0* 9.6*  9.6* 10.4*   HCT 35.2*  34.9* 29.2*  29.2*   < > 26.7* 28.6*  28.6* 32.7*    153  153  --   --  153  --     < > = values in this interval not displayed.     CMP:   Recent Labs   Lab 04/28/22  0557 04/29/22  0529     139 143   K 4.3  4.3 4.1     107 109   CO2 26  26 30*   *  132* 103   BUN 15  15 13   CREATININE 0.9  0.9 0.9   CALCIUM 7.8*  7.8* 8.4*   PROT  4.0* 4.7*   ALBUMIN 2.7* 3.0*   BILITOT 1.0 1.0   ALKPHOS 29* 33*   AST 15 19   ALT 14 14   ANIONGAP 6*  6* 4*   EGFRNONAA >60  >60 >60       Significant Imaging: I have reviewed all pertinent imaging results/findings within the past 24 hours.      Assessment/Plan:      * Cecal volvulus  -General Surgery- primary team  -s/p Exploratory laparotomy- Right hemicolectomy mobilization of hepatic flexure  -gentle hydration   -clear liquid diet   -analgesia as needed   -antiemetics as needed           Acute blood loss anemia  -in the setting of cecal volvulus repair on 4/27/22 and recent NSTEMI with stent placement on 2/2022  -ASA and Brilinta continued due to risk of thrombosis  -H/H trending down -11.9>8.5/34.9>25.4  -serial H/H in progress and post transfusion with PRBC s x1 unit ordered for transfusion     4/29   Hbg stable, hemodynamics stable  Serial H/H     Chronic systolic CHF (congestive heart failure)  Patient is identified as having Combined Systolic and Diastolic heart failure that is Chronic. CHF is currently controlled. Latest ECHO performed and demonstrates- Results for orders placed during the hospital encounter of 04/25/22    Echo    Interpretation Summary  · The left ventricle is normal in size with moderately decreased systolic function.  · The estimated ejection fraction is 35%.  · Grade I left ventricular diastolic dysfunction.  · There are segmental left ventricular wall motion abnormalities.  · Mild mitral regurgitation.  · Normal central venous pressure (3 mmHg).  · The estimated PA systolic pressure is 32 mmHg.  · With low normal right ventricular systolic function.  . Continue Beta Blocker and monitor clinical status closely. Monitor on telemetry. Patient is off CHF pathway.  Monitor strict Is&Os and daily weights.  Place on fluid restriction of 2 L. Continue to stress to patient importance of self efficacy and  on diet for CHF. Last BNP reviewed.  -supplemental oxygen as needed   -gentle  hydration as needed post procedure   -monitor for volume overload and diurese as needed   -4/28/22- Lopressor held due to hypotension- BNP elevated- IVF decreased and Lasix to be given       Coronary artery disease of native artery of native heart with  angina pectoris  -NSTEMI and stents placed on 2/2022  -Home medications resumed   -Will resume Ranexa when appropriate   -Cardiology following     4/29  Continue ASA and Brilinta per cardiology recs       H/O non-ST elevation myocardial infarction (NSTEMI)  -recent in 2/2022  -ASA, Lopressor, and Brilinta continued   -Echo on 4/25/22 showed   · left ventricle is normal in size with moderately decreased systolic function.  · The estimated ejection fraction is 35%.  · Grade I left ventricular diastolic dysfunction.  · There are segmental left ventricular wall motion abnormalities.  · Mild mitral regurgitation.  · Normal central venous pressure (3 mmHg).  · The estimated PA systolic pressure is 32 mmHg.  · With low normal right ventricular systolic function.    -higher risk for bleeding post procedure discussed with patient per General Surgery   -will monitor H/H with downward trend noted and PRBCs x 1 unit transfused     -will resume Ranexa when appropriate     Hypercholesterolemia  -stable   -intolerant to statin therapy         VTE Risk Mitigation (From admission, onward)         Ordered     Place sequential compression device  Until discontinued         04/27/22 0914     IP VTE HIGH RISK PATIENT  Once         04/27/22 0914     Place sequential compression device  Until discontinued         04/27/22 0914                Discharge Planning   KAILA: per primary     Code Status: Full Code   Is the patient medically ready for discharge?:     Reason for patient still in hospital (select all that apply): Patient new problem, Imaging and Other (specify) per primary team    Discharge Plan A: Home with family                  Julia Torres NP  Department of Hospital  Medicine   [facility]

## 2022-04-29 NOTE — SUBJECTIVE & OBJECTIVE
Interval History: see hospital course     Review of Systems   Constitutional:  Negative for chills and fever.   Respiratory:  Negative for shortness of breath.    Cardiovascular:  Negative for chest pain.   Gastrointestinal:  Positive for blood in stool. Negative for abdominal pain, diarrhea, nausea and vomiting.   Neurological:  Negative for syncope, weakness and light-headedness.   Objective:     Vital Signs (Most Recent):  Temp: 98.7 °F (37.1 °C) (04/29/22 1604)  Pulse: 71 (04/29/22 1604)  Resp: 18 (04/29/22 1604)  BP: 117/63 (04/29/22 1604)  SpO2: 100 % (04/29/22 1604) Vital Signs (24h Range):  Temp:  [97.6 °F (36.4 °C)-98.8 °F (37.1 °C)] 98.7 °F (37.1 °C)  Pulse:  [59-80] 71  Resp:  [14-19] 18  SpO2:  [95 %-100 %] 100 %  BP: (109-133)/(57-72) 117/63     Weight: 70.7 kg (155 lb 13.8 oz)  Body mass index is 23.7 kg/m².    Intake/Output Summary (Last 24 hours) at 4/29/2022 1649  Last data filed at 4/29/2022 1400  Gross per 24 hour   Intake 1418.02 ml   Output --   Net 1418.02 ml      Physical Exam  Constitutional:       General: He is not in acute distress.     Appearance: He is well-developed. He is not ill-appearing.      Comments: Anxious     HENT:      Head: Normocephalic and atraumatic.   Eyes:      Conjunctiva/sclera: Conjunctivae normal.      Pupils: Pupils are equal, round, and reactive to light.   Neck:      Vascular: No JVD.   Cardiovascular:      Rate and Rhythm: Normal rate and regular rhythm.      Heart sounds: Normal heart sounds.   Pulmonary:      Effort: Pulmonary effort is normal. No respiratory distress.      Breath sounds: Normal breath sounds. No wheezing.   Abdominal:      General: Bowel sounds are normal. There is no distension.      Palpations: Abdomen is soft.      Tenderness: There is no abdominal tenderness. There is no guarding.      Comments: Abdomen soft, abdominal incision healing well    Musculoskeletal:         General: No tenderness. Normal range of motion.      Cervical back:  Normal range of motion and neck supple.   Skin:     General: Skin is warm and dry.      Capillary Refill: Capillary refill takes less than 2 seconds.      Findings: No erythema.   Neurological:      Mental Status: He is alert and oriented to person, place, and time.   Psychiatric:         Behavior: Behavior normal.       Significant Labs: All pertinent labs within the past 24 hours have been reviewed.  CBC:   Recent Labs   Lab 04/28/22  0116 04/28/22  0557 04/28/22  0935 04/28/22  2351 04/29/22  0529 04/29/22  1203   WBC 12.58 9.60  9.60  --   --  7.04  --    HGB 11.8*  11.9* 9.7*  9.7*   < > 9.0* 9.6*  9.6* 10.4*   HCT 35.2*  34.9* 29.2*  29.2*   < > 26.7* 28.6*  28.6* 32.7*    153  153  --   --  153  --     < > = values in this interval not displayed.     CMP:   Recent Labs   Lab 04/28/22  0557 04/29/22  0529     139 143   K 4.3  4.3 4.1     107 109   CO2 26  26 30*   *  132* 103   BUN 15  15 13   CREATININE 0.9  0.9 0.9   CALCIUM 7.8*  7.8* 8.4*   PROT 4.0* 4.7*   ALBUMIN 2.7* 3.0*   BILITOT 1.0 1.0   ALKPHOS 29* 33*   AST 15 19   ALT 14 14   ANIONGAP 6*  6* 4*   EGFRNONAA >60  >60 >60       Significant Imaging: I have reviewed all pertinent imaging results/findings within the past 24 hours.

## 2022-04-29 NOTE — ASSESSMENT & PLAN NOTE
-NSTEMI and stents placed on 2/2022  -Home medications resumed   -Will resume Ranexa when appropriate   -Cardiology following     4/29  Continue ASA and Brilinta per cardiology recs

## 2022-04-29 NOTE — PHYSICIAN QUERY
PT Name: Mekhi Lambert  MR #: [id number]     DOCUMENTATION CLARIFICATION     CDS: Annabel MCKEON RN  Contact information: [email or web address]    This form is a permanent document in the medical record.     Query Date: April 29, 2022    By submitting this query, we are merely seeking further clarification of documentation.  Please utilize your independent clinical judgment when addressing the question(s) below.    The Medical Record contains the following   Indicators Supporting Clinical Findings Location in Medical Record   X Heart Failure documented Chronic systolic CHF (congestive heart failure)  Patient is identified as having Combined Systolic and Diastolic heart failure that is Chronic. CHF is currently controlled.     Chronic systolic CHF (congestive heart failure)  EF 30%  Currently stable/cardiac monitoring  Medical management    Chronic systolic CHF (congestive heart failure)  -Clinically compensated  -Continue low dose BB as BP permits  -Patient previously declined initiation of Entresto/ARB  -Echo 4/22 showed EF of 35% Consults HM 4/27/2022      H&P Gen Surg 4/27/2022        Consults Cards 4/28/2022   X BNP BNP= 180 Labs 4/28/2022   X EF/Echo The left ventricle is normal in size with moderately decreased systolic function.  The estimated ejection fraction is 35%.  Grade I left ventricular diastolic dysfunction.  There are segmental left ventricular wall motion abnormalities.  Mild mitral regurgitation.  Normal central venous pressure (3 mmHg).  The estimated PA systolic pressure is 32 mmHg.  With low normal right ventricular systolic function. Echo 4/25/2022   X Radiology findings Cardiac silhouette is enlarged but stable.  The lungs demonstrate no evidence of active disease.  No evidence of pleural effusion or pneumothorax.   XR Chest 4/28/2022    Subjective/Objective Respiratory Conditions     X Recent/Current MI H/O non-ST elevation myocardial infarction (NSTEMI)   -Patient with prior NSTEMI  2/26/22 with successful PCI of LAD/diagonal, on ASA and Brilinta as OP Consults Cards 4/28/2022    Heart Transplant, LVAD      Edema, JVD      Ascites     X Diuretics/Meds IVF decreased and Lasix given in the setting of CHF (EF 25%-30%).     metoprolol tartrate (LOPRESSOR) split tablet 12.5 mg  Freq: Nightly Route: Oral   PN Hosp Med 4/28/2022  MAR 4/28-current    Other Treatment      Other       Heart failure is a clinical diagnosis which includes symptomatic fluid retention, elevated intracardiac pressures, and/or the inability of the heart to deliver adequate blood flow.    Heart Failure with reduced Ejection Fraction (HFrEF) or Systolic Heart Failure (loses ability to contract normally, EF is <40%)    Heart Failure with preserved Ejection Fraction (HFpEF) or Diastolic Heart Failure (stiff ventricles, does not relax properly, EF is >50%)     Heart Failure with Combined Systolic and Diastolic Failure (stiff ventricles, does not relax properly and EF is <50%)    Mid-range or mildly reduced ejection fraction (HFmrEF) is classified as systolic heart failure.   Common clues to acute exacerbation:  Rapidly progressive symptoms (w/in 2 weeks of presentation), using IV diuretics, using supplemental O2, pulmonary edema on Xray, new or worsening pleural effusion, +JVD or other signs of volume overload, MI w/in 4 weeks, and/or BNP >500  The clinical guidelines noted are only system guidelines, and do not replace the providers clinical judgment.    Due to conflicting documentation, please further clarify the diagnosis of Heart failure     [  X ]  Chronic Systolic Heart Failure (HFrEF or HFmrEF) - preexisting and stable   [   ]  Chronic Combined Systolic and Diastolic Heart Failure - pre-existing and stable   [   ]  Other (please specify): ___________________________________   [  ]  Clinically Undetermined                       Please document in your progress notes daily for the duration of treatment until resolved and  include in your discharge summary.    References:  American Heart Association editorial staff. (2017, May). Ejection Fraction Heart Failure Measurement. American Heart Association. https://www.heart.org/en/health-topics/heart-failure/diagnosing-heart-failure/ejection-fraction-heart-failure-measurement#:~:text=Ejection%20fraction%20(EF)%20is%20a,pushed%20out%20with%20each%20heartbeat  TONY Stephens (2020, December 15). Heart failure with preserved ejection fraction: Clinical manifestations and diagnosis. [employer]. https://www.JournalDoc.[employer]/contents/heart-failure-with-preserved-ejection-fraction-clinical-manifestations-and-diagnosis.  ICD-10-CM/PCS Coding Clinic Third Quarter ICD-10, Effective with discharges: September 8, 2020 [facility] § Heart failure with mid-range or mildly reduced ejection fraction (2020).  Form No. 56303

## 2022-04-29 NOTE — PT/OT/SLP PROGRESS
Physical Therapy  Treatment    Mekhi Lambert   MRN: [id number]   Admitting Diagnosis: Cecal volvulus    PT Received On: 04/29/22  PT Start Time: 0730     PT Stop Time: 0754    PT Total Time (min): 24 min       Billable Minutes:  Gait Training 14 and Therapeutic Exercise 10    Treatment Type: Treatment  PT/PTA: PT     PTA Visit Number: 0       General Precautions: Standard,    Orthopedic Precautions: N/A   Braces: N/A  Respiratory Status: Room air         Subjective:  Communicated with NURSE Garsia AND [facility] CHART REVIEW  prior to session.  PT AGREED TO TX     Pain/Comfort  Pain Rating 1: 5/10  Location 1: abdomen  Pain Addressed 1: Nurse notified  Pain Rating Post-Intervention 1: 5/10    Objective:   Patient found with: peripheral IV    Functional Mobility:  PT MET IN RM STANDING BY BED. P.T. TIED PT GOWN AND PT AMBULATED COMMUNITY DISTANCES IND WITH NO LOB. PT RETURNED TO  T/F TO CHAIR IND. PT COMPLETED B LE TE X 15 REPS OF AP, TKE, AND MIP. PT LEFT SEATED IN CHAIR WITH ALL NEEDS MET AND CALL BELL IN REACH      AM-PAC 6 CLICK MOBILITY  How much help from another person does this patient currently need?   1 = Unable, Total/Dependent Assistance  2 = A lot, Maximum/Moderate Assistance  3 = A little, Minimum/Contact Guard/Supervision  4 = None, Modified Tucker/Independent    Turning over in bed (including adjusting bedclothes, sheets and blankets)?: 4  Sitting down on and standing up from a chair with arms (e.g., wheelchair, bedside commode, etc.): 4  Moving from lying on back to sitting on the side of the bed?: 4  Moving to and from a bed to a chair (including a wheelchair)?: 4  Need to walk in hospital room?: 4  Climbing 3-5 steps with a railing?: 1  Basic Mobility Total Score: 21    AM-PAC Raw Score CMS G-Code Modifier Level of Impairment Assistance   6 % Total / Unable   7 - 9 CM 80 - 100% Maximal Assist   10 - 14 CL 60 - 80% Moderate Assist   15 - 19 CK 40 - 60% Moderate Assist   20 - 22 CJ 20 - 40%  Minimal Assist   23 CI 1-20% SBA / CGA   24 CH 0% Independent/ Mod I     Patient left up in chair with call button in reach.    Assessment:  PT GOALS MET AND WILL BE D/C FROM P.T. TO [facility] PROGRAM    Rehab identified problem list/impairments: Rehab identified problem list/impairments: weakness, impaired endurance, impaired functional mobilty, gait instability, impaired balance, pain    Discharge recommendations: Discharge Facility/Level of Care Needs: home     Barriers to discharge:      Equipment recommendations: Equipment Needed After Discharge: none     GOALS:   Multidisciplinary Problems     Physical Therapy Goals        Problem: Physical Therapy    Goal Priority Disciplines Outcome Goal Variances Interventions   Physical Therapy Goal     PT, PT/OT      Description: LT22  1.PT WILL COMPLETE BED MOBILITY IND  2. PT WILL T/F TO CHAIR IND  3. PT WILL GT TRAIN X 250' IND                   PLAN:    Patient to be seen 3 x/week  to address the above listed problems via gait training, therapeutic activities, therapeutic exercises  Plan of Care expires: 22  Plan of Care reviewed with: patient         2022

## 2022-04-29 NOTE — HOSPITAL COURSE
4/29/22-Patient seen and examined today, lying in bed. Admits to fatigue/weakness. Still having bloody BM per his report. No chest pain or SOB. Transfused overnight. H/H 9.6/28.6. Prolonged discussion with patient regarding importance of continuation of DAPT in light of recent NSTEMI/LAD stent.

## 2022-04-30 LAB
ANION GAP SERPL CALC-SCNC: 10 MMOL/L (ref 8–16)
BASOPHILS # BLD AUTO: 0.05 K/UL (ref 0–0.2)
BASOPHILS NFR BLD: 0.6 % (ref 0–1.9)
BUN SERPL-MCNC: 11 MG/DL (ref 8–23)
CALCIUM SERPL-MCNC: 8.7 MG/DL (ref 8.7–10.5)
CHLORIDE SERPL-SCNC: 108 MMOL/L (ref 95–110)
CO2 SERPL-SCNC: 24 MMOL/L (ref 23–29)
CREAT SERPL-MCNC: 0.9 MG/DL (ref 0.5–1.4)
DIFFERENTIAL METHOD: ABNORMAL
EOSINOPHIL # BLD AUTO: 0.3 K/UL (ref 0–0.5)
EOSINOPHIL NFR BLD: 4 % (ref 0–8)
ERYTHROCYTE [DISTWIDTH] IN BLOOD BY AUTOMATED COUNT: 15.9 % (ref 11.5–14.5)
EST. GFR  (AFRICAN AMERICAN): >60 ML/MIN/1.73 M^2
EST. GFR  (NON AFRICAN AMERICAN): >60 ML/MIN/1.73 M^2
GLUCOSE SERPL-MCNC: 133 MG/DL (ref 70–110)
HCT VFR BLD AUTO: 28.8 % (ref 40–54)
HGB BLD-MCNC: 9.6 G/DL (ref 14–18)
IMM GRANULOCYTES # BLD AUTO: 0.04 K/UL (ref 0–0.04)
IMM GRANULOCYTES NFR BLD AUTO: 0.5 % (ref 0–0.5)
LYMPHOCYTES # BLD AUTO: 1.6 K/UL (ref 1–4.8)
LYMPHOCYTES NFR BLD: 19.5 % (ref 18–48)
MCH RBC QN AUTO: 30.4 PG (ref 27–31)
MCHC RBC AUTO-ENTMCNC: 33.3 G/DL (ref 32–36)
MCV RBC AUTO: 91 FL (ref 82–98)
MONOCYTES # BLD AUTO: 0.5 K/UL (ref 0.3–1)
MONOCYTES NFR BLD: 6.2 % (ref 4–15)
NEUTROPHILS # BLD AUTO: 5.7 K/UL (ref 1.8–7.7)
NEUTROPHILS NFR BLD: 69.2 % (ref 38–73)
NRBC BLD-RTO: 0 /100 WBC
PLATELET # BLD AUTO: 211 K/UL (ref 150–450)
PLATELET BLD QL SMEAR: ABNORMAL
PMV BLD AUTO: 10.2 FL (ref 9.2–12.9)
POTASSIUM SERPL-SCNC: 3.5 MMOL/L (ref 3.5–5.1)
RBC # BLD AUTO: 3.16 M/UL (ref 4.6–6.2)
SODIUM SERPL-SCNC: 142 MMOL/L (ref 136–145)
WBC # BLD AUTO: 8.22 K/UL (ref 3.9–12.7)

## 2022-04-30 PROCEDURE — 25000003 PHARM REV CODE 250: Performed by: NURSE PRACTITIONER

## 2022-04-30 PROCEDURE — 63600175 PHARM REV CODE 636 W HCPCS: Performed by: NURSE PRACTITIONER

## 2022-04-30 PROCEDURE — 25000003 PHARM REV CODE 250: Performed by: SURGERY

## 2022-04-30 PROCEDURE — 94761 N-INVAS EAR/PLS OXIMETRY MLT: CPT

## 2022-04-30 PROCEDURE — 36415 COLL VENOUS BLD VENIPUNCTURE: CPT | Performed by: FAMILY MEDICINE

## 2022-04-30 PROCEDURE — 80048 BASIC METABOLIC PNL TOTAL CA: CPT | Performed by: FAMILY MEDICINE

## 2022-04-30 PROCEDURE — 99900035 HC TECH TIME PER 15 MIN (STAT)

## 2022-04-30 PROCEDURE — 25000003 PHARM REV CODE 250: Performed by: EMERGENCY MEDICINE

## 2022-04-30 PROCEDURE — 85025 COMPLETE CBC W/AUTO DIFF WBC: CPT | Performed by: FAMILY MEDICINE

## 2022-04-30 PROCEDURE — 21400001 HC TELEMETRY ROOM

## 2022-04-30 RX ADMIN — TICAGRELOR 90 MG: 90 TABLET ORAL at 08:04

## 2022-04-30 RX ADMIN — SODIUM CHLORIDE, SODIUM LACTATE, POTASSIUM CHLORIDE, AND CALCIUM CHLORIDE: .6; .31; .03; .02 INJECTION, SOLUTION INTRAVENOUS at 05:04

## 2022-04-30 RX ADMIN — SENNOSIDES AND DOCUSATE SODIUM 1 TABLET: 50; 8.6 TABLET ORAL at 09:04

## 2022-04-30 RX ADMIN — CHLORHEXIDINE GLUCONATE 0.12% ORAL RINSE 10 ML: 1.2 LIQUID ORAL at 08:04

## 2022-04-30 RX ADMIN — ACETAMINOPHEN 650 MG: 325 TABLET ORAL at 08:04

## 2022-04-30 RX ADMIN — ASPIRIN 81 MG: 81 TABLET, COATED ORAL at 09:04

## 2022-04-30 RX ADMIN — SENNOSIDES AND DOCUSATE SODIUM 1 TABLET: 50; 8.6 TABLET ORAL at 08:04

## 2022-04-30 RX ADMIN — METOPROLOL TARTRATE 12.5 MG: 25 TABLET, FILM COATED ORAL at 08:04

## 2022-04-30 RX ADMIN — ACETAMINOPHEN 650 MG: 325 TABLET ORAL at 12:04

## 2022-04-30 RX ADMIN — FAMOTIDINE 20 MG: 10 INJECTION, SOLUTION INTRAVENOUS at 09:04

## 2022-04-30 RX ADMIN — ACETAMINOPHEN 650 MG: 325 TABLET ORAL at 05:04

## 2022-04-30 RX ADMIN — CHLORHEXIDINE GLUCONATE 0.12% ORAL RINSE 10 ML: 1.2 LIQUID ORAL at 09:04

## 2022-04-30 RX ADMIN — PANTOPRAZOLE SODIUM 40 MG: 40 TABLET, DELAYED RELEASE ORAL at 09:04

## 2022-04-30 RX ADMIN — TICAGRELOR 90 MG: 90 TABLET ORAL at 09:04

## 2022-04-30 RX ADMIN — FAMOTIDINE 20 MG: 10 INJECTION, SOLUTION INTRAVENOUS at 08:04

## 2022-04-30 NOTE — ASSESSMENT & PLAN NOTE
-in the setting of cecal volvulus repair on 4/27/22 and recent NSTEMI with stent placement on 2/2022  -ASA and Brilinta continued due to risk of thrombosis  -H/H trending down -11.9>8.5/34.9>25.4  -serial H/H in progress and post transfusion with PRBC s x1 unit ordered for transfusion     4/29   Hbg stable, hemodynamics stable  Serial H/H

## 2022-04-30 NOTE — ASSESSMENT & PLAN NOTE
S/p right hemicolectomy secondary to volvulus    Tolerating regular diet.  Liquid bowel movement without blood today.  Pain controlled with Tylenol  Ambulate and using incentive spirometer  Monitor h/h, bloody bowel movements , repeat labs in a.m.  Cards recommends not holding brilinta and asa due to heart/stent thrombosis risks  Transfuse platelets  and blood products as needed

## 2022-04-30 NOTE — ASSESSMENT & PLAN NOTE
Prior cardiac stents  Currently on Brilinta, no further wound oozing and platelets and H&H stable overnight.  Cardiac monitoring  Medical management

## 2022-04-30 NOTE — SUBJECTIVE & OBJECTIVE
Interval History: see hospital course     Review of Systems   Constitutional:  Negative for chills and fever.   Respiratory:  Negative for shortness of breath.    Cardiovascular:  Negative for chest pain.   Gastrointestinal:  Positive for blood in stool (improving). Negative for abdominal pain, diarrhea, nausea and vomiting.   Neurological:  Negative for syncope, weakness and light-headedness.   Objective:     Vital Signs (Most Recent):  Temp: 98 °F (36.7 °C) (04/30/22 1551)  Pulse: 68 (04/30/22 1551)  Resp: 16 (04/30/22 1551)  BP: 108/66 (04/30/22 1551)  SpO2: 99 % (04/30/22 1551) Vital Signs (24h Range):  Temp:  [97.8 °F (36.6 °C)-98.1 °F (36.7 °C)] 98 °F (36.7 °C)  Pulse:  [57-74] 68  Resp:  [16-20] 16  SpO2:  [95 %-100 %] 99 %  BP: ()/(52-66) 108/66     Weight: 71 kg (156 lb 8.4 oz)  Body mass index is 23.8 kg/m².  No intake or output data in the 24 hours ending 04/30/22 1652     Physical Exam  Constitutional:       General: He is not in acute distress.     Appearance: He is well-developed. He is not ill-appearing.   HENT:      Head: Normocephalic and atraumatic.   Eyes:      Conjunctiva/sclera: Conjunctivae normal.      Pupils: Pupils are equal, round, and reactive to light.   Neck:      Vascular: No JVD.   Cardiovascular:      Rate and Rhythm: Normal rate and regular rhythm.      Heart sounds: Normal heart sounds.   Pulmonary:      Effort: Pulmonary effort is normal. No respiratory distress.      Breath sounds: Normal breath sounds. No wheezing.   Abdominal:      General: Bowel sounds are normal. There is no distension.      Palpations: Abdomen is soft.      Tenderness: There is no abdominal tenderness. There is no guarding.      Comments: Abdomen soft, abdominal incision healing well    Musculoskeletal:         General: No tenderness. Normal range of motion.      Cervical back: Normal range of motion and neck supple.   Skin:     General: Skin is warm and dry.      Capillary Refill: Capillary refill  takes less than 2 seconds.      Findings: No erythema.   Neurological:      Mental Status: He is alert and oriented to person, place, and time.   Psychiatric:         Behavior: Behavior normal.       Significant Labs: All pertinent labs within the past 24 hours have been reviewed.  CBC:   Recent Labs   Lab 04/29/22  0529 04/29/22  1203 04/29/22  1653 04/30/22  0848   WBC 7.04  --   --  8.22   HGB 9.6*  9.6* 10.4* 10.3* 9.6*   HCT 28.6*  28.6* 32.7* 30.1* 28.8*     --   --  211       CMP:   Recent Labs   Lab 04/29/22  0529 04/30/22  0848    142   K 4.1 3.5    108   CO2 30* 24    133*   BUN 13 11   CREATININE 0.9 0.9   CALCIUM 8.4* 8.7   PROT 4.7*  --    ALBUMIN 3.0*  --    BILITOT 1.0  --    ALKPHOS 33*  --    AST 19  --    ALT 14  --    ANIONGAP 4* 10   EGFRNONAA >60 >60         Significant Imaging: I have reviewed all pertinent imaging results/findings within the past 24 hours.

## 2022-04-30 NOTE — PROGRESS NOTES
[facility] S[facility]  General Surgery  Progress Note    Subjective:     History of Present Illness:  71-year-old male referred for volvulus.  Patient reports epigastric abdominal discomfort with abdominal bloating.  He reports having decrease sensation and difficulty with sensation of needing to have a bowel movement for the past few months.  He also reports intentional weight loss of approximately 30 lbs.  CT scan done in ER concerning for volvulus      Post-Op Info:  Procedure(s) (LRB):  LAPAROTOMY, EXPLORATORY (N/A)  HEMICOLECTOMY, RIGHT (N/A)   3 Days Post-Op     Interval History:Postop day 2 patient had a liquid bowel movement today.  Stated no acute blood within it.  Had 1 episode of shortness of breath yesterday but none since.  Tolerated regular diet today.  Has been ambulating and using his incentive spirometry.  Afebrile, vital signs stable.  Incision looks good without signs of any infection nor any further bleeding.  H&H and platelets are stable.  Will recheck labs in a.m..  Anticipate discharge Sunday or Monday.    Medications:  Continuous Infusions:  Scheduled Meds:   aspirin  81 mg Oral Daily    chlorhexidine  10 mL Mouth/Throat BID    famotidine (PF)  20 mg Intravenous Q12H    metoprolol tartrate  12.5 mg Oral QHS    pantoprazole  40 mg Oral Daily    senna-docusate 8.6-50 mg  1 tablet Oral BID    ticagrelor  90 mg Oral BID     PRN Meds:sodium chloride, sodium chloride, acetaminophen, acetaminophen, albuterol sulfate, cloNIDine, diphenhydrAMINE, melatonin, morphine, morphine, morphine, ondansetron, oxyCODONE, sodium chloride 0.9%     Review of patient's allergies indicates:   Allergen Reactions    Statins-hmg-coa reductase inhibitors Other (See Comments)     SEVERE MYALGIAS AND GI SIDE EFFECTS     Objective:     Vital Signs (Most Recent):  Temp: 98 °F (36.7 °C) (04/30/22 1551)  Pulse: 68 (04/30/22 1551)  Resp: 16 (04/30/22 1551)  BP: 108/66 (04/30/22 1551)  SpO2: 99 % (04/30/22 1551) Vital Signs  (24h Range):  Temp:  [97.8 °F (36.6 °C)-98.1 °F (36.7 °C)] 98 °F (36.7 °C)  Pulse:  [57-74] 68  Resp:  [16-20] 16  SpO2:  [95 %-100 %] 99 %  BP: ()/(52-66) 108/66     Weight: 71 kg (156 lb 8.4 oz)  Body mass index is 23.8 kg/m².    Intake/Output - Last 3 Shifts         04/28 0700 04/29 0659 04/29 0700 04/30 0659 04/30 0700 05/01 0659    P.O. 240 480 840    I.V. (mL/kg)   300 (4.2)    Blood 698      IV Piggyback       Total Intake(mL/kg) 938 (13.3) 480 (6.8) 1140 (16.1)    Urine (mL/kg/hr)       Emesis/NG output       Blood       Total Output       Net +938 +480 +1140           Urine Occurrence 4 x 4 x 3 x    Stool Occurrence 2 x 1 x             Physical Exam  Vitals reviewed.   Constitutional:       Appearance: Normal appearance. He is normal weight.   HENT:      Nose: Nose normal.      Mouth/Throat:      Mouth: Mucous membranes are moist.   Cardiovascular:      Rate and Rhythm: Normal rate and regular rhythm.   Pulmonary:      Effort: Pulmonary effort is normal.      Breath sounds: Normal breath sounds.   Abdominal:      General: Abdomen is flat. Bowel sounds are normal.      Palpations: Abdomen is soft.      Comments: Incision is clean dry and intact.  No further bleeding   Musculoskeletal:         General: Normal range of motion.   Skin:     General: Skin is warm and dry.   Neurological:      General: No focal deficit present.      Mental Status: He is alert and oriented to person, place, and time.   Psychiatric:         Mood and Affect: Mood normal.         Behavior: Behavior normal.         Thought Content: Thought content normal.         Judgment: Judgment normal.       Significant Labs:  I have reviewed all pertinent lab results within the past 24 hours.  CBC:   Recent Labs   Lab 04/30/22  0848   WBC 8.22   RBC 3.16*   HGB 9.6*   HCT 28.8*      MCV 91   MCH 30.4   MCHC 33.3     BMP:   Recent Labs   Lab 04/30/22  0848   *      K 3.5      CO2 24   BUN 11   CREATININE 0.9    CALCIUM 8.7       Significant Diagnostics:  I have reviewed all pertinent imaging results/findings within the past 24 hours.  I have reviewed and interpreted all pertinent imaging results/findings within the past 24 hours.    Assessment/Plan:     * Cecal volvulus  S/p right hemicolectomy secondary to volvulus    Tolerating regular diet.  Liquid bowel movement without blood today.  Pain controlled with Tylenol  Ambulate and using incentive spirometer  Monitor h/h, bloody bowel movements , repeat labs in a.m.  Cards recommends not holding brilinta and asa due to heart/stent thrombosis risks  Transfuse platelets  and blood products as needed      Chronic systolic CHF (congestive heart failure)  EF 30%  Currently stable/cardiac monitoring  Medical management    H/O non-ST elevation myocardial infarction (NSTEMI)  Prior cardiac stents  Currently on Brilinta, no further wound oozing and platelets and H&H stable overnight.  Cardiac monitoring  Medical management    Hypercholesterolemia  Stable  Medical management/dietary modifications        Pratibha Irene MD  General Surgery  [facility] - Med Surg

## 2022-04-30 NOTE — PLAN OF CARE
Problem: Adult Inpatient Plan of Care  Goal: Plan of Care Review  Outcome: Ongoing, Progressing  Goal: Patient-Specific Goal (Individualized)  Outcome: Ongoing, Progressing  Goal: Absence of Hospital-Acquired Illness or Injury  Outcome: Ongoing, Progressing  Goal: Optimal Comfort and Wellbeing  Outcome: Ongoing, Progressing  Goal: Readiness for Transition of Care  Outcome: Ongoing, Progressing     Problem: Infection  Goal: Absence of Infection Signs and Symptoms  Outcome: Ongoing, Progressing     Problem: Fall Injury Risk  Goal: Absence of Fall and Fall-Related Injury  Outcome: Ongoing, Progressing       Pt remained free of injury. Call bell and personal items within reach. Pain controlled with PRN meds.  No complaints of bloody stool. Dressing CDI. Pt independent with ADLs. IV fluids infusing.Chart check complete. Will continue to monitor.

## 2022-04-30 NOTE — SUBJECTIVE & OBJECTIVE
Interval History:Postop day 2 patient had a liquid bowel movement today.  Stated no acute blood within it.  Had 1 episode of shortness of breath yesterday but none since.  Tolerated regular diet today.  Has been ambulating and using his incentive spirometry.  Afebrile, vital signs stable.  Incision looks good without signs of any infection nor any further bleeding.  H&H and platelets are stable.  Will recheck labs in a.m..  Anticipate discharge Sunday or Monday.    Medications:  Continuous Infusions:  Scheduled Meds:   aspirin  81 mg Oral Daily    chlorhexidine  10 mL Mouth/Throat BID    famotidine (PF)  20 mg Intravenous Q12H    metoprolol tartrate  12.5 mg Oral QHS    pantoprazole  40 mg Oral Daily    senna-docusate 8.6-50 mg  1 tablet Oral BID    ticagrelor  90 mg Oral BID     PRN Meds:sodium chloride, sodium chloride, acetaminophen, acetaminophen, albuterol sulfate, cloNIDine, diphenhydrAMINE, melatonin, morphine, morphine, morphine, ondansetron, oxyCODONE, sodium chloride 0.9%     Review of patient's allergies indicates:   Allergen Reactions    Statins-hmg-coa reductase inhibitors Other (See Comments)     SEVERE MYALGIAS AND GI SIDE EFFECTS     Objective:     Vital Signs (Most Recent):  Temp: 98 °F (36.7 °C) (04/30/22 1551)  Pulse: 68 (04/30/22 1551)  Resp: 16 (04/30/22 1551)  BP: 108/66 (04/30/22 1551)  SpO2: 99 % (04/30/22 1551) Vital Signs (24h Range):  Temp:  [97.8 °F (36.6 °C)-98.1 °F (36.7 °C)] 98 °F (36.7 °C)  Pulse:  [57-74] 68  Resp:  [16-20] 16  SpO2:  [95 %-100 %] 99 %  BP: ()/(52-66) 108/66     Weight: 71 kg (156 lb 8.4 oz)  Body mass index is 23.8 kg/m².    Intake/Output - Last 3 Shifts         04/28 0700  04/29 0659 04/29 0700  04/30 0659 04/30 0700  05/01 0659    P.O. 240 480 840    I.V. (mL/kg)   300 (4.2)    Blood 698      IV Piggyback       Total Intake(mL/kg) 938 (13.3) 480 (6.8) 1140 (16.1)    Urine (mL/kg/hr)       Emesis/NG output       Blood       Total Output       Net +938 +480  +1140           Urine Occurrence 4 x 4 x 3 x    Stool Occurrence 2 x 1 x             Physical Exam  Vitals reviewed.   Constitutional:       Appearance: Normal appearance. He is normal weight.   HENT:      Nose: Nose normal.      Mouth/Throat:      Mouth: Mucous membranes are moist.   Cardiovascular:      Rate and Rhythm: Normal rate and regular rhythm.   Pulmonary:      Effort: Pulmonary effort is normal.      Breath sounds: Normal breath sounds.   Abdominal:      General: Abdomen is flat. Bowel sounds are normal.      Palpations: Abdomen is soft.      Comments: Incision is clean dry and intact.  No further bleeding   Musculoskeletal:         General: Normal range of motion.   Skin:     General: Skin is warm and dry.   Neurological:      General: No focal deficit present.      Mental Status: He is alert and oriented to person, place, and time.   Psychiatric:         Mood and Affect: Mood normal.         Behavior: Behavior normal.         Thought Content: Thought content normal.         Judgment: Judgment normal.       Significant Labs:  I have reviewed all pertinent lab results within the past 24 hours.  CBC:   Recent Labs   Lab 04/30/22  0848   WBC 8.22   RBC 3.16*   HGB 9.6*   HCT 28.8*      MCV 91   MCH 30.4   MCHC 33.3     BMP:   Recent Labs   Lab 04/30/22  0848   *      K 3.5      CO2 24   BUN 11   CREATININE 0.9   CALCIUM 8.7       Significant Diagnostics:  I have reviewed all pertinent imaging results/findings within the past 24 hours.  I have reviewed and interpreted all pertinent imaging results/findings within the past 24 hours.

## 2022-05-01 VITALS
HEIGHT: 68 IN | WEIGHT: 152.75 LBS | RESPIRATION RATE: 18 BRPM | DIASTOLIC BLOOD PRESSURE: 65 MMHG | BODY MASS INDEX: 23.15 KG/M2 | TEMPERATURE: 96 F | OXYGEN SATURATION: 100 % | SYSTOLIC BLOOD PRESSURE: 103 MMHG | HEART RATE: 76 BPM

## 2022-05-01 LAB
ANION GAP SERPL CALC-SCNC: 10 MMOL/L (ref 8–16)
BASOPHILS # BLD AUTO: 0.05 K/UL (ref 0–0.2)
BASOPHILS NFR BLD: 0.6 % (ref 0–1.9)
BUN SERPL-MCNC: 11 MG/DL (ref 8–23)
CALCIUM SERPL-MCNC: 9.2 MG/DL (ref 8.7–10.5)
CHLORIDE SERPL-SCNC: 111 MMOL/L (ref 95–110)
CO2 SERPL-SCNC: 23 MMOL/L (ref 23–29)
CREAT SERPL-MCNC: 0.9 MG/DL (ref 0.5–1.4)
DIFFERENTIAL METHOD: ABNORMAL
EOSINOPHIL # BLD AUTO: 0.3 K/UL (ref 0–0.5)
EOSINOPHIL NFR BLD: 4 % (ref 0–8)
ERYTHROCYTE [DISTWIDTH] IN BLOOD BY AUTOMATED COUNT: 15.7 % (ref 11.5–14.5)
EST. GFR  (AFRICAN AMERICAN): >60 ML/MIN/1.73 M^2
EST. GFR  (NON AFRICAN AMERICAN): >60 ML/MIN/1.73 M^2
GLUCOSE SERPL-MCNC: 103 MG/DL (ref 70–110)
HCT VFR BLD AUTO: 28.3 % (ref 40–54)
HGB BLD-MCNC: 9.4 G/DL (ref 14–18)
IMM GRANULOCYTES # BLD AUTO: 0.03 K/UL (ref 0–0.04)
IMM GRANULOCYTES NFR BLD AUTO: 0.4 % (ref 0–0.5)
LYMPHOCYTES # BLD AUTO: 1.8 K/UL (ref 1–4.8)
LYMPHOCYTES NFR BLD: 22.2 % (ref 18–48)
MCH RBC QN AUTO: 30 PG (ref 27–31)
MCHC RBC AUTO-ENTMCNC: 33.2 G/DL (ref 32–36)
MCV RBC AUTO: 90 FL (ref 82–98)
MONOCYTES # BLD AUTO: 0.7 K/UL (ref 0.3–1)
MONOCYTES NFR BLD: 8.8 % (ref 4–15)
NEUTROPHILS # BLD AUTO: 5.1 K/UL (ref 1.8–7.7)
NEUTROPHILS NFR BLD: 64 % (ref 38–73)
NRBC BLD-RTO: 0 /100 WBC
PLATELET # BLD AUTO: 207 K/UL (ref 150–450)
PMV BLD AUTO: 10 FL (ref 9.2–12.9)
POTASSIUM SERPL-SCNC: 4.3 MMOL/L (ref 3.5–5.1)
RBC # BLD AUTO: 3.13 M/UL (ref 4.6–6.2)
SODIUM SERPL-SCNC: 144 MMOL/L (ref 136–145)
WBC # BLD AUTO: 7.94 K/UL (ref 3.9–12.7)

## 2022-05-01 PROCEDURE — 85025 COMPLETE CBC W/AUTO DIFF WBC: CPT | Performed by: SURGERY

## 2022-05-01 PROCEDURE — 25000003 PHARM REV CODE 250: Performed by: SURGERY

## 2022-05-01 PROCEDURE — 80048 BASIC METABOLIC PNL TOTAL CA: CPT | Performed by: SURGERY

## 2022-05-01 PROCEDURE — 25000003 PHARM REV CODE 250: Performed by: EMERGENCY MEDICINE

## 2022-05-01 PROCEDURE — 36415 COLL VENOUS BLD VENIPUNCTURE: CPT | Performed by: SURGERY

## 2022-05-01 RX ORDER — OXYCODONE HYDROCHLORIDE 5 MG/1
5 TABLET ORAL EVERY 6 HOURS PRN
Qty: 15 TABLET | Refills: 0 | Status: SHIPPED | OUTPATIENT
Start: 2022-05-01 | End: 2023-03-24

## 2022-05-01 RX ADMIN — TICAGRELOR 90 MG: 90 TABLET ORAL at 08:05

## 2022-05-01 RX ADMIN — ASPIRIN 81 MG: 81 TABLET, COATED ORAL at 08:05

## 2022-05-01 RX ADMIN — CHLORHEXIDINE GLUCONATE 0.12% ORAL RINSE 10 ML: 1.2 LIQUID ORAL at 08:05

## 2022-05-01 RX ADMIN — ACETAMINOPHEN 650 MG: 325 TABLET ORAL at 04:05

## 2022-05-01 RX ADMIN — PANTOPRAZOLE SODIUM 40 MG: 40 TABLET, DELAYED RELEASE ORAL at 08:05

## 2022-05-01 RX ADMIN — SENNOSIDES AND DOCUSATE SODIUM 1 TABLET: 50; 8.6 TABLET ORAL at 08:05

## 2022-05-01 RX ADMIN — FAMOTIDINE 20 MG: 10 INJECTION, SOLUTION INTRAVENOUS at 09:05

## 2022-05-01 NOTE — DISCHARGE SUMMARY
'formerly [facility] Surg  General Surgery  Discharge Summary      Patient Name: Mekhi Lambert  MRN: [id number]  Admission Date: 4/26/2022  Hospital Length of Stay: 4 days  Discharge Date and Time:  05/01/2022 11:40 AM  Attending Physician: See Dominguez MD   Discharging Provider: Pratibha Irene MD  Primary Care Provider: ABIGAIL Lopez Jr, MD    HPI:   71-year-old male referred for volvulus.  Patient reports epigastric abdominal discomfort with abdominal bloating.  He reports having decrease sensation and difficulty with sensation of needing to have a bowel movement for the past few months.  He also reports intentional weight loss of approximately 30 lbs.  CT scan done in ER concerning for volvulus      Procedure(s) (LRB):  LAPAROTOMY, EXPLORATORY (N/A)  HEMICOLECTOMY, RIGHT (N/A)      Indwelling Lines/Drains at time of discharge:   Lines/Drains/Airways     None               Hospital Course: Status post right hemicolectomy secondary to volvulus    Postop day 1 patient having bloody bowel movements overnight, transfuse platelets today, cardiology recommending to continue Brilinta, pain controlled, tolerating liquids    Postop day 2 patient had a liquid bowel movement today.  Stated no acute blood within it.  Had 1 episode of shortness of breath yesterday but none since.  Tolerated regular diet today.  Has been ambulating and using his incentive spirometry.  Afebrile, vital signs stable.  Incision looks good without signs of any infection nor any further bleeding.  H&H and platelets are stable.  Will recheck labs in a.m..  Anticipate discharge Sunday or Monday.    Postop day 3. Patient states he had a good night.  No complaints.  Had a normal bowel movement.  No blood seen.  Tolerated regular diet.  Afebrile, vital signs stable, platelet count and H&H are stable.  White blood cell count within normal limits.  Incision looks good without signs of any infection, bleeding or dehiscence.  Hospitalist has released patient  for discharge.  Will discharge today.        Goals of Care Treatment Preferences:  Code Status: Full Code      Consults:   Consults (From admission, onward)        Status Ordering Provider     Inpatient consult to Cardiology  Once        Provider:  (Not yet assigned)    MIN Mendez     Inpatient consult to Internal Medicine  Once        Provider:  Gaudencio Nice NP    Acknowledged JODY GUPTA          Significant Diagnostic Studies: Labs:   BMP:   Recent Labs   Lab 04/30/22  0848 05/01/22  0643   * 103    144   K 3.5 4.3    111*   CO2 24 23   BUN 11 11   CREATININE 0.9 0.9   CALCIUM 8.7 9.2   , CMP   Recent Labs   Lab 04/30/22  0848 05/01/22  0643    144   K 3.5 4.3    111*   CO2 24 23   * 103   BUN 11 11   CREATININE 0.9 0.9   CALCIUM 8.7 9.2   ANIONGAP 10 10   ESTGFRAFRICA >60 >60   EGFRNONAA >60 >60   , CBC   Recent Labs   Lab 04/29/22  1653 04/30/22  0848 05/01/22  0643   WBC  --  8.22 7.94   HGB 10.3* 9.6* 9.4*   HCT 30.1* 28.8* 28.3*   PLT  --  211 207    and INR   Lab Results   Component Value Date    INR 1.0 04/29/2022    INR 1.0 04/28/2022    INR 0.9 02/25/2022     Radiology: CT scan: CT ABDOMEN PELVIS WITH CONTRAST: No results found for this visit on 04/26/22.    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    Specialties: General Surgery, Bariatrics  Contact information:  [address]  4th [address]  [facility] [address]  [phone]                       Patient Instructions:      Ambulatory referral/consult to Outpatient Case Management   Referral Priority: Routine Referral Type: Consultation   Referral Reason: Specialty Services Required   Number of Visits Requested: 1     Diet Cardiac     Lifting restrictions     Notify your health care provider if you experience any of the following:  temperature >100.4     Notify your health care provider if you experience any of the following:  persistent nausea and vomiting or diarrhea     Notify your health care provider if you experience any of the following:  severe uncontrolled pain     Notify your health care provider if you experience any of the following:  redness, tenderness, or signs of infection (pain, swelling, redness, odor or green/yellow discharge around incision site)     Notify your health care provider if you experience any of the following:  difficulty breathing or increased cough     Notify your health care provider if you experience any of the following:  persistent dizziness, light-headedness, or visual disturbances     Notify your health care provider if you experience any of the following:  worsening rash     Notify your health care provider if you experience any of the following:  increased confusion or weakness     No dressing needed     Change dressing (specify)   Order Comments: May shower and get incision wet. Pat dry     Medications:  Reconciled Home Medications:      Medication List      START taking these medications    oxyCODONE 5 MG immediate release tablet  Commonly known as: ROXICODONE  Take 1 tablet  (5 mg total) by mouth every 6 (six) hours as needed for Pain.        CONTINUE taking these medications    aspirin 81 MG EC tablet  Commonly known as: ECOTRIN  Take 1 tablet (81 mg total) by mouth once daily.     metoprolol succinate 25 MG 24 hr tablet  Commonly known as: TOPROL-XL  Take 0.5 tablets (12.5 mg total) by mouth every evening.     mometasone 0.1% 0.1 % cream  Commonly known as: ELOCON  AAA bid prn for psoriasis. Strong steroid.  Do not use on face, underarms or groin.     multivitamin capsule  Take 1 capsule by mouth once daily.     nitroGLYCERIN 0.4 MG SL tablet  Commonly known as: NITROSTAT  Place 1 tablet (0.4 mg total) under the tongue every 5 (five) minutes as needed for Chest pain.     pantoprazole 40 MG tablet  Commonly known as: PROTONIX  Take 1 tablet (40 mg total) by mouth once daily.     ranolazine 500 MG Tb12  Commonly known as: RANEXA  Take 1 tablet (500 mg total) by mouth 2 (two) times daily.     ticagrelor 90 mg tablet  Commonly known as: BRILINTA  Take 1 tablet (90 mg total) by mouth 2 (two) times a day.          Time spent on the discharge of patient: 30 minutes    Pratibha Irene MD  General Surgery  [facility]

## 2022-05-01 NOTE — ASSESSMENT & PLAN NOTE
Prior cardiac stents  Currently on Brilinta, no further wound oozing and platelets and H&H stable overnight.  Medical management

## 2022-05-01 NOTE — ASSESSMENT & PLAN NOTE
S/p right hemicolectomy secondary to volvulus    Postop day 3. Patient states he had a good night.  No complaints.  Had a normal bowel movement.  No blood seen.  Tolerated regular diet.  Afebrile, vital signs stable, platelet count and H&H are stable.  White blood cell count within normal limits.  Incision looks good without signs of any infection, bleeding or dehiscence.  Hospitalist has released patient for discharge.  Will discharge today.

## 2022-05-01 NOTE — SUBJECTIVE & OBJECTIVE
Interval History: see above    Review of Systems   Constitutional:  Negative for chills and fever.   Respiratory:  Negative for shortness of breath.    Cardiovascular:  Negative for chest pain.   Gastrointestinal:  Negative for abdominal pain, blood in stool (improving), diarrhea, nausea and vomiting.        Normal BM reported this am.   Skin:  Positive for wound (surgical incision to abdomen).   Neurological:  Negative for syncope, weakness and light-headedness.   Objective:     Vital Signs (Most Recent):  Temp: 97.9 °F (36.6 °C) (05/01/22 0740)  Pulse: 88 (05/01/22 0915)  Resp: 18 (05/01/22 0740)  BP: 111/70 (05/01/22 0740)  SpO2: 99 % (05/01/22 0740) Vital Signs (24h Range):  Temp:  [97.9 °F (36.6 °C)-99.3 °F (37.4 °C)] 97.9 °F (36.6 °C)  Pulse:  [53-88] 88  Resp:  [16-19] 18  SpO2:  [96 %-99 %] 99 %  BP: ()/(50-73) 111/70     Weight: 69.3 kg (152 lb 12.5 oz)  Body mass index is 23.23 kg/m².    Intake/Output Summary (Last 24 hours) at 5/1/2022 1025  Last data filed at 4/30/2022 1720  Gross per 24 hour   Intake 1140 ml   Output --   Net 1140 ml      Physical Exam  Vitals and nursing note reviewed.   Constitutional:       General: He is not in acute distress.     Appearance: He is well-developed. He is not ill-appearing.   HENT:      Head: Normocephalic and atraumatic.   Eyes:      Conjunctiva/sclera: Conjunctivae normal.      Pupils: Pupils are equal, round, and reactive to light.   Neck:      Vascular: No JVD.   Cardiovascular:      Rate and Rhythm: Normal rate and regular rhythm.      Heart sounds: Normal heart sounds.   Pulmonary:      Effort: Pulmonary effort is normal. No respiratory distress.      Breath sounds: Normal breath sounds. No wheezing.   Abdominal:      General: Bowel sounds are normal. There is no distension.      Palpations: Abdomen is soft.      Tenderness: There is abdominal tenderness (mild around incision). There is no guarding.      Comments: Abdomen soft, abdominal incision healing  well, mild ttp   Musculoskeletal:         General: No tenderness. Normal range of motion.      Cervical back: Normal range of motion and neck supple.      Right lower leg: No edema.      Left lower leg: No edema.   Skin:     General: Skin is warm and dry.      Capillary Refill: Capillary refill takes less than 2 seconds.      Findings: No erythema.   Neurological:      Mental Status: He is alert and oriented to person, place, and time.   Psychiatric:         Behavior: Behavior normal.       Significant Labs: All pertinent labs within the past 24 hours have been reviewed.  CBC:   Recent Labs   Lab 04/29/22  1653 04/30/22  0848 05/01/22  0643   WBC  --  8.22 7.94   HGB 10.3* 9.6* 9.4*   HCT 30.1* 28.8* 28.3*   PLT  --  211 207     CMP:   Recent Labs   Lab 04/30/22  0848 05/01/22  0643    144   K 3.5 4.3    111*   CO2 24 23   * 103   BUN 11 11   CREATININE 0.9 0.9   CALCIUM 8.7 9.2   ANIONGAP 10 10   EGFRNONAA >60 >60       Significant Imaging: I have reviewed all pertinent imaging results/findings within the past 24 hours.

## 2022-05-01 NOTE — PLAN OF CARE
[facility]  Discharge Final Note    Primary Care Provider: ABIGAIL Lopez Jr, MD    Expected Discharge Date: 5/1/2022    Final Discharge Note (most recent)     Final Note - 05/01/22 1139        Final Note    Assessment Type Final Discharge Note     Anticipated Discharge Disposition Home or Self Care                 Important Message from Medicare  Important Message from Medicare regarding Discharge Appeal Rights: Given to patient/caregiver, Explained to patient/caregiver, Signed/date by patient/caregiver     Date IMM was signed: 04/30/22  Time IMM was signed: 0920    Contact Info     See Dominguez MD   Specialty: General Surgery, Bariatrics    [address]  [address]th [address]  [facility] [address]   Phone: [phone]       Next Steps: Follow up in 2 week(s)

## 2022-05-01 NOTE — PLAN OF CARE
Problem: Adult Inpatient Plan of Care  Goal: Plan of Care Review  Outcome: Ongoing, Progressing     Problem: Adult Inpatient Plan of Care  Goal: Patient-Specific Goal (Individualized)  Outcome: Ongoing, Progressing     Problem: Adult Inpatient Plan of Care  Goal: Absence of Hospital-Acquired Illness or Injury  Outcome: Ongoing, Progressing     Problem: Adult Inpatient Plan of Care  Goal: Optimal Comfort and Wellbeing  Outcome: Ongoing, Progressing     Problem: Adult Inpatient Plan of Care  Goal: Readiness for Transition of Care  Outcome: Ongoing, Progressing     Pt remained free of injury. Call bell and personal items within reach. VSS. Pain controlled with PRN meds.  Incision CDI and open to air. NADN. Chart check complete. Will continue to monitor.

## 2022-05-02 NOTE — PROGRESS NOTES
[facility] Medicine  Progress Note    Patient Name: Mekhi Lambert  MRN: [id number]  Patient Class: IP- Inpatient   Admission Date: 4/26/2022  Length of Stay: 4 days  Attending Physician: No att. providers found  Primary Care Provider: ABIGAIL Lopez Jr, MD        Subjective:     Principal Problem:Cecal volvulus        HPI:  Mekhi Lambert is a 71-year-old male with PMHX of Cancer, Long-term anticoagulation, CHF, CAD, Digestive disorder, and MI (2/2022) who was referred to General Surgery for volvulus.  Patient reports epigastric abdominal discomfort with abdominal bloating. Associated symptoms include a decreased sensation and difficulty with needing to have a bowel movement for the past few months.  He also reports intentional weight loss of approximately 30 lbs.  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 Generally visceral aneurysms are treated greater than 2 cm. ER discussed findings with General Surgery and pt taken to OR for Exploratory laparotomy with Right hemicolectomy mobilization of hepatic flexure. Pt reports no immediate complication. Hospital Medicine contacted for medical management post procedure. Pt admitted to Medical Surgical Unit for further evaluation.          Overview/Hospital Course:  Pt admitted to Medical Surgical unit s/p Exploratory laparotomy- Right hemicolectomy mobilization of hepatic flexure on 4/27/22. CT showed cecal volvulus and 19 mm splenic artery aneurysm noted which is slightly larger than was seen on prior exam 06/22/2020. Generally visceral aneurysms are treated greater than 2 cm.  Hsospital Medicine consulted post procedure for medical management. Pt remained on ASA and Brilinta due to recent NSTEMI and stent placement 2/2022 with high risk of bleeding discussed with patient prior to procedure. Pt had multiple stools with blood clots post procedure. H/H  trending down with serial results in progress. Echo 4/21 showed EF of 35%, DD. BNP stable at 180. Cardiology consulted with medications continued due to risk of stent thrombosis, recommend continuation of ASA and Brilinta and would avoid discontinuation if possible. Pt H/H declining with pt verbalized dizziness, weakness, and hypotension of note and PRBCs ordered for transfusion. IVF decreased and Lasix given with BNP elevated in the setting of CHF with EF 25-30%. Lopressor held. Case discussed with primary team.     4/29 Hbg stable this morning. Patient reports 1 episode stool mixed with bright red and dark red blood over night. Hemodynamics stable. Patient reports resolution of dizziness and weakness. No further surgical intervention planned. Pt to continue ASA and Brilinta. He discussed need for dual DAPT at length with cardiology. Repeat Hbg/Hct this afternoon. Will monitor overnight. Anticipate dc 4/30/22.    4/30 patient doing well. Denies complaints. Reports scant melena noted in stool overnight. Hbg stable.     05/01/2022  Doing well. Patient has no complaints. Had a normal bowel movement this morning. Labs and vitals reviewed. Patient is stable and appropriate for discharge form a medicine perspective. Discussed with Dr. Irene- [facility] req completed for discharge.      Interval History: see above    Review of Systems   Constitutional:  Negative for chills and fever.   Respiratory:  Negative for shortness of breath.    Cardiovascular:  Negative for chest pain.   Gastrointestinal:  Negative for abdominal pain, blood in stool (improving), diarrhea, nausea and vomiting.        Normal BM reported this am.   Skin:  Positive for wound (surgical incision to abdomen).   Neurological:  Negative for syncope, weakness and light-headedness.   Objective:     Vital Signs (Most Recent):  Temp: 97.9 °F (36.6 °C) (05/01/22 0740)  Pulse: 88 (05/01/22 0915)  Resp: 18 (05/01/22 0740)  BP: 111/70 (05/01/22 0740)  SpO2: 99 %  (05/01/22 0740) Vital Signs (24h Range):  Temp:  [97.9 °F (36.6 °C)-99.3 °F (37.4 °C)] 97.9 °F (36.6 °C)  Pulse:  [53-88] 88  Resp:  [16-19] 18  SpO2:  [96 %-99 %] 99 %  BP: ()/(50-73) 111/70     Weight: 69.3 kg (152 lb 12.5 oz)  Body mass index is 23.23 kg/m².    Intake/Output Summary (Last 24 hours) at 5/1/2022 1025  Last data filed at 4/30/2022 1720  Gross per 24 hour   Intake 1140 ml   Output --   Net 1140 ml      Physical Exam  Vitals and nursing note reviewed.   Constitutional:       General: He is not in acute distress.     Appearance: He is well-developed. He is not ill-appearing.   HENT:      Head: Normocephalic and atraumatic.   Eyes:      Conjunctiva/sclera: Conjunctivae normal.      Pupils: Pupils are equal, round, and reactive to light.   Neck:      Vascular: No JVD.   Cardiovascular:      Rate and Rhythm: Normal rate and regular rhythm.      Heart sounds: Normal heart sounds.   Pulmonary:      Effort: Pulmonary effort is normal. No respiratory distress.      Breath sounds: Normal breath sounds. No wheezing.   Abdominal:      General: Bowel sounds are normal. There is no distension.      Palpations: Abdomen is soft.      Tenderness: There is abdominal tenderness (mild around incision). There is no guarding.      Comments: Abdomen soft, abdominal incision healing well, mild ttp   Musculoskeletal:         General: No tenderness. Normal range of motion.      Cervical back: Normal range of motion and neck supple.      Right lower leg: No edema.      Left lower leg: No edema.   Skin:     General: Skin is warm and dry.      Capillary Refill: Capillary refill takes less than 2 seconds.      Findings: No erythema.   Neurological:      Mental Status: He is alert and oriented to person, place, and time.   Psychiatric:         Behavior: Behavior normal.       Significant Labs: All pertinent labs within the past 24 hours have been reviewed.  CBC:   Recent Labs   Lab 04/29/22  1653 04/30/22  0861  05/01/22  0643   WBC  --  8.22 7.94   HGB 10.3* 9.6* 9.4*   HCT 30.1* 28.8* 28.3*   PLT  --  211 207     CMP:   Recent Labs   Lab 04/30/22  0848 05/01/22  0643    144   K 3.5 4.3    111*   CO2 24 23   * 103   BUN 11 11   CREATININE 0.9 0.9   CALCIUM 8.7 9.2   ANIONGAP 10 10   EGFRNONAA >60 >60       Significant Imaging: I have reviewed all pertinent imaging results/findings within the past 24 hours.      Assessment/Plan:      * Cecal volvulus  -General Surgery- primary team  -s/p Exploratory laparotomy- Right hemicolectomy mobilization of hepatic flexure  -gentle hydration   -clear liquid diet   -analgesia as needed   -antiemetics as needed           Acute blood loss anemia  -in the setting of cecal volvulus repair on 4/27/22 and recent NSTEMI with stent placement on 2/2022  -ASA and Brilinta continued due to risk of thrombosis  -H/H trending down -11.9>8.5/34.9>25.4  -serial H/H in progress and post transfusion with PRBC s x1 unit ordered for transfusion     4/29   Hbg stable, hemodynamics stable  Serial H/H     Chronic systolic CHF (congestive heart failure)  Patient is identified as having Combined Systolic and Diastolic heart failure that is Chronic. CHF is currently controlled. Latest ECHO performed and demonstrates- Results for orders placed during the hospital encounter of 04/25/22    Echo    Interpretation Summary  · The left ventricle is normal in size with moderately decreased systolic function.  · The estimated ejection fraction is 35%.  · Grade I left ventricular diastolic dysfunction.  · There are segmental left ventricular wall motion abnormalities.  · Mild mitral regurgitation.  · Normal central venous pressure (3 mmHg).  · The estimated PA systolic pressure is 32 mmHg.  · With low normal right ventricular systolic function.  . Continue Beta Blocker and monitor clinical status closely. Monitor on telemetry. Patient is off CHF pathway.  Monitor strict Is&Os and daily weights.  Place  on fluid restriction of 2 L. Continue to stress to patient importance of self efficacy and  on diet for CHF. Last BNP reviewed.  -supplemental oxygen as needed   -gentle hydration as needed post procedure   -monitor for volume overload and diurese as needed   -4/28/22- Lopressor held due to hypotension- BNP elevated- IVF decreased and Lasix to be given       Coronary artery disease of native artery of native heart with  angina pectoris  -NSTEMI and stents placed on 2/2022  -Home medications resumed   -Will resume Ranexa when appropriate   -Cardiology following     4/29  Continue ASA and Brilinta per cardiology recs       H/O non-ST elevation myocardial infarction (NSTEMI)  -recent in 2/2022  -ASA, Lopressor, and Brilinta continued   -Echo on 4/25/22 showed   · left ventricle is normal in size with moderately decreased systolic function.  · The estimated ejection fraction is 35%.  · Grade I left ventricular diastolic dysfunction.  · There are segmental left ventricular wall motion abnormalities.  · Mild mitral regurgitation.  · Normal central venous pressure (3 mmHg).  · The estimated PA systolic pressure is 32 mmHg.  · With low normal right ventricular systolic function.    -higher risk for bleeding post procedure discussed with patient per General Surgery   -will monitor H/H with downward trend noted and PRBCs x 1 unit transfused     -will resume Ranexa when appropriate     Hypercholesterolemia  -stable   -intolerant to statin therapy         VTE Risk Mitigation (From admission, onward)         Ordered     IP VTE HIGH RISK PATIENT  Once         04/27/22 0914                Discharge Planning   KAILA: 5/1/2022     Code Status: Prior   Is the patient medically ready for discharge?: Yes    Reason for patient still in hospital (select all that apply): Patient new problem and Pending disposition per primary team  Discharge Plan A: Home with family                  Allyn Pierson NP  Department of Hospital  Medicine   [facility] Surg

## 2022-05-03 NOTE — PHYSICIAN QUERY
PT Name: Mekhi Lambert  MR #: [id number]     Documentation Clarification      CDS: Annabel MCKEON RN  Contact information: [email or web address]    This form is a permanent document in the medical record.     Query Date: May 3, 2022    By submitting this query, we are merely seeking further clarification of documentation. Please utilize your independent clinical judgment when addressing the question(s) below.    The Medical Record reflects the following:    Supporting Clinical Findings Location in Medical Record   Procedure:   Exploratory laparotomy  Right hemicolectomy mobilization of hepatic flexure    Upon further inspection the patient was confirmed to have a cecal volvulus.  This segment of colon appeared poorly perfused and ischemic.  The ascending colon and hepatic flexure were further mobilized along the white line of Toldt using Bovie electrocautery and the EnSeal Super jaw.  Once adequately mobilized to healthy-appearing colon and ileum the ischemic portion was resected by dividing the terminal ileum and transverse colon using a MOSES 75 mm stapler with blue load. Op Note 4/27/2022        Op Note 4/27/2022                                                                            Doctor, please further specify the documentation if intestinal ischemia     [  X ] Acute focal intestinal ischemia   [   ] Acute diffuse intestinal ischemia   [   ] Chronic intestinal ischemia   [   ] Other (please specify): ____________   [  ] Clinically undetermined

## 2022-05-04 LAB
FINAL PATHOLOGIC DIAGNOSIS: NORMAL
GROSS: NORMAL
Lab: NORMAL

## 2022-05-05 ENCOUNTER — OFFICE VISIT (OUTPATIENT)
Dept: INTERNAL MEDICINE | Facility: CLINIC | Age: 72
End: 2022-05-05
Payer: MEDICARE

## 2022-05-05 ENCOUNTER — LAB VISIT (OUTPATIENT)
Dept: LAB | Facility: HOSPITAL | Age: 72
End: 2022-05-05
Attending: NURSE PRACTITIONER
Payer: MEDICARE

## 2022-05-05 VITALS
OXYGEN SATURATION: 98 % | SYSTOLIC BLOOD PRESSURE: 94 MMHG | RESPIRATION RATE: 16 BRPM | DIASTOLIC BLOOD PRESSURE: 60 MMHG | BODY MASS INDEX: 22.95 KG/M2 | HEIGHT: 68 IN | WEIGHT: 151.44 LBS | HEART RATE: 70 BPM

## 2022-05-05 DIAGNOSIS — I25.118 CORONARY ARTERY DISEASE OF NATIVE ARTERY OF NATIVE HEART WITH STABLE ANGINA PECTORIS: ICD-10-CM

## 2022-05-05 DIAGNOSIS — I50.22 CHRONIC SYSTOLIC CHF (CONGESTIVE HEART FAILURE): ICD-10-CM

## 2022-05-05 DIAGNOSIS — I50.20 HEART FAILURE WITH REDUCED EJECTION FRACTION, NYHA CLASS II: ICD-10-CM

## 2022-05-05 DIAGNOSIS — F41.9 ANXIETY DISORDER, UNSPECIFIED TYPE: ICD-10-CM

## 2022-05-05 DIAGNOSIS — I42.0 DILATED CARDIOMYOPATHY: ICD-10-CM

## 2022-05-05 DIAGNOSIS — D62 ACUTE BLOOD LOSS ANEMIA: ICD-10-CM

## 2022-05-05 DIAGNOSIS — Z09 HOSPITAL DISCHARGE FOLLOW-UP: Primary | ICD-10-CM

## 2022-05-05 LAB
ALBUMIN SERPL BCP-MCNC: 3.4 G/DL (ref 3.5–5.2)
ALP SERPL-CCNC: 54 U/L (ref 55–135)
ALT SERPL W/O P-5'-P-CCNC: 25 U/L (ref 10–44)
ANION GAP SERPL CALC-SCNC: 7 MMOL/L (ref 8–16)
AST SERPL-CCNC: 18 U/L (ref 10–40)
BASOPHILS # BLD AUTO: 0.05 K/UL (ref 0–0.2)
BASOPHILS NFR BLD: 0.7 % (ref 0–1.9)
BILIRUB SERPL-MCNC: 0.4 MG/DL (ref 0.1–1)
BUN SERPL-MCNC: 14 MG/DL (ref 8–23)
CALCIUM SERPL-MCNC: 8.9 MG/DL (ref 8.7–10.5)
CHLORIDE SERPL-SCNC: 109 MMOL/L (ref 95–110)
CO2 SERPL-SCNC: 25 MMOL/L (ref 23–29)
CREAT SERPL-MCNC: 1 MG/DL (ref 0.5–1.4)
DIFFERENTIAL METHOD: ABNORMAL
EOSINOPHIL # BLD AUTO: 0.3 K/UL (ref 0–0.5)
EOSINOPHIL NFR BLD: 4.2 % (ref 0–8)
ERYTHROCYTE [DISTWIDTH] IN BLOOD BY AUTOMATED COUNT: 16.8 % (ref 11.5–14.5)
EST. GFR  (AFRICAN AMERICAN): >60 ML/MIN/1.73 M^2
EST. GFR  (NON AFRICAN AMERICAN): >60 ML/MIN/1.73 M^2
GLUCOSE SERPL-MCNC: 118 MG/DL (ref 70–110)
HCT VFR BLD AUTO: 29.7 % (ref 40–54)
HGB BLD-MCNC: 9.8 G/DL (ref 14–18)
IMM GRANULOCYTES # BLD AUTO: 0.03 K/UL (ref 0–0.04)
IMM GRANULOCYTES NFR BLD AUTO: 0.4 % (ref 0–0.5)
LYMPHOCYTES # BLD AUTO: 1.3 K/UL (ref 1–4.8)
LYMPHOCYTES NFR BLD: 18 % (ref 18–48)
MCH RBC QN AUTO: 30.4 PG (ref 27–31)
MCHC RBC AUTO-ENTMCNC: 33 G/DL (ref 32–36)
MCV RBC AUTO: 92 FL (ref 82–98)
MONOCYTES # BLD AUTO: 0.7 K/UL (ref 0.3–1)
MONOCYTES NFR BLD: 9.5 % (ref 4–15)
NEUTROPHILS # BLD AUTO: 4.7 K/UL (ref 1.8–7.7)
NEUTROPHILS NFR BLD: 67.2 % (ref 38–73)
NRBC BLD-RTO: 0 /100 WBC
PLATELET # BLD AUTO: 260 K/UL (ref 150–450)
PMV BLD AUTO: 9 FL (ref 9.2–12.9)
POTASSIUM SERPL-SCNC: 4 MMOL/L (ref 3.5–5.1)
PROT SERPL-MCNC: 5.9 G/DL (ref 6–8.4)
RBC # BLD AUTO: 3.22 M/UL (ref 4.6–6.2)
SODIUM SERPL-SCNC: 141 MMOL/L (ref 136–145)
WBC # BLD AUTO: 6.93 K/UL (ref 3.9–12.7)

## 2022-05-05 PROCEDURE — 99214 OFFICE O/P EST MOD 30 MIN: CPT | Mod: S$PBB,,, | Performed by: NURSE PRACTITIONER

## 2022-05-05 PROCEDURE — 80053 COMPREHEN METABOLIC PANEL: CPT | Performed by: NURSE PRACTITIONER

## 2022-05-05 PROCEDURE — 99214 PR OFFICE/OUTPT VISIT, EST, LEVL IV, 30-39 MIN: ICD-10-PCS | Mod: S$PBB,,, | Performed by: NURSE PRACTITIONER

## 2022-05-05 PROCEDURE — 99213 OFFICE O/P EST LOW 20 MIN: CPT | Mod: PBBFAC | Performed by: NURSE PRACTITIONER

## 2022-05-05 PROCEDURE — 36415 COLL VENOUS BLD VENIPUNCTURE: CPT | Performed by: NURSE PRACTITIONER

## 2022-05-05 PROCEDURE — 99999 PR PBB SHADOW E&M-EST. PATIENT-LVL III: ICD-10-PCS | Mod: PBBFAC,,, | Performed by: NURSE PRACTITIONER

## 2022-05-05 PROCEDURE — 85025 COMPLETE CBC W/AUTO DIFF WBC: CPT | Performed by: NURSE PRACTITIONER

## 2022-05-05 PROCEDURE — 99999 PR PBB SHADOW E&M-EST. PATIENT-LVL III: CPT | Mod: PBBFAC,,, | Performed by: NURSE PRACTITIONER

## 2022-05-05 NOTE — PROGRESS NOTES
Subjective:       Patient ID: Mekhi Lambert is a 71 y.o. male.    Chief Complaint: Hospital Follow Up    HPI    Patient Name: Mekhi Lambert  MRN: [id number]  Admission Date: 4/26/2022  Hospital Length of Stay: 4 days  Discharge Date and Time:  05/01/2022 11:40 AM  Attending Physician: See Dominguez MD   Discharging Provider: Pratibha Irene MD  Primary Care Provider: ABIGAIL Lopez Jr, MD     HPI:   71-year-old male referred for volvulus.  Patient reports epigastric abdominal discomfort with abdominal bloating.  He reports having decrease sensation and difficulty with sensation of needing to have a bowel movement for the past few months.  He also reports intentional weight loss of approximately 30 lbs.  CT scan done in ER concerning for volvulus        Procedure(s) (LRB):  LAPAROTOMY, EXPLORATORY (N/A)  HEMICOLECTOMY, RIGHT (N/A)      Indwelling Lines/Drains at time of discharge:       Lines/Drains/Airways      None                   Hospital Course: Status post right hemicolectomy secondary to volvulus     Postop day 1 patient having bloody bowel movements overnight, transfuse platelets today, cardiology recommending to continue Brilinta, pain controlled, tolerating liquids     Postop day 2 patient had a liquid bowel movement today.  Stated no acute blood within it.  Had 1 episode of shortness of breath yesterday but none since.  Tolerated regular diet today.  Has been ambulating and using his incentive spirometry.  Afebrile, vital signs stable.  Incision looks good without signs of any infection nor any further bleeding.  H&H and platelets are stable.  Will recheck labs in a.m..  Anticipate discharge Sunday or Monday.     Postop day 3. Patient states he had a good night.  No complaints.  Had a normal bowel movement.  No blood seen.  Tolerated regular diet.  Afebrile, vital signs stable, platelet count and H&H are stable.  White blood cell count within normal limits.  Incision looks good without signs  of any infection, bleeding or dehiscence.  Hospitalist has released patient for discharge.  Will discharge today.          Dizziness  BP at home 80/50s - 100s/70s reports holding BB according to readings; no usual for BP to run low but worried about 80s/50s and dizziness  Hydrates well, eating well  Worried about his ability to retain fluids/electrolytes and his H/H  No longer taking opioids  Has f/u with surgeon on 5/16              Review of Systems   Constitutional: Negative for activity change, appetite change, chills, diaphoresis, fatigue, fever and unexpected weight change.   HENT: Negative for congestion, ear pain, postnasal drip, rhinorrhea, sinus pressure, sinus pain, sneezing, sore throat, tinnitus, trouble swallowing and voice change.    Eyes: Negative for photophobia, pain and visual disturbance.   Respiratory: Negative for cough, chest tightness, shortness of breath and wheezing.    Cardiovascular: Negative for chest pain, palpitations and leg swelling.   Gastrointestinal: Negative for abdominal distention, abdominal pain, constipation, diarrhea, nausea and vomiting.   Genitourinary: Negative for decreased urine volume, difficulty urinating, dysuria, flank pain, frequency, hematuria and urgency.   Musculoskeletal: Negative for arthralgias, back pain, joint swelling, neck pain and neck stiffness.   Allergic/Immunologic: Negative for immunocompromised state.   Neurological: Positive for dizziness. Negative for tremors, seizures, syncope, facial asymmetry, speech difficulty, weakness, light-headedness, numbness and headaches.   Hematological: Negative for adenopathy. Does not bruise/bleed easily.   Psychiatric/Behavioral: Negative for confusion and sleep disturbance.       Objective:      Physical Exam  Vitals reviewed.   Cardiovascular:      Rate and Rhythm: Normal rate and regular rhythm.      Pulses: Normal pulses.      Heart sounds: Normal heart sounds.   Pulmonary:      Effort: Pulmonary effort is  normal.      Breath sounds: Normal breath sounds.   Abdominal:      General: Abdomen is flat. Bowel sounds are normal.      Palpations: Abdomen is soft.      Comments: Surgical incision. Clean, dry ESEQUIEL   Musculoskeletal:         General: Normal range of motion.      Cervical back: Normal range of motion and neck supple.   Skin:     General: Skin is warm and dry.      Capillary Refill: Capillary refill takes less than 2 seconds.   Neurological:      Mental Status: He is alert and oriented to person, place, and time.   Psychiatric:         Mood and Affect: Mood normal.         Assessment:     Vitals:    05/05/22 1548   BP: 94/60   Pulse: 70   Resp: 16         1. Hospital discharge follow-up    2. Acute blood loss anemia    3. Chronic systolic CHF (congestive heart failure)    4. Heart failure with reduced ejection fraction, NYHA class II    5. Dilated cardiomyopathy    6. Coronary artery disease of native artery of native heart with  angina pectoris    7. Anxiety disorder, unspecified type        Plan:   Hospital discharge follow-up    Acute blood loss anemia  -     CBC Auto Differential; Future; Expected date: 05/05/2022  -     Comprehensive Metabolic Panel; Future; Expected date: 05/05/2022    Chronic systolic CHF (congestive heart failure)    Heart failure with reduced ejection fraction, NYHA class II    Dilated cardiomyopathy    Coronary artery disease of native artery of native heart with  angina pectoris    Anxiety disorder, unspecified type    check cbc , cmp now  Aside from low BP and dizziness (not present currently) normal PE  Wound looks good  Continue activity as gil  Nutrition discussed  Keep surg f/u  We discussed s/s that warrant ER visit

## 2022-05-06 ENCOUNTER — PATIENT MESSAGE (OUTPATIENT)
Dept: CARDIOLOGY | Facility: CLINIC | Age: 72
End: 2022-05-06
Payer: MEDICARE

## 2022-05-06 ENCOUNTER — PATIENT MESSAGE (OUTPATIENT)
Dept: INTERNAL MEDICINE | Facility: CLINIC | Age: 72
End: 2022-05-06
Payer: MEDICARE

## 2022-05-25 ENCOUNTER — OFFICE VISIT (OUTPATIENT)
Dept: SURGERY | Facility: CLINIC | Age: 72
End: 2022-05-25
Payer: MEDICARE

## 2022-05-25 VITALS
TEMPERATURE: 97 F | DIASTOLIC BLOOD PRESSURE: 73 MMHG | SYSTOLIC BLOOD PRESSURE: 104 MMHG | HEART RATE: 63 BPM | WEIGHT: 153 LBS | BODY MASS INDEX: 23.26 KG/M2

## 2022-05-25 DIAGNOSIS — K56.2 CECAL VOLVULUS: Primary | ICD-10-CM

## 2022-05-25 PROCEDURE — 99024 PR POST-OP FOLLOW-UP VISIT: ICD-10-PCS | Mod: POP,,, | Performed by: SURGERY

## 2022-05-25 PROCEDURE — 99024 POSTOP FOLLOW-UP VISIT: CPT | Mod: POP,,, | Performed by: SURGERY

## 2022-05-25 PROCEDURE — 99213 OFFICE O/P EST LOW 20 MIN: CPT | Mod: PBBFAC | Performed by: SURGERY

## 2022-05-25 PROCEDURE — 99999 PR PBB SHADOW E&M-EST. PATIENT-LVL III: ICD-10-PCS | Mod: PBBFAC,,, | Performed by: SURGERY

## 2022-05-25 PROCEDURE — 99999 PR PBB SHADOW E&M-EST. PATIENT-LVL III: CPT | Mod: PBBFAC,,, | Performed by: SURGERY

## 2022-05-30 ENCOUNTER — OFFICE VISIT (OUTPATIENT)
Dept: CARDIOLOGY | Facility: CLINIC | Age: 72
End: 2022-05-30
Payer: MEDICARE

## 2022-05-30 VITALS
DIASTOLIC BLOOD PRESSURE: 74 MMHG | BODY MASS INDEX: 23.45 KG/M2 | SYSTOLIC BLOOD PRESSURE: 102 MMHG | RESPIRATION RATE: 16 BRPM | HEART RATE: 55 BPM | OXYGEN SATURATION: 97 % | HEIGHT: 68 IN | WEIGHT: 154.75 LBS

## 2022-05-30 DIAGNOSIS — E78.00 HYPERCHOLESTEROLEMIA: ICD-10-CM

## 2022-05-30 DIAGNOSIS — I50.20 HEART FAILURE WITH REDUCED EJECTION FRACTION, NYHA CLASS II: ICD-10-CM

## 2022-05-30 DIAGNOSIS — I42.0 DILATED CARDIOMYOPATHY: ICD-10-CM

## 2022-05-30 DIAGNOSIS — I25.2 H/O NON-ST ELEVATION MYOCARDIAL INFARCTION (NSTEMI): ICD-10-CM

## 2022-05-30 DIAGNOSIS — I25.118 CORONARY ARTERY DISEASE OF NATIVE ARTERY OF NATIVE HEART WITH STABLE ANGINA PECTORIS: ICD-10-CM

## 2022-05-30 DIAGNOSIS — I21.4 NSTEMI (NON-ST ELEVATED MYOCARDIAL INFARCTION): Primary | ICD-10-CM

## 2022-05-30 DIAGNOSIS — Z95.5 H/O HEART ARTERY STENT: ICD-10-CM

## 2022-05-30 DIAGNOSIS — I50.22 CHRONIC SYSTOLIC CHF (CONGESTIVE HEART FAILURE): ICD-10-CM

## 2022-05-30 PROCEDURE — 99214 PR OFFICE/OUTPT VISIT, EST, LEVL IV, 30-39 MIN: ICD-10-PCS | Mod: S$PBB,,, | Performed by: INTERNAL MEDICINE

## 2022-05-30 PROCEDURE — 99999 PR PBB SHADOW E&M-EST. PATIENT-LVL III: ICD-10-PCS | Mod: PBBFAC,,, | Performed by: INTERNAL MEDICINE

## 2022-05-30 PROCEDURE — 99999 PR PBB SHADOW E&M-EST. PATIENT-LVL III: CPT | Mod: PBBFAC,,, | Performed by: INTERNAL MEDICINE

## 2022-05-30 PROCEDURE — 99213 OFFICE O/P EST LOW 20 MIN: CPT | Mod: PBBFAC | Performed by: INTERNAL MEDICINE

## 2022-05-30 PROCEDURE — 99214 OFFICE O/P EST MOD 30 MIN: CPT | Mod: S$PBB,,, | Performed by: INTERNAL MEDICINE

## 2022-05-30 NOTE — PROGRESS NOTES
Subjective:   Patient ID:  Mekhi Lambert is a 71 y.o. male who presents for follow-up of No chief complaint on file.    Post hospitalization:  72 y/o WM admitted with a Dx of NSTEMI . Started on heparin drip , asa and BB . Cardiology consulted and plan for LHC today   2/27 Pt was seen and examined at bedside . He was determined to be  suitable for d/c   He  was admitted with a Dx of NSTEMI .  S/P LHC which show :Ld prox 90% with d2 bifurcation 50% requiring stenting with kissing angioplasty .  lcx patent , normAL RCA .Ef 30% ant wall apical akinesis . Cardiology rec brilinta and asa x 1 year . PT is ALLERGIC to STATIN .  There was gabrielle cue event since admission . He was advised to f/u with his cardiology and PCP in 1 to 2 weeks      Vis fu echo.  Will re-evaluate in the near future after stent hopef of the heart.  Clinically otherwise stable having no to try his own rehab.  He will continue with antiplatelet medications and metoprolol.  He has been reluctant to take additional medications would like to exercise before making further decisions.  I have offered him the use of cholesterol profile and have a discussion about Repatha injectable medications lower cholesterol levels.  Will have a cholesterol profile near future, also a repeat cardiac echo be done  Discussion will be made in the future about giving him Repatha.  Discussion will be made the future about ACE-inhibitor or Entresto in the near future.  Patient is reluctant take these medications and would like to wait to the next visit.  The patient is an  and very thoughtful about the potential use of new medications.  He understands now the guideline directed medical therapy includes the use of cholesterol lowering agents, Ace inhibitors or Entresto medications as well as beta-blockers and the use of antiplatelet medications for the stent.  We will continue to have these discussions in the future and hopefully he will take additional medications  as necessary.  I would like him to take an ACE-inhibitor and Repatha medication.        Patient presents the office clinically stable no recurrence symptoms chest pain shortness breath he states he can exercise more generously than he has before.  The patient is on a personal journey to review the literature for himself.  He states that he will make his decisions about what medications he will take and not take.  States that he will be able to lower his cholesterol level on his own and does not take statins or other medications.  Patient is willing to take Brilinta for the year and then go on aspirin therapy after that.  He has on metoprolol taking half dose today.  Blood pressure heart rate have remained stable he is exercising feels improved.  Patient is not willing to take other medications for improvement of heart function.  Cardiac echo from 03/02/2022 shows slight improvement LV function however the patient refuses to take statin medications and refuses to take ACE-inhibitor.      Review of Systems   Constitutional: Negative for chills, diaphoresis, night sweats, weight gain and weight loss.   HENT: Negative for congestion, hoarse voice, sore throat and stridor.    Eyes: Negative for double vision and pain.   Cardiovascular: Negative for chest pain, claudication, cyanosis, dyspnea on exertion, irregular heartbeat, leg swelling, near-syncope, orthopnea, palpitations, paroxysmal nocturnal dyspnea and syncope.   Respiratory: Negative for cough, hemoptysis, shortness of breath, sleep disturbances due to breathing, snoring, sputum production and wheezing.    Endocrine: Negative for cold intolerance, heat intolerance and polydipsia.   Hematologic/Lymphatic: Negative for bleeding problem. Does not bruise/bleed easily.   Skin: Negative for color change, dry skin and rash.   Musculoskeletal: Negative for joint swelling and muscle cramps.   Gastrointestinal: Negative for bloating, abdominal pain, constipation, diarrhea,  dysphagia, melena, nausea and vomiting.   Genitourinary: Negative for flank pain and urgency.   Neurological: Negative for dizziness, focal weakness, headaches, light-headedness, loss of balance, seizures and weakness.   Psychiatric/Behavioral: Negative for altered mental status and memory loss. The patient is not nervous/anxious.      Family History   Problem Relation Age of Onset    Heart disease Mother         lupus related    Diabetes Mother     Cancer Father         prostate, bone, lung, skin cancers all seperate types    Diabetes Maternal Aunt      Past Medical History:   Diagnosis Date    Anticoagulant long-term use     Arthritis     Cancer     Chronic systolic CHF (congestive heart failure) 02/26/2022    Coronary artery disease     Digestive disorder     Myocardial infarction     2/20/22     Social History     Socioeconomic History    Marital status:    Tobacco Use    Smoking status: Never Smoker    Smokeless tobacco: Former User     Types: Chew     Quit date: 6/3/2004   Substance and Sexual Activity    Alcohol use: Yes     Alcohol/week: 2.0 standard drinks     Types: 2 Glasses of wine per week     Comment: per week    Drug use: No     Current Outpatient Medications on File Prior to Visit   Medication Sig Dispense Refill    aspirin (ECOTRIN) 81 MG EC tablet Take 1 tablet (81 mg total) by mouth once daily. 90 tablet 3    metoprolol succinate (TOPROL-XL) 25 MG 24 hr tablet Take 0.5 tablets (12.5 mg total) by mouth every evening. 45 tablet 3    mometasone 0.1% (ELOCON) 0.1 % cream AAA bid prn for psoriasis. Strong steroid.  Do not use on face, underarms or groin. 50 g 1    multivitamin capsule Take 1 capsule by mouth once daily.      nitroGLYCERIN (NITROSTAT) 0.4 MG SL tablet Place 1 tablet (0.4 mg total) under the tongue every 5 (five) minutes as needed for Chest pain. 30 tablet 1    oxyCODONE (ROXICODONE) 5 MG immediate release tablet Take 1 tablet (5 mg total) by mouth every 6  (six) hours as needed for Pain. 15 tablet 0    pantoprazole (PROTONIX) 40 MG tablet Take 1 tablet (40 mg total) by mouth once daily. 30 tablet 3    ranolazine (RANEXA) 500 MG Tb12 Take 1 tablet (500 mg total) by mouth 2 (two) times daily. 60 tablet 11    ticagrelor (BRILINTA) 90 mg tablet Take 1 tablet (90 mg total) by mouth 2 (two) times a day. 180 tablet 1     No current facility-administered medications on file prior to visit.     Review of patient's allergies indicates:   Allergen Reactions    Statins-hmg-coa reductase inhibitors Other (See Comments)     SEVERE MYALGIAS AND GI SIDE EFFECTS       Objective:     Physical Exam  Eyes:      Pupils: Pupils are equal, round, and reactive to light.   Neck:      Trachea: No tracheal deviation.   Cardiovascular:      Rate and Rhythm: Normal rate and regular rhythm.      Pulses: Intact distal pulses.           Carotid pulses are 2+ on the right side and 2+ on the left side.       Radial pulses are 2+ on the right side and 2+ on the left side.        Femoral pulses are 2+ on the right side and 2+ on the left side.       Popliteal pulses are 2+ on the right side and 2+ on the left side.        Dorsalis pedis pulses are 2+ on the right side and 2+ on the left side.        Posterior tibial pulses are 2+ on the right side and 2+ on the left side.      Heart sounds: Normal heart sounds. No murmur heard.    No friction rub. No gallop.   Pulmonary:      Effort: Pulmonary effort is normal. No respiratory distress.      Breath sounds: Normal breath sounds. No stridor. No wheezing or rales.   Chest:      Chest wall: No tenderness.   Abdominal:      General: There is no distension.      Tenderness: There is no abdominal tenderness. There is no rebound.   Musculoskeletal:         General: No tenderness.      Cervical back: Normal range of motion.   Skin:     General: Skin is warm and dry.   Neurological:      Mental Status: He is alert and oriented to person, place, and time.      · The left ventricle is normal in size with moderately decreased systolic function.  · The estimated ejection fraction is 35%.  · Grade I left ventricular diastolic dysfunction.  · There are segmental left ventricular wall motion abnormalities.  · Mild mitral regurgitation.  · Normal central venous pressure (3 mmHg).  · The estimated PA systolic pressure is 32 mmHg.  · With low normal right ventricular systolic function.    Cardiac echo 03/02/2022:      Assessment:     1. Hypercholesterolemia    2. H/O non-ST elevation myocardial infarction (NSTEMI)    3. Coronary artery disease of native artery of native heart with  angina pectoris    4. H/O heart artery stent    5. Dilated cardiomyopathy    6. Heart failure with reduced ejection fraction, NYHA class II    7. NSTEMI (non-ST elevated myocardial infarction)    8. Chronic systolic CHF (congestive heart failure)        Plan:     Hypercholesterolemia    H/O non-ST elevation myocardial infarction (NSTEMI)    Coronary artery disease of native artery of native heart with  angina pectoris    H/O heart artery stent    Dilated cardiomyopathy    Heart failure with reduced ejection fraction, NYHA class II    NSTEMI (non-ST elevated myocardial infarction)    Chronic systolic CHF (congestive heart failure)      Impression 1 CAD status post stent placements in stable and on aspirin and Brilinta.  Also taking metoprolol.  No chest discomfort  2. Dilated cardiomyopathy slightly improved but clearly there has been damaged from NSTEMI and the patient has refused to take additional medications for improvement overall LV function.  The patient has been investigating 100s if not thousands of articles and the patient is only willing to take medications he feels necessary.  I will see the patient for months review to see how he is doing support his use of medications.  My   primary goal is to make sure he takes the Brilinta for 1 year.

## 2022-05-30 NOTE — PROGRESS NOTES
History & Physical    SUBJECTIVE:     History of Present Illness:  Patient is a 71 y.o. 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  He is doing well with no complaints.    No chief complaint on file.      Review of patient's allergies indicates:   Allergen Reactions    Statins-hmg-coa reductase inhibitors Other (See Comments)     SEVERE MYALGIAS AND GI SIDE EFFECTS       Current Outpatient Medications   Medication Sig Dispense Refill    aspirin (ECOTRIN) 81 MG EC tablet Take 1 tablet (81 mg total) by mouth once daily. 90 tablet 3    metoprolol succinate (TOPROL-XL) 25 MG 24 hr tablet Take 0.5 tablets (12.5 mg total) by mouth every evening. 45 tablet 3    mometasone 0.1% (ELOCON) 0.1 % cream AAA bid prn for psoriasis. Strong steroid.  Do not use on face, underarms or groin. 50 g 1    multivitamin capsule Take 1 capsule by mouth once daily.      nitroGLYCERIN (NITROSTAT) 0.4 MG SL tablet Place 1 tablet (0.4 mg total) under the tongue every 5 (five) minutes as needed for Chest pain. 30 tablet 1    oxyCODONE (ROXICODONE) 5 MG immediate release tablet Take 1 tablet (5 mg total) by mouth every 6 (six) hours as needed for Pain. 15 tablet 0    pantoprazole (PROTONIX) 40 MG tablet Take 1 tablet (40 mg total) by mouth once daily. 30 tablet 3    ranolazine (RANEXA) 500 MG Tb12 Take 1 tablet (500 mg total) by mouth 2 (two) times daily. 60 tablet 11    ticagrelor (BRILINTA) 90 mg tablet Take 1 tablet (90 mg total) by mouth 2 (two) times a day. 180 tablet 1     No current facility-administered medications for this visit.       Past Medical History:   Diagnosis Date    Anticoagulant long-term use     Arthritis     Cancer     Chronic systolic CHF (congestive heart failure) 02/26/2022    Coronary artery disease     Digestive disorder     Myocardial infarction     2/20/22     Past Surgical History:   Procedure Laterality Date    CARDIAC CATHETERIZATION       COLONOSCOPY N/A 9/22/2021    Procedure: COLONOSCOPY;  Surgeon: Meaghan Jaime MD;  Location: [facility] ENDO;  Service: Endoscopy;  Laterality: N/A;    CORONARY ANGIOGRAPHY INCLUDING BYPASS GRAFTS WITH CATHETERIZATION OF LEFT HEART N/A 3/3/2020    Procedure: ANGIOGRAM, CORONARY, INCLUDING BYPASS GRAFT, WITH LEFT HEART CATHETERIZATION;  Surgeon: Ute Melchor MD;  Location: [facility] CATH LAB;  Service: Cardiology;  Laterality: N/A;    JOINT REPLACEMENT Right     knee    KNEE SURGERY      LEFT HEART CATHETERIZATION Left 2/22/2020    Procedure: CATHETERIZATION, HEART, LEFT;  Surgeon: Ute Melchor MD;  Location: [facility] CATH LAB;  Service: Cardiology;  Laterality: Left;    LEFT HEART CATHETERIZATION Left 2/26/2022    Procedure: CATHETERIZATION, HEART, LEFT;  Surgeon: Ute Melchor MD;  Location: [facility] CATH LAB;  Service: Cardiology;  Laterality: Left;    PERCUTANEOUS TRANSLUMINAL BALLOON ANGIOPLASTY OF CORONARY ARTERY Left 2/22/2020    Procedure: Angioplasty-coronary;  Surgeon: Ute Melchor MD;  Location: [facility] CATH LAB;  Service: Cardiology;  Laterality: Left;    PERCUTANEOUS TRANSLUMINAL BALLOON ANGIOPLASTY OF CORONARY ARTERY  2/26/2022    Procedure: Angioplasty-coronary;  Surgeon: Ute Melchor MD;  Location: [facility] CATH LAB;  Service: Cardiology;;    RIGHT HEMICOLECTOMY N/A 4/27/2022    Procedure: HEMICOLECTOMY, RIGHT;  Surgeon: See Dominguez MD;  Location: [facility] OR;  Service: General;  Laterality: N/A;    SHOULDER ARTHROSCOPY Right     Dr Bella    simple prostatectomy  06/06/2016    robotic assisted partial prostectomy due to BPH    TOTAL KNEE ARTHROPLASTY       Family History   Problem Relation Age of Onset    Heart disease Mother         lupus related    Diabetes Mother     Cancer Father         prostate, bone, lung, skin cancers all seperate types    Diabetes Maternal Aunt      Social History     Tobacco Use    Smoking status: Never Smoker    Smokeless tobacco: Former User     Types: Chew      Quit date: 6/3/2004   Substance Use Topics    Alcohol use: Yes     Alcohol/week: 2.0 standard drinks     Types: 2 Glasses of wine per week     Comment: per week    Drug use: No        Review of Systems:  Review of Systems   Constitutional: Negative for chills, fever and unexpected weight change.   HENT: Negative for congestion.    Eyes: Negative for visual disturbance.   Respiratory: Negative for shortness of breath.    Cardiovascular: Negative for chest pain.   Gastrointestinal: Negative for abdominal distention, abdominal pain, constipation, nausea, rectal pain and vomiting.   Genitourinary: Negative for dysuria.   Musculoskeletal: Negative for arthralgias.   Skin: Negative for rash.   Neurological: Negative for light-headedness.   Hematological: Negative for adenopathy.       OBJECTIVE:     Vital Signs (Most Recent)  Temp: 97.4 °F (36.3 °C) (05/25/22 1051)  Pulse: 63 (05/25/22 1051)  BP: 104/73 (05/25/22 1051)     69.4 kg (153 lb)     Physical Exam:  Physical Exam  Vitals reviewed.   Constitutional:       Appearance: He is well-developed.   HENT:      Head: Normocephalic and atraumatic.   Cardiovascular:      Rate and Rhythm: Normal rate and regular rhythm.   Pulmonary:      Effort: Pulmonary effort is normal.      Breath sounds: Normal breath sounds.   Abdominal:      General: Bowel sounds are normal. There is no distension.      Palpations: Abdomen is soft.      Tenderness: There is no abdominal tenderness.      Comments: Incision healing well   Musculoskeletal:      Cervical back: Normal range of motion and neck supple.   Skin:     General: Skin is warm and dry.   Neurological:      Mental Status: He is alert and oriented to person, place, and time.         Final Pathologic Diagnosis 1. EXCISION OF RIGHT COLON:   NO INTRINSIC ABNORMALITY OF THE COLON, SMALL INTESTINE, OR APPENDIX   AREA OF SEROSAL ACUTE INFLAMMATION AND AREAS OF CONGESTION (PROCEDURE FOR   VOLVULUS)   3. ANASTOMOTIC DONUTS:   NO SIGNIFICANT  HISTOLOGIC ALTERATION         ASSESSMENT/PLAN:     71-year-old male status post right hemicolectomy    PLAN:Plan     Pathology reviewed  Healing well  Continue light duty x2 weeks then okay to resume regular activity  Follow-up p.r.n.

## 2022-05-31 ENCOUNTER — PATIENT MESSAGE (OUTPATIENT)
Dept: CARDIOLOGY | Facility: CLINIC | Age: 72
End: 2022-05-31
Payer: MEDICARE

## 2022-05-31 DIAGNOSIS — I42.0 DILATED CARDIOMYOPATHY: ICD-10-CM

## 2022-05-31 DIAGNOSIS — I25.2 H/O NON-ST ELEVATION MYOCARDIAL INFARCTION (NSTEMI): ICD-10-CM

## 2022-05-31 DIAGNOSIS — I50.22 CHRONIC SYSTOLIC CHF (CONGESTIVE HEART FAILURE): ICD-10-CM

## 2022-05-31 DIAGNOSIS — I21.4 NSTEMI (NON-ST ELEVATED MYOCARDIAL INFARCTION): ICD-10-CM

## 2022-05-31 DIAGNOSIS — Z95.5 H/O HEART ARTERY STENT: ICD-10-CM

## 2022-05-31 DIAGNOSIS — I50.20 HEART FAILURE WITH REDUCED EJECTION FRACTION, NYHA CLASS II: ICD-10-CM

## 2022-05-31 DIAGNOSIS — E78.00 HYPERCHOLESTEROLEMIA: ICD-10-CM

## 2022-05-31 DIAGNOSIS — I25.118 CORONARY ARTERY DISEASE OF NATIVE ARTERY OF NATIVE HEART WITH STABLE ANGINA PECTORIS: ICD-10-CM

## 2022-06-16 ENCOUNTER — PATIENT MESSAGE (OUTPATIENT)
Dept: ADMINISTRATIVE | Facility: OTHER | Age: 72
End: 2022-06-16
Payer: MEDICARE

## 2022-06-20 ENCOUNTER — OUTPATIENT CASE MANAGEMENT (OUTPATIENT)
Dept: ADMINISTRATIVE | Facility: OTHER | Age: 72
End: 2022-06-20
Payer: MEDICARE

## 2022-06-20 NOTE — PROGRESS NOTES
06/20/22 Attempt assessment with pt/caregiver. Pt declined OPCM at this time. Has all needs met at this time. Will mail RN info and OPCM brochure. RN OPCM case closure

## 2022-06-30 NOTE — HOSPITAL COURSE
2/23/2020 HAS NSTEMI SECONDARY TO ULCERATED THROMBOTIC OCCLUSION OF LCX TREATED WITH ISSAC . HAD DISTAL EMBOLIZATION TREATED WITH PHARMACOTHERAPY SUCCESSFULLY. HAD AN UNEVENTFUL NITE NO CHF NO ANGINA TOLERATED B BLOCKERS WELL. HE IS ON BRILINTA ASA AGGRASTAT STILL INFUSION. HAS A DROP IN HCT WHICH IS DILUTIONAL  FROM IV HYDRATION.HE HAS STATINS ALLERGY.   Yes

## 2022-07-27 ENCOUNTER — PATIENT MESSAGE (OUTPATIENT)
Dept: SURGERY | Facility: CLINIC | Age: 72
End: 2022-07-27
Payer: MEDICARE

## 2022-08-07 ENCOUNTER — HOSPITAL ENCOUNTER (OUTPATIENT)
Facility: HOSPITAL | Age: 72
Discharge: HOME | End: 2022-08-08
Attending: EMERGENCY MEDICINE | Admitting: INTERNAL MEDICINE
Payer: MEDICARE

## 2022-08-07 DIAGNOSIS — N17.9 AKI (ACUTE KIDNEY INJURY): ICD-10-CM

## 2022-08-07 DIAGNOSIS — I50.22 CHRONIC SYSTOLIC CHF (CONGESTIVE HEART FAILURE): ICD-10-CM

## 2022-08-07 DIAGNOSIS — R07.9 CHEST PAIN: ICD-10-CM

## 2022-08-07 DIAGNOSIS — I50.9 CHF (CONGESTIVE HEART FAILURE): ICD-10-CM

## 2022-08-07 DIAGNOSIS — R07.9 CHEST PAIN IN ADULT: ICD-10-CM

## 2022-08-07 DIAGNOSIS — I20.89 ANGINA AT REST: Primary | ICD-10-CM

## 2022-08-07 LAB
ALBUMIN SERPL BCP-MCNC: 4.1 G/DL (ref 3.5–5.2)
ALP SERPL-CCNC: 61 U/L (ref 55–135)
ALT SERPL W/O P-5'-P-CCNC: 15 U/L (ref 10–44)
ANION GAP SERPL CALC-SCNC: 8 MMOL/L (ref 8–16)
AST SERPL-CCNC: 20 U/L (ref 10–40)
BASOPHILS # BLD AUTO: 0.06 K/UL (ref 0–0.2)
BASOPHILS NFR BLD: 1.1 % (ref 0–1.9)
BILIRUB SERPL-MCNC: 0.5 MG/DL (ref 0.1–1)
BNP SERPL-MCNC: 118 PG/ML (ref 0–99)
BUN SERPL-MCNC: 20 MG/DL (ref 8–23)
CALCIUM SERPL-MCNC: 9.7 MG/DL (ref 8.7–10.5)
CHLORIDE SERPL-SCNC: 109 MMOL/L (ref 95–110)
CO2 SERPL-SCNC: 24 MMOL/L (ref 23–29)
CREAT SERPL-MCNC: 1.4 MG/DL (ref 0.5–1.4)
DIFFERENTIAL METHOD: ABNORMAL
EOSINOPHIL # BLD AUTO: 0.1 K/UL (ref 0–0.5)
EOSINOPHIL NFR BLD: 2 % (ref 0–8)
ERYTHROCYTE [DISTWIDTH] IN BLOOD BY AUTOMATED COUNT: 14.6 % (ref 11.5–14.5)
EST. GFR  (NO RACE VARIABLE): 53 ML/MIN/1.73 M^2
GLUCOSE SERPL-MCNC: 95 MG/DL (ref 70–110)
HCT VFR BLD AUTO: 44.3 % (ref 40–54)
HGB BLD-MCNC: 14.9 G/DL (ref 14–18)
IMM GRANULOCYTES # BLD AUTO: 0.01 K/UL (ref 0–0.04)
IMM GRANULOCYTES NFR BLD AUTO: 0.2 % (ref 0–0.5)
LYMPHOCYTES # BLD AUTO: 1.4 K/UL (ref 1–4.8)
LYMPHOCYTES NFR BLD: 25.2 % (ref 18–48)
MCH RBC QN AUTO: 28.2 PG (ref 27–31)
MCHC RBC AUTO-ENTMCNC: 33.6 G/DL (ref 32–36)
MCV RBC AUTO: 84 FL (ref 82–98)
MONOCYTES # BLD AUTO: 0.5 K/UL (ref 0.3–1)
MONOCYTES NFR BLD: 9.7 % (ref 4–15)
NEUTROPHILS # BLD AUTO: 3.4 K/UL (ref 1.8–7.7)
NEUTROPHILS NFR BLD: 61.8 % (ref 38–73)
NRBC BLD-RTO: 0 /100 WBC
PLATELET # BLD AUTO: 157 K/UL (ref 150–450)
PMV BLD AUTO: 9.9 FL (ref 9.2–12.9)
POTASSIUM SERPL-SCNC: 4.5 MMOL/L (ref 3.5–5.1)
PROT SERPL-MCNC: 6.6 G/DL (ref 6–8.4)
RBC # BLD AUTO: 5.29 M/UL (ref 4.6–6.2)
SARS-COV-2 RDRP RESP QL NAA+PROBE: NEGATIVE
SODIUM SERPL-SCNC: 141 MMOL/L (ref 136–145)
TROPONIN I SERPL DL<=0.01 NG/ML-MCNC: 0.01 NG/ML (ref 0–0.03)
TROPONIN I SERPL DL<=0.01 NG/ML-MCNC: 0.03 NG/ML (ref 0–0.03)
TROPONIN I SERPL DL<=0.01 NG/ML-MCNC: <0.006 NG/ML (ref 0–0.03)
TROPONIN I SERPL DL<=0.01 NG/ML-MCNC: <0.006 NG/ML (ref 0–0.03)
WBC # BLD AUTO: 5.56 K/UL (ref 3.9–12.7)

## 2022-08-07 PROCEDURE — 36415 COLL VENOUS BLD VENIPUNCTURE: CPT | Performed by: NURSE PRACTITIONER

## 2022-08-07 PROCEDURE — 84484 ASSAY OF TROPONIN QUANT: CPT | Mod: 91 | Performed by: EMERGENCY MEDICINE

## 2022-08-07 PROCEDURE — 85025 COMPLETE CBC W/AUTO DIFF WBC: CPT | Performed by: EMERGENCY MEDICINE

## 2022-08-07 PROCEDURE — 93010 EKG 12-LEAD: ICD-10-PCS | Mod: ,,, | Performed by: INTERNAL MEDICINE

## 2022-08-07 PROCEDURE — 80053 COMPREHEN METABOLIC PANEL: CPT | Performed by: EMERGENCY MEDICINE

## 2022-08-07 PROCEDURE — 25000003 PHARM REV CODE 250: Performed by: NURSE PRACTITIONER

## 2022-08-07 PROCEDURE — G0378 HOSPITAL OBSERVATION PER HR: HCPCS

## 2022-08-07 PROCEDURE — 93005 ELECTROCARDIOGRAM TRACING: CPT

## 2022-08-07 PROCEDURE — 99214 OFFICE O/P EST MOD 30 MIN: CPT | Mod: ,,, | Performed by: INTERNAL MEDICINE

## 2022-08-07 PROCEDURE — 83880 ASSAY OF NATRIURETIC PEPTIDE: CPT | Performed by: EMERGENCY MEDICINE

## 2022-08-07 PROCEDURE — 99214 PR OFFICE/OUTPT VISIT, EST, LEVL IV, 30-39 MIN: ICD-10-PCS | Mod: ,,, | Performed by: INTERNAL MEDICINE

## 2022-08-07 PROCEDURE — 99285 EMERGENCY DEPT VISIT HI MDM: CPT | Mod: 25

## 2022-08-07 PROCEDURE — 96372 THER/PROPH/DIAG INJ SC/IM: CPT | Performed by: NURSE PRACTITIONER

## 2022-08-07 PROCEDURE — 84484 ASSAY OF TROPONIN QUANT: CPT | Mod: 91 | Performed by: NURSE PRACTITIONER

## 2022-08-07 PROCEDURE — U0002 COVID-19 LAB TEST NON-CDC: HCPCS | Performed by: EMERGENCY MEDICINE

## 2022-08-07 PROCEDURE — 99900035 HC TECH TIME PER 15 MIN (STAT)

## 2022-08-07 PROCEDURE — 93010 ELECTROCARDIOGRAM REPORT: CPT | Mod: ,,, | Performed by: INTERNAL MEDICINE

## 2022-08-07 PROCEDURE — 63600175 PHARM REV CODE 636 W HCPCS: Performed by: NURSE PRACTITIONER

## 2022-08-07 RX ORDER — SODIUM CHLORIDE 0.9 % (FLUSH) 0.9 %
10 SYRINGE (ML) INJECTION EVERY 8 HOURS PRN
Status: DISCONTINUED | OUTPATIENT
Start: 2022-08-07 | End: 2022-08-08 | Stop reason: HOSPADM

## 2022-08-07 RX ORDER — LANOLIN ALCOHOL/MO/W.PET/CERES
800 CREAM (GRAM) TOPICAL
Status: DISCONTINUED | OUTPATIENT
Start: 2022-08-07 | End: 2022-08-08 | Stop reason: HOSPADM

## 2022-08-07 RX ORDER — ASPIRIN 81 MG/1
81 TABLET ORAL DAILY
Status: DISCONTINUED | OUTPATIENT
Start: 2022-08-07 | End: 2022-08-08 | Stop reason: HOSPADM

## 2022-08-07 RX ORDER — MORPHINE SULFATE 2 MG/ML
2 INJECTION, SOLUTION INTRAMUSCULAR; INTRAVENOUS EVERY 4 HOURS PRN
Status: DISCONTINUED | OUTPATIENT
Start: 2022-08-07 | End: 2022-08-08 | Stop reason: HOSPADM

## 2022-08-07 RX ORDER — ASPIRIN 325 MG
325 TABLET ORAL
Status: DISCONTINUED | OUTPATIENT
Start: 2022-08-07 | End: 2022-08-07

## 2022-08-07 RX ORDER — ACETAMINOPHEN 325 MG/1
650 TABLET ORAL EVERY 4 HOURS PRN
Status: DISCONTINUED | OUTPATIENT
Start: 2022-08-07 | End: 2022-08-08 | Stop reason: HOSPADM

## 2022-08-07 RX ORDER — PANTOPRAZOLE SODIUM 40 MG/1
40 TABLET, DELAYED RELEASE ORAL DAILY
Status: DISCONTINUED | OUTPATIENT
Start: 2022-08-07 | End: 2022-08-08 | Stop reason: HOSPADM

## 2022-08-07 RX ORDER — ENOXAPARIN SODIUM 100 MG/ML
40 INJECTION SUBCUTANEOUS EVERY 24 HOURS
Status: DISCONTINUED | OUTPATIENT
Start: 2022-08-07 | End: 2022-08-08 | Stop reason: HOSPADM

## 2022-08-07 RX ORDER — RANOLAZINE 500 MG/1
500 TABLET, EXTENDED RELEASE ORAL 2 TIMES DAILY
Status: DISCONTINUED | OUTPATIENT
Start: 2022-08-07 | End: 2022-08-08 | Stop reason: HOSPADM

## 2022-08-07 RX ORDER — PROCHLORPERAZINE EDISYLATE 5 MG/ML
5 INJECTION INTRAMUSCULAR; INTRAVENOUS EVERY 6 HOURS PRN
Status: DISCONTINUED | OUTPATIENT
Start: 2022-08-07 | End: 2022-08-08 | Stop reason: HOSPADM

## 2022-08-07 RX ORDER — MAG HYDROX/ALUMINUM HYD/SIMETH 200-200-20
30 SUSPENSION, ORAL (FINAL DOSE FORM) ORAL 4 TIMES DAILY PRN
Status: DISCONTINUED | OUTPATIENT
Start: 2022-08-07 | End: 2022-08-08 | Stop reason: HOSPADM

## 2022-08-07 RX ORDER — TALC
6 POWDER (GRAM) TOPICAL NIGHTLY PRN
Status: DISCONTINUED | OUTPATIENT
Start: 2022-08-07 | End: 2022-08-08 | Stop reason: HOSPADM

## 2022-08-07 RX ORDER — IPRATROPIUM BROMIDE AND ALBUTEROL SULFATE 2.5; .5 MG/3ML; MG/3ML
3 SOLUTION RESPIRATORY (INHALATION) EVERY 6 HOURS PRN
Status: DISCONTINUED | OUTPATIENT
Start: 2022-08-07 | End: 2022-08-08 | Stop reason: HOSPADM

## 2022-08-07 RX ORDER — SIMETHICONE 80 MG
1 TABLET,CHEWABLE ORAL 4 TIMES DAILY PRN
Status: DISCONTINUED | OUTPATIENT
Start: 2022-08-07 | End: 2022-08-08 | Stop reason: HOSPADM

## 2022-08-07 RX ORDER — ONDANSETRON 2 MG/ML
4 INJECTION INTRAMUSCULAR; INTRAVENOUS EVERY 8 HOURS PRN
Status: DISCONTINUED | OUTPATIENT
Start: 2022-08-07 | End: 2022-08-08 | Stop reason: HOSPADM

## 2022-08-07 RX ADMIN — NITROGLYCERIN 1 INCH: 20 OINTMENT TOPICAL at 09:08

## 2022-08-07 RX ADMIN — ASPIRIN 81 MG: 81 TABLET, COATED ORAL at 11:08

## 2022-08-07 RX ADMIN — METOPROLOL SUCCINATE 12.5 MG: 25 TABLET, EXTENDED RELEASE ORAL at 09:08

## 2022-08-07 RX ADMIN — TICAGRELOR 90 MG: 90 TABLET ORAL at 10:08

## 2022-08-07 RX ADMIN — TICAGRELOR 90 MG: 90 TABLET ORAL at 09:08

## 2022-08-07 RX ADMIN — RANOLAZINE 500 MG: 500 TABLET, EXTENDED RELEASE ORAL at 09:08

## 2022-08-07 RX ADMIN — NITROGLYCERIN 1 INCH: 20 OINTMENT TOPICAL at 10:08

## 2022-08-07 RX ADMIN — PANTOPRAZOLE SODIUM 40 MG: 40 TABLET, DELAYED RELEASE ORAL at 10:08

## 2022-08-07 RX ADMIN — RANOLAZINE 500 MG: 500 TABLET, EXTENDED RELEASE ORAL at 10:08

## 2022-08-07 RX ADMIN — ACETAMINOPHEN 650 MG: 325 TABLET ORAL at 10:08

## 2022-08-07 RX ADMIN — ENOXAPARIN SODIUM 40 MG: 100 INJECTION SUBCUTANEOUS at 04:08

## 2022-08-07 NOTE — PROGRESS NOTES
"Pt admitted from ED at 0820 this am initially denied C/O CP then C/O intermittent chest tightness and "soreness" to L chest. Rpts pain is reproducible with movement and non radiating. Nitro patch placed with improvement rpted. Denies SOB or N/V. OOB ab alethea with steady gait, ambulating in hallway multiple times since arrival to unit, declines SCD's.  POC reviewed.  "

## 2022-08-07 NOTE — ASSESSMENT & PLAN NOTE
Cardiology consult  Serial cardiac enzymes, continue beta blocker & ASA  Nitrates topical,  Oxygen therapy to maintain sats > 92 %  Lipid panel - pt does not tolerate STATIN due to severe myalgias   2 D ECHO pending  troponins negative  Cardiology plans nuclear stress test in AM

## 2022-08-07 NOTE — HPI
History of Present Illness: Mekhi Lambert is a 72 y.o. male patient with a PMHx of CHF and CAD who presents to the Emergency Department for evaluation of CP which onset 2x hrs PTA. Pt states he woke up with sxs and thought it could be indigestion so he started moving around and stretching his shoulder but it did not get better. Symptoms are intermittent and moderate in severity. No mitigating or exacerbating factors reported. No associated sxs. Patient denies any fever, chills, nausea, SOB, back pain, cough, congestion, and all other sxs at this time. Pt took 4 aspirin and 1 nitro tablet before EMS arrived. No further complaints or concerns at this time.    Patient with chest pain, plan nuclear stress test tomorrow.

## 2022-08-07 NOTE — H&P
[facility] Medicine  History & Physical    Patient Name: Mekhi Lambert  MRN: [id number]  Patient Class: OP- Observation  Admission Date: 8/7/2022  Attending Physician: Nelson Benjamin, *   Primary Care Provider: ABIGAIL Lopez Jr, MD         Patient information was obtained from patient and ER records.     Subjective:     Principal Problem:Chest pain in adult    Chief Complaint:   Chief Complaint   Patient presents with    Chest Pain     Pt was at home and began having intermittent chest pain. Pt has a history of stents. Pt took 325 aspirin and 1 sub-lingual nitro tablet before EMS arrival.          HPI: Pt is a 73 yo male with PMhx of combined heart failure, CAD with stents x 3 and myocardial infarctions who presents to the ED with reports of left sided chest pain onset last night while patient was sleeping. Pt exercises every day and thought it could be muscle related. He had no relief with SL nitroglycerin. He denies any associated symptoms. This chest pain is reminiscent to his episodes of myocardial infarction however, pain was mild in nature without radiation.   EKG - NSR with nonspecific T wave abnormalities, PVC  Troponin normal, CBC and CMP normal, BNP = 118  CXR - no acute findings  To evaluate for acute coronary syndrome  As clarification, on 8/7/2022 patient should be admitted for hospital observation services under my care in collaboration with Nelson Benjamin, *        Past Medical History:   Diagnosis Date    Anticoagulant long-term use     Arthritis     Cancer     Chronic systolic CHF (congestive heart failure) 02/26/2022    Coronary artery disease     Digestive disorder     Myocardial infarction     2/20/22       Past Surgical History:   Procedure Laterality Date    CARDIAC CATHETERIZATION      COLONOSCOPY N/A 09/22/2021    Procedure: COLONOSCOPY;  Surgeon: Meaghan Jaime MD;  Location: [facility];  Service: Endoscopy;  Laterality: N/A;    CORONARY  ANGIOGRAPHY INCLUDING BYPASS GRAFTS WITH CATHETERIZATION OF LEFT HEART N/A 03/03/2020    Procedure: ANGIOGRAM, CORONARY, INCLUDING BYPASS GRAFT, WITH LEFT HEART CATHETERIZATION;  Surgeon: Ute Melchor MD;  Location: [facility] [place] CATH LAB;  Service: Cardiology;  Laterality: N/A;    JOINT REPLACEMENT Right     knee    KNEE SURGERY      LEFT HEART CATHETERIZATION Left 02/22/2020    Procedure: CATHETERIZATION, HEART, LEFT;  Surgeon: Ute Melchor MD;  Location: [facility] CATH LAB;  Service: Cardiology;  Laterality: Left;    LEFT HEART CATHETERIZATION Left 02/26/2022    Procedure: CATHETERIZATION, HEART, LEFT;  Surgeon: Ute Melchor MD;  Location: [facility] CATH LAB;  Service: Cardiology;  Laterality: Left;    PERCUTANEOUS TRANSLUMINAL BALLOON ANGIOPLASTY OF CORONARY ARTERY Left 02/22/2020    Procedure: Angioplasty-coronary;  Surgeon: Ute Melchor MD;  Location: [facility] [place] CATH LAB;  Service: Cardiology;  Laterality: Left;    PERCUTANEOUS TRANSLUMINAL BALLOON ANGIOPLASTY OF CORONARY ARTERY  02/26/2022    Procedure: Angioplasty-coronary;  Surgeon: Ute Melchor MD;  Location: [facility] CATH LAB;  Service: Cardiology;;    RIGHT HEMICOLECTOMY N/A 04/27/2022    Procedure: HEMICOLECTOMY, RIGHT;  Surgeon: See Dominguez MD;  Location: [facility] OR;  Service: General;  Laterality: N/A;    SHOULDER ARTHROSCOPY Right     Dr Bella    SIGMOIDECTOMY      simple prostatectomy  06/06/2016    robotic assisted partial prostectomy due to BPH    TOTAL KNEE ARTHROPLASTY         Review of patient's allergies indicates:   Allergen Reactions    Statins-hmg-coa reductase inhibitors Other (See Comments)     SEVERE MYALGIAS AND GI SIDE EFFECTS       No current facility-administered medications on file prior to encounter.     Current Outpatient Medications on File Prior to Encounter   Medication Sig    metoprolol succinate (TOPROL-XL) 25 MG 24 hr tablet Take 0.5 tablets (12.5 mg total) by mouth every evening.    mometasone 0.1% (ELOCON) 0.1 % cream AAA  bid prn for psoriasis. Strong steroid.  Do not use on face, underarms or groin.    nitroGLYCERIN (NITROSTAT) 0.4 MG SL tablet Place 1 tablet (0.4 mg total) under the tongue every 5 (five) minutes as needed for Chest pain.    pantoprazole (PROTONIX) 40 MG tablet Take 1 tablet (40 mg total) by mouth once daily.    ranolazine (RANEXA) 500 MG Tb12 Take 1 tablet (500 mg total) by mouth 2 (two) times daily.    ticagrelor (BRILINTA) 90 mg tablet Take 1 tablet (90 mg total) by mouth 2 (two) times a day.    aspirin (ECOTRIN) 81 MG EC tablet Take 1 tablet (81 mg total) by mouth once daily.    multivitamin capsule Take 1 capsule by mouth once daily.    oxyCODONE (ROXICODONE) 5 MG immediate release tablet Take 1 tablet (5 mg total) by mouth every 6 (six) hours as needed for Pain.     Family History       Problem Relation (Age of Onset)    Cancer Father    Diabetes Mother, Maternal Aunt    Heart disease Mother          Tobacco Use    Smoking status: Never Smoker    Smokeless tobacco: Former User     Types: Chew     Quit date: 6/3/2004   Substance and Sexual Activity    Alcohol use: Yes     Alcohol/week: 2.0 standard drinks     Types: 2 Glasses of wine per week     Comment: per week    Drug use: No    Sexual activity: Yes     Review of Systems   Respiratory:  Positive for chest tightness.    Cardiovascular:  Positive for chest pain.   All other systems reviewed and are negative.  Objective:     Vital Signs (Most Recent):  Temp: 98.3 °F (36.8 °C) (08/07/22 1203)  Pulse: (!) 57 (08/07/22 1203)  Resp: 18 (08/07/22 1203)  BP: 104/68 (08/07/22 1203)  SpO2: 99 % (08/07/22 1203)   Vital Signs (24h Range):  Temp:  [97.6 °F (36.4 °C)-98.3 °F (36.8 °C)] 98.3 °F (36.8 °C)  Pulse:  [56-68] 57  Resp:  [18-23] 18  SpO2:  [97 %-100 %] 99 %  BP: (103-130)/(65-88) 104/68     Weight: 68 kg (150 lb)  Body mass index is 21.52 kg/m².    Physical Exam  Vitals reviewed.   Constitutional:       Appearance: Normal appearance. He is  well-developed.   HENT:      Head: Normocephalic and atraumatic.      Nose: Nose normal.   Eyes:      General: No scleral icterus.     Conjunctiva/sclera: Conjunctivae normal.      Pupils: Pupils are equal, round, and reactive to light.   Cardiovascular:      Rate and Rhythm: Normal rate and regular rhythm.      Heart sounds: Normal heart sounds. No murmur heard.    No friction rub. No gallop.   Pulmonary:      Effort: Pulmonary effort is normal.      Breath sounds: Normal breath sounds.   Abdominal:      General: Bowel sounds are normal.      Palpations: Abdomen is soft.   Musculoskeletal:         General: No tenderness. Normal range of motion.      Cervical back: Normal range of motion and neck supple.   Skin:     General: Skin is warm and dry.   Neurological:      Mental Status: He is alert and oriented to person, place, and time.   Psychiatric:         Behavior: Behavior normal.         CRANIAL NERVES     CN III, IV, VI   Pupils are equal, round, and reactive to light.     Significant Labs: All pertinent labs within the past 24 hours have been reviewed.  CBC:   Recent Labs   Lab 08/07/22 0222   WBC 5.56   HGB 14.9   HCT 44.3        CMP:   Recent Labs   Lab 08/07/22 0222      K 4.5      CO2 24   GLU 95   BUN 20   CREATININE 1.4   CALCIUM 9.7   PROT 6.6   ALBUMIN 4.1   BILITOT 0.5   ALKPHOS 61   AST 20   ALT 15   ANIONGAP 8       Significant Imaging: I have reviewed all pertinent imaging results/findings within the past 24 hours.    Assessment/Plan:     * Chest pain in adult  Cardiology consult  Serial cardiac enzymes, continue beta blocker & ASA  Nitrates topical,  Oxygen therapy to maintain sats > 92 %  Lipid panel - pt does not tolerate STATIN due to severe myalgias   2 D ECHO pending  troponins negative  Cardiology plans nuclear stress test in AM        Chronic systolic CHF (congestive heart failure)  Patient is identified as having Combined Systolic and Diastolic heart failure that is  Chronic. CHF is currently controlled. Latest ECHO performed and demonstrates- Results for orders placed during the hospital encounter of 04/25/22    Echo    Interpretation Summary  · The left ventricle is normal in size with moderately decreased systolic function.  · The estimated ejection fraction is 35%.  · Grade I left ventricular diastolic dysfunction.  · There are segmental left ventricular wall motion abnormalities.  · Mild mitral regurgitation.  · Normal central venous pressure (3 mmHg).  · The estimated PA systolic pressure is 32 mmHg.  · With low normal right ventricular systolic function.  . Continue Beta Blocker Nitrate/Vasodilator and monitor clinical status closely. Monitor on telemetry. Patient is off CHF pathway.  Monitor strict Is&Os and daily weights.  Place on fluid restriction of 1.5 L. Continue to stress to patient importance of self efficacy and  on diet for CHF. Last BNP reviewed- and noted below   Recent Labs   Lab 08/07/22 0222   *   .          Coronary artery disease of native artery of native heart with  angina pectoris  Continue ASA, Ranexa and BB  Continue Brillinta      Hypercholesterolemia  Pt intolerant to STATIN  Lab Results   Component Value Date    CHOL 299 (H) 03/16/2022    CHOL 272 (H) 06/22/2020    CHOL 176 02/23/2020     Lab Results   Component Value Date    HDL 78 (H) 03/16/2022    HDL 73 06/22/2020    HDL 52 02/23/2020     Lab Results   Component Value Date    LDLCALC 204.6 (H) 03/16/2022    LDLCALC 180.6 (H) 06/22/2020    LDLCALC 104.2 02/23/2020     Lab Results   Component Value Date    TRIG 82 03/16/2022    TRIG 92 06/22/2020    TRIG 99 02/23/2020     Lab Results   Component Value Date    CHOLHDL 26.1 03/16/2022    CHOLHDL 26.8 06/22/2020    CHOLHDL 29.5 02/23/2020           VTE Risk Mitigation (From admission, onward)         Ordered     enoxaparin injection 40 mg  Daily         08/07/22 0852     IP VTE HIGH RISK PATIENT  Once         08/07/22 0852     Place  sequential compression device  Until discontinued         08/07/22 0852                   Christi Aviles NP  Department of Hospital Medicine   '[facility]

## 2022-08-07 NOTE — SUBJECTIVE & OBJECTIVE
Past Medical History:   Diagnosis Date    Anticoagulant long-term use     Arthritis     Cancer     Chronic systolic CHF (congestive heart failure) 02/26/2022    Coronary artery disease     Digestive disorder     Myocardial infarction     2/20/22       Past Surgical History:   Procedure Laterality Date    CARDIAC CATHETERIZATION      COLONOSCOPY N/A 09/22/2021    Procedure: COLONOSCOPY;  Surgeon: Meaghan Jaime MD;  Location: [facility] [place] ENDO;  Service: Endoscopy;  Laterality: N/A;    CORONARY ANGIOGRAPHY INCLUDING BYPASS GRAFTS WITH CATHETERIZATION OF LEFT HEART N/A 03/03/2020    Procedure: ANGIOGRAM, CORONARY, INCLUDING BYPASS GRAFT, WITH LEFT HEART CATHETERIZATION;  Surgeon: Ute Melchor MD;  Location: [facility] [place] CATH LAB;  Service: Cardiology;  Laterality: N/A;    JOINT REPLACEMENT Right     knee    KNEE SURGERY      LEFT HEART CATHETERIZATION Left 02/22/2020    Procedure: CATHETERIZATION, HEART, LEFT;  Surgeon: Ute Melchor MD;  Location: [facility] [place] CATH LAB;  Service: Cardiology;  Laterality: Left;    LEFT HEART CATHETERIZATION Left 02/26/2022    Procedure: CATHETERIZATION, HEART, LEFT;  Surgeon: Ute Melchor MD;  Location: [facility] [place] CATH LAB;  Service: Cardiology;  Laterality: Left;    PERCUTANEOUS TRANSLUMINAL BALLOON ANGIOPLASTY OF CORONARY ARTERY Left 02/22/2020    Procedure: Angioplasty-coronary;  Surgeon: Ute Melchor MD;  Location: [facility] [place] CATH LAB;  Service: Cardiology;  Laterality: Left;    PERCUTANEOUS TRANSLUMINAL BALLOON ANGIOPLASTY OF CORONARY ARTERY  02/26/2022    Procedure: Angioplasty-coronary;  Surgeon: Ute Melchor MD;  Location: [facility] [place] CATH LAB;  Service: Cardiology;;    RIGHT HEMICOLECTOMY N/A 04/27/2022    Procedure: HEMICOLECTOMY, RIGHT;  Surgeon: See Dominguez MD;  Location: [facility] [place] OR;  Service: General;  Laterality: N/A;    SHOULDER ARTHROSCOPY Right     Dr Bella    SIGMOIDECTOMY      simple prostatectomy  06/06/2016    robotic assisted partial prostectomy due to BPH    TOTAL KNEE ARTHROPLASTY          Review of patient's allergies indicates:   Allergen Reactions    Statins-hmg-coa reductase inhibitors Other (See Comments)     SEVERE MYALGIAS AND GI SIDE EFFECTS       No current facility-administered medications on file prior to encounter.     Current Outpatient Medications on File Prior to Encounter   Medication Sig    metoprolol succinate (TOPROL-XL) 25 MG 24 hr tablet Take 0.5 tablets (12.5 mg total) by mouth every evening.    mometasone 0.1% (ELOCON) 0.1 % cream AAA bid prn for psoriasis. Strong steroid.  Do not use on face, underarms or groin.    nitroGLYCERIN (NITROSTAT) 0.4 MG SL tablet Place 1 tablet (0.4 mg total) under the tongue every 5 (five) minutes as needed for Chest pain.    pantoprazole (PROTONIX) 40 MG tablet Take 1 tablet (40 mg total) by mouth once daily.    ranolazine (RANEXA) 500 MG Tb12 Take 1 tablet (500 mg total) by mouth 2 (two) times daily.    ticagrelor (BRILINTA) 90 mg tablet Take 1 tablet (90 mg total) by mouth 2 (two) times a day.    aspirin (ECOTRIN) 81 MG EC tablet Take 1 tablet (81 mg total) by mouth once daily.    multivitamin capsule Take 1 capsule by mouth once daily.    oxyCODONE (ROXICODONE) 5 MG immediate release tablet Take 1 tablet (5 mg total) by mouth every 6 (six) hours as needed for Pain.     Family History       Problem Relation (Age of Onset)    Cancer Father    Diabetes Mother, Maternal Aunt    Heart disease Mother          Tobacco Use    Smoking status: Never Smoker    Smokeless tobacco: Former User     Types: Chew     Quit date: 6/3/2004   Substance and Sexual Activity    Alcohol use: Yes     Alcohol/week: 2.0 standard drinks     Types: 2 Glasses of wine per week     Comment: per week    Drug use: No    Sexual activity: Yes     Review of Systems   Constitutional: Negative for chills, diaphoresis, night sweats, weight gain and weight loss.   HENT:  Negative for congestion, hoarse voice, sore throat and stridor.    Eyes:  Negative for double vision and pain.    Cardiovascular:  Positive for chest pain. Negative for claudication, cyanosis, dyspnea on exertion, irregular heartbeat, leg swelling, near-syncope, orthopnea, palpitations, paroxysmal nocturnal dyspnea and syncope.   Respiratory:  Negative for cough, hemoptysis, shortness of breath, sleep disturbances due to breathing, snoring, sputum production and wheezing.    Endocrine: Negative for cold intolerance, heat intolerance and polydipsia.   Hematologic/Lymphatic: Negative for bleeding problem. Does not bruise/bleed easily.   Skin:  Negative for color change, dry skin and rash.   Musculoskeletal:  Negative for joint swelling and muscle cramps.   Gastrointestinal:  Negative for bloating, abdominal pain, constipation, diarrhea, dysphagia, melena, nausea and vomiting.   Genitourinary:  Negative for flank pain and urgency.   Neurological:  Negative for dizziness, focal weakness, headaches, light-headedness, loss of balance, seizures and weakness.   Psychiatric/Behavioral:  Negative for altered mental status and memory loss. The patient is not nervous/anxious.    Objective:     Vital Signs (Most Recent):  Temp: 97.8 °F (36.6 °C) (08/07/22 0820)  Pulse: 64 (08/07/22 0820)  Resp: 18 (08/07/22 0820)  BP: 120/67 (08/07/22 0820)  SpO2: 98 % (08/07/22 0820) Vital Signs (24h Range):  Temp:  [97.6 °F (36.4 °C)-97.8 °F (36.6 °C)] 97.8 °F (36.6 °C)  Pulse:  [56-68] 64  Resp:  [18-23] 18  SpO2:  [97 %-100 %] 98 %  BP: (103-130)/(65-88) 120/67     Weight: 68 kg (150 lb)  Body mass index is 21.52 kg/m².    SpO2: 98 %  O2 Device (Oxygen Therapy): room air    No intake or output data in the 24 hours ending 08/07/22 1035    Lines/Drains/Airways       Peripheral Intravenous Line  Duration                  Peripheral IV - Single Lumen 18 G Right Forearm -- days                    Physical Exam  Eyes:      Pupils: Pupils are equal, round, and reactive to light.   Neck:      Trachea: No tracheal deviation.   Cardiovascular:      Rate and  Rhythm: Normal rate and regular rhythm.      Pulses: Intact distal pulses.           Carotid pulses are 2+ on the right side and 2+ on the left side.       Radial pulses are 2+ on the right side and 2+ on the left side.        Femoral pulses are 2+ on the right side and 2+ on the left side.       Popliteal pulses are 2+ on the right side and 2+ on the left side.        Dorsalis pedis pulses are 2+ on the right side and 2+ on the left side.        Posterior tibial pulses are 2+ on the right side and 2+ on the left side.      Heart sounds: Normal heart sounds. No murmur heard.    No friction rub. No gallop.   Pulmonary:      Effort: Pulmonary effort is normal. No respiratory distress.      Breath sounds: Normal breath sounds. No stridor. No wheezing or rales.   Chest:      Chest wall: No tenderness.   Abdominal:      General: There is no distension.      Tenderness: There is no abdominal tenderness. There is no rebound.   Musculoskeletal:         General: No tenderness.      Cervical back: Normal range of motion.   Skin:     General: Skin is warm and dry.   Neurological:      Mental Status: He is alert and oriented to person, place, and time.       Significant Labs: CBC   Recent Labs   Lab 08/07/22 0222   WBC 5.56   HGB 14.9   HCT 44.3      , INR No results for input(s): INR, PROTIME in the last 48 hours., and Troponin   Recent Labs   Lab 08/07/22 0222 08/07/22  0445   TROPONINI <0.006 0.012       Significant Imaging: Echocardiogram: Transthoracic echo (TTE) complete (Cupid Only):   Results for orders placed or performed during the hospital encounter of 04/25/22   Echo   Result Value Ref Range    BSA 1.85 m2    LA WIDTH 5.29 cm    Left Ventricular Outflow Tract Mean Velocity 0.746455198757875 cm/s    Left Ventricular Outflow Tract Mean Gradient 1.15 mmHg    Pulmonary Valve Mean Velocity 0.64 m/s    PV PEAK VELOCITY 1.06 cm/s    LVIDd 5.34 3.5 - 6.0 cm    IVS 0.94 0.6 - 1.1 cm    Posterior Wall 0.81 0.6 - 1.1  cm    Ao root annulus 3.54 cm    LVIDs 4.17 (A) 2.1 - 4.0 cm    FS 22 28 - 44 %    STJ 3.38 cm    Ascending aorta 3.52 cm    LV mass 170.43 g    LA size 3.73 cm    RVDD 2.84 cm    Left Ventricle Relative Wall Thickness 0.30 cm    AV mean gradient 4 mmHg    AV valve area 2.09 cm2    AV Velocity Ratio 0.58     AV index (prosthetic) 0.66     MV valve area p 1/2 method 2.74 cm2    E/A ratio 0.47     E wave deceleration time [id number] msec    IVRT 114.646958254041169 msec    Pulm vein S/D ratio 1.54     LVOT diameter 2.01 cm    LVOT area 3.2 cm2    LVOT peak fredo 0.77 m/s    LVOT peak VTI 19.80 cm    Ao peak fredo 1.32 m/s    Ao VTI 30.1 cm    LVOT stroke volume 62.80 cm3    AV peak gradient 7 mmHg    MV Peak E Fredo 0.47 m/s    TR Max Fredo 2.70 m/s    MV stenosis pressure 1/2 time 80.261704891194525 ms    MV Peak A Fredo 1.00 m/s    PV Peak S Fredo 0.299425882420222 m/s    PV Peak D Fredo 0.41 m/s    LV Systolic Volume 77.06 mL    LV Systolic Volume Index 41.7 mL/m2    LV Diastolic Volume 137.70 mL    LV Diastolic Volume Index 74.43 mL/m2    LV Mass Index 92 g/m2    Echo EF Estimated 44 %    RA Major Axis 5.05 cm    Triscuspid Valve Regurgitation Peak Gradient 29 mmHg    LA Volume Index (Mod) 7.8 mL/m2    LA volume (mod) 14.42 cm3    Right Atrial Pressure (from IVC) 3 mmHg    EF 35 %    TV rest pulmonary artery pressure 32 mmHg    Narrative    · The left ventricle is normal in size with moderately decreased systolic   function.  · The estimated ejection fraction is 35%.  · Grade I left ventricular diastolic dysfunction.  · There are segmental left ventricular wall motion abnormalities.  · Mild mitral regurgitation.  · Normal central venous pressure (3 mmHg).  · The estimated PA systolic pressure is 44 mmHg.  · There is pulmonary hypertension.  · With low normal right ventricular systolic function.

## 2022-08-07 NOTE — SUBJECTIVE & OBJECTIVE
Past Medical History:   Diagnosis Date    Anticoagulant long-term use     Arthritis     Cancer     Chronic systolic CHF (congestive heart failure) 02/26/2022    Coronary artery disease     Digestive disorder     Myocardial infarction     2/20/22       Past Surgical History:   Procedure Laterality Date    CARDIAC CATHETERIZATION      COLONOSCOPY N/A 09/22/2021    Procedure: COLONOSCOPY;  Surgeon: Meaghan Jaime MD;  Location: [facility] ENDO;  Service: Endoscopy;  Laterality: N/A;    CORONARY ANGIOGRAPHY INCLUDING BYPASS GRAFTS WITH CATHETERIZATION OF LEFT HEART N/A 03/03/2020    Procedure: ANGIOGRAM, CORONARY, INCLUDING BYPASS GRAFT, WITH LEFT HEART CATHETERIZATION;  Surgeon: Ute Melchor MD;  Location: [facility] CATH LAB;  Service: Cardiology;  Laterality: N/A;    JOINT REPLACEMENT Right     knee    KNEE SURGERY      LEFT HEART CATHETERIZATION Left 02/22/2020    Procedure: CATHETERIZATION, HEART, LEFT;  Surgeon: Ute Melchor MD;  Location: [facility] CATH LAB;  Service: Cardiology;  Laterality: Left;    LEFT HEART CATHETERIZATION Left 02/26/2022    Procedure: CATHETERIZATION, HEART, LEFT;  Surgeon: Ute Melchor MD;  Location: [facility] CATH LAB;  Service: Cardiology;  Laterality: Left;    PERCUTANEOUS TRANSLUMINAL BALLOON ANGIOPLASTY OF CORONARY ARTERY Left 02/22/2020    Procedure: Angioplasty-coronary;  Surgeon: Ute Melchor MD;  Location: [facility] CATH LAB;  Service: Cardiology;  Laterality: Left;    PERCUTANEOUS TRANSLUMINAL BALLOON ANGIOPLASTY OF CORONARY ARTERY  02/26/2022    Procedure: Angioplasty-coronary;  Surgeon: Ute Melchor MD;  Location: [facility] CATH LAB;  Service: Cardiology;;    RIGHT HEMICOLECTOMY N/A 04/27/2022    Procedure: HEMICOLECTOMY, RIGHT;  Surgeon: See Dominguez MD;  Location: [facility] OR;  Service: General;  Laterality: N/A;    SHOULDER ARTHROSCOPY Right     Dr Bella    SIGMOIDECTOMY      simple prostatectomy  06/06/2016    robotic assisted partial prostectomy due to BPH    TOTAL KNEE ARTHROPLASTY          Review of patient's allergies indicates:   Allergen Reactions    Statins-hmg-coa reductase inhibitors Other (See Comments)     SEVERE MYALGIAS AND GI SIDE EFFECTS       No current facility-administered medications on file prior to encounter.     Current Outpatient Medications on File Prior to Encounter   Medication Sig    metoprolol succinate (TOPROL-XL) 25 MG 24 hr tablet Take 0.5 tablets (12.5 mg total) by mouth every evening.    mometasone 0.1% (ELOCON) 0.1 % cream AAA bid prn for psoriasis. Strong steroid.  Do not use on face, underarms or groin.    nitroGLYCERIN (NITROSTAT) 0.4 MG SL tablet Place 1 tablet (0.4 mg total) under the tongue every 5 (five) minutes as needed for Chest pain.    pantoprazole (PROTONIX) 40 MG tablet Take 1 tablet (40 mg total) by mouth once daily.    ranolazine (RANEXA) 500 MG Tb12 Take 1 tablet (500 mg total) by mouth 2 (two) times daily.    ticagrelor (BRILINTA) 90 mg tablet Take 1 tablet (90 mg total) by mouth 2 (two) times a day.    aspirin (ECOTRIN) 81 MG EC tablet Take 1 tablet (81 mg total) by mouth once daily.    multivitamin capsule Take 1 capsule by mouth once daily.    oxyCODONE (ROXICODONE) 5 MG immediate release tablet Take 1 tablet (5 mg total) by mouth every 6 (six) hours as needed for Pain.     Family History       Problem Relation (Age of Onset)    Cancer Father    Diabetes Mother, Maternal Aunt    Heart disease Mother          Tobacco Use    Smoking status: Never Smoker    Smokeless tobacco: Former User     Types: Chew     Quit date: 6/3/2004   Substance and Sexual Activity    Alcohol use: Yes     Alcohol/week: 2.0 standard drinks     Types: 2 Glasses of wine per week     Comment: per week    Drug use: No    Sexual activity: Yes     Review of Systems   Respiratory:  Positive for chest tightness.    Cardiovascular:  Positive for chest pain.   All other systems reviewed and are negative.  Objective:     Vital Signs (Most Recent):  Temp: 98.3 °F (36.8 °C) (08/07/22  1203)  Pulse: (!) 57 (08/07/22 1203)  Resp: 18 (08/07/22 1203)  BP: 104/68 (08/07/22 1203)  SpO2: 99 % (08/07/22 1203)   Vital Signs (24h Range):  Temp:  [97.6 °F (36.4 °C)-98.3 °F (36.8 °C)] 98.3 °F (36.8 °C)  Pulse:  [56-68] 57  Resp:  [18-23] 18  SpO2:  [97 %-100 %] 99 %  BP: (103-130)/(65-88) 104/68     Weight: 68 kg (150 lb)  Body mass index is 21.52 kg/m².    Physical Exam  Vitals reviewed.   Constitutional:       Appearance: Normal appearance. He is well-developed.   HENT:      Head: Normocephalic and atraumatic.      Nose: Nose normal.   Eyes:      General: No scleral icterus.     Conjunctiva/sclera: Conjunctivae normal.      Pupils: Pupils are equal, round, and reactive to light.   Cardiovascular:      Rate and Rhythm: Normal rate and regular rhythm.      Heart sounds: Normal heart sounds. No murmur heard.    No friction rub. No gallop.   Pulmonary:      Effort: Pulmonary effort is normal.      Breath sounds: Normal breath sounds.   Abdominal:      General: Bowel sounds are normal.      Palpations: Abdomen is soft.   Musculoskeletal:         General: No tenderness. Normal range of motion.      Cervical back: Normal range of motion and neck supple.   Skin:     General: Skin is warm and dry.   Neurological:      Mental Status: He is alert and oriented to person, place, and time.   Psychiatric:         Behavior: Behavior normal.         CRANIAL NERVES     CN III, IV, VI   Pupils are equal, round, and reactive to light.     Significant Labs: All pertinent labs within the past 24 hours have been reviewed.  CBC:   Recent Labs   Lab 08/07/22 0222   WBC 5.56   HGB 14.9   HCT 44.3        CMP:   Recent Labs   Lab 08/07/22 0222      K 4.5      CO2 24   GLU 95   BUN 20   CREATININE 1.4   CALCIUM 9.7   PROT 6.6   ALBUMIN 4.1   BILITOT 0.5   ALKPHOS 61   AST 20   ALT 15   ANIONGAP 8       Significant Imaging: I have reviewed all pertinent imaging results/findings within the past 24 hours.

## 2022-08-07 NOTE — ASSESSMENT & PLAN NOTE
Patient is identified as having Systolic (HFrEF) heart failure that is Chronic. CHF is currently controlled. Latest ECHO performed and demonstrates- Results for orders placed during the hospital encounter of 04/25/22    Echo    Interpretation Summary  · The left ventricle is normal in size with moderately decreased systolic function.  · The estimated ejection fraction is 35%.  · Grade I left ventricular diastolic dysfunction.  · There are segmental left ventricular wall motion abnormalities.  · Mild mitral regurgitation.  · Normal central venous pressure (3 mmHg).  · The estimated PA systolic pressure is 32 mmHg.  · With low normal right ventricular systolic function.  . Continue Furosemide and monitor clinical status closely. Monitor on telemetry. Patient is off CHF pathway.  Monitor strict Is&Os and daily weights.  Place on fluid restriction of 1.5 L. Continue to stress to patient importance of self efficacy and  on diet for CHF. Last BNP reviewed- and noted below   Recent Labs   Lab 08/07/22  0222   *   .

## 2022-08-07 NOTE — ASSESSMENT & PLAN NOTE
Pt intolerant to STATIN  Lab Results   Component Value Date    CHOL 299 (H) 03/16/2022    CHOL 272 (H) 06/22/2020    CHOL 176 02/23/2020     Lab Results   Component Value Date    HDL 78 (H) 03/16/2022    HDL 73 06/22/2020    HDL 52 02/23/2020     Lab Results   Component Value Date    LDLCALC 204.6 (H) 03/16/2022    LDLCALC 180.6 (H) 06/22/2020    LDLCALC 104.2 02/23/2020     Lab Results   Component Value Date    TRIG 82 03/16/2022    TRIG 92 06/22/2020    TRIG 99 02/23/2020     Lab Results   Component Value Date    CHOLHDL 26.1 03/16/2022    CHOLHDL 26.8 06/22/2020    CHOLHDL 29.5 02/23/2020

## 2022-08-07 NOTE — HPI
Pt is a 71 yo male with PMhx of combined heart failure, CAD with stents x 3 and myocardial infarctions who presents to the ED with reports of left sided chest pain onset last night while patient was sleeping. Pt exercises every day and thought it could be muscle related. He had no relief with SL nitroglycerin. He denies any associated symptoms. This chest pain is reminiscent to his episodes of myocardial infarction however, pain was mild in nature without radiation.   EKG - NSR with nonspecific T wave abnormalities, PVC  Troponin normal, CBC and CMP normal, BNP = 118  CXR - no acute findings  To evaluate for acute coronary syndrome  As clarification, on 8/7/2022 patient should be admitted for hospital observation services under my care in collaboration with Nelson Benjamin, *

## 2022-08-07 NOTE — ASSESSMENT & PLAN NOTE
Patient is identified as having Combined Systolic and Diastolic heart failure that is Chronic. CHF is currently controlled. Latest ECHO performed and demonstrates- Results for orders placed during the hospital encounter of 04/25/22    Echo    Interpretation Summary  · The left ventricle is normal in size with moderately decreased systolic function.  · The estimated ejection fraction is 35%.  · Grade I left ventricular diastolic dysfunction.  · There are segmental left ventricular wall motion abnormalities.  · Mild mitral regurgitation.  · Normal central venous pressure (3 mmHg).  · The estimated PA systolic pressure is 32 mmHg.  · With low normal right ventricular systolic function.  . Continue Beta Blocker Nitrate/Vasodilator and monitor clinical status closely. Monitor on telemetry. Patient is off CHF pathway.  Monitor strict Is&Os and daily weights.  Place on fluid restriction of 1.5 L. Continue to stress to patient importance of self efficacy and  on diet for CHF. Last BNP reviewed- and noted below   Recent Labs   Lab 08/07/22 0222   *   .

## 2022-08-07 NOTE — ED PROVIDER NOTES
SCRIBE #1 NOTE: I, Alicia Caputo, am scribing for, and in the presence of, Darek Stevens MD. I have scribed the entire note.       History     Chief Complaint   Patient presents with    Chest Pain     Pt was at home and began having intermittent chest pain. Pt has a history of stents. Pt took 325 aspirin and 1 sub-lingual nitro tablet before EMS arrival.       Review of patient's allergies indicates:   Allergen Reactions    Statins-hmg-coa reductase inhibitors Other (See Comments)     SEVERE MYALGIAS AND GI SIDE EFFECTS         History of Present Illness     HPI    8/7/2022, 2:07 AM  History obtained from the patient      History of Present Illness: Mekhi Lambert is a 72 y.o. male patient with a PMHx of CHF and CAD who presents to the Emergency Department for evaluation of CP which onset 2x hrs PTA. Pt states he woke up with sxs and thought it could be indigestion so he started moving around and stretching his shoulder but it did not get better. Symptoms are intermittent and moderate in severity. No mitigating or exacerbating factors reported. No associated sxs. Patient denies any fever, chills, nausea, SOB, back pain, cough, congestion, and all other sxs at this time. Pt took 4 aspirin and 1 nitro tablet before EMS arrived. No further complaints or concerns at this time.       Arrival mode: EMS     PCP: ABIGAIL Lopez Jr, MD        Past Medical History:  Past Medical History:   Diagnosis Date    Anticoagulant long-term use     Arthritis     Cancer     Chronic systolic CHF (congestive heart failure) 02/26/2022    Coronary artery disease     Digestive disorder     Myocardial infarction     2/20/22       Past Surgical History:  Past Surgical History:   Procedure Laterality Date    CARDIAC CATHETERIZATION      COLONOSCOPY N/A 09/22/2021    Procedure: COLONOSCOPY;  Surgeon: Meaghan Jaime MD;  Location: [facility];  Service: Endoscopy;  Laterality: N/A;    CORONARY ANGIOGRAPHY INCLUDING BYPASS GRAFTS  WITH CATHETERIZATION OF LEFT HEART N/A 03/03/2020    Procedure: ANGIOGRAM, CORONARY, INCLUDING BYPASS GRAFT, WITH LEFT HEART CATHETERIZATION;  Surgeon: Ute Melchor MD;  Location: [facility] CATH LAB;  Service: Cardiology;  Laterality: N/A;    JOINT REPLACEMENT Right     knee    KNEE SURGERY      LEFT HEART CATHETERIZATION Left 02/22/2020    Procedure: CATHETERIZATION, HEART, LEFT;  Surgeon: Ute Melchor MD;  Location: [facility] CATH LAB;  Service: Cardiology;  Laterality: Left;    LEFT HEART CATHETERIZATION Left 02/26/2022    Procedure: CATHETERIZATION, HEART, LEFT;  Surgeon: Ute Melchor MD;  Location: [facility] CATH LAB;  Service: Cardiology;  Laterality: Left;    PERCUTANEOUS TRANSLUMINAL BALLOON ANGIOPLASTY OF CORONARY ARTERY Left 02/22/2020    Procedure: Angioplasty-coronary;  Surgeon: Ute Melchor MD;  Location: [facility] CATH LAB;  Service: Cardiology;  Laterality: Left;    PERCUTANEOUS TRANSLUMINAL BALLOON ANGIOPLASTY OF CORONARY ARTERY  02/26/2022    Procedure: Angioplasty-coronary;  Surgeon: Ute Melchor MD;  Location: [facility] CATH LAB;  Service: Cardiology;;    RIGHT HEMICOLECTOMY N/A 04/27/2022    Procedure: HEMICOLECTOMY, RIGHT;  Surgeon: See Dominguez MD;  Location: [facility] OR;  Service: General;  Laterality: N/A;    SHOULDER ARTHROSCOPY Right     Dr Bella    SIGMOIDECTOMY      simple prostatectomy  06/06/2016    robotic assisted partial prostectomy due to BPH    TOTAL KNEE ARTHROPLASTY           Family History:  Family History   Problem Relation Age of Onset    Heart disease Mother         lupus related    Diabetes Mother     Cancer Father         prostate, bone, lung, skin cancers all seperate types    Diabetes Maternal Aunt        Social History:  Social History     Tobacco Use    Smoking status: Never Smoker    Smokeless tobacco: Former User     Types: Chew     Quit date: 6/3/2004   Substance and Sexual Activity    Alcohol use: Yes     Alcohol/week: 2.0 standard drinks     Types: 2 Glasses  of wine per week     Comment: per week    Drug use: No    Sexual activity: Yes        Review of Systems     Review of Systems   Constitutional: Negative for chills and fever.   HENT: Negative for congestion and sore throat.    Respiratory: Negative for cough and shortness of breath.    Cardiovascular: Positive for chest pain (L sided).   Gastrointestinal: Negative for nausea.   Genitourinary: Negative for dysuria.   Musculoskeletal: Negative for back pain.   Skin: Negative for rash.   Neurological: Negative for weakness.   Hematological: Does not bruise/bleed easily.   All other systems reviewed and are negative.       Physical Exam     Initial Vitals [08/07/22 0150]   BP Pulse Resp Temp SpO2   130/88 67 18 97.6 °F (36.4 °C) 99 %      MAP       --          Physical Exam  Nursing Notes and Vital Signs Reviewed.  Constitutional: Patient is in no acute distress. Well-developed and well-nourished.  Head: Atraumatic. Normocephalic.  Eyes: PERRL. EOM intact. Conjunctivae are not pale. No scleral icterus.  ENT: Mucous membranes are moist. Oropharynx is clear and symmetric.    Neck: Supple. Full ROM. No lymphadenopathy.  Cardiovascular: Regular rate. Regular rhythm. No murmurs, rubs, or gallops. Distal pulses are 2+ and symmetric.  Pulmonary/Chest: No respiratory distress. Clear to auscultation bilaterally. No wheezing or rales.  Abdominal: Soft and non-distended.  There is no tenderness.  No rebound, guarding, or rigidity. Good bowel sounds.  Genitourinary: No CVA tenderness  Musculoskeletal: Moves all extremities. No obvious deformities. No edema. No calf tenderness.  Skin: Warm and dry.  Neurological:  Alert, awake, and appropriate.  Normal speech.  No acute focal neurological deficits are appreciated.  Psychiatric: Normal affect. Good eye contact. Appropriate in content.     ED Course   ED Vital Signs:  Vitals:    08/07/22 0150 08/07/22 0203 08/07/22 0232 08/07/22 0303   BP: 130/88  127/83 127/81   Pulse: 67 68 65 62    Resp: 18  20 19   Temp: 97.6 °F (36.4 °C)      TempSrc: Oral      SpO2: 99%  100% 100%    08/07/22 0402 08/07/22 0432 08/07/22 0505 08/07/22 0603   BP: 120/71 123/72 119/69 104/65   Pulse: (!) 57 60 (!) 57 (!) 56   Resp: 18 (!) 21 (!) 22 18   Temp:       TempSrc:       SpO2: 100% 100% 100% 97%    08/07/22 0632 08/07/22 0710 08/07/22 0730 08/07/22 0800   BP: 115/67 127/83 117/77 103/66   Pulse: 64 60 (!) 58 (!) 57   Resp: (!) 23 20 20 20   Temp:       TempSrc:       SpO2: 98% 99% 98% 100%    08/07/22 0820   BP: 120/67   Pulse: 64   Resp: 18   Temp: 97.8 °F (36.6 °C)   TempSrc: Oral   SpO2: 98%       Abnormal Lab Results:  Labs Reviewed   CBC W/ AUTO DIFFERENTIAL - Abnormal; Notable for the following components:       Result Value    RDW 14.6 (*)     All other components within normal limits   COMPREHENSIVE METABOLIC PANEL - Abnormal; Notable for the following components:    eGFR 53 (*)     All other components within normal limits   B-TYPE NATRIURETIC PEPTIDE - Abnormal; Notable for the following components:     (*)     All other components within normal limits   TROPONIN I   TROPONIN I   SARS-COV-2 RNA AMPLIFICATION, QUAL        All Lab Results:  Results for orders placed or performed during the hospital encounter of 08/07/22   CBC auto differential   Result Value Ref Range    WBC 5.56 3.90 - 12.70 K/uL    RBC 5.29 4.60 - 6.20 M/uL    Hemoglobin 14.9 14.0 - 18.0 g/dL    Hematocrit 44.3 40.0 - 54.0 %    MCV 84 82 - 98 fL    MCH 28.2 27.0 - 31.0 pg    MCHC 33.6 32.0 - 36.0 g/dL    RDW 14.6 (H) 11.5 - 14.5 %    Platelets 157 150 - 450 K/uL    MPV 9.9 9.2 - 12.9 fL    Immature Granulocytes 0.2 0.0 - 0.5 %    Gran # (ANC) 3.4 1.8 - 7.7 K/uL    Immature Grans (Abs) 0.01 0.00 - 0.04 K/uL    Lymph # 1.4 1.0 - 4.8 K/uL    Mono # 0.5 0.3 - 1.0 K/uL    Eos # 0.1 0.0 - 0.5 K/uL    Baso # 0.06 0.00 - 0.20 K/uL    nRBC 0 0 /100 WBC    Gran % 61.8 38.0 - 73.0 %    Lymph % 25.2 18.0 - 48.0 %    Mono % 9.7 4.0 - 15.0 %     Eosinophil % 2.0 0.0 - 8.0 %    Basophil % 1.1 0.0 - 1.9 %    Differential Method Automated    Comprehensive metabolic panel   Result Value Ref Range    Sodium 141 136 - 145 mmol/L    Potassium 4.5 3.5 - 5.1 mmol/L    Chloride 109 95 - 110 mmol/L    CO2 24 23 - 29 mmol/L    Glucose 95 70 - 110 mg/dL    BUN 20 8 - 23 mg/dL    Creatinine 1.4 0.5 - 1.4 mg/dL    Calcium 9.7 8.7 - 10.5 mg/dL    Total Protein 6.6 6.0 - 8.4 g/dL    Albumin 4.1 3.5 - 5.2 g/dL    Total Bilirubin 0.5 0.1 - 1.0 mg/dL    Alkaline Phosphatase 61 55 - 135 U/L    AST 20 10 - 40 U/L    ALT 15 10 - 44 U/L    Anion Gap 8 8 - 16 mmol/L    eGFR 53 (A) >60 mL/min/1.73 m^2   Troponin I #1   Result Value Ref Range    Troponin I <0.006 0.000 - 0.026 ng/mL   Troponin I #2   Result Value Ref Range    Troponin I 0.012 0.000 - 0.026 ng/mL   BNP   Result Value Ref Range     (H) 0 - 99 pg/mL   COVID-19 Rapid Screening   Result Value Ref Range    SARS-CoV-2 RNA, Amplification, Qual Negative Negative         Imaging Results:  Imaging Results          X-Ray Chest AP Portable (Final result)  Result time 08/07/22 07:29:34    Final result by Clarence Tidwell MD (08/07/22 07:29:34)                 Impression:      No acute findings.      Electronically signed by: Clarence Tidwell  Date:    08/07/2022  Time:    07:29             Narrative:    EXAMINATION:  XR CHEST AP PORTABLE    CLINICAL HISTORY:  Chest Pain;    COMPARISON:  04/28/2022    FINDINGS:  Lung fields are clear.  The heart is normal in size.  The aorta is atherosclerotic.  Coronary artery stent noted.    No pleural fluid or pneumothorax.    No adenopathy is identified.    No significant osseous abnormality.                                 The EKG was ordered, reviewed, and independently interpreted by the ED provider.  Interpretation time: 1:49  Rate: 70 BPM  Rhythm: normal sinus rhythm  Interpretation: Nonspecific T wave abnormality. No STEMI.           The Emergency Provider reviewed the vital signs and test  results, which are outlined above.     ED Discussion     5:52 AM: Discussed case with Lakia Da Silva NP ([facility]). Dr. Valdez agrees with current care and management of pt and accepts admission.   Admitting Service: Hospital medicine  Admitting Physician: Dr. Valdez  Admit to: obs unit    5:52 AM: Re-evaluated pt. I have discussed test results, shared treatment plan, and the need for admission with patient and family at bedside. Pt and family express understanding at this time and agree with all information. All questions answered. Pt and family have no further questions or concerns at this time. Pt is ready for admit.         Medical Decision Making:   Clinical Tests:   Lab Tests: Ordered and Reviewed  Radiological Study: Ordered and Reviewed  Medical Tests: Ordered and Reviewed           ED Medication(s):  Medications   sodium chloride 0.9% flush 10 mL (has no administration in time range)   albuterol-ipratropium 2.5 mg-0.5 mg/3 mL nebulizer solution 3 mL (has no administration in time range)   melatonin tablet 6 mg (has no administration in time range)   ondansetron injection 4 mg (has no administration in time range)   prochlorperazine injection Soln 5 mg (has no administration in time range)   simethicone chewable tablet 80 mg (has no administration in time range)   aluminum-magnesium hydroxide-simethicone 200-200-20 mg/5 mL suspension 30 mL (has no administration in time range)   acetaminophen tablet 650 mg (has no administration in time range)   magnesium oxide tablet 800 mg (has no administration in time range)   magnesium oxide tablet 800 mg (has no administration in time range)   dextrose 10% bolus 125 mL (has no administration in time range)   dextrose 10% bolus 250 mL (has no administration in time range)   enoxaparin injection 40 mg (has no administration in time range)       Current Discharge Medication List                  Scribe Attestation:   Scribe #1: I performed the above scribed  service and the documentation accurately describes the services I performed. I attest to the accuracy of the note.     Attending:   Physician Attestation Statement for Scribe #1: I, Darek Stevens MD, personally performed the services described in this documentation, as scribed by Alicia Caputo, in my presence, and it is both accurate and complete.           Clinical Impression       ICD-10-CM ICD-9-CM   1. Angina at rest  I20.8 413.9   2. Chest pain  R07.9 786.50       Disposition:   Disposition: Placed in Observation  Condition: Fair         Darek Stevens MD  08/07/22 0906

## 2022-08-07 NOTE — CONSULTS
[facility]  Cardiology  Consult Note    Patient Name: Mekhi Lambert  MRN: [id number]  Admission Date: 8/7/2022  Hospital Length of Stay: 0 days  Code Status: Full Code   Attending Provider: Nelson Benjamin, *   Consulting Provider: Kiko Sutton MD  Primary Care Physician: ABIGAIL Lopez Jr, MD  Principal Problem:<principal problem not specified>    Patient information was obtained from patient and ER records.     Consults  Subjective:     Chief Complaint:  Chest pain     HPI:    History of Present Illness: Mekhi Lambert is a 72 y.o. male patient with a PMHx of CHF and CAD who presents to the Emergency Department for evaluation of CP which onset 2x hrs PTA. Pt states he woke up with sxs and thought it could be indigestion so he started moving around and stretching his shoulder but it did not get better. Symptoms are intermittent and moderate in severity. No mitigating or exacerbating factors reported. No associated sxs. Patient denies any fever, chills, nausea, SOB, back pain, cough, congestion, and all other sxs at this time. Pt took 4 aspirin and 1 nitro tablet before EMS arrived. No further complaints or concerns at this time.    Patient with chest pain, plan nuclear stress test tomorrow.      Past Medical History:   Diagnosis Date    Anticoagulant long-term use     Arthritis     Cancer     Chronic systolic CHF (congestive heart failure) 02/26/2022    Coronary artery disease     Digestive disorder     Myocardial infarction     2/20/22       Past Surgical History:   Procedure Laterality Date    CARDIAC CATHETERIZATION      COLONOSCOPY N/A 09/22/2021    Procedure: COLONOSCOPY;  Surgeon: Meaghan Jaime MD;  Location: [facility];  Service: Endoscopy;  Laterality: N/A;    CORONARY ANGIOGRAPHY INCLUDING BYPASS GRAFTS WITH CATHETERIZATION OF LEFT HEART N/A 03/03/2020    Procedure: ANGIOGRAM, CORONARY, INCLUDING BYPASS GRAFT, WITH LEFT HEART CATHETERIZATION;  Surgeon: Ute HARDING  MD Ruiz;  Location: [facility] CATH LAB;  Service: Cardiology;  Laterality: N/A;    JOINT REPLACEMENT Right     knee    KNEE SURGERY      LEFT HEART CATHETERIZATION Left 02/22/2020    Procedure: CATHETERIZATION, HEART, LEFT;  Surgeon: Ute Melchor MD;  Location: [facility] CATH LAB;  Service: Cardiology;  Laterality: Left;    LEFT HEART CATHETERIZATION Left 02/26/2022    Procedure: CATHETERIZATION, HEART, LEFT;  Surgeon: Ute Melchor MD;  Location: [facility] CATH LAB;  Service: Cardiology;  Laterality: Left;    PERCUTANEOUS TRANSLUMINAL BALLOON ANGIOPLASTY OF CORONARY ARTERY Left 02/22/2020    Procedure: Angioplasty-coronary;  Surgeon: Ute Melchor MD;  Location: [facility] CATH LAB;  Service: Cardiology;  Laterality: Left;    PERCUTANEOUS TRANSLUMINAL BALLOON ANGIOPLASTY OF CORONARY ARTERY  02/26/2022    Procedure: Angioplasty-coronary;  Surgeon: Ute Melchor MD;  Location: [facility] CATH LAB;  Service: Cardiology;;    RIGHT HEMICOLECTOMY N/A 04/27/2022    Procedure: HEMICOLECTOMY, RIGHT;  Surgeon: See Dominguez MD;  Location: [facility] OR;  Service: General;  Laterality: N/A;    SHOULDER ARTHROSCOPY Right     Dr Bella    SIGMOIDECTOMY      simple prostatectomy  06/06/2016    robotic assisted partial prostectomy due to BPH    TOTAL KNEE ARTHROPLASTY         Review of patient's allergies indicates:   Allergen Reactions    Statins-hmg-coa reductase inhibitors Other (See Comments)     SEVERE MYALGIAS AND GI SIDE EFFECTS       No current facility-administered medications on file prior to encounter.     Current Outpatient Medications on File Prior to Encounter   Medication Sig    metoprolol succinate (TOPROL-XL) 25 MG 24 hr tablet Take 0.5 tablets (12.5 mg total) by mouth every evening.    mometasone 0.1% (ELOCON) 0.1 % cream AAA bid prn for psoriasis. Strong steroid.  Do not use on face, underarms or groin.    nitroGLYCERIN (NITROSTAT) 0.4 MG SL tablet Place 1 tablet (0.4 mg total) under the tongue every 5 (five) minutes  as needed for Chest pain.    pantoprazole (PROTONIX) 40 MG tablet Take 1 tablet (40 mg total) by mouth once daily.    ranolazine (RANEXA) 500 MG Tb12 Take 1 tablet (500 mg total) by mouth 2 (two) times daily.    ticagrelor (BRILINTA) 90 mg tablet Take 1 tablet (90 mg total) by mouth 2 (two) times a day.    aspirin (ECOTRIN) 81 MG EC tablet Take 1 tablet (81 mg total) by mouth once daily.    multivitamin capsule Take 1 capsule by mouth once daily.    oxyCODONE (ROXICODONE) 5 MG immediate release tablet Take 1 tablet (5 mg total) by mouth every 6 (six) hours as needed for Pain.     Family History       Problem Relation (Age of Onset)    Cancer Father    Diabetes Mother, Maternal Aunt    Heart disease Mother          Tobacco Use    Smoking status: Never Smoker    Smokeless tobacco: Former User     Types: Chew     Quit date: 6/3/2004   Substance and Sexual Activity    Alcohol use: Yes     Alcohol/week: 2.0 standard drinks     Types: 2 Glasses of wine per week     Comment: per week    Drug use: No    Sexual activity: Yes     Review of Systems   Constitutional: Negative for chills, diaphoresis, night sweats, weight gain and weight loss.   HENT:  Negative for congestion, hoarse voice, sore throat and stridor.    Eyes:  Negative for double vision and pain.   Cardiovascular:  Positive for chest pain. Negative for claudication, cyanosis, dyspnea on exertion, irregular heartbeat, leg swelling, near-syncope, orthopnea, palpitations, paroxysmal nocturnal dyspnea and syncope.   Respiratory:  Negative for cough, hemoptysis, shortness of breath, sleep disturbances due to breathing, snoring, sputum production and wheezing.    Endocrine: Negative for cold intolerance, heat intolerance and polydipsia.   Hematologic/Lymphatic: Negative for bleeding problem. Does not bruise/bleed easily.   Skin:  Negative for color change, dry skin and rash.   Musculoskeletal:  Negative for joint swelling and muscle cramps.    Gastrointestinal:  Negative for bloating, abdominal pain, constipation, diarrhea, dysphagia, melena, nausea and vomiting.   Genitourinary:  Negative for flank pain and urgency.   Neurological:  Negative for dizziness, focal weakness, headaches, light-headedness, loss of balance, seizures and weakness.   Psychiatric/Behavioral:  Negative for altered mental status and memory loss. The patient is not nervous/anxious.    Objective:     Vital Signs (Most Recent):  Temp: 97.8 °F (36.6 °C) (08/07/22 0820)  Pulse: 64 (08/07/22 0820)  Resp: 18 (08/07/22 0820)  BP: 120/67 (08/07/22 0820)  SpO2: 98 % (08/07/22 0820) Vital Signs (24h Range):  Temp:  [97.6 °F (36.4 °C)-97.8 °F (36.6 °C)] 97.8 °F (36.6 °C)  Pulse:  [56-68] 64  Resp:  [18-23] 18  SpO2:  [97 %-100 %] 98 %  BP: (103-130)/(65-88) 120/67     Weight: 68 kg (150 lb)  Body mass index is 21.52 kg/m².    SpO2: 98 %  O2 Device (Oxygen Therapy): room air    No intake or output data in the 24 hours ending 08/07/22 1035    Lines/Drains/Airways       Peripheral Intravenous Line  Duration                  Peripheral IV - Single Lumen 18 G Right Forearm -- days                    Physical Exam  Eyes:      Pupils: Pupils are equal, round, and reactive to light.   Neck:      Trachea: No tracheal deviation.   Cardiovascular:      Rate and Rhythm: Normal rate and regular rhythm.      Pulses: Intact distal pulses.           Carotid pulses are 2+ on the right side and 2+ on the left side.       Radial pulses are 2+ on the right side and 2+ on the left side.        Femoral pulses are 2+ on the right side and 2+ on the left side.       Popliteal pulses are 2+ on the right side and 2+ on the left side.        Dorsalis pedis pulses are 2+ on the right side and 2+ on the left side.        Posterior tibial pulses are 2+ on the right side and 2+ on the left side.      Heart sounds: Normal heart sounds. No murmur heard.    No friction rub. No gallop.   Pulmonary:      Effort: Pulmonary  effort is normal. No respiratory distress.      Breath sounds: Normal breath sounds. No stridor. No wheezing or rales.   Chest:      Chest wall: No tenderness.   Abdominal:      General: There is no distension.      Tenderness: There is no abdominal tenderness. There is no rebound.   Musculoskeletal:         General: No tenderness.      Cervical back: Normal range of motion.   Skin:     General: Skin is warm and dry.   Neurological:      Mental Status: He is alert and oriented to person, place, and time.       Significant Labs: CBC   Recent Labs   Lab 08/07/22 0222   WBC 5.56   HGB 14.9   HCT 44.3      , INR No results for input(s): INR, PROTIME in the last 48 hours., and Troponin   Recent Labs   Lab 08/07/22 0222 08/07/22  0445   TROPONINI <0.006 0.012       Significant Imaging: Echocardiogram: Transthoracic echo (TTE) complete (Cupid Only):   Results for orders placed or performed during the hospital encounter of 04/25/22   Echo   Result Value Ref Range    BSA 1.85 m2    LA WIDTH 5.29 cm    Left Ventricular Outflow Tract Mean Velocity 0.970097690677100 cm/s    Left Ventricular Outflow Tract Mean Gradient 1.15 mmHg    Pulmonary Valve Mean Velocity 0.64 m/s    PV PEAK VELOCITY 1.06 cm/s    LVIDd 5.34 3.5 - 6.0 cm    IVS 0.94 0.6 - 1.1 cm    Posterior Wall 0.81 0.6 - 1.1 cm    Ao root annulus 3.54 cm    LVIDs 4.17 (A) 2.1 - 4.0 cm    FS 22 28 - 44 %    STJ 3.38 cm    Ascending aorta 3.52 cm    LV mass 170.43 g    LA size 3.73 cm    RVDD 2.84 cm    Left Ventricle Relative Wall Thickness 0.30 cm    AV mean gradient 4 mmHg    AV valve area 2.09 cm2    AV Velocity Ratio 0.58     AV index (prosthetic) 0.66     MV valve area p 1/2 method 2.74 cm2    E/A ratio 0.47     E wave deceleration time 276.906171161925516 msec    IVRT 114.784732319527101 msec    Pulm vein S/D ratio 1.54     LVOT diameter 2.01 cm    LVOT area 3.2 cm2    LVOT peak tyrese 0.77 m/s    LVOT peak VTI 19.80 cm    Ao peak tyrese 1.32 m/s    Ao VTI 30.1  cm    LVOT stroke volume 62.80 cm3    AV peak gradient 7 mmHg    MV Peak E Fredo 0.47 m/s    TR Max Fredo 2.70 m/s    MV stenosis pressure 1/2 time 80.117326490652353 ms    MV Peak A Fredo 1.00 m/s    PV Peak S Fredo 0.041317500324374 m/s    PV Peak D Fredo 0.41 m/s    LV Systolic Volume 77.06 mL    LV Systolic Volume Index 41.7 mL/m2    LV Diastolic Volume 137.70 mL    LV Diastolic Volume Index 74.43 mL/m2    LV Mass Index 92 g/m2    Echo EF Estimated 44 %    RA Major Axis 5.05 cm    Triscuspid Valve Regurgitation Peak Gradient 29 mmHg    LA Volume Index (Mod) 7.8 mL/m2    LA volume (mod) 14.42 cm3    Right Atrial Pressure (from IVC) 3 mmHg    EF 35 %    TV rest pulmonary artery pressure 32 mmHg    Narrative    · The left ventricle is normal in size with moderately decreased systolic   function.  · The estimated ejection fraction is 35%.  · Grade I left ventricular diastolic dysfunction.  · There are segmental left ventricular wall motion abnormalities.  · Mild mitral regurgitation.  · Normal central venous pressure (3 mmHg).  · The estimated PA systolic pressure is 44 mmHg.  · There is pulmonary hypertension.  · With low normal right ventricular systolic function.        Assessment and Plan:     Chronic systolic CHF (congestive heart failure)  Patient is identified as having Systolic (HFrEF) heart failure that is Chronic. CHF is currently controlled. Latest ECHO performed and demonstrates- Results for orders placed during the hospital encounter of 04/25/22    Echo    Interpretation Summary  · The left ventricle is normal in size with moderately decreased systolic function.  · The estimated ejection fraction is 35%.  · Grade I left ventricular diastolic dysfunction.  · There are segmental left ventricular wall motion abnormalities.  · Mild mitral regurgitation.  · Normal central venous pressure (3 mmHg).  · The estimated PA systolic pressure is 32 mmHg.  · With low normal right ventricular systolic function.  . Continue  Furosemide and monitor clinical status closely. Monitor on telemetry. Patient is off CHF pathway.  Monitor strict Is&Os and daily weights.  Place on fluid restriction of 1.5 L. Continue to stress to patient importance of self efficacy and  on diet for CHF. Last BNP reviewed- and noted below   Recent Labs   Lab 08/07/22 0222   *   .      Coronary artery disease of native artery of native heart with  angina pectoris  2 vessel CAD, plan nuclear stress tomorrow        VTE Risk Mitigation (From admission, onward)         Ordered     enoxaparin injection 40 mg  Daily         08/07/22 0852     IP VTE HIGH RISK PATIENT  Once         08/07/22 0852     Place sequential compression device  Until discontinued         08/07/22 0852                Thank you for your consult. I will follow-up with patient. Please contact us if you have any additional questions.    Kiko Sutton MD  Cardiology   [facility]

## 2022-08-08 VITALS
SYSTOLIC BLOOD PRESSURE: 113 MMHG | TEMPERATURE: 97 F | HEIGHT: 70 IN | OXYGEN SATURATION: 100 % | RESPIRATION RATE: 18 BRPM | WEIGHT: 149.94 LBS | BODY MASS INDEX: 21.47 KG/M2 | DIASTOLIC BLOOD PRESSURE: 70 MMHG | HEART RATE: 60 BPM

## 2022-08-08 PROBLEM — N17.9 AKI (ACUTE KIDNEY INJURY): Status: ACTIVE | Noted: 2022-08-08

## 2022-08-08 LAB
AORTIC ROOT ANNULUS: 3.26 CM
ASCENDING AORTA: 3.71 CM
AV INDEX (PROSTH): 0.67
AV MEAN GRADIENT: 3 MMHG
AV PEAK GRADIENT: 5 MMHG
AV VALVE AREA: 2.22 CM2
AV VELOCITY RATIO: 0.68
BSA FOR ECHO PROCEDURE: 1.83 M2
CV ECHO LV RWT: 0.6 CM
CV STRESS BASE HR: 63 BPM
DIASTOLIC BLOOD PRESSURE: 71 MMHG
DOP CALC AO PEAK VEL: 1.07 M/S
DOP CALC AO VTI: 22.1 CM
DOP CALC LVOT AREA: 3.3 CM2
DOP CALC LVOT DIAMETER: 2.05 CM
DOP CALC LVOT PEAK VEL: 0.73 M/S
DOP CALC LVOT STROKE VOLUME: 49.15 CM3
DOP CALC RVOT PEAK VEL: 0.61 M/S
DOP CALC RVOT VTI: 11.8 CM
DOP CALCLVOT PEAK VEL VTI: 14.9 CM
E WAVE DECELERATION TIME: 432.53 MSEC
E/A RATIO: 0.55
E/E' RATIO: 9.5 M/S
ECHO LV POSTERIOR WALL: 1.34 CM (ref 0.6–1.1)
EJECTION FRACTION- HIGH: 73 %
EJECTION FRACTION: 30 %
END DIASTOLIC INDEX-HIGH: 165 ML/M2
END DIASTOLIC INDEX-LOW: 101 ML/M2
END SYSTOLIC INDEX-HIGH: 64 ML/M2
END SYSTOLIC INDEX-LOW: 28 ML/M2
FRACTIONAL SHORTENING: 8 % (ref 28–44)
INTERVENTRICULAR SEPTUM: 1.33 CM (ref 0.6–1.1)
IVC DIAMETER: 1.28 CM
IVRT: 83.73 MSEC
LA MAJOR: 4.38 CM
LA MINOR: 4.57 CM
LA WIDTH: 3 CM
LEFT ATRIUM SIZE: 3.18 CM
LEFT ATRIUM VOLUME INDEX: 19.6 ML/M2
LEFT ATRIUM VOLUME: 36.27 CM3
LEFT INTERNAL DIMENSION IN SYSTOLE: 4.11 CM (ref 2.1–4)
LEFT VENTRICLE DIASTOLIC VOLUME INDEX: 49.55 ML/M2
LEFT VENTRICLE DIASTOLIC VOLUME: 91.66 ML
LEFT VENTRICLE MASS INDEX: 124 G/M2
LEFT VENTRICLE SYSTOLIC VOLUME INDEX: 40.3 ML/M2
LEFT VENTRICLE SYSTOLIC VOLUME: 74.62 ML
LEFT VENTRICULAR INTERNAL DIMENSION IN DIASTOLE: 4.48 CM (ref 3.5–6)
LEFT VENTRICULAR MASS: 229.91 G
LV LATERAL E/E' RATIO: 12.67 M/S
LV SEPTAL E/E' RATIO: 7.6 M/S
LVOT MG: 1.47 MMHG
LVOT MV: 0.59 CM/S
MV PEAK A VEL: 0.69 M/S
MV PEAK E VEL: 0.38 M/S
NUC REST EJECTION FRACTION: 28
NUC STRESS EJECTION FRACTION: 27 %
OHS CV CPX 85 PERCENT MAX PREDICTED HEART RATE MALE: 126
OHS CV CPX MAX PREDICTED HEART RATE: 148
OHS CV CPX PATIENT IS FEMALE: 0
OHS CV CPX PATIENT IS MALE: 1
OHS CV CPX PEAK DIASTOLIC BLOOD PRESSURE: 71 MMHG
OHS CV CPX PEAK HEAR RATE: 94 BPM
OHS CV CPX PEAK RATE PRESSURE PRODUCT: NORMAL
OHS CV CPX PEAK SYSTOLIC BLOOD PRESSURE: 139 MMHG
OHS CV CPX PERCENT MAX PREDICTED HEART RATE ACHIEVED: 64
OHS CV CPX RATE PRESSURE PRODUCT PRESENTING: 8757
PISA MRMAX VEL: 2.79 M/S
PISA TR MAX VEL: 2.23 M/S
PV MEAN GRADIENT: 0.68 MMHG
RA MAJOR: 4.15 CM
RA PRESSURE: 3 MMHG
RETIRED EF AND QEF - SEE NOTES: 59 %
STJ: 3.82 CM
SYSTOLIC BLOOD PRESSURE: 139 MMHG
TDI LATERAL: 0.03 M/S
TDI SEPTAL: 0.05 M/S
TDI: 0.04 M/S
TR MAX PG: 20 MMHG
TRICUSPID ANNULAR PLANE SYSTOLIC EXCURSION: 2.2 CM
TV REST PULMONARY ARTERY PRESSURE: 23 MMHG

## 2022-08-08 PROCEDURE — 96360 HYDRATION IV INFUSION INIT: CPT

## 2022-08-08 PROCEDURE — 63600175 PHARM REV CODE 636 W HCPCS: Performed by: NURSE PRACTITIONER

## 2022-08-08 PROCEDURE — G0378 HOSPITAL OBSERVATION PER HR: HCPCS

## 2022-08-08 PROCEDURE — 96361 HYDRATE IV INFUSION ADD-ON: CPT

## 2022-08-08 PROCEDURE — 25000003 PHARM REV CODE 250: Performed by: NURSE PRACTITIONER

## 2022-08-08 PROCEDURE — 94761 N-INVAS EAR/PLS OXIMETRY MLT: CPT

## 2022-08-08 PROCEDURE — 63600175 PHARM REV CODE 636 W HCPCS: Performed by: INTERNAL MEDICINE

## 2022-08-08 PROCEDURE — 96372 THER/PROPH/DIAG INJ SC/IM: CPT | Performed by: NURSE PRACTITIONER

## 2022-08-08 RX ORDER — ACETAMINOPHEN 325 MG/1
650 TABLET ORAL EVERY 6 HOURS PRN
Refills: 0
Start: 2022-08-08 | End: 2023-03-24

## 2022-08-08 RX ORDER — SODIUM CHLORIDE 9 MG/ML
INJECTION, SOLUTION INTRAVENOUS ONCE
Status: COMPLETED | OUTPATIENT
Start: 2022-08-08 | End: 2022-08-08

## 2022-08-08 RX ORDER — REGADENOSON 0.08 MG/ML
0.4 INJECTION, SOLUTION INTRAVENOUS ONCE
Status: COMPLETED | OUTPATIENT
Start: 2022-08-08 | End: 2022-08-08

## 2022-08-08 RX ADMIN — NITROGLYCERIN 1 INCH: 20 OINTMENT TOPICAL at 01:08

## 2022-08-08 RX ADMIN — NITROGLYCERIN 1 INCH: 20 OINTMENT TOPICAL at 06:08

## 2022-08-08 RX ADMIN — ASPIRIN 81 MG: 81 TABLET, COATED ORAL at 09:08

## 2022-08-08 RX ADMIN — RANOLAZINE 500 MG: 500 TABLET, EXTENDED RELEASE ORAL at 09:08

## 2022-08-08 RX ADMIN — SODIUM CHLORIDE: 0.9 INJECTION, SOLUTION INTRAVENOUS at 09:08

## 2022-08-08 RX ADMIN — ENOXAPARIN SODIUM 40 MG: 100 INJECTION SUBCUTANEOUS at 05:08

## 2022-08-08 RX ADMIN — TICAGRELOR 90 MG: 90 TABLET ORAL at 09:08

## 2022-08-08 RX ADMIN — REGADENOSON 0.4 MG: 0.08 INJECTION, SOLUTION INTRAVENOUS at 01:08

## 2022-08-08 RX ADMIN — PANTOPRAZOLE SODIUM 40 MG: 40 TABLET, DELAYED RELEASE ORAL at 09:08

## 2022-08-08 NOTE — HOSPITAL COURSE
The patient was monitored closely during his stay. He was kept on continuous telemetry monitoring. His troponins were trended. Cardiology was consulted. An echocardiogram was ordered and showed concentric hypertrophy and moderately decreased systolic function, trivial pericardial effusion, estimated ejection fraction was 30%. Grade I left ventricular diastolic dysfunction was noted. There was moderate left ventricular global hypokinesis, normal right ventricular size with normal right ventricular systolic function, normal central venous pressure (3 mmHg), and the estimated PA systolic pressure was 23 mmHg. A Lexiscan stress test was ordered and showed abnormal myocardial perfusion scan. There was a severe intensity, large sized, fixed perfusion abnormality consistent with scar in the basal to apical lateral and apical wall(s). There were no other significant perfusion abnormalities. The gated perfusion images showed an ejection fraction of 28% at rest. The gated perfusion images showed an ejection fraction of 27% post stress. Normal ejection fraction was greater than 59%. The EKG portion of this study was negative for ischemia. Patient's overall condition remained stable and improved and patient was discharged to home.

## 2022-08-08 NOTE — PLAN OF CARE
[facility]  Discharge Final Note    Primary Care Provider: ABIGAIL Lopez Jr, MD    Expected Discharge Date: 8/8/2022    Final Discharge Note (most recent)     Final Note - 08/08/22 1420        Final Note    Assessment Type Final Discharge Note     Anticipated Discharge Disposition Home or Self Care     Hospital Resources/Appts/Education Provided Provided patient/caregiver with written discharge plan information;Appointments scheduled and added to AVS        Post-Acute Status    Discharge Delays None known at this time                 Important Message from Medicare             Contact Info     Cardiology        Next Steps: Follow up in 2 week(s)    Instructions: Take blood pressure and pulse 2 x day and keep log for follow up

## 2022-08-08 NOTE — PLAN OF CARE
[facility]  Initial Discharge Assessment       Primary Care Provider: ABIGAIL Lopez Jr, MD    Admission Diagnosis: Chest pain [R07.9]    Admission Date: 8/7/2022  Expected Discharge Date: 8/8/2022    Discharge Barriers Identified: None    Payor: MEDICARE / Plan: MEDICARE PART A & B / Product Type: Government /     Extended Emergency Contact Information  Primary Emergency Contact: Lynn Lambert  Address: [address] [place] [address]           [place] [address] [facility]  Home Phone: [phone]  Mobile Phone: [phone]  Relation: Spouse    Discharge Plan A: Home  Discharge Plan B: Home      [id number] - [place] - [address]  [address] [place] [address]  Phone: [phone] Fax: [phone]      Initial Assessment (most recent)     Adult Discharge Assessment - 08/08/22 1120        Discharge Assessment    Assessment Type Discharge Planning Assessment     Confirmed/corrected address, phone number and insurance Yes     Confirmed Demographics Correct on Facesheet     Source of Information patient     Communicated KAILA with patient/caregiver Date not available/Unable to determine     Reason For Admission chest pain     Lives With spouse     Facility Arrived From: home     Do you expect to return to your current living situation? Yes     Do you have help at home or someone to help you manage your care at home? No     Prior to hospitilization cognitive status: Alert/Oriented     Current cognitive status: Alert/Oriented     Walking or Climbing Stairs Difficulty none     Dressing/Bathing Difficulty none     Equipment Currently Used at Home none     Readmission within 30 days? No     Patient currently being followed by outpatient case management? No     Do you currently have service(s) that help you manage your care at home? No     Do you take prescription medications? Yes     Do you have prescription coverage? Yes     Coverage Medicare A&B     Do you have any problems affording  any of your prescribed medications? No     Is the patient taking medications as prescribed? yes     Who is going to help you get home at discharge? patient will take an uber     How do you get to doctors appointments? car, drives self;family or friend will provide     Are you on dialysis? No     Do you take coumadin? No     Discharge Plan A Home     Discharge Plan B Home     DME Needed Upon Discharge  none     Discharge Plan discussed with: Patient     Discharge Barriers Identified None               Initial assessment completed with patient. Patient reports independence with ADL's  prior to hospitalization. Patient uses no DME at home. Patient currently has no cm needs upon DC. CM will continue following to assist with other needs.   CM provided a transitional care folder, information on advanced directives, information on pharmacy bedside delivery, and discharge planning begins on admission with contact information for any needs/questions.

## 2022-08-08 NOTE — PLAN OF CARE
Pt remains free of falls/injury this shift. Safety precautions maintained. Pt. denies any pain or discomfort. IV fluids infusing. NSR on tele monitor #[id number]. VSS. No signs and symptoms of acute distress noted at this time. 12 hour chart check completed.Will continue to monitor.

## 2022-08-08 NOTE — ASSESSMENT & PLAN NOTE
8/08 Suspected secondary to dehydration  Add NS x 500 ml  Monitor  Trend BMP  Renal dose all medications

## 2022-08-08 NOTE — PLAN OF CARE
Patient remains free of falls and injuries during shift. Tylenol given PRN for headache. Telemonitoring in place. Will continue to monitor.

## 2022-08-09 ENCOUNTER — PATIENT MESSAGE (OUTPATIENT)
Dept: CARDIOLOGY | Facility: CLINIC | Age: 72
End: 2022-08-09
Payer: MEDICARE

## 2022-08-09 PROBLEM — R07.9 CHEST PAIN IN ADULT: Status: RESOLVED | Noted: 2022-08-07 | Resolved: 2022-08-09

## 2022-08-09 NOTE — DISCHARGE SUMMARY
[facility] Medicine  Discharge Summary      Patient Name: Mekhi Lambert  MRN: [id number]  Patient Class: OP- Observation  Admission Date: 8/7/2022  Hospital Length of Stay: 0 days  Discharge Date and Time:  08/08/2022 7:54 pm  Attending Physician: No att. providers found   Discharging Provider: TONY Boston  Primary Care Provider: ABIGAIL Lopez Jr, MD      HPI:   Pt is a 73 yo male with PMhx of combined heart failure, CAD with stents x 3 and myocardial infarctions who presents to the ED with reports of left sided chest pain onset last night while patient was sleeping. Pt exercises every day and thought it could be muscle related. He had no relief with SL nitroglycerin. He denies any associated symptoms. This chest pain is reminiscent to his episodes of myocardial infarction however, pain was mild in nature without radiation.   EKG - NSR with nonspecific T wave abnormalities, PVC  Troponin normal, CBC and CMP normal, BNP = 118  CXR - no acute findings  To evaluate for acute coronary syndrome  As clarification, on 8/7/2022 patient should be admitted for hospital observation services under my care in collaboration with Nelson Benjamin, *        * No surgery found *      Hospital Course:   The patient was monitored closely during his stay. He was kept on continuous telemetry monitoring. His troponins were trended. Cardiology was consulted. An echocardiogram was ordered and showed concentric hypertrophy and moderately decreased systolic function, trivial pericardial effusion, estimated ejection fraction was 30%. Grade I left ventricular diastolic dysfunction was noted. There was moderate left ventricular global hypokinesis, normal right ventricular size with normal right ventricular systolic function, normal central venous pressure (3 mmHg), and the estimated PA systolic pressure was 23 mmHg. A Lexiscan stress test was ordered and showed abnormal myocardial perfusion scan. There was a  severe intensity, large sized, fixed perfusion abnormality consistent with scar in the basal to apical lateral and apical wall(s). There were no other significant perfusion abnormalities. The gated perfusion images showed an ejection fraction of 28% at rest. The gated perfusion images showed an ejection fraction of 27% post stress. Normal ejection fraction was greater than 59%. The EKG portion of this study was negative for ischemia. Patient's overall condition remained stable and improved and patient was discharged to home.         Goals of Care Treatment Preferences:  Code Status: Full Code      Consults: Cardiology- Dr. Sutton       Final Active Diagnoses:    Diagnosis Date Noted POA    ALBERTO (acute kidney injury) [N17.9] 08/08/2022 Yes    Chronic systolic CHF (congestive heart failure) [I50.22] 02/26/2022 Yes    Coronary artery disease of native artery of native heart with  angina pectoris History coronary stent placement [I25.118] 02/26/2020 Yes    Hypercholesterolemia [E78.00] 02/21/2017 Yes      Problems Resolved During this Admission:    Diagnosis Date Noted Date Resolved POA    PRINCIPAL PROBLEM:  Chest pain in adult [R07.9] 08/07/2022 08/09/2022 Yes       Discharged Condition: stable    Disposition: Home or Self Care    Follow Up:   Follow-up Information     Cardiology Follow up in 2 week(s).    Why: Take blood pressure and pulse 2 x day and keep log for follow up                     Patient Instructions:      Diet Cardiac     Notify your health care provider if you experience any of the following:  temperature >100.4     Notify your health care provider if you experience any of the following:  persistent nausea and vomiting or diarrhea     Notify your health care provider if you experience any of the following:  severe uncontrolled pain     Notify your health care provider if you experience any of the following:  difficulty breathing or increased cough     Notify your health care provider if you  experience any of the following:  persistent dizziness, light-headedness, or visual disturbances     Notify your health care provider if you experience any of the following:  increased confusion or weakness     Notify your health care provider if you experience any of the following:   Order Comments: Any decline in condition     Activity as tolerated       Significant Diagnostic Studies:    Lab Results   Component Value Date    WBC 5.56 08/07/2022    HGB 14.9 08/07/2022    HCT 44.3 08/07/2022    MCV 84 08/07/2022     08/07/2022       CMP  Sodium   Date Value Ref Range Status   08/07/2022 141 136 - 145 mmol/L Final     Potassium   Date Value Ref Range Status   08/07/2022 4.5 3.5 - 5.1 mmol/L Final     Chloride   Date Value Ref Range Status   08/07/2022 109 95 - 110 mmol/L Final     CO2   Date Value Ref Range Status   08/07/2022 24 23 - 29 mmol/L Final     Glucose   Date Value Ref Range Status   08/07/2022 95 70 - 110 mg/dL Final     BUN   Date Value Ref Range Status   08/07/2022 20 8 - 23 mg/dL Final     Creatinine   Date Value Ref Range Status   08/07/2022 1.4 0.5 - 1.4 mg/dL Final     Calcium   Date Value Ref Range Status   08/07/2022 9.7 8.7 - 10.5 mg/dL Final     Total Protein   Date Value Ref Range Status   08/07/2022 6.6 6.0 - 8.4 g/dL Final     Albumin   Date Value Ref Range Status   08/07/2022 4.1 3.5 - 5.2 g/dL Final     Total Bilirubin   Date Value Ref Range Status   08/07/2022 0.5 0.1 - 1.0 mg/dL Final     Comment:     For infants and newborns, interpretation of results should be based  on gestational age, weight and in agreement with clinical  observations.    Premature Infant recommended reference ranges:  Up to 24 hours.............<8.0 mg/dL  Up to 48 hours............<12.0 mg/dL  3-5 days..................<15.0 mg/dL  6-29 days.................<15.0 mg/dL       Alkaline Phosphatase   Date Value Ref Range Status   08/07/2022 61 55 - 135 U/L Final     AST   Date Value Ref Range Status    08/07/2022 20 10 - 40 U/L Final     ALT   Date Value Ref Range Status   08/07/2022 15 10 - 44 U/L Final     Anion Gap   Date Value Ref Range Status   08/07/2022 8 8 - 16 mmol/L Final     eGFR if    Date Value Ref Range Status   05/05/2022 >60 >60 mL/min/1.73 m^2 Final     eGFR if non    Date Value Ref Range Status   05/05/2022 >60 >60 mL/min/1.73 m^2 Final     Comment:     Calculation used to obtain the estimated glomerular filtration  rate (eGFR) is the CKD-EPI equation.          Troponin   Recent Labs   Lab 08/07/22  0445 08/07/22  1159 08/07/22  1848   TROPONINI 0.012 <0.006 0.025        Procedure Component Value Units Date/Time   X-Ray Chest AP Portable [[id number]] Resulted: 08/07/22 0729   Order Status: Completed Updated: 08/07/22 0731   Narrative:     EXAMINATION:   XR CHEST AP PORTABLE     CLINICAL HISTORY:   Chest Pain;     COMPARISON:   04/28/2022     FINDINGS:   Lung fields are clear.  The heart is normal in size.  The aorta is atherosclerotic.  Coronary artery stent noted.     No pleural fluid or pneumothorax.     No adenopathy is identified.     No significant osseous abnormality.    Impression:       No acute findings.       Electronically signed by: Clarence Tidwell   Date: 08/07/2022   Time: 07:29         Pending Diagnostic Studies:     None         Medications:  Reconciled Home Medications:      Medication List      START taking these medications    acetaminophen 325 MG tablet  Commonly known as: TYLENOL  Take 2 tablets (650 mg total) by mouth every 6 (six) hours as needed for Pain.        CONTINUE taking these medications    aspirin 81 MG EC tablet  Commonly known as: ECOTRIN  Take 1 tablet (81 mg total) by mouth once daily.     metoprolol succinate 25 MG 24 hr tablet  Commonly known as: TOPROL-XL  Take 0.5 tablets (12.5 mg total) by mouth every evening.     mometasone 0.1% 0.1 % cream  Commonly known as: ELOCON  AAA bid prn for psoriasis. Strong steroid.  Do not use on  face, underarms or groin.     multivitamin capsule  Take 1 capsule by mouth once daily.     nitroGLYCERIN 0.4 MG SL tablet  Commonly known as: NITROSTAT  Place 1 tablet (0.4 mg total) under the tongue every 5 (five) minutes as needed for Chest pain.     oxyCODONE 5 MG immediate release tablet  Commonly known as: ROXICODONE  Take 1 tablet (5 mg total) by mouth every 6 (six) hours as needed for Pain.     pantoprazole 40 MG tablet  Commonly known as: PROTONIX  Take 1 tablet (40 mg total) by mouth once daily.     ranolazine 500 MG Tb12  Commonly known as: RANEXA  Take 1 tablet (500 mg total) by mouth 2 (two) times daily.     ticagrelor 90 mg tablet  Commonly known as: BRILINTA  Take 1 tablet (90 mg total) by mouth 2 (two) times a day.            Indwelling Lines/Drains at time of discharge:   Lines/Drains/Airways     None                 Time spent on the discharge of patient: 69 minutes         TONY Boston  Department of Hospital Medicine  '[facility]

## 2022-08-09 NOTE — NURSING
Cardiologist Danilo spoke with patient at bedside. Confirmed discharge. AVS discharge paperwork printed and given to patient. IV access taken out.

## 2022-08-16 NOTE — ANESTHESIA PREPROCEDURE EVALUATION
04/27/2022  Mekhi Lambert is a 71 y.o., male.  Past Medical History:   Diagnosis Date    Anticoagulant long-term use     Arthritis     Cancer     Chronic systolic CHF (congestive heart failure) 02/26/2022    Coronary artery disease     Digestive disorder     Myocardial infarction     2/20/22     Past Surgical History:   Procedure Laterality Date    CARDIAC CATHETERIZATION      COLONOSCOPY N/A 9/22/2021    Procedure: COLONOSCOPY;  Surgeon: Meaghan Jaime MD;  Location: [facility] ENDO;  Service: Endoscopy;  Laterality: N/A;    CORONARY ANGIOGRAPHY INCLUDING BYPASS GRAFTS WITH CATHETERIZATION OF LEFT HEART N/A 3/3/2020    Procedure: ANGIOGRAM, CORONARY, INCLUDING BYPASS GRAFT, WITH LEFT HEART CATHETERIZATION;  Surgeon: Ute Melchor MD;  Location: [facility] CATH LAB;  Service: Cardiology;  Laterality: N/A;    JOINT REPLACEMENT Right     knee    KNEE SURGERY      LEFT HEART CATHETERIZATION Left 2/22/2020    Procedure: CATHETERIZATION, HEART, LEFT;  Surgeon: Ute Melchor MD;  Location: [facility] CATH LAB;  Service: Cardiology;  Laterality: Left;    LEFT HEART CATHETERIZATION Left 2/26/2022    Procedure: CATHETERIZATION, HEART, LEFT;  Surgeon: Ute Melchor MD;  Location: [facility] CATH LAB;  Service: Cardiology;  Laterality: Left;    PERCUTANEOUS TRANSLUMINAL BALLOON ANGIOPLASTY OF CORONARY ARTERY Left 2/22/2020    Procedure: Angioplasty-coronary;  Surgeon: Ute Melchor MD;  Location: [facility] CATH LAB;  Service: Cardiology;  Laterality: Left;    PERCUTANEOUS TRANSLUMINAL BALLOON ANGIOPLASTY OF CORONARY ARTERY  2/26/2022    Procedure: Angioplasty-coronary;  Surgeon: Ute Melchor MD;  Location: [facility] CATH LAB;  Service: Cardiology;;    SHOULDER ARTHROSCOPY Right     Dr Bella    simple prostatectomy  06/06/2016    robotic assisted partial prostectomy due to BPH    TOTAL KNEE ARTHROPLASTY        Pre-op Assessment    I have reviewed the Patient Summary Reports.    I have reviewed the Nursing Notes. I have reviewed the NPO Status.   I have reviewed the Medications.     Review of Systems  Anesthesia Hx:  No problems with previous Anesthesia  Denies Family Hx of Anesthesia complications.   Denies Personal Hx of Anesthesia complications.   Social:  Non-Smoker, Alcohol Use    Hematology/Oncology:  Hematology Normal   Oncology Normal     EENT/Dental:EENT/Dental Normal   Cardiovascular:   Past MI CAD  CABG/stent  Angina CHF ECG has been reviewed. EKG NSR 3/2021. Hx NSTEMI 2/2020, s/p PCI w ISSAC to Cfx.      2-26-22 Cath  · The pre-procedure left ventricular end diastolic pressure was 15.  · The post-procedure left ventricular end diastolic pressure was 20.  · The ejection fraction was calculated to be 30%.  · There was no mitral valve stenosis.  · There was no mitral valve prolapse evident. The annulus was normal. There was normal mitral valve motion.  · There was no aortic valve stenosis.  · The Prox LAD to Mid LAD lesion was 95% stenosed with 0% stenosis post-intervention.  · A stent was successfully placed at 14 NAT for 20 sec.  · The 2nd Diag lesion was 50% stenosed with 0% stenosis post-intervention.  · A stent was successfully placed at 14 NAT for 20 sec.  · The estimated blood loss was none.  · There was two vessel coronary artery disease.  · The PCI was successful.        4-25-22  Addendum  · The left ventricle is normal in size with moderately decreased systolic function.  · The estimated ejection fraction is 35%.  · Grade I left ventricular diastolic dysfunction.  · There are segmental left ventricular wall motion abnormalities.  · Mild mitral regurgitation.  · Normal central venous pressure (3 mmHg).  · The estimated PA systolic pressure is 32 mmHg.  · With low normal right ventricular systolic function.     Pulmonary:   Pulmonary nodules per CT scan 6/2020   Renal/:  Renal/ Normal      Hepatic/GI:   Bowel Prep.    Musculoskeletal:   Arthritis     Neurological:  Neurology Normal    Endocrine:  Endocrine Normal    Dermatological:  Skin Normal    Psych:   anxiety          Physical Exam  General:  Well nourished      Airway/Jaw/Neck:  Mouth Opening: Normal   Tongue: Normal   Pre-Existing Airway Tube(s): Oral Endotracheal tube Mallampati: II  TM Distance: Normal   Neck ROM: Normal ROM       Dental:  Intact     Chest/Lungs:  Chest/Lungs Findings: Normal Respiratory Rate      Heart/Vascular:  Heart Findings: Rate: Normal  Rhythm: Regular Rhythm        Mental Status:  Mental Status Findings:  Cooperative, Alert and Oriented         Anesthesia Plan  Type of Anesthesia, risks & benefits discussed:  Anesthesia Type:  general    Patient's Preference:   Plan Factors:          Intra-op Monitoring Plan: Standard ASA Monitors  Intra-op Monitoring Plan Comments:   Post Op Pain Control Plan: per primary service following discharge from PACU and multimodal analgesia  Post Op Pain Control Plan Comments:     Induction:   IV  Beta Blocker:  Patient is not currently on a Beta-Blocker (No further documentation required).       Informed Consent: Informed consent signed with the Patient and all parties understand the risks and agree with anesthesia plan.  All questions answered.  Anesthesia consent signed with patient.  ASA Score: 3   Emergent   Day of Surgery Review of History & Physical: I have interviewed and examined the patient. I have reviewed the patient's H&P dated:    H&P Update referred to the surgeon/provider.          Ready For Surgery From Anesthesia Perspective.           Physical Exam  General: Well nourished    Airway:  Mallampati: II   Mouth Opening: Normal  TM Distance: Normal  Tongue: Normal  Neck ROM: Normal ROM  Pre-Existing Airway: Oral Endotracheal tube    Dental:  Intact    Chest/Lungs:  Normal Respiratory Rate    Heart:  Rate: Normal  Rhythm: Regular Rhythm          Anesthesia Plan  Type of  Anesthesia, risks & benefits discussed:    Anesthesia Type: general  Intra-op Monitoring Plan: Standard ASA Monitors  Post Op Pain Control Plan: per primary service following discharge from PACU and multimodal analgesia  Induction:  IV  Airway Plan: Direct  Informed Consent: Informed consent signed with the Patient and all parties understand the risks and agree with anesthesia plan.  All questions answered.   ASA Score: 3 Emergent  Day of Surgery Review of History & Physical: H&P Update referred to the surgeon/provider.I have interviewed and examined the patient. I have reviewed the patient's H&P dated: There are no significant changes.     Ready For Surgery From Anesthesia Perspective.       .       Yes

## 2022-09-03 NOTE — ASSESSMENT & PLAN NOTE
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    Plan:  - CT A/P 9/3 redemonstrated 3 intra-abdominal collections  - Pain control (oxycodone 5/10mg IR when patient tolerating reg diet)  - Flush IR drain w/ 5cc daily  - Protonix BID  - Continue to replete electrolytes  - Continue ertapenem, monitor for clinical signs of infection  - DVT ppx  - OOB  - preparations H for hemorrhoid pain     ACS  [id number]

## 2022-09-22 ENCOUNTER — HOSPITAL ENCOUNTER (OUTPATIENT)
Dept: RADIOLOGY | Facility: HOSPITAL | Age: 72
Discharge: HOME | End: 2022-09-22
Attending: INTERNAL MEDICINE
Payer: MEDICARE

## 2022-09-22 DIAGNOSIS — R91.1 SOLITARY PULMONARY NODULE: ICD-10-CM

## 2022-09-22 PROCEDURE — 71250 CT THORAX DX C-: CPT | Mod: TC

## 2022-09-23 ENCOUNTER — OFFICE VISIT (OUTPATIENT)
Dept: CARDIOLOGY | Facility: CLINIC | Age: 72
End: 2022-09-23
Payer: MEDICARE

## 2022-09-23 DIAGNOSIS — F41.9 ANXIETY DISORDER, UNSPECIFIED TYPE: ICD-10-CM

## 2022-09-23 DIAGNOSIS — I25.2 H/O NON-ST ELEVATION MYOCARDIAL INFARCTION (NSTEMI): ICD-10-CM

## 2022-09-23 DIAGNOSIS — E78.00 HYPERCHOLESTEROLEMIA: ICD-10-CM

## 2022-09-23 DIAGNOSIS — I50.20 HEART FAILURE WITH REDUCED EJECTION FRACTION, NYHA CLASS II: Primary | ICD-10-CM

## 2022-09-23 DIAGNOSIS — Z95.5 H/O HEART ARTERY STENT: ICD-10-CM

## 2022-09-23 DIAGNOSIS — I42.0 DILATED CARDIOMYOPATHY: ICD-10-CM

## 2022-09-23 DIAGNOSIS — I25.118 CORONARY ARTERY DISEASE OF NATIVE ARTERY OF NATIVE HEART WITH STABLE ANGINA PECTORIS: ICD-10-CM

## 2022-09-23 DIAGNOSIS — I50.22 CHRONIC SYSTOLIC CHF (CONGESTIVE HEART FAILURE): ICD-10-CM

## 2022-09-23 DIAGNOSIS — I21.4 NSTEMI (NON-ST ELEVATED MYOCARDIAL INFARCTION): ICD-10-CM

## 2022-09-23 PROCEDURE — 99214 OFFICE O/P EST MOD 30 MIN: CPT | Mod: 95,,, | Performed by: INTERNAL MEDICINE

## 2022-09-23 PROCEDURE — 99214 PR OFFICE/OUTPT VISIT, EST, LEVL IV, 30-39 MIN: ICD-10-PCS | Mod: 95,,, | Performed by: INTERNAL MEDICINE

## 2022-09-23 RX ORDER — LOSARTAN POTASSIUM 25 MG/1
25 TABLET ORAL DAILY
Qty: 90 TABLET | Refills: 3 | Status: SHIPPED | OUTPATIENT
Start: 2022-09-23 | End: 2023-03-24

## 2022-09-23 NOTE — PROGRESS NOTES
Subjective:   Patient ID:  Mekhi Lambert is a 72 y.o. male who presents for follow-up of No chief complaint on file.  Post hospitalization:  72 y/o WM admitted with a Dx of NSTEMI . Started on heparin drip , asa and BB . Cardiology consulted and plan for LHC today   2/27 Pt was seen and examined at bedside . He was determined to be  suitable for d/c   He  was admitted with a Dx of NSTEMI .  S/P LHC which show :Ld prox 90% with d2 bifurcation 50% requiring stenting with kissing angioplasty .  lcx patent , normAL RCA .Ef 30% ant wall apical akinesis . Cardiology rec brilinta and asa x 1 year . PT is ALLERGIC to STATIN .  There was gabrielle cue event since admission . He was advised to f/u with his cardiology and PCP in 1 to 2 weeks      Vis fu echo.  Will re-evaluate in the near future after stent hopef of the heart.  Clinically otherwise stable having no to try his own rehab.  He will continue with antiplatelet medications and metoprolol.  He has been reluctant to take additional medications would like to exercise before making further decisions.  I have offered him the use of cholesterol profile and have a discussion about Repatha injectable medications lower cholesterol levels.  Will have a cholesterol profile near future, also a repeat cardiac echo be done  Discussion will be made in the future about giving him Repatha.  Discussion will be made the future about ACE-inhibitor or Entresto in the near future.  Patient is reluctant take these medications and would like to wait to the next visit.  The patient is an  and very thoughtful about the potential use of new medications.  He understands now the guideline directed medical therapy includes the use of cholesterol lowering agents, Ace inhibitors or Entresto medications as well as beta-blockers and the use of antiplatelet medications for the stent.  We will continue to have these discussions in the future and hopefully he will take additional medications as  necessary.  I would like him to take an ACE-inhibitor and Repatha medication.        Patient presents the office clinically stable no recurrence symptoms chest pain shortness breath he states he can exercise more generously than he has before.  The patient is on a personal journey to review the literature for himself.  He states that he will make his decisions about what medications he will take and not take.  States that he will be able to lower his cholesterol level on his own and does not take statins or other medications.  Patient is willing to take Brilinta for the year and then go on aspirin therapy after that.  He has on metoprolol taking half dose today.  Blood pressure heart rate have remained stable he is exercising feels improved.  Patient is not willing to take other medications for improvement of heart function.  Cardiac echo from 03/02/2022 shows slight improvement LV function however the patient refuses to take statin medications and refuses to take ACE-inhibitor.       The patient presented by virtual visit today. He accepts the need for additional medication due to lower LV function and CAD.He will trial losartan at 25 mg daily. He had refused ACE inhibitor in the past. He generally feels well today. The ongoing plan is to introduce GDMT for CHF class 2. I will add spironolactone if possible at next visit.  He accepts and takes metoprolol succinate. He takes ASA and brillinta for prior stent in 2/22.  He will continue brillinta until 2/23.      Review of Systems   Constitutional: Negative for chills, diaphoresis, night sweats, weight gain and weight loss.   HENT:  Negative for congestion, hoarse voice, sore throat and stridor.    Eyes:  Negative for double vision and pain.   Cardiovascular:  Negative for chest pain, claudication, cyanosis, dyspnea on exertion, irregular heartbeat, leg swelling, near-syncope, orthopnea, palpitations, paroxysmal nocturnal dyspnea and syncope.   Respiratory:  Negative  for cough, hemoptysis, shortness of breath, sleep disturbances due to breathing, snoring, sputum production and wheezing.    Endocrine: Negative for cold intolerance, heat intolerance and polydipsia.   Hematologic/Lymphatic: Negative for bleeding problem. Does not bruise/bleed easily.   Skin:  Negative for color change, dry skin and rash.   Musculoskeletal:  Negative for joint swelling and muscle cramps.   Gastrointestinal:  Negative for bloating, abdominal pain, constipation, diarrhea, dysphagia, melena, nausea and vomiting.   Genitourinary:  Negative for flank pain and urgency.   Neurological:  Negative for dizziness, focal weakness, headaches, light-headedness, loss of balance, seizures and weakness.   Psychiatric/Behavioral:  Negative for altered mental status and memory loss. The patient is not nervous/anxious.    Family History   Problem Relation Age of Onset    Heart disease Mother         lupus related    Diabetes Mother     Cancer Father         prostate, bone, lung, skin cancers all seperate types    Diabetes Maternal Aunt      Past Medical History:   Diagnosis Date    ALBERTO (acute kidney injury) 8/8/2022    Anticoagulant long-term use     Arthritis     Cancer     Chronic systolic CHF (congestive heart failure) 02/26/2022    Coronary artery disease     Digestive disorder     Myocardial infarction     2/20/22     Social History     Socioeconomic History    Marital status:    Tobacco Use    Smoking status: Never    Smokeless tobacco: Former     Types: Chew     Quit date: 6/3/2004   Substance and Sexual Activity    Alcohol use: Yes     Alcohol/week: 2.0 standard drinks     Types: 2 Glasses of wine per week     Comment: per week    Drug use: No    Sexual activity: Yes     Current Outpatient Medications on File Prior to Visit   Medication Sig Dispense Refill    acetaminophen (TYLENOL) 325 MG tablet Take 2 tablets (650 mg total) by mouth every 6 (six) hours as needed for Pain.  0    aspirin (ECOTRIN) 81 MG  EC tablet Take 1 tablet (81 mg total) by mouth once daily. 90 tablet 3    metoprolol succinate (TOPROL-XL) 25 MG 24 hr tablet Take 0.5 tablets (12.5 mg total) by mouth every evening. 45 tablet 3    mometasone 0.1% (ELOCON) 0.1 % cream AAA bid prn for psoriasis. Strong steroid.  Do not use on face, underarms or groin. 50 g 1    multivitamin capsule Take 1 capsule by mouth once daily.      nitroGLYCERIN (NITROSTAT) 0.4 MG SL tablet Place 1 tablet (0.4 mg total) under the tongue every 5 (five) minutes as needed for Chest pain. 30 tablet 1    oxyCODONE (ROXICODONE) 5 MG immediate release tablet Take 1 tablet (5 mg total) by mouth every 6 (six) hours as needed for Pain. 15 tablet 0    pantoprazole (PROTONIX) 40 MG tablet Take 1 tablet (40 mg total) by mouth once daily. 30 tablet 3    ranolazine (RANEXA) 500 MG Tb12 Take 1 tablet (500 mg total) by mouth 2 (two) times daily. 60 tablet 11    ticagrelor (BRILINTA) 90 mg tablet Take 1 tablet (90 mg total) by mouth 2 (two) times a day. 180 tablet 3     No current facility-administered medications on file prior to visit.     Review of patient's allergies indicates:   Allergen Reactions    Statins-hmg-coa reductase inhibitors Other (See Comments)     SEVERE MYALGIAS AND GI SIDE EFFECTS       Objective:         Physical exam not done due to virtual visit  Assessment:     1. Anxiety disorder, unspecified type    2. Chronic systolic CHF (congestive heart failure)    3. Coronary artery disease of native artery of native heart with  angina pectoris History coronary stent placement    4. Dilated cardiomyopathy    5. H/O heart artery stent    6. H/O non-ST elevation myocardial infarction (NSTEMI)    7. Heart failure with reduced ejection fraction, NYHA class II    8. Hypercholesterolemia    9. NSTEMI (non-ST elevated myocardial infarction)        Plan:     Anxiety disorder, unspecified type    Chronic systolic CHF (congestive heart failure)    Coronary artery disease of native artery  of native heart with  angina pectoris History coronary stent placement    Dilated cardiomyopathy    H/O heart artery stent    H/O non-ST elevation myocardial infarction (NSTEMI)    Heart failure with reduced ejection fraction, NYHA class II    Hypercholesterolemia    NSTEMI (non-ST elevated myocardial infarction)        Impression 1 dilated cardiomyopathy secondary to ischemic conditions.  Patient understands that his LV function is less than normal and is 28-32% by prior testing.  He accepts the start of losartan as he refused ACE-inhibitor in the past.  He continues with metoprolol succinate.  Patient takes aspirin and Brilinta due to advanced coronary disease and prior stent in February 2022.  2. CAD as above the patient takes aspirin and Brilinta.  No aspects of chest discomfort as discussed today by virtual visit.    3.hyperlipidemia the patient has refused to take statin medications due to muscle pain.  He has refused to take Repatha or injectable medications in the past.  Follow-up evaluation again next 2 months.  In the past the patient has been on inadequate guideline directed medical management and he has understood the risks of not completing these requirements..  At this point he understands and accepts the initiation of more advanced medications as he can not tolerate them with blood pressure tolerance and side symptoms.

## 2022-10-12 ENCOUNTER — OFFICE VISIT (OUTPATIENT)
Dept: DERMATOLOGY | Facility: CLINIC | Age: 72
End: 2022-10-12
Payer: MEDICARE

## 2022-10-12 DIAGNOSIS — L82.1 SEBORRHEIC KERATOSIS: ICD-10-CM

## 2022-10-12 DIAGNOSIS — Z85.828 HISTORY OF SKIN CANCER: ICD-10-CM

## 2022-10-12 DIAGNOSIS — L57.0 ACTINIC KERATOSES: ICD-10-CM

## 2022-10-12 DIAGNOSIS — D48.5 NEOPLASM OF UNCERTAIN BEHAVIOR OF SKIN: ICD-10-CM

## 2022-10-12 DIAGNOSIS — Z12.83 SCREENING, MALIGNANT NEOPLASM, SKIN: ICD-10-CM

## 2022-10-12 DIAGNOSIS — L90.5 SCAR CONDITIONS/SKIN FIBROSIS: Primary | ICD-10-CM

## 2022-10-12 PROCEDURE — 99999 PR PBB SHADOW E&M-EST. PATIENT-LVL III: CPT | Mod: PBBFAC,,, | Performed by: DERMATOLOGY

## 2022-10-12 PROCEDURE — 17000 PR DESTRUCTION(LASER SURGERY,CRYOSURGERY,CHEMOSURGERY),PREMALIGNANT LESIONS,FIRST LESION: ICD-10-PCS | Mod: 59,S$PBB,, | Performed by: DERMATOLOGY

## 2022-10-12 PROCEDURE — 17000 DESTRUCT PREMALG LESION: CPT | Mod: 59,S$PBB,, | Performed by: DERMATOLOGY

## 2022-10-12 PROCEDURE — 11102 PR TANGENTIAL BIOPSY, SKIN, SINGLE LESION: ICD-10-PCS | Mod: S$PBB,,, | Performed by: DERMATOLOGY

## 2022-10-12 PROCEDURE — 99213 OFFICE O/P EST LOW 20 MIN: CPT | Mod: 25,S$PBB,, | Performed by: DERMATOLOGY

## 2022-10-12 PROCEDURE — 11102 TANGNTL BX SKIN SINGLE LES: CPT | Mod: PBBFAC | Performed by: DERMATOLOGY

## 2022-10-12 PROCEDURE — 99999 PR PBB SHADOW E&M-EST. PATIENT-LVL III: ICD-10-PCS | Mod: PBBFAC,,, | Performed by: DERMATOLOGY

## 2022-10-12 PROCEDURE — 88305 TISSUE EXAM BY PATHOLOGIST: ICD-10-PCS | Mod: 26,,, | Performed by: PATHOLOGY

## 2022-10-12 PROCEDURE — 88305 TISSUE EXAM BY PATHOLOGIST: CPT | Mod: 26,,, | Performed by: PATHOLOGY

## 2022-10-12 PROCEDURE — 99213 OFFICE O/P EST LOW 20 MIN: CPT | Mod: PBBFAC | Performed by: DERMATOLOGY

## 2022-10-12 PROCEDURE — 88305 TISSUE EXAM BY PATHOLOGIST: CPT | Performed by: PATHOLOGY

## 2022-10-12 PROCEDURE — 11102 TANGNTL BX SKIN SINGLE LES: CPT | Mod: S$PBB,,, | Performed by: DERMATOLOGY

## 2022-10-12 PROCEDURE — 99213 PR OFFICE/OUTPT VISIT, EST, LEVL III, 20-29 MIN: ICD-10-PCS | Mod: 25,S$PBB,, | Performed by: DERMATOLOGY

## 2022-10-12 PROCEDURE — 17000 DESTRUCT PREMALG LESION: CPT | Mod: 59,PBBFAC | Performed by: DERMATOLOGY

## 2022-10-12 NOTE — PATIENT INSTRUCTIONS
Shave Biopsy Wound Care    Your doctor has performed a shave biopsy today.  A band aid and vaseline ointment has been placed over the site.  This should remain in place for 24 hours.  It is recommended that you keep the area dry for the first 24 hours.  After 24 hours, you may remove the band aid and wash the area with warm soap and water and apply Vaseline jelly.  Many patients prefer to use Neosporin or Bacitracin ointment.  This is acceptable; however, know that you can develop an allergy to this medication even if you have used it safely for years.  It is important to keep the area moist.  Letting it dry out and get air slows healing time, and will worsen the scar.  Band aid is optional after first 24 hours.      If you notice increasing redness, tenderness, pain, or yellow drainage at the biopsy site, please notify your doctor.  These are signs of an infection.    If your biopsy site is bleeding, apply firm pressure for 15 minutes straight.  Repeat for another 15 minutes, if it is still bleeding.   If the surgical site continues to bleed, then please contact your doctor.      [facility]   DERMATOLOGY  [address] [address]   Dept: [phone]   Dept Fax: [phone]       CRYOSURGERY      Your doctor has used a method called cryosurgery to treat your skin condition. Cryosurgery refers to the use of very cold substances to treat a variety of skin conditions such as warts, pre-skin cancers, molluscum contagiosum, sun spots, and several benign growths. The substance we use in cryosurgery is liquid nitrogen and is so cold (-195 degrees Celsius) that is burns when administered.     Following treatment in the office, the skin may immediately burn and become red. You may find the area around the lesion is affected as well. It is sometimes necessary to treat not only the lesion, but a small area of the surrounding normal skin to achieve a good response.      A blister, and even a blood filled blister, may form after treatment.   This is a normal response. If the blister is painful, it is acceptable to sterilize a needle and with rubbing alcohol and gently pop the blister. It is important that you gently wash the area with soap and warm water as the blister fluid may contain wart virus if a wart was treated. Do no remove the roof of the blister.     The area treated can take anywhere from 1-3 weeks to heal. Healing time depends on the kind of skin lesion treated, the location, and how aggressively the lesion was treated. It is recommended that the areas treated are covered with Vaseline or bacitracin ointment and a band-aid. If a band-aid is not practical, just ointment applied several times per day will do. Keeping these areas moist will speed the healing time.    Treatment with liquid nitrogen can leave a scar. In dark skin, it may be a light or dark scar, in light skin it may be a white or pink scar. These will generally fade with time.    If you have any concerns after your treatment, please feel free to call the office.         [facility] DERMATOLOGY 4TH FLOOR  [address] [address]  Dept: [phone]  Dept Fax: [phone]

## 2022-10-12 NOTE — PROGRESS NOTES
Subjective:       Patient ID:  Mekhi Lambert is a 72 y.o. male who presents for   Chief Complaint   Patient presents with    Skin Check     Spots of concern: left arm; right elbow and top of right calf      Hx of NMSC of the right upper back, family hx of melanoma (father), AK of the right dorsal hand (s/p biopsy of the 3/4/20), psoriasis, last seen on 9/29/21. He c/o lesion of the left arm, x several months. + tenderness. No tx tried.      This is a high risk patient here to check for the development of new lesions.        Review of Systems   Constitutional:  Negative for fever and chills.   Gastrointestinal:  Negative for nausea and vomiting.   Skin:  Positive for activity-related sunscreen use. Negative for daily sunscreen use and recent sunburn.   Hematologic/Lymphatic: Does not bruise/bleed easily.      Objective:    Physical Exam   Constitutional: He appears well-developed and well-nourished. No distress.   Neurological: He is alert and oriented to person, place, and time. He is not disoriented.   Psychiatric: He has a normal mood and affect.   Skin:   Areas Examined (abnormalities noted in diagram):   Scalp / Hair Palpated and Inspected  Head / Face Inspection Performed  Neck Inspection Performed  Chest / Axilla Inspection Performed  Abdomen Inspection Performed  Genitals / Buttocks / Groin Inspection Performed  Back Inspection Performed  RUE Inspected  LUE Inspection Performed  RLE Inspected  LLE Inspection Performed  Nails and Digits Inspection Performed            Diagram Legend     Erythematous scaling macule/papule c/w actinic keratosis       Vascular papule c/w angioma      Pigmented verrucoid papule/plaque c/w seborrheic keratosis      Yellow umbilicated papule c/w sebaceous hyperplasia      Irregularly shaped tan macule c/w lentigo     1-2 mm smooth white papules consistent with Milia      Movable subcutaneous cyst with punctum c/w epidermal inclusion cyst      Subcutaneous movable cyst c/w  pilar cyst      Firm pink to brown papule c/w dermatofibroma      Pedunculated fleshy papule(s) c/w skin tag(s)      Evenly pigmented macule c/w junctional nevus     Mildly variegated pigmented, slightly irregular-bordered macule c/w mildly atypical nevus      Flesh colored to evenly pigmented papule c/w intradermal nevus       Pink pearly papule/plaque c/w basal cell carcinoma      Erythematous hyperkeratotic cursted plaque c/w SCC      Surgical scar with no sign of skin cancer recurrence      Open and closed comedones      Inflammatory papules and pustules      Verrucoid papule consistent consistent with wart     Erythematous eczematous patches and plaques     Dystrophic onycholytic nail with subungual debris c/w onychomycosis     Umbilicated papule    Erythematous-base heme-crusted tan verrucoid plaque consistent with inflamed seborrheic keratosis     Erythematous Silvery Scaling Plaque c/w Psoriasis     See annotation        Assessment / Plan:      Pathology Orders:       Normal Orders This Visit    Specimen to Pathology, Dermatology     Comments:    Number of Specimens:->1  ------------------------->-------------------------  Spec 1 Procedure:->Biopsy  Spec 1 Clinical Impression:->r/o SCC  Spec 1 Source:->left forearm  Release to patient->Immediate    Questions:    Procedure Type: Dermatology and skin neoplasms    Number of Specimens: 1    ------------------------: -------------------------    Spec 1 Procedure: Biopsy    Spec 1 Clinical Impression: r/o SCC    Spec 1 Source: left forearm    Clinical Information: see above    Release to patient: Immediate          Scar conditions/skin fibrosis  Screening, malignant neoplasm, skin  History of skin cancer  Scar of the right upper back, hx of NMSC.  No evidence of recurrence on physical exam today.  Continue routine skin surveillance. Daily sunscreen advised.    Seborrheic keratosis  Reassurance given. Discussed diagnosis and that lesions are benign.  AAD handout  given.     Actinic keratoses  Cryosurgery Procedure Note    The patient is informed of the precancerous quality and need for treatment of these lesions. After risks, benefits and alternatives explained, including blistering, pain, hyper- and hypopigmentation, patient verbally consents to cryotherapy to precancerous lesions. Liquid nitrogen cryosurgery is applied to the 1 actinic keratoses, as detailed in the physical exam, to produce a freeze injury. The patient is aware that blisters may form and is instructed on wound care with gentle cleansing and use of vaseline ointment to keep moist until healed. The patient is supplied a handout on cryosurgery and is instructed to call if lesions do not completely resolve.    Neoplasm of uncertain behavior of skin  -     Specimen to Pathology, Dermatology  -     Shave biopsy(-ies) done of 1 site(s).   Patient informed to call for results within 2 weeks if have not received notification via telephone call or [facility]         Follow up for call for results.    PROCEDURE NOTE - SHAVE BIOPSY   Location: see above    After risk, benefits, and alternatives were discussed with the patient, the patient agrees to the procedure by verbal informed consent.  The area(s) were cleansed with alcohol. 2 cc of lidocaine 1% with epinephrine was injected for local anesthesia into each lesion(s).  A sharp dermablade was used to remove part or all of the lesion(s).  The specimen(s) will be sent for tissue pathology.  Hemostasis was obtained with aluminum chloride and/or hyfrecation.  The area(s) were dressed with vaseline ointment and bandaged.  The patient tolerated the procedure well without adverse events.  Wound care instructions were given to the patient on the AVS.  The patient will be notified of pathology results once available. Results will also be available in Epic.

## 2022-10-17 LAB
FINAL PATHOLOGIC DIAGNOSIS: NORMAL
GROSS: NORMAL
Lab: NORMAL
MICROSCOPIC EXAM: NORMAL

## 2022-10-21 ENCOUNTER — TELEPHONE (OUTPATIENT)
Dept: DERMATOLOGY | Facility: CLINIC | Age: 72
End: 2022-10-21
Payer: MEDICARE

## 2022-10-21 NOTE — TELEPHONE ENCOUNTER
Called pt to review biopsy results.  Pt given 3 options, f/u in 3 months and treat any visually residual area with cryo at that time, use efudex cream bid x 4 weeks c/ f/u in 3-4 months and schedule for deeper shave biopsy.  Discussed the former option is the most conservative approach.  Pt opts for the former.  Will f/u in 3 months and treat any residual area with cryotherapy at that time and visually survey for evidence of recurrence. The patient acknowledged understanding.

## 2022-10-21 NOTE — TELEPHONE ENCOUNTER
----- Message from Elayne Victoria MA sent at 10/18/2022  2:02 PM CDT -----  Please give patient a call. Patient does not understanding results. Patient is reading what pathologist sent and has questions

## 2022-11-10 ENCOUNTER — TELEPHONE (OUTPATIENT)
Dept: ADMINISTRATIVE | Facility: HOSPITAL | Age: 72
End: 2022-11-10
Payer: MEDICARE

## 2022-11-14 PROBLEM — N17.9 AKI (ACUTE KIDNEY INJURY): Status: RESOLVED | Noted: 2022-08-08 | Resolved: 2022-11-14

## 2022-12-02 ENCOUNTER — TELEPHONE (OUTPATIENT)
Dept: ADMINISTRATIVE | Facility: HOSPITAL | Age: 72
End: 2022-12-02
Payer: MEDICARE

## 2023-01-05 ENCOUNTER — TELEPHONE (OUTPATIENT)
Dept: CARDIOLOGY | Facility: CLINIC | Age: 73
End: 2023-01-05
Payer: MEDICARE

## 2023-01-05 NOTE — TELEPHONE ENCOUNTER
Appt made pt want am appt pb    Dr jose agreed to see. Pt notified.        This pt is wanting an appt. Dr funk will no longer see the pt per his last notes and messages. I am waiting to see which physician will see this pt. pb

## 2023-01-11 ENCOUNTER — OFFICE VISIT (OUTPATIENT)
Dept: DERMATOLOGY | Facility: CLINIC | Age: 73
End: 2023-01-11
Payer: MEDICARE

## 2023-01-11 DIAGNOSIS — L82.1 SEBORRHEIC KERATOSIS: ICD-10-CM

## 2023-01-11 DIAGNOSIS — Z85.828 HISTORY OF SKIN CANCER: ICD-10-CM

## 2023-01-11 DIAGNOSIS — D18.01 HEMANGIOMA OF SKIN: ICD-10-CM

## 2023-01-11 DIAGNOSIS — Z12.83 SCREENING, MALIGNANT NEOPLASM, SKIN: ICD-10-CM

## 2023-01-11 DIAGNOSIS — L90.5 SCAR CONDITIONS/SKIN FIBROSIS: Primary | ICD-10-CM

## 2023-01-11 PROCEDURE — 99213 OFFICE O/P EST LOW 20 MIN: CPT | Mod: S$PBB,,, | Performed by: DERMATOLOGY

## 2023-01-11 PROCEDURE — 99999 PR PBB SHADOW E&M-EST. PATIENT-LVL III: ICD-10-PCS | Mod: PBBFAC,,, | Performed by: DERMATOLOGY

## 2023-01-11 PROCEDURE — 99213 PR OFFICE/OUTPT VISIT, EST, LEVL III, 20-29 MIN: ICD-10-PCS | Mod: S$PBB,,, | Performed by: DERMATOLOGY

## 2023-01-11 PROCEDURE — 99999 PR PBB SHADOW E&M-EST. PATIENT-LVL III: CPT | Mod: PBBFAC,,, | Performed by: DERMATOLOGY

## 2023-01-11 PROCEDURE — 99213 OFFICE O/P EST LOW 20 MIN: CPT | Mod: PBBFAC | Performed by: DERMATOLOGY

## 2023-01-11 NOTE — PROGRESS NOTES
Subjective:       Patient ID:  Mekhi Lambert is a 72 y.o. male who presents for   Chief Complaint   Patient presents with    Skin Check     Hx of NMSC of the right upper back, family hx of melanoma (father), HAK of the left forearm (s/p biopsy on 10/12/22), AK of the right dorsal hand (s/p biopsy of the 3/4/20), psoriasis, last seen on 10/22/22. Hx of shave biopsy of the left forearm showing HAK, could not r/o invasive SCC given HAK extended to base of biopsy. Denies bleeding, tender, growing, or concerning lesions.     This is a high risk patient here to check for the development of new lesions.        Review of Systems   Constitutional:  Negative for fever and chills.   Gastrointestinal:  Negative for nausea and vomiting.   Skin:  Positive for activity-related sunscreen use. Negative for daily sunscreen use and recent sunburn.   Hematologic/Lymphatic: Does not bruise/bleed easily.      Objective:    Physical Exam   Constitutional: He appears well-developed and well-nourished. No distress.   Neurological: He is alert and oriented to person, place, and time. He is not disoriented.   Psychiatric: He has a normal mood and affect.   Skin:   Areas Examined (abnormalities noted in diagram):   Scalp / Hair Palpated and Inspected  Head / Face Inspection Performed  Neck Inspection Performed  Chest / Axilla Inspection Performed  Abdomen Inspection Performed  Back Inspection Performed  RUE Inspected  LUE Inspection Performed  Nails and Digits Inspection Performed                 Diagram Legend     Erythematous scaling macule/papule c/w actinic keratosis       Vascular papule c/w angioma      Pigmented verrucoid papule/plaque c/w seborrheic keratosis      Yellow umbilicated papule c/w sebaceous hyperplasia      Irregularly shaped tan macule c/w lentigo     1-2 mm smooth white papules consistent with Milia      Movable subcutaneous cyst with punctum c/w epidermal inclusion cyst      Subcutaneous movable cyst c/w pilar  cyst      Firm pink to brown papule c/w dermatofibroma      Pedunculated fleshy papule(s) c/w skin tag(s)      Evenly pigmented macule c/w junctional nevus     Mildly variegated pigmented, slightly irregular-bordered macule c/w mildly atypical nevus      Flesh colored to evenly pigmented papule c/w intradermal nevus       Pink pearly papule/plaque c/w basal cell carcinoma      Erythematous hyperkeratotic cursted plaque c/w SCC      Surgical scar with no sign of skin cancer recurrence      Open and closed comedones      Inflammatory papules and pustules      Verrucoid papule consistent consistent with wart     Erythematous eczematous patches and plaques     Dystrophic onycholytic nail with subungual debris c/w onychomycosis     Umbilicated papule    Erythematous-base heme-crusted tan verrucoid plaque consistent with inflamed seborrheic keratosis     Erythematous Silvery Scaling Plaque c/w Psoriasis     See annotation      Assessment / Plan:        Scar conditions/skin fibrosis  Screening, malignant neoplasm, skin  History of skin cancer  Scar of the right upper back, hx of NMSC.  No evidence of recurrence on physical exam today.  Continue routine skin surveillance. Daily sunscreen advised.    Hemangioma of skin  Reassurance given.  Lesions are benign.    Seborrheic keratosis  Reassurance given. Discussed diagnosis and that lesions are benign.  AAD handout given.     Actinic keratosis  S/p biopsy on 10/12/22 of left forearm, appears resolved. Continue surveillance. The patient acknowledged understanding.          Follow up in about 1 year (around 1/11/2024).

## 2023-01-11 NOTE — PATIENT INSTRUCTIONS
Preventing Skin Cancer  Relaxing in the sun may feel good. But it isnt good for your skin. In fact, being exposed to the suns harmful rays is a major cause of skin cancer. This is a serious disease that can be life-threatening. People of all ages and backgrounds are at risk. But in most cases, skin cancer can be prevented.    Your Role in Prevention  You can act today to help prevent skin cancer. Start by avoiding the suns UV (ultraviolet) rays. And dont use tanning beds, which are no safer than the sun. Taking these steps can help keep you from getting skin cancer. It can also help prevent wrinkles and other sun-induced aging effects. Make sure your children also follow these safeguards. Now is the time to start taking preventive steps against skin cancer.  When You Are Outdoors  Protect your skin when you go outdoors during the day. Take precautions whenever you go out to eat, run errands by car or on foot, or do any outdoor activity. There isnt just one easy way to protect your skin. Its best to follow all of these steps:  Wear tightly woven clothing that covers your skin. Put on a wide-brimmed hat to protect your face, ears, and scalp.  Watch the clock. Try to avoid the sun between 10 a.m. and 4 p.m., when it is strongest.  Head for the shade or create your own. Use an umbrella when sitting or strolling.  Know that the suns rays can reflect off sand, water, and snow. This can harm your skin. Take extra care when you are near reflective surfaces.  Keep in mind that even when the weather is hazy or cloudy, your skin can be exposed to strong UV rays.  Shield your skin with sunscreen. Also, apply sunscreen to your childrens skin.  Tips for Using Sunscreen  To help prevent skin cancer, choose the right sunscreen and use it correctly. Try the following tips:  Choose a sunscreen that has a sun protection factor (SPF) of at least 15. For the best protection, an SPF of at least 30 is preferred. Also, choose a  sunscreen labeled broad spectrum. This will shield you from both UVA and UVB (ultraviolet A and B) rays.  If one brand irritates your skin, try another, particularly ones without fragrance.  Use a water-resistant sunscreen if swimming or sweating.  Reapply sunscreen every 2 hours. If youre active, do this more often.  Cover any sun-exposed skin, from your face to your feet. Dont forget your ears and your lips.  Know that while sunscreen helps protect you, it isnt enough. You should also wear protective clothing. And try to stay out of the sun as much as you can, especially from 10 a.m. to 4 p.m.  © 1631-4084 [employer]. [address] [place] [address], [place] [address]. All rights reserved. This information is not intended as a substitute for professional medical care. Always follow your healthcare professional's instructions.

## 2023-01-19 ENCOUNTER — TELEPHONE (OUTPATIENT)
Dept: UROLOGY | Facility: CLINIC | Age: 73
End: 2023-01-19
Payer: MEDICARE

## 2023-01-19 NOTE — TELEPHONE ENCOUNTER
Contacted to reschedule f/u appointment. Patient agreed to be seen on 3/24/23 at 3 pm at the [facility] with Dr. Harris.

## 2023-03-09 ENCOUNTER — OFFICE VISIT (OUTPATIENT)
Dept: CARDIOLOGY | Facility: CLINIC | Age: 73
End: 2023-03-09
Payer: MEDICARE

## 2023-03-09 VITALS
SYSTOLIC BLOOD PRESSURE: 108 MMHG | OXYGEN SATURATION: 98 % | HEART RATE: 65 BPM | DIASTOLIC BLOOD PRESSURE: 72 MMHG | BODY MASS INDEX: 24.89 KG/M2 | HEIGHT: 68 IN | WEIGHT: 164.25 LBS

## 2023-03-09 DIAGNOSIS — I42.0 DILATED CARDIOMYOPATHY: ICD-10-CM

## 2023-03-09 DIAGNOSIS — I25.118 CORONARY ARTERY DISEASE OF NATIVE ARTERY OF NATIVE HEART WITH STABLE ANGINA PECTORIS: ICD-10-CM

## 2023-03-09 DIAGNOSIS — Z78.9 STATIN INTOLERANCE: ICD-10-CM

## 2023-03-09 DIAGNOSIS — I42.0 DCM (DILATED CARDIOMYOPATHY): ICD-10-CM

## 2023-03-09 DIAGNOSIS — I50.22 CHRONIC SYSTOLIC CHF (CONGESTIVE HEART FAILURE): ICD-10-CM

## 2023-03-09 DIAGNOSIS — E78.00 HYPERCHOLESTEROLEMIA: ICD-10-CM

## 2023-03-09 DIAGNOSIS — I25.2 H/O NON-ST ELEVATION MYOCARDIAL INFARCTION (NSTEMI): Primary | ICD-10-CM

## 2023-03-09 DIAGNOSIS — R00.2 PALPITATIONS: ICD-10-CM

## 2023-03-09 DIAGNOSIS — Z95.5 H/O HEART ARTERY STENT: ICD-10-CM

## 2023-03-09 PROCEDURE — 99215 PR OFFICE/OUTPT VISIT, EST, LEVL V, 40-54 MIN: ICD-10-PCS | Mod: S$PBB,,, | Performed by: INTERNAL MEDICINE

## 2023-03-09 PROCEDURE — 99213 OFFICE O/P EST LOW 20 MIN: CPT | Mod: PBBFAC,PO | Performed by: INTERNAL MEDICINE

## 2023-03-09 PROCEDURE — 99999 PR PBB SHADOW E&M-EST. PATIENT-LVL III: CPT | Mod: PBBFAC,,, | Performed by: INTERNAL MEDICINE

## 2023-03-09 PROCEDURE — 99215 OFFICE O/P EST HI 40 MIN: CPT | Mod: S$PBB,,, | Performed by: INTERNAL MEDICINE

## 2023-03-09 PROCEDURE — 99999 PR PBB SHADOW E&M-EST. PATIENT-LVL III: ICD-10-PCS | Mod: PBBFAC,,, | Performed by: INTERNAL MEDICINE

## 2023-03-09 RX ORDER — METOPROLOL SUCCINATE 25 MG/1
12.5 TABLET, EXTENDED RELEASE ORAL NIGHTLY
Qty: 45 TABLET | Refills: 3 | Status: SHIPPED | OUTPATIENT
Start: 2023-03-09 | End: 2024-04-02

## 2023-03-09 NOTE — PROGRESS NOTES
Subjective:   Patient ID:  Mekhi Lambert is a 72 y.o. male who presents for follow-up of Follow-up  Patient denies CP, angina or anginal equivalent. PCI 2-22  Pt active , exercising without sx    Pt with low BP on losartan  Coronary Artery Disease  Presents for follow-up visit. Pertinent negatives include no chest pain, chest pressure, chest tightness, dizziness, leg swelling, muscle weakness, palpitations, shortness of breath or weight gain. Risk factors include hyperlipidemia. The symptoms have been stable. Compliance with diet is good. Compliance with exercise is good. Compliance with medications is good.   Hyperlipidemia  This is a chronic problem. The current episode started more than 1 year ago. Recent lipid tests were reviewed and are variable. Pertinent negatives include no chest pain or shortness of breath. He is currently on no antihyperlipidemic treatment. The current treatment provides mild improvement of lipids. Compliance problems include medication side effects.      Review of Systems   Constitutional: Negative for weight gain.   Cardiovascular:  Negative for chest pain, leg swelling and palpitations.   Respiratory:  Negative for chest tightness and shortness of breath.    Musculoskeletal:  Negative for muscle weakness.   Neurological:  Negative for dizziness.   Family History   Problem Relation Age of Onset    Heart disease Mother         lupus related    Diabetes Mother     Cancer Father         prostate, bone, lung, skin cancers all seperate types    Diabetes Maternal Aunt      Past Medical History:   Diagnosis Date    ALBERTO (acute kidney injury) 8/8/2022    Anticoagulant long-term use     Arthritis     Cancer     Chronic systolic CHF (congestive heart failure) 02/26/2022    Coronary artery disease     Digestive disorder     Myocardial infarction     2/20/22     Social History     Socioeconomic History    Marital status:    Tobacco Use    Smoking status: Never    Smokeless tobacco: Former      Types: Chew     Quit date: 6/3/2004   Substance and Sexual Activity    Alcohol use: Yes     Alcohol/week: 2.0 standard drinks     Types: 2 Glasses of wine per week     Comment: per week    Drug use: No    Sexual activity: Yes     Current Outpatient Medications on File Prior to Visit   Medication Sig Dispense Refill    metoprolol succinate (TOPROL-XL) 25 MG 24 hr tablet Take 0.5 tablets (12.5 mg total) by mouth every evening. 45 tablet 3    mometasone 0.1% (ELOCON) 0.1 % cream AAA bid prn for psoriasis. Strong steroid.  Do not use on face, underarms or groin. 50 g 1    multivitamin capsule Take 1 capsule by mouth once daily.      nitroGLYCERIN (NITROSTAT) 0.4 MG SL tablet Place 1 tablet (0.4 mg total) under the tongue every 5 (five) minutes as needed for Chest pain. 30 tablet 1    pantoprazole (PROTONIX) 40 MG tablet Take 1 tablet (40 mg total) by mouth once daily. 30 tablet 3    acetaminophen (TYLENOL) 325 MG tablet Take 2 tablets (650 mg total) by mouth every 6 (six) hours as needed for Pain. (Patient not taking: Reported on 3/9/2023)  0    aspirin (ECOTRIN) 81 MG EC tablet Take 1 tablet (81 mg total) by mouth once daily. (Patient not taking: Reported on 3/9/2023) 90 tablet 3    losartan (COZAAR) 25 MG tablet Take 1 tablet (25 mg total) by mouth once daily. (Patient not taking: Reported on 3/9/2023) 90 tablet 3    oxyCODONE (ROXICODONE) 5 MG immediate release tablet Take 1 tablet (5 mg total) by mouth every 6 (six) hours as needed for Pain. (Patient not taking: Reported on 3/9/2023) 15 tablet 0    ticagrelor (BRILINTA) 90 mg tablet Take 1 tablet (90 mg total) by mouth 2 (two) times a day. (Patient not taking: Reported on 3/9/2023) 180 tablet 3     No current facility-administered medications on file prior to visit.     Review of patient's allergies indicates:   Allergen Reactions    Statins-hmg-coa reductase inhibitors Other (See Comments)     SEVERE MYALGIAS AND GI SIDE EFFECTS       Objective:     Physical  Exam    Assessment:     1. H/O non-ST elevation myocardial infarction (NSTEMI)    2. Hypercholesterolemia    3. Coronary artery disease of native artery of native heart with  angina pectoris History coronary stent placement    4. H/O heart artery stent    5. Chronic systolic CHF (congestive heart failure)    6. Statin intolerance        Plan:     H/O non-ST elevation myocardial infarction (NSTEMI)    Hypercholesterolemia    Coronary artery disease of native artery of native heart with  angina pectoris History coronary stent placement    H/O heart artery stent    Chronic systolic CHF (congestive heart failure)    Statin intolerance      Continue metoprolol - CMY  Continue asa- CAD  Try niacin

## 2023-03-16 ENCOUNTER — HOSPITAL ENCOUNTER (OUTPATIENT)
Dept: CARDIOLOGY | Facility: HOSPITAL | Age: 73
Discharge: HOME | End: 2023-03-16
Attending: INTERNAL MEDICINE
Payer: MEDICARE

## 2023-03-16 ENCOUNTER — TELEPHONE (OUTPATIENT)
Dept: CARDIOLOGY | Facility: HOSPITAL | Age: 73
End: 2023-03-16
Payer: MEDICARE

## 2023-03-16 VITALS
WEIGHT: 164 LBS | DIASTOLIC BLOOD PRESSURE: 76 MMHG | SYSTOLIC BLOOD PRESSURE: 123 MMHG | BODY MASS INDEX: 24.86 KG/M2 | HEIGHT: 68 IN

## 2023-03-16 VITALS — DIASTOLIC BLOOD PRESSURE: 79 MMHG | SYSTOLIC BLOOD PRESSURE: 125 MMHG | HEART RATE: 56 BPM

## 2023-03-16 DIAGNOSIS — I50.22 CHRONIC SYSTOLIC CHF (CONGESTIVE HEART FAILURE): ICD-10-CM

## 2023-03-16 LAB
AORTIC ROOT ANNULUS: 3.54 CM
AV INDEX (PROSTH): 0.69
AV MEAN GRADIENT: 4 MMHG
AV PEAK GRADIENT: 9 MMHG
AV VALVE AREA: 2.21 CM2
AV VELOCITY RATIO: 0.62
BSA FOR ECHO PROCEDURE: 1.89 M2
CV ECHO LV RWT: 0.44 CM
DOP CALC AO PEAK VEL: 1.46 M/S
DOP CALC AO VTI: 32.6 CM
DOP CALC LVOT AREA: 3.2 CM2
DOP CALC LVOT DIAMETER: 2.02 CM
DOP CALC LVOT PEAK VEL: 0.9 M/S
DOP CALC LVOT STROKE VOLUME: 72.07 CM3
DOP CALC RVOT PEAK VEL: 0.54 M/S
DOP CALC RVOT VTI: 15.3 CM
DOP CALCLVOT PEAK VEL VTI: 22.5 CM
E WAVE DECELERATION TIME: 141.59 MSEC
E/A RATIO: 0.88
E/E' RATIO: 10.25 M/S
ECHO LV POSTERIOR WALL: 1.07 CM (ref 0.6–1.1)
EJECTION FRACTION: 30 %
FRACTIONAL SHORTENING: 22 % (ref 28–44)
INTERVENTRICULAR SEPTUM: 1.24 CM (ref 0.6–1.1)
IVRT: 91.34 MSEC
LA MAJOR: 4.68 CM
LA MINOR: 5.91 CM
LA WIDTH: 3.7 CM
LEFT ATRIUM SIZE: 3.82 CM
LEFT ATRIUM VOLUME INDEX MOD: 32.9 ML/M2
LEFT ATRIUM VOLUME INDEX: 33.4 ML/M2
LEFT ATRIUM VOLUME MOD: 61.77 CM3
LEFT ATRIUM VOLUME: 62.76 CM3
LEFT INTERNAL DIMENSION IN SYSTOLE: 3.82 CM (ref 2.1–4)
LEFT VENTRICLE DIASTOLIC VOLUME INDEX: 59.43 ML/M2
LEFT VENTRICLE DIASTOLIC VOLUME: 111.73 ML
LEFT VENTRICLE MASS INDEX: 113 G/M2
LEFT VENTRICLE SYSTOLIC VOLUME INDEX: 33.3 ML/M2
LEFT VENTRICLE SYSTOLIC VOLUME: 62.58 ML
LEFT VENTRICULAR INTERNAL DIMENSION IN DIASTOLE: 4.88 CM (ref 3.5–6)
LEFT VENTRICULAR MASS: 213.16 G
LV LATERAL E/E' RATIO: 10.25 M/S
LV SEPTAL E/E' RATIO: 10.25 M/S
LVOT MG: 1.82 MMHG
LVOT MV: 0.63 CM/S
MV PEAK A VEL: 0.93 M/S
MV PEAK E VEL: 0.82 M/S
MV STENOSIS PRESSURE HALF TIME: 41.06 MS
MV VALVE AREA P 1/2 METHOD: 5.36 CM2
PISA TR MAX VEL: 3.2 M/S
PV MEAN GRADIENT: 0.64 MMHG
PV MV: 0.69 M/S
PV PEAK VELOCITY: 1.07 CM/S
RA MAJOR: 5.08 CM
RA PRESSURE: 3 MMHG
RA WIDTH: 4.2 CM
RIGHT VENTRICULAR END-DIASTOLIC DIMENSION: 3.14 CM
SINUS: 2.96 CM
STJ: 2.78 CM
TDI LATERAL: 0.08 M/S
TDI SEPTAL: 0.08 M/S
TDI: 0.08 M/S
TR MAX PG: 41 MMHG
TRICUSPID ANNULAR PLANE SYSTOLIC EXCURSION: 2.27 CM
TV REST PULMONARY ARTERY PRESSURE: 44 MMHG

## 2023-03-16 PROCEDURE — 93306 ECHO (CUPID ONLY): ICD-10-PCS | Mod: 26,,, | Performed by: INTERNAL MEDICINE

## 2023-03-16 PROCEDURE — 93306 TTE W/DOPPLER COMPLETE: CPT | Mod: PO

## 2023-03-16 PROCEDURE — 93306 TTE W/DOPPLER COMPLETE: CPT | Mod: 26,,, | Performed by: INTERNAL MEDICINE

## 2023-03-16 NOTE — TELEPHONE ENCOUNTER
Patient asked for his blood pressure to be taken today.   Left arm- 123/76  Right arm- 125/79  Pulse 56

## 2023-03-17 ENCOUNTER — TELEPHONE (OUTPATIENT)
Dept: CARDIOLOGY | Facility: CLINIC | Age: 73
End: 2023-03-17
Payer: MEDICARE

## 2023-03-17 ENCOUNTER — OFFICE VISIT (OUTPATIENT)
Dept: CARDIOLOGY | Facility: CLINIC | Age: 73
End: 2023-03-17
Payer: MEDICARE

## 2023-03-17 DIAGNOSIS — I50.20 HEART FAILURE WITH REDUCED EJECTION FRACTION, NYHA CLASS II: ICD-10-CM

## 2023-03-17 DIAGNOSIS — E78.00 HYPERCHOLESTEROLEMIA: Primary | ICD-10-CM

## 2023-03-17 DIAGNOSIS — I25.118 CORONARY ARTERY DISEASE OF NATIVE ARTERY OF NATIVE HEART WITH STABLE ANGINA PECTORIS: ICD-10-CM

## 2023-03-17 PROCEDURE — 99214 PR OFFICE/OUTPT VISIT, EST, LEVL IV, 30-39 MIN: ICD-10-PCS | Mod: 95,,, | Performed by: INTERNAL MEDICINE

## 2023-03-17 PROCEDURE — 99214 OFFICE O/P EST MOD 30 MIN: CPT | Mod: 95,,, | Performed by: INTERNAL MEDICINE

## 2023-03-17 NOTE — PROGRESS NOTES
Subjective:   Patient ID:  Mekhi Lambert is a 72 y.o. male who presents for follow-up of No chief complaint on file.  The patient location is: home  The chief complaint leading to consultation is:   Visit type: Virtual visit with synchronous audio and video  Total time spent with patient: 10 minutes including chart review  Each patient to whom he or she provides medical services by telemedicine is:  (1) informed of the relationship between the physician and patient and the respective role of any other health care provider with respect to management of the patient; and (2) notified that he or she may decline to receive medical services by telemedicine and may withdraw from such care at any time.     Coronary Artery Disease  Presents for follow-up visit. Pertinent negatives include no chest pain, chest pressure, chest tightness, dizziness, leg swelling, muscle weakness, palpitations, shortness of breath or weight gain. The symptoms have been stable. Compliance with diet is good. Compliance with exercise is good. Compliance with medications is good.   Hyperlipidemia  This is a chronic problem. The current episode started more than 1 year ago. Recent lipid tests were reviewed and are variable. Pertinent negatives include no chest pain or shortness of breath. He is currently on no antihyperlipidemic treatment. The current treatment provides mild improvement of lipids. Compliance problems include medication side effects.    Echo- no significant changes EF 30%  Pt discussing stroke volume calculations  Patient denies CP, angina or anginal equivalent.   Pt does not agree with calculated EF ( similar from past 4 echos)    Review of Systems   Constitutional: Negative. Negative for weight gain.   HENT: Negative.     Eyes: Negative.    Cardiovascular: Negative.  Negative for chest pain, leg swelling and palpitations.   Respiratory: Negative.  Negative for chest tightness and shortness of breath.    Endocrine: Negative.     Hematologic/Lymphatic: Negative.    Skin: Negative.    Musculoskeletal:  Negative for muscle weakness.   Gastrointestinal: Negative.    Genitourinary: Negative.    Neurological: Negative.  Negative for dizziness.   Psychiatric/Behavioral: Negative.     Allergic/Immunologic: Negative.    All other systems reviewed and are negative.  Family History   Problem Relation Age of Onset    Heart disease Mother         lupus related    Diabetes Mother     Cancer Father         prostate, bone, lung, skin cancers all seperate types    Diabetes Maternal Aunt      Past Medical History:   Diagnosis Date    ALBERTO (acute kidney injury) 8/8/2022    Anticoagulant long-term use     Arthritis     Cancer     Chronic systolic CHF (congestive heart failure) 02/26/2022    Coronary artery disease     Digestive disorder     Myocardial infarction     2/20/22     Social History     Socioeconomic History    Marital status:    Tobacco Use    Smoking status: Never    Smokeless tobacco: Former     Types: Chew     Quit date: 6/3/2004   Substance and Sexual Activity    Alcohol use: Yes     Alcohol/week: 2.0 standard drinks     Types: 2 Glasses of wine per week     Comment: per week    Drug use: No    Sexual activity: Yes     Current Outpatient Medications on File Prior to Visit   Medication Sig Dispense Refill    acetaminophen (TYLENOL) 325 MG tablet Take 2 tablets (650 mg total) by mouth every 6 (six) hours as needed for Pain. (Patient not taking: Reported on 3/9/2023)  0    aspirin (ECOTRIN) 81 MG EC tablet Take 1 tablet (81 mg total) by mouth once daily. (Patient not taking: Reported on 3/9/2023) 90 tablet 3    losartan (COZAAR) 25 MG tablet Take 1 tablet (25 mg total) by mouth once daily. (Patient not taking: Reported on 3/9/2023) 90 tablet 3    metoprolol succinate (TOPROL-XL) 25 MG 24 hr tablet Take 0.5 tablets (12.5 mg total) by mouth every evening. 45 tablet 3    mometasone 0.1% (ELOCON) 0.1 % cream AAA bid prn for psoriasis. Strong  steroid.  Do not use on face, underarms or groin. 50 g 1    multivitamin capsule Take 1 capsule by mouth once daily.      nitroGLYCERIN (NITROSTAT) 0.4 MG SL tablet Place 1 tablet (0.4 mg total) under the tongue every 5 (five) minutes as needed for Chest pain. 30 tablet 1    oxyCODONE (ROXICODONE) 5 MG immediate release tablet Take 1 tablet (5 mg total) by mouth every 6 (six) hours as needed for Pain. (Patient not taking: Reported on 3/9/2023) 15 tablet 0    pantoprazole (PROTONIX) 40 MG tablet Take 1 tablet (40 mg total) by mouth once daily. 30 tablet 3    ticagrelor (BRILINTA) 90 mg tablet Take 1 tablet (90 mg total) by mouth 2 (two) times a day. (Patient not taking: Reported on 3/9/2023) 180 tablet 3     No current facility-administered medications on file prior to visit.     Review of patient's allergies indicates:   Allergen Reactions    Statins-hmg-coa reductase inhibitors Other (See Comments)     SEVERE MYALGIAS AND GI SIDE EFFECTS       Objective:     Physical Exam  Constitutional:       Appearance: Normal appearance.   Neurological:      Mental Status: He is alert.       Assessment:     1. H/O non-ST elevation myocardial infarction (NSTEMI)    2. Hypercholesterolemia    3. Coronary artery disease of native artery of native heart with  angina pectoris History coronary stent placement    4. H/O heart artery stent    5. Chronic systolic CHF (congestive heart failure)    6. Statin intolerance         Plan:     Continue metoprolol - CMY  Continue asa- CAD  Try niacin

## 2023-03-17 NOTE — TELEPHONE ENCOUNTER
Spoke with pt and discussed echo results. Pt requested virtual appt with Dr. Zabala today to further discuss concerns. Virtual appt scheduled. Pt will call back with any further questions or concerns.  ----- Message from Robert Zabala MD sent at 3/17/2023  4:51 AM CDT -----  Please tell pt:  Echo without significant changes from previous echo  Continue current meds

## 2023-03-20 ENCOUNTER — LAB VISIT (OUTPATIENT)
Dept: LAB | Facility: HOSPITAL | Age: 73
End: 2023-03-20
Attending: INTERNAL MEDICINE
Payer: MEDICARE

## 2023-03-20 DIAGNOSIS — E78.00 HYPERCHOLESTEROLEMIA: ICD-10-CM

## 2023-03-20 LAB
CHOLEST SERPL-MCNC: 255 MG/DL (ref 120–199)
CHOLEST/HDLC SERPL: 3 {RATIO} (ref 2–5)
HDLC SERPL-MCNC: 85 MG/DL (ref 40–75)
HDLC SERPL: 33.3 % (ref 20–50)
LDLC SERPL CALC-MCNC: 156 MG/DL (ref 63–159)
NONHDLC SERPL-MCNC: 170 MG/DL
TRIGL SERPL-MCNC: 70 MG/DL (ref 30–150)

## 2023-03-20 PROCEDURE — 80061 LIPID PANEL: CPT | Performed by: INTERNAL MEDICINE

## 2023-03-20 PROCEDURE — 36415 COLL VENOUS BLD VENIPUNCTURE: CPT | Mod: PN | Performed by: INTERNAL MEDICINE

## 2023-03-24 ENCOUNTER — HOSPITAL ENCOUNTER (OUTPATIENT)
Dept: RADIOLOGY | Facility: HOSPITAL | Age: 73
Discharge: HOME | End: 2023-03-24
Attending: NURSE PRACTITIONER
Payer: MEDICARE

## 2023-03-24 ENCOUNTER — OFFICE VISIT (OUTPATIENT)
Dept: UROLOGY | Facility: CLINIC | Age: 73
End: 2023-03-24
Payer: MEDICARE

## 2023-03-24 ENCOUNTER — PATIENT MESSAGE (OUTPATIENT)
Dept: UROLOGY | Facility: CLINIC | Age: 73
End: 2023-03-24

## 2023-03-24 ENCOUNTER — OFFICE VISIT (OUTPATIENT)
Dept: INTERNAL MEDICINE | Facility: CLINIC | Age: 73
End: 2023-03-24
Payer: MEDICARE

## 2023-03-24 ENCOUNTER — TELEPHONE (OUTPATIENT)
Dept: ADMINISTRATIVE | Facility: HOSPITAL | Age: 73
End: 2023-03-24
Payer: MEDICARE

## 2023-03-24 ENCOUNTER — TELEPHONE (OUTPATIENT)
Dept: INTERNAL MEDICINE | Facility: CLINIC | Age: 73
End: 2023-03-24

## 2023-03-24 VITALS
WEIGHT: 165.56 LBS | DIASTOLIC BLOOD PRESSURE: 78 MMHG | HEIGHT: 68 IN | SYSTOLIC BLOOD PRESSURE: 126 MMHG | TEMPERATURE: 96 F | OXYGEN SATURATION: 99 % | HEART RATE: 65 BPM | RESPIRATION RATE: 16 BRPM | BODY MASS INDEX: 25.09 KG/M2

## 2023-03-24 VITALS
DIASTOLIC BLOOD PRESSURE: 71 MMHG | SYSTOLIC BLOOD PRESSURE: 107 MMHG | BODY MASS INDEX: 25.01 KG/M2 | HEIGHT: 68 IN | HEART RATE: 64 BPM | WEIGHT: 165 LBS

## 2023-03-24 DIAGNOSIS — R10.9 ABDOMINAL DISCOMFORT: Primary | ICD-10-CM

## 2023-03-24 DIAGNOSIS — Z12.5 PROSTATE CANCER SCREENING: ICD-10-CM

## 2023-03-24 DIAGNOSIS — K56.2 CECAL VOLVULUS: ICD-10-CM

## 2023-03-24 DIAGNOSIS — N40.1 BENIGN PROSTATIC HYPERPLASIA WITH WEAK URINARY STREAM: Primary | ICD-10-CM

## 2023-03-24 DIAGNOSIS — K44.9 HIATAL HERNIA: ICD-10-CM

## 2023-03-24 DIAGNOSIS — R39.12 BENIGN PROSTATIC HYPERPLASIA WITH WEAK URINARY STREAM: Primary | ICD-10-CM

## 2023-03-24 DIAGNOSIS — R10.9 ABDOMINAL DISCOMFORT: ICD-10-CM

## 2023-03-24 PROCEDURE — 99214 OFFICE O/P EST MOD 30 MIN: CPT | Mod: S$PBB,,, | Performed by: NURSE PRACTITIONER

## 2023-03-24 PROCEDURE — 74019 RADEX ABDOMEN 2 VIEWS: CPT | Mod: TC

## 2023-03-24 PROCEDURE — 99999 PR PBB SHADOW E&M-EST. PATIENT-LVL IV: ICD-10-PCS | Mod: PBBFAC,,, | Performed by: UROLOGY

## 2023-03-24 PROCEDURE — 99999 PR PBB SHADOW E&M-EST. PATIENT-LVL IV: ICD-10-PCS | Mod: PBBFAC,,, | Performed by: NURSE PRACTITIONER

## 2023-03-24 PROCEDURE — 99999 PR PBB SHADOW E&M-EST. PATIENT-LVL IV: CPT | Mod: PBBFAC,,, | Performed by: UROLOGY

## 2023-03-24 PROCEDURE — 99999 PR PBB SHADOW E&M-EST. PATIENT-LVL IV: CPT | Mod: PBBFAC,,, | Performed by: NURSE PRACTITIONER

## 2023-03-24 PROCEDURE — 99214 OFFICE O/P EST MOD 30 MIN: CPT | Mod: PBBFAC,27 | Performed by: UROLOGY

## 2023-03-24 PROCEDURE — 99214 PR OFFICE/OUTPT VISIT, EST, LEVL IV, 30-39 MIN: ICD-10-PCS | Mod: S$PBB,,, | Performed by: UROLOGY

## 2023-03-24 PROCEDURE — 74019 RADEX ABDOMEN 2 VIEWS: CPT | Mod: 26,,, | Performed by: RADIOLOGY

## 2023-03-24 PROCEDURE — 99214 OFFICE O/P EST MOD 30 MIN: CPT | Mod: S$PBB,,, | Performed by: UROLOGY

## 2023-03-24 PROCEDURE — 99214 PR OFFICE/OUTPT VISIT, EST, LEVL IV, 30-39 MIN: ICD-10-PCS | Mod: S$PBB,,, | Performed by: NURSE PRACTITIONER

## 2023-03-24 PROCEDURE — 99214 OFFICE O/P EST MOD 30 MIN: CPT | Mod: PBBFAC | Performed by: NURSE PRACTITIONER

## 2023-03-24 PROCEDURE — 74019 XR ABDOMEN FLAT AND ERECT: ICD-10-PCS | Mod: 26,,, | Performed by: RADIOLOGY

## 2023-03-24 RX ORDER — SILODOSIN 4 MG/1
4 CAPSULE ORAL DAILY
Qty: 30 CAPSULE | Refills: 11 | Status: SHIPPED | OUTPATIENT
Start: 2023-03-24 | End: 2023-03-27 | Stop reason: SDUPTHER

## 2023-03-24 NOTE — Clinical Note
Catheter is inserted into the ostium   right coronary artery.  Tissue Cultured Epidermal Autograft Text: The defect edges were debeveled with a #15 scalpel blade.  Given the location of the defect, shape of the defect and the proximity to free margins a tissue cultured epidermal autograft was deemed most appropriate.  The graft was then trimmed to fit the size of the defect.  The graft was then placed in the primary defect and oriented appropriately.

## 2023-03-24 NOTE — PROGRESS NOTES
Chief Complaint: UTIs, BPH    HPI:   03/24/2023 - patient returns today for follow-up, over the last year he notes that his urination is not as good as it was white after his simple prostatectomy, has been taking some over-the-counter saw palmetto supplements which seemed to help but he thinks it would do better, has tried taking Flomax in the past and had significant hypotension but did well on Rapaflo but it was too expensive, no GH or dysuria    02/18/2022 - patient returns today for follow-up, has had 2 episodes of epididymitis since December, these were accompanied by slow stream and dysuria, abx resolved his symptoms, since that time, he notes that his stream has greatly improved, he denies any further dysuria or gross hematuria     11/11/19: PSA remains low.  BPH not been a problem lately.  Has read a lot about finasteride and flomax.  Discussed finasteride a a preventative hasn't ever taken it.    11/5/18: Was having trouble a month ago with voiding.  Hasn't taken rapaflo in quite a while.  Had started buproprion prior to that and the LUTS came in to play after that mainly with hesitancy.  At day 4 he felt he couldn't get started at all.  He stopped the med, started rapaflo.  Then had constipation/gas and thought he had a UTI.  Been great for a week.  Stopped rapaflo again.  PSA normal.    5/24/17: 67 yo man in last two years had a TURP followed by a RA Simple Prostatectomy.  No LUTS at all.  No abd/pelvic pain and no exac/rel factors.  No hematuria.  No urolithiasis.  No urinary bother.  Normal sexual function.  Takes rapaflo.    Allergies:  Statins-hmg-coa reductase inhibitors    Medications:  has a current medication list which includes the following prescription(s): metoprolol succinate, mometasone 0.1%, multivitamin, nitroglycerin, pantoprazole, and silodosin.    Review of Systems:  General: No fever, chills  Skin: No rashes  Chest:  Denies cough and sputum production  Heart: Denies chest pain  Resp:  Denies dyspnea  Abdomen: Denies diarrhea, abdominal pain, hematemesis, or blood in stool.  Musculoskeletal: No joint stiffness or swelling. Denies back pain.  : see HPI  Neuro: no dizziness or weakness      PMH:   has a past medical history of ALBERTO (acute kidney injury) (8/8/2022), Anticoagulant long-term use, Arthritis, Cancer, Chronic systolic CHF (congestive heart failure) (02/26/2022), Coronary artery disease, Digestive disorder, and Myocardial infarction.    PSH:   has a past surgical history that includes Shoulder arthroscopy (Right); Knee surgery; Total knee arthroplasty; simple prostatectomy (06/06/2016); Cardiac catheterization; Left heart catheterization (Left, 02/22/2020); Percutaneous transluminal balloon angioplasty of coronary artery (Left, 02/22/2020); Coronary angiography including bypass grafts with catheterization of left heart (N/A, 03/03/2020); Joint replacement (Right); Colonoscopy (N/A, 09/22/2021); Left heart catheterization (Left, 02/26/2022); Percutaneous transluminal balloon angioplasty of coronary artery (02/26/2022); Right hemicolectomy (N/A, 04/27/2022); and Sigmoidectomy.    FamHx: family history includes Cancer in his father; Diabetes in his maternal aunt and mother; Heart disease in his mother.    SocHx:  reports that he has never smoked. He quit smokeless tobacco use about 18 years ago.  His smokeless tobacco use included chew. He reports current alcohol use of about 2.0 standard drinks per week. He reports that he does not use drugs.      Physical Exam:  Vitals:    03/24/23 1511   BP: 107/71   Pulse: 64     General: awake, alert, cooperative  Head: NC/AT  Ears: external ears normal  Eyes: sclera normal  Lungs: normal inspiration, NAD  Heart: well-perfused  Abdomen: Soft, NT, ND   2/22: Normal circ'd phallus, meatus normal in size and position, BL testicles palpable, no masses, nontender, scrotum normal  JOURDAN 2/22: Normal rectal tone, no hemorrhoids. Prostate smooth and normal, no  nodules 40 gm SV not palpable. Perineum and anus normal.  Lymphatic: groin nodes negative  Skin: The skin is warm and dry  Ext: No c/c/e.  Neuro: grossly intact, AOx3    Imaging:  US SCROTUM AND TESTICLES     CLINICAL HISTORY:  Testicular pain, unspecified     TECHNIQUE:  Sonography of the scrotum and testes.     COMPARISON:  None.     FINDINGS:  Right Testicle:     *Size: 3.9 x 1.7 x 3.1 cm  *Appearance: Normal.  *Flow: Normal arterial and venous flow  *Epididymis: Epididymal head cyst measuring up to 1 cm.  Hypervascularity of the epididymal tail.  *Hydrocele: None.  *Varicocele: None.  .     Left Testicle:     *Size: 4.6 x 2.0 x 3.0 cm  *Appearance: Normal.  *Flow: Normal arterial and venous flow  *Epididymis: 1.5 cm epididymal head cyst.  *Hydrocele: None.  *Varicocele: None.  .     Other findings: None.     Impression:     Findings concerning for right epididymitis.  Correlation advised.    Labs/Studies:   Lab Results   Component Value Date    WBC 12.37 03/24/2023    HGB 16.4 03/24/2023    HCT 48.8 03/24/2023     03/24/2023     08/07/2022    K 4.5 08/07/2022     08/07/2022    CREATININE 1.4 08/07/2022    BUN 20 08/07/2022    CO2 24 08/07/2022    TSH 1.607 02/14/2017    PSA 1.1 11/10/2020    INR 1.0 04/29/2022    HGBA1C 4.9 02/23/2020    CHOL 255 (H) 03/20/2023    TRIG 70 03/20/2023    HDL 85 (H) 03/20/2023    ALT 15 08/07/2022    AST 20 08/07/2022       Urinalysis performed in clinic today specific gravity 1.015 pH 5, negative for all parameters    PSA    2/17: 1.2    10/18: 1.8    11/19: 0.98    Impression/Plan:   Right epididymitis - no further issues    BPH - restart rapaflo since patient had hypotension with flomax    Prostate Cancer Screening - PSA and JOURDAN normal, continue annual screening    Clarence Harris MD

## 2023-03-24 NOTE — PROGRESS NOTES
HPI     Chief Complaint  Chief Complaint   Patient presents with    Constipation       HPI  Mekhi Lambert is a 72 y.o. male with multiple medical diagnoses as listed in the medical history and problem list that presents for Abdominal Discomfort. This patient is new to me.    Abdominal Discomfort: Umbilical region. He feels full.Started four days ago. Had eaten pork loin and sweet potato and within fifteen minutes he experienced dyspepsia with reflux. No dyspepsia or reflux since then. Stools are usually soft and formed. He has not not had a bowel movement since then. He is usually regular with at least one bowel movement a day. He remains active with exercise. Diet is generally healthy which includes vegetables and water. Denies fever, chills, night sweats or body aches.     2. 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    04/2022 CT report:     3. GI Tract/Mesentery: Small hiatal hernia.  Mild thickening at the GE junction could reflect reflux disease.  The cecum dilated measuring up to 7 mm in diameter and is located within the mid abdomen consistent with cecal volvulus.  Terminal ileum is tethered to the cecum in abnormal configuration.  Shotty nodes are seen within the mesentery mild mesenteric edema also noted.  Diverticulosis of the descending and sigmoid colon without evidence of diverticulitis.    History     PAST MEDICAL HISTORY:  Past Medical History:   Diagnosis Date    ALBERTO (acute kidney injury) 8/8/2022    Anticoagulant long-term use     Arthritis     Cancer     Chronic systolic CHF (congestive heart failure) 02/26/2022    Coronary artery disease     Digestive disorder     Myocardial infarction     2/20/22       PAST SURGICAL HISTORY:  Past Surgical History:   Procedure Laterality Date    CARDIAC CATHETERIZATION      COLONOSCOPY N/A 09/22/2021    Procedure: COLONOSCOPY;  Surgeon: Meaghan Jaime MD;  Location: [facility];  Service: Endoscopy;  Laterality: N/A;    CORONARY  ANGIOGRAPHY INCLUDING BYPASS GRAFTS WITH CATHETERIZATION OF LEFT HEART N/A 03/03/2020    Procedure: ANGIOGRAM, CORONARY, INCLUDING BYPASS GRAFT, WITH LEFT HEART CATHETERIZATION;  Surgeon: Ute Melchor MD;  Location: [facility] CATH LAB;  Service: Cardiology;  Laterality: N/A;    JOINT REPLACEMENT Right     knee    KNEE SURGERY      LEFT HEART CATHETERIZATION Left 02/22/2020    Procedure: CATHETERIZATION, HEART, LEFT;  Surgeon: Ute Melchor MD;  Location: [facility] CATH LAB;  Service: Cardiology;  Laterality: Left;    LEFT HEART CATHETERIZATION Left 02/26/2022    Procedure: CATHETERIZATION, HEART, LEFT;  Surgeon: Ute Melchor MD;  Location: [facility] CATH LAB;  Service: Cardiology;  Laterality: Left;    PERCUTANEOUS TRANSLUMINAL BALLOON ANGIOPLASTY OF CORONARY ARTERY Left 02/22/2020    Procedure: Angioplasty-coronary;  Surgeon: Ute Melchor MD;  Location: [facility] CATH LAB;  Service: Cardiology;  Laterality: Left;    PERCUTANEOUS TRANSLUMINAL BALLOON ANGIOPLASTY OF CORONARY ARTERY  02/26/2022    Procedure: Angioplasty-coronary;  Surgeon: Ute Melchor MD;  Location: [facility] CATH LAB;  Service: Cardiology;;    RIGHT HEMICOLECTOMY N/A 04/27/2022    Procedure: HEMICOLECTOMY, RIGHT;  Surgeon: See Dominguez MD;  Location: [facility] OR;  Service: General;  Laterality: N/A;    SHOULDER ARTHROSCOPY Right     Dr Bella    SIGMOIDECTOMY      simple prostatectomy  06/06/2016    robotic assisted partial prostectomy due to BPH    TOTAL KNEE ARTHROPLASTY         SOCIAL HISTORY:  Social History     Socioeconomic History    Marital status:    Tobacco Use    Smoking status: Never    Smokeless tobacco: Former     Types: Chew     Quit date: 6/3/2004   Substance and Sexual Activity    Alcohol use: Yes     Alcohol/week: 2.0 standard drinks     Types: 2 Glasses of wine per week     Comment: per week    Drug use: No    Sexual activity: Yes       FAMILY HISTORY:  Family History   Problem Relation Age of Onset    Heart disease Mother         lupus  related    Diabetes Mother     Cancer Father         prostate, bone, lung, skin cancers all seperate types    Diabetes Maternal Aunt        ALLERGIES AND MEDICATIONS: updated and reviewed.  Review of patient's allergies indicates:   Allergen Reactions    Statins-hmg-coa reductase inhibitors Other (See Comments)     SEVERE MYALGIAS AND GI SIDE EFFECTS     Current Outpatient Medications   Medication Sig Dispense Refill    metoprolol succinate (TOPROL-XL) 25 MG 24 hr tablet Take 0.5 tablets (12.5 mg total) by mouth every evening. 45 tablet 3    mometasone 0.1% (ELOCON) 0.1 % cream AAA bid prn for psoriasis. Strong steroid.  Do not use on face, underarms or groin. 50 g 1    multivitamin capsule Take 1 capsule by mouth once daily.      nitroGLYCERIN (NITROSTAT) 0.4 MG SL tablet Place 1 tablet (0.4 mg total) under the tongue every 5 (five) minutes as needed for Chest pain. 30 tablet 1    pantoprazole (PROTONIX) 40 MG tablet Take 1 tablet (40 mg total) by mouth once daily. 30 tablet 3     No current facility-administered medications for this visit.       Exam     ROS  Review of Systems   Constitutional:  Negative for appetite change, chills, fatigue and fever.   HENT:  Negative for congestion, ear pain, postnasal drip, rhinorrhea, sneezing, sore throat and tinnitus.    Respiratory:  Negative for cough and shortness of breath.    Cardiovascular:  Negative for chest pain and palpitations.   Gastrointestinal:  Positive for abdominal pain. Negative for constipation, diarrhea, nausea and vomiting.   Genitourinary:  Negative for difficulty urinating and dysuria.   Musculoskeletal:  Negative for arthralgias.   Neurological:  Negative for headaches.   Psychiatric/Behavioral:  Negative for sleep disturbance.          Physical Exam  Vitals:    03/24/23 0938   BP: 126/78   BP Location: Left arm   Patient Position: Sitting   BP Method: Medium (Manual)   Pulse: 65   Resp: 16   Temp: 96.1 °F (35.6 °C)   TempSrc: Tympanic   SpO2: 99%  "  Weight: 75.1 kg (165 lb 9.1 oz)   Height: 5' 8" (1.727 m)    Body mass index is 25.17 kg/m².  Weight: 75.1 kg (165 lb 9.1 oz)   Height: 5' 8" (172.7 cm)   Physical Exam  Constitutional:       General: He is not in acute distress.     Appearance: Normal appearance. He is well-developed.   HENT:      Head: Normocephalic and atraumatic.      Right Ear: External ear normal.      Left Ear: External ear normal.      Nose: Nose normal.      Mouth/Throat:      Pharynx: No oropharyngeal exudate.   Eyes:      Pupils: Pupils are equal, round, and reactive to light.   Cardiovascular:      Rate and Rhythm: Normal rate and regular rhythm.      Heart sounds: No murmur heard.    No friction rub. No gallop.   Pulmonary:      Effort: Pulmonary effort is normal. No respiratory distress.      Breath sounds: Normal breath sounds. No wheezing.   Abdominal:      General: Bowel sounds are normal.      Palpations: Abdomen is soft.      Tenderness: There is abdominal tenderness in the periumbilical area.   Musculoskeletal:      Cervical back: Neck supple.   Lymphadenopathy:      Cervical: No cervical adenopathy.   Skin:     General: Skin is warm and dry.   Neurological:      Mental Status: He is alert and oriented to person, place, and time.   Psychiatric:         Mood and Affect: Mood normal.         Health Maintenance         Date Due Completion Date    High Dose Statin Never done ---    Abdominal Aortic Aneurysm Screening Never done ---    Pneumococcal Vaccines (Age 65+) (2 - PCV) 01/24/2018 1/24/2017    COVID-19 Vaccine (4 - Booster for Pfizer series) 12/01/2021 10/6/2021    Colorectal Cancer Screening 09/22/2026 9/22/2021    TETANUS VACCINE 01/24/2027 1/24/2017    Lipid Panel 03/20/2028 3/20/2023            Assessment & Plan     Assessment & Plan  Problem List Items Addressed This Visit          GI    Cecal volvulus  - Resolved at previous surgery     Other Visit Diagnoses       Abdominal discomfort    -  Primary  1. Non-fasting labs " today to screen for possible acute changes  2. Abdominal Xray today   3. CT if Abdominal Xray proves remarkable    Relevant Orders    X-Ray Abdomen Flat And Erect    Comprehensive Metabolic Panel    CBC Auto Differential    Lipase    Sedimentation rate    C-REACTIVE PROTEIN    Ambulatory referral/consult to Gastroenterology    Hiatal hernia        Relevant Orders    Ambulatory referral/consult to Gastroenterology              Health Maintenance reviewed: Deferred per patient     Follow-up: RTC as needed or sooner pending lab and imaging results    30+ minutes of total time spent on the encounter, which includes face to face time and non-face to face time preparing to see the patient (eg, review of tests), Obtaining and/or reviewing separately obtained history, documenting clinical information in the electronic or other health record, independently interpreting results (not separately reported) and communicating results to the patient/family/caregiver, or Care coordination (not separately reported).

## 2023-03-24 NOTE — TELEPHONE ENCOUNTER
We discussed Xray results with plan to add roughage to diet and increase water in take.  Patient advised to visit ER for increasing pain that that is constant.   Follow-up in clinic if abdominal discomfort continues through the next 3 days with no bowel movement.    Jan Salinas NP

## 2023-03-27 ENCOUNTER — PATIENT MESSAGE (OUTPATIENT)
Dept: UROLOGY | Facility: CLINIC | Age: 73
End: 2023-03-27
Payer: MEDICARE

## 2023-03-27 DIAGNOSIS — N40.1 BENIGN PROSTATIC HYPERPLASIA WITH WEAK URINARY STREAM: ICD-10-CM

## 2023-03-27 DIAGNOSIS — R39.12 BENIGN PROSTATIC HYPERPLASIA WITH WEAK URINARY STREAM: ICD-10-CM

## 2023-03-29 RX ORDER — SILODOSIN 4 MG/1
4 CAPSULE ORAL DAILY
Qty: 30 CAPSULE | Refills: 11 | Status: SHIPPED | OUTPATIENT
Start: 2023-03-29 | End: 2024-04-01

## 2023-04-24 ENCOUNTER — OFFICE VISIT (OUTPATIENT)
Dept: GASTROENTEROLOGY | Facility: CLINIC | Age: 73
End: 2023-04-24
Payer: MEDICARE

## 2023-04-24 VITALS
HEIGHT: 68 IN | SYSTOLIC BLOOD PRESSURE: 118 MMHG | HEART RATE: 65 BPM | BODY MASS INDEX: 25.39 KG/M2 | WEIGHT: 167.56 LBS | DIASTOLIC BLOOD PRESSURE: 80 MMHG

## 2023-04-24 DIAGNOSIS — R10.9 ABDOMINAL DISCOMFORT: ICD-10-CM

## 2023-04-24 DIAGNOSIS — K83.8 DILATED CBD, ACQUIRED: Primary | ICD-10-CM

## 2023-04-24 DIAGNOSIS — K21.9 GASTROESOPHAGEAL REFLUX DISEASE, UNSPECIFIED WHETHER ESOPHAGITIS PRESENT: ICD-10-CM

## 2023-04-24 DIAGNOSIS — K44.9 HIATAL HERNIA: ICD-10-CM

## 2023-04-24 PROCEDURE — 99999 PR PBB SHADOW E&M-EST. PATIENT-LVL IV: ICD-10-PCS | Mod: PBBFAC,,, | Performed by: NURSE PRACTITIONER

## 2023-04-24 PROCEDURE — 99214 OFFICE O/P EST MOD 30 MIN: CPT | Mod: PBBFAC | Performed by: NURSE PRACTITIONER

## 2023-04-24 PROCEDURE — 99214 PR OFFICE/OUTPT VISIT, EST, LEVL IV, 30-39 MIN: ICD-10-PCS | Mod: S$PBB,,, | Performed by: NURSE PRACTITIONER

## 2023-04-24 PROCEDURE — 99999 PR PBB SHADOW E&M-EST. PATIENT-LVL IV: CPT | Mod: PBBFAC,,, | Performed by: NURSE PRACTITIONER

## 2023-04-24 PROCEDURE — 99214 OFFICE O/P EST MOD 30 MIN: CPT | Mod: S$PBB,,, | Performed by: NURSE PRACTITIONER

## 2023-04-24 RX ORDER — PANTOPRAZOLE SODIUM 20 MG/1
20 TABLET, DELAYED RELEASE ORAL DAILY
Qty: 30 TABLET | Refills: 11 | Status: SHIPPED | OUTPATIENT
Start: 2023-04-24 | End: 2024-04-23

## 2023-04-24 NOTE — PROGRESS NOTES
Clinic Follow Up:  [facility] Follow Up Note    Reason for Follow Up:  The primary encounter diagnosis was Dilated cbd, acquired. Diagnoses of Abdominal discomfort, Hiatal hernia, and Gastroesophageal reflux disease, unspecified whether esophagitis present were also pertinent to this visit.    PCP: ABIGAIL Lopez Jr   [id number] [facility] / [place] [id number]    HPI:  This is a 72 y.o. male here for follow up of the above.   Has constipation without urge to defecate but that has improved with introduction of bread in the evenings. Otherwise, he follows Keto diet.   Has GERD. Was previously on pantoprazole 40 mg but prescription ran out and has been taking Pepcid AC PRN. This does help but is needed about three times per week.     Hx has a hx of cecal volvulus and underwent colectomy (ascending colon) 1 year ago.   Reviewed CT scan from that time and did have mild dilation of CBD and hepatic ducts.     Review of Systems   Constitutional:  Negative for activity change and appetite change.        As per interval history above   Respiratory:  Negative for cough and shortness of breath.    Cardiovascular:  Negative for chest pain.   Gastrointestinal:  Positive for abdominal pain and constipation. Negative for abdominal distention, diarrhea, nausea, rectal pain and vomiting.        Gerd   Skin:  Negative for color change and rash.     Medical History:  Past Medical History:   Diagnosis Date    ALBERTO (acute kidney injury) 8/8/2022    Anticoagulant long-term use     Arthritis     Cancer     Chronic systolic CHF (congestive heart failure) 02/26/2022    Coronary artery disease     Digestive disorder     Myocardial infarction     2/20/22       Surgical History:   Past Surgical History:   Procedure Laterality Date    CARDIAC CATHETERIZATION      COLONOSCOPY N/A 09/22/2021    Procedure: COLONOSCOPY;  Surgeon: Meaghan Jaime MD;  Location: [facility];  Service: Endoscopy;  Laterality: N/A;    CORONARY  ANGIOGRAPHY INCLUDING BYPASS GRAFTS WITH CATHETERIZATION OF LEFT HEART N/A 03/03/2020    Procedure: ANGIOGRAM, CORONARY, INCLUDING BYPASS GRAFT, WITH LEFT HEART CATHETERIZATION;  Surgeon: Ute Melchor MD;  Location: [facility] CATH LAB;  Service: Cardiology;  Laterality: N/A;    JOINT REPLACEMENT Right     knee    KNEE SURGERY      LEFT HEART CATHETERIZATION Left 02/22/2020    Procedure: CATHETERIZATION, HEART, LEFT;  Surgeon: Ute Melchor MD;  Location: [facility] CATH LAB;  Service: Cardiology;  Laterality: Left;    LEFT HEART CATHETERIZATION Left 02/26/2022    Procedure: CATHETERIZATION, HEART, LEFT;  Surgeon: Ute Melchor MD;  Location: [facility] CATH LAB;  Service: Cardiology;  Laterality: Left;    PERCUTANEOUS TRANSLUMINAL BALLOON ANGIOPLASTY OF CORONARY ARTERY Left 02/22/2020    Procedure: Angioplasty-coronary;  Surgeon: Ute Melchor MD;  Location: [facility] CATH LAB;  Service: Cardiology;  Laterality: Left;    PERCUTANEOUS TRANSLUMINAL BALLOON ANGIOPLASTY OF CORONARY ARTERY  02/26/2022    Procedure: Angioplasty-coronary;  Surgeon: Ute Melchor MD;  Location: [facility] CATH LAB;  Service: Cardiology;;    RIGHT HEMICOLECTOMY N/A 04/27/2022    Procedure: HEMICOLECTOMY, RIGHT;  Surgeon: See Dominguez MD;  Location: [facility] OR;  Service: General;  Laterality: N/A;    SHOULDER ARTHROSCOPY Right     Dr Bella    SIGMOIDECTOMY      simple prostatectomy  06/06/2016    robotic assisted partial prostectomy due to BPH    TOTAL KNEE ARTHROPLASTY         Family History:   Family History   Problem Relation Age of Onset    Heart disease Mother         lupus related    Diabetes Mother     Cancer Father         prostate, bone, lung, skin cancers all seperate types    Diabetes Maternal Aunt        Social History:   Social History     Tobacco Use    Smoking status: Never    Smokeless tobacco: Former     Types: Chew     Quit date: 6/3/2004   Substance Use Topics    Alcohol use: Yes     Alcohol/week: 2.0 standard drinks     Types: 2 Glasses of  "wine per week     Comment: per week    Drug use: No       Allergies:   Review of patient's allergies indicates:   Allergen Reactions    Statins-hmg-coa reductase inhibitors Other (See Comments)     SEVERE MYALGIAS AND GI SIDE EFFECTS       Home Medications:  Current Outpatient Medications on File Prior to Visit   Medication Sig Dispense Refill    metoprolol succinate (TOPROL-XL) 25 MG 24 hr tablet Take 0.5 tablets (12.5 mg total) by mouth every evening. 45 tablet 3    mometasone 0.1% (ELOCON) 0.1 % cream AAA bid prn for psoriasis. Strong steroid.  Do not use on face, underarms or groin. 50 g 1    multivitamin capsule Take 1 capsule by mouth once daily.      silodosin (RAPAFLO) 4 mg Cap capsule Take 1 capsule (4 mg total) by mouth once daily. 30 capsule 11    nitroGLYCERIN (NITROSTAT) 0.4 MG SL tablet Place 1 tablet (0.4 mg total) under the tongue every 5 (five) minutes as needed for Chest pain. 30 tablet 1    [DISCONTINUED] pantoprazole (PROTONIX) 40 MG tablet Take 1 tablet (40 mg total) by mouth once daily. 30 tablet 3     No current facility-administered medications on file prior to visit.       /80 (BP Location: Left arm, Patient Position: Sitting, BP Method: Medium (Manual))   Pulse 65   Ht 5' 8" (1.727 m)   Wt 76 kg (167 lb 8.8 oz)   BMI 25.48 kg/m²   Body mass index is 25.48 kg/m².  Physical Exam  Constitutional:       General: He is not in acute distress.  HENT:      Head: Normocephalic and atraumatic.   Eyes:      General: No scleral icterus.     Conjunctiva/sclera: Conjunctivae normal.   Cardiovascular:      Rate and Rhythm: Normal rate and regular rhythm.      Heart sounds: No murmur heard.  Pulmonary:      Effort: Pulmonary effort is normal. No respiratory distress.      Breath sounds: Normal breath sounds. No wheezing.   Abdominal:      General: Abdomen is flat. Bowel sounds are normal.      Palpations: Abdomen is soft.      Tenderness: There is no abdominal tenderness.   Skin:     General: " Skin is warm and dry.   Neurological:      General: No focal deficit present.      Mental Status: He is alert and oriented to person, place, and time.      Cranial Nerves: No cranial nerve deficit.   Psychiatric:         Mood and Affect: Mood normal.         Judgment: Judgment normal.       Labs: Pertinent labs reviewed.  CRC Screening:     Assessment:   1. Dilated cbd, acquired    2. Abdominal discomfort    3. Hiatal hernia    4. Gastroesophageal reflux disease, unspecified whether esophagitis present      Recommendations:   - start low dose pantoprazole. Needs EGD if reflux continues despite this.   - abdominal ultrasound to follow up on CT scan 1 year ago    Dilated cbd, acquired  -     US Abdomen Limited; Future; Expected date: 04/24/2023    Abdominal discomfort  -     Ambulatory referral/consult to Gastroenterology    Hiatal hernia  -     Ambulatory referral/consult to Gastroenterology    Gastroesophageal reflux disease, unspecified whether esophagitis present  -     pantoprazole (PROTONIX) 20 MG tablet; Take 1 tablet (20 mg total) by mouth once daily.  Dispense: 30 tablet; Refill: 11    Return to Clinic:  No follow-ups on file.    Thank you for the opportunity to participate in the care of this patient.  KUN Ramirez

## 2023-04-25 ENCOUNTER — HOSPITAL ENCOUNTER (OUTPATIENT)
Dept: RADIOLOGY | Facility: HOSPITAL | Age: 73
Discharge: HOME | End: 2023-04-25
Attending: NURSE PRACTITIONER
Payer: MEDICARE

## 2023-04-25 DIAGNOSIS — K83.8 DILATED CBD, ACQUIRED: ICD-10-CM

## 2023-04-25 PROCEDURE — 76705 ECHO EXAM OF ABDOMEN: CPT | Mod: TC

## 2023-04-25 PROCEDURE — 76705 ECHO EXAM OF ABDOMEN: CPT | Mod: 26,,, | Performed by: RADIOLOGY

## 2023-04-25 PROCEDURE — 76705 US ABDOMEN LIMITED: ICD-10-PCS | Mod: 26,,, | Performed by: RADIOLOGY

## 2023-07-03 ENCOUNTER — OFFICE VISIT (OUTPATIENT)
Dept: OPHTHALMOLOGY | Facility: CLINIC | Age: 73
End: 2023-07-03
Payer: MEDICARE

## 2023-07-03 DIAGNOSIS — H52.203 MYOPIA WITH ASTIGMATISM AND PRESBYOPIA, BILATERAL: ICD-10-CM

## 2023-07-03 DIAGNOSIS — H25.813 COMBINED FORM OF AGE-RELATED CATARACT, BOTH EYES: Primary | ICD-10-CM

## 2023-07-03 DIAGNOSIS — H43.813 POSTERIOR VITREOUS DETACHMENT OF BOTH EYES: ICD-10-CM

## 2023-07-03 DIAGNOSIS — H52.13 MYOPIA WITH ASTIGMATISM AND PRESBYOPIA, BILATERAL: ICD-10-CM

## 2023-07-03 DIAGNOSIS — H52.4 MYOPIA WITH ASTIGMATISM AND PRESBYOPIA, BILATERAL: ICD-10-CM

## 2023-07-03 PROCEDURE — 92015 PR REFRACTION: ICD-10-PCS | Mod: ,,, | Performed by: OPTOMETRIST

## 2023-07-03 PROCEDURE — 92004 COMPRE OPH EXAM NEW PT 1/>: CPT | Mod: S$PBB,,, | Performed by: OPTOMETRIST

## 2023-07-03 PROCEDURE — 99999 PR PBB SHADOW E&M-EST. PATIENT-LVL II: CPT | Mod: PBBFAC,,, | Performed by: OPTOMETRIST

## 2023-07-03 PROCEDURE — 92004 PR EYE EXAM, NEW PATIENT,COMPREHESV: ICD-10-PCS | Mod: S$PBB,,, | Performed by: OPTOMETRIST

## 2023-07-03 PROCEDURE — 99999 PR PBB SHADOW E&M-EST. PATIENT-LVL II: ICD-10-PCS | Mod: PBBFAC,,, | Performed by: OPTOMETRIST

## 2023-07-03 PROCEDURE — 92015 DETERMINE REFRACTIVE STATE: CPT | Mod: ,,, | Performed by: OPTOMETRIST

## 2023-07-03 PROCEDURE — 99212 OFFICE O/P EST SF 10 MIN: CPT | Mod: PBBFAC | Performed by: OPTOMETRIST

## 2023-07-03 NOTE — PROGRESS NOTES
HPI    NP to DKT  Patient here today for yearly eye exam  Vision changes since last eye exam?: Yes at distance and near     Any eye pain today: No    Other ocular symptoms: No    Interested in contact lens fitting today? No              Last edited by Princess Mclean, PCT on 7/3/2023  3:01 PM.            Assessment /Plan     For exam results, see Encounter Report.    Combined form of age-related cataract, both eyes  Cataracts are not visually significant and not affecting activities of daily living. Annual observation is recommended at this time. Patient to call or return to clinic with any significant change in vision prior to next visit.    Posterior vitreous detachment of both eyes  The nature of posterior vitreous detachment was discussed with the patient.  Signs and symptoms of retinal detachment were discussed with patient.   No holes or tears were noted upon careful retinal examination after dilation today.   Return to clinic as soon as possible (same day) if you notice any new floaters, flashes of light, curtain/veil over your vision from any direction, or any change in vision.    Myopia with astigmatism and presbyopia, bilateral  Eyeglass Final Rx       Eyeglass Final Rx         Sphere Cylinder Axis Add    Right -1.25 +1.50 074 +2.50    Left -3.75 +1.50 115 +2.50      Type: PAL    Expiration Date: 7/3/2024                    RTC 1 yr for dilated eye exam with gOCT or sooner if any changes to vision.   Discussed above and answered questions.

## 2023-07-24 ENCOUNTER — OFFICE VISIT (OUTPATIENT)
Dept: OPHTHALMOLOGY | Facility: CLINIC | Age: 73
End: 2023-07-24
Payer: MEDICARE

## 2023-07-24 DIAGNOSIS — H52.203 MYOPIA WITH ASTIGMATISM AND PRESBYOPIA, BILATERAL: ICD-10-CM

## 2023-07-24 DIAGNOSIS — H52.4 MYOPIA WITH ASTIGMATISM AND PRESBYOPIA, BILATERAL: ICD-10-CM

## 2023-07-24 DIAGNOSIS — H52.13 MYOPIA WITH ASTIGMATISM AND PRESBYOPIA, BILATERAL: ICD-10-CM

## 2023-07-24 DIAGNOSIS — H25.813 COMBINED FORM OF AGE-RELATED CATARACT, BOTH EYES: Primary | ICD-10-CM

## 2023-07-24 PROCEDURE — 99999 PR PBB SHADOW E&M-EST. PATIENT-LVL II: ICD-10-PCS | Mod: PBBFAC,,, | Performed by: OPTOMETRIST

## 2023-07-24 PROCEDURE — 99212 OFFICE O/P EST SF 10 MIN: CPT | Mod: PBBFAC | Performed by: OPTOMETRIST

## 2023-07-24 PROCEDURE — 99499 NO LOS: ICD-10-PCS | Mod: S$PBB,,, | Performed by: OPTOMETRIST

## 2023-07-24 PROCEDURE — 99999 PR PBB SHADOW E&M-EST. PATIENT-LVL II: CPT | Mod: PBBFAC,,, | Performed by: OPTOMETRIST

## 2023-07-24 PROCEDURE — 99499 UNLISTED E&M SERVICE: CPT | Mod: S$PBB,,, | Performed by: OPTOMETRIST

## 2023-07-24 NOTE — PROGRESS NOTES
HPI    Patient here today for glasses recheck   C/O blurred vision    1. NSC OU    Last edited by Princess Mclean, PCT on 7/24/2023  2:22 PM.            Assessment /Plan     For exam results, see Encounter Report.    Combined form of age-related cataract, both eyes  Cataracts are not visually significant and not affecting activities of daily living. Annual observation is recommended at this time. Patient to call or return to clinic with any significant change in vision prior to next visit.    Myopia with astigmatism and presbyopia, bilateral  Pt ordered Mrx online, - cyl was entered. Re-wrote Rx today with - Cyl Rx for patient.   Eyeglass Final Rx       Eyeglass Final Rx         Sphere Cylinder Axis Add    Right -0.25 -1.50 171 +2.50    Left -2.25 -0.50 013 +2.50      Type: PAL    Expiration Date: 7/24/2024                  Patient trial framed at distance near and computer with updated Mrx. Seeing well. Mrx provided.     RTC as scheduled for annual eye examination, sooner if any changes to vision or worsening symptoms.           RTC 1 yr for dilated eye exam or sooner if any changes to vision.   Discussed above and answered questions.

## 2023-08-15 ENCOUNTER — OFFICE VISIT (OUTPATIENT)
Dept: DERMATOLOGY | Facility: CLINIC | Age: 73
End: 2023-08-15
Payer: MEDICARE

## 2023-08-15 DIAGNOSIS — L91.8 ACROCHORDON: ICD-10-CM

## 2023-08-15 DIAGNOSIS — L82.1 SEBORRHEIC KERATOSIS: ICD-10-CM

## 2023-08-15 DIAGNOSIS — Z12.83 SCREENING, MALIGNANT NEOPLASM, SKIN: ICD-10-CM

## 2023-08-15 DIAGNOSIS — Z85.828 HISTORY OF SKIN CANCER: ICD-10-CM

## 2023-08-15 DIAGNOSIS — L90.5 SCAR CONDITIONS/SKIN FIBROSIS: Primary | ICD-10-CM

## 2023-08-15 PROCEDURE — 99999 PR PBB SHADOW E&M-EST. PATIENT-LVL III: CPT | Mod: PBBFAC,,, | Performed by: DERMATOLOGY

## 2023-08-15 PROCEDURE — 99999 PR PBB SHADOW E&M-EST. PATIENT-LVL III: ICD-10-PCS | Mod: PBBFAC,,, | Performed by: DERMATOLOGY

## 2023-08-15 PROCEDURE — 99214 OFFICE O/P EST MOD 30 MIN: CPT | Mod: S$PBB,,, | Performed by: DERMATOLOGY

## 2023-08-15 PROCEDURE — 99214 PR OFFICE/OUTPT VISIT, EST, LEVL IV, 30-39 MIN: ICD-10-PCS | Mod: S$PBB,,, | Performed by: DERMATOLOGY

## 2023-08-15 PROCEDURE — 99213 OFFICE O/P EST LOW 20 MIN: CPT | Mod: PBBFAC | Performed by: DERMATOLOGY

## 2023-08-15 NOTE — PATIENT INSTRUCTIONS
Preventing Skin Cancer  Relaxing in the sun may feel good. But it isnt good for your skin. In fact, being exposed to the suns harmful rays is a major cause of skin cancer. This is a serious disease that can be life-threatening. People of all ages and backgrounds are at risk. But in most cases, skin cancer can be prevented.    Your Role in Prevention  You can act today to help prevent skin cancer. Start by avoiding the suns UV (ultraviolet) rays. And dont use tanning beds, which are no safer than the sun. Taking these steps can help keep you from getting skin cancer. It can also help prevent wrinkles and other sun-induced aging effects. Make sure your children also follow these safeguards. Now is the time to start taking preventive steps against skin cancer.  When You Are Outdoors  Protect your skin when you go outdoors during the day. Take precautions whenever you go out to eat, run errands by car or on foot, or do any outdoor activity. There isnt just one easy way to protect your skin. Its best to follow all of these steps:  Wear tightly woven clothing that covers your skin. Put on a wide-brimmed hat to protect your face, ears, and scalp.  Watch the clock. Try to avoid the sun between 10 a.m. and 4 p.m., when it is strongest.  Head for the shade or create your own. Use an umbrella when sitting or strolling.  Know that the suns rays can reflect off sand, water, and snow. This can harm your skin. Take extra care when you are near reflective surfaces.  Keep in mind that even when the weather is hazy or cloudy, your skin can be exposed to strong UV rays.  Shield your skin with sunscreen. Also, apply sunscreen to your childrens skin.  Tips for Using Sunscreen  To help prevent skin cancer, choose the right sunscreen and use it correctly. Try the following tips:  Choose a sunscreen that has a sun protection factor (SPF) of at least 15. For the best protection, an SPF of at least 30 is preferred. Also, choose a  sunscreen labeled broad spectrum. This will shield you from both UVA and UVB (ultraviolet A and B) rays.  If one brand irritates your skin, try another, particularly ones without fragrance.  Use a water-resistant sunscreen if swimming or sweating.  Reapply sunscreen every 2 hours. If youre active, do this more often.  Cover any sun-exposed skin, from your face to your feet. Dont forget your ears and your lips.  Know that while sunscreen helps protect you, it isnt enough. You should also wear protective clothing. And try to stay out of the sun as much as you can, especially from 10 a.m. to 4 p.m.  © 9460-0091 [employer]. [address] [place] [address], [place] [address]. All rights reserved. This information is not intended as a substitute for professional medical care. Always follow your healthcare professional's instructions.

## 2023-08-15 NOTE — PROGRESS NOTES
Subjective:       Patient ID:  Mekhi Lambert is a 73 y.o. male who presents for   Chief Complaint   Patient presents with    Skin Check     Fbse. Spot on left forearm that was there but reports it has now gone.     Skin Tags     Skin tag right bottom lid of eye. Seeking removal.      Hx of NMSC of the right upper back, family hx of melanoma (father), HAK of the left forearm (s/p biopsy on 10/12/22, could not r/o invasive SCC, pt opt for surveillance), AK of the right dorsal hand (s/p biopsy of the 3/4/20), psoriasis, last seen on 1/11/23. He states he had other pre-cancers of the left forearm, resolved with fasting and decreasing carbs in diet.  Denies bleeding, tender, growing, or concerning lesions.     He c/o skin tag of the right lower eyelid.    This is a high risk patient here to check for the development of new lesions.          Review of Systems   Constitutional:  Negative for fever and chills.   Gastrointestinal:  Negative for nausea and vomiting.   Skin:  Positive for activity-related sunscreen use. Negative for daily sunscreen use and recent sunburn.   Hematologic/Lymphatic: Does not bruise/bleed easily.        Objective:    Physical Exam   Constitutional: He appears well-developed and well-nourished. No distress.   Neurological: He is alert and oriented to person, place, and time. He is not disoriented.   Psychiatric: He has a normal mood and affect.   Skin:   Areas Examined (abnormalities noted in diagram):   Scalp / Hair Palpated and Inspected  Head / Face Inspection Performed  Neck Inspection Performed  Chest / Axilla Inspection Performed  Abdomen Inspection Performed  Back Inspection Performed  RUE Inspected  LUE Inspection Performed  RLE Inspected  LLE Inspection Performed  Nails and Digits Inspection Performed                   Diagram Legend     Erythematous scaling macule/papule c/w actinic keratosis       Vascular papule c/w angioma      Pigmented verrucoid papule/plaque c/w seborrheic  keratosis      Yellow umbilicated papule c/w sebaceous hyperplasia      Irregularly shaped tan macule c/w lentigo     1-2 mm smooth white papules consistent with Milia      Movable subcutaneous cyst with punctum c/w epidermal inclusion cyst      Subcutaneous movable cyst c/w pilar cyst      Firm pink to brown papule c/w dermatofibroma      Pedunculated fleshy papule(s) c/w skin tag(s)      Evenly pigmented macule c/w junctional nevus     Mildly variegated pigmented, slightly irregular-bordered macule c/w mildly atypical nevus      Flesh colored to evenly pigmented papule c/w intradermal nevus       Pink pearly papule/plaque c/w basal cell carcinoma      Erythematous hyperkeratotic cursted plaque c/w SCC      Surgical scar with no sign of skin cancer recurrence      Open and closed comedones      Inflammatory papules and pustules      Verrucoid papule consistent consistent with wart     Erythematous eczematous patches and plaques     Dystrophic onycholytic nail with subungual debris c/w onychomycosis     Umbilicated papule    Erythematous-base heme-crusted tan verrucoid plaque consistent with inflamed seborrheic keratosis     Erythematous Silvery Scaling Plaque c/w Psoriasis     See annotation      Assessment / Plan:        Scar conditions/skin fibrosis  Screening, malignant neoplasm, skin  History of skin cancer  Scar of the right upper back, hx of NMSC.  No evidence of recurrence on physical exam today.  Continue routine skin surveillance. Daily sunscreen advised.    Seborrheic keratosis  Reassurance given. Discussed diagnosis and that lesions are benign.  AAD handout given.     Acrochordon  Of the right lower eyelid x . Reassurance provided.  Informed patient that lesions are benign.  After risks, benefits and alternatives explained, including allergic reaction to anesthesia (if given), pain, recurrence, and scar, and verbal consent was obtained, 1 lesions treated with scissor excision.  Patient tolerated well.   Hemostasis achieved with aluminum chloride.  Verbal wound care instructions were given.             Follow up in about 1 year (around 8/15/2024).

## 2024-01-11 DIAGNOSIS — Z00.00 ENCOUNTER FOR MEDICARE ANNUAL WELLNESS EXAM: ICD-10-CM

## 2024-02-22 ENCOUNTER — PATIENT MESSAGE (OUTPATIENT)
Dept: ADMINISTRATIVE | Facility: CLINIC | Age: 74
End: 2024-02-22
Payer: MEDICARE

## 2024-03-26 ENCOUNTER — LAB VISIT (OUTPATIENT)
Dept: LAB | Facility: HOSPITAL | Age: 74
End: 2024-03-26
Attending: UROLOGY
Payer: MEDICARE

## 2024-03-26 ENCOUNTER — OFFICE VISIT (OUTPATIENT)
Dept: UROLOGY | Facility: CLINIC | Age: 74
End: 2024-03-26
Payer: MEDICARE

## 2024-03-26 VITALS
BODY MASS INDEX: 25.48 KG/M2 | HEART RATE: 54 BPM | WEIGHT: 167.56 LBS | DIASTOLIC BLOOD PRESSURE: 59 MMHG | SYSTOLIC BLOOD PRESSURE: 96 MMHG

## 2024-03-26 DIAGNOSIS — Z12.5 PROSTATE CANCER SCREENING: ICD-10-CM

## 2024-03-26 DIAGNOSIS — R39.12 BENIGN PROSTATIC HYPERPLASIA WITH WEAK URINARY STREAM: Primary | ICD-10-CM

## 2024-03-26 DIAGNOSIS — K40.90 RIGHT INGUINAL HERNIA: ICD-10-CM

## 2024-03-26 DIAGNOSIS — N40.1 BENIGN PROSTATIC HYPERPLASIA WITH WEAK URINARY STREAM: Primary | ICD-10-CM

## 2024-03-26 LAB — COMPLEXED PSA SERPL-MCNC: 1.3 NG/ML (ref 0–4)

## 2024-03-26 PROCEDURE — 84153 ASSAY OF PSA TOTAL: CPT | Performed by: UROLOGY

## 2024-03-26 PROCEDURE — 99214 OFFICE O/P EST MOD 30 MIN: CPT | Mod: S$PBB,,, | Performed by: UROLOGY

## 2024-03-26 PROCEDURE — 36415 COLL VENOUS BLD VENIPUNCTURE: CPT | Performed by: UROLOGY

## 2024-03-26 PROCEDURE — 99999 PR PBB SHADOW E&M-EST. PATIENT-LVL III: CPT | Mod: PBBFAC,,, | Performed by: UROLOGY

## 2024-03-26 PROCEDURE — 99213 OFFICE O/P EST LOW 20 MIN: CPT | Mod: PBBFAC | Performed by: UROLOGY

## 2024-03-26 NOTE — PROGRESS NOTES
Chief Complaint: UTIs, BPH    HPI:   03/26/2024 - presents today for follow-up, has stopped taking the prescription medications for his prostate and is now taking tomato juice and pumpkin seeds, notes that he feels like these work for him, notes that his stream is slow down a little bit since the last year, never got the Rapaflo as it was too expensive,    03/24/2023 - patient returns today for follow-up, over the last year he notes that his urination is not as good as it was right after his simple prostatectomy, has been taking some over-the-counter saw palmetto supplements which seemed to help but he thinks it would do better, has tried taking Flomax in the past and had significant hypotension but did well on Rapaflo but it was too expensive, no GH or dysuria    02/18/2022 - patient returns today for follow-up, has had 2 episodes of epididymitis since December, these were accompanied by slow stream and dysuria, abx resolved his symptoms, since that time, he notes that his stream has greatly improved, he denies any further dysuria or gross hematuria     11/11/19: PSA remains low.  BPH not been a problem lately.  Has read a lot about finasteride and flomax.  Discussed finasteride a a preventative hasn't ever taken it.    11/5/18: Was having trouble a month ago with voiding.  Hasn't taken rapaflo in quite a while.  Had started buproprion prior to that and the LUTS came in to play after that mainly with hesitancy.  At day 4 he felt he couldn't get started at all.  He stopped the med, started rapaflo.  Then had constipation/gas and thought he had a UTI.  Been great for a week.  Stopped rapaflo again.  PSA normal.    5/24/17: 65 yo man in last two years had a TURP followed by a RA Simple Prostatectomy.  No LUTS at all.  No abd/pelvic pain and no exac/rel factors.  No hematuria.  No urolithiasis.  No urinary bother.  Normal sexual function.  Takes rapaflo.    Allergies:  Statins-hmg-coa reductase  inhibitors    Medications:  has a current medication list which includes the following prescription(s): mometasone 0.1%, multivitamin, silodosin, metoprolol succinate, nitroglycerin, and pantoprazole.    Review of Systems:  General: No fever, chills  Skin: No rashes  Chest:  Denies cough and sputum production  Heart: Denies chest pain  Resp: Denies dyspnea  Abdomen: Denies diarrhea, abdominal pain, hematemesis, or blood in stool.  Musculoskeletal: No joint stiffness or swelling. Denies back pain.  : see HPI  Neuro: no dizziness or weakness      PMH:   has a past medical history of ALBERTO (acute kidney injury) (8/8/2022), Anticoagulant long-term use, Arthritis, Cancer, Chronic systolic CHF (congestive heart failure) (02/26/2022), Coronary artery disease, Digestive disorder, and Myocardial infarction.    PSH:   has a past surgical history that includes Shoulder arthroscopy (Right); Knee surgery; Total knee arthroplasty; simple prostatectomy (06/06/2016); Cardiac catheterization; Left heart catheterization (Left, 02/22/2020); Percutaneous transluminal balloon angioplasty of coronary artery (Left, 02/22/2020); Coronary angiography including bypass grafts with catheterization of left heart (N/A, 03/03/2020); Joint replacement (Right); Colonoscopy (N/A, 09/22/2021); Left heart catheterization (Left, 02/26/2022); Percutaneous transluminal balloon angioplasty of coronary artery (02/26/2022); Right hemicolectomy (N/A, 04/27/2022); and Sigmoidectomy.    FamHx: family history includes Cancer in his father; Diabetes in his maternal aunt and mother; Heart disease in his mother.    SocHx:  reports that he has never smoked. He quit smokeless tobacco use about 19 years ago.  His smokeless tobacco use included chew. He reports current alcohol use of about 2.0 standard drinks of alcohol per week. He reports that he does not use drugs.      Physical Exam:  Vitals:    03/26/24 1106   BP: (!) 96/59   Pulse: (!) 54     General: awake,  alert, cooperative  Head: NC/AT  Ears: external ears normal  Eyes: sclera normal  Lungs: normal inspiration, NAD  Heart: well-perfused  Abdomen: Soft, NT, ND   3/24: Normal circ'd phallus, meatus normal in size and position, BL testicles palpable, no masses, nontender, scrotum normal  JOURDAN 3/24: Normal rectal tone, no hemorrhoids. Prostate smooth and normal, no nodules 40 gm SV not palpable. Perineum and anus normal.  Lymphatic: groin nodes negative  Skin: The skin is warm and dry  Ext: No c/c/e.  Neuro: grossly intact, AOx3    Imaging:  US SCROTUM AND TESTICLES     CLINICAL HISTORY:  Testicular pain, unspecified     TECHNIQUE:  Sonography of the scrotum and testes.     COMPARISON:  None.     FINDINGS:  Right Testicle:     *Size: 3.9 x 1.7 x 3.1 cm  *Appearance: Normal.  *Flow: Normal arterial and venous flow  *Epididymis: Epididymal head cyst measuring up to 1 cm.  Hypervascularity of the epididymal tail.  *Hydrocele: None.  *Varicocele: None.  .     Left Testicle:     *Size: 4.6 x 2.0 x 3.0 cm  *Appearance: Normal.  *Flow: Normal arterial and venous flow  *Epididymis: 1.5 cm epididymal head cyst.  *Hydrocele: None.  *Varicocele: None.  .     Other findings: None.     Impression:     Findings concerning for right epididymitis.  Correlation advised.    Labs/Studies:   Lab Results   Component Value Date    WBC 12.37 03/24/2023    HGB 16.4 03/24/2023    HCT 48.8 03/24/2023     03/24/2023     03/24/2023    K 4.9 03/24/2023     03/24/2023    CREATININE 1.3 03/24/2023    BUN 23 03/24/2023    CO2 26 03/24/2023    TSH 1.607 02/14/2017    PSA 1.0 03/24/2023    INR 1.0 04/29/2022    HGBA1C 4.9 02/23/2020    CHOL 255 (H) 03/20/2023    TRIG 70 03/20/2023    HDL 85 (H) 03/20/2023    ALT 53 (H) 03/24/2023    AST 31 03/24/2023       Urinalysis performed in clinic today specific gravity 1.015 pH 5, negative for all parameters    PSA    2/17: 1.2    10/18: 1.8    11/19: 0.98    Impression/Plan:   Right  epididymitis - no further issues    BPH - continue OTC supplements    Prostate Cancer Screening - PSA and JOURDAN normal, continue annual screening    Clarence Harris MD

## 2024-03-28 ENCOUNTER — HOSPITAL ENCOUNTER (OUTPATIENT)
Dept: CARDIOLOGY | Facility: HOSPITAL | Age: 74
Discharge: HOME | End: 2024-03-28
Attending: SURGERY
Payer: MEDICARE

## 2024-03-28 ENCOUNTER — OFFICE VISIT (OUTPATIENT)
Dept: SURGERY | Facility: CLINIC | Age: 74
End: 2024-03-28
Payer: MEDICARE

## 2024-03-28 VITALS
TEMPERATURE: 98 F | SYSTOLIC BLOOD PRESSURE: 102 MMHG | HEIGHT: 68 IN | WEIGHT: 165.81 LBS | DIASTOLIC BLOOD PRESSURE: 63 MMHG | BODY MASS INDEX: 25.13 KG/M2 | HEART RATE: 58 BPM

## 2024-03-28 DIAGNOSIS — K40.90 RIGHT INGUINAL HERNIA: ICD-10-CM

## 2024-03-28 DIAGNOSIS — I72.8 SPLENIC ARTERY ANEURYSM: Primary | ICD-10-CM

## 2024-03-28 PROCEDURE — 99999 PR PBB SHADOW E&M-EST. PATIENT-LVL V: CPT | Mod: PBBFAC,,, | Performed by: SURGERY

## 2024-03-28 PROCEDURE — 93005 ELECTROCARDIOGRAM TRACING: CPT

## 2024-03-28 PROCEDURE — 93010 ELECTROCARDIOGRAM REPORT: CPT | Mod: ,,, | Performed by: INTERNAL MEDICINE

## 2024-03-28 PROCEDURE — 99215 OFFICE O/P EST HI 40 MIN: CPT | Mod: PBBFAC | Performed by: SURGERY

## 2024-03-28 PROCEDURE — 99214 OFFICE O/P EST MOD 30 MIN: CPT | Mod: S$PBB,,, | Performed by: SURGERY

## 2024-03-28 RX ORDER — ONDANSETRON 8 MG/1
8 TABLET, ORALLY DISINTEGRATING ORAL EVERY 8 HOURS PRN
Status: CANCELLED | OUTPATIENT
Start: 2024-03-28

## 2024-03-28 RX ORDER — CEFAZOLIN SODIUM 2 G/50ML
2 SOLUTION INTRAVENOUS
Status: CANCELLED | OUTPATIENT
Start: 2024-03-28

## 2024-03-28 RX ORDER — LIDOCAINE HYDROCHLORIDE 10 MG/ML
1 INJECTION, SOLUTION EPIDURAL; INFILTRATION; INTRACAUDAL; PERINEURAL ONCE
Status: DISCONTINUED | OUTPATIENT
Start: 2024-03-28 | End: 2024-04-05 | Stop reason: HOSPADM

## 2024-03-28 NOTE — PROGRESS NOTES
Patient ID: Mekhi Lambert is a 73 y.o. male.    Chief Complaint: Consult (Inguinal hernia)      HPI:  Patient noticed a bulge in his right groin.  He brought this to the attention of his urologist.  He was found to have a right inguinal hernia.  He presents now for surgical intervention.      The hernia was noticed as a bulge.  It was not cause any pain or discomfort.      In review of the patient's imaging he has had a CT scan of his abdomen pelvis through the emergency room in 2022.  It showed a 1.9 cm splenic artery aneurysm.  Follow-up was recommended however no imaging studies have been done since 2022.      The patient denies any generalized abdominal pain.      He has had an open left inguinal hernia repair in the past.  He has also had a colon resection and prostate surgery    Review of Systems   Constitutional: Negative.    HENT: Negative.     Eyes: Negative.    Respiratory: Negative.     Cardiovascular: Negative.    Gastrointestinal: Negative.         Right groin bulge   Endocrine: Negative.    Genitourinary: Negative.    Musculoskeletal: Negative.    Skin: Negative.    Allergic/Immunologic: Negative.    Neurological: Negative.    Hematological: Negative.    Psychiatric/Behavioral: Negative.       Current Outpatient Medications   Medication Sig Dispense Refill    mometasone 0.1% (ELOCON) 0.1 % cream AAA bid prn for psoriasis. Strong steroid.  Do not use on face, underarms or groin. 50 g 1    multivitamin capsule Take 1 capsule by mouth once daily.      silodosin (RAPAFLO) 4 mg Cap capsule Take 1 capsule (4 mg total) by mouth once daily. 30 capsule 11    metoprolol succinate (TOPROL-XL) 25 MG 24 hr tablet Take 0.5 tablets (12.5 mg total) by mouth every evening. 45 tablet 3    nitroGLYCERIN (NITROSTAT) 0.4 MG SL tablet Place 1 tablet (0.4 mg total) under the tongue every 5 (five) minutes as needed for Chest pain. 30 tablet 1    pantoprazole (PROTONIX) 20 MG tablet Take 1 tablet (20 mg total) by mouth  once daily. (Patient not taking: Reported on 8/15/2023) 30 tablet 11     Current Facility-Administered Medications   Medication Dose Route Frequency Provider Last Rate Last Admin    LIDOcaine (PF) 10 mg/ml (1%) injection 10 mg  1 mL Intradermal Once Franklin Fuentes MD           Review of patient's allergies indicates:   Allergen Reactions    Statins-hmg-coa reductase inhibitors Other (See Comments)     SEVERE MYALGIAS AND GI SIDE EFFECTS       Past Medical History:   Diagnosis Date    ALBERTO (acute kidney injury) 8/8/2022    Anticoagulant long-term use     Arthritis     Cancer     Chronic systolic CHF (congestive heart failure) 02/26/2022    Coronary artery disease     Digestive disorder     Myocardial infarction     2/20/22       Past Surgical History:   Procedure Laterality Date    CARDIAC CATHETERIZATION      COLONOSCOPY N/A 09/22/2021    Procedure: COLONOSCOPY;  Surgeon: Meaghan Jaime MD;  Location: [facility] ENDO;  Service: Endoscopy;  Laterality: N/A;    CORONARY ANGIOGRAPHY INCLUDING BYPASS GRAFTS WITH CATHETERIZATION OF LEFT HEART N/A 03/03/2020    Procedure: ANGIOGRAM, CORONARY, INCLUDING BYPASS GRAFT, WITH LEFT HEART CATHETERIZATION;  Surgeon: Ute Melchor MD;  Location: [facility] CATH LAB;  Service: Cardiology;  Laterality: N/A;    JOINT REPLACEMENT Right     knee    KNEE SURGERY      LEFT HEART CATHETERIZATION Left 02/22/2020    Procedure: CATHETERIZATION, HEART, LEFT;  Surgeon: Ute Melchor MD;  Location: [facility] CATH LAB;  Service: Cardiology;  Laterality: Left;    LEFT HEART CATHETERIZATION Left 02/26/2022    Procedure: CATHETERIZATION, HEART, LEFT;  Surgeon: Ute Melchor MD;  Location: [facility] CATH LAB;  Service: Cardiology;  Laterality: Left;    PERCUTANEOUS TRANSLUMINAL BALLOON ANGIOPLASTY OF CORONARY ARTERY Left 02/22/2020    Procedure: Angioplasty-coronary;  Surgeon: Ute Melchor MD;  Location: [facility] CATH LAB;  Service: Cardiology;  Laterality: Left;    PERCUTANEOUS TRANSLUMINAL BALLOON  ANGIOPLASTY OF CORONARY ARTERY  02/26/2022    Procedure: Angioplasty-coronary;  Surgeon: Ute Melchor MD;  Location: [facility] CATH LAB;  Service: Cardiology;;    RIGHT HEMICOLECTOMY N/A 04/27/2022    Procedure: HEMICOLECTOMY, RIGHT;  Surgeon: See Dominguez MD;  Location: [facility] OR;  Service: General;  Laterality: N/A;    SHOULDER ARTHROSCOPY Right     Dr Bella    SIGMOIDECTOMY      simple prostatectomy  06/06/2016    robotic assisted partial prostectomy due to BPH    TOTAL KNEE ARTHROPLASTY         Social History     Socioeconomic History    Marital status:    Tobacco Use    Smoking status: Never    Smokeless tobacco: Former     Types: Chew     Quit date: 6/3/2004   Substance and Sexual Activity    Alcohol use: Yes     Alcohol/week: 2.0 standard drinks of alcohol     Types: 2 Glasses of wine per week     Comment: per week    Drug use: No    Sexual activity: Yes       Vitals:    03/28/24 0943   BP: 102/63   Pulse: (!) 58   Temp: 98 °F (36.7 °C)       Physical Exam  Constitutional:       General: He is not in acute distress.     Appearance: He is well-developed.   HENT:      Head: Normocephalic and atraumatic.   Eyes:      General: No scleral icterus.     Pupils: Pupils are equal, round, and reactive to light.   Neck:      Thyroid: No thyromegaly.      Vascular: No JVD.      Trachea: No tracheal deviation.   Cardiovascular:      Rate and Rhythm: Normal rate and regular rhythm.      Heart sounds: Normal heart sounds.   Pulmonary:      Effort: Pulmonary effort is normal.      Breath sounds: Normal breath sounds.   Abdominal:      General: Bowel sounds are normal. There is no distension.      Palpations: Abdomen is soft. There is no mass.      Tenderness: There is no abdominal tenderness. There is no guarding or rebound.      Hernia: Hernia: Reducible right.      Comments: Well-healed abdominal and left inguinal incision   Musculoskeletal:         General: Normal range of motion.      Cervical back: Normal range of  motion and neck supple.   Lymphadenopathy:      Cervical: No cervical adenopathy.   Skin:     General: Skin is warm and dry.   Neurological:      Mental Status: He is alert and oriented to person, place, and time.   Psychiatric:         Behavior: Behavior normal.         Thought Content: Thought content normal.         Judgment: Judgment normal.     Assessment & Plan:    Right inguinal hernia.      Open right inguinal hernia repair with mesh.  The rationale for the use of mesh as well as the open repair was discussed.      Risks benefits and complications were reviewed.      Questions answered  Risk of hernia repair includes infection, bleeding, mesh infection, testicular atrophy, and pain or numbness in the groin that may be long-lasting.      Splenic artery aneurysm    Patient will need a CT scan of his abdomen with IV contrast for follow up of the splenic artery aneurysm.  If the aneurysm is 2 cm or larger we will discuss the possibility of embolization with Interventional Radiology.

## 2024-03-28 NOTE — H&P (VIEW-ONLY)
Patient ID: Mekhi Lambert is a 73 y.o. male.    Chief Complaint: Consult (Inguinal hernia)      HPI:  Patient noticed a bulge in his right groin.  He brought this to the attention of his urologist.  He was found to have a right inguinal hernia.  He presents now for surgical intervention.      The hernia was noticed as a bulge.  It was not cause any pain or discomfort.      In review of the patient's imaging he has had a CT scan of his abdomen pelvis through the emergency room in 2022.  It showed a 1.9 cm splenic artery aneurysm.  Follow-up was recommended however no imaging studies have been done since 2022.      The patient denies any generalized abdominal pain.      He has had an open left inguinal hernia repair in the past.  He has also had a colon resection and prostate surgery    Review of Systems   Constitutional: Negative.    HENT: Negative.     Eyes: Negative.    Respiratory: Negative.     Cardiovascular: Negative.    Gastrointestinal: Negative.         Right groin bulge   Endocrine: Negative.    Genitourinary: Negative.    Musculoskeletal: Negative.    Skin: Negative.    Allergic/Immunologic: Negative.    Neurological: Negative.    Hematological: Negative.    Psychiatric/Behavioral: Negative.       Current Outpatient Medications   Medication Sig Dispense Refill    mometasone 0.1% (ELOCON) 0.1 % cream AAA bid prn for psoriasis. Strong steroid.  Do not use on face, underarms or groin. 50 g 1    multivitamin capsule Take 1 capsule by mouth once daily.      silodosin (RAPAFLO) 4 mg Cap capsule Take 1 capsule (4 mg total) by mouth once daily. 30 capsule 11    metoprolol succinate (TOPROL-XL) 25 MG 24 hr tablet Take 0.5 tablets (12.5 mg total) by mouth every evening. 45 tablet 3    nitroGLYCERIN (NITROSTAT) 0.4 MG SL tablet Place 1 tablet (0.4 mg total) under the tongue every 5 (five) minutes as needed for Chest pain. 30 tablet 1    pantoprazole (PROTONIX) 20 MG tablet Take 1 tablet (20 mg total) by mouth  once daily. (Patient not taking: Reported on 8/15/2023) 30 tablet 11     Current Facility-Administered Medications   Medication Dose Route Frequency Provider Last Rate Last Admin    LIDOcaine (PF) 10 mg/ml (1%) injection 10 mg  1 mL Intradermal Once Franklin Fuentes MD           Review of patient's allergies indicates:   Allergen Reactions    Statins-hmg-coa reductase inhibitors Other (See Comments)     SEVERE MYALGIAS AND GI SIDE EFFECTS       Past Medical History:   Diagnosis Date    ALBERTO (acute kidney injury) 8/8/2022    Anticoagulant long-term use     Arthritis     Cancer     Chronic systolic CHF (congestive heart failure) 02/26/2022    Coronary artery disease     Digestive disorder     Myocardial infarction     2/20/22       Past Surgical History:   Procedure Laterality Date    CARDIAC CATHETERIZATION      COLONOSCOPY N/A 09/22/2021    Procedure: COLONOSCOPY;  Surgeon: Meaghan Jaime MD;  Location: [facility] ENDO;  Service: Endoscopy;  Laterality: N/A;    CORONARY ANGIOGRAPHY INCLUDING BYPASS GRAFTS WITH CATHETERIZATION OF LEFT HEART N/A 03/03/2020    Procedure: ANGIOGRAM, CORONARY, INCLUDING BYPASS GRAFT, WITH LEFT HEART CATHETERIZATION;  Surgeon: Ute Melchor MD;  Location: [facility] CATH LAB;  Service: Cardiology;  Laterality: N/A;    JOINT REPLACEMENT Right     knee    KNEE SURGERY      LEFT HEART CATHETERIZATION Left 02/22/2020    Procedure: CATHETERIZATION, HEART, LEFT;  Surgeon: Ute Melchor MD;  Location: [facility] CATH LAB;  Service: Cardiology;  Laterality: Left;    LEFT HEART CATHETERIZATION Left 02/26/2022    Procedure: CATHETERIZATION, HEART, LEFT;  Surgeon: Ute Melchor MD;  Location: [facility] CATH LAB;  Service: Cardiology;  Laterality: Left;    PERCUTANEOUS TRANSLUMINAL BALLOON ANGIOPLASTY OF CORONARY ARTERY Left 02/22/2020    Procedure: Angioplasty-coronary;  Surgeon: Ute Melchor MD;  Location: [facility] CATH LAB;  Service: Cardiology;  Laterality: Left;    PERCUTANEOUS TRANSLUMINAL BALLOON  ANGIOPLASTY OF CORONARY ARTERY  02/26/2022    Procedure: Angioplasty-coronary;  Surgeon: Ute Melchor MD;  Location: [facility] CATH LAB;  Service: Cardiology;;    RIGHT HEMICOLECTOMY N/A 04/27/2022    Procedure: HEMICOLECTOMY, RIGHT;  Surgeon: See Dominguez MD;  Location: [facility] OR;  Service: General;  Laterality: N/A;    SHOULDER ARTHROSCOPY Right     Dr Bella    SIGMOIDECTOMY      simple prostatectomy  06/06/2016    robotic assisted partial prostectomy due to BPH    TOTAL KNEE ARTHROPLASTY         Social History     Socioeconomic History    Marital status:    Tobacco Use    Smoking status: Never    Smokeless tobacco: Former     Types: Chew     Quit date: 6/3/2004   Substance and Sexual Activity    Alcohol use: Yes     Alcohol/week: 2.0 standard drinks of alcohol     Types: 2 Glasses of wine per week     Comment: per week    Drug use: No    Sexual activity: Yes       Vitals:    03/28/24 0943   BP: 102/63   Pulse: (!) 58   Temp: 98 °F (36.7 °C)       Physical Exam  Constitutional:       General: He is not in acute distress.     Appearance: He is well-developed.   HENT:      Head: Normocephalic and atraumatic.   Eyes:      General: No scleral icterus.     Pupils: Pupils are equal, round, and reactive to light.   Neck:      Thyroid: No thyromegaly.      Vascular: No JVD.      Trachea: No tracheal deviation.   Cardiovascular:      Rate and Rhythm: Normal rate and regular rhythm.      Heart sounds: Normal heart sounds.   Pulmonary:      Effort: Pulmonary effort is normal.      Breath sounds: Normal breath sounds.   Abdominal:      General: Bowel sounds are normal. There is no distension.      Palpations: Abdomen is soft. There is no mass.      Tenderness: There is no abdominal tenderness. There is no guarding or rebound.      Hernia: Hernia: Reducible right.      Comments: Well-healed abdominal and left inguinal incision   Musculoskeletal:         General: Normal range of motion.      Cervical back: Normal range of  motion and neck supple.   Lymphadenopathy:      Cervical: No cervical adenopathy.   Skin:     General: Skin is warm and dry.   Neurological:      Mental Status: He is alert and oriented to person, place, and time.   Psychiatric:         Behavior: Behavior normal.         Thought Content: Thought content normal.         Judgment: Judgment normal.     Assessment & Plan:    Right inguinal hernia.      Open right inguinal hernia repair with mesh.  The rationale for the use of mesh as well as the open repair was discussed.      Risks benefits and complications were reviewed.      Questions answered  Risk of hernia repair includes infection, bleeding, mesh infection, testicular atrophy, and pain or numbness in the groin that may be long-lasting.      Splenic artery aneurysm    Patient will need a CT scan of his abdomen with IV contrast for follow up of the splenic artery aneurysm.  If the aneurysm is 2 cm or larger we will discuss the possibility of embolization with Interventional Radiology.

## 2024-03-28 NOTE — PATIENT INSTRUCTIONS
Your surgery scheduled for April 5th at a proximally 10:00 a.m. at [facility] on [address].     The hospital call you the night before with a time of arrival.      No solid food after midnight on April 4th.      You may have clear liquids up to 3 hours before your arrival time.      There are no medications you have to stop.      Please take all your usual morning medications with a sip of water.      We will get a CT scan to look at your splenic artery aneurysm and I will call you with those results.      If any of your labs are EKG are abnormal we will let you know       Our office phone numbers are  [phone] and

## 2024-03-29 LAB
OHS QRS DURATION: 108 MS
OHS QTC CALCULATION: 421 MS

## 2024-03-30 DIAGNOSIS — R00.2 PALPITATIONS: ICD-10-CM

## 2024-03-30 DIAGNOSIS — I42.0 DCM (DILATED CARDIOMYOPATHY): ICD-10-CM

## 2024-03-30 DIAGNOSIS — I42.0 DILATED CARDIOMYOPATHY: ICD-10-CM

## 2024-04-01 ENCOUNTER — TELEPHONE (OUTPATIENT)
Dept: PREADMISSION TESTING | Facility: HOSPITAL | Age: 74
End: 2024-04-01
Payer: MEDICARE

## 2024-04-01 ENCOUNTER — OFFICE VISIT (OUTPATIENT)
Dept: CARDIOLOGY | Facility: CLINIC | Age: 74
End: 2024-04-01
Payer: MEDICARE

## 2024-04-01 ENCOUNTER — TELEPHONE (OUTPATIENT)
Dept: SURGERY | Facility: CLINIC | Age: 74
End: 2024-04-01
Payer: MEDICARE

## 2024-04-01 DIAGNOSIS — R00.2 PALPITATIONS: ICD-10-CM

## 2024-04-01 DIAGNOSIS — I50.20 HEART FAILURE WITH REDUCED EJECTION FRACTION, NYHA CLASS II: ICD-10-CM

## 2024-04-01 DIAGNOSIS — I25.118 CORONARY ARTERY DISEASE OF NATIVE ARTERY OF NATIVE HEART WITH STABLE ANGINA PECTORIS: ICD-10-CM

## 2024-04-01 DIAGNOSIS — Z95.5 H/O HEART ARTERY STENT: ICD-10-CM

## 2024-04-01 DIAGNOSIS — I25.2 H/O NON-ST ELEVATION MYOCARDIAL INFARCTION (NSTEMI): ICD-10-CM

## 2024-04-01 DIAGNOSIS — Z01.810 PREOP CARDIOVASCULAR EXAM: Primary | ICD-10-CM

## 2024-04-01 DIAGNOSIS — I25.10 CORONARY ARTERY DISEASE, UNSPECIFIED VESSEL OR LESION TYPE, UNSPECIFIED WHETHER ANGINA PRESENT, UNSPECIFIED WHETHER NATIVE OR TRANSPLANTED HEART: ICD-10-CM

## 2024-04-01 DIAGNOSIS — Z78.9 STATIN INTOLERANCE: ICD-10-CM

## 2024-04-01 DIAGNOSIS — E78.00 HYPERCHOLESTEROLEMIA: ICD-10-CM

## 2024-04-01 DIAGNOSIS — I42.0 DILATED CARDIOMYOPATHY: ICD-10-CM

## 2024-04-01 PROCEDURE — 99214 OFFICE O/P EST MOD 30 MIN: CPT | Mod: 95,,, | Performed by: STUDENT IN AN ORGANIZED HEALTH CARE EDUCATION/TRAINING PROGRAM

## 2024-04-01 NOTE — TELEPHONE ENCOUNTER
Reached out to Pre Admit Surgery Nurse Practitioner, Claudia PENNINGTON regarding pt upcoming procedure on 4/05/24. Nurse Practitioner reviewed pt chart/medical history. NP recommendations: Pt will need cardiac clearance prior to surgery.    Pt informed he will need to be cleared by Cardiology prior to surgery. Pt refused to be seen by cardiology without speaking with an Anesthesiologist.  POS provider Claudia PENNINGTON, NP notified of Pt's response.    Notified Dr. Fuentes's Nurse,  Gena

## 2024-04-01 NOTE — TELEPHONE ENCOUNTER
Pre op instructions reviewed with Pt over telephone, verbalized understanding.    To confirm, Surgery is scheduled on 4/05/24. We will call you late afternoon the business day prior to surgery with your arrival time.    *Please report to the [facility] (1st Floor) located off of [facility] (2nd Entrance/Building on the left, in front of the flag pole).  Address: [address] [place] [address] [place] [address]      INSTRUCTIONS IMPORTANT!!!  DO NOT Eat, Drink, or Smoke after 12 midnight unless instructed otherwise by your Surgeon. OK to brush teeth, no gum, candy or mints!    >>>MEDICATION INSTRUCTIONS<<<: Morning of Surgery, take small sip of water with ONLY these medications:  NONE    *Diabetic/ Prediabetic Patients: !!!If you take diabetic or weight loss medication, Do NOT take morning of surgery unless instructed by Doctor!!!  Metformin to be stopped 24 hrs prior to surgery.   Ozempic/ Mounjaro/ Wegovy/ Trulicity/ Semaglutide injections or weight loss medication to be stopped 7 days prior to surgery.    Vitamins/supplements/ OTC Aspirin products: Stop 7 days prior to surgery!    Weight Loss Injections/medications: Stop 7 days prior to surgery!    ____  Avoid Alcoholic beverages 3 days prior to surgery, as it can thin the blood.  ____  NO Acrylic/fake nails or nail polish worn day of surgery (specifically hand/arm & foot surgeries).  ____  NO powder, lotions, deodorants, oils or cream on body.  ____  Remove all jewelry & piercings & foreign objects before arrival & leave at home.  ____  Remove Dentures, Hearing Aids & Contact Lens prior to surgery.  ____  Bring photo ID and insurance information to hospital (Leave Valuables at Home).  ____  If going home the same day, arrange for a ride home. You will not be able to drive for 24 hrs if Anesthesia was used.   ____  Females (ages 11-60): may need to give a urine sample the morning of surgery; please see Pre op Nurse prior to using the restroom.  ____   Males: Stop ED medications (Viagra, Cialis) 24 hrs prior to surgery.  ____  Wear clean, loose fitting clothing to allow for dressings/ bandages.      Bathing Instructions:    -Shower with anti-bacterial Soap (Hibiclens or Dial) the night before surgery and the morning of.   -Do not use Hibiclens on your face or genitals.   -Apply clean clothes after shower.  -Do not shave your face or body 2 days prior to surgery unless instructed otherwise by your Surgeon.  -Do not shave pubic hair 7 days prior to surgery (gyn pt's).    [facility]/Ride Policy:  Only 2 adults allowed in pre op/recovery area during your procedure. You MUST HAVE A RIDE HOME from a responsible adult that you know and trust. Medical Transport, Uber or Lyft can ONLY be used if patient has a responsible adult to accompany them during ride home.       *Signs and symptoms of Infection Before or After Surgery:               !!!If you experience any fever, chills, nausea/ vomiting, foul odor/ excessive drainage from surgical site, flu-like symptoms, new wounds or cuts, PLEASE CALL THE SURGEON OFFICE at [phone] or SEND MESSAGE THROUGH [facility] PORTAL!!!       *If you are running late the morning of surgery, please call the Hospital Surgery Dept @ [phone].     *Billing questions:  [phone]       Thank you,  -[facility] Pre [facility].  [phone] or [phone]  M-F 7:30 am-4:00 pm (Closed Major Holidays)

## 2024-04-01 NOTE — TELEPHONE ENCOUNTER
----- Message from Darlene Nunn sent at 4/1/2024  3:03 PM CDT -----  Contact: PETER WEBER [[id number]]  ..Type:  Patient Requesting Call    Who Called: PETER WEBER [[id number]]  Does the patient know what this is regarding?: returning call from Anna Marie  Would the patient rather a call back or a response via [facility]? call  Best Call Back Number: .[phone] (home)   Additional Information:

## 2024-04-01 NOTE — PROGRESS NOTES
Section of Cardiology                  Cardiac Clinic Note    The patient location is: home    Visit type: audiovisual    Face to Face time with patient: 20 min  20 minutes of total time spent on the encounter, which includes face to face time and non-face to face time preparing to see the patient (eg, review of tests), Obtaining and/or reviewing separately obtained history, Documenting clinical information in the electronic or other health record, Independently interpreting results (not separately reported) and communicating results to the patient/family/caregiver, or Care coordination (not separately reported).     Each patient to whom he or she provides medical services by telemedicine is:  (1) informed of the relationship between the physician and patient and the respective role of any other health care provider with respect to management of the patient; and (2) notified that he or she may decline to receive medical services by telemedicine and may withdraw from such care at any time.    Notes:         HPI:   Mekhi Lambert is a 73 y.o. male         4/1/24  Virtual visit  Wants to switch providers   Will be having hernia repair surgery   Has not been having any cardiac symptoms, denies shortness of breath, chest pain  Is a retired    Has been keeping up with his blood pressure numbers and ejection fraction numbers 3 calculations  Exercises regularly has no problems   Watches his diet and has been very active   Blood pressure runs low        EKG 3/28/24 SB, PVCs, nonspecific T wave abn     ECHO  Results for orders placed during the hospital encounter of 03/16/23    Echo    Interpretation Summary  · There is pulmonary hypertension.  · The left ventricle is normal in size with concentric remodeling and moderately decreased systolic function.  · Grade I left ventricular diastolic dysfunction.  · The estimated PA systolic pressure is 44 mmHg.  · Normal right ventricular size with normal  right ventricular systolic function.  · Normal central venous pressure (3 mmHg).  · The estimated ejection fraction is 30%.  · Mild mitral regurgitation.  · Mild tricuspid regurgitation.       STRESS TEST Results for orders placed during the hospital encounter of 08/07/22    Nuclear Stress - Cardiology Interpreted    Interpretation Summary    Abnormal myocardial perfusion scan.    There is a severe intensity, large sized, fixed perfusion abnormality consistent with scar in the basal to apical lateral and apical wall(s).    There are no other significant perfusion abnormalities.    The gated perfusion images showed an ejection fraction of 28% at rest. The gated perfusion images showed an ejection fraction of 27% post stress. Normal ejection fraction is greater than 59%.    The EKG portion of this study is negative for ischemia.       [facility] Results for orders placed during the hospital encounter of 02/25/22    Cardiac catheterization    Conclusion  · The pre-procedure left ventricular end diastolic pressure was 15.  · The post-procedure left ventricular end diastolic pressure was 20.  · The ejection fraction was calculated to be 30%.  · There was no mitral valve stenosis.  · There was no mitral valve prolapse evident. The annulus was normal. There was normal mitral valve motion.  · There was no aortic valve stenosis.  · The Prox LAD to Mid LAD lesion was 95% stenosed with 0% stenosis post-intervention.  · A stent was successfully placed at 14 NAT for 20 sec.  · The 2nd Diag lesion was 50% stenosed with 0% stenosis post-intervention.  · A stent was successfully placed at 14 NAT for 20 sec.  · The estimated blood loss was none.  · There was two vessel coronary artery disease.  · The PCI was successful.    This is a bifurcation lesion intervention with potts classification 1,1,1, intervention planned with sequential stenting of each lesion due to plaque shift and worseining lesion in the diagonal. So sequential stenting  was done with subsequent rewiring of the two stented vessles and kissing agioplasty was performed with excellent angiographic result and no complication.            ROS: All 10 systems reviewed. Please refer to the HPI for pertinent positives. All other systems negative.     Past Medical History  Past Medical History:   Diagnosis Date    ALBERTO (acute kidney injury) 8/8/2022    Anticoagulant long-term use     Arthritis     Cancer     Chronic systolic CHF (congestive heart failure) 02/26/2022    Coronary artery disease     Digestive disorder     Myocardial infarction     2/20/22       Surgical History  Past Surgical History:   Procedure Laterality Date    CARDIAC CATHETERIZATION      COLONOSCOPY N/A 09/22/2021    Procedure: COLONOSCOPY;  Surgeon: Meaghan Jaime MD;  Location: [facility] ENDO;  Service: Endoscopy;  Laterality: N/A;    CORONARY ANGIOGRAPHY INCLUDING BYPASS GRAFTS WITH CATHETERIZATION OF LEFT HEART N/A 03/03/2020    Procedure: ANGIOGRAM, CORONARY, INCLUDING BYPASS GRAFT, WITH LEFT HEART CATHETERIZATION;  Surgeon: Ute Melchor MD;  Location: [facility] CATH LAB;  Service: Cardiology;  Laterality: N/A;    JOINT REPLACEMENT Right     knee    KNEE SURGERY      LEFT HEART CATHETERIZATION Left 02/22/2020    Procedure: CATHETERIZATION, HEART, LEFT;  Surgeon: Ute Melchor MD;  Location: [facility] CATH LAB;  Service: Cardiology;  Laterality: Left;    LEFT HEART CATHETERIZATION Left 02/26/2022    Procedure: CATHETERIZATION, HEART, LEFT;  Surgeon: Ute Melchor MD;  Location: [facility] CATH LAB;  Service: Cardiology;  Laterality: Left;    PERCUTANEOUS TRANSLUMINAL BALLOON ANGIOPLASTY OF CORONARY ARTERY Left 02/22/2020    Procedure: Angioplasty-coronary;  Surgeon: Ute Melchor MD;  Location: [facility] CATH LAB;  Service: Cardiology;  Laterality: Left;    PERCUTANEOUS TRANSLUMINAL BALLOON ANGIOPLASTY OF CORONARY ARTERY  02/26/2022    Procedure: Angioplasty-coronary;  Surgeon: Ute Melchor MD;  Location: [facility] CATH LAB;   Service: Cardiology;;    RIGHT HEMICOLECTOMY N/A 04/27/2022    Procedure: HEMICOLECTOMY, RIGHT;  Surgeon: eSe Dominguez MD;  Location: [facility];  Service: General;  Laterality: N/A;    SHOULDER ARTHROSCOPY Right     Dr Bella    SIGMOIDECTOMY      simple prostatectomy  06/06/2016    robotic assisted partial prostectomy due to BPH    TOTAL KNEE ARTHROPLASTY            Allergies:   Review of patient's allergies indicates:   Allergen Reactions    Statins-hmg-coa reductase inhibitors Other (See Comments)     SEVERE MYALGIAS AND GI SIDE EFFECTS       Social History:  Social History     Socioeconomic History    Marital status:    Tobacco Use    Smoking status: Never    Smokeless tobacco: Former     Types: Chew     Quit date: 6/3/2004   Substance and Sexual Activity    Alcohol use: Yes     Alcohol/week: 2.0 standard drinks of alcohol     Types: 2 Glasses of wine per week     Comment: per week    Drug use: No    Sexual activity: Yes     Social Determinants of Health     Financial Resource Strain: Low Risk  (4/1/2024)    Overall Financial Resource Strain (CARDIA)     Difficulty of Paying Living Expenses: Not hard at all   Food Insecurity: No Food Insecurity (4/1/2024)    Hunger Vital Sign     Worried About Running Out of Food in the Last Year: Never true     Ran Out of Food in the Last Year: Never true   Transportation Needs: No Transportation Needs (4/1/2024)    PRAPARE - Transportation     Lack of Transportation (Medical): No     Lack of Transportation (Non-Medical): No   Physical Activity: Sufficiently Active (4/1/2024)    Exercise Vital Sign     Days of Exercise per Week: 7 days     Minutes of Exercise per Session: 130 min   Stress: No Stress Concern Present (4/1/2024)    [employer] [place] [employer] - Occupational Stress Questionnaire     Feeling of Stress : Only a little   Social Connections: Unknown (4/1/2024)    Social Connection and Isolation Panel [NHANES]     Frequency of Communication with  Friends and Family: More than three times a week     Frequency of Social Gatherings with Friends and Family: More than three times a week     Active Member of Clubs or Organizations: No     Attends Club or Organization Meetings: Patient declined     Marital Status:    Housing Stability: Low Risk  (4/1/2024)    Housing Stability Vital Sign     Unable to Pay for Housing in the Last Year: No     Number of Places Lived in the Last Year: 1     Unstable Housing in the Last Year: No       Family History:  family history includes Cancer in his father; Diabetes in his maternal aunt and mother; Heart disease in his mother.    Home Medications:  Current Outpatient Medications on File Prior to Visit   Medication Sig Dispense Refill    metoprolol succinate (TOPROL-XL) 25 MG 24 hr tablet Take 0.5 tablets (12.5 mg total) by mouth every evening. 45 tablet 3    mometasone 0.1% (ELOCON) 0.1 % cream AAA bid prn for psoriasis. Strong steroid.  Do not use on face, underarms or groin. 50 g 1    multivitamin capsule Take 1 capsule by mouth once daily.      nitroGLYCERIN (NITROSTAT) 0.4 MG SL tablet Place 1 tablet (0.4 mg total) under the tongue every 5 (five) minutes as needed for Chest pain. 30 tablet 1    pantoprazole (PROTONIX) 20 MG tablet Take 1 tablet (20 mg total) by mouth once daily. (Patient not taking: Reported on 8/15/2023) 30 tablet 11    silodosin (RAPAFLO) 4 mg Cap capsule Take 1 capsule (4 mg total) by mouth once daily. 30 capsule 11     Current Facility-Administered Medications on File Prior to Visit   Medication Dose Route Frequency Provider Last Rate Last Admin    LIDOcaine (PF) 10 mg/ml (1%) injection 10 mg  1 mL Intradermal Once Franklin Fuentes MD           Physical exam:  There were no vitals taken for this visit.        General: Pt is a 73 y.o. year old male who is AAOx3, in NAD, is pleasant, well nourished, looks stated age  HEENT: PERRL, EOMI, Oral mucosa pink & moist  CVS  No abnormal cardiac pulsations  noted on inspection. JVP not raised. The apical impulse is normal on palpation, and is located in the left 5th intercostal space in the mid - clavicular line. No palpable thrills or abnormal pulsations noted. RR, S1 - S2 heard, no murmurs, rubs or gallops appreciated.   PUL : CTA B/L. No wheezes/crackles heard   ABD : BS +, soft. No tenderness elicited   LE : No C/C/E. Distal Pulses palpable B/L         LABS:    Chemistry:   Lab Results   Component Value Date     03/28/2024    K 4.9 03/28/2024     03/28/2024    CO2 25 03/28/2024    BUN 16 03/28/2024    CREATININE 1.2 03/28/2024    CALCIUM 9.8 03/28/2024     Cardiac Markers:   Lab Results   Component Value Date    TROPONINI 0.025 08/07/2022     Cardiac Markers (Last 3):   Lab Results   Component Value Date    TROPONINI 0.025 08/07/2022    TROPONINI <0.006 08/07/2022    TROPONINI 0.012 08/07/2022     CBC:   Lab Results   Component Value Date    WBC 12.37 03/24/2023    HGB 16.4 03/24/2023    HCT 48.8 03/24/2023    MCV 91 03/24/2023     03/24/2023     Lipids:   Lab Results   Component Value Date    CHOL 255 (H) 03/20/2023    TRIG 70 03/20/2023    HDL 85 (H) 03/20/2023     Coagulation:   Lab Results   Component Value Date    INR 1.0 04/29/2022    APTT 25.7 04/28/2022           Assessment        1. Preop cardiovascular exam    2. Heart failure with reduced ejection fraction, NYHA class II    3. Coronary artery disease of native artery of native heart with stable angina pectoris    4. H/O heart artery stent    5. H/O non-ST elevation myocardial infarction (NSTEMI)    6. Palpitations    7. Dilated cardiomyopathy    8. Hypercholesterolemia    9. Statin intolerance    10. Coronary artery disease, unspecified vessel or lesion type, unspecified whether angina present, unspecified whether native or transplanted heart         Plan:    Preop risk stratification   Elevated preop risk given co-morbidities for hernia repair surgery   EKG with no significant  abnormalities   Denies any cardiac symptoms and patient is very active, exercises with no issues  No further cardiac workup needed at this time     Congestive heart failure   Persistently keeps up with his numbers and calculates his ejection fraction on his own     CAD   History of MI s/p stents   Denies chest pain   Currently not on aspirin or statin   Continue metoprolol    HLD   Has statin allergy     Low salt, low fat diet  Exercise as tolerated, at least 30 min daily     This note was prepared using voice recognition system and is likely to have sound alike errors that may have been overlooked even after proofreading.     I have reviewed all pertinent chart information.  Plans and recommendations have been formulated under my direct supervision. All questions answered and patient voiced understanding.   If symptoms persist go to the ED.    RTC in 6m        Viktoriya Varela MD  Cardiology

## 2024-04-02 RX ORDER — METOPROLOL SUCCINATE 25 MG/1
TABLET, EXTENDED RELEASE ORAL
Qty: 45 TABLET | Refills: 0 | Status: SHIPPED | OUTPATIENT
Start: 2024-04-02

## 2024-04-04 ENCOUNTER — TELEPHONE (OUTPATIENT)
Dept: PREADMISSION TESTING | Facility: HOSPITAL | Age: 74
End: 2024-04-04
Payer: MEDICARE

## 2024-04-04 NOTE — TELEPHONE ENCOUNTER
Called and LVM about the following:     Please arrive to [facility] ([facility]) at 0745am on 4/5/24 for your scheduled procedure.  Address: [address] [place] [address] [place] [address] ([address] Building on left, 1st Floor Lobby)  >>>NO eating or drinking after midnight unless instructed otherwise by your Surgeon<<<    Thank you,  -[facility] Pre Admit Testing Dept.  Mon-Fri 7:30 am - 4 pm [phone]

## 2024-04-05 ENCOUNTER — ANESTHESIA EVENT (OUTPATIENT)
Dept: SURGERY | Facility: HOSPITAL | Age: 74
End: 2024-04-05
Payer: MEDICARE

## 2024-04-05 ENCOUNTER — HOSPITAL ENCOUNTER (OUTPATIENT)
Facility: HOSPITAL | Age: 74
Discharge: HOME | End: 2024-04-05
Attending: SURGERY | Admitting: SURGERY
Payer: MEDICARE

## 2024-04-05 ENCOUNTER — ANESTHESIA (OUTPATIENT)
Dept: SURGERY | Facility: HOSPITAL | Age: 74
End: 2024-04-05
Payer: MEDICARE

## 2024-04-05 DIAGNOSIS — K40.90 RIGHT INGUINAL HERNIA: ICD-10-CM

## 2024-04-05 PROCEDURE — 36000706: Performed by: SURGERY

## 2024-04-05 PROCEDURE — 71000039 HC RECOVERY, EACH ADD'L HOUR: Performed by: SURGERY

## 2024-04-05 PROCEDURE — 37000009 HC ANESTHESIA EA ADD 15 MINS: Performed by: SURGERY

## 2024-04-05 PROCEDURE — 37000008 HC ANESTHESIA 1ST 15 MINUTES: Performed by: SURGERY

## 2024-04-05 PROCEDURE — 63600175 PHARM REV CODE 636 W HCPCS: Mod: JZ,JG | Performed by: SURGERY

## 2024-04-05 PROCEDURE — C1781 MESH (IMPLANTABLE): HCPCS | Performed by: SURGERY

## 2024-04-05 PROCEDURE — 25000003 PHARM REV CODE 250: Performed by: SURGERY

## 2024-04-05 PROCEDURE — 63600175 PHARM REV CODE 636 W HCPCS: Performed by: SURGERY

## 2024-04-05 PROCEDURE — 63600175 PHARM REV CODE 636 W HCPCS: Performed by: CLINIC/CENTER

## 2024-04-05 PROCEDURE — 25000003 PHARM REV CODE 250: Performed by: CLINIC/CENTER

## 2024-04-05 PROCEDURE — 49505 PRP I/HERN INIT REDUC >5 YR: CPT | Mod: RT,,, | Performed by: SURGERY

## 2024-04-05 PROCEDURE — 71000015 HC POSTOP RECOV 1ST HR: Performed by: SURGERY

## 2024-04-05 PROCEDURE — 71000033 HC RECOVERY, INTIAL HOUR: Performed by: SURGERY

## 2024-04-05 PROCEDURE — 36000707: Performed by: SURGERY

## 2024-04-05 DEVICE — POLYPROPYLENE PARTIALLY ABSORBABLE MEDIUM BLUE / UNDYED PLUG AND ONLAY PATCH
Type: IMPLANTABLE DEVICE | Site: INGUINAL | Status: FUNCTIONAL
Brand: ULTRAPRO

## 2024-04-05 RX ORDER — ONDANSETRON 8 MG/1
8 TABLET, ORALLY DISINTEGRATING ORAL EVERY 8 HOURS PRN
Status: DISCONTINUED | OUTPATIENT
Start: 2024-04-05 | End: 2024-04-05 | Stop reason: HOSPADM

## 2024-04-05 RX ORDER — ONDANSETRON 8 MG/1
8 TABLET, ORALLY DISINTEGRATING ORAL EVERY 8 HOURS PRN
Status: CANCELLED | OUTPATIENT
Start: 2024-04-05

## 2024-04-05 RX ORDER — BUPIVACAINE HYDROCHLORIDE 2.5 MG/ML
INJECTION, SOLUTION EPIDURAL; INFILTRATION; INTRACAUDAL
Status: DISCONTINUED | OUTPATIENT
Start: 2024-04-05 | End: 2024-04-05 | Stop reason: HOSPADM

## 2024-04-05 RX ORDER — KETOROLAC TROMETHAMINE 30 MG/ML
15 INJECTION, SOLUTION INTRAMUSCULAR; INTRAVENOUS EVERY 8 HOURS PRN
Status: DISCONTINUED | OUTPATIENT
Start: 2024-04-05 | End: 2024-04-05 | Stop reason: HOSPADM

## 2024-04-05 RX ORDER — HYDROCODONE BITARTRATE AND ACETAMINOPHEN 5; 325 MG/1; MG/1
1 TABLET ORAL EVERY 6 HOURS PRN
Qty: 20 TABLET | Refills: 0 | Status: SHIPPED | OUTPATIENT
Start: 2024-04-05 | End: 2024-04-18

## 2024-04-05 RX ORDER — OXYCODONE AND ACETAMINOPHEN 5; 325 MG/1; MG/1
1 TABLET ORAL
Status: DISCONTINUED | OUTPATIENT
Start: 2024-04-05 | End: 2024-04-05 | Stop reason: HOSPADM

## 2024-04-05 RX ORDER — LIDOCAINE HYDROCHLORIDE 20 MG/ML
INJECTION INTRAVENOUS
Status: DISCONTINUED | OUTPATIENT
Start: 2024-04-05 | End: 2024-04-05

## 2024-04-05 RX ORDER — PROPOFOL 10 MG/ML
VIAL (ML) INTRAVENOUS
Status: DISCONTINUED | OUTPATIENT
Start: 2024-04-05 | End: 2024-04-05

## 2024-04-05 RX ORDER — LIDOCAINE HYDROCHLORIDE 10 MG/ML
INJECTION, SOLUTION EPIDURAL; INFILTRATION; INTRACAUDAL; PERINEURAL
Status: DISCONTINUED | OUTPATIENT
Start: 2024-04-05 | End: 2024-04-05 | Stop reason: HOSPADM

## 2024-04-05 RX ORDER — HYDROCODONE BITARTRATE AND ACETAMINOPHEN 5; 325 MG/1; MG/1
1 TABLET ORAL EVERY 4 HOURS PRN
Status: DISCONTINUED | OUTPATIENT
Start: 2024-04-05 | End: 2024-04-05 | Stop reason: HOSPADM

## 2024-04-05 RX ORDER — HYDROMORPHONE HYDROCHLORIDE 2 MG/ML
0.2 INJECTION, SOLUTION INTRAMUSCULAR; INTRAVENOUS; SUBCUTANEOUS EVERY 5 MIN PRN
Status: DISCONTINUED | OUTPATIENT
Start: 2024-04-05 | End: 2024-04-05 | Stop reason: HOSPADM

## 2024-04-05 RX ORDER — FENTANYL CITRATE 50 UG/ML
INJECTION, SOLUTION INTRAMUSCULAR; INTRAVENOUS
Status: DISCONTINUED | OUTPATIENT
Start: 2024-04-05 | End: 2024-04-05

## 2024-04-05 RX ORDER — HYDROMORPHONE HYDROCHLORIDE 2 MG/ML
1 INJECTION, SOLUTION INTRAMUSCULAR; INTRAVENOUS; SUBCUTANEOUS EVERY 4 HOURS PRN
Status: CANCELLED | OUTPATIENT
Start: 2024-04-05

## 2024-04-05 RX ORDER — ONDANSETRON HYDROCHLORIDE 2 MG/ML
4 INJECTION, SOLUTION INTRAVENOUS DAILY PRN
Status: DISCONTINUED | OUTPATIENT
Start: 2024-04-05 | End: 2024-04-05 | Stop reason: HOSPADM

## 2024-04-05 RX ORDER — LIDOCAINE HYDROCHLORIDE 20 MG/ML
JELLY TOPICAL
Status: DISCONTINUED | OUTPATIENT
Start: 2024-04-05 | End: 2024-04-05

## 2024-04-05 RX ORDER — CEFAZOLIN SODIUM 2 G/50ML
2 SOLUTION INTRAVENOUS
Status: COMPLETED | OUTPATIENT
Start: 2024-04-05 | End: 2024-04-05

## 2024-04-05 RX ORDER — HYDROCODONE BITARTRATE AND ACETAMINOPHEN 7.5; 325 MG/1; MG/1
1 TABLET ORAL EVERY 6 HOURS PRN
Status: DISCONTINUED | OUTPATIENT
Start: 2024-04-05 | End: 2024-04-05 | Stop reason: HOSPADM

## 2024-04-05 RX ORDER — PROPOFOL 10 MG/ML
VIAL (ML) INTRAVENOUS CONTINUOUS PRN
Status: DISCONTINUED | OUTPATIENT
Start: 2024-04-05 | End: 2024-04-05

## 2024-04-05 RX ADMIN — CEFAZOLIN SODIUM 2 G: 2 SOLUTION INTRAVENOUS at 09:04

## 2024-04-05 RX ADMIN — PROPOFOL 30 MG: 10 INJECTION, EMULSION INTRAVENOUS at 09:04

## 2024-04-05 RX ADMIN — SODIUM CHLORIDE, POTASSIUM CHLORIDE, SODIUM LACTATE AND CALCIUM CHLORIDE: 600; 310; 30; 20 INJECTION, SOLUTION INTRAVENOUS at 09:04

## 2024-04-05 RX ADMIN — FENTANYL CITRATE 50 MCG: 50 INJECTION, SOLUTION INTRAMUSCULAR; INTRAVENOUS at 09:04

## 2024-04-05 RX ADMIN — LIDOCAINE HYDROCHLORIDE 100 MG: 20 INJECTION INTRAVENOUS at 09:04

## 2024-04-05 RX ADMIN — PROPOFOL 100 MCG/KG/MIN: 10 INJECTION, EMULSION INTRAVENOUS at 09:04

## 2024-04-05 RX ADMIN — FENTANYL CITRATE 25 MCG: 50 INJECTION, SOLUTION INTRAMUSCULAR; INTRAVENOUS at 09:04

## 2024-04-05 RX ADMIN — LIDOCAINE HYDROCHLORIDE 4 ML: 20 JELLY TOPICAL at 09:04

## 2024-04-05 NOTE — TRANSFER OF CARE
"Anesthesia Transfer of Care Note    Patient: Mekhi Lambert    Procedure(s) Performed: Procedure(s) (LRB):  REPAIR, HERNIA, INGUINAL (Right)    Anesthesia Type: MAC    Transport from OR: Transported from OR on room air with adequate spontaneous ventilation    Post pain: adequate analgesia    Post assessment: no apparent anesthetic complications and tolerated procedure well    Post vital signs: stable    Level of consciousness: awake    Nausea/Vomiting: no nausea/vomiting    Complications: none    Transfer of care protocol was followed      Last vitals: Visit Vitals  /74   Pulse (!) 58   Temp 36.6 °C (97.8 °F) (Temporal)   Resp 16   Ht 5' 8" (1.727 m)   Wt 74.6 kg (164 lb 5.7 oz)   SpO2 100%   BMI 24.99 kg/m²     "

## 2024-04-05 NOTE — DISCHARGE INSTRUCTIONS
Please call for any fever, increase in pain, nausea or vomiting or redness or drainage from incision(s).    No lifting more than 30 pounds for 2 weeks    No driving if taking the pain medicine    May shower     Removed the glue when it becomes loose, usually takes 10-14 days.      You may want to obtain a stool softener and a laxative take while using pain medicine    If you become constipated from the pain medication you can use a double dose of,  Miralax or Glycolax, or milk of magnesia for severe constipation.    Our office phone numbers are  [phone] and     Our office fax  number is #[phone]

## 2024-04-05 NOTE — OP NOTE
[facility] - Surgery (Hospital)  Operative Note      Date of Procedure: 4/5/2024     Procedure: Procedure(s) (LRB):  REPAIR, HERNIA, INGUINAL (Right)     Surgeon(s) and Role:     * Franklin Fuentes MD - Primary    Assisting Surgeon: None    Pre-Operative Diagnosis: Right inguinal hernia [K40.90]    Post-Operative Diagnosis: Post-Op Diagnosis Codes:     * Right inguinal hernia [K40.90]    Anesthesia: Local MAC    Operative Findings (including complications, if any):     Moderate size indirect inguinal hernia and small direct inguinal hernia    Description of Technical Procedures:     The patient was brought into the operative room placed on the operative table in the supine position.  IV sedation was provided by the anesthesia service.  Pneumatic compression devices on lower extremities.  The lower groins were clipped, prepped and draped in the standard fashion.  Antibiotics were given intra operatively before the incision    A time-out was performed.      .   The right groin was prepped and draped in sterile fashion.  Local anesthesia was applied with a mixture of lidocaine and marcaine.      A right inguinal incision was made between the anterior superior iliac spine and the pubic tubercle.  The incision was deepened through Michelles and Campers fascia with electrocautery until the aponeurosis of the external oblique was encountered. Bridge vein were divided and tied with vicryl ties. This was cleaned and the external ring was exposed.  Hemostasis was achieved in the wound.  An incision was made in the midportion of the external oblique aponeurosis in the direction of its fibers.  The ilioinguinal nerve was identified and protected throughout the dissection.  Flaps of the external oblique were developed cephalad and inferiorly.    The cord was identified.  It was gently dissected free at the pubic tubercle and encircled with a Penrose drain.  The sac was identified and carefully dissected free of the cord down to  the level of the internal ring.  The vas deferens and testicular vessels were identified and protected from injury.  The sac was reduced.  A finger was passed into the peritoneal cavity and the floor of the inguinal canal assessed and found to be generally weak consistent with a small direct hernia.  The femoral canal was palpated and no hernia identified.  The sac was allowed to retract into the abdomen.     A plug of ultrapro mesh was placed in the indirect hernia and secured around the cord.  A polypropylene mesh was cut to the appropriate size with a lateral opening for the spermatic cord.  Beginning at the pubic tubercle, the mesh was sutured to the inguinal ligament inferiorly with running 0 Nurolon suture, and the conjoined tendon superiorly with interrupted 0 Nurolon sutures.  Care was taken to assure that the mesh was placed in a tension-free fashion and that no neurovascular structures were caught in the repair.  Laterally, the tails of the mesh were crossed and the internal ring recreated, allowing for passage of the surgeons fifth fingertip.    Hemostasis was again checked.  The Penrose  drain was removed.  The external oblique aponeurosis was closed with a running suture of 0 Vicryl, taking care not to catch the ilioinguinal nerve in the suture line.  Michelles fascia was closed with interrupted 3-0 Vicryl.  The skin was closed with a running subcuticular 4-0 Monocryl suture.  Dermabond was placed.      During the procedure additional lidocaine and Marcaine was infiltrated as needed to achieve sufficient local anesthetic     The testis was gently pulled down into its anatomic position in the scrotum.  The patient tolerated the procedure well and was taken to the postanesthesia care unit in stable condition.      Significant Surgical Tasks Conducted by the Assistant(s), if Applicable:  Assistance with retraction, exposure and closure    Estimated Blood Loss (EBL):  10 mL   IV fluids 500 mL   Marcaine  0.25% 30 mL   Lidocaine 1% 20 mL           Implants:   Implant Name Type Inv. Item Serial No.  Lot No. LRB No. Used Action   PLUG [employer]UM - [id number]  [employer]  [employer] [id number] Right 1 Implanted       Specimens:   Specimen (24h ago, onward)      None                    Condition: Stable    Disposition: PACU - hemodynamically stable.    Attestation: I performed the procedure.    Discharge Note    OUTCOME: Patient tolerated treatment/procedure well without complication and is now ready for discharge.    Open repair of a    DISPOSITION: Home or Self Care    FINAL DIAGNOSIS:  Indirect and direct inguinal hernias on the right    FOLLOWUP: In clinic    DISCHARGE INSTRUCTIONS:    Discharge Procedure Orders   Diet general     Call MD for:  temperature >100.4     Call MD for:  persistent nausea and vomiting     Call MD for:  severe uncontrolled pain     Call MD for:  difficulty breathing, headache or visual disturbances     Call MD for:  redness, tenderness, or signs of infection (pain, swelling, redness, odor or green/yellow discharge around incision site)     Lifting restrictions   Order Comments: IP no lifting more than 20 lb for     No dressing needed        Clinical Reference Documents Added to Patient Instructions         Document    HERNIA REPAIR DISCHARGE INSTRUCTIONS (ENGLISH)    HYDROCODONE AND ACETAMINOPHEN, ADULT (ENGLISH)

## 2024-04-05 NOTE — ANESTHESIA PREPROCEDURE EVALUATION
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 History coronary stent placement    H/O heart artery stent    Mood Disorder Unspecified (intermittent verbal irritability)    Anxiety disorder    Personal history of colonic polyps    Dilated cardiomyopathy    Heart failure with reduced ejection fraction, NYHA class II    Rectal pain    NSTEMI (non-ST elevated myocardial infarction)    Chronic systolic CHF (congestive heart failure)    Acute blood loss anemia    Splenic artery aneurysm     Past Medical History:   Diagnosis Date    ALBERTO (acute kidney injury) 8/8/2022    Anticoagulant long-term use     Arthritis     Cancer     Chronic systolic CHF (congestive heart failure) 02/26/2022    Coronary artery disease     Digestive disorder     Myocardial infarction     2/20/22     Past Surgical History:   Procedure Laterality Date    CARDIAC CATHETERIZATION      COLONOSCOPY N/A 09/22/2021    Procedure: COLONOSCOPY;  Surgeon: Meaghan Jaime MD;  Location: [facility] ENDO;  Service: Endoscopy;  Laterality: N/A;    CORONARY ANGIOGRAPHY INCLUDING BYPASS GRAFTS WITH CATHETERIZATION OF LEFT HEART N/A 03/03/2020    Procedure: ANGIOGRAM, CORONARY, INCLUDING BYPASS GRAFT, WITH LEFT HEART CATHETERIZATION;  Surgeon: Ute Melchor MD;  Location: [facility] CATH LAB;  Service: Cardiology;  Laterality: N/A;    JOINT REPLACEMENT Right     knee    KNEE SURGERY      LEFT HEART CATHETERIZATION Left 02/22/2020    Procedure: CATHETERIZATION, HEART, LEFT;  Surgeon: Ute Melchor MD;  Location: [facility] CATH LAB;   Service: Cardiology;  Laterality: Left;    LEFT HEART CATHETERIZATION Left 02/26/2022    Procedure: CATHETERIZATION, HEART, LEFT;  Surgeon: Ute Melchor MD;  Location: [facility] CATH LAB;  Service: Cardiology;  Laterality: Left;    PERCUTANEOUS TRANSLUMINAL BALLOON ANGIOPLASTY OF CORONARY ARTERY Left 02/22/2020    Procedure: Angioplasty-coronary;  Surgeon: Ute Melchor MD;  Location: [facility] CATH LAB;  Service: Cardiology;  Laterality: Left;    PERCUTANEOUS TRANSLUMINAL BALLOON ANGIOPLASTY OF CORONARY ARTERY  02/26/2022    Procedure: Angioplasty-coronary;  Surgeon: Ute Melchor MD;  Location: [facility] CATH LAB;  Service: Cardiology;;    RIGHT HEMICOLECTOMY N/A 04/27/2022    Procedure: HEMICOLECTOMY, RIGHT;  Surgeon: See Dominguez MD;  Location: [facility] OR;  Service: General;  Laterality: N/A;    SHOULDER ARTHROSCOPY Right     Dr Bella    SIGMOIDECTOMY      simple prostatectomy  06/06/2016    robotic assisted partial prostectomy due to BPH    TOTAL KNEE ARTHROPLASTY       Anesthesia Evaluation    I have reviewed the Patient Summary Reports.     I have reviewed the Nursing Notes. I have reviewed the NPO Status.   I have reviewed the Medications.     Review of Systems  Anesthesia Hx:  No problems with previous Anesthesia   History of prior surgery of interest to airway management or planning:  Previous anesthesia: General        Denies Family Hx of Anesthesia complications.    Denies Personal Hx of Anesthesia complications.                    Social:  Non-Smoker, Alcohol Use       Hematology/Oncology:  Hematology Normal   Oncology Normal                                   EENT/Dental:  EENT/Dental Normal           Cardiovascular:       Past MI CAD   CABG/stent   Angina CHF       ECG has been reviewed. Sinus bradycardia (59) with occasional Premature ventricular complexes   Low voltage QRS   Nonspecific T wave abnormality   Abnormal ECG   When compared with ECG of 08-AUG-2022 14:21,   Previous ECG has undetermined rhythm,  needs review   T wave inversion now evident in Lateral leads   Confirmed by KAREL BROWN MD ([id number]) on 3/29/2024 8:58:55 AM                            Pulmonary:         Pulmonary nodules per CT scan 6/2020               Renal/:  Chronic Renal Disease, ARF   ALBERTO              Hepatic/GI:  Bowel Prep.                Musculoskeletal:  Arthritis               Neurological:  Neurology Normal                                      Endocrine:  Endocrine Normal            Dermatological:  Skin Normal    Psych:   anxiety               ECHO:  Summary    There is pulmonary hypertension.  The left ventricle is normal in size with concentric remodeling and moderately decreased systolic function.  Grade I left ventricular diastolic dysfunction.  The estimated PA systolic pressure is 44 mmHg.  Normal right ventricular size with normal right ventricular systolic function.  Normal central venous pressure (3 mmHg).  The estimated ejection fraction is 30%.  Mild mitral regurgitation.  Mild tricuspid regurgitation.      Chemistry        Component Value Date/Time     03/28/2024 1102    K 4.9 03/28/2024 1102     03/28/2024 1102    CO2 25 03/28/2024 1102    BUN 16 03/28/2024 1102    CREATININE 1.2 03/28/2024 1102    GLU 90 03/28/2024 1102        Component Value Date/Time    CALCIUM 9.8 03/28/2024 1102    ALKPHOS 73 03/24/2023 1023    AST 31 03/24/2023 1023    ALT 53 (H) 03/24/2023 1023    BILITOT 0.8 03/24/2023 1023    ESTGFRAFRICA >60 05/05/2022 1647    EGFRNONAA >60 05/05/2022 1647        Lab Results   Component Value Date    WBC 12.37 03/24/2023    HGB 16.4 03/24/2023    HCT 48.8 03/24/2023    MCV 91 03/24/2023     03/24/2023       Physical Exam  General:  Well nourished       Airway/Jaw/Neck:   Mouth Opening: Normal     Tongue: Normal   Pre-Existing Airway Tube(s): Oral Endotracheal tube         Mallampati: II   TM Distance: Normal       Neck ROM: Normal ROM          Dental:   Intact      Chest/Lungs:  Chest/Lungs  Findings:  Normal Respiratory Rate       Heart/Vascular:  Heart Findings: Rate: Normal  Rhythm: Regular Rhythm                        Mental Status:  Mental Status Findings:  Cooperative, Alert and Oriented         Anesthesia Plan  Type of Anesthesia, risks & benefits discussed:  Anesthesia Type:  MAC    Patient's Preference: local/MAC  Plan Factors:          Intra-op Monitoring Plan: Standard ASA Monitors  Intra-op Monitoring Plan Comments:   Post Op Pain Control Plan: per primary service following discharge from PACU and multimodal analgesia  Post Op Pain Control Plan Comments:     Induction:   IV  Beta Blocker:  Patient is not currently on a Beta-Blocker (No further documentation required).       Informed Consent: Informed consent signed with the Patient and all parties understand the risks and agree with anesthesia plan.  All questions answered.  Anesthesia consent signed with patient.  ASA Score: 3     Day of Surgery Review of History & Physical: I have interviewed and examined the patient. I have reviewed the patient's H&P dated:  There are no significant changes.  H&P Update referred to the surgeon/provider.    Anesthesia Plan Notes: **Patient requests Local/MAC for procedure (had previous inguinal hernia repair under local/MAC and did well)        Ready For Surgery From Anesthesia Perspective.             Physical Exam  General: Well nourished    Airway:  Mallampati: II   Mouth Opening: Normal  TM Distance: Normal  Tongue: Normal  Neck ROM: Normal ROM  Pre-Existing Airway: Oral Endotracheal tube    Dental:  Intact    Chest/Lungs:  Normal Respiratory Rate    Heart:  Rate: Normal  Rhythm: Regular Rhythm          Anesthesia Plan  Type of Anesthesia, risks & benefits discussed:    Anesthesia Type: MAC  Intra-op Monitoring Plan: Standard ASA Monitors  Post Op Pain Control Plan: per primary service following discharge from PACU and multimodal analgesia  Induction:  IV  Airway Plan: Direct  Informed Consent:  Informed consent signed with the Patient and all parties understand the risks and agree with anesthesia plan.  All questions answered.   ASA Score: 3  Day of Surgery Review of History & Physical: H&P Update referred to the surgeon/provider.I have interviewed and examined the patient. I have reviewed the patient's H&P dated: There are no significant changes.   Anesthesia Plan Notes: **Patient requests Local/MAC for procedure (had previous inguinal hernia repair under local/MAC and did well)    Ready For Surgery From Anesthesia Perspective.       .

## 2024-04-08 VITALS
RESPIRATION RATE: 15 BRPM | SYSTOLIC BLOOD PRESSURE: 126 MMHG | DIASTOLIC BLOOD PRESSURE: 78 MMHG | HEIGHT: 68 IN | HEART RATE: 49 BPM | TEMPERATURE: 99 F | OXYGEN SATURATION: 97 % | WEIGHT: 164.38 LBS | BODY MASS INDEX: 24.91 KG/M2

## 2024-04-08 NOTE — ANESTHESIA POSTPROCEDURE EVALUATION
Anesthesia Post Evaluation    Patient: Mekhi Lambert    Procedure(s) Performed: Procedure(s) (LRB):  REPAIR, HERNIA, INGUINAL (Right)    Final Anesthesia Type: MAC      Patient location during evaluation: PACU  Patient participation: Yes- Able to Participate  Level of consciousness: awake and alert and oriented  Post-procedure vital signs: reviewed and stable  Pain management: adequate  Airway patency: patent  TIMA mitigation strategies: Multimodal analgesia  PONV status at discharge: No PONV  Anesthetic complications: no      Cardiovascular status: blood pressure returned to baseline and hemodynamically stable  Respiratory status: unassisted  Hydration status: euvolemic  Follow-up not needed.              Vitals Value Taken Time   /78 04/05/24 1115   Temp 37.1 °C (98.8 °F) 04/05/24 1115   Pulse 51 04/05/24 1118   Resp 16 04/05/24 1118   SpO2 95 % 04/05/24 1118   Vitals shown include unvalidated device data.      Event Time   Out of Recovery 11:23:18         Pain/Kristy Score: No data recorded

## 2024-04-17 ENCOUNTER — TELEPHONE (OUTPATIENT)
Dept: INTERNAL MEDICINE | Facility: CLINIC | Age: 74
End: 2024-04-17
Payer: MEDICARE

## 2024-04-17 DIAGNOSIS — R73.09 ELEVATED GLUCOSE: ICD-10-CM

## 2024-04-17 DIAGNOSIS — E78.00 HYPERCHOLESTEROLEMIA: Primary | ICD-10-CM

## 2024-04-17 NOTE — TELEPHONE ENCOUNTER
Returned call to pt regarding lab orders. Appt scheduled. He verbalized understanding of appt date and time.

## 2024-04-18 ENCOUNTER — OFFICE VISIT (OUTPATIENT)
Dept: SURGERY | Facility: CLINIC | Age: 74
End: 2024-04-18
Payer: MEDICARE

## 2024-04-18 VITALS
BODY MASS INDEX: 25.39 KG/M2 | HEART RATE: 54 BPM | WEIGHT: 167.56 LBS | SYSTOLIC BLOOD PRESSURE: 91 MMHG | DIASTOLIC BLOOD PRESSURE: 59 MMHG | HEIGHT: 68 IN | TEMPERATURE: 99 F

## 2024-04-18 DIAGNOSIS — I72.8 SPLENIC ARTERY ANEURYSM: ICD-10-CM

## 2024-04-18 DIAGNOSIS — K40.90 RIGHT INGUINAL HERNIA: Primary | ICD-10-CM

## 2024-04-18 PROBLEM — D62 ACUTE BLOOD LOSS ANEMIA: Status: RESOLVED | Noted: 2022-04-28 | Resolved: 2024-04-18

## 2024-04-18 PROCEDURE — 99024 POSTOP FOLLOW-UP VISIT: CPT | Mod: POP,,, | Performed by: SURGERY

## 2024-04-18 PROCEDURE — 99999 PR PBB SHADOW E&M-EST. PATIENT-LVL III: CPT | Mod: PBBFAC,,, | Performed by: SURGERY

## 2024-04-18 PROCEDURE — 99213 OFFICE O/P EST LOW 20 MIN: CPT | Mod: PBBFAC | Performed by: SURGERY

## 2024-04-18 RX ORDER — ACETAMINOPHEN 500 MG
500 TABLET ORAL EVERY 6 HOURS PRN
COMMUNITY

## 2024-04-18 NOTE — PATIENT INSTRUCTIONS
There are no limits on your activity.      The small amount of swelling in the right groin will go down over time.      We will see you back as needed    We will call you with the results of your CT scan in regards to your splenic artery aneurysm      Our office phone numbers are  [phone] and

## 2024-04-18 NOTE — PROGRESS NOTES
Subjective:       Patient ID: Mekhi Lambert is a 73 y.o. male.    Chief Complaint: Post-op Evaluation (Inguinal hernia repair)    Returns after a right inguinal hernia repair.  He was a slight fullness in the right groin.  He had mild pain that has resolved  Review of Systems   Gastrointestinal:         Right inguinal incision is healing.  Mild swelling     Objective:      Physical Exam  Vitals reviewed.   Abdominal:      General: Abdomen is flat.      Comments: Right inguinal incision is healing well.  Mild fullness.  No evidence of a recurrent hernia or seroma   Neurological:      Mental Status: He is alert.       Assessment:    Recovering as expected after open right inguinal hernia repair with mesh    Plan:       Activity as tolerated.  Follow up with surgery as needed

## 2024-04-22 ENCOUNTER — HOSPITAL ENCOUNTER (OUTPATIENT)
Dept: RADIOLOGY | Facility: HOSPITAL | Age: 74
Discharge: HOME | End: 2024-04-22
Attending: SURGERY
Payer: MEDICARE

## 2024-04-22 DIAGNOSIS — K40.90 RIGHT INGUINAL HERNIA: ICD-10-CM

## 2024-04-22 PROCEDURE — 74177 CT ABD & PELVIS W/CONTRAST: CPT | Mod: TC

## 2024-04-22 PROCEDURE — 74177 CT ABD & PELVIS W/CONTRAST: CPT | Mod: 26,,, | Performed by: RADIOLOGY

## 2024-04-22 PROCEDURE — A9698 NON-RAD CONTRAST MATERIALNOC: HCPCS | Performed by: SURGERY

## 2024-04-22 PROCEDURE — 25500020 PHARM REV CODE 255: Performed by: SURGERY

## 2024-04-22 RX ADMIN — IOHEXOL 1000 ML: 12 SOLUTION ORAL at 01:04

## 2024-04-22 RX ADMIN — IOHEXOL 100 ML: 350 INJECTION, SOLUTION INTRAVENOUS at 02:04

## 2024-04-24 ENCOUNTER — TELEPHONE (OUTPATIENT)
Dept: SURGERY | Facility: HOSPITAL | Age: 74
End: 2024-04-24
Payer: MEDICARE

## 2024-04-24 NOTE — TELEPHONE ENCOUNTER
Impression:     Splenic artery aneurysm noted within the mid to distal splenic artery measuring 1.6 x 1.5 cm which is stable from prior.     All CT scans at [this location] are performed using dose modulation techniques as appropriate to a performed exam including the following: automated exposure control; adjustment of the mA and/or kV according to patient size (this includes techniques or standardized protocols for targeted exams where dose is matched to indication / reason for exam; i.e. extremities or head); use of iterative reconstruction technique.     Finalized on: 4/22/2024 8:19 PM By:  Kayden Yañez MD  [facility]# [id number]    Patient was called about his CT scan showing that the splenic artery aneurysms was no bigger.  I did  him that if he ever develops severe abdominal pain should go to the emergency room and let him know that he has a splenic artery aneurysm.      Routine general surgical follow up is not needed

## 2024-04-26 ENCOUNTER — OFFICE VISIT (OUTPATIENT)
Dept: INTERNAL MEDICINE | Facility: CLINIC | Age: 74
End: 2024-04-26
Payer: MEDICARE

## 2024-04-26 VITALS
SYSTOLIC BLOOD PRESSURE: 116 MMHG | DIASTOLIC BLOOD PRESSURE: 80 MMHG | WEIGHT: 166.69 LBS | RESPIRATION RATE: 18 BRPM | HEIGHT: 68 IN | TEMPERATURE: 98 F | HEART RATE: 56 BPM | BODY MASS INDEX: 25.26 KG/M2

## 2024-04-26 DIAGNOSIS — F41.9 ANXIETY DISORDER, UNSPECIFIED TYPE: ICD-10-CM

## 2024-04-26 DIAGNOSIS — N40.0 BENIGN PROSTATIC HYPERPLASIA, UNSPECIFIED WHETHER LOWER URINARY TRACT SYMPTOMS PRESENT: ICD-10-CM

## 2024-04-26 DIAGNOSIS — L40.8 OTHER PSORIASIS: ICD-10-CM

## 2024-04-26 DIAGNOSIS — C44.90 SKIN CANCER: ICD-10-CM

## 2024-04-26 DIAGNOSIS — I72.8 SPLENIC ARTERY ANEURYSM: ICD-10-CM

## 2024-04-26 DIAGNOSIS — I50.22 CHRONIC SYSTOLIC CHF (CONGESTIVE HEART FAILURE): ICD-10-CM

## 2024-04-26 DIAGNOSIS — Z00.00 ENCOUNTER FOR PREVENTIVE HEALTH EXAMINATION: Primary | ICD-10-CM

## 2024-04-26 DIAGNOSIS — I42.0 DILATED CARDIOMYOPATHY: ICD-10-CM

## 2024-04-26 DIAGNOSIS — I25.118 CORONARY ARTERY DISEASE OF NATIVE ARTERY OF NATIVE HEART WITH STABLE ANGINA PECTORIS: ICD-10-CM

## 2024-04-26 DIAGNOSIS — F39 UNSPECIFIED MOOD (AFFECTIVE) DISORDER: ICD-10-CM

## 2024-04-26 PROCEDURE — G0439 PPPS, SUBSEQ VISIT: HCPCS | Mod: ,,, | Performed by: NURSE PRACTITIONER

## 2024-04-26 PROCEDURE — 99999 PR PBB SHADOW E&M-EST. PATIENT-LVL III: CPT | Mod: PBBFAC,,, | Performed by: NURSE PRACTITIONER

## 2024-04-26 PROCEDURE — 99213 OFFICE O/P EST LOW 20 MIN: CPT | Mod: PBBFAC | Performed by: NURSE PRACTITIONER

## 2024-04-26 NOTE — PROGRESS NOTES
"  Mekhi Lambert presented for a follow-up Medicare AWV today. The following components were reviewed and updated:    Medical history  Family History  Social history  Allergies and Current Medications  Health Risk Assessment  Health Maintenance  Care Team    **See Completed Assessments for Annual Wellness visit with in the encounter summary    The following assessments were completed:  Depression Screening  Cognitive function Screening  Timed Get Up Test  Whisper Test      Opioid documentation:      Patient does not have a current opioid prescription.          Vitals:    04/26/24 1500   BP: 116/80   Pulse: (!) 56   Resp: 18   Temp: 97.5 °F (36.4 °C)   Weight: 75.6 kg (166 lb 10.7 oz)   Height: 5' 8" (1.727 m)     Body mass index is 25.34 kg/m².       Physical Exam  Vitals and nursing note reviewed.   Constitutional:       Appearance: Normal appearance.   HENT:      Head: Normocephalic.   Cardiovascular:      Rate and Rhythm: Normal rate and regular rhythm.      Heart sounds: Murmur heard.   Pulmonary:      Effort: Pulmonary effort is normal.      Breath sounds: Normal breath sounds.   Musculoskeletal:         General: Normal range of motion.   Skin:     General: Skin is warm.   Neurological:      General: No focal deficit present.      Mental Status: He is alert and oriented to person, place, and time.   Psychiatric:         Mood and Affect: Mood is anxious.         Behavior: Behavior normal.         Thought Content: Thought content normal.         Judgment: Judgment normal.     Current Outpatient Medications:     acetaminophen (TYLENOL) 500 MG tablet, Take 500 mg by mouth every 6 (six) hours as needed for Pain., Disp: , Rfl:     metoprolol succinate (TOPROL-XL) 25 MG 24 hr tablet, TAKE 0.5 TABLETS BY MOUTH EVERY EVENING., Disp: 45 tablet, Rfl: 0    mometasone 0.1% (ELOCON) 0.1 % cream, AAA bid prn for psoriasis. Strong steroid.  Do not use on face, underarms or groin., Disp: 50 g, Rfl: 1    multivitamin capsule, Take " 1 capsule by mouth once daily., Disp: , Rfl:       Diagnoses and health risks identified today and associated recommendations/orders:  1. Encounter for preventive health examination    Reviewed over due vaccines    2. Coronary artery disease of native artery of native heart with  angina pectoris History coronary stent placement  Chronic and Stable.    Hx  of MI  an stents  x 3  Taking Toprol daily  Denies s/s   Continue low fat/carb/chol diet. Cardio exercise as tolerated. Life style modifications.  Followed by card- states he will find new card      3. Dilated cardiomyopathy echo   There is pulmonary hypertension.  The left ventricle is normal in size with concentric remodeling and moderately decreased systolic function.  Grade I left ventricular diastolic dysfunction.  The estimated PA systolic pressure is 44 mmHg.  Normal right ventricular size with normal right ventricular systolic function.  Normal central venous pressure (3 mmHg).  The estimated ejection fraction is 30%.  Mild mitral regurgitation.  Mild tricuspid regurgitation.  Chronic and Stable on Metoprolol Continue current treatment plan as previously prescribed with your card    4. Chronic systolic CHF (congestive heart failure)  See echo   Chronic and Stable on Metoprolol Continue current treatment plan as previously prescribed with your card  5. Splenic artery aneurysm  Splenic artery aneurysm.  1.9 cm in size on CT scan 12 th 2022.  Follow-up CT scan ordered. Followed by card    6. Mood Disorder Unspecified (intermittent verbal irritability)  Chronic and Stable- take Mg and supplements. Mediation and exercise/walks    7. Skin cancer  Stable and controlled.   No  new lesions pt   Routine  skin cheek  per derm    8. Anxiety disorder, unspecified type  Chronic and Stable- take Mg and supplements. Mediation and exercise/walks- decreased caffeine    9. Benign prostatic hyperplasia, unspecified whether lower urinary tract symptoms present  Chronic and  Stable. continue OTC supplements   Prostate Cancer Screening - PSA and JOURDAN normal, continue annual screening   Followed by urologist    10. Other psoriasis  Chronic and Stable on Elocin. Continue current treatment plan as previously prescribed with your derm     Provided [place] with a 5-10 year written screening schedule and personal prevention plan. Recommendations were developed using the USPSTF age appropriate recommendations. Education, counseling, and referrals were provided as needed.  After Visit Summary printed and given to patient which includes a list of additional screenings\tests needed.    Follow up in about 1 year (around 4/26/2025) for Follow up with PCP.  Angie Molina NP

## 2024-04-26 NOTE — PATIENT INSTRUCTIONS
Counseling and Referral of Other Preventative  (Italic type indicates deductible and co-insurance are waived)    Patient Name: Mekhi Lambert  Today's Date: 4/26/2024    Health Maintenance       Date Due Completion Date    Aspirin/Antiplatelet Therapy Never done ---    High Dose Statin Never done ---    RSV Vaccine (Age 60+ and Pregnant patients) (1 - 1-dose 60+ series) Never done ---    Pneumococcal Vaccines (Age 65+) (2 of 2 - PCV) 01/24/2018 1/24/2017    COVID-19 Vaccine (4 - 2023-24 season) 09/01/2023 10/6/2021    Colorectal Cancer Screening 09/22/2026 9/22/2021    TETANUS VACCINE 01/24/2027 1/24/2017    Lipid Panel 03/20/2028 3/20/2023        No orders of the defined types were placed in this encounter.      The following information is provided to all patients.  This information is to help you find resources for any of the problems found today that may be affecting your health:                  Living healthy guide: www.[facility].[place].gov      Understanding Diabetes: www.diabetes.org      Eating healthy: www.cdc.gov/healthyweight      [facility] home safety checklist: www.cdc.gov/steadi/patient.html      Agency on Aging: www.goea.[place].gov      Alcoholics anonymous (AA): www.aa.org      Physical Activity: www.makeda.nih.gov/od7fyme      Tobacco use: www.quitwithusla.org

## 2024-05-06 ENCOUNTER — LAB VISIT (OUTPATIENT)
Dept: LAB | Facility: HOSPITAL | Age: 74
End: 2024-05-06
Attending: PEDIATRICS
Payer: MEDICARE

## 2024-05-06 DIAGNOSIS — R73.09 ELEVATED GLUCOSE: ICD-10-CM

## 2024-05-06 DIAGNOSIS — E78.00 HYPERCHOLESTEROLEMIA: ICD-10-CM

## 2024-05-06 LAB
ALBUMIN SERPL BCP-MCNC: 3.9 G/DL (ref 3.5–5.2)
ALP SERPL-CCNC: 59 U/L (ref 55–135)
ALT SERPL W/O P-5'-P-CCNC: 20 U/L (ref 10–44)
ANION GAP SERPL CALC-SCNC: 5 MMOL/L (ref 8–16)
AST SERPL-CCNC: 19 U/L (ref 10–40)
BILIRUB SERPL-MCNC: 1 MG/DL (ref 0.1–1)
BUN SERPL-MCNC: 20 MG/DL (ref 8–23)
CALCIUM SERPL-MCNC: 9.7 MG/DL (ref 8.7–10.5)
CHLORIDE SERPL-SCNC: 109 MMOL/L (ref 95–110)
CHOLEST SERPL-MCNC: 247 MG/DL (ref 120–199)
CHOLEST/HDLC SERPL: 3.3 {RATIO} (ref 2–5)
CO2 SERPL-SCNC: 27 MMOL/L (ref 23–29)
CREAT SERPL-MCNC: 1.2 MG/DL (ref 0.5–1.4)
EST. GFR  (NO RACE VARIABLE): >60 ML/MIN/1.73 M^2
ESTIMATED AVG GLUCOSE: 100 MG/DL (ref 68–131)
GLUCOSE SERPL-MCNC: 90 MG/DL (ref 70–110)
HBA1C MFR BLD: 5.1 % (ref 4–5.6)
HDLC SERPL-MCNC: 75 MG/DL (ref 40–75)
HDLC SERPL: 30.4 % (ref 20–50)
LDLC SERPL CALC-MCNC: 157.8 MG/DL (ref 63–159)
NONHDLC SERPL-MCNC: 172 MG/DL
POTASSIUM SERPL-SCNC: 5.1 MMOL/L (ref 3.5–5.1)
PROT SERPL-MCNC: 6.2 G/DL (ref 6–8.4)
SODIUM SERPL-SCNC: 141 MMOL/L (ref 136–145)
TRIGL SERPL-MCNC: 71 MG/DL (ref 30–150)

## 2024-05-06 PROCEDURE — 36415 COLL VENOUS BLD VENIPUNCTURE: CPT | Mod: PN | Performed by: PEDIATRICS

## 2024-05-06 PROCEDURE — 80053 COMPREHEN METABOLIC PANEL: CPT | Performed by: PEDIATRICS

## 2024-05-06 PROCEDURE — 80061 LIPID PANEL: CPT | Performed by: PEDIATRICS

## 2024-05-06 PROCEDURE — 83036 HEMOGLOBIN GLYCOSYLATED A1C: CPT | Performed by: PEDIATRICS

## 2024-05-13 ENCOUNTER — OFFICE VISIT (OUTPATIENT)
Dept: INTERNAL MEDICINE | Facility: CLINIC | Age: 74
End: 2024-05-13
Payer: MEDICARE

## 2024-05-13 ENCOUNTER — HOSPITAL ENCOUNTER (OUTPATIENT)
Dept: RADIOLOGY | Facility: HOSPITAL | Age: 74
Discharge: HOME | End: 2024-05-13
Attending: PEDIATRICS
Payer: MEDICARE

## 2024-05-13 VITALS
HEART RATE: 57 BPM | OXYGEN SATURATION: 99 % | DIASTOLIC BLOOD PRESSURE: 78 MMHG | SYSTOLIC BLOOD PRESSURE: 114 MMHG | HEIGHT: 68 IN | WEIGHT: 167.31 LBS | RESPIRATION RATE: 17 BRPM | BODY MASS INDEX: 25.36 KG/M2 | TEMPERATURE: 96 F

## 2024-05-13 DIAGNOSIS — I50.22 CHRONIC SYSTOLIC CHF (CONGESTIVE HEART FAILURE): ICD-10-CM

## 2024-05-13 DIAGNOSIS — C44.90 SKIN CANCER: ICD-10-CM

## 2024-05-13 DIAGNOSIS — Z86.010 PERSONAL HISTORY OF COLONIC POLYPS: ICD-10-CM

## 2024-05-13 DIAGNOSIS — I25.2 H/O NON-ST ELEVATION MYOCARDIAL INFARCTION (NSTEMI): ICD-10-CM

## 2024-05-13 DIAGNOSIS — M79.672 LEFT FOOT PAIN: ICD-10-CM

## 2024-05-13 DIAGNOSIS — G72.0 STATIN MYOPATHY: ICD-10-CM

## 2024-05-13 DIAGNOSIS — Z00.00 WELL ADULT EXAM: Primary | ICD-10-CM

## 2024-05-13 DIAGNOSIS — T46.6X5A STATIN MYOPATHY: ICD-10-CM

## 2024-05-13 DIAGNOSIS — I72.8 SPLENIC ARTERY ANEURYSM: ICD-10-CM

## 2024-05-13 DIAGNOSIS — F39 UNSPECIFIED MOOD (AFFECTIVE) DISORDER: ICD-10-CM

## 2024-05-13 DIAGNOSIS — N40.0 PROSTATE HYPERTROPHY: ICD-10-CM

## 2024-05-13 DIAGNOSIS — F41.9 ANXIETY DISORDER, UNSPECIFIED TYPE: ICD-10-CM

## 2024-05-13 DIAGNOSIS — I42.0 DILATED CARDIOMYOPATHY: ICD-10-CM

## 2024-05-13 DIAGNOSIS — E78.00 HYPERCHOLESTEROLEMIA: ICD-10-CM

## 2024-05-13 PROBLEM — Z78.9 STATIN INTOLERANCE: Status: RESOLVED | Noted: 2020-02-26 | Resolved: 2024-05-13

## 2024-05-13 PROCEDURE — 99999PBSHW PNEUMOCOCCAL CONJUGATE VACCINE 20-VALENT: Mod: PBBFAC,,,

## 2024-05-13 PROCEDURE — 99999 PR PBB SHADOW E&M-EST. PATIENT-LVL V: CPT | Mod: PBBFAC,,, | Performed by: PEDIATRICS

## 2024-05-13 PROCEDURE — 90677 PCV20 VACCINE IM: CPT | Mod: PBBFAC

## 2024-05-13 PROCEDURE — 99397 PER PM REEVAL EST PAT 65+ YR: CPT | Mod: S$PBB,GZ,, | Performed by: PEDIATRICS

## 2024-05-13 PROCEDURE — 99215 OFFICE O/P EST HI 40 MIN: CPT | Mod: PBBFAC,25 | Performed by: PEDIATRICS

## 2024-05-13 PROCEDURE — 73630 X-RAY EXAM OF FOOT: CPT | Mod: TC,LT

## 2024-05-13 PROCEDURE — 73630 X-RAY EXAM OF FOOT: CPT | Mod: 26,LT,, | Performed by: RADIOLOGY

## 2024-05-13 RX ORDER — EZETIMIBE 10 MG/1
10 TABLET ORAL DAILY
Qty: 90 TABLET | Refills: 3 | Status: SHIPPED | OUTPATIENT
Start: 2024-05-13 | End: 2025-05-13

## 2024-05-13 NOTE — PROGRESS NOTES
Subjective     Patient ID: Mekhi Lambert is a 73 y.o. male.    Chief Complaint: Annual Exam    Mekhi Lambert is a 73 year old male here for his annual. Pt had a back injury in high school which has always caused him right leg pain. Pt now reports the pain has moved from his right to his left foot.     PMHx, PSHx, SocHx, and FHx reviewed and discussed with patient.    Patient Active Problem List:     Prostate hypertrophy     History of total right knee replacement     Skin cancer     Hypercholesterolemia     Family history of melanoma     NSTEMI (non-ST elevated myocardial infarction)     Statin intolerance/myopathy     Coronary artery disease of native artery of native heart with stable angina pectoris     H/O heart artery stent     CAD, multiple vessel     Mood Disorder Unspecified (intermittent verbal irritability)     Generalized anxiety disorder     Hx of hernia repair: Pt reports that he will intermittently feel the mesh in his abdomen, but it is overall improving.    Pt has been actively trying lifestyle modification to control his cardiovascular risk.      Review of Systems   Constitutional:  Negative for chills and fever.   HENT:  Negative for nasal congestion and rhinorrhea.    Respiratory:  Negative for cough and shortness of breath.    Cardiovascular:  Negative for chest pain.   Gastrointestinal:  Positive for abdominal pain. Negative for blood in stool, change in bowel habit, constipation, diarrhea and nausea.   Endocrine: Negative for cold intolerance, heat intolerance, polydipsia, polyphagia and polyuria.   Genitourinary:  Negative for dysuria.   Musculoskeletal:  Positive for arthralgias, back pain, leg pain and myalgias.   Neurological:  Negative for weakness.          Objective     Physical Exam  Constitutional:       General: He is not in acute distress.     Appearance: Normal appearance. He is normal weight. He is not ill-appearing, toxic-appearing or diaphoretic.   HENT:      Head: Atraumatic.    Neck:      Vascular: No carotid bruit.   Cardiovascular:      Rate and Rhythm: Normal rate and regular rhythm.      Pulses: Normal pulses.      Heart sounds: Normal heart sounds. No murmur heard.  Pulmonary:      Effort: Pulmonary effort is normal. No respiratory distress.      Breath sounds: Normal breath sounds. No stridor. No wheezing, rhonchi or rales.   Chest:      Chest wall: No tenderness.   Abdominal:      General: Abdomen is flat. Bowel sounds are normal. There is no distension.      Palpations: Abdomen is soft. There is no mass.      Tenderness: There is no abdominal tenderness. There is no guarding.   Neurological:      General: No focal deficit present.      Mental Status: He is alert and oriented to person, place, and time. Mental status is at baseline.      Cranial Nerves: No cranial nerve deficit.      Sensory: No sensory deficit.      Motor: No weakness.      Gait: Gait normal.   Psychiatric:         Mood and Affect: Mood normal.         Behavior: Behavior normal.         Thought Content: Thought content normal.         Judgment: Judgment normal.            Assessment and Plan     1. Well adult exam    2. Prostate hypertrophy    3. Skin cancer    4. Hypercholesterolemia  -     Lipid Panel; Future; Expected date: 05/13/2024  -     Comprehensive Metabolic Panel; Future; Expected date: 05/13/2024  -     ezetimibe (ZETIA) 10 mg tablet; Take 1 tablet (10 mg total) by mouth once daily.  Dispense: 90 tablet; Refill: 3  -     Lipid Panel; Future; Expected date: 05/13/2024  -     Comprehensive Metabolic Panel; Future; Expected date: 05/13/2024    5. H/O non-ST elevation myocardial infarction (NSTEMI)    6. Statin myopathy    7. Mood Disorder Unspecified (intermittent verbal irritability)    8. Anxiety disorder, unspecified type    9. Personal history of colonic polyps    10. Dilated cardiomyopathy    11. Chronic systolic CHF (congestive heart failure)    12. Splenic artery aneurysm  Overview:  Splenic  artery aneurysm.  1.9 cm in size on CT scan 12th 2022.  Follow-up CT scan ordered      13. Left foot pain  -     Ambulatory referral/consult to Podiatry; Future; Expected date: 05/20/2024  -     X-Ray Foot Complete 3 view Left; Future; Expected date: 05/13/2024    Other orders  -     (In Office Administered) Pneumococcal Conjugate Vaccine (20 Valent) (IM) (Preferred)        Await Xray. Referral to podiatry. BP and A1c are at goal. Lipids near goal. Pt agrees to try zetia, long discussion of various tx arms for cholesterol. Maintain lifestyle mod. HMI discussed with pt. PCV20 given today. F/U in 3 months.         No follow-ups on file.

## 2024-05-20 ENCOUNTER — OFFICE VISIT (OUTPATIENT)
Dept: PODIATRY | Facility: CLINIC | Age: 74
End: 2024-05-20
Payer: MEDICARE

## 2024-05-20 ENCOUNTER — HOSPITAL ENCOUNTER (OUTPATIENT)
Dept: RADIOLOGY | Facility: HOSPITAL | Age: 74
Discharge: HOME | End: 2024-05-20
Attending: PODIATRIST
Payer: MEDICARE

## 2024-05-20 VITALS — WEIGHT: 167.31 LBS | BODY MASS INDEX: 25.36 KG/M2 | HEIGHT: 68 IN

## 2024-05-20 DIAGNOSIS — M76.72 PERONEAL TENDONITIS, LEFT: Primary | ICD-10-CM

## 2024-05-20 DIAGNOSIS — M54.16 LUMBAR RADICULOPATHY: ICD-10-CM

## 2024-05-20 PROCEDURE — 72114 X-RAY EXAM L-S SPINE BENDING: CPT | Mod: TC

## 2024-05-20 PROCEDURE — 99213 OFFICE O/P EST LOW 20 MIN: CPT | Mod: PBBFAC,25 | Performed by: PODIATRIST

## 2024-05-20 PROCEDURE — 99999 PR PBB SHADOW E&M-EST. PATIENT-LVL III: CPT | Mod: PBBFAC,,, | Performed by: PODIATRIST

## 2024-05-20 PROCEDURE — 99203 OFFICE O/P NEW LOW 30 MIN: CPT | Mod: S$PBB,,, | Performed by: PODIATRIST

## 2024-05-20 PROCEDURE — 72114 X-RAY EXAM L-S SPINE BENDING: CPT | Mod: 26,,, | Performed by: RADIOLOGY

## 2024-05-20 NOTE — PROGRESS NOTES
Subjective:     Patient ID: Mekhi Lambert is a 73 y.o. male.    Chief Complaint: Foot Pain (Pt c/o left side of foot pain 0/10 now,non-diabetic pt wears tennis shoes, PCP Dr Lopez last seen 5-13-24)    Mekhi is a 73 y.o. male who presents to the podiatry clinic  with complaint of  left foot pain. Onset of the symptoms was several weeks ago. Precipitating event: none known. Current symptoms include: ability to bear weight, but with some pain. Aggravating factors: walking. Symptoms have stabilized.  Evaluation to date: plain films: normal. Treatment to date:  elliptical machine . Patients rates pain 0/10 on pain scale. Patient points to left lateral foot. Patient has no other pedal complaints at this time. Patient does admit history of lower back issues with L4 and L5 but denies any pain currently.     Patient Active Problem List   Diagnosis    Prostate hypertrophy    History of total right knee replacement    Skin cancer    Hypercholesterolemia    Family history of melanoma    H/O non-ST elevation myocardial infarction (NSTEMI)    Coronary artery disease of native artery of native heart with  angina pectoris History coronary stent placement    H/O heart artery stent    Mood Disorder Unspecified (intermittent verbal irritability)    Anxiety disorder    Personal history of colonic polyps    Dilated cardiomyopathy    Heart failure with reduced ejection fraction, NYHA class II    Rectal pain    NSTEMI (non-ST elevated myocardial infarction)    Chronic systolic CHF (congestive heart failure)    Splenic artery aneurysm    Statin myopathy       Medication List with Changes/Refills   Current Medications    ACETAMINOPHEN (TYLENOL) 500 MG TABLET    Take 500 mg by mouth every 6 (six) hours as needed for Pain.    EZETIMIBE (ZETIA) 10 MG TABLET    Take 1 tablet (10 mg total) by mouth once daily.    METOPROLOL SUCCINATE (TOPROL-XL) 25 MG 24 HR TABLET    TAKE 0.5 TABLETS BY MOUTH EVERY EVENING.    MOMETASONE 0.1% (ELOCON) 0.1 %  CREAM    AAA bid prn for psoriasis. Strong steroid.  Do not use on face, underarms or groin.    MULTIVITAMIN CAPSULE    Take 1 capsule by mouth once daily.       Review of patient's allergies indicates:   Allergen Reactions    Statins-hmg-coa reductase inhibitors Other (See Comments)     SEVERE MYALGIAS AND GI SIDE EFFECTS       Past Surgical History:   Procedure Laterality Date    CARDIAC CATHETERIZATION      COLONOSCOPY N/A 09/22/2021    Procedure: COLONOSCOPY;  Surgeon: Meaghan Jaime MD;  Location: [facility] ENDO;  Service: Endoscopy;  Laterality: N/A;    CORONARY ANGIOGRAPHY INCLUDING BYPASS GRAFTS WITH CATHETERIZATION OF LEFT HEART N/A 03/03/2020    Procedure: ANGIOGRAM, CORONARY, INCLUDING BYPASS GRAFT, WITH LEFT HEART CATHETERIZATION;  Surgeon: Ute Melchor MD;  Location: [facility] CATH LAB;  Service: Cardiology;  Laterality: N/A;    JOINT REPLACEMENT Right     knee    KNEE SURGERY      LEFT HEART CATHETERIZATION Left 02/22/2020    Procedure: CATHETERIZATION, HEART, LEFT;  Surgeon: Ute Melchor MD;  Location: [facility] CATH LAB;  Service: Cardiology;  Laterality: Left;    LEFT HEART CATHETERIZATION Left 02/26/2022    Procedure: CATHETERIZATION, HEART, LEFT;  Surgeon: Ute Melchor MD;  Location: [facility] CATH LAB;  Service: Cardiology;  Laterality: Left;    PERCUTANEOUS TRANSLUMINAL BALLOON ANGIOPLASTY OF CORONARY ARTERY Left 02/22/2020    Procedure: Angioplasty-coronary;  Surgeon: Ute Melchor MD;  Location: [facility] CATH LAB;  Service: Cardiology;  Laterality: Left;    PERCUTANEOUS TRANSLUMINAL BALLOON ANGIOPLASTY OF CORONARY ARTERY  02/26/2022    Procedure: Angioplasty-coronary;  Surgeon: Ute Melchor MD;  Location: [facility] CATH LAB;  Service: Cardiology;;    REPAIR, HERNIA, INGUINAL Right 4/5/2024    Procedure: REPAIR, HERNIA, INGUINAL;  Surgeon: Franklin Fuentes MD;  Location: [facility] OR;  Service: General;  Laterality: Right;    RIGHT HEMICOLECTOMY N/A 04/27/2022    Procedure: HEMICOLECTOMY, RIGHT;  Surgeon:  See Dominguez MD;  Location: [facility] OR;  Service: General;  Laterality: N/A;    SHOULDER ARTHROSCOPY Right     Dr Jena    SIGMOIDECTOMY      simple prostatectomy  06/06/2016    robotic assisted partial prostectomy due to BPH    TOTAL KNEE ARTHROPLASTY         Family History   Problem Relation Name Age of Onset    Heart disease Mother          lupus related    Diabetes Mother      Cancer Father          prostate, bone, lung, skin cancers all seperate types    Diabetes Maternal Aunt         Social History     Socioeconomic History    Marital status:    Tobacco Use    Smoking status: Never    Smokeless tobacco: Former     Types: Chew     Quit date: 6/3/2004   Substance and Sexual Activity    Alcohol use: Yes     Alcohol/week: 2.0 standard drinks of alcohol     Types: 2 Glasses of wine per week     Comment: per week    Drug use: No    Sexual activity: Yes     Social Determinants of Health     Financial Resource Strain: Low Risk  (4/26/2024)    Overall Financial Resource Strain (CARDIA)     Difficulty of Paying Living Expenses: Not hard at all   Food Insecurity: No Food Insecurity (4/26/2024)    Hunger Vital Sign     Worried About Running Out of Food in the Last Year: Never true     Ran Out of Food in the Last Year: Never true   Transportation Needs: No Transportation Needs (4/26/2024)    PRAPARE - Transportation     Lack of Transportation (Medical): No     Lack of Transportation (Non-Medical): No   Physical Activity: Sufficiently Active (4/26/2024)    Exercise Vital Sign     Days of Exercise per Week: 7 days     Minutes of Exercise per Session: 130 min   Stress: No Stress Concern Present (4/26/2024)    [place] [employer] - Occupational Stress Questionnaire     Feeling of Stress : Only a little   Housing Stability: Low Risk  (4/1/2024)    Housing Stability Vital Sign     Unable to Pay for Housing in the Last Year: No     Number of Places Lived in the Last Year: 1     Unstable Housing in the  "Last Year: No       Vitals:    05/20/24 1451   Weight: 75.9 kg (167 lb 5.3 oz)   Height: 5' 8" (1.727 m)   PainSc: 0-No pain       Hemoglobin A1C   Date Value Ref Range Status   05/06/2024 5.1 4.0 - 5.6 % Final     Comment:     ADA Screening Guidelines:  5.7-6.4%  Consistent with prediabetes  >or=6.5%  Consistent with diabetes    High levels of fetal hemoglobin interfere with the HbA1C  assay. Heterozygous hemoglobin variants (HbS, HgC, etc)do  not significantly interfere with this assay.   However, presence of multiple variants may affect accuracy.     02/23/2020 4.9 4.0 - 5.6 % Final     Comment:     ADA Screening Guidelines:  5.7-6.4%  Consistent with prediabetes  >or=6.5%  Consistent with diabetes  High levels of fetal hemoglobin interfere with the HbA1C  assay. Heterozygous hemoglobin variants (HbS, HgC, etc)do  not significantly interfere with this assay.   However, presence of multiple variants may affect accuracy.         Review of Systems   Constitutional:  Negative for chills and fever.   Respiratory:  Negative for shortness of breath.    Cardiovascular:  Negative for chest pain, palpitations, orthopnea, claudication and leg swelling.   Gastrointestinal:  Negative for diarrhea, nausea and vomiting.   Musculoskeletal:  Negative for joint pain.   Skin:  Negative for rash.   Neurological:  Negative for dizziness, tingling, sensory change, focal weakness and weakness.   Psychiatric/Behavioral: Negative.             Objective:       PHYSICAL EXAM: Apperance: Alert and orient in no distress,well developed, and with good attention to grooming and body habits  Patient presents ambulating in tennis shoes.   Lower Extremity Physical Exam:  VASCULAR: Dorsalis pedis pulses 2/4 left and Posterior Tibial pulses 2/4 left.   DERMATOLOGICAL: No skin rashes, subcutaneous nodules, lesions, or ulcers observed bilateral.  NEUROLOGICAL: Light touch, sharp-dull, proprioception all present and equal bilaterally.  "   MUSCULOSKELETAL: Muscle strength is 5/5 for foot inverters, everters, plantarflexors, and dorsiflexors. Muscle tone is normal. (--) pain on palpation of left peroneal tendon and styloid process. No pain on left foot inversion or eversion.      TEST RESULTS: Radiographs of left foot/ankle taken 05/13/2024 reveals no acute abnormality or significant arthritic change.           Assessment:       ICD-10-CM ICD-9-CM   1. Peroneal tendonitis, left  M76.72 726.79   2. Lumbar radiculopathy  M54.16 724.4       Plan:   Peroneal tendonitis, left  -     Ambulatory referral/consult to Podiatry    Lumbar radiculopathy  -     X-Ray Lumbar Complete Including Flex And Ext; Future; Expected date: 05/20/2024  -     Ambulatory referral/consult to Pain Clinic; Future; Expected date: 05/27/2024    I counseled the patient on his conditions, regarding findings of my examination, my impressions, and usual treatment plan.   Reviewed left foot x-rays in exam room with patient.   The patient and I reviewed the types of shoes he should be wearing, my recommendation includes generally the best time of the day for a shoe fitting is the afternoon, shoes with a wide toe box, very good cushion, and tennis shoes with removable inner soles.The patient and I reviewed my recommendations for over-the-counter orthotic inserts.   Ordered lower back x-rays to be completed today.   Referral completed Interventional pain management.   Patient to returns if symptoms return or persist.          Yesi Eaton DPM  [facility]

## 2024-06-29 DIAGNOSIS — R00.2 PALPITATIONS: ICD-10-CM

## 2024-06-29 DIAGNOSIS — I42.0 DCM (DILATED CARDIOMYOPATHY): ICD-10-CM

## 2024-06-29 DIAGNOSIS — I42.0 DILATED CARDIOMYOPATHY: ICD-10-CM

## 2024-07-01 RX ORDER — METOPROLOL SUCCINATE 25 MG/1
12.5 TABLET, EXTENDED RELEASE ORAL NIGHTLY
Qty: 45 TABLET | Refills: 1 | Status: SHIPPED | OUTPATIENT
Start: 2024-07-01

## 2024-08-06 ENCOUNTER — LAB VISIT (OUTPATIENT)
Dept: LAB | Facility: HOSPITAL | Age: 74
End: 2024-08-06
Attending: PEDIATRICS
Payer: MEDICARE

## 2024-08-06 DIAGNOSIS — E78.00 HYPERCHOLESTEROLEMIA: ICD-10-CM

## 2024-08-06 LAB
ALBUMIN SERPL BCP-MCNC: 3.7 G/DL (ref 3.5–5.2)
ALP SERPL-CCNC: 61 U/L (ref 55–135)
ALT SERPL W/O P-5'-P-CCNC: 13 U/L (ref 10–44)
ANION GAP SERPL CALC-SCNC: 5 MMOL/L (ref 8–16)
AST SERPL-CCNC: 17 U/L (ref 10–40)
BILIRUB SERPL-MCNC: 1 MG/DL (ref 0.1–1)
BUN SERPL-MCNC: 24 MG/DL (ref 8–23)
CALCIUM SERPL-MCNC: 9 MG/DL (ref 8.7–10.5)
CHLORIDE SERPL-SCNC: 108 MMOL/L (ref 95–110)
CHOLEST SERPL-MCNC: 227 MG/DL (ref 120–199)
CHOLEST/HDLC SERPL: 3.3 {RATIO} (ref 2–5)
CO2 SERPL-SCNC: 26 MMOL/L (ref 23–29)
CREAT SERPL-MCNC: 1.2 MG/DL (ref 0.5–1.4)
EST. GFR  (NO RACE VARIABLE): >60 ML/MIN/1.73 M^2
GLUCOSE SERPL-MCNC: 81 MG/DL (ref 70–110)
HDLC SERPL-MCNC: 69 MG/DL (ref 40–75)
HDLC SERPL: 30.4 % (ref 20–50)
LDLC SERPL CALC-MCNC: 145.6 MG/DL (ref 63–159)
NONHDLC SERPL-MCNC: 158 MG/DL
POTASSIUM SERPL-SCNC: 4.6 MMOL/L (ref 3.5–5.1)
PROT SERPL-MCNC: 5.9 G/DL (ref 6–8.4)
SODIUM SERPL-SCNC: 139 MMOL/L (ref 136–145)
TRIGL SERPL-MCNC: 62 MG/DL (ref 30–150)

## 2024-08-06 PROCEDURE — 80053 COMPREHEN METABOLIC PANEL: CPT | Performed by: PEDIATRICS

## 2024-08-06 PROCEDURE — 36415 COLL VENOUS BLD VENIPUNCTURE: CPT | Performed by: PEDIATRICS

## 2024-08-06 PROCEDURE — 80061 LIPID PANEL: CPT | Performed by: PEDIATRICS

## 2024-08-13 ENCOUNTER — OFFICE VISIT (OUTPATIENT)
Dept: INTERNAL MEDICINE | Facility: CLINIC | Age: 74
End: 2024-08-13
Payer: MEDICARE

## 2024-08-13 VITALS
DIASTOLIC BLOOD PRESSURE: 74 MMHG | HEART RATE: 61 BPM | HEIGHT: 68 IN | BODY MASS INDEX: 25.53 KG/M2 | SYSTOLIC BLOOD PRESSURE: 116 MMHG | OXYGEN SATURATION: 98 % | RESPIRATION RATE: 18 BRPM | WEIGHT: 168.44 LBS | TEMPERATURE: 96 F

## 2024-08-13 DIAGNOSIS — T46.6X5A STATIN MYOPATHY: ICD-10-CM

## 2024-08-13 DIAGNOSIS — E78.00 HYPERCHOLESTEROLEMIA: Primary | ICD-10-CM

## 2024-08-13 DIAGNOSIS — Z12.5 PROSTATE CANCER SCREENING: ICD-10-CM

## 2024-08-13 DIAGNOSIS — G72.0 STATIN MYOPATHY: ICD-10-CM

## 2024-08-13 DIAGNOSIS — I25.118 CORONARY ARTERY DISEASE OF NATIVE ARTERY OF NATIVE HEART WITH STABLE ANGINA PECTORIS: ICD-10-CM

## 2024-08-13 PROCEDURE — G2211 COMPLEX E/M VISIT ADD ON: HCPCS | Mod: S$PBB,,, | Performed by: PEDIATRICS

## 2024-08-13 PROCEDURE — 99214 OFFICE O/P EST MOD 30 MIN: CPT | Mod: PBBFAC | Performed by: PEDIATRICS

## 2024-08-13 PROCEDURE — 99214 OFFICE O/P EST MOD 30 MIN: CPT | Mod: S$PBB,,, | Performed by: PEDIATRICS

## 2024-08-13 PROCEDURE — 99999 PR PBB SHADOW E&M-EST. PATIENT-LVL IV: CPT | Mod: PBBFAC,,, | Performed by: PEDIATRICS

## 2024-08-15 ENCOUNTER — OFFICE VISIT (OUTPATIENT)
Dept: DERMATOLOGY | Facility: CLINIC | Age: 74
End: 2024-08-15
Payer: MEDICARE

## 2024-08-15 DIAGNOSIS — Z12.83 SCREENING, MALIGNANT NEOPLASM, SKIN: ICD-10-CM

## 2024-08-15 DIAGNOSIS — L90.5 SCAR CONDITIONS/SKIN FIBROSIS: Primary | ICD-10-CM

## 2024-08-15 DIAGNOSIS — L57.0 ACTINIC KERATOSES: ICD-10-CM

## 2024-08-15 DIAGNOSIS — L82.1 SEBORRHEIC KERATOSIS: ICD-10-CM

## 2024-08-15 DIAGNOSIS — Z85.828 HISTORY OF SKIN CANCER: ICD-10-CM

## 2024-08-15 PROCEDURE — 17000 DESTRUCT PREMALG LESION: CPT | Mod: S$PBB,,, | Performed by: DERMATOLOGY

## 2024-08-15 PROCEDURE — 99213 OFFICE O/P EST LOW 20 MIN: CPT | Mod: PBBFAC | Performed by: DERMATOLOGY

## 2024-08-15 PROCEDURE — 99213 OFFICE O/P EST LOW 20 MIN: CPT | Mod: 25,S$PBB,, | Performed by: DERMATOLOGY

## 2024-08-15 PROCEDURE — 99999 PR PBB SHADOW E&M-EST. PATIENT-LVL III: CPT | Mod: PBBFAC,,, | Performed by: DERMATOLOGY

## 2024-08-15 PROCEDURE — 17003 DESTRUCT PREMALG LES 2-14: CPT | Mod: S$PBB,,, | Performed by: DERMATOLOGY

## 2024-08-15 PROCEDURE — 17000 DESTRUCT PREMALG LESION: CPT | Mod: PBBFAC | Performed by: DERMATOLOGY

## 2024-08-15 PROCEDURE — 17003 DESTRUCT PREMALG LES 2-14: CPT | Mod: PBBFAC | Performed by: DERMATOLOGY

## 2024-08-15 NOTE — PROGRESS NOTES
Subjective:       Patient ID:  Mekhi Lambert is a 74 y.o. male who presents for   Chief Complaint   Patient presents with    Skin Check     Hx of NMSC of the right upper back, family hx of melanoma (father), HAK of the left forearm (s/p biopsy on 10/12/22, could not r/o invasive SCC, pt opt for surveillance), AK of the right dorsal hand (s/p biopsy of the 3/4/20), psoriasis, last seen on 8/15/23. He states he had other pre-cancer of the left forearm, resolved with scraping site and spending time outdoors with some improvement.      Denies bleeding, tender, growing, or concerning lesions.     This is a high risk patient here to check for the development of new lesions.          Review of Systems   Constitutional:  Negative for fever and chills.   Gastrointestinal:  Negative for nausea and vomiting.   Skin:  Positive for activity-related sunscreen use. Negative for daily sunscreen use and recent sunburn.   Hematologic/Lymphatic: Does not bruise/bleed easily.        Objective:    Physical Exam   Constitutional: He appears well-developed and well-nourished. No distress.   Neurological: He is alert and oriented to person, place, and time. He is not disoriented.   Psychiatric: He has a normal mood and affect.   Skin:   Areas Examined (abnormalities noted in diagram):   Scalp / Hair Palpated and Inspected  Head / Face Inspection Performed  Neck Inspection Performed  Chest / Axilla Inspection Performed  Abdomen Inspection Performed  Back Inspection Performed  RUE Inspected  LUE Inspection Performed  RLE Inspected  LLE Inspection Performed  Nails and Digits Inspection Performed                   Diagram Legend     Erythematous scaling macule/papule c/w actinic keratosis       Vascular papule c/w angioma      Pigmented verrucoid papule/plaque c/w seborrheic keratosis      Yellow umbilicated papule c/w sebaceous hyperplasia      Irregularly shaped tan macule c/w lentigo     1-2 mm smooth white papules consistent with Milia       Movable subcutaneous cyst with punctum c/w epidermal inclusion cyst      Subcutaneous movable cyst c/w pilar cyst      Firm pink to brown papule c/w dermatofibroma      Pedunculated fleshy papule(s) c/w skin tag(s)      Evenly pigmented macule c/w junctional nevus     Mildly variegated pigmented, slightly irregular-bordered macule c/w mildly atypical nevus      Flesh colored to evenly pigmented papule c/w intradermal nevus       Pink pearly papule/plaque c/w basal cell carcinoma      Erythematous hyperkeratotic cursted plaque c/w SCC      Surgical scar with no sign of skin cancer recurrence      Open and closed comedones      Inflammatory papules and pustules      Verrucoid papule consistent consistent with wart     Erythematous eczematous patches and plaques     Dystrophic onycholytic nail with subungual debris c/w onychomycosis     Umbilicated papule    Erythematous-base heme-crusted tan verrucoid plaque consistent with inflamed seborrheic keratosis     Erythematous Silvery Scaling Plaque c/w Psoriasis     See annotation      Assessment / Plan:        Scar conditions/skin fibrosis  Screening, malignant neoplasm, skin  History of skin cancer  Scar of the right upper back, hx of NMSC.  No evidence of recurrence on physical exam today.  Continue routine skin surveillance. Daily sunscreen advised.    Seborrheic keratosis  Reassurance given. Discussed diagnosis and that lesions are benign.  AAD handout given.     Actinic keratoses  Cryosurgery Procedure Note    The patient is informed of the precancerous quality and need for treatment of these lesions. After risks, benefits and alternatives explained, including blistering, pain, hyper- and hypopigmentation, patient verbally consents to cryotherapy to precancerous lesions. Liquid nitrogen cryosurgery is applied to the 5 actinic keratoses, as detailed in the physical exam, to produce a freeze injury. The patient is aware that blisters may form and is instructed on  wound care with gentle cleansing and use of vaseline ointment to keep moist until healed. The patient is supplied a handout on cryosurgery and is instructed to call if lesions do not completely resolve.                Follow up in about 1 year (around 8/15/2025).

## 2024-08-15 NOTE — PATIENT INSTRUCTIONS
.kCRYOSURGERY      Your doctor has used a method called cryosurgery to treat your skin condition. Cryosurgery refers to the use of very cold substances to treat a variety of skin conditions such as warts, pre-skin cancers, molluscum contagiosum, sun spots, and several benign growths. The substance we use in cryosurgery is liquid nitrogen and is so cold (-195 degrees Celsius) that is burns when administered.     Following treatment in the office, the skin may immediately burn and become red. You may find the area around the lesion is affected as well. It is sometimes necessary to treat not only the lesion, but a small area of the surrounding normal skin to achieve a good response.     A blister, and even a blood filled blister, may form after treatment.   This is a normal response. If the blister is painful, it is acceptable to sterilize a needle and with rubbing alcohol and gently pop the blister. It is important that you gently wash the area with soap and warm water as the blister fluid may contain wart virus if a wart was treated. Do no remove the roof of the blister.     The area treated can take anywhere from 1-3 weeks to heal. Healing time depends on the kind skin lesion treated, the location, and how aggressively the lesion was treated. It is recommended that the areas treated are covered with Vaseline or bacitracin ointment and a band-aid. If a band-aid is not practical, just ointment applied several times per day will do. Keeping these areas moist will speed the healing time.    Treatment with liquid nitrogen can leave a scar. In dark skin, it may be a light or dark scar, in light skin it may be a white or pink scar. These will generally fade with time, but may never go away completely.     If you have any concerns after your treatment, please feel free to call the office.       [facility] [place] [address]/ [phone] FAX/ [email or web address]

## 2024-09-19 ENCOUNTER — PATIENT MESSAGE (OUTPATIENT)
Dept: INTERNAL MEDICINE | Facility: CLINIC | Age: 74
End: 2024-09-19
Payer: MEDICARE

## 2024-09-19 DIAGNOSIS — R79.1 ABNORMALLY PROLONGED CLOTTING TIME: Primary | ICD-10-CM

## 2024-09-19 DIAGNOSIS — G45.9 TIA (TRANSIENT ISCHEMIC ATTACK): ICD-10-CM

## 2024-09-20 ENCOUNTER — OFFICE VISIT (OUTPATIENT)
Dept: CARDIOLOGY | Facility: CLINIC | Age: 74
End: 2024-09-20
Payer: MEDICARE

## 2024-09-20 VITALS
HEART RATE: 60 BPM | OXYGEN SATURATION: 98 % | BODY MASS INDEX: 25.56 KG/M2 | WEIGHT: 168.63 LBS | SYSTOLIC BLOOD PRESSURE: 120 MMHG | DIASTOLIC BLOOD PRESSURE: 80 MMHG | HEIGHT: 68 IN

## 2024-09-20 DIAGNOSIS — E78.00 HYPERCHOLESTEROLEMIA: ICD-10-CM

## 2024-09-20 DIAGNOSIS — Z95.5 H/O HEART ARTERY STENT: ICD-10-CM

## 2024-09-20 DIAGNOSIS — I25.118 CORONARY ARTERY DISEASE OF NATIVE ARTERY OF NATIVE HEART WITH STABLE ANGINA PECTORIS: Primary | ICD-10-CM

## 2024-09-20 DIAGNOSIS — F41.9 ANXIETY DISORDER, UNSPECIFIED TYPE: ICD-10-CM

## 2024-09-20 DIAGNOSIS — Z86.73 HISTORY OF TRANSIENT ISCHEMIC ATTACK (TIA): ICD-10-CM

## 2024-09-20 DIAGNOSIS — Z78.9 STATIN INTOLERANCE: ICD-10-CM

## 2024-09-20 DIAGNOSIS — I42.0 DCM (DILATED CARDIOMYOPATHY): ICD-10-CM

## 2024-09-20 DIAGNOSIS — R00.2 PALPITATIONS: ICD-10-CM

## 2024-09-20 DIAGNOSIS — I25.2 H/O NON-ST ELEVATION MYOCARDIAL INFARCTION (NSTEMI): ICD-10-CM

## 2024-09-20 DIAGNOSIS — I50.20 HEART FAILURE WITH REDUCED EJECTION FRACTION, NYHA CLASS II: ICD-10-CM

## 2024-09-20 PROCEDURE — 99999 PR PBB SHADOW E&M-EST. PATIENT-LVL III: CPT | Mod: PBBFAC,,, | Performed by: STUDENT IN AN ORGANIZED HEALTH CARE EDUCATION/TRAINING PROGRAM

## 2024-09-20 PROCEDURE — 99213 OFFICE O/P EST LOW 20 MIN: CPT | Mod: PBBFAC,PO | Performed by: STUDENT IN AN ORGANIZED HEALTH CARE EDUCATION/TRAINING PROGRAM

## 2024-09-20 RX ORDER — NAPROXEN SODIUM 220 MG/1
1 TABLET, FILM COATED ORAL EVERY MORNING
COMMUNITY
Start: 2024-09-19 | End: 2025-09-19

## 2024-09-20 RX ORDER — ATORVASTATIN CALCIUM 40 MG/1
40 TABLET, FILM COATED ORAL DAILY
COMMUNITY
Start: 2024-09-18 | End: 2024-09-20

## 2024-09-20 NOTE — PROGRESS NOTES
Section of Cardiology                  Cardiac Clinic Note    HPI:   Mekhi Lambert is a 74 y.o. male     4/1/24  Virtual visit  Wants to switch providers   Will be having hernia repair surgery   Has not been having any cardiac symptoms, denies shortness of breath, chest pain  Is a retired    Has been keeping up with his blood pressure numbers and ejection fraction numbers 3 calculations  Exercises regularly has no problems   Watches his diet and has been very active   Blood pressure runs low      9/20/24  Was admitted recently with TIA at [facility]- said he was walking the dog, started vomiting and was tired- reports headache and right side weakness, brain fog/memory loss    Echo 9/24 showed EF 25-30%, similar to prior (mildly dilated asc aorta- new compared to prior)  Doing well overall- does not want to start ASA, statin, or meds for CHF       Denies SOB, chest pain, syncope, palpitations         EKG 9/17/24 NSR, PACs, nonspecific T wave abn  (no image available)  EKG 3/28/24 SB, PVCs, nonspecific T wave abn       Echo 9/24   1. Moderately dilated left ventricle. Severely depressed left ventricular systolic   function. LVEF 25 - 30%. Mild (Grade I) diastolic dysfunction (impaired relaxation).   2. Normal right ventricular size. Borderline right ventricular systolic function.   3. Mildly dilated aortic root. Mildly dilated ascending aorta.     ECHO  Results for orders placed during the hospital encounter of 03/16/23    Echo    Interpretation Summary  · There is pulmonary hypertension.  · The left ventricle is normal in size with concentric remodeling and moderately decreased systolic function.  · Grade I left ventricular diastolic dysfunction.  · The estimated PA systolic pressure is 44 mmHg.  · Normal right ventricular size with normal right ventricular systolic function.  · Normal central venous pressure (3 mmHg).  · The estimated ejection fraction is 30%.  · Mild mitral regurgitation.  · Mild  tricuspid regurgitation.       STRESS TEST Results for orders placed during the hospital encounter of 08/07/22    Nuclear Stress - Cardiology Interpreted    Interpretation Summary    Abnormal myocardial perfusion scan.    There is a severe intensity, large sized, fixed perfusion abnormality consistent with scar in the basal to apical lateral and apical wall(s).    There are no other significant perfusion abnormalities.    The gated perfusion images showed an ejection fraction of 28% at rest. The gated perfusion images showed an ejection fraction of 27% post stress. Normal ejection fraction is greater than 59%.    The EKG portion of this study is negative for ischemia.       [facility] Results for orders placed during the hospital encounter of 02/25/22    Cardiac catheterization    Conclusion  · The pre-procedure left ventricular end diastolic pressure was 15.  · The post-procedure left ventricular end diastolic pressure was 20.  · The ejection fraction was calculated to be 30%.  · There was no mitral valve stenosis.  · There was no mitral valve prolapse evident. The annulus was normal. There was normal mitral valve motion.  · There was no aortic valve stenosis.  · The Prox LAD to Mid LAD lesion was 95% stenosed with 0% stenosis post-intervention.  · A stent was successfully placed at 14 NAT for 20 sec.  · The 2nd Diag lesion was 50% stenosed with 0% stenosis post-intervention.  · A stent was successfully placed at 14 NAT for 20 sec.  · The estimated blood loss was none.  · There was two vessel coronary artery disease.  · The PCI was successful.    This is a bifurcation lesion intervention with potts classification 1,1,1, intervention planned with sequential stenting of each lesion due to plaque shift and worseining lesion in the diagonal. So sequential stenting was done with subsequent rewiring of the two stented vessles and kissing agioplasty was performed with excellent angiographic result and no  complication.            ROS: All 10 systems reviewed. Please refer to the HPI for pertinent positives. All other systems negative.     Past Medical History  Past Medical History:   Diagnosis Date    ALBERTO (acute kidney injury) 8/8/2022    Anticoagulant long-term use     Arthritis     Cancer     Chronic systolic CHF (congestive heart failure) 02/26/2022    Coronary artery disease     Digestive disorder     Myocardial infarction     2/20/22       Surgical History  Past Surgical History:   Procedure Laterality Date    CARDIAC CATHETERIZATION      COLONOSCOPY N/A 09/22/2021    Procedure: COLONOSCOPY;  Surgeon: Meaghan Jaime MD;  Location: [facility] ENDO;  Service: Endoscopy;  Laterality: N/A;    CORONARY ANGIOGRAPHY INCLUDING BYPASS GRAFTS WITH CATHETERIZATION OF LEFT HEART N/A 03/03/2020    Procedure: ANGIOGRAM, CORONARY, INCLUDING BYPASS GRAFT, WITH LEFT HEART CATHETERIZATION;  Surgeon: Ute Melchor MD;  Location: [facility] CATH LAB;  Service: Cardiology;  Laterality: N/A;    JOINT REPLACEMENT Right     knee    KNEE SURGERY      LEFT HEART CATHETERIZATION Left 02/22/2020    Procedure: CATHETERIZATION, HEART, LEFT;  Surgeon: Ute Melchor MD;  Location: [facility] CATH LAB;  Service: Cardiology;  Laterality: Left;    LEFT HEART CATHETERIZATION Left 02/26/2022    Procedure: CATHETERIZATION, HEART, LEFT;  Surgeon: Ute Melchor MD;  Location: [facility] CATH LAB;  Service: Cardiology;  Laterality: Left;    PERCUTANEOUS TRANSLUMINAL BALLOON ANGIOPLASTY OF CORONARY ARTERY Left 02/22/2020    Procedure: Angioplasty-coronary;  Surgeon: Ute Melchor MD;  Location: [facility] CATH LAB;  Service: Cardiology;  Laterality: Left;    PERCUTANEOUS TRANSLUMINAL BALLOON ANGIOPLASTY OF CORONARY ARTERY  02/26/2022    Procedure: Angioplasty-coronary;  Surgeon: Ute Melchor MD;  Location: [facility] CATH LAB;  Service: Cardiology;;    REPAIR, HERNIA, INGUINAL Right 4/5/2024    Procedure: REPAIR, HERNIA, INGUINAL;  Surgeon: Franklin Fuentes MD;   Location: [facility] OR;  Service: General;  Laterality: Right;    RIGHT HEMICOLECTOMY N/A 04/27/2022    Procedure: HEMICOLECTOMY, RIGHT;  Surgeon: See Dominguez MD;  Location: [facility] OR;  Service: General;  Laterality: N/A;    SHOULDER ARTHROSCOPY Right     Dr Bella    SIGMOIDECTOMY      simple prostatectomy  06/06/2016    robotic assisted partial prostectomy due to BPH    TOTAL KNEE ARTHROPLASTY            Allergies:   Review of patient's allergies indicates:   Allergen Reactions    Statins-hmg-coa reductase inhibitors Other (See Comments)     SEVERE MYALGIAS AND GI SIDE EFFECTS       Social History:  Social History     Socioeconomic History    Marital status:    Tobacco Use    Smoking status: Never    Smokeless tobacco: Former     Types: Chew     Quit date: 6/3/2004   Substance and Sexual Activity    Alcohol use: Yes     Alcohol/week: 2.0 standard drinks of alcohol     Types: 2 Glasses of wine per week     Comment: per week    Drug use: No    Sexual activity: Yes     Social Determinants of Health     Financial Resource Strain: Low Risk  (4/26/2024)    Overall Financial Resource Strain (CARDIA)     Difficulty of Paying Living Expenses: Not hard at all   Food Insecurity: No Food Insecurity (9/17/2024)    Received from [employer] [facility] and Its Subsidiaries and Affiliates    Hunger Vital Sign     Worried About Running Out of Food in the Last Year: Never true     Ran Out of Food in the Last Year: Never true   Transportation Needs: No Transportation Needs (9/17/2024)    [facility] [employer] [facility]    PRAPARE - Transportation     Lack of Transportation (Medical): No     Lack of Transportation (Non-Medical): No   Physical Activity: Sufficiently Active (4/26/2024)    Exercise Vital Sign     Days of Exercise per Week: 7 days     Minutes of Exercise per Session: 130 min   Stress: No Stress Concern Present (9/17/2024)     "Received from [facility] and Its Subsidiaries and Affiliates    [employer] - Occupational Stress Questionnaire     Feeling of Stress : Not at all   Housing Stability: Low Risk  (4/1/2024)    Housing Stability Vital Sign     Unable to Pay for Housing in the Last Year: No     Number of Places Lived in the Last Year: 1     Unstable Housing in the Last Year: No       Family History:  family history includes Cancer in his father; Diabetes in his maternal aunt and mother; Heart disease in his mother.    Home Medications:  Current Outpatient Medications on File Prior to Visit   Medication Sig Dispense Refill    acetaminophen (TYLENOL) 500 MG tablet Take 500 mg by mouth every 6 (six) hours as needed for Pain.      metoprolol succinate (TOPROL-XL) 25 MG 24 hr tablet TAKE 1/2 TABLET BY MOUTH EVERY EVENING 45 tablet 1    mometasone 0.1% (ELOCON) 0.1 % cream AAA bid prn for psoriasis. Strong steroid.  Do not use on face, underarms or groin. 50 g 1    multivitamin capsule Take 1 capsule by mouth once daily.      aspirin 81 MG Chew Take 1 tablet by mouth every morning.      atorvastatin (LIPITOR) 40 MG tablet Take 40 mg by mouth once daily.       No current facility-administered medications on file prior to visit.       Physical exam:  /80 (BP Location: Right arm, Patient Position: Sitting, BP Method: Medium (Manual))   Pulse 60   Ht 5' 8" (1.727 m)   Wt 76.5 kg (168 lb 10.4 oz)   SpO2 98%   BMI 25.64 kg/m²         General: Pt is a 74 y.o. year old male who is AAOx3, in NAD, is pleasant, well nourished, looks stated age  HEENT: PERRL, EOMI, Oral mucosa pink & moist  CVS  No abnormal cardiac pulsations noted on inspection. JVP not raised. The apical impulse is normal on palpation, and is located in the left 5th intercostal space in the mid - clavicular line. No palpable thrills or abnormal pulsations noted. RR, S1 - S2 heard, no murmurs, rubs or " gallops appreciated.   PUL : CTA B/L. No wheezes/crackles heard   ABD : BS +, soft. No tenderness elicited   LE : No C/C/E. Distal Pulses palpable B/L         LABS:    Chemistry:   Lab Results   Component Value Date     09/18/2024     08/06/2024    K 4.5 09/18/2024    K 4.6 08/06/2024     (H) 09/18/2024     08/06/2024    CO2 27 09/18/2024    CO2 26 08/06/2024    BUN 14 09/18/2024    BUN 24 (H) 08/06/2024    CREATININE 1.24 (H) 09/18/2024    CREATININE 1.2 08/06/2024    CALCIUM 8.2 (L) 09/18/2024    CALCIUM 9.0 08/06/2024     Cardiac Markers:   Lab Results   Component Value Date    TROPONINI <0.01 09/17/2024    TROPONINI 0.025 08/07/2022     Cardiac Markers (Last 3):   Lab Results   Component Value Date    TROPONINI <0.01 09/17/2024    TROPONINI 0.025 08/07/2022    TROPONINI <0.006 08/07/2022    TROPONINI 0.012 08/07/2022     CBC:   Lab Results   Component Value Date    WBC 12.37 03/24/2023    HGB 16.4 03/24/2023    HCT 48.8 03/24/2023    MCV 91 03/24/2023     03/24/2023     Lipids:   Lab Results   Component Value Date    CHOL 199 09/18/2024    CHOL 227 (H) 08/06/2024    TRIG 90 09/18/2024    TRIG 62 08/06/2024    HDL 60 09/18/2024    HDL 69 08/06/2024     Coagulation:   Lab Results   Component Value Date    INR 1.2 09/17/2024    INR 1.0 04/29/2022    APTT 25.7 04/28/2022           Assessment        1. Coronary artery disease of native artery of native heart with stable angina pectoris    2. Heart failure with reduced ejection fraction, NYHA class II    3. H/O heart artery stent    4. H/O non-ST elevation myocardial infarction (NSTEMI)    5. Hypercholesterolemia    6. Statin intolerance    7. Anxiety disorder, unspecified type    8. Palpitations    9. DCM (dilated cardiomyopathy)    10. History of transient ischemic attack (TIA)           Plan:    Congestive heart failure   Persistently keeps up with his numbers and calculates his ejection fraction on his own   Echo 9/24 showed EF  25-30%, similar to prior (mildly dilated asc aorta- new compared to prior)  Obtain ETT     CAD   History of MI s/p stents   Denies chest pain   Currently not on aspirin or statin   Continue metoprolol    HLD   Has statin allergy     History TIA  Needs ASA- does not want to start  Statin allergy     Low salt, low fat diet  Exercise as tolerated, at least 30 min daily     This note was prepared using voice recognition system and is likely to have sound alike errors that may have been overlooked even after proofreading.     I have reviewed all pertinent chart information.  Plans and recommendations have been formulated under my direct supervision. All questions answered and patient voiced understanding.   If symptoms persist go to the ED.    RTC prn        Viktoriya Varela MD  Cardiology

## 2024-09-24 ENCOUNTER — TELEPHONE (OUTPATIENT)
Dept: HEMATOLOGY/ONCOLOGY | Facility: CLINIC | Age: 74
End: 2024-09-24
Payer: MEDICARE

## 2024-09-24 NOTE — TELEPHONE ENCOUNTER
----- Message from Anna Borrero LPN sent at 9/23/2024  4:57 PM CDT -----  Regarding: New Hematology Referal  Contact: [phone]  Did you get any referral info on this patient?  ----- Message -----  From: Sergio Barr  Sent: 9/23/2024   4:32 PM CDT  To: [facility] Staff    Type:  Sooner Apoointment Request    Caller is requesting a sooner appointment.  Caller declined first available appointment listed below.  Caller will not accept being placed on the waitlist and is requesting a message be sent to doctor.  Name of Caller:PETER EWBER [[id number]]  When is the first available appointment?AS SOON AS POSSIBLE  Symptoms:HAS AN REFERRAL TO GO OVER BLOOD WORK  Would the patient rather a call back or a response via [facility]? CALL BACK  Best Call Back Number:[phone]  Additional Information: [id number]

## 2024-09-30 ENCOUNTER — HOSPITAL ENCOUNTER (OUTPATIENT)
Dept: CARDIOLOGY | Facility: HOSPITAL | Age: 74
Discharge: HOME | End: 2024-09-30
Attending: STUDENT IN AN ORGANIZED HEALTH CARE EDUCATION/TRAINING PROGRAM
Payer: MEDICARE

## 2024-09-30 DIAGNOSIS — I50.20 HEART FAILURE WITH REDUCED EJECTION FRACTION, NYHA CLASS II: ICD-10-CM

## 2024-09-30 DIAGNOSIS — I42.0 DCM (DILATED CARDIOMYOPATHY): ICD-10-CM

## 2024-09-30 DIAGNOSIS — I25.118 CORONARY ARTERY DISEASE OF NATIVE ARTERY OF NATIVE HEART WITH STABLE ANGINA PECTORIS: ICD-10-CM

## 2024-09-30 DIAGNOSIS — Z86.73 HISTORY OF TRANSIENT ISCHEMIC ATTACK (TIA): ICD-10-CM

## 2024-09-30 DIAGNOSIS — Z95.5 H/O HEART ARTERY STENT: ICD-10-CM

## 2024-09-30 PROCEDURE — 93016 CV STRESS TEST SUPVJ ONLY: CPT | Mod: ,,, | Performed by: STUDENT IN AN ORGANIZED HEALTH CARE EDUCATION/TRAINING PROGRAM

## 2024-09-30 PROCEDURE — 93018 CV STRESS TEST I&R ONLY: CPT | Mod: ,,, | Performed by: STUDENT IN AN ORGANIZED HEALTH CARE EDUCATION/TRAINING PROGRAM

## 2024-09-30 PROCEDURE — 93017 CV STRESS TEST TRACING ONLY: CPT

## 2024-10-01 LAB
CV STRESS BASE HR: 64 BPM
DIASTOLIC BLOOD PRESSURE: 82 MMHG
OHS CV CPX 85 PERCENT MAX PREDICTED HEART RATE MALE: 124
OHS CV CPX ESTIMATED METS: 9
OHS CV CPX MAX PREDICTED HEART RATE: 146
OHS CV CPX PATIENT IS FEMALE: 0
OHS CV CPX PATIENT IS MALE: 1
OHS CV CPX PEAK DIASTOLIC BLOOD PRESSURE: 87 MMHG
OHS CV CPX PEAK HEAR RATE: 148 BPM
OHS CV CPX PEAK RATE PRESSURE PRODUCT: NORMAL
OHS CV CPX PEAK SYSTOLIC BLOOD PRESSURE: 185 MMHG
OHS CV CPX PERCENT MAX PREDICTED HEART RATE ACHIEVED: 101
OHS CV CPX RATE PRESSURE PRODUCT PRESENTING: 7232
STRESS ECHO POST EXERCISE DUR MIN: 7 MINUTES
STRESS ECHO POST EXERCISE DUR SEC: 2 SECONDS
SYSTOLIC BLOOD PRESSURE: 113 MMHG

## 2024-10-03 ENCOUNTER — PATIENT MESSAGE (OUTPATIENT)
Dept: CARDIOLOGY | Facility: CLINIC | Age: 74
End: 2024-10-03
Payer: MEDICARE

## 2024-10-18 ENCOUNTER — OFFICE VISIT (OUTPATIENT)
Dept: HEMATOLOGY/ONCOLOGY | Facility: CLINIC | Age: 74
End: 2024-10-18
Payer: MEDICARE

## 2024-10-18 DIAGNOSIS — E88.09 PROTEINS SERUM PLASMA LOW: Primary | ICD-10-CM

## 2024-10-18 DIAGNOSIS — R79.82 ELEVATED C-REACTIVE PROTEIN (CRP): ICD-10-CM

## 2024-10-18 DIAGNOSIS — G45.9 TIA (TRANSIENT ISCHEMIC ATTACK): ICD-10-CM

## 2024-10-18 DIAGNOSIS — Z83.2 FAMILY HISTORY OF AUTOIMMUNE DISORDER: ICD-10-CM

## 2024-10-18 DIAGNOSIS — F41.1 GENERALIZED ANXIETY DISORDER: ICD-10-CM

## 2024-10-18 DIAGNOSIS — D69.6 THROMBOCYTOPENIA: ICD-10-CM

## 2024-10-18 NOTE — PROGRESS NOTES
The patient location is: home  The chief complaint leading to consultation is: abnormal labs    Visit type: audiovisual    Face to Face time with patient: 45  60 minutes of total time spent on the encounter, which includes face to face time and non-face to face time preparing to see the patient (eg, review of tests), Obtaining and/or reviewing separately obtained history, Documenting clinical information in the electronic or other health record, Independently interpreting results (not separately reported) and communicating results to the patient/family/caregiver, or Care coordination (not separately reported).         Each patient to whom he or she provides medical services by telemedicine is:  (1) informed of the relationship between the physician and patient and the respective role of any other health care provider with respect to management of the patient; and (2) notified that he or she may decline to receive medical services by telemedicine and may withdraw from such care at any time.     Patient ID: Mekhi Lambert is a 74 y.o. male.    Chief Complaint: lab concerns    HPI:  73 yo male presents to the clinic as a new patient for further evaluation and recommendation  of prolonged clotting time. Had recent TIA and was evaluated at [facility].     States that on/off platelet count runs low.  States that 2 days prior to having tia he walked his dog and then came home and threw up. States that for a week after he had stomach or cramping.  States thought may be GI virus.  This happened 2 days prior to his TIA.    Us up to date with age appropriate cancer screenings.    Family hx of cancer:  Father: lung cancer - smoker, skin cancer, bone cancer, prostate cancer    Does have a family history of SLE - mother    Denies cigarette smoking or illicit drug use. Has occasional alcohol.    States that he works out daily.    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      Social History     Socioeconomic History    Marital status:    Tobacco Use    Smoking status: Never    Smokeless tobacco: Former     Types: Chew     Quit date: 6/3/2004   Substance and Sexual Activity    Alcohol use: Yes     Alcohol/week: 2.0 standard drinks of alcohol     Types: 2 Glasses of wine per week     Comment: per week    Drug use: No    Sexual activity: Yes     Social Drivers of Health     Financial Resource Strain: Low Risk  (4/26/2024)    Overall Financial Resource Strain (CARDIA)     Difficulty of Paying Living Expenses: Not hard at all   Food Insecurity: No Food Insecurity (9/17/2024)    Received from [facility] and Its Subsidiaries and Affiliates    Hunger Vital Sign     Worried About Running Out of Food in the Last Year: Never true     Ran Out of Food in the Last Year: Never true   Transportation Needs: No Transportation Needs (9/17/2024)    [email or web address]    PRAPARE - Transportation     Lack of Transportation (Medical): No     Lack of Transportation (Non-Medical): No   Physical Activity: Sufficiently Active (4/26/2024)    Exercise Vital Sign     Days of Exercise per Week: 7 days     Minutes of Exercise per Session: 130 min   Stress: No Stress Concern Present (9/17/2024)    Received from [facility] and Its Subsidiaries and Affiliates    [employer] [place] [employer] - Occupational Stress Questionnaire     Feeling of Stress : Not at all   Housing Stability: Low Risk  (4/1/2024)    Housing Stability Vital Sign     Unable to Pay for Housing in the Last Year: No     Number of Places Lived in the Last Year: 1     Unstable Housing in the Last Year: No       Family History   Problem Relation Name Age of Onset     Heart disease Mother          lupus related    Diabetes Mother      Cancer Father          prostate, bone, lung, skin cancers all seperate types    Diabetes Maternal Aunt         Past Surgical History:   Procedure Laterality Date    CARDIAC CATHETERIZATION      COLONOSCOPY N/A 09/22/2021    Procedure: COLONOSCOPY;  Surgeon: Meaghan Jaime MD;  Location: [facility] ENDO;  Service: Endoscopy;  Laterality: N/A;    CORONARY ANGIOGRAPHY INCLUDING BYPASS GRAFTS WITH CATHETERIZATION OF LEFT HEART N/A 03/03/2020    Procedure: ANGIOGRAM, CORONARY, INCLUDING BYPASS GRAFT, WITH LEFT HEART CATHETERIZATION;  Surgeon: Ute Melchor MD;  Location: [facility] CATH LAB;  Service: Cardiology;  Laterality: N/A;    JOINT REPLACEMENT Right     knee    KNEE SURGERY      LEFT HEART CATHETERIZATION Left 02/22/2020    Procedure: CATHETERIZATION, HEART, LEFT;  Surgeon: Ute Melchor MD;  Location: [facility] CATH LAB;  Service: Cardiology;  Laterality: Left;    LEFT HEART CATHETERIZATION Left 02/26/2022    Procedure: CATHETERIZATION, HEART, LEFT;  Surgeon: Ute Melchor MD;  Location: [facility] CATH LAB;  Service: Cardiology;  Laterality: Left;    PERCUTANEOUS TRANSLUMINAL BALLOON ANGIOPLASTY OF CORONARY ARTERY Left 02/22/2020    Procedure: Angioplasty-coronary;  Surgeon: Ute Melchor MD;  Location: [facility] CATH LAB;  Service: Cardiology;  Laterality: Left;    PERCUTANEOUS TRANSLUMINAL BALLOON ANGIOPLASTY OF CORONARY ARTERY  02/26/2022    Procedure: Angioplasty-coronary;  Surgeon: Ute Melchor MD;  Location: [facility] CATH LAB;  Service: Cardiology;;    REPAIR, HERNIA, INGUINAL Right 4/5/2024    Procedure: REPAIR, HERNIA, INGUINAL;  Surgeon: Franklin Fuetnes MD;  Location: [facility] OR;  Service: General;  Laterality: Right;    RIGHT HEMICOLECTOMY N/A 04/27/2022    Procedure: HEMICOLECTOMY, RIGHT;  Surgeon: See Dominguez MD;  Location: [facility] OR;  Service: General;  Laterality: N/A;    SHOULDER ARTHROSCOPY Right     Dr Bella    SIGMOIDECTOMY      simple  prostatectomy  06/06/2016    robotic assisted partial prostectomy due to BPH    TOTAL KNEE ARTHROPLASTY         Past Medical History:   Diagnosis Date    ALBERTO (acute kidney injury) 8/8/2022    Anticoagulant long-term use     Arthritis     Cancer     Chronic systolic CHF (congestive heart failure) 02/26/2022    Coronary artery disease     Digestive disorder     Myocardial infarction     2/20/22       Review of Systems   Constitutional: Negative.    HENT: Negative.     Eyes: Negative.    Respiratory: Negative.     Cardiovascular: Negative.    Gastrointestinal:  Positive for diarrhea (on/off).   Endocrine: Negative.    Genitourinary: Negative.    Musculoskeletal:  Positive for arthralgias.   Skin: Negative.    Allergic/Immunologic: Negative.    Neurological: Negative.    Hematological: Negative.    Psychiatric/Behavioral: Negative.            Medication List with Changes/Refills   Current Medications    ACETAMINOPHEN (TYLENOL) 500 MG TABLET    Take 500 mg by mouth every 6 (six) hours as needed for Pain.    ASPIRIN 81 MG CHEW    Take 1 tablet by mouth every morning.    METOPROLOL SUCCINATE (TOPROL-XL) 25 MG 24 HR TABLET    TAKE 1/2 TABLET BY MOUTH EVERY EVENING    MOMETASONE 0.1% (ELOCON) 0.1 % CREAM    AAA bid prn for psoriasis. Strong steroid.  Do not use on face, underarms or groin.    MULTIVITAMIN CAPSULE    Take 1 capsule by mouth once daily.        Objective:   There were no vitals filed for this visit.    Physical Exam  Constitutional:       Appearance: Normal appearance.   Pulmonary:      Effort: Pulmonary effort is normal.   Neurological:      Mental Status: He is alert and oriented to person, place, and time.   Psychiatric:         Mood and Affect: Mood normal.         Behavior: Behavior normal.         Thought Content: Thought content normal.         Judgment: Judgment normal.         Assessment:     Problem List Items Addressed This Visit       Anxiety disorder    Relevant Orders    TSH     Other Visit  "Diagnoses       Proteins serum plasma low    -  Primary    Relevant Orders    CBC Auto Differential    Comprehensive Metabolic Panel    Protein Electrophoresis, Serum    Immunofixation Electrophoresis    Immunoglobulin Free LT Chains Blood    IMMUNOGLOBULINS (IGG, IGA, IGM) QUANTITATIVE    TIA (transient ischemic attack)        Relevant Orders    TSH    Family history of autoimmune disorder        Relevant Orders    NOLVIA Screen w/Reflex    Thrombocytopenia        Elevated C-reactive protein (CRP)                Lab Results   Component Value Date    WBC 12.37 03/24/2023    RBC 5.34 03/24/2023    HGB 16.4 03/24/2023    HCT 48.8 03/24/2023    MCV 91 03/24/2023    MCH 30.7 03/24/2023    MCHC 33.6 03/24/2023    RDW 14.1 03/24/2023     03/24/2023    MPV 10.2 03/24/2023    GRAN 9.1 (H) 03/24/2023    GRAN 73.3 (H) 03/24/2023    LYMPH 1.9 03/24/2023    LYMPH 15.4 (L) 03/24/2023    MONO 1.3 (H) 03/24/2023    MONO 10.7 03/24/2023    EOS 0.0 03/24/2023    BASO 0.03 03/24/2023    EOSINOPHIL 0.1 03/24/2023    BASOPHIL 0.2 03/24/2023      Lab Results   Component Value Date     09/18/2024    K 4.5 09/18/2024     (H) 09/18/2024    CO2 27 09/18/2024    BUN 14 09/18/2024    CREATININE 1.24 (H) 09/18/2024    CALCIUM 8.2 (L) 09/18/2024    ANIONGAP 4 (L) 09/18/2024    ESTGFRAFRICA 61 09/18/2024    EGFRNONAA >60 05/05/2022     More recent labs found in [facility]  No results found for: "IRON", "TRANSFERRIN", "TIBC", "FESATURATED", "FERRITIN"     Plan:   Proteins serum plasma low  -     CBC Auto Differential; Future; Expected date: 10/18/2024  -     Comprehensive Metabolic Panel; Future; Expected date: 10/18/2024  -     Protein Electrophoresis, Serum; Future; Expected date: 10/18/2024  -     Immunofixation Electrophoresis; Future; Expected date: 10/18/2024  -     Immunoglobulin Free LT Chains Blood; Future; Expected date: 10/18/2024  -     IMMUNOGLOBULINS (IGG, IGA, IGM) QUANTITATIVE; Future; Expected date: " 10/18/2024    TIA (transient ischemic attack)  -     Ambulatory referral/consult to Hematology / Oncology  -     TSH; Future; Expected date: 10/18/2024    Family history of autoimmune disorder  -     CAMACHO Screen w/Reflex; Future; Expected date: 10/18/2024    Generalized anxiety disorder  -     TSH; Future; Expected date: 10/18/2024    Thrombocytopenia    Elevated C-reactive protein (CRP)      Med Onc Chart Routing      Follow up with physician    Follow up with KAYLEEN . F/u 4-6 weeks with VV to review results   Infusion scheduling note   n/a   Injection scheduling note n/a   Labs   Scheduling:  Preferred lab: [facility] on [address] and [address]  Lab interval:  cbc, cmp, camacho, spep, flc, cedrick, immunoglobulin, tsh - next week   Imaging   Abdominal US at    Pharmacy appointment No pharmacy appointment needed      Other referrals       No additional referrals needed  n/a          No abnormal clotting time identified. Patient had concerns about different labs values that are on/off abnormal.  States that his crp is always elevated.  Noted low serum protein without anemia.  Has had on/off low platelet count.  States that autoimmune disease does run in the family and woulld like to be evaluated.  Concerned that TIA may have been a symptom of something else underlying.     Collaborating Provider:  Dr. Yfn Marin    Thank You,  Mick Farrell, ROLANDOP-C  Benign Hematology

## 2024-10-21 ENCOUNTER — PATIENT MESSAGE (OUTPATIENT)
Dept: HEMATOLOGY/ONCOLOGY | Facility: CLINIC | Age: 74
End: 2024-10-21
Payer: MEDICARE

## 2024-10-22 ENCOUNTER — HOSPITAL ENCOUNTER (OUTPATIENT)
Dept: RADIOLOGY | Facility: HOSPITAL | Age: 74
Discharge: HOME | End: 2024-10-22
Attending: NURSE PRACTITIONER
Payer: MEDICARE

## 2024-10-22 DIAGNOSIS — D69.6 THROMBOCYTOPENIA: ICD-10-CM

## 2024-10-23 DIAGNOSIS — D69.6 THROMBOCYTOPENIA: Primary | ICD-10-CM

## 2024-10-25 ENCOUNTER — LAB VISIT (OUTPATIENT)
Dept: LAB | Facility: HOSPITAL | Age: 74
End: 2024-10-25
Attending: NURSE PRACTITIONER
Payer: MEDICARE

## 2024-10-25 DIAGNOSIS — D69.6 THROMBOCYTOPENIA: ICD-10-CM

## 2024-10-25 DIAGNOSIS — F41.1 GENERALIZED ANXIETY DISORDER: ICD-10-CM

## 2024-10-25 DIAGNOSIS — Z83.2 FAMILY HISTORY OF AUTOIMMUNE DISORDER: ICD-10-CM

## 2024-10-25 DIAGNOSIS — E88.09 PROTEINS SERUM PLASMA LOW: ICD-10-CM

## 2024-10-25 DIAGNOSIS — G45.9 TIA (TRANSIENT ISCHEMIC ATTACK): ICD-10-CM

## 2024-10-25 LAB
ALBUMIN SERPL BCP-MCNC: 3.7 G/DL (ref 3.5–5.2)
ALP SERPL-CCNC: 63 U/L (ref 40–150)
ALT SERPL W/O P-5'-P-CCNC: 15 U/L (ref 10–44)
ANION GAP SERPL CALC-SCNC: 10 MMOL/L (ref 8–16)
AST SERPL-CCNC: 17 U/L (ref 10–40)
BASOPHILS # BLD AUTO: 0.04 K/UL (ref 0–0.2)
BASOPHILS NFR BLD: 0.8 % (ref 0–1.9)
BILIRUB SERPL-MCNC: 0.7 MG/DL (ref 0.1–1)
BUN SERPL-MCNC: 24 MG/DL (ref 8–23)
CALCIUM SERPL-MCNC: 9 MG/DL (ref 8.7–10.5)
CHLORIDE SERPL-SCNC: 111 MMOL/L (ref 95–110)
CO2 SERPL-SCNC: 21 MMOL/L (ref 23–29)
CREAT SERPL-MCNC: 1.5 MG/DL (ref 0.5–1.4)
DIFFERENTIAL METHOD BLD: NORMAL
EOSINOPHIL # BLD AUTO: 0.1 K/UL (ref 0–0.5)
EOSINOPHIL NFR BLD: 1.5 % (ref 0–8)
ERYTHROCYTE [DISTWIDTH] IN BLOOD BY AUTOMATED COUNT: 14.3 % (ref 11.5–14.5)
EST. GFR  (NO RACE VARIABLE): 49 ML/MIN/1.73 M^2
FOLATE SERPL-MCNC: 5 NG/ML (ref 4–24)
FOLATE SERPL-MCNC: 5 NG/ML (ref 4–24)
GLUCOSE SERPL-MCNC: 94 MG/DL (ref 70–110)
HCT VFR BLD AUTO: 42.3 % (ref 40–54)
HGB BLD-MCNC: 14.3 G/DL (ref 14–18)
IGA SERPL-MCNC: 47 MG/DL (ref 40–350)
IGG SERPL-MCNC: 483 MG/DL (ref 650–1600)
IGM SERPL-MCNC: 29 MG/DL (ref 50–300)
IMM GRANULOCYTES # BLD AUTO: 0 K/UL (ref 0–0.04)
IMM GRANULOCYTES NFR BLD AUTO: 0 % (ref 0–0.5)
IRON SERPL-MCNC: 112 UG/DL (ref 45–160)
LYMPHOCYTES # BLD AUTO: 1.2 K/UL (ref 1–4.8)
LYMPHOCYTES NFR BLD: 23.4 % (ref 18–48)
MCH RBC QN AUTO: 30 PG (ref 27–31)
MCHC RBC AUTO-ENTMCNC: 33.8 G/DL (ref 32–36)
MCV RBC AUTO: 89 FL (ref 82–98)
MONOCYTES # BLD AUTO: 0.4 K/UL (ref 0.3–1)
MONOCYTES NFR BLD: 7.9 % (ref 4–15)
NEUTROPHILS # BLD AUTO: 3.4 K/UL (ref 1.8–7.7)
NEUTROPHILS NFR BLD: 66.4 % (ref 38–73)
NRBC BLD-RTO: 0 /100 WBC
PLATELET # BLD AUTO: 150 K/UL (ref 150–450)
PMV BLD AUTO: 9.9 FL (ref 9.2–12.9)
POTASSIUM SERPL-SCNC: 4.6 MMOL/L (ref 3.5–5.1)
PROT SERPL-MCNC: 6 G/DL (ref 6–8.4)
RBC # BLD AUTO: 4.76 M/UL (ref 4.6–6.2)
SATURATED IRON: 35 % (ref 20–50)
SODIUM SERPL-SCNC: 142 MMOL/L (ref 136–145)
TOTAL IRON BINDING CAPACITY: 324 UG/DL (ref 250–450)
TRANSFERRIN SERPL-MCNC: 219 MG/DL (ref 200–375)
TSH SERPL DL<=0.005 MIU/L-ACNC: 1.1 UIU/ML (ref 0.4–4)
VIT B12 SERPL-MCNC: 464 PG/ML (ref 210–950)
WBC # BLD AUTO: 5.17 K/UL (ref 3.9–12.7)

## 2024-10-25 PROCEDURE — 86038 ANTINUCLEAR ANTIBODIES: CPT | Performed by: NURSE PRACTITIONER

## 2024-10-25 PROCEDURE — 83521 IG LIGHT CHAINS FREE EACH: CPT | Mod: 59 | Performed by: NURSE PRACTITIONER

## 2024-10-25 PROCEDURE — 84165 PROTEIN E-PHORESIS SERUM: CPT | Mod: 26,,, | Performed by: PATHOLOGY

## 2024-10-25 PROCEDURE — 83540 ASSAY OF IRON: CPT | Performed by: NURSE PRACTITIONER

## 2024-10-25 PROCEDURE — 80053 COMPREHEN METABOLIC PANEL: CPT | Performed by: NURSE PRACTITIONER

## 2024-10-25 PROCEDURE — 86334 IMMUNOFIX E-PHORESIS SERUM: CPT | Performed by: NURSE PRACTITIONER

## 2024-10-25 PROCEDURE — 84443 ASSAY THYROID STIM HORMONE: CPT | Performed by: NURSE PRACTITIONER

## 2024-10-25 PROCEDURE — 36415 COLL VENOUS BLD VENIPUNCTURE: CPT | Performed by: NURSE PRACTITIONER

## 2024-10-25 PROCEDURE — 86225 DNA ANTIBODY NATIVE: CPT | Performed by: NURSE PRACTITIONER

## 2024-10-25 PROCEDURE — 82784 ASSAY IGA/IGD/IGG/IGM EACH: CPT | Mod: 59 | Performed by: NURSE PRACTITIONER

## 2024-10-25 PROCEDURE — 84165 PROTEIN E-PHORESIS SERUM: CPT | Performed by: NURSE PRACTITIONER

## 2024-10-25 PROCEDURE — 82746 ASSAY OF FOLIC ACID SERUM: CPT | Performed by: NURSE PRACTITIONER

## 2024-10-25 PROCEDURE — 85025 COMPLETE CBC W/AUTO DIFF WBC: CPT | Performed by: NURSE PRACTITIONER

## 2024-10-25 PROCEDURE — 86235 NUCLEAR ANTIGEN ANTIBODY: CPT | Mod: 59 | Performed by: NURSE PRACTITIONER

## 2024-10-25 PROCEDURE — 86334 IMMUNOFIX E-PHORESIS SERUM: CPT | Mod: 26,,, | Performed by: PATHOLOGY

## 2024-10-25 PROCEDURE — 82668 ASSAY OF ERYTHROPOIETIN: CPT | Performed by: NURSE PRACTITIONER

## 2024-10-25 PROCEDURE — 82607 VITAMIN B-12: CPT | Performed by: NURSE PRACTITIONER

## 2024-10-25 PROCEDURE — 86039 ANTINUCLEAR ANTIBODIES (ANA): CPT | Performed by: NURSE PRACTITIONER

## 2024-10-25 PROCEDURE — 84466 ASSAY OF TRANSFERRIN: CPT | Performed by: NURSE PRACTITIONER

## 2024-10-28 LAB
ALBUMIN SERPL ELPH-MCNC: 3.94 G/DL (ref 3.35–5.55)
ALPHA1 GLOB SERPL ELPH-MCNC: 0.25 G/DL (ref 0.17–0.41)
ALPHA2 GLOB SERPL ELPH-MCNC: 0.45 G/DL (ref 0.43–0.99)
ANA PATTERN 1: NORMAL
ANA SER QL IF: POSITIVE
ANA TITR SER IF: NORMAL {TITER}
B-GLOBULIN SERPL ELPH-MCNC: 0.51 G/DL (ref 0.5–1.1)
EPO SERPL-ACNC: 13 MIU/ML (ref 2.6–18.5)
GAMMA GLOB SERPL ELPH-MCNC: 0.45 G/DL (ref 0.67–1.58)
INTERPRETATION SERPL IFE-IMP: NORMAL
KAPPA LC SER QL IA: 1 MG/DL (ref 0.33–1.94)
KAPPA LC/LAMBDA SER IA: 1.3 (ref 0.26–1.65)
LAMBDA LC SER QL IA: 0.77 MG/DL (ref 0.57–2.63)
PROT SERPL-MCNC: 5.6 G/DL (ref 6–8.4)

## 2024-10-29 ENCOUNTER — HOSPITAL ENCOUNTER (OUTPATIENT)
Dept: RADIOLOGY | Facility: HOSPITAL | Age: 74
Discharge: HOME | End: 2024-10-29
Attending: NURSE PRACTITIONER
Payer: MEDICARE

## 2024-10-29 LAB
PATHOLOGIST INTERPRETATION IFE: NORMAL
PATHOLOGIST INTERPRETATION SPE: NORMAL

## 2024-10-29 PROCEDURE — 76700 US EXAM ABDOM COMPLETE: CPT | Mod: TC

## 2024-10-29 PROCEDURE — 76700 US EXAM ABDOM COMPLETE: CPT | Mod: 26,,, | Performed by: RADIOLOGY

## 2024-10-30 LAB
ANTI SM ANTIBODY: 0.08 RATIO (ref 0–0.99)
ANTI SM/RNP ANTIBODY: 0.05 RATIO (ref 0–0.99)
ANTI-SM INTERPRETATION: NEGATIVE
ANTI-SM/RNP INTERPRETATION: NEGATIVE
ANTI-SSA ANTIBODY: 0.05 RATIO (ref 0–0.99)
ANTI-SSA INTERPRETATION: NEGATIVE
ANTI-SSB ANTIBODY: 0.08 RATIO (ref 0–0.99)
ANTI-SSB INTERPRETATION: NEGATIVE
DSDNA AB SER-ACNC: NORMAL [IU]/ML

## 2024-12-06 ENCOUNTER — OFFICE VISIT (OUTPATIENT)
Dept: HEMATOLOGY/ONCOLOGY | Facility: CLINIC | Age: 74
End: 2024-12-06
Payer: MEDICARE

## 2024-12-06 DIAGNOSIS — R79.9 ELEVATED BUN: ICD-10-CM

## 2024-12-06 DIAGNOSIS — R79.89 BLOOD CREATININE INCREASED COMPARED WITH PRIOR MEASUREMENT: ICD-10-CM

## 2024-12-06 DIAGNOSIS — N28.9 FUNCTION KIDNEY DECREASED: ICD-10-CM

## 2024-12-06 PROCEDURE — G2211 COMPLEX E/M VISIT ADD ON: HCPCS | Mod: 95,,, | Performed by: NURSE PRACTITIONER

## 2024-12-06 PROCEDURE — 99215 OFFICE O/P EST HI 40 MIN: CPT | Mod: 95,,, | Performed by: NURSE PRACTITIONER

## 2024-12-06 NOTE — PROGRESS NOTES
The patient location is: home  The chief complaint leading to consultation is: no complaints    Visit type: audiovisual    Face to Face time with patient: 35  50 minutes of total time spent on the encounter, which includes face to face time and non-face to face time preparing to see the patient (eg, review of tests), Obtaining and/or reviewing separately obtained history, Documenting clinical information in the electronic or other health record, Independently interpreting results (not separately reported) and communicating results to the patient/family/caregiver, or Care coordination (not separately reported).     Each patient to whom he or she provides medical services by telemedicine is:  (1) informed of the relationship between the physician and patient and the respective role of any other health care provider with respect to management of the patient; and (2) notified that he or she may decline to receive medical services by telemedicine and may withdraw from such care at any time.     Patient ID: Mekhi Lambert is a 74 y.o. male.    Chief Complaint: no complaints    HPI:  73 yo male presents to the clinic as a new patient for further evaluation and recommendation  of prolonged clotting time. Had recent TIA and was evaluated at [facility].     States that on/off platelet count runs low.  States that 2 days prior to having tia he walked his dog and then came home and threw up. States that for a week after he had stomach or cramping.  States thought may be GI virus.  This happened 2 days prior to his TIA.    Us up to date with age appropriate cancer screenings.    Family hx of cancer:  Father: lung cancer - smoker, skin cancer, bone cancer, prostate cancer    Does have a family history of SLE - mother    Denies cigarette smoking or illicit drug use. Has occasional alcohol.    States that he works out daily.    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  Noted  decreased kidney function , increased creatinine and BUN.  States has been taking NSAIDs recently; however has stopped.  Folate, b12 normal.  No abnormal paraproteinemia found.  Abdominal US reviewed and no abnormality found.  PTT normalized.      I have reviewed all of the patient's relevant lab work available in the medical record and have utilized this in my evaluation and management recommendations today.      Social History     Socioeconomic History    Marital status:    Tobacco Use    Smoking status: Never    Smokeless tobacco: Former     Types: Chew     Quit date: 6/3/2004   Substance and Sexual Activity    Alcohol use: Yes     Alcohol/week: 2.0 standard drinks of alcohol     Types: 2 Glasses of wine per week     Comment: per week    Drug use: No    Sexual activity: Yes     Social Drivers of Health     Financial Resource Strain: Low Risk  (4/26/2024)    Overall Financial Resource Strain (CARDIA)     Difficulty of Paying Living Expenses: Not hard at all   Food Insecurity: No Food Insecurity (9/17/2024)    Received from [facility] and Its Subsidiaries and Affiliates    Hunger Vital Sign     Worried About Running Out of Food in the Last Year: Never true     Ran Out of Food in the Last Year: Never true   Transportation Needs: No Transportation Needs (9/17/2024)    Received from [facility] and Its Subsidiaries and Affiliates    PRAPARE - Transportation     Lack of Transportation (Medical): No     Lack of Transportation (Non-Medical): No   Physical Activity: Sufficiently Active (4/26/2024)    Exercise Vital Sign     Days of Exercise per Week: 7 days     Minutes of Exercise per Session: 130 min   Stress: No Stress Concern Present (9/17/2024)    Received from [employer] [facility] and Its Subsidiaries and Affiliates    [place] [employer] - Occupational Stress Questionnaire     Feeling  of Stress : Not at all   Housing Stability: Low Risk  (4/1/2024)    Housing Stability Vital Sign     Unable to Pay for Housing in the Last Year: No     Number of Places Lived in the Last Year: 1     Unstable Housing in the Last Year: No       Family History   Problem Relation Name Age of Onset    Heart disease Mother          lupus related    Diabetes Mother      Cancer Father          prostate, bone, lung, skin cancers all seperate types    Diabetes Maternal Aunt         Past Surgical History:   Procedure Laterality Date    CARDIAC CATHETERIZATION      COLONOSCOPY N/A 09/22/2021    Procedure: COLONOSCOPY;  Surgeon: Meaghan Jaime MD;  Location: [facility] ENDO;  Service: Endoscopy;  Laterality: N/A;    CORONARY ANGIOGRAPHY INCLUDING BYPASS GRAFTS WITH CATHETERIZATION OF LEFT HEART N/A 03/03/2020    Procedure: ANGIOGRAM, CORONARY, INCLUDING BYPASS GRAFT, WITH LEFT HEART CATHETERIZATION;  Surgeon: Ute Melchor MD;  Location: [facility] CATH LAB;  Service: Cardiology;  Laterality: N/A;    JOINT REPLACEMENT Right     knee    KNEE SURGERY      LEFT HEART CATHETERIZATION Left 02/22/2020    Procedure: CATHETERIZATION, HEART, LEFT;  Surgeon: Ute Melchor MD;  Location: [facility] CATH LAB;  Service: Cardiology;  Laterality: Left;    LEFT HEART CATHETERIZATION Left 02/26/2022    Procedure: CATHETERIZATION, HEART, LEFT;  Surgeon: Ute Melchor MD;  Location: [facility] CATH LAB;  Service: Cardiology;  Laterality: Left;    PERCUTANEOUS TRANSLUMINAL BALLOON ANGIOPLASTY OF CORONARY ARTERY Left 02/22/2020    Procedure: Angioplasty-coronary;  Surgeon: Ute Melchor MD;  Location: [facility] CATH LAB;  Service: Cardiology;  Laterality: Left;    PERCUTANEOUS TRANSLUMINAL BALLOON ANGIOPLASTY OF CORONARY ARTERY  02/26/2022    Procedure: Angioplasty-coronary;  Surgeon: Ute Melchor MD;  Location: [facility] CATH LAB;  Service: Cardiology;;    REPAIR, HERNIA, INGUINAL Right 4/5/2024    Procedure: REPAIR, HERNIA, INGUINAL;  Surgeon: Franklin Fuentes,  MD;  Location: [facility] OR;  Service: General;  Laterality: Right;    RIGHT HEMICOLECTOMY N/A 04/27/2022    Procedure: HEMICOLECTOMY, RIGHT;  Surgeon: See Dominguez MD;  Location: [facility] OR;  Service: General;  Laterality: N/A;    SHOULDER ARTHROSCOPY Right     Dr Bella    SIGMOIDECTOMY      simple prostatectomy  06/06/2016    robotic assisted partial prostectomy due to BPH    TOTAL KNEE ARTHROPLASTY         Past Medical History:   Diagnosis Date    ALBERTO (acute kidney injury) 8/8/2022    Anticoagulant long-term use     Arthritis     Cancer     Chronic systolic CHF (congestive heart failure) 02/26/2022    Coronary artery disease     Digestive disorder     Myocardial infarction     2/20/22       Review of Systems   Constitutional: Negative.    HENT: Negative.     Eyes: Negative.    Respiratory: Negative.     Cardiovascular: Negative.    Gastrointestinal:  Positive for constipation (controlled with magnesium) and diarrhea (controlled with taking an iron tablet).   Endocrine: Negative.    Genitourinary: Negative.    Musculoskeletal:  Positive for arthralgias.   Skin: Negative.    Allergic/Immunologic: Negative.    Neurological: Negative.    Hematological: Negative.    Psychiatric/Behavioral: Negative.            Medication List with Changes/Refills   Current Medications    ACETAMINOPHEN (TYLENOL) 500 MG TABLET    Take 500 mg by mouth every 6 (six) hours as needed for Pain.    ASPIRIN 81 MG CHEW    Take 1 tablet by mouth every morning.    METOPROLOL SUCCINATE (TOPROL-XL) 25 MG 24 HR TABLET    TAKE 1/2 TABLET BY MOUTH EVERY EVENING    MOMETASONE 0.1% (ELOCON) 0.1 % CREAM    AAA bid prn for psoriasis. Strong steroid.  Do not use on face, underarms or groin.    MULTIVITAMIN CAPSULE    Take 1 capsule by mouth once daily.        Objective:   There were no vitals filed for this visit.    Physical Exam  Constitutional:       Appearance: Normal appearance.   Pulmonary:      Effort: Pulmonary effort is normal.   Neurological:       Mental Status: He is alert and oriented to person, place, and time.   Psychiatric:         Mood and Affect: Mood normal.         Behavior: Behavior normal.         Thought Content: Thought content normal.         Judgment: Judgment normal.         Assessment:     Problem List Items Addressed This Visit    None  Visit Diagnoses       Function kidney decreased    -  Primary    Relevant Orders    CBC Auto Differential    Comprehensive Metabolic Panel    Blood creatinine increased compared with prior measurement        Relevant Orders    CBC Auto Differential    Comprehensive Metabolic Panel    Elevated BUN        Relevant Orders    CBC Auto Differential    Comprehensive Metabolic Panel              Lab Results   Component Value Date    WBC 5.17 10/25/2024    RBC 4.76 10/25/2024    HGB 14.3 10/25/2024    HCT 42.3 10/25/2024    MCV 89 10/25/2024    MCH 30.0 10/25/2024    MCHC 33.8 10/25/2024    RDW 14.3 10/25/2024     10/25/2024    MPV 9.9 10/25/2024    GRAN 3.4 10/25/2024    GRAN 66.4 10/25/2024    LYMPH 1.2 10/25/2024    LYMPH 23.4 10/25/2024    MONO 0.4 10/25/2024    MONO 7.9 10/25/2024    EOS 0.1 10/25/2024    BASO 0.04 10/25/2024    EOSINOPHIL 1.5 10/25/2024    BASOPHIL 0.8 10/25/2024      Lab Results   Component Value Date     10/25/2024    K 4.6 10/25/2024     (H) 10/25/2024    CO2 21 (L) 10/25/2024    BUN 24 (H) 10/25/2024    CREATININE 1.5 (H) 10/25/2024    CALCIUM 9.0 10/25/2024    ANIONGAP 10 10/25/2024    ESTGFRAFRICA 61 09/18/2024    EGFRNONAA >60 05/05/2022     Lab Results   Component Value Date    IRON 112 10/25/2024    TRANSFERRIN 219 10/25/2024    TIBC 324 10/25/2024    FESATURATED 35 10/25/2024      Lab Results   Component Value Date    FOLATE 5.0 10/25/2024    FOLATE 5.0 10/25/2024     Lab Results   Component Value Date    GCIOMOCB40 464 10/25/2024     Lab Results   Component Value Date    TSH 1.105 10/25/2024         Plan:   Function kidney decreased  -     CBC Auto Differential;  Future; Expected date: 12/06/2024  -     Comprehensive Metabolic Panel; Future; Expected date: 12/06/2024    Blood creatinine increased compared with prior measurement  -     CBC Auto Differential; Future; Expected date: 12/06/2024  -     Comprehensive Metabolic Panel; Future; Expected date: 12/06/2024    Elevated BUN  -     CBC Auto Differential; Future; Expected date: 12/06/2024  -     Comprehensive Metabolic Panel; Future; Expected date: 12/06/2024        Med Onc Chart Routing      Follow up with physician    Follow up with KAYLEEN 2 months. VV with labs prior   Infusion scheduling note   n/a   Injection scheduling note n/a   Labs   Scheduling:  Preferred lab: [facility]  Lab interval:  cbc, cmp   Imaging   N/a   Pharmacy appointment No pharmacy appointment needed      Other referrals       No additional referrals needed  n/a          Continue to refrain from NSAID use increase water intake - will assess if kidney function improves with next labs.  Currently cbc looks great with no cytopenias noted.  If labs look good on next visit he can follow up with his pcp for continued monitoring.      Collaborating Provider:  Dr. Yfn Marin    Thank You,  TONY Tuttle-VIANNEY  Benign Hematology

## 2025-01-23 DIAGNOSIS — I42.0 DILATED CARDIOMYOPATHY: ICD-10-CM

## 2025-01-23 DIAGNOSIS — I42.0 DCM (DILATED CARDIOMYOPATHY): ICD-10-CM

## 2025-01-23 DIAGNOSIS — R00.2 PALPITATIONS: ICD-10-CM

## 2025-01-24 RX ORDER — METOPROLOL SUCCINATE 25 MG/1
12.5 TABLET, EXTENDED RELEASE ORAL NIGHTLY
Qty: 45 TABLET | Refills: 1 | Status: SHIPPED | OUTPATIENT
Start: 2025-01-24

## 2025-02-03 ENCOUNTER — LAB VISIT (OUTPATIENT)
Dept: LAB | Facility: HOSPITAL | Age: 75
End: 2025-02-03
Attending: NURSE PRACTITIONER
Payer: MEDICARE

## 2025-02-03 DIAGNOSIS — R79.89 BLOOD CREATININE INCREASED COMPARED WITH PRIOR MEASUREMENT: ICD-10-CM

## 2025-02-03 DIAGNOSIS — R79.9 ELEVATED BUN: ICD-10-CM

## 2025-02-03 DIAGNOSIS — N28.9 FUNCTION KIDNEY DECREASED: ICD-10-CM

## 2025-02-03 LAB
ALBUMIN SERPL BCP-MCNC: 3.6 G/DL (ref 3.5–5.2)
ALP SERPL-CCNC: 73 U/L (ref 40–150)
ALT SERPL W/O P-5'-P-CCNC: 16 U/L (ref 10–44)
ANION GAP SERPL CALC-SCNC: 8 MMOL/L (ref 8–16)
AST SERPL-CCNC: 17 U/L (ref 10–40)
BASOPHILS # BLD AUTO: 0.05 K/UL (ref 0–0.2)
BASOPHILS NFR BLD: 0.6 % (ref 0–1.9)
BILIRUB SERPL-MCNC: 0.5 MG/DL (ref 0.1–1)
BUN SERPL-MCNC: 20 MG/DL (ref 8–23)
CALCIUM SERPL-MCNC: 9.3 MG/DL (ref 8.7–10.5)
CHLORIDE SERPL-SCNC: 108 MMOL/L (ref 95–110)
CO2 SERPL-SCNC: 25 MMOL/L (ref 23–29)
CREAT SERPL-MCNC: 1.4 MG/DL (ref 0.5–1.4)
DIFFERENTIAL METHOD BLD: NORMAL
EOSINOPHIL # BLD AUTO: 0.1 K/UL (ref 0–0.5)
EOSINOPHIL NFR BLD: 0.7 % (ref 0–8)
ERYTHROCYTE [DISTWIDTH] IN BLOOD BY AUTOMATED COUNT: 13.7 % (ref 11.5–14.5)
EST. GFR  (NO RACE VARIABLE): 53 ML/MIN/1.73 M^2
GLUCOSE SERPL-MCNC: 105 MG/DL (ref 70–110)
HCT VFR BLD AUTO: 43.9 % (ref 40–54)
HGB BLD-MCNC: 15.1 G/DL (ref 14–18)
IMM GRANULOCYTES # BLD AUTO: 0.02 K/UL (ref 0–0.04)
IMM GRANULOCYTES NFR BLD AUTO: 0.2 % (ref 0–0.5)
LYMPHOCYTES # BLD AUTO: 1.6 K/UL (ref 1–4.8)
LYMPHOCYTES NFR BLD: 20 % (ref 18–48)
MCH RBC QN AUTO: 30.5 PG (ref 27–31)
MCHC RBC AUTO-ENTMCNC: 34.4 G/DL (ref 32–36)
MCV RBC AUTO: 89 FL (ref 82–98)
MONOCYTES # BLD AUTO: 0.7 K/UL (ref 0.3–1)
MONOCYTES NFR BLD: 8 % (ref 4–15)
NEUTROPHILS # BLD AUTO: 5.8 K/UL (ref 1.8–7.7)
NEUTROPHILS NFR BLD: 70.5 % (ref 38–73)
NRBC BLD-RTO: 0 /100 WBC
PLATELET # BLD AUTO: 177 K/UL (ref 150–450)
PMV BLD AUTO: 9.6 FL (ref 9.2–12.9)
POTASSIUM SERPL-SCNC: 5.2 MMOL/L (ref 3.5–5.1)
PROT SERPL-MCNC: 6.1 G/DL (ref 6–8.4)
RBC # BLD AUTO: 4.95 M/UL (ref 4.6–6.2)
SODIUM SERPL-SCNC: 141 MMOL/L (ref 136–145)
WBC # BLD AUTO: 8.17 K/UL (ref 3.9–12.7)

## 2025-02-03 PROCEDURE — 36415 COLL VENOUS BLD VENIPUNCTURE: CPT | Performed by: NURSE PRACTITIONER

## 2025-02-03 PROCEDURE — 80053 COMPREHEN METABOLIC PANEL: CPT | Performed by: NURSE PRACTITIONER

## 2025-02-03 PROCEDURE — 85025 COMPLETE CBC W/AUTO DIFF WBC: CPT | Performed by: NURSE PRACTITIONER

## 2025-02-07 ENCOUNTER — OFFICE VISIT (OUTPATIENT)
Dept: HEMATOLOGY/ONCOLOGY | Facility: CLINIC | Age: 75
End: 2025-02-07
Payer: MEDICARE

## 2025-02-07 DIAGNOSIS — R76.8 LOW SERUM IGG FOR AGE: Primary | ICD-10-CM

## 2025-02-07 PROCEDURE — 98006 SYNCH AUDIO-VIDEO EST MOD 30: CPT | Mod: 95,,, | Performed by: NURSE PRACTITIONER

## 2025-02-07 PROCEDURE — G2211 COMPLEX E/M VISIT ADD ON: HCPCS | Mod: 95,,, | Performed by: NURSE PRACTITIONER

## 2025-02-07 NOTE — PROGRESS NOTES
The patient location is: home  The chief complaint leading to consultation is: no complaints    Visit type: audiovisual    Face to Face time with patient: 25  35 minutes of total time spent on the encounter, which includes face to face time and non-face to face time preparing to see the patient (eg, review of tests), Obtaining and/or reviewing separately obtained history, Documenting clinical information in the electronic or other health record, Independently interpreting results (not separately reported) and communicating results to the patient/family/caregiver, or Care coordination (not separately reported).     Each patient to whom he or she provides medical services by telemedicine is:  (1) informed of the relationship between the physician and patient and the respective role of any other health care provider with respect to management of the patient; and (2) notified that he or she may decline to receive medical services by telemedicine and may withdraw from such care at any time.     Patient ID: Mekhi Lambert is a 74 y.o. male.    Chief Complaint: no complaints    HPI:  75 yo male presents to the clinic as a new patient for further evaluation and recommendation  of prolonged clotting time. Had recent TIA and was evaluated at [facility].     States that on/off platelet count runs low.  States that 2 days prior to having tia he walked his dog and then came home and threw up. States that for a week after he had stomach or cramping.  States thought may be GI virus.  This happened 2 days prior to his TIA.    Us up to date with age appropriate cancer screenings.    Family hx of cancer:  Father: lung cancer - smoker, skin cancer, bone cancer, prostate cancer    Does have a family history of SLE - mother    Denies cigarette smoking or illicit drug use. Has occasional alcohol.    States that he works out daily.    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  Noted  "decreased kidney function , increased creatinine and BUN.  States has been taking NSAIDs recently; however has stopped.  Folate, b12 normal.  No abnormal paraproteinemia found.  Abdominal US reviewed and no abnormality found.  PTT normalized.    Interval History:  2/7/2025  States had a recent gi type virus "new covid" lasted about 4-5 days but is better now.  CBC is normal.  CMP creatinine and BUN normal - egfr has also improved.  Noted with low IgG - patient denies frequent sinus infections or lower respiratory infections.  Does have GI issues after workouts or stress.  Catches colds easy.        I have reviewed all of the patient's relevant lab work available in the medical record and have utilized this in my evaluation and management recommendations today.      Social History     Socioeconomic History    Marital status:    Tobacco Use    Smoking status: Never    Smokeless tobacco: Former     Types: Chew     Quit date: 6/3/2004   Substance and Sexual Activity    Alcohol use: Yes     Alcohol/week: 2.0 standard drinks of alcohol     Types: 2 Glasses of wine per week     Comment: per week    Drug use: No    Sexual activity: Yes     Social Drivers of Health     Financial Resource Strain: Low Risk  (4/26/2024)    Overall Financial Resource Strain (CARDIA)     Difficulty of Paying Living Expenses: Not hard at all   Food Insecurity: No Food Insecurity (9/17/2024)    Received from [facility] and Its Subsidiaries and Affiliates    Hunger Vital Sign     Worried About Running Out of Food in the Last Year: Never true     Ran Out of Food in the Last Year: Never true   Transportation Needs: No Transportation Needs (9/17/2024)    Received from [facility] and Its Subsidiaries and Affiliates    PRAPARE - Transportation     Lack of Transportation (Medical): No     Lack of Transportation (Non-Medical): No   Physical Activity: Sufficiently Active " (4/26/2024)    Exercise Vital Sign     Days of Exercise per Week: 7 days     Minutes of Exercise per Session: 130 min   Stress: No Stress Concern Present (9/17/2024)    Received from [facility] and Its Subsidiaries and Affiliates    [place] [employer] - Occupational Stress Questionnaire     Feeling of Stress : Not at all   Housing Stability: Low Risk  (4/1/2024)    Housing Stability Vital Sign     Unable to Pay for Housing in the Last Year: No     Number of Places Lived in the Last Year: 1     Unstable Housing in the Last Year: No       Family History   Problem Relation Name Age of Onset    Heart disease Mother          lupus related    Diabetes Mother      Cancer Father          prostate, bone, lung, skin cancers all seperate types    Diabetes Maternal Aunt         Past Surgical History:   Procedure Laterality Date    CARDIAC CATHETERIZATION      COLONOSCOPY N/A 09/22/2021    Procedure: COLONOSCOPY;  Surgeon: Meaghan Jaime MD;  Location: [facility] ENDO;  Service: Endoscopy;  Laterality: N/A;    CORONARY ANGIOGRAPHY INCLUDING BYPASS GRAFTS WITH CATHETERIZATION OF LEFT HEART N/A 03/03/2020    Procedure: ANGIOGRAM, CORONARY, INCLUDING BYPASS GRAFT, WITH LEFT HEART CATHETERIZATION;  Surgeon: Ute Melchor MD;  Location: [facility] CATH LAB;  Service: Cardiology;  Laterality: N/A;    JOINT REPLACEMENT Right     knee    KNEE SURGERY      LEFT HEART CATHETERIZATION Left 02/22/2020    Procedure: CATHETERIZATION, HEART, LEFT;  Surgeon: Ute Melchor MD;  Location: [facility] CATH LAB;  Service: Cardiology;  Laterality: Left;    LEFT HEART CATHETERIZATION Left 02/26/2022    Procedure: CATHETERIZATION, HEART, LEFT;  Surgeon: Ute Melchor MD;  Location: [facility] CATH LAB;  Service: Cardiology;  Laterality: Left;    PERCUTANEOUS TRANSLUMINAL BALLOON ANGIOPLASTY OF CORONARY ARTERY Left 02/22/2020    Procedure: Angioplasty-coronary;  Surgeon: Ute Melchor MD;  Location: [facility]  CATH LAB;  Service: Cardiology;  Laterality: Left;    PERCUTANEOUS TRANSLUMINAL BALLOON ANGIOPLASTY OF CORONARY ARTERY  02/26/2022    Procedure: Angioplasty-coronary;  Surgeon: Ute Melchor MD;  Location: [facility] CATH LAB;  Service: Cardiology;;    REPAIR, HERNIA, INGUINAL Right 4/5/2024    Procedure: REPAIR, HERNIA, INGUINAL;  Surgeon: Franklin Fuentes MD;  Location: [facility] OR;  Service: General;  Laterality: Right;    RIGHT HEMICOLECTOMY N/A 04/27/2022    Procedure: HEMICOLECTOMY, RIGHT;  Surgeon: See Dominguez MD;  Location: [facility] OR;  Service: General;  Laterality: N/A;    SHOULDER ARTHROSCOPY Right     Dr Bella    SIGMOIDECTOMY      simple prostatectomy  06/06/2016    robotic assisted partial prostectomy due to BPH    TOTAL KNEE ARTHROPLASTY         Past Medical History:   Diagnosis Date    ALBERTO (acute kidney injury) 8/8/2022    Anticoagulant long-term use     Arthritis     Cancer     Chronic systolic CHF (congestive heart failure) 02/26/2022    Coronary artery disease     Digestive disorder     Myocardial infarction     2/20/22       Review of Systems   Constitutional: Negative.    HENT: Negative.     Eyes: Negative.    Respiratory: Negative.     Cardiovascular: Negative.    Gastrointestinal:  Positive for constipation (controlled with magnesium) and diarrhea (controlled with taking an iron tablet).   Endocrine: Negative.    Genitourinary: Negative.    Musculoskeletal:  Positive for arthralgias.   Skin: Negative.    Allergic/Immunologic: Negative.    Neurological: Negative.    Hematological: Negative.    Psychiatric/Behavioral: Negative.            Medication List with Changes/Refills   Current Medications    ACETAMINOPHEN (TYLENOL) 500 MG TABLET    Take 500 mg by mouth every 6 (six) hours as needed for Pain.    ASPIRIN 81 MG CHEW    Take 1 tablet by mouth every morning.    METOPROLOL SUCCINATE (TOPROL-XL) 25 MG 24 HR TABLET    TAKE 1/2 TABLET BY MOUTH EVERY EVENING    MOMETASONE 0.1% (ELOCON) 0.1 % CREAM     AAA bid prn for psoriasis. Strong steroid.  Do not use on face, underarms or groin.        Objective:   There were no vitals filed for this visit.    Physical Exam  Constitutional:       Appearance: Normal appearance.   Pulmonary:      Effort: Pulmonary effort is normal.   Neurological:      Mental Status: He is alert and oriented to person, place, and time.   Psychiatric:         Mood and Affect: Mood normal.         Behavior: Behavior normal.         Thought Content: Thought content normal.         Judgment: Judgment normal.         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    -  Primary    Relevant Orders    Ambulatory referral/consult to Immunology                Lab Results   Component Value Date    WBC 8.17 02/03/2025    RBC 4.95 02/03/2025    HGB 15.1 02/03/2025    HCT 43.9 02/03/2025    MCV 89 02/03/2025    MCH 30.5 02/03/2025    MCHC 34.4 02/03/2025    RDW 13.7 02/03/2025     02/03/2025    MPV 9.6 02/03/2025    GRAN 5.8 02/03/2025    GRAN 70.5 02/03/2025    LYMPH 1.6 02/03/2025    LYMPH 20.0 02/03/2025    MONO 0.7 02/03/2025    MONO 8.0 02/03/2025    EOS 0.1 02/03/2025    BASO 0.05 02/03/2025    EOSINOPHIL 0.7 02/03/2025    BASOPHIL 0.6 02/03/2025      Lab Results   Component Value Date     02/03/2025    K 5.2 (H) 02/03/2025     02/03/2025    CO2 25 02/03/2025    BUN 20 02/03/2025    CREATININE 1.4 02/03/2025    CALCIUM 9.3 02/03/2025    ANIONGAP 8 02/03/2025    ESTGFRAFRICA 61 09/18/2024    EGFRNONAA >60 05/05/2022     Lab Results   Component Value Date    IRON 112 10/25/2024    TRANSFERRIN 219 10/25/2024    TIBC 324 10/25/2024    FESATURATED 35 10/25/2024      Lab Results   Component Value Date    FOLATE 5.0 10/25/2024    FOLATE 5.0 10/25/2024     Lab Results   Component Value Date    XOJCVWLD12 464 10/25/2024     Lab Results   Component Value Date    TSH 1.105 10/25/2024       Ref Range & Units 3 mo ago    IgG 650 -  1600 mg/dL 483 Low    Comment: IgG Cord Blood Reference Range: 650-1600 mg/dL.   IgA 40 - 350 mg/dL 47   Comment: IgA Cord Blood Reference Range: <5 mg/dL.   IgM 50 - 300 mg/dL 29 Low    Comment: IgM Cord Blood Reference Range: <25 mg/dL.   Resulting Agency  [facility]                   Plan:         Med Onc Chart Routing      Follow up with physician    Follow up with KAYLEEN No follow up needed.   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      He no longer has prolonged clotting time and labs look great.  OK to follow up with pcp for continued monitoring.           Collaborating Provider:  Dr. Yfn Marin    Thank You,  Mick Farrell, ROLANDOP-C  Benign Hematology

## 2025-02-10 ENCOUNTER — TELEPHONE (OUTPATIENT)
Dept: ALLERGY | Facility: CLINIC | Age: 75
End: 2025-02-10
Payer: MEDICARE

## 2025-02-10 ENCOUNTER — OFFICE VISIT (OUTPATIENT)
Dept: INTERNAL MEDICINE | Facility: CLINIC | Age: 75
End: 2025-02-10
Payer: MEDICARE

## 2025-02-10 VITALS
WEIGHT: 171.5 LBS | OXYGEN SATURATION: 96 % | DIASTOLIC BLOOD PRESSURE: 70 MMHG | BODY MASS INDEX: 25.99 KG/M2 | SYSTOLIC BLOOD PRESSURE: 116 MMHG | HEART RATE: 53 BPM | TEMPERATURE: 96 F | HEIGHT: 68 IN

## 2025-02-10 DIAGNOSIS — K21.9 GASTROESOPHAGEAL REFLUX DISEASE, UNSPECIFIED WHETHER ESOPHAGITIS PRESENT: ICD-10-CM

## 2025-02-10 DIAGNOSIS — T46.6X5A STATIN MYOPATHY: ICD-10-CM

## 2025-02-10 DIAGNOSIS — I42.0 DILATED CARDIOMYOPATHY: ICD-10-CM

## 2025-02-10 DIAGNOSIS — Z01.818 PREOP EXAM FOR INTERNAL MEDICINE: Primary | ICD-10-CM

## 2025-02-10 DIAGNOSIS — C44.90 SKIN CANCER: ICD-10-CM

## 2025-02-10 DIAGNOSIS — Z86.73 HX-TIA (TRANSIENT ISCHEMIC ATTACK): ICD-10-CM

## 2025-02-10 DIAGNOSIS — M15.0 PRIMARY OSTEOARTHRITIS INVOLVING MULTIPLE JOINTS: ICD-10-CM

## 2025-02-10 DIAGNOSIS — I25.2 H/O NON-ST ELEVATION MYOCARDIAL INFARCTION (NSTEMI): ICD-10-CM

## 2025-02-10 DIAGNOSIS — I50.22 CHRONIC SYSTOLIC CHF (CONGESTIVE HEART FAILURE): ICD-10-CM

## 2025-02-10 DIAGNOSIS — I72.8 SPLENIC ARTERY ANEURYSM: ICD-10-CM

## 2025-02-10 DIAGNOSIS — Z86.0100 HISTORY OF COLONIC POLYPS: ICD-10-CM

## 2025-02-10 DIAGNOSIS — N18.31 CHRONIC KIDNEY DISEASE, STAGE 3A: ICD-10-CM

## 2025-02-10 DIAGNOSIS — G72.0 STATIN MYOPATHY: ICD-10-CM

## 2025-02-10 DIAGNOSIS — H25.9 SENILE CATARACT, UNSPECIFIED AGE-RELATED CATARACT TYPE, UNSPECIFIED LATERALITY: ICD-10-CM

## 2025-02-10 DIAGNOSIS — Z95.5 H/O HEART ARTERY STENT: ICD-10-CM

## 2025-02-10 DIAGNOSIS — E78.00 HYPERCHOLESTEROLEMIA: ICD-10-CM

## 2025-02-10 DIAGNOSIS — I25.118 CORONARY ARTERY DISEASE OF NATIVE ARTERY OF NATIVE HEART WITH STABLE ANGINA PECTORIS: ICD-10-CM

## 2025-02-10 DIAGNOSIS — Z96.651 HISTORY OF TOTAL RIGHT KNEE REPLACEMENT: ICD-10-CM

## 2025-02-10 PROBLEM — F39 UNSPECIFIED MOOD (AFFECTIVE) DISORDER: Status: RESOLVED | Noted: 2020-05-12 | Resolved: 2025-02-10

## 2025-02-10 PROCEDURE — 99999 PR PBB SHADOW E&M-EST. PATIENT-LVL III: CPT | Mod: PBBFAC,,, | Performed by: PEDIATRICS

## 2025-02-10 PROCEDURE — 99214 OFFICE O/P EST MOD 30 MIN: CPT | Mod: S$PBB,,, | Performed by: PEDIATRICS

## 2025-02-10 PROCEDURE — 99213 OFFICE O/P EST LOW 20 MIN: CPT | Mod: PBBFAC | Performed by: PEDIATRICS

## 2025-02-10 RX ORDER — VIT C/E/ZN/COPPR/LUTEIN/ZEAXAN 250MG-90MG
1000 CAPSULE ORAL DAILY
COMMUNITY

## 2025-02-10 RX ORDER — MAGNESIUM GLYCINATE 100 MG
100 CAPSULE ORAL DAILY PRN
COMMUNITY

## 2025-02-10 RX ORDER — PANTOPRAZOLE SODIUM 20 MG/1
20 TABLET, DELAYED RELEASE ORAL DAILY
Qty: 30 TABLET | Refills: 11 | Status: SHIPPED | OUTPATIENT
Start: 2025-02-10 | End: 2026-02-10

## 2025-02-10 RX ORDER — VITAMIN B COMPLEX
1 CAPSULE ORAL DAILY
COMMUNITY

## 2025-02-10 RX ORDER — METHOCARBAMOL 500 MG/1
500 TABLET, FILM COATED ORAL 3 TIMES DAILY PRN
Qty: 30 TABLET | Refills: 3 | Status: SHIPPED | OUTPATIENT
Start: 2025-02-10 | End: 2025-02-10

## 2025-02-10 RX ORDER — CYCLOBENZAPRINE HCL 5 MG
5 TABLET ORAL 3 TIMES DAILY PRN
Qty: 60 TABLET | Refills: 1 | Status: SHIPPED | OUTPATIENT
Start: 2025-02-10

## 2025-02-10 NOTE — PROGRESS NOTES
Subjective:       Patient ID: Mekhi Lambert is a 74 y.o. male.    Chief Complaint: Pre-op Exam    Preop clearance for bilateral cataract removal by Dr Keating 2/19/25.    PMHx, PSHx, SocHx, and FHx reviewed and discussed with patient.    Patient Active Problem List:     Prostate hypertrophy     History of total right knee replacement     Skin cancer     Hypercholesterolemia     Family history of melanoma     H/O non-ST elevation myocardial infarction (NSTEMI)     Coronary artery disease of native artery of native heart with  angina pectoris History coronary stent placement     H/O heart artery stent     Anxiety disorder     History of colonic polyps     Dilated cardiomyopathy     Heart failure with reduced ejection fraction, NYHA class II     Rectal pain     NSTEMI (non-ST elevated myocardial infarction)     Chronic systolic CHF (congestive heart failure)     Splenic artery aneurysm     Statin myopathy     Chronic kidney disease, stage 3a     Hx-TIA (transient ischemic attack)    Past Surgical History:  No date: CARDIAC CATHETERIZATION  09/22/2021: COLONOSCOPY; N/A      Comment:  Procedure: COLONOSCOPY;  Surgeon: Meaghan Jaime MD;  Location: [facility] ENDO;  Service: Endoscopy;                 Laterality: N/A;  03/03/2020: CORONARY ANGIOGRAPHY INCLUDING BYPASS GRAFTS WITH   CATHETERIZATION OF LEFT HEART; N/A      Comment:  Procedure: ANGIOGRAM, CORONARY, INCLUDING BYPASS GRAFT,                WITH LEFT HEART CATHETERIZATION;  Surgeon: Ute Melchor MD;  Location: [facility] CATH LAB;  Service:                Cardiology;  Laterality: N/A;  No date: JOINT REPLACEMENT; Right      Comment:  knee  No date: KNEE SURGERY  02/22/2020: LEFT HEART CATHETERIZATION; Left      Comment:  Procedure: CATHETERIZATION, HEART, LEFT;  Surgeon: Ute Melchor MD;  Location: [facility] CATH LAB;  Service:                Cardiology;  Laterality: Left;  02/26/2022: LEFT HEART CATHETERIZATION; Left       Comment:  Procedure: CATHETERIZATION, HEART, LEFT;  Surgeon: Ute Melchor MD;  Location: [facility] CATH LAB;  Service:                Cardiology;  Laterality: Left;  02/22/2020: PERCUTANEOUS TRANSLUMINAL BALLOON ANGIOPLASTY OF CORONARY   ARTERY; Left      Comment:  Procedure: Angioplasty-coronary;  Surgeon: Ute Melchor MD;  Location: [facility] CATH LAB;  Service:                Cardiology;  Laterality: Left;  02/26/2022: PERCUTANEOUS TRANSLUMINAL BALLOON ANGIOPLASTY OF CORONARY   ARTERY      Comment:  Procedure: Angioplasty-coronary;  Surgeon: Ute Melchor MD;  Location: [facility] CATH LAB;  Service:                Cardiology;;  4/5/2024: REPAIR, HERNIA, INGUINAL; Right      Comment:  Procedure: REPAIR, HERNIA, INGUINAL;  Surgeon:                Franklin Fuentes MD;  Location: [facility] OR;  Service:                General;  Laterality: Right;  04/27/2022: RIGHT HEMICOLECTOMY; N/A      Comment:  Procedure: HEMICOLECTOMY, RIGHT;  Surgeon: See Dominguez MD;  Location: [facility] OR;  Service: General;  Laterality:                N/A;  No date: SHOULDER ARTHROSCOPY; Right      Comment:  Dr Bella  No date: SIGMOIDECTOMY  06/06/2016: simple prostatectomy      Comment:  robotic assisted partial prostectomy due to BPH  No date: TOTAL KNEE ARTHROPLASTY    Review of patient's family history indicates:  Problem: Heart disease      Relation: Mother          Name:               Age of Onset: (Not Specified)              Comment: lupus related  Problem: Diabetes      Relation: Mother          Name:               Age of Onset: (Not Specified)  Problem: Cancer      Relation: Father          Name:               Age of Onset: (Not Specified)              Comment: prostate, bone, lung, skin cancers all seperate types  Problem: Diabetes      Relation: Maternal Aunt          Name:               Age of Onset: (Not Specified)    Social History    Socioeconomic History      Marital status:      Tobacco Use      Smoking status: Never      Smokeless tobacco: Former        Types: Chew        Quit date: 6/3/2004    Substance and Sexual Activity      Alcohol use: Yes        Alcohol/week: 2.0 standard drinks of alcohol        Types: 2 Glasses of wine per week        Comment: per week      Drug use: No      Sexual activity: Yes    Social Drivers of Health  Financial Resource Strain: Low Risk  (4/26/2024)      Overall Financial Resource Strain (CARDIA)          Difficulty of Paying Living Expenses: Not hard at all  Food Insecurity: No Food Insecurity (9/17/2024)      [facility] and Its Subsidiaries and Affiliates      Hunger Vital Sign          Worried About Running Out of Food in the Last Year: Never true          Ran Out of Food in the Last Year: Never true  Transportation Needs: No Transportation Needs (9/17/2024)      [facility]      PRAPARE - Transportation          Lack of Transportation (Medical): No          Lack of Transportation (Non-Medical): No  Physical Activity: Sufficiently Active (4/26/2024)      Exercise Vital Sign          Days of Exercise per Week: 7 days          Minutes of Exercise per Session: 130 min  Stress: No Stress Concern Present (9/17/2024)      Received from [facility] and Its Subsidiaries and Affiliates      [place] [employer] - Occupational Stress Questionnaire          Feeling of Stress : Not at all  Housing Stability: Low Risk  (4/1/2024)      Housing Stability Vital Sign          Unable to Pay for Housing in the Last Year: No          Number of Places Lived in the Last Year: 1          Unstable Housing in the Last Year: No       Review of Systems   Constitutional:  Positive for activity change. Negative for fever and unexpected weight change.   HENT:  Negative for  congestion, hearing loss, rhinorrhea and trouble swallowing.    Eyes:  Positive for visual disturbance. Negative for discharge and redness.   Respiratory:  Negative for cough, chest tightness and wheezing.    Cardiovascular:  Negative for chest pain and palpitations.   Gastrointestinal:  Positive for diarrhea. Negative for blood in stool, constipation and vomiting.   Endocrine: Negative for polydipsia and polyuria.   Genitourinary:  Negative for decreased urine volume, difficulty urinating, hematuria and urgency.   Musculoskeletal:  Negative for arthralgias, joint swelling and neck pain.   Skin:  Negative for rash and wound.   Neurological:  Negative for syncope, weakness and headaches.   Psychiatric/Behavioral:  Negative for behavioral problems, confusion, dysphoric mood and sleep disturbance.        Objective:      Physical Exam  Vitals and nursing note reviewed.   Constitutional:       General: He is not in acute distress.     Appearance: He is well-developed.   Neck:      Thyroid: No thyromegaly.      Vascular: No JVD.   Cardiovascular:      Rate and Rhythm: Normal rate and regular rhythm.      Heart sounds: Normal heart sounds. No murmur heard.  Pulmonary:      Effort: Pulmonary effort is normal. No respiratory distress.      Breath sounds: Normal breath sounds. No wheezing or rales.   Abdominal:      General: There is no distension.      Palpations: Abdomen is soft. There is no mass.      Tenderness: There is no abdominal tenderness. There is no guarding.   Musculoskeletal:      Right lower leg: No edema.      Left lower leg: No edema.   Lymphadenopathy:      Cervical: No cervical adenopathy.   Skin:     Capillary Refill: Capillary refill takes less than 2 seconds.      Findings: No rash.   Neurological:      General: No focal deficit present.      Mental Status: He is alert and oriented to person, place, and time.      Cranial Nerves: No cranial nerve deficit.      Coordination: Coordination normal.    Psychiatric:         Mood and Affect: Mood normal.         Behavior: Behavior normal.         Thought Content: Thought content normal.         Judgment: Judgment normal.         Assessment:       1. Preop exam for internal medicine    2. Splenic artery aneurysm    3. Senile cataract, unspecified age-related cataract type, unspecified laterality    4. Chronic systolic CHF (congestive heart failure)    5. Dilated cardiomyopathy    6. H/O heart artery stent    7. H/O non-ST elevation myocardial infarction (NSTEMI)    8. History of total right knee replacement    9. Hypercholesterolemia    10. History of colonic polyps    11. Skin cancer    12. Statin myopathy    13. Chronic kidney disease, stage 3a    14. Coronary artery disease of native artery of native heart with stable angina pectoris    15. Hx-TIA (transient ischemic attack)        Plan:       Preop exam for internal medicine    Splenic artery aneurysm    Senile cataract, unspecified age-related cataract type, unspecified laterality    Chronic systolic CHF (congestive heart failure)    Dilated cardiomyopathy    H/O heart artery stent    H/O non-ST elevation myocardial infarction (NSTEMI)    History of total right knee replacement    Hypercholesterolemia    History of colonic polyps    Skin cancer    Statin myopathy    Chronic kidney disease, stage 3a    Coronary artery disease of native artery of native heart with stable angina pectoris    Hx-TIA (transient ischemic attack)    He is cleared for surgery at acceptable risk. Note to Dr Keating. Incidentally, needs f/u spleen artery aneurysm.

## 2025-02-10 NOTE — TELEPHONE ENCOUNTER
----- Message from Nurse Johnson sent at 2/10/2025  8:57 AM CST -----  Regarding: Schedule an appointment.  Good morning. I was attempting to schedule an appointment for this patient and I could not. Could you help?

## 2025-02-10 NOTE — TELEPHONE ENCOUNTER
No care due was identified.  [facility] Embedded Care Due Messages. Reference number: [id number].   2/10/2025 9:23:21 AM CST

## 2025-03-18 ENCOUNTER — LAB VISIT (OUTPATIENT)
Dept: LAB | Facility: HOSPITAL | Age: 75
End: 2025-03-18
Attending: PEDIATRICS
Payer: MEDICARE

## 2025-03-18 DIAGNOSIS — Z12.5 PROSTATE CANCER SCREENING: ICD-10-CM

## 2025-03-18 DIAGNOSIS — E78.00 HYPERCHOLESTEROLEMIA: ICD-10-CM

## 2025-03-18 LAB
ALBUMIN SERPL BCP-MCNC: 3.8 G/DL (ref 3.5–5.2)
ALP SERPL-CCNC: 67 U/L (ref 40–150)
ALT SERPL W/O P-5'-P-CCNC: 17 U/L (ref 10–44)
ANION GAP SERPL CALC-SCNC: 6 MMOL/L (ref 8–16)
AST SERPL-CCNC: 24 U/L (ref 10–40)
BILIRUB SERPL-MCNC: 1.1 MG/DL (ref 0.1–1)
BUN SERPL-MCNC: 16 MG/DL (ref 8–23)
CALCIUM SERPL-MCNC: 9.1 MG/DL (ref 8.7–10.5)
CHLORIDE SERPL-SCNC: 108 MMOL/L (ref 95–110)
CHOLEST SERPL-MCNC: 232 MG/DL (ref 120–199)
CHOLEST/HDLC SERPL: 3.1 {RATIO} (ref 2–5)
CO2 SERPL-SCNC: 25 MMOL/L (ref 23–29)
COMPLEXED PSA SERPL-MCNC: 1.7 NG/ML (ref 0–4)
CREAT SERPL-MCNC: 1.1 MG/DL (ref 0.5–1.4)
EST. GFR  (NO RACE VARIABLE): >60 ML/MIN/1.73 M^2
GLUCOSE SERPL-MCNC: 83 MG/DL (ref 70–110)
HDLC SERPL-MCNC: 76 MG/DL (ref 40–75)
HDLC SERPL: 32.8 % (ref 20–50)
LDLC SERPL CALC-MCNC: 140.8 MG/DL (ref 63–159)
NONHDLC SERPL-MCNC: 156 MG/DL
POTASSIUM SERPL-SCNC: 4.9 MMOL/L (ref 3.5–5.1)
PROT SERPL-MCNC: 6.1 G/DL (ref 6–8.4)
SODIUM SERPL-SCNC: 139 MMOL/L (ref 136–145)
TRIGL SERPL-MCNC: 76 MG/DL (ref 30–150)

## 2025-03-18 PROCEDURE — 84153 ASSAY OF PSA TOTAL: CPT | Performed by: PEDIATRICS

## 2025-03-18 PROCEDURE — 36415 COLL VENOUS BLD VENIPUNCTURE: CPT | Performed by: PEDIATRICS

## 2025-03-18 PROCEDURE — 80061 LIPID PANEL: CPT | Performed by: PEDIATRICS

## 2025-03-18 PROCEDURE — 80053 COMPREHEN METABOLIC PANEL: CPT | Performed by: PEDIATRICS

## 2025-03-24 ENCOUNTER — RESULTS FOLLOW-UP (OUTPATIENT)
Dept: INTERNAL MEDICINE | Facility: CLINIC | Age: 75
End: 2025-03-24

## 2025-03-24 ENCOUNTER — HOSPITAL ENCOUNTER (OUTPATIENT)
Dept: RADIOLOGY | Facility: HOSPITAL | Age: 75
Discharge: HOME | End: 2025-03-24
Attending: PEDIATRICS
Payer: MEDICARE

## 2025-03-24 DIAGNOSIS — I72.8 SPLENIC ARTERY ANEURYSM: ICD-10-CM

## 2025-03-24 PROCEDURE — 74174 CTA ABD&PLVS W/CONTRAST: CPT | Mod: 26,,, | Performed by: STUDENT IN AN ORGANIZED HEALTH CARE EDUCATION/TRAINING PROGRAM

## 2025-03-24 PROCEDURE — 74174 CTA ABD&PLVS W/CONTRAST: CPT | Mod: TC

## 2025-03-24 PROCEDURE — 25500020 PHARM REV CODE 255: Performed by: PEDIATRICS

## 2025-03-24 RX ADMIN — IOHEXOL 100 ML: 350 INJECTION, SOLUTION INTRAVENOUS at 10:03

## 2025-03-25 ENCOUNTER — OFFICE VISIT (OUTPATIENT)
Dept: INTERNAL MEDICINE | Facility: CLINIC | Age: 75
End: 2025-03-25
Payer: MEDICARE

## 2025-03-25 VITALS
DIASTOLIC BLOOD PRESSURE: 78 MMHG | OXYGEN SATURATION: 97 % | TEMPERATURE: 97 F | SYSTOLIC BLOOD PRESSURE: 104 MMHG | HEART RATE: 73 BPM | HEIGHT: 68 IN | WEIGHT: 166.25 LBS | BODY MASS INDEX: 25.2 KG/M2

## 2025-03-25 DIAGNOSIS — I50.22 CHRONIC SYSTOLIC CHF (CONGESTIVE HEART FAILURE): ICD-10-CM

## 2025-03-25 DIAGNOSIS — N18.31 CHRONIC KIDNEY DISEASE, STAGE 3A: ICD-10-CM

## 2025-03-25 DIAGNOSIS — Z86.0100 HISTORY OF COLONIC POLYPS: ICD-10-CM

## 2025-03-25 DIAGNOSIS — G72.0 STATIN MYOPATHY: ICD-10-CM

## 2025-03-25 DIAGNOSIS — F41.9 ANXIETY DISORDER, UNSPECIFIED TYPE: ICD-10-CM

## 2025-03-25 DIAGNOSIS — Z85.828 HISTORY OF SKIN CANCER: ICD-10-CM

## 2025-03-25 DIAGNOSIS — Z95.5 H/O HEART ARTERY STENT: ICD-10-CM

## 2025-03-25 DIAGNOSIS — I72.8 SPLENIC ARTERY ANEURYSM: ICD-10-CM

## 2025-03-25 DIAGNOSIS — Z00.00 WELL ADULT EXAM: Primary | ICD-10-CM

## 2025-03-25 DIAGNOSIS — Z96.651 HISTORY OF TOTAL RIGHT KNEE REPLACEMENT: ICD-10-CM

## 2025-03-25 DIAGNOSIS — Z86.73 HX-TIA (TRANSIENT ISCHEMIC ATTACK): ICD-10-CM

## 2025-03-25 DIAGNOSIS — E78.00 HYPERCHOLESTEROLEMIA: ICD-10-CM

## 2025-03-25 DIAGNOSIS — T46.6X5A STATIN MYOPATHY: ICD-10-CM

## 2025-03-25 DIAGNOSIS — I25.2 H/O NON-ST ELEVATION MYOCARDIAL INFARCTION (NSTEMI): ICD-10-CM

## 2025-03-25 PROBLEM — I21.4 NSTEMI (NON-ST ELEVATED MYOCARDIAL INFARCTION): Status: RESOLVED | Noted: 2022-02-25 | Resolved: 2025-03-25

## 2025-03-25 PROCEDURE — 99999 PR PBB SHADOW E&M-EST. PATIENT-LVL III: CPT | Mod: PBBFAC,,, | Performed by: PEDIATRICS

## 2025-03-25 PROCEDURE — 99213 OFFICE O/P EST LOW 20 MIN: CPT | Mod: PBBFAC | Performed by: PEDIATRICS

## 2025-03-25 NOTE — PROGRESS NOTES
Subjective:       Patient ID: Mekhi Lambert is a 74 y.o. male.    Chief Complaint: Follow-up    Mekhi Lambert is a 74 year old male here for his annual.    PMHx, PSHx, SocHx, and FHx reviewed and discussed with patient.     Patient Active Problem List:     Prostate hypertrophy     History of total right knee replacement     Skin cancer     Hypercholesterolemia     Family history of melanoma     NSTEMI (non-ST elevated myocardial infarction)     Statin intolerance/myopathy     Coronary artery disease of native artery of native heart with stable angina pectoris:he is following lifestyle modification, reads medical journal and does not want active medical intervention.     H/O heart artery stent     Generalized anxiety disorder: trying to reduce stress and improve sleep.     Hx of inguinal hernia repair: Pt reports that he will intermittently feel the mesh in his abdomen, but it is overall improving.     Splenic artery aneurysm:Stable on serial CTs, annual monitoring     Pt has been actively trying lifestyle modification to control his cardiovascular risk.      Review of Systems   Constitutional:  Negative for fever and unexpected weight change.   HENT:  Negative for congestion and rhinorrhea.    Eyes:  Negative for discharge and redness.   Respiratory:  Negative for cough and wheezing.    Cardiovascular:  Negative for chest pain, palpitations and leg swelling.   Gastrointestinal:  Negative for abdominal pain, constipation, diarrhea and vomiting.   Endocrine: Negative for cold intolerance, heat intolerance, polydipsia, polyphagia and polyuria.   Genitourinary:  Negative for decreased urine volume and difficulty urinating.   Musculoskeletal:  Negative for arthralgias and joint swelling.   Skin:  Negative for rash and wound.   Neurological:  Negative for syncope and headaches.   Psychiatric/Behavioral:  Negative for behavioral problems and sleep disturbance.        Objective:      Physical Exam  Vitals and nursing  note reviewed.   Constitutional:       General: He is not in acute distress.     Appearance: He is well-developed.   Neck:      Thyroid: No thyromegaly.      Vascular: No JVD.   Cardiovascular:      Rate and Rhythm: Normal rate and regular rhythm.      Heart sounds: Normal heart sounds. No murmur heard.  Pulmonary:      Effort: Pulmonary effort is normal. No respiratory distress.      Breath sounds: Normal breath sounds. No wheezing or rales.   Abdominal:      General: There is no distension.      Palpations: Abdomen is soft. There is no mass.      Tenderness: There is no abdominal tenderness. There is no guarding.   Musculoskeletal:      Right lower leg: No edema.      Left lower leg: No edema.   Lymphadenopathy:      Cervical: No cervical adenopathy.   Skin:     Capillary Refill: Capillary refill takes less than 2 seconds.      Findings: No rash.   Neurological:      General: No focal deficit present.      Mental Status: He is alert and oriented to person, place, and time.      Cranial Nerves: No cranial nerve deficit.      Coordination: Coordination normal.   Psychiatric:         Mood and Affect: Mood normal.         Behavior: Behavior normal.         Thought Content: Thought content normal.         Judgment: Judgment normal.         Assessment:       1. Well adult exam    2. Statin myopathy    3. Anxiety disorder, unspecified type    4. Chronic kidney disease, stage 3a    5. Chronic systolic CHF (congestive heart failure)    6. H/O heart artery stent    7. H/O non-ST elevation myocardial infarction (NSTEMI)    8. History of colonic polyps    9. History of total right knee replacement    10. Hx-TIA (transient ischemic attack)    11. Hypercholesterolemia    12. Splenic artery aneurysm    13. History of skin cancer        Plan:       Well adult exam    Statin myopathy    Anxiety disorder, unspecified type    Chronic kidney disease, stage 3a  -     Urinalysis; Future    Chronic systolic CHF (congestive heart  failure)    H/O heart artery stent    H/O non-ST elevation myocardial infarction (NSTEMI)    History of colonic polyps    History of total right knee replacement    Hx-TIA (transient ischemic attack)    Hypercholesterolemia  -     Lipid Panel; Future; Expected date: 03/25/2025  -     Comprehensive Metabolic Panel; Future; Expected date: 03/25/2025    Splenic artery aneurysm    History of skin cancer    HMI d/w patient. He has improved his lipids. He will continue lifestyle mod off lipid medicine therapy. F/u 6 months with labs. He will be seeing allergy due to gamma globulin lab findings.

## 2025-03-26 ENCOUNTER — RESULTS FOLLOW-UP (OUTPATIENT)
Dept: INTERNAL MEDICINE | Facility: CLINIC | Age: 75
End: 2025-03-26

## 2025-03-26 ENCOUNTER — OFFICE VISIT (OUTPATIENT)
Dept: ALLERGY | Facility: CLINIC | Age: 75
End: 2025-03-26
Payer: MEDICARE

## 2025-03-26 ENCOUNTER — OFFICE VISIT (OUTPATIENT)
Dept: UROLOGY | Facility: CLINIC | Age: 75
End: 2025-03-26
Payer: MEDICARE

## 2025-03-26 ENCOUNTER — LAB VISIT (OUTPATIENT)
Dept: LAB | Facility: HOSPITAL | Age: 75
End: 2025-03-26
Attending: ALLERGY & IMMUNOLOGY
Payer: MEDICARE

## 2025-03-26 VITALS
TEMPERATURE: 98 F | BODY MASS INDEX: 25.61 KG/M2 | HEART RATE: 66 BPM | SYSTOLIC BLOOD PRESSURE: 127 MMHG | DIASTOLIC BLOOD PRESSURE: 82 MMHG | WEIGHT: 168.44 LBS

## 2025-03-26 VITALS
DIASTOLIC BLOOD PRESSURE: 71 MMHG | WEIGHT: 166.25 LBS | SYSTOLIC BLOOD PRESSURE: 103 MMHG | BODY MASS INDEX: 25.2 KG/M2 | HEART RATE: 57 BPM | HEIGHT: 68 IN

## 2025-03-26 DIAGNOSIS — Z12.5 PROSTATE CANCER SCREENING: ICD-10-CM

## 2025-03-26 DIAGNOSIS — R76.8 LOW SERUM IGG FOR AGE: ICD-10-CM

## 2025-03-26 DIAGNOSIS — R39.12 BENIGN PROSTATIC HYPERPLASIA WITH WEAK URINARY STREAM: Primary | ICD-10-CM

## 2025-03-26 DIAGNOSIS — N40.1 BENIGN PROSTATIC HYPERPLASIA WITH WEAK URINARY STREAM: Primary | ICD-10-CM

## 2025-03-26 DIAGNOSIS — N18.31 CHRONIC KIDNEY DISEASE, STAGE 3A: ICD-10-CM

## 2025-03-26 LAB
ABSOLUTE EOSINOPHIL (OHS): 0.05 K/UL
ABSOLUTE MONOCYTE (OHS): 0.48 K/UL (ref 0.3–1)
ABSOLUTE NEUTROPHIL COUNT (OHS): 4.58 K/UL (ref 1.8–7.7)
BASOPHILS # BLD AUTO: 0.05 K/UL
BASOPHILS NFR BLD AUTO: 0.7 %
BILIRUB UR QL STRIP.AUTO: NEGATIVE
BILIRUB UR QL STRIP: NEGATIVE
CLARITY UR: CLEAR
COLOR UR AUTO: YELLOW
ERYTHROCYTE [DISTWIDTH] IN BLOOD BY AUTOMATED COUNT: 14.6 % (ref 11.5–14.5)
GLUCOSE UR QL STRIP: NEGATIVE
GLUCOSE UR QL STRIP: NEGATIVE
HCT VFR BLD AUTO: 44.5 % (ref 40–54)
HGB BLD-MCNC: 15.4 GM/DL (ref 14–18)
HGB UR QL STRIP: NEGATIVE
IMM GRANULOCYTES # BLD AUTO: 0.01 K/UL (ref 0–0.04)
IMM GRANULOCYTES NFR BLD AUTO: 0.1 % (ref 0–0.5)
KETONES UR QL STRIP: NEGATIVE
KETONES UR QL STRIP: NEGATIVE
LEUKOCYTE ESTERASE UR QL STRIP: NEGATIVE
LEUKOCYTE ESTERASE UR QL STRIP: NEGATIVE
LYMPHOCYTES # BLD AUTO: 1.5 K/UL (ref 1–4.8)
MCH RBC QN AUTO: 30.7 PG (ref 27–50)
MCHC RBC AUTO-ENTMCNC: 34.6 G/DL (ref 32–36)
MCV RBC AUTO: 89 FL (ref 82–98)
NITRITE UR QL STRIP: NEGATIVE
NUCLEATED RBC (/100WBC) (OHS): 0 /100 WBC
PH UR STRIP: 7 [PH]
PH, POC UA: 5.5
PLATELET # BLD AUTO: 176 K/UL (ref 150–450)
PMV BLD AUTO: 10.3 FL (ref 9.2–12.9)
POC BLOOD, URINE: NEGATIVE
POC NITRATES, URINE: NEGATIVE
POC RESIDUAL URINE VOLUME: 288 ML (ref 0–100)
PROT UR QL STRIP: NEGATIVE
PROT UR QL STRIP: NEGATIVE
RBC # BLD AUTO: 5.02 M/UL (ref 4.6–6.2)
RELATIVE EOSINOPHIL (OHS): 0.7 %
RELATIVE LYMPHOCYTE (OHS): 22.5 % (ref 18–48)
RELATIVE MONOCYTE (OHS): 7.2 % (ref 4–15)
RELATIVE NEUTROPHIL (OHS): 68.8 % (ref 38–73)
SP GR UR STRIP: 1 (ref 1–1.03)
SP GR UR STRIP: 1.01
UROBILINOGEN UR STRIP-ACNC: 0.2 (ref 0.3–2.2)
WBC # BLD AUTO: 6.67 K/UL (ref 3.9–12.7)

## 2025-03-26 PROCEDURE — 99214 OFFICE O/P EST MOD 30 MIN: CPT | Mod: S$PBB,,, | Performed by: UROLOGY

## 2025-03-26 PROCEDURE — 99213 OFFICE O/P EST LOW 20 MIN: CPT | Mod: PBBFAC | Performed by: UROLOGY

## 2025-03-26 PROCEDURE — 81003 URINALYSIS AUTO W/O SCOPE: CPT

## 2025-03-26 PROCEDURE — 85025 COMPLETE CBC W/AUTO DIFF WBC: CPT

## 2025-03-26 PROCEDURE — 51798 US URINE CAPACITY MEASURE: CPT | Mod: PBBFAC | Performed by: UROLOGY

## 2025-03-26 PROCEDURE — 86317 IMMUNOASSAY INFECTIOUS AGENT: CPT

## 2025-03-26 PROCEDURE — 99999PBSHW POCT URINALYSIS, DIPSTICK, AUTOMATED, W/O SCOPE: Mod: PBBFAC,,,

## 2025-03-26 PROCEDURE — 99999 PR PBB SHADOW E&M-EST. PATIENT-LVL III: CPT | Mod: PBBFAC,,, | Performed by: UROLOGY

## 2025-03-26 PROCEDURE — 82784 ASSAY IGA/IGD/IGG/IGM EACH: CPT

## 2025-03-26 PROCEDURE — 99999PBSHW POCT BLADDER SCAN: Mod: PBBFAC,,,

## 2025-03-26 PROCEDURE — 86355 B CELLS TOTAL COUNT: CPT

## 2025-03-26 PROCEDURE — 81003 URINALYSIS AUTO W/O SCOPE: CPT | Mod: PBBFAC | Performed by: UROLOGY

## 2025-03-26 PROCEDURE — 99204 OFFICE O/P NEW MOD 45 MIN: CPT | Mod: S$PBB,,, | Performed by: ALLERGY & IMMUNOLOGY

## 2025-03-26 PROCEDURE — 99999 PR PBB SHADOW E&M-EST. PATIENT-LVL III: CPT | Mod: PBBFAC,,, | Performed by: ALLERGY & IMMUNOLOGY

## 2025-03-26 PROCEDURE — 99213 OFFICE O/P EST LOW 20 MIN: CPT | Mod: PBBFAC,27,25 | Performed by: ALLERGY & IMMUNOLOGY

## 2025-03-26 PROCEDURE — 36415 COLL VENOUS BLD VENIPUNCTURE: CPT

## 2025-03-26 PROCEDURE — 86162 COMPLEMENT TOTAL (CH50): CPT

## 2025-03-26 RX ORDER — ALFUZOSIN HYDROCHLORIDE 10 MG/1
10 TABLET, EXTENDED RELEASE ORAL
Qty: 30 TABLET | Refills: 11 | Status: SHIPPED | OUTPATIENT
Start: 2025-03-26 | End: 2026-03-26

## 2025-03-26 NOTE — PROGRESS NOTES
Chief Complaint: UTIs, BPH    HPI:   03/26/2025 - patient returns today for follow-up, still doing the naturopathic remedies for his prostate and feels like this works, stream is slow in the mornings but picks up during the day, denies incomplete emptying, denies gross hematuria or dysuria, PSA 1.7    03/26/2024 - presents today for follow-up, has stopped taking the prescription medications for his prostate and is now taking tomato juice and pumpkin seeds, notes that he feels like these work for him, notes that his stream has slow down a little bit since the last year, never got the Rapaflo as it was too expensive,    03/24/2023 - patient returns today for follow-up, over the last year he notes that his urination is not as good as it was right after his simple prostatectomy, has been taking some over-the-counter saw palmetto supplements which seemed to help but he thinks it would do better, has tried taking Flomax in the past and had significant hypotension but did well on Rapaflo but it was too expensive, no GH or dysuria    02/18/2022 - patient returns today for follow-up, has had 2 episodes of epididymitis since December, these were accompanied by slow stream and dysuria, abx resolved his symptoms, since that time, he notes that his stream has greatly improved, he denies any further dysuria or gross hematuria     11/11/19: PSA remains low.  BPH not been a problem lately.  Has read a lot about finasteride and flomax.  Discussed finasteride a a preventative hasn't ever taken it.    11/5/18: Was having trouble a month ago with voiding.  Hasn't taken rapaflo in quite a while.  Had started buproprion prior to that and the LUTS came in to play after that mainly with hesitancy.  At day 4 he felt he couldn't get started at all.  He stopped the med, started rapaflo.  Then had constipation/gas and thought he had a UTI.  Been great for a week.  Stopped rapaflo again.  PSA normal.    5/24/17: 67 yo man in last two years  had a TURP followed by a RA Simple Prostatectomy.  No LUTS at all.  No abd/pelvic pain and no exac/rel factors.  No hematuria.  No urolithiasis.  No urinary bother.  Normal sexual function.  Takes rapaflo.    Allergies:  Statins-hmg-coa reductase inhibitors    Medications:  has a current medication list which includes the following prescription(s): acetaminophen, b complex vitamins, cholecalciferol (vitamin d3), cyclobenzaprine, magnesium glycinate, metoprolol succinate, mometasone 0.1%, and pantoprazole.    Review of Systems:  General: No fever, chills  Skin: No rashes  Chest:  Denies cough and sputum production  Heart: Denies chest pain  Resp: Denies dyspnea  Abdomen: Denies diarrhea, abdominal pain, hematemesis, or blood in stool.  Musculoskeletal: No joint stiffness or swelling. Denies back pain.  : see HPI  Neuro: no dizziness or weakness      PMH:   has a past medical history of ALBERTO (acute kidney injury) (08/08/2022), Anticoagulant long-term use, Arthritis, Cancer, Chronic systolic CHF (congestive heart failure) (02/26/2022), Coronary artery disease, Digestive disorder, Myocardial infarction, and Statin myopathy (05/13/2024).    PSH:   has a past surgical history that includes Shoulder arthroscopy (Right); Knee surgery; Total knee arthroplasty; simple prostatectomy (06/06/2016); Cardiac catheterization; Left heart catheterization (Left, 02/22/2020); Percutaneous transluminal balloon angioplasty of coronary artery (Left, 02/22/2020); Coronary angiography including bypass grafts with catheterization of left heart (N/A, 03/03/2020); Joint replacement (Right); Colonoscopy (N/A, 09/22/2021); Left heart catheterization (Left, 02/26/2022); Percutaneous transluminal balloon angioplasty of coronary artery (02/26/2022); Right hemicolectomy (N/A, 04/27/2022); Sigmoidectomy; and repair, hernia, inguinal (Right, 4/5/2024).    FamHx: family history includes Cancer in his father; Diabetes in his maternal aunt and mother;  Heart disease in his mother.    SocHx:  reports that he has never smoked. He quit smokeless tobacco use about 20 years ago.  His smokeless tobacco use included chew. He reports current alcohol use of about 2.0 standard drinks of alcohol per week. He reports that he does not use drugs.      Physical Exam:  Vitals:    03/26/25 1108   BP: 103/71   Pulse: (!) 57     General: awake, alert, cooperative  Head: NC/AT  Ears: external ears normal  Eyes: sclera normal  Lungs: normal inspiration, NAD  Heart: well-perfused  Abdomen: Soft, NT, ND   3/24: Normal circ'd phallus, meatus normal in size and position, BL testicles palpable, no masses, nontender, scrotum normal  JOURDAN 3/24: Normal rectal tone, no hemorrhoids. Prostate smooth and normal, no nodules 40 gm SV not palpable. Perineum and anus normal.  Lymphatic: groin nodes negative  Skin: The skin is warm and dry  Ext: No c/c/e.  Neuro: grossly intact, AOx3    Imaging:  CTA ABDOMEN AND PELVIS     CLINICAL HISTORY:  f/u spleenic artery aneurysm;Aneurysm of other specified arteries     TECHNIQUE:  Contrast enhanced CTA of the abdomen and pelvis was obtained. Images were reformatted and reviewed in coronal and sagittal planes. .     Images were acquired during the arterial phase after 100 cc of non ionic intravenous contrast administration.     COMPARISON:  CT abdomen from 04/22/2024     FINDINGS:  Vascular structures:     1.  Aorta:     The abdominal aorta is normal in caliber with mild atheromatous disease.     Celiac:Patent.     SMA: Patent.     ANN: Patent.     Splenic artery: Marked tortuosity.  Redemonstration of the splenic artery aneurysm measuring 1.7 x 1.5 cm.  Calcification near the spleen all karin.     Common iliacs: Mild atherosclerotic disease.     Internal iliacs: Moderate atherosclerotic disease.     External iliacs: Patent.     Non-vascular:     Lower chest: No abnormalities are seen in the lung bases. No pleural effusion is noted.  Small hiatal hernia.      The spleen, liver, pancreas, and adrenals are grossly unremarkable.     Kidneys: Scarring of the bilateral renal cortex.     Gallbladder: Unremarkable.     GI: Colonic diverticulosis without evident diverticulitis.     /reproductive organs: Asymmetric enlargement of the left prostate with internal calcifications similar in appearance to study from 04/22/2024.     Peritoneum: No intraperitoneal fluid.  No pneumoperitoneum.     Abdominal wall: Normal.     Musculoskeletal system: Age-appropriate degenerative changes.     Impression:  1. Findings compatible with splenic artery aneurysm measuring 1.7 x 1.5 cm.  2. Moderate atherosclerotic disease in the aorta and branching vessels.  Prostate measures approximately 95 g and appears to have a significant left-sided lobe    Labs/Studies:   Lab Results   Component Value Date    WBC 8.17 02/03/2025    HGB 15.1 02/03/2025    HCT 43.9 02/03/2025     02/03/2025     03/18/2025    K 4.9 03/18/2025     03/18/2025    CREATININE 1.1 03/18/2025    BUN 16 03/18/2025    CO2 25 03/18/2025    TSH 1.105 10/25/2024    PSA 1.7 03/18/2025    INR 1.2 09/17/2024    HGBA1C 5.2 09/17/2024    CHOL 232 (H) 03/18/2025    TRIG 76 03/18/2025    HDL 76 (H) 03/18/2025    ALT 17 03/18/2025    AST 24 03/18/2025       Urinalysis performed in clinic today specific gravity < 1.005 pH 5.5, negative for all parameters    PVR = 280mL    PSA    2/17: 1.2    10/18: 1.8    11/19: 0.98    Impression/Plan:   Right epididymitis - no further issues    BPH - incomplete emptying noted with almost 300 mL remaining, recommend starting an alpha blocker, alfuzosin sent to his pharmacy, side effects reviewed, follow-up eight weeks for symptom check, if he continues to have poor tolerance p.o. medications, we will need to discuss an outlet procedure    Prostate Cancer Screening - PSA and JOURDAN normal, continue annual screening    Clarence Harris MD

## 2025-03-26 NOTE — PROGRESS NOTES
Subjective:      Patient ID: Mekhi Lambert is a 74 y.o. male.    Chief Complaint:  low IgG      HPI:  3/26/2025: 74 year old male referred for a low IgG in the 400's  NO infections over last 4 years  NO pneumonia  No sinus infections    Labs done by Heme/onc    No history of asthma  NO history of atopic dermatitis  NO rhinitis apart from cold      Stress causes infections- final week- sinus infections  Retired         Past Medical History:   Diagnosis Date    ALBERTO (acute kidney injury) 08/08/2022    Anticoagulant long-term use     Arthritis     Cancer     Chronic systolic CHF (congestive heart failure) 02/26/2022    Coronary artery disease     Digestive disorder     Myocardial infarction     2/20/22    Statin myopathy 05/13/2024        Family History   Problem Relation Name Age of Onset    Heart disease Mother          lupus related    Diabetes Mother      Cancer Father          prostate, bone, lung, skin cancers all seperate types    Diabetes Maternal Aunt          Review of patient's allergies indicates:   Allergen Reactions    Statins-hmg-coa reductase inhibitors Other (See Comments)     SEVERE MYALGIAS AND GI SIDE EFFECTS        Environmental History: Pets in the home: dogs (1).  Tobacco Smoke in Home: no  Review of Systems   HENT:  Negative for congestion and rhinorrhea.    Skin:  Positive for rash.   Allergic/Immunologic: Negative for environmental allergies.   Psychiatric/Behavioral:  Negative for behavioral problems and suicidal ideas.    All other systems reviewed and are negative.      Objective:   Physical Exam  Constitutional:       General: He is not in acute distress.     Appearance: Normal appearance. He is not ill-appearing, toxic-appearing or diaphoretic.   HENT:      Head: Normocephalic and atraumatic.      Right Ear: Tympanic membrane, ear canal and external ear normal. There is no impacted cerumen.      Left Ear: Tympanic membrane, ear canal and external ear normal. There is no  impacted cerumen.      Nose: Nose normal. No congestion or rhinorrhea.      Mouth/Throat:      Pharynx: No oropharyngeal exudate or posterior oropharyngeal erythema.   Eyes:      General: No scleral icterus.        Right eye: No discharge.         Left eye: No discharge.      Pupils: Pupils are equal, round, and reactive to light.   Neck:      Thyroid: No thyromegaly.   Cardiovascular:      Rate and Rhythm: Normal rate and regular rhythm.      Heart sounds: Normal heart sounds. No murmur heard.     No friction rub. No gallop.   Pulmonary:      Effort: Pulmonary effort is normal. No respiratory distress.      Breath sounds: Normal breath sounds. No stridor. No wheezing, rhonchi or rales.   Chest:      Chest wall: No tenderness.   Musculoskeletal:         General: Normal range of motion.      Cervical back: Normal range of motion and neck supple. No rigidity or tenderness.   Lymphadenopathy:      Cervical: No cervical adenopathy.   Skin:     General: Skin is warm and dry.      Findings: No rash.   Neurological:      General: No focal deficit present.      Mental Status: He is alert and oriented to person, place, and time.      Gait: Gait normal.   Psychiatric:         Mood and Affect: Mood normal.         Behavior: Behavior normal.         Thought Content: Thought content normal.         Judgment: Judgment normal.              Reviewed immune labs done in October    Assessment:      1. 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  -     Ambulatory referral/consult to Immunology  -     CBC Auto Differential; Future; Expected date: 03/26/2025  -     IgG; Future; Expected date: 03/26/2025  -     IgA; Future; Expected date: 03/26/2025  -     IgM; Future; Expected date: 03/26/2025  -     Haemophilius influenzae B Ab IgG; Future; Expected date: 03/26/2025  -     S.pneumoniae (IgG) AB; Future; Expected date: 03/26/2025  -     Tetanus Toxoid, IgG; Future; Expected date: 03/26/2025  -     Lymphocyte Profile  II; Future; Expected date: 03/26/2025  -     Complement, Total; Future; Expected date: 03/26/2025  -     Complement, Alternate Pathway (AH50); Future; Expected date: 03/26/2025       Labs ordered    RTC 3-4 weeks       GRANT DOSS spent a total of 47 minutes on the day of the visit.This includes face to face time and non-face to face time preparing to see the patient (eg, review of tests), obtaining and/or reviewing separately obtained history, documenting clinical information in the electronic or other health record, independently interpreting results and communicating results to the patient/family/caregiver, or care coordinator.

## 2025-03-27 LAB
IGA SERPL-MCNC: 49 MG/DL (ref 40–350)
IGG SERPL-MCNC: 559 MG/DL (ref 650–1600)
IGM SERPL-MCNC: 40 MG/DL (ref 50–300)

## 2025-03-28 LAB
CD16/56 ABSOLUTE (OHS): 297 CELLS/UL (ref 90–600)
CD16/56% NK CELLS (OHS): 16.78 % (ref 7–31)
CD19 ABSOLUTE (OHS): 119 CELLS/UL (ref 100–500)
CD19% B CELLS (OHS): 6.76 % (ref 6–19)
CD3 ABSOLUTE FLOW (OHS): 1280 CELLS/UL (ref 700–2100)
CD3 PERCENT (OHS): 76.59 % (ref 55–83)
CD3+CD4+ CELLS # SPEC: 943 CELLS/UL (ref 300–1400)
CD3+CD4+ CELLS NFR BLD: 59.8 % (ref 28–57)
CD3+CD8+ CELLS NFR SPEC: 16.76 % (ref 10–39)
CD4/CD8 RATIO FLOW (OHS): 3.57 (ref 0.9–3.6)
CD8 ABSOLUTE FLOW (OHS): 264 CELLS/UL (ref 200–900)
HAEM INFLU B IGG SER IA-MCNC: 0.15 MG/L
LABORATORY COMMENT REPORT: ABNORMAL

## 2025-03-29 LAB
C TETANI TOXOID AB SER QL: POSITIVE
C TETANI TOXOID IGG SERPL IA-ACNC: >2.24 IU/ML

## 2025-03-31 LAB — AH50 ACT/NOR SER IA: 71 %

## 2025-04-01 LAB
S PN DA SERO 19F IGG SER-MCNC: 2.4 UG/ML
S PNEUM DA 1 IGG SER-MCNC: 7.3 UG/ML
S PNEUM DA 12F IGG SER-MCNC: <0.3 UG/ML
S PNEUM DA 14 IGG SER-MCNC: <0.3
S PNEUM DA 18C IGG SER-MCNC: <0.3
S PNEUM DA 23F IGG SER-MCNC: 0.5 UG/ML
S PNEUM DA 3 IGG SER-MCNC: 0.4 UG/ML
S PNEUM DA 4 IGG SER-MCNC: <0.3 UG/ML
S PNEUM DA 5 IGG SER-MCNC: 9.5 UG/ML
S PNEUM DA 6B IGG SER-MCNC: <0.3 UG/ML
S PNEUM DA 7F IGG SER-MCNC: <0.3 UG/ML
S PNEUM DA 8 IGG SER-MCNC: 0.5 UG/ML
S PNEUM DA 9N IGG SER-MCNC: 0.5 UG/ML
S PNEUM DA 9V IGG SER-MCNC: <0.3 UG/ML

## 2025-04-03 ENCOUNTER — RESULTS FOLLOW-UP (OUTPATIENT)
Dept: ALLERGY | Facility: CLINIC | Age: 75
End: 2025-04-03

## 2025-04-28 DIAGNOSIS — Z00.00 ENCOUNTER FOR MEDICARE ANNUAL WELLNESS EXAM: ICD-10-CM

## 2025-04-30 ENCOUNTER — PATIENT OUTREACH (OUTPATIENT)
Dept: ADMINISTRATIVE | Facility: HOSPITAL | Age: 75
End: 2025-04-30
Payer: MEDICARE

## 2025-04-30 ENCOUNTER — OFFICE VISIT (OUTPATIENT)
Dept: ALLERGY | Facility: CLINIC | Age: 75
End: 2025-04-30
Payer: MEDICARE

## 2025-04-30 VITALS
DIASTOLIC BLOOD PRESSURE: 65 MMHG | HEIGHT: 68 IN | TEMPERATURE: 98 F | HEART RATE: 55 BPM | BODY MASS INDEX: 25.66 KG/M2 | SYSTOLIC BLOOD PRESSURE: 102 MMHG | WEIGHT: 169.31 LBS

## 2025-04-30 DIAGNOSIS — N18.31 CHRONIC KIDNEY DISEASE, STAGE 3A: Primary | ICD-10-CM

## 2025-04-30 DIAGNOSIS — R89.9 ABNORMAL LABORATORY TEST RESULT: ICD-10-CM

## 2025-04-30 DIAGNOSIS — Z23 NEED FOR PNEUMOCOCCAL VACCINE: ICD-10-CM

## 2025-04-30 DIAGNOSIS — R76.8 LOW SERUM IGG FOR AGE: Primary | ICD-10-CM

## 2025-04-30 PROCEDURE — 99213 OFFICE O/P EST LOW 20 MIN: CPT | Mod: PBBFAC | Performed by: ALLERGY & IMMUNOLOGY

## 2025-04-30 PROCEDURE — 99999PBSHW PR PBB SHADOW TECHNICAL ONLY FILED TO HB: Mod: PBBFAC,,,

## 2025-04-30 PROCEDURE — 90732 PPSV23 VACC 2 YRS+ SUBQ/IM: CPT | Mod: PBBFAC

## 2025-04-30 PROCEDURE — G2211 COMPLEX E/M VISIT ADD ON: HCPCS | Mod: S$PBB,,, | Performed by: ALLERGY & IMMUNOLOGY

## 2025-04-30 PROCEDURE — G0009 ADMIN PNEUMOCOCCAL VACCINE: HCPCS | Mod: PBBFAC

## 2025-04-30 PROCEDURE — 99215 OFFICE O/P EST HI 40 MIN: CPT | Mod: S$PBB,,, | Performed by: ALLERGY & IMMUNOLOGY

## 2025-04-30 PROCEDURE — 99999 PR PBB SHADOW E&M-EST. PATIENT-LVL III: CPT | Mod: PBBFAC,,, | Performed by: ALLERGY & IMMUNOLOGY

## 2025-04-30 RX ADMIN — PNEUMOCOCCAL VACCINE POLYVALENT 0.5 ML
25; 25; 25; 25; 25; 25; 25; 25; 25; 25; 25; 25; 25; 25; 25; 25; 25; 25; 25; 25; 25; 25; 25 INJECTION, SOLUTION INTRAMUSCULAR; SUBCUTANEOUS at 11:04

## 2025-04-30 NOTE — PROGRESS NOTES
Subjective:      Patient ID: Mekhi Lambert is a 74 y.o. male.    Chief Complaint:  Follow-up      HPI:    4/30/2025: 74 year old male  Follow up  Sub optimal pneumococcal titers  NO infections      3/26/2025: 74 year old male referred for a low IgG in the 400's  NO infections over last 4 years  NO pneumonia  No sinus infections    Labs done by Heme/onc    No history of asthma  NO history of atopic dermatitis  NO rhinitis apart from cold      Stress causes infections- final week- sinus infections  Retired         Past Medical History:   Diagnosis Date    ALBERTO (acute kidney injury) 08/08/2022    Anticoagulant long-term use     Arthritis     Cancer     Chronic systolic CHF (congestive heart failure) 02/26/2022    Coronary artery disease     Digestive disorder     Myocardial infarction     2/20/22    Statin myopathy 05/13/2024        Family History   Problem Relation Name Age of Onset    Heart disease Mother          lupus related    Diabetes Mother      Cancer Father          prostate, bone, lung, skin cancers all seperate types    Diabetes Maternal Aunt          Review of patient's allergies indicates:   Allergen Reactions    Statins-hmg-coa reductase inhibitors Other (See Comments)     SEVERE MYALGIAS AND GI SIDE EFFECTS        Environmental History: Pets in the home: dogs (1).  Tobacco Smoke in Home: no  Review of Systems   HENT:  Negative for congestion and rhinorrhea.    Skin:  Positive for rash.   Allergic/Immunologic: Negative for environmental allergies.   Psychiatric/Behavioral:  Negative for behavioral problems and suicidal ideas.    All other systems reviewed and are negative.      Objective:   Physical Exam  Constitutional:       General: He is not in acute distress.     Appearance: Normal appearance. He is not ill-appearing, toxic-appearing or diaphoretic.   HENT:      Head: Normocephalic and atraumatic.      Right Ear: Tympanic membrane, ear canal and external ear normal. There is no  impacted cerumen.      Left Ear: Tympanic membrane, ear canal and external ear normal. There is no impacted cerumen.      Nose: Nose normal. No congestion or rhinorrhea.      Mouth/Throat:      Pharynx: No oropharyngeal exudate or posterior oropharyngeal erythema.   Eyes:      General: No scleral icterus.        Right eye: No discharge.         Left eye: No discharge.      Pupils: Pupils are equal, round, and reactive to light.   Neck:      Thyroid: No thyromegaly.   Cardiovascular:      Rate and Rhythm: Normal rate and regular rhythm.      Heart sounds: Normal heart sounds. No murmur heard.     No friction rub. No gallop.   Pulmonary:      Effort: Pulmonary effort is normal. No respiratory distress.      Breath sounds: Normal breath sounds. No stridor. No wheezing, rhonchi or rales.   Chest:      Chest wall: No tenderness.   Musculoskeletal:         General: Normal range of motion.      Cervical back: Normal range of motion and neck supple. No rigidity or tenderness.   Lymphadenopathy:      Cervical: No cervical adenopathy.   Skin:     General: Skin is warm and dry.      Findings: No rash.   Neurological:      General: No focal deficit present.      Mental Status: He is alert and oriented to person, place, and time.      Gait: Gait normal.   Psychiatric:         Mood and Affect: Mood normal.         Behavior: Behavior normal.         Thought Content: Thought content normal.         Judgment: Judgment normal.              Reviewed immune labs done in October    Assessment:      1. 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 Need for pneumococcal vaccine    3. Abnormal laboratory test result          Plan:       Low serum IgG for age    Need for pneumococcal vaccine  -     pneumococcal vaccine (PNEUMOVAX-23) injection 0.5 mL  -     Streptococcus Pneumoniae IgG Antibody (23 Serotypes), MAID; Future; Expected date: 05/28/2025    Abnormal laboratory test result  -     pneumococcal vaccine (PNEUMOVAX-23) injection 0.5 mL  -      Streptococcus Pneumoniae IgG Antibody (23 Serotypes), MAID; Future; Expected date: 05/28/2025      Discussed labs  NO infections  PPSV23 given today  Will recheck titers in 4 weeks    Visit today included increased complexity associated with the care of the episodic problem  Low IgG addressed and managing the longitudinal care of the patient due to the serious and/or complex managed problem(s)  low IgG.       RTC 8 weeks       GRANT DOSS spent a total of 47 minutes on the day of the visit.This includes face to face time and non-face to face time preparing to see the patient (eg, review of tests), obtaining and/or reviewing separately obtained history, documenting clinical information in the electronic or other health record, independently interpreting results and communicating results to the patient/family/caregiver, or care coordinator.

## 2025-04-30 NOTE — PROGRESS NOTES
Working CKD Lab Report.  Pt has lab appt scheduled, 5/06/25.  Micro Albumin urine ordered and linked to appt,

## 2025-05-21 ENCOUNTER — OFFICE VISIT (OUTPATIENT)
Dept: UROLOGY | Facility: CLINIC | Age: 75
End: 2025-05-21
Payer: MEDICARE

## 2025-05-21 VITALS
DIASTOLIC BLOOD PRESSURE: 77 MMHG | SYSTOLIC BLOOD PRESSURE: 116 MMHG | HEART RATE: 51 BPM | BODY MASS INDEX: 25.39 KG/M2 | WEIGHT: 167.56 LBS | HEIGHT: 68 IN

## 2025-05-21 DIAGNOSIS — Z01.818 PRE-OP EXAM: ICD-10-CM

## 2025-05-21 DIAGNOSIS — N13.8 BPH WITH OBSTRUCTION/LOWER URINARY TRACT SYMPTOMS: ICD-10-CM

## 2025-05-21 DIAGNOSIS — R39.12 BENIGN PROSTATIC HYPERPLASIA WITH WEAK URINARY STREAM: Primary | ICD-10-CM

## 2025-05-21 DIAGNOSIS — N40.1 BENIGN PROSTATIC HYPERPLASIA WITH WEAK URINARY STREAM: Primary | ICD-10-CM

## 2025-05-21 DIAGNOSIS — N40.1 BPH WITH OBSTRUCTION/LOWER URINARY TRACT SYMPTOMS: ICD-10-CM

## 2025-05-21 PROCEDURE — 99215 OFFICE O/P EST HI 40 MIN: CPT | Mod: PBBFAC | Performed by: UROLOGY

## 2025-05-21 PROCEDURE — 99999 PR PBB SHADOW E&M-EST. PATIENT-LVL V: CPT | Mod: PBBFAC,,, | Performed by: UROLOGY

## 2025-05-21 PROCEDURE — 52000 CYSTOURETHROSCOPY: CPT | Mod: PBBFAC | Performed by: UROLOGY

## 2025-05-21 RX ORDER — CEFAZOLIN SODIUM 2 G/50ML
2 SOLUTION INTRAVENOUS
OUTPATIENT
Start: 2025-05-21

## 2025-05-21 NOTE — PROGRESS NOTES
Chief Complaint: UTIs, BPH    HPI:   05/21/2025 - returns today for follow-up, tried the alfuzosin for about a week and made him feel very sick like he had the flu, no hematuria or dysuria    03/26/2025 - patient returns today for follow-up, still doing the naturopathic remedies for his prostate and feels like this works, stream is slow in the mornings but picks up during the day, denies incomplete emptying, denies gross hematuria or dysuria, PSA 1.7    03/26/2024 - presents today for follow-up, has stopped taking the prescription medications for his prostate and is now taking tomato juice and pumpkin seeds, notes that he feels like these work for him, notes that his stream has slow down a little bit since the last year, never got the Rapaflo as it was too expensive,    03/24/2023 - patient returns today for follow-up, over the last year he notes that his urination is not as good as it was right after his simple prostatectomy, has been taking some over-the-counter saw palmetto supplements which seemed to help but he thinks it would do better, has tried taking Flomax in the past and had significant hypotension but did well on Rapaflo but it was too expensive, no GH or dysuria    02/18/2022 - patient returns today for follow-up, has had 2 episodes of epididymitis since December, these were accompanied by slow stream and dysuria, abx resolved his symptoms, since that time, he notes that his stream has greatly improved, he denies any further dysuria or gross hematuria     11/11/19: PSA remains low.  BPH not been a problem lately.  Has read a lot about finasteride and flomax.  Discussed finasteride a a preventative hasn't ever taken it.    11/5/18: Was having trouble a month ago with voiding.  Hasn't taken rapaflo in quite a while.  Had started buproprion prior to that and the LUTS came in to play after that mainly with hesitancy.  At day 4 he felt he couldn't get started at all.  He stopped the med, started rapaflo.   Then had constipation/gas and thought he had a UTI.  Been great for a week.  Stopped rapaflo again.  PSA normal.    5/24/17: 67 yo man in last two years had a TURP followed by a RA Simple Prostatectomy.  No LUTS at all.  No abd/pelvic pain and no exac/rel factors.  No hematuria.  No urolithiasis.  No urinary bother.  Normal sexual function.  Takes rapaflo.    Allergies:  Statins-hmg-coa reductase inhibitors    Medications:  has a current medication list which includes the following prescription(s): acetaminophen, b complex vitamins, cholecalciferol (vitamin d3), cyclobenzaprine, magnesium glycinate, metoprolol succinate, mometasone 0.1%, pantoprazole, and alfuzosin.    Review of Systems:  General: No fever, chills  Skin: No rashes  Chest:  Denies cough and sputum production  Heart: Denies chest pain  Resp: Denies dyspnea  Abdomen: Denies diarrhea, abdominal pain, hematemesis, or blood in stool.  Musculoskeletal: No joint stiffness or swelling. Denies back pain.  : see HPI  Neuro: no dizziness or weakness      PMH:   has a past medical history of ALBERTO (acute kidney injury) (08/08/2022), Anticoagulant long-term use, Arthritis, Cancer, Chronic systolic CHF (congestive heart failure) (02/26/2022), Coronary artery disease, Digestive disorder, Myocardial infarction, and Statin myopathy (05/13/2024).    PSH:   has a past surgical history that includes Shoulder arthroscopy (Right); Knee surgery; Total knee arthroplasty; simple prostatectomy (06/06/2016); Cardiac catheterization; Left heart catheterization (Left, 02/22/2020); Percutaneous transluminal balloon angioplasty of coronary artery (Left, 02/22/2020); Coronary angiography including bypass grafts with catheterization of left heart (N/A, 03/03/2020); Joint replacement (Right); Colonoscopy (N/A, 09/22/2021); Left heart catheterization (Left, 02/26/2022); Percutaneous transluminal balloon angioplasty of coronary artery (02/26/2022); Right hemicolectomy (N/A,  04/27/2022); Sigmoidectomy; and repair, hernia, inguinal (Right, 4/5/2024).    FamHx: family history includes Cancer in his father; Diabetes in his maternal aunt and mother; Heart disease in his mother.    SocHx:  reports that he has never smoked. He quit smokeless tobacco use about 20 years ago.  His smokeless tobacco use included chew. He reports current alcohol use of about 2.0 standard drinks of alcohol per week. He reports that he does not use drugs.      Physical Exam:  Vitals:    05/21/25 0821   BP: 116/77   Pulse: (!) 51     General: awake, alert, cooperative  Head: NC/AT  Ears: external ears normal  Eyes: sclera normal  Lungs: normal inspiration, NAD  Heart: well-perfused  Abdomen: Soft, NT, ND   3/24: Normal circ'd phallus, meatus normal in size and position, BL testicles palpable, no masses, nontender, scrotum normal  JOURDAN 3/24: Normal rectal tone, no hemorrhoids. Prostate smooth and normal, no nodules 40 gm SV not palpable. Perineum and anus normal.  Lymphatic: groin nodes negative  Skin: The skin is warm and dry  Ext: No c/c/e.  Neuro: grossly intact, AOx3    Imaging:  CTA ABDOMEN AND PELVIS     CLINICAL HISTORY:  f/u spleenic artery aneurysm;Aneurysm of other specified arteries     TECHNIQUE:  Contrast enhanced CTA of the abdomen and pelvis was obtained. Images were reformatted and reviewed in coronal and sagittal planes. .     Images were acquired during the arterial phase after 100 cc of non ionic intravenous contrast administration.     COMPARISON:  CT abdomen from 04/22/2024     FINDINGS:  Vascular structures:     1.  Aorta:     The abdominal aorta is normal in caliber with mild atheromatous disease.     Celiac:Patent.     SMA: Patent.     ANN: Patent.     Splenic artery: Marked tortuosity.  Redemonstration of the splenic artery aneurysm measuring 1.7 x 1.5 cm.  Calcification near the spleen all karin.     Common iliacs: Mild atherosclerotic disease.     Internal iliacs: Moderate atherosclerotic  disease.     External iliacs: Patent.     Non-vascular:     Lower chest: No abnormalities are seen in the lung bases. No pleural effusion is noted.  Small hiatal hernia.     The spleen, liver, pancreas, and adrenals are grossly unremarkable.     Kidneys: Scarring of the bilateral renal cortex.     Gallbladder: Unremarkable.     GI: Colonic diverticulosis without evident diverticulitis.     /reproductive organs: Asymmetric enlargement of the left prostate with internal calcifications similar in appearance to study from 04/22/2024.     Peritoneum: No intraperitoneal fluid.  No pneumoperitoneum.     Abdominal wall: Normal.     Musculoskeletal system: Age-appropriate degenerative changes.     Impression:  1. Findings compatible with splenic artery aneurysm measuring 1.7 x 1.5 cm.  2. Moderate atherosclerotic disease in the aorta and branching vessels.  Prostate measures approximately 95 g and appears to have a significant left-sided lobe    Labs/Studies:   Lab Results   Component Value Date    WBC 6.67 03/26/2025    HGB 15.4 03/26/2025    HCT 44.5 03/26/2025     03/26/2025     03/18/2025    K 4.9 03/18/2025     03/18/2025    CREATININE 1.1 03/18/2025    BUN 16 03/18/2025    CO2 25 03/18/2025    TSH 1.105 10/25/2024    PSA 1.7 03/18/2025    INR 1.2 09/17/2024    HGBA1C 5.2 09/17/2024    CHOL 232 (H) 03/18/2025    TRIG 76 03/18/2025    HDL 76 (H) 03/18/2025    ALT 17 03/18/2025    AST 24 03/18/2025       Urinalysis performed in clinic today specific gravity < 1.005 pH 5.5, negative for all parameters    PVR = 320mL    Procedure:  Diagnostic Cystoscopy    Indications: BPH/LUTS    UA: normal, see lab results for values    Procedure in Detail: After proper consents were obtained, the patient was prepped and draped in normal sterile fashion for diagnostic cystoscopy. 5 ml of lidocaine jelly was instilled in the urethra. The flexible cystoscope was then introduced into the urethra, and advanced into the  bladder under direct vision. The urethral mucosa appeared normal, and no strictures were noted. The sphincter was normal, and the veru montanum was unremarkable. The prostatic mucosa and the lateral lobes of the prostate were unremarkable, with left sided adenomatous regrowth and complete visual obstruction. The bladder neck was normal. Inspection of the interior of the bladder was then carried out. The trigone was unremarkable, with no mucosal lesions. The ureteral orifices were normal in position and configuration. Systematic inspection of the mucosa of the bladder it was then carried out, rotating the cystoscope so that all areas of the left and right lateral walls, the dome of the bladder, and the posterior wall were all visualized. The cystoscope was then advanced further into the bladder, and maximum deflection of the scope was performed so that the bladder neck could be inspected. No mucosal lesions were noted there. The cystoscope was then removed, and the procedure terminated. Patient tolerated the procedure well. No complications.     Findings:  Adenomatous regrowth, moderate trabeculations, no tumors      Impression/Plan:   Right epididymitis - no further issues    BPH - incomplete emptying noted with over 300 mL remaining, notes that he underwent a simple prostatectomy as well as a TURP, last procedure was about 10 years ago, scope shows some adenomatous regrowth on the left side with obstruction, recommend proceeding with repeat TURP, risks/benefits/alternatives reviewed, patient understands and agrees, we will schedule for 6/30    Prostate Cancer Screening - PSA and JOURDAN normal, continue annual screening    Clarence Harris MD

## 2025-05-26 ENCOUNTER — PATIENT MESSAGE (OUTPATIENT)
Dept: UROLOGY | Facility: CLINIC | Age: 75
End: 2025-05-26
Payer: MEDICARE

## 2025-05-26 ENCOUNTER — LAB VISIT (OUTPATIENT)
Dept: LAB | Facility: HOSPITAL | Age: 75
End: 2025-05-26
Attending: UROLOGY
Payer: MEDICARE

## 2025-05-26 DIAGNOSIS — N40.1 BPH WITH OBSTRUCTION/LOWER URINARY TRACT SYMPTOMS: ICD-10-CM

## 2025-05-26 DIAGNOSIS — N13.8 BPH WITH OBSTRUCTION/LOWER URINARY TRACT SYMPTOMS: ICD-10-CM

## 2025-05-26 DIAGNOSIS — N13.8 BPH WITH OBSTRUCTION/LOWER URINARY TRACT SYMPTOMS: Primary | ICD-10-CM

## 2025-05-26 DIAGNOSIS — N40.1 BPH WITH OBSTRUCTION/LOWER URINARY TRACT SYMPTOMS: Primary | ICD-10-CM

## 2025-05-26 LAB
BACTERIA #/AREA URNS AUTO: ABNORMAL /HPF
BILIRUB UR QL STRIP.AUTO: NEGATIVE
CLARITY UR: ABNORMAL
COLOR UR AUTO: YELLOW
GLUCOSE UR QL STRIP: NEGATIVE
HGB UR QL STRIP: ABNORMAL
KETONES UR QL STRIP: NEGATIVE
LEUKOCYTE ESTERASE UR QL STRIP: ABNORMAL
MICROSCOPIC COMMENT: ABNORMAL
NITRITE UR QL STRIP: NEGATIVE
PH UR STRIP: 6 [PH]
PROT UR QL STRIP: ABNORMAL
RBC #/AREA URNS AUTO: 5 /HPF (ref 0–4)
SP GR UR STRIP: 1.01
SQUAMOUS #/AREA URNS AUTO: <1 /HPF
UROBILINOGEN UR STRIP-ACNC: NEGATIVE EU/DL
WBC #/AREA URNS AUTO: >100 /HPF (ref 0–5)
WBC CLUMPS UR QL AUTO: ABNORMAL

## 2025-05-26 PROCEDURE — 87086 URINE CULTURE/COLONY COUNT: CPT

## 2025-05-26 PROCEDURE — 81001 URINALYSIS AUTO W/SCOPE: CPT

## 2025-05-27 ENCOUNTER — PATIENT OUTREACH (OUTPATIENT)
Dept: ADMINISTRATIVE | Facility: HOSPITAL | Age: 75
End: 2025-05-27
Payer: MEDICARE

## 2025-05-27 NOTE — PROGRESS NOTES
Working CKD Lab Report.  Pt has lab appt scheduled, 5/28/25.  Micro Albumin urine linked to appt,

## 2025-05-28 LAB — BACTERIA UR CULT: ABNORMAL

## 2025-05-28 RX ORDER — DOXYCYCLINE HYCLATE 100 MG
100 TABLET ORAL 2 TIMES DAILY
Qty: 20 TABLET | Refills: 0 | Status: SHIPPED | OUTPATIENT
Start: 2025-05-28 | End: 2025-06-07

## 2025-06-08 ENCOUNTER — NURSE TRIAGE (OUTPATIENT)
Dept: ADMINISTRATIVE | Facility: CLINIC | Age: 75
End: 2025-06-08
Payer: MEDICARE

## 2025-06-08 ENCOUNTER — PATIENT OUTREACH (OUTPATIENT)
Facility: OTHER | Age: 75
End: 2025-06-08
Payer: MEDICARE

## 2025-06-08 ENCOUNTER — E-VISIT (OUTPATIENT)
Facility: CLINIC | Age: 75
End: 2025-06-08
Attending: EMERGENCY MEDICINE
Payer: MEDICARE

## 2025-06-08 DIAGNOSIS — N45.1 EPIDIDYMITIS: Primary | ICD-10-CM

## 2025-06-08 RX ORDER — DOXYCYCLINE 100 MG/1
100 CAPSULE ORAL EVERY 12 HOURS
Qty: 14 CAPSULE | Refills: 0 | Status: SHIPPED | OUTPATIENT
Start: 2025-06-08

## 2025-06-08 NOTE — TELEPHONE ENCOUNTER
Pt calling, recently finished course of doxycycline yesterday for UTI. Pt was having dysuria and pain in scrotum due to spermatocele that was the size of a golf ball. After taking abx, spermatocele size reduced and became more manageable and less painful, however there was only one day where pt states he didn't have trouble urinating. Today, the spermatocele seems to be growing again and pt has dysuria in full force again. Rates overall pain 3/10. No abdominal pain but does have soreness in previous inguinal hernia area.    Initiated [facility] outreach per dept protocol and discussed pt's status with Dr. Chairez who advised he would send in another week of doxycycline and pt should f/u with urology as soon as possible.    E-visit initiated, walked pt through check-in. Advised pt to call back with any concerns or any issues with pharmacy, talking to urology office tomorrow morning, he verbalized understanding.      Reason for Disposition   [1] Taking antibiotic > 72 hours (3 days) for UTI AND [2] pain (e.g., flank, scrotum) is SAME (unchanged, not better)    Additional Information   Negative: Shock suspected (e.g., cold/pale/clammy skin, too weak to stand, low BP, rapid pulse)   Negative: Sounds like a life-threatening emergency to the triager   Negative: [1] Unable to urinate (or only a few drops) > 4 hours AND [2] bladder feels very full (e.g., palpable bladder or strong urge to urinate)   Negative: Passing pure blood or large blood clots (i.e., size > a dime)  (Exceptions: Flecks, small strands, or pinkish-red color)   Negative: Fever > 103 F (39.4 C)   Negative: Patient sounds very sick or weak to the triager   Negative: [1] SEVERE pain (e.g., excruciating) AND [2] no improvement 2 hours after pain medications   Negative: [1] Fever > 100 F (37.8 C) AND [2] new-onset since starting antibiotics   Negative: [1] Side (flank) or lower back pain AND [2] new-onset since starting antibiotics   Negative: [1] Taking  antibiotic > 24 hours for UTI AND [2] flank or lower back pain getting worse   Negative: [1] Vomiting 2 or more times AND [2] interferes with taking oral antibiotic   Negative: [1] Taking antibiotic > 24 hours for UTI AND [2] fever persists (still has fever)    Protocols used: Urinary Tract Infection on Antibiotic Follow-up Call - Male-A-

## 2025-06-08 NOTE — PROGRESS NOTES
Patient ID: Mekhi Lambert is a 74 y.o. male.    Chief Complaint: Urinary Tract Infection (Entered automatically based on patient selection in [employer].)    The patient initiated a request through [employer] on 6/8/2025 for evaluation and management with a chief complaint of Urinary Tract Infection (Entered automatically based on patient selection in [employer].)     I evaluated the questionnaire submission on 6/8/24.    [facility] P[facility] Evisit Uti Questionnaire    6/8/2025  1:30 PM CDT - Filed by Patient   Do you agree to participate in an E-Visit? Yes   If you have any of the following symptoms, please go to the nearest emergency room or call 911: I acknowledge   Choose the state of your primary residence [place]   What is the main issue you would like addressed today? Reoccurring urinary tract infection   Do you have any of the following symptoms? Discomfort or pain passing urine;  Passing urine more frequently   When did your concern begin? 5/28/2025   What medications or treatments have you used to help your symptoms? Antibiotics;  Painkillers   What does your urine look like? Light yellow   Have you had any of the following symptoms during the past 24 hours?   Urgency (a sudden and uncontrollable urge to urinate) Mild   Frequency (going to the toilet very often) None   Burning pain when urinating Mild   Incomplete bladder emptying (still feel like you need to urinate again after urination) (None, Mild, Moderate, Severe) Moderate   Pain in the lower belly when youre not urinating. Mild   Discomfort from your urinary symptoms. Moderate   Have you had any of the following symptoms during the past 24 hours?   Blood seen in the urine None   Pain in the lower back on one or both sides (flank pain) Mild   Discharge from the opening you urinate from (urethra) when not urinating. None   Have your symptoms interfered with your every day activities? Moderate   Do you have a fever? No   Are you a diabetic? No   Do you have a  history of kidney stones? No   Provide any additional information you feel is important.    Please attach any relevant images or files (if you have performed a home test for UTI, please submit a photo of results)    Are you able to take your vital signs? Yes   Systolic Blood Pressure: 104   Diastolic Blood Pressure: 64   Weight: 167   Height: 60   Pulse: 55   Temperature: 97.8   Respiration rate:    Pulse Oxygen: 99         Encounter Diagnosis   Name Primary?    Epididymitis Yes        No orders of the defined types were placed in this encounter.     Medications Ordered This Encounter   Medications    doxycycline (VIBRAMYCIN) 100 MG Cap     Sig: Take 1 capsule (100 mg total) by mouth every 12 (twelve) hours.     Dispense:  14 capsule     Refill:  0        No follow-ups on file.    74-year-old male with history of CAD, BPH contents nurse on-call for recommendations about persistent dysuria and recurrent right testicular swelling.  Per chart review, patient recently saw urology a few weeks ago, subsequently had urine culture checked on 05/26 that was positive for coag-negative staph but no susceptibility testing done.  He was started on doxycycline on 05/28 with a 10 day course that finished yesterday, his testicular swelling was improved but dysuria persisted.  After he finished the doxycycline yesterday, he noticed some recurrent swelling of his right testicular area that was initially the size of a golf ball but improved with antibiotics until today.  He is concerned he needs a longer course of antibiotics.  His UA for did show signs of UTI then.  Unable to get in touch with his urologist today or drop off repeat UA.  No fevers or abdominal pain.  Given previous culture results and improvement of suspected epididymitis will give another Rx for doxycycline, though patient understands need for close urology follow up for repeat UA and possible scrotal ultrasound, further management.  He is also advised on ER  precautions for fevers or worsening symptoms      E-Visit Time Tracking:    Day 1 Time (in minutes): 10    Total Time (in minutes): 10

## 2025-06-08 NOTE — PROGRESS NOTES
"Patient contacted [facility] On Call RN with c/o "recently finished 10 days of doxycycline for a UTI. He's calling today stating he still has UTI symptoms (dysuria) in addition to problems with a right-sided spermatocele. The spermatocele was the size of a golf ball, but after course of abx, it reduced to the size of a peanut and was much more manageable and less painful. Today, though, the spermatocele seems to be growing again. Pt is concerned that he needs a longer course of abx."  [facility] Provider Jay Chairez consulted, and Edson completed.     Follow up scheduled 6/9/2025 to assess for additional needs or concerns.    Jo Mccabe  ED Navigator     "

## 2025-06-10 ENCOUNTER — PATIENT MESSAGE (OUTPATIENT)
Dept: UROLOGY | Facility: CLINIC | Age: 75
End: 2025-06-10
Payer: MEDICARE

## 2025-06-10 DIAGNOSIS — N40.1 BPH WITH OBSTRUCTION/LOWER URINARY TRACT SYMPTOMS: Primary | ICD-10-CM

## 2025-06-10 DIAGNOSIS — N13.8 BPH WITH OBSTRUCTION/LOWER URINARY TRACT SYMPTOMS: Primary | ICD-10-CM

## 2025-06-11 ENCOUNTER — PATIENT MESSAGE (OUTPATIENT)
Dept: UROLOGY | Facility: CLINIC | Age: 75
End: 2025-06-11
Payer: MEDICARE

## 2025-06-12 ENCOUNTER — LAB VISIT (OUTPATIENT)
Dept: LAB | Facility: HOSPITAL | Age: 75
End: 2025-06-12
Attending: UROLOGY
Payer: MEDICARE

## 2025-06-12 DIAGNOSIS — N13.8 BPH WITH OBSTRUCTION/LOWER URINARY TRACT SYMPTOMS: ICD-10-CM

## 2025-06-12 DIAGNOSIS — E78.00 HYPERCHOLESTEROLEMIA: ICD-10-CM

## 2025-06-12 DIAGNOSIS — N40.1 BPH WITH OBSTRUCTION/LOWER URINARY TRACT SYMPTOMS: ICD-10-CM

## 2025-06-12 LAB
ALBUMIN SERPL BCP-MCNC: 3.8 G/DL (ref 3.5–5.2)
ALP SERPL-CCNC: 71 UNIT/L (ref 40–150)
ALT SERPL W/O P-5'-P-CCNC: 15 UNIT/L (ref 10–44)
ANION GAP (OHS): 8 MMOL/L (ref 8–16)
AST SERPL-CCNC: 22 UNIT/L (ref 11–45)
BILIRUB SERPL-MCNC: 0.8 MG/DL (ref 0.1–1)
BILIRUB UR QL STRIP.AUTO: NEGATIVE
BUN SERPL-MCNC: 21 MG/DL (ref 8–23)
CALCIUM SERPL-MCNC: 8.9 MG/DL (ref 8.7–10.5)
CHLORIDE SERPL-SCNC: 105 MMOL/L (ref 95–110)
CLARITY UR: CLEAR
CO2 SERPL-SCNC: 28 MMOL/L (ref 23–29)
COLOR UR AUTO: YELLOW
CREAT SERPL-MCNC: 1.2 MG/DL (ref 0.5–1.4)
GFR SERPLBLD CREATININE-BSD FMLA CKD-EPI: >60 ML/MIN/1.73/M2
GLUCOSE SERPL-MCNC: 44 MG/DL (ref 70–110)
GLUCOSE UR QL STRIP: NEGATIVE
HGB UR QL STRIP: NEGATIVE
KETONES UR QL STRIP: NEGATIVE
LEUKOCYTE ESTERASE UR QL STRIP: NEGATIVE
NITRITE UR QL STRIP: NEGATIVE
PH UR STRIP: 6 [PH]
POTASSIUM SERPL-SCNC: 4.3 MMOL/L (ref 3.5–5.1)
PROT SERPL-MCNC: 6.3 GM/DL (ref 6–8.4)
PROT UR QL STRIP: NEGATIVE
SODIUM SERPL-SCNC: 141 MMOL/L (ref 136–145)
SP GR UR STRIP: 1.01
UROBILINOGEN UR STRIP-ACNC: NEGATIVE EU/DL

## 2025-06-12 PROCEDURE — 81003 URINALYSIS AUTO W/O SCOPE: CPT

## 2025-06-12 PROCEDURE — 82040 ASSAY OF SERUM ALBUMIN: CPT

## 2025-06-12 PROCEDURE — 87086 URINE CULTURE/COLONY COUNT: CPT

## 2025-06-12 PROCEDURE — 36415 COLL VENOUS BLD VENIPUNCTURE: CPT | Mod: PN

## 2025-06-12 RX ORDER — DOXYCYCLINE HYCLATE 100 MG
100 TABLET ORAL 2 TIMES DAILY
Qty: 20 TABLET | Refills: 0 | Status: SHIPPED | OUTPATIENT
Start: 2025-06-12 | End: 2025-06-22

## 2025-06-13 ENCOUNTER — RESULTS FOLLOW-UP (OUTPATIENT)
Dept: INTERNAL MEDICINE | Facility: CLINIC | Age: 75
End: 2025-06-13

## 2025-06-14 LAB — BACTERIA UR CULT: NO GROWTH

## 2025-06-16 ENCOUNTER — PATIENT OUTREACH (OUTPATIENT)
Facility: OTHER | Age: 75
End: 2025-06-16
Payer: MEDICARE

## 2025-06-16 ENCOUNTER — HOSPITAL ENCOUNTER (OUTPATIENT)
Dept: CARDIOLOGY | Facility: HOSPITAL | Age: 75
Discharge: HOME | End: 2025-06-16
Attending: UROLOGY
Payer: MEDICARE

## 2025-06-16 ENCOUNTER — TELEPHONE (OUTPATIENT)
Dept: UROLOGY | Facility: CLINIC | Age: 75
End: 2025-06-16
Payer: MEDICARE

## 2025-06-16 ENCOUNTER — PATIENT MESSAGE (OUTPATIENT)
Dept: UROLOGY | Facility: CLINIC | Age: 75
End: 2025-06-16
Payer: MEDICARE

## 2025-06-16 DIAGNOSIS — Z01.818 PRE-OP EXAM: ICD-10-CM

## 2025-06-16 DIAGNOSIS — Z01.818 PRE-OP EXAM: Primary | ICD-10-CM

## 2025-06-16 LAB
OHS QRS DURATION: 112 MS
OHS QTC CALCULATION: 416 MS

## 2025-06-16 PROCEDURE — 93005 ELECTROCARDIOGRAM TRACING: CPT

## 2025-06-16 PROCEDURE — 93010 ELECTROCARDIOGRAM REPORT: CPT | Mod: ,,, | Performed by: INTERNAL MEDICINE

## 2025-06-16 NOTE — PROGRESS NOTES
Called patient for follow up  after 6/8/25 E-Visit with Elsy MD to assess for additional needs or concerns.  A voicemail message was left asking patient to return the call.  Will address any needs if patient returns the call.    Jo Mccabe  ED Navigator

## 2025-06-16 NOTE — TELEPHONE ENCOUNTER
Copied from CRM #[id number]. Topic: General Inquiry - Patient Advice  >> Jun 16, 2025  9:13 AM Cathryn wrote:  .Type: Appt Access       Who called:   PATIENT       What is the request in detail:    CALLED IN WANTING TO KNOW IF HE NEEDS TO BE SEEN BEFORE HIS SURGERY TO PLEASE GIVE HIM A CALL   Can the clinic reply by [facility]?           Would the patient rather a call back or a response via [facility]?        Best call back number:  [phone]

## 2025-06-18 ENCOUNTER — LAB VISIT (OUTPATIENT)
Dept: LAB | Facility: HOSPITAL | Age: 75
End: 2025-06-18
Attending: UROLOGY
Payer: MEDICARE

## 2025-06-18 DIAGNOSIS — Z01.818 PRE-OP EXAM: ICD-10-CM

## 2025-06-18 LAB
ABSOLUTE EOSINOPHIL (OHS): 0.05 K/UL
ABSOLUTE MONOCYTE (OHS): 0.52 K/UL (ref 0.3–1)
ABSOLUTE NEUTROPHIL COUNT (OHS): 4.98 K/UL (ref 1.8–7.7)
BASOPHILS # BLD AUTO: 0.04 K/UL
BASOPHILS NFR BLD AUTO: 0.6 %
ERYTHROCYTE [DISTWIDTH] IN BLOOD BY AUTOMATED COUNT: 13.7 % (ref 11.5–14.5)
HCT VFR BLD AUTO: 43.5 % (ref 40–54)
HGB BLD-MCNC: 14.7 GM/DL (ref 14–18)
IMM GRANULOCYTES # BLD AUTO: 0.01 K/UL (ref 0–0.04)
IMM GRANULOCYTES NFR BLD AUTO: 0.1 % (ref 0–0.5)
LYMPHOCYTES # BLD AUTO: 1.42 K/UL (ref 1–4.8)
MCH RBC QN AUTO: 30.2 PG (ref 27–31)
MCHC RBC AUTO-ENTMCNC: 33.8 G/DL (ref 32–36)
MCV RBC AUTO: 90 FL (ref 82–98)
NUCLEATED RBC (/100WBC) (OHS): 0 /100 WBC
PLATELET # BLD AUTO: 167 K/UL (ref 150–450)
PMV BLD AUTO: 11.4 FL (ref 9.2–12.9)
RBC # BLD AUTO: 4.86 M/UL (ref 4.6–6.2)
RELATIVE EOSINOPHIL (OHS): 0.7 %
RELATIVE LYMPHOCYTE (OHS): 20.2 % (ref 18–48)
RELATIVE MONOCYTE (OHS): 7.4 % (ref 4–15)
RELATIVE NEUTROPHIL (OHS): 71 % (ref 38–73)
WBC # BLD AUTO: 7.02 K/UL (ref 3.9–12.7)

## 2025-06-18 PROCEDURE — 36415 COLL VENOUS BLD VENIPUNCTURE: CPT | Mod: PN

## 2025-06-18 PROCEDURE — 85025 COMPLETE CBC W/AUTO DIFF WBC: CPT

## 2025-06-23 ENCOUNTER — OFFICE VISIT (OUTPATIENT)
Dept: INTERNAL MEDICINE | Facility: CLINIC | Age: 75
End: 2025-06-23
Payer: MEDICARE

## 2025-06-23 ENCOUNTER — HOSPITAL ENCOUNTER (OUTPATIENT)
Dept: CARDIOLOGY | Facility: HOSPITAL | Age: 75
Discharge: HOME | End: 2025-06-23
Payer: MEDICARE

## 2025-06-23 ENCOUNTER — OFFICE VISIT (OUTPATIENT)
Dept: CARDIOLOGY | Facility: CLINIC | Age: 75
End: 2025-06-23
Payer: MEDICARE

## 2025-06-23 VITALS
HEART RATE: 73 BPM | DIASTOLIC BLOOD PRESSURE: 77 MMHG | SYSTOLIC BLOOD PRESSURE: 144 MMHG | TEMPERATURE: 98 F | OXYGEN SATURATION: 95 % | RESPIRATION RATE: 20 BRPM

## 2025-06-23 VITALS
HEIGHT: 68 IN | DIASTOLIC BLOOD PRESSURE: 78 MMHG | HEART RATE: 62 BPM | SYSTOLIC BLOOD PRESSURE: 135 MMHG | WEIGHT: 169.06 LBS | BODY MASS INDEX: 25.62 KG/M2 | OXYGEN SATURATION: 98 %

## 2025-06-23 DIAGNOSIS — I50.20 HEART FAILURE WITH REDUCED EJECTION FRACTION, NYHA CLASS II: ICD-10-CM

## 2025-06-23 DIAGNOSIS — L40.9 PSORIASIS: ICD-10-CM

## 2025-06-23 DIAGNOSIS — I25.118 CORONARY ARTERY DISEASE OF NATIVE ARTERY OF NATIVE HEART WITH STABLE ANGINA PECTORIS: ICD-10-CM

## 2025-06-23 DIAGNOSIS — I72.8 SPLENIC ARTERY ANEURYSM: ICD-10-CM

## 2025-06-23 DIAGNOSIS — K44.9 HIATAL HERNIA WITH GERD: ICD-10-CM

## 2025-06-23 DIAGNOSIS — Z01.818 PRE-OP EXAM: ICD-10-CM

## 2025-06-23 DIAGNOSIS — I50.22 CHRONIC SYSTOLIC CHF (CONGESTIVE HEART FAILURE): ICD-10-CM

## 2025-06-23 DIAGNOSIS — I25.118 CORONARY ARTERY DISEASE OF NATIVE ARTERY OF NATIVE HEART WITH STABLE ANGINA PECTORIS: Primary | ICD-10-CM

## 2025-06-23 DIAGNOSIS — N40.0 PROSTATE HYPERTROPHY: Primary | ICD-10-CM

## 2025-06-23 DIAGNOSIS — Z86.73 HX-TIA (TRANSIENT ISCHEMIC ATTACK): ICD-10-CM

## 2025-06-23 DIAGNOSIS — K21.9 HIATAL HERNIA WITH GERD: ICD-10-CM

## 2025-06-23 DIAGNOSIS — I42.0 DILATED CARDIOMYOPATHY: ICD-10-CM

## 2025-06-23 DIAGNOSIS — E78.00 HYPERCHOLESTEROLEMIA: ICD-10-CM

## 2025-06-23 DIAGNOSIS — Z01.818 PRE-OP EVALUATION: Primary | ICD-10-CM

## 2025-06-23 LAB
OHS QRS DURATION: 108 MS
OHS QTC CALCULATION: 429 MS

## 2025-06-23 PROCEDURE — 93005 ELECTROCARDIOGRAM TRACING: CPT

## 2025-06-23 PROCEDURE — 99214 OFFICE O/P EST MOD 30 MIN: CPT | Mod: S$PBB,,,

## 2025-06-23 PROCEDURE — 93010 ELECTROCARDIOGRAM REPORT: CPT | Mod: ,,, | Performed by: INTERNAL MEDICINE

## 2025-06-23 PROCEDURE — 99214 OFFICE O/P EST MOD 30 MIN: CPT | Mod: PBBFAC,25

## 2025-06-23 PROCEDURE — 99999 PR PBB SHADOW E&M-EST. PATIENT-LVL IV: CPT | Mod: PBBFAC,,,

## 2025-06-23 PROCEDURE — 99213 OFFICE O/P EST LOW 20 MIN: CPT | Mod: PBBFAC,25,27

## 2025-06-23 PROCEDURE — 99999 PR PBB SHADOW E&M-EST. PATIENT-LVL III: CPT | Mod: PBBFAC,,,

## 2025-06-23 NOTE — ASSESSMENT & PLAN NOTE
Pt on metoprolol   Pt with NSTEMI in 2021 and 2022 , stents x 3 last placed 2022  Pt very active without c/o CP  Incomplete LBBB on most recent EKG  Pt to see cards today for clearance / risk stratification prior to surgery

## 2025-06-23 NOTE — ASSESSMENT & PLAN NOTE
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

## 2025-06-23 NOTE — ASSESSMENT & PLAN NOTE
Most recent echo as above  No LE edema  Pt on BB, continue  Keep follow up with cards - I scheduled pt for pre op eval today at 1430.

## 2025-06-23 NOTE — DISCHARGE INSTRUCTIONS
To confirm, Surgery is scheduled on Monday, 6/30/25. We will call you late afternoon the Friday prior to surgery with your arrival time.    *Please report to the [facility] (1st Floor) located off of [facility] (2nd Entrance/Building on the left, in front of the flag pole). Address: [address] [place] [address] [place] [address]      INSTRUCTIONS IMPORTANT!!!  DO NOT Eat, Drink, or Smoke after 12 midnight unless instructed otherwise by your Surgeon. OK to brush teeth, no gum, candy or mints!    MORNING OF SURGERY, drink small sip of water with the following medications instructed by Pre-Admit Provider:  N/A    Diabetic Patients: If you take diabetic or weight loss medication, Do NOT take morning of surgery unless instructed by Doctor. Metformin to be stopped 24 hrs prior to surgery.     DO NOT take long-acting insulin the evening before surgery. Blood sugars will be checked in pre-op by Nurse.    If you take Ozempic/ Mounjaro / Wegovy / Trulicity / Semaglutide, Tirzepatide any weight loss injections OR PHENTERMINE --->>> PLEASE LET US KNOW IMMEDIATELY, as these medications need to be stopped 7 days prior to surgery!    *Patients should HOLD all vitamins, herbal supplements, weight loss medication, aspirin products & NSAIDS 7 days prior to surgery, as these can thin the blood. Ok to take Tylenol.    ____  Avoid Alcoholic beverages 3 days prior to surgery, as it can thin the blood.  ____  NO Acrylic/fake nails or nail polish worn day of surgery (specifically hand/arm & foot surgeries).  ____  NO powder, lotions, deodorants, oils or cream on body.  ____  Remove all jewelry, piercings, & foreign objects prior to arrival and leave at home.  ____  Remove Dentures, Hearing Aids & Contact Lens prior to surgery.  ____  Bring photo ID and insurance information to hospital (Leave Valuables at Home).  ____  If going home the same day, arrange for a ride home. You will not be able to drive for 24 hrs if Anesthesia  was used.   ____  Females (ages 11-60): may need to give a urine sample the morning of surgery; please see Pre op Nurse prior to using the restroom.  ____  Males: Stop ED medications (Viagra, Cialis) 24 hrs prior to surgery.  ____  Wear clean, loose fitting clothing to allow for dressings/ bandages.    Bathing Instructions:    -Shower with anti-bacterial Soap (ex: Hibiclens or Dial) the night before surgery and the morning of.   -Do not use Hibiclens on your face or genitals.   -Apply clean clothes after shower.  -Do not shave your face morning of surgery   -Do not shave your body 3 days prior to surgery unless instructed otherwise by your Surgeon.    [facility] Visitor/Ride Policy:  Only 2 adults allowed in pre op/recovery area during your procedure. You MUST HAVE A RIDE HOME from a responsible adult that you know and trust. Medical Transport, Uber or Lyft can ONLY be used if patient has a responsible adult to accompany them during ride home.       *Signs and symptoms of Infection Before or After Surgery:               !!!If you experience any fever, chills, nausea/ vomiting, foul odor/ excessive drainage from surgical site, flu-like symptoms, new wounds or cuts, PLEASE CALL THE SURGEON OFFICE at [phone] or SEND MESSAGE THROUGH [facility] PORTAL!!!     *If you are running late day of surgery, please call the Surgery Dept @ [phone].    *Billing question, please call  [phone]     Thank you,  -[facility] Pre Admit Dept.  [phone]   or [phone]  M-F 7:30 am-4:00 pm (Closed Major Holidays)    Additional Test Scheduled Today:  EKG (4th Floor @ 2:20 p.m.) Check in at the

## 2025-06-23 NOTE — PRE-PROCEDURE INSTRUCTIONS
Pre op instructions reviewed with patient during Clinic Visit with Provider.    To confirm, Surgery is scheduled on Monday, 6/30/25. We will call you late afternoon the Friday prior to surgery with your arrival time.    *Please report to the [facility] (1st Floor) located off of [facility] (2nd Entrance/Building on the left, in front of the flag pole). Address: [address] [place] [address] [place] [address]      INSTRUCTIONS IMPORTANT!!!  DO NOT Eat, Drink, or Smoke after 12 midnight unless instructed otherwise by your Surgeon. OK to brush teeth, no gum, candy or mints!    MORNING OF SURGERY, drink small sip of water with the following medications instructed by Pre-Admit Provider:  N/A    Diabetic Patients: If you take diabetic or weight loss medication, Do NOT take morning of surgery unless instructed by Doctor. Metformin to be stopped 24 hrs prior to surgery.     DO NOT take long-acting insulin the evening before surgery. Blood sugars will be checked in pre-op by Nurse.    If you take Ozempic/ Mounjaro / Wegovy / Trulicity / Semaglutide, Tirzepatide any weight loss injections OR PHENTERMINE --->>> PLEASE LET US KNOW IMMEDIATELY, as these medications need to be stopped 7 days prior to surgery!    *Patients should HOLD all vitamins, herbal supplements, weight loss medication, aspirin products & NSAIDS 7 days prior to surgery, as these can thin the blood. Ok to take Tylenol.    ____  Avoid Alcoholic beverages 3 days prior to surgery, as it can thin the blood.  ____  NO Acrylic/fake nails or nail polish worn day of surgery (specifically hand/arm & foot surgeries).  ____  NO powder, lotions, deodorants, oils or cream on body.  ____  Remove all jewelry, piercings, & foreign objects prior to arrival and leave at home.  ____  Remove Dentures, Hearing Aids & Contact Lens prior to surgery.  ____  Bring photo ID and insurance information to hospital (Leave Valuables at Home).  ____  If going home the same day,  arrange for a ride home. You will not be able to drive for 24 hrs if Anesthesia was used.   ____  Females (ages 11-60): may need to give a urine sample the morning of surgery; please see Pre op Nurse prior to using the restroom.  ____  Males: Stop ED medications (Viagra, Cialis) 24 hrs prior to surgery.  ____  Wear clean, loose fitting clothing to allow for dressings/ bandages.    Bathing Instructions:    -Shower with anti-bacterial Soap (ex: Hibiclens or Dial) the night before surgery and the morning of.   -Do not use Hibiclens on your face or genitals.   -Apply clean clothes after shower.  -Do not shave your face morning of surgery   -Do not shave your body 3 days prior to surgery unless instructed otherwise by your Surgeon.    [facility]/Ride Policy:  Only 2 adults allowed in pre op/recovery area during your procedure. You MUST HAVE A RIDE HOME from a responsible adult that you know and trust. Medical Transport, Uber or Lyft can ONLY be used if patient has a responsible adult to accompany them during ride home.       *Signs and symptoms of Infection Before or After Surgery:               !!!If you experience any fever, chills, nausea/ vomiting, foul odor/ excessive drainage from surgical site, flu-like symptoms, new wounds or cuts, PLEASE CALL THE SURGEON OFFICE at [phone] or SEND MESSAGE THROUGH [facility] PORTAL!!!     *If you are running late day of surgery, please call the Surgery Dept @ [phone].    *Billing question, please call  [phone]     Thank you,  -[facility] Pre Ad[facility] Dept.  [phone]   or [phone]  M-F 7:30 am-4:00 pm (Closed Major Holidays)    Additional Test Scheduled Today:  EKG (4th Floor @ 2:20 p.m.) Check in at the

## 2025-06-23 NOTE — ASSESSMENT & PLAN NOTE
9/2024   Imaging negative for acute infarct   No residual sx.   Stress following incident :   9/2024 :  The ECG portion of the study is negative for ischemia.    The patient reported no chest pain during the stress test.    The blood pressure response to stress was normal.    During stress, occasional PVCs are noted.    The patient exercised for 7 minutes 2 seconds on a Keith protocol, corresponding to a functional capacity of 9METS, achieving a peak heart rate of 148 bpm, which is 101% of the age predicted maximum heart rate.

## 2025-06-23 NOTE — ASSESSMENT & PLAN NOTE
No orthopnea, no SOB on exertion   No recent exacerbations  No LE edema  Last exacerbation with NSTEMI 2022  Pt with echo as below 9/2024  CONCLUSIONS:   1. Moderately dilated left ventricle. Severely depressed left ventricular systolic   function. LVEF 25 - 30%. Mild (Grade I) diastolic dysfunction (impaired relaxation).   2. Normal right ventricular size. Borderline right ventricular systolic function.   3. Mildly dilated aortic root. Mildly dilated ascending aorta.

## 2025-06-23 NOTE — PROGRESS NOTES
Preoperative History and Physical                                                                                                  Chief Complaint: Preoperative evaluation     History of Present Illness:      Mekhi Lambert is a 74 y.o. male who presents to the office today for a preoperative consultation at the request of Dr. Harris  who plans on performing TURP  on June 30.     Functional Status:      The patient is able to climb a flight of stairs. The patient is able to ambulate  without difficulty. The patient's functional status is not affected by the surgical problem. The patient's functional status is not affected by shortness of breath, chest pain, dyspnea on exertion and fatigue.  Work out in gym - 6-7 days HIIT training and strength straining. No CP no SOB   MET score greater than 4    Patient Anesthesia History:      History of Malignant Hyperthermia: no  History of Pseudocholinesterase Deficiency: no  History PONV: no  History of difficult intubation: no  History of delayed emergence: no    Family Anesthesia History:      History of Malignant Hyperthermia: no  History of Pseudocholinesterase Deficiency: no     Past Medical History:      Past Medical History:   Diagnosis Date    ALBERTO (acute kidney injury) 08/08/2022    Anticoagulant long-term use     Arthritis     Cancer     Chronic systolic CHF (congestive heart failure) 02/26/2022    Coronary artery disease     Digestive disorder     Hiatal hernia with GERD 6/23/2025    Myocardial infarction     2/20/22    Statin myopathy 05/13/2024        Past Surgical History:      Past Surgical History:   Procedure Laterality Date    CARDIAC CATHETERIZATION      COLONOSCOPY N/A 09/22/2021    Procedure: COLONOSCOPY;  Surgeon: Meaghan Jaime MD;  Location: [facility];  Service: Endoscopy;  Laterality: N/A;    CORONARY ANGIOGRAPHY INCLUDING BYPASS GRAFTS WITH CATHETERIZATION OF LEFT HEART N/A 03/03/2020    Procedure: ANGIOGRAM, CORONARY, INCLUDING BYPASS GRAFT,  WITH LEFT HEART CATHETERIZATION;  Surgeon: Ute Melchor MD;  Location: [facility] CATH LAB;  Service: Cardiology;  Laterality: N/A;    JOINT REPLACEMENT Right     knee    KNEE SURGERY      LEFT HEART CATHETERIZATION Left 02/22/2020    Procedure: CATHETERIZATION, HEART, LEFT;  Surgeon: Ute Melchor MD;  Location: [facility] CATH LAB;  Service: Cardiology;  Laterality: Left;    LEFT HEART CATHETERIZATION Left 02/26/2022    Procedure: CATHETERIZATION, HEART, LEFT;  Surgeon: Ute Melchor MD;  Location: [facility] C[facility] LAB;  Service: Cardiology;  Laterality: Left;    PERCUTANEOUS TRANSLUMINAL BALLOON ANGIOPLASTY OF CORONARY ARTERY Left 02/22/2020    Procedure: Angioplasty-coronary;  Surgeon: Ute Melchor MD;  Location: [facility] C[facility] LAB;  Service: Cardiology;  Laterality: Left;    PERCUTANEOUS TRANSLUMINAL BALLOON ANGIOPLASTY OF CORONARY ARTERY  02/26/2022    Procedure: Angioplasty-coronary;  Surgeon: Ute Melchor MD;  Location: [facility] CATH LAB;  Service: Cardiology;;    REPAIR, HERNIA, INGUINAL Right 4/5/2024    Procedure: REPAIR, HERNIA, INGUINAL;  Surgeon: Franklin Fuentes MD;  Location: [facility];  Service: General;  Laterality: Right;    RIGHT HEMICOLECTOMY N/A 04/27/2022    Procedure: HEMICOLECTOMY, RIGHT;  Surgeon: See Dominguez MD;  Location: [facility] OR;  Service: General;  Laterality: N/A;    SHOULDER ARTHROSCOPY Right     Dr Bella    SIGMOIDECTOMY      simple prostatectomy  06/06/2016    robotic assisted partial prostectomy due to BPH    TOTAL KNEE ARTHROPLASTY          Social History:      Social History     Socioeconomic History    Marital status:    Tobacco Use    Smoking status: Never    Smokeless tobacco: Former     Types: Chew     Quit date: 6/3/2004   Substance and Sexual Activity    Alcohol use: Yes     Alcohol/week: 2.0 standard drinks of alcohol     Types: 2 Glasses of wine per week     Comment: per week    Drug use: No    Sexual activity: Yes     Social Drivers of Health     Financial Resource Strain:  Low Risk  (5/26/2025)    Overall Financial Resource Strain (CARDIA)     Difficulty of Paying Living Expenses: Not hard at all   Food Insecurity: No Food Insecurity (5/26/2025)    Hunger Vital Sign     Worried About Running Out of Food in the Last Year: Never true     Ran Out of Food in the Last Year: Never true   Transportation Needs: No Transportation Needs (5/26/2025)    PRAPARE - Transportation     Lack of Transportation (Medical): No     Lack of Transportation (Non-Medical): No   Physical Activity: Unknown (5/26/2025)    Exercise Vital Sign     Days of Exercise per Week: 7 days   Stress: No Stress Concern Present (5/26/2025)    [employer] [place] [employer] - Occupational Stress Questionnaire     Feeling of Stress : Only a little   Housing Stability: Low Risk  (5/26/2025)    Housing Stability Vital Sign     Unable to Pay for Housing in the Last Year: No     Number of Times Moved in the Last Year: 0     Homeless in the Last Year: No        Family History:      Family History   Problem Relation Name Age of Onset    Heart disease Mother          lupus related    Diabetes Mother      Lupus Mother      Cancer Father          prostate, bone, lung, skin cancers all seperate types    Diabetes Maternal Aunt         Allergies:      Review of patient's allergies indicates:   Allergen Reactions    Statins-hmg-coa reductase inhibitors Other (See Comments)     SEVERE MYALGIAS AND GI SIDE EFFECTS       Medications:      Current Outpatient Medications   Medication Sig    acetaminophen (TYLENOL) 500 MG tablet Take 500 mg by mouth every 6 (six) hours as needed for Pain.    alfuzosin (UROXATRAL) 10 mg Tb24 Take 1 tablet (10 mg total) by mouth daily with breakfast.    b complex vitamins capsule Take 1 capsule by mouth once daily.    cholecalciferol, vitamin D3, (VITAMIN D3) 25 mcg (1,000 unit) capsule Take 1,000 Units by mouth once daily.    magnesium glycinate 100 mg magnesium Cap Take 100 mg by mouth daily as needed.     metoprolol succinate (TOPROL-XL) 25 MG 24 hr tablet TAKE 1/2 TABLET BY MOUTH EVERY EVENING    mometasone 0.1% (ELOCON) 0.1 % cream AAA bid prn for psoriasis. Strong steroid.  Do not use on face, underarms or groin.    pantoprazole (PROTONIX) 20 MG tablet Take 1 tablet (20 mg total) by mouth once daily.    cyclobenzaprine (FLEXERIL) 5 MG tablet Take 1 tablet (5 mg total) by mouth 3 (three) times daily as needed for Muscle spasms.    doxycycline (VIBRAMYCIN) 100 MG Cap Take 1 capsule (100 mg total) by mouth every 12 (twelve) hours. (Patient not taking: Reported on 6/23/2025)     No current facility-administered medications for this visit.       Vitals:      Vitals:    06/23/25 1348   BP: (!) 144/77   Pulse:    Resp:    Temp:        Review of Systems:        Review of Systems   Constitutional:  Negative for chills, fever and weight loss.   HENT:  Negative for congestion, ear discharge and ear pain.    Eyes:  Negative for pain, discharge and redness.   Respiratory:  Negative for cough, shortness of breath and wheezing.    Cardiovascular:  Positive for palpitations (PVCs better controlled with metoprolol). Negative for chest pain and orthopnea.   Gastrointestinal:  Positive for heartburn. Negative for blood in stool, melena, nausea and vomiting.   Genitourinary:  Negative for dysuria.        Recent UTI treated with doxy, sx improved pt without dysuria    Musculoskeletal:  Negative for back pain and neck pain.   Skin:  Positive for rash.   Neurological:  Negative for dizziness, tingling, seizures, weakness and headaches.   Endo/Heme/Allergies:  Negative for environmental allergies.   Psychiatric/Behavioral:  Negative for depression.          Physical Exam:      Constitutional: Appears well-developed, well-nourished and in no acute distress.  Patient is oriented to person, place, and time.   Head: Normocephalic and atraumatic. Mucous membranes moist.  Neck: Neck supple no mass.   Cardiovascular: Normal rate and regular  rhythm with occasional extrasystole     Pulmonary/Chest: Effort normal and clear to auscultation bilaterally. No respiratory distress.   Abdomen:  non-distended.   Neurological: Patient is alert and oriented to person, place and time. Moves all extremities.  Skin: Warm and dry. No lesions.  Extremities: No clubbing, cyanosis or edema.    Laboratory data:      Reviewed and noted in plan where applicable. Please see chart for full laboratory data.    @OWDWYZDCQ59(cpk,cpkmb,troponini,mb)@ @WGSSEBUNH10(poctglucose)@     Lab Results   Component Value Date    INR 1.2 09/17/2024    INR 1.0 04/29/2022    INR 1.0 04/28/2022       Lab Results   Component Value Date    WBC 7.02 06/18/2025    HGB 14.7 06/18/2025    HCT 43.5 06/18/2025    MCV 90 06/18/2025     06/18/2025           Predictors of intubation difficulty:       Morbid obesity? no   Anatomically abnormal facies? no   Prominent incisors? no   Receding mandible? no   Short, thick neck? no   Neck range of motion: normal     Cardiographics:      ECG:   Results for orders placed or performed during the hospital encounter of 06/23/25   EKG 12-lead    Collection Time: 06/23/25  1:54 PM   Result Value Ref Range    QRS Duration 108 ms    [facility] QTC Calculation 429 ms    Narrative    Test Reason : I25.118,I50.20,Z01.818,    Vent. Rate :  64 BPM     Atrial Rate :  64 BPM     P-R Int : 176 ms          QRS Dur : 108 ms      QT Int : 416 ms       P-R-T Axes :  52  17  51 degrees    QTcB Int : 429 ms    Sinus rhythm with occasional Premature ventricular complexes  Low voltage QRS  Borderline Abnormal ECG  No previous ECGs available    Referred By: FILI GARCIA           Confirmed By:        Echocardiogram: 9/2024  CONCLUSIONS:   1. Moderately dilated left ventricle. Severely depressed left ventricular systolic   function. LVEF 25 - 30%. Mild (Grade I) diastolic dysfunction (impaired relaxation).   2. Normal right ventricular size. Borderline right ventricular systolic function.    3. Mildly dilated aortic root. Mildly dilated ascending aorta.   Imaging:      Chest x-ray: 9/17/2024   Findings:   The heart and mediastinal silhouette are normal. .  No focal parenchymal process. No pleural effusions.  No pneumothorax. No acute osseous abnormalities.       Impression:     1. No acute cardiopulmonary abnormality.   Assessment and Plan:      Prostate hypertrophy  Pt presents at the request of Dr. Harris who plans on performing a  TURP on 6/30/2025  Known risk factors for perioperative complications: cardiomyopathy, HTN    Difficulty with intubation is not anticipated.    Cardiac Risk Estimation:  Per Revised Cardiac Risk Index patient 's RCRI score is 3 with a 15% risk of a major cardiac event.      1.) Preoperative workup as follows: ECG, hemoglobin, hematocrit, electrolytes, creatinine, glucose, liver function studies.  2.) Change in medication regimen before surgery: hold NSAIDs 7 days preop .  3.) Prophylaxis for cardiac events with perioperative beta-blockers: continue metoprolol perioperatively .  4.) Invasive hemodynamic monitoring perioperatively: at the discretion of anesthesiologist.  5.) Deep vein thrombosis prophylaxis postoperatively: intermittent pneumatic compression boots and regimen to be chosen by surgical team.  6.) Surveillance for postoperative MI with ECG immediately postoperatively and on postoperati ve days 1 and 2 AND troponin levels 24 hours postoperatively and on day 4 or hospital discharge (whichever comes first): not indicated.  7.) Current medications which may produce withdrawal symptoms if withheld perioperatively: none  8.) Other measures: pre op eval with cards      Hiatal hernia with GERD  Contineu home PPI     Psoriasis  Gluteal cleft and L elbow- controlled with topicals   Appears stable today without noted plaques to L elbow     Hx-TIA (transient ischemic attack)  9/2024   Imaging negative for acute infarct   No residual sx.   Stress following incident :    9/2024 :  The ECG portion of the study is negative for ischemia.    The patient reported no chest pain during the stress test.    The blood pressure response to stress was normal.    During stress, occasional PVCs are noted.    The patient exercised for 7 minutes 2 seconds on a Keith protocol, corresponding to a functional capacity of 9METS, achieving a peak heart rate of 148 bpm, which is 101% of the age predicted maximum heart rate.      Chronic systolic CHF (congestive heart failure)  No orthopnea, no SOB on exertion   No recent exacerbations  No LE edema  Last exacerbation with NSTEMI 2022  Pt with echo as below 9/2024  CONCLUSIONS:   1. Moderately dilated left ventricle. Severely depressed left ventricular systolic   function. LVEF 25 - 30%. Mild (Grade I) diastolic dysfunction (impaired relaxation).   2. Normal right ventricular size. Borderline right ventricular systolic function.   3. Mildly dilated aortic root. Mildly dilated ascending aorta.     Coronary artery disease of native artery of native heart with  angina pectoris History coronary stent placement  Pt on metoprolol   Pt with NSTEMI in 2021 and 2022 , stents x 3 last placed 2022  Pt very active without c/o CP  Incomplete LBBB on most recent EKG  Pt to see cards today for clearance / risk stratification prior to surgery     Dilated cardiomyopathy  Most recent echo as above  No LE edema  Pt on BB, continue  Keep follow up with cards - I scheduled pt for pre op eval today at 1430.     Splenic artery aneurysm  Followed by PCP   Stable on most recent imaging in march   Keep f/u with PCP         Electronically signed   TONY Elliott PA-C

## 2025-06-23 NOTE — ASSESSMENT & PLAN NOTE
Pt presents at the request of Dr. Harris who plans on performing a  TURP on 6/30/2025  Known risk factors for perioperative complications: cardiomyopathy, HTN    Difficulty with intubation is not anticipated.    Cardiac Risk Estimation:  Per Revised Cardiac Risk Index patient 's RCRI score is 3 with a 15% risk of a major cardiac event.      1.) Preoperative workup as follows: ECG, hemoglobin, hematocrit, electrolytes, creatinine, glucose, liver function studies.  2.) Change in medication regimen before surgery: hold NSAIDs 7 days preop .  3.) Prophylaxis for cardiac events with perioperative beta-blockers: continue metoprolol perioperatively .  4.) Invasive hemodynamic monitoring perioperatively: at the discretion of anesthesiologist.  5.) Deep vein thrombosis prophylaxis postoperatively: intermittent pneumatic compression boots and regimen to be chosen by surgical team.  6.) Surveillance for postoperative MI with ECG immediately postoperatively and on postoperati ve days 1 and 2 AND troponin levels 24 hours postoperatively and on day 4 or hospital discharge (whichever comes first): not indicated.  7.) Current medications which may produce withdrawal symptoms if withheld perioperatively: none  8.) Other measures: pre op eval with cards

## 2025-06-23 NOTE — PROGRESS NOTES
Subjective:   Patient ID:  Mekhi Lambert is a 74 y.o. male who presents for pre-operative evaluation prior to scheduled TURP. His current medical conditions include CAD, chronic systolic HF, and HLD.     9/20/24  Was admitted recently with TIA at [facility]- said he was walking the dog, started vomiting and was tired- reports headache and right side weakness, brain fog/memory loss    Echo 9/24 showed EF 25-30%, similar to prior (mildly dilated asc aorta- new compared to prior)  Doing well overall- does not want to start ASA, statin, or meds for CHF       Denies SOB, chest pain, syncope, palpitations     6/23/25  Patient presents for pre-operative evaluation prior to scheduled TURP. He reports he is overall doing well from a CV standpoint. He is working out 7 days a week doing HIT training and other types of training in which he elevates his heart rate to 140s. He reports that his blood pressures at home are well controlled, typically between  systolic and 70s-80s diastolic.   He calculates his own ejection fraction and feels that all of his prior testing is inaccurate.   He is only on metoprolol for GDMT as he has not wanted to start statins or CHF medications in the past  He denies all other symptoms including chest pain, dyspnea, palpitations, pre-syncope, syncope, orthopnea, PND and leg swelling.     Past Medical History:   Diagnosis Date    ALBERTO (acute kidney injury) 08/08/2022    Anticoagulant long-term use     Arthritis     Cancer     Chronic systolic CHF (congestive heart failure) 02/26/2022    Coronary artery disease     Digestive disorder     Hiatal hernia with GERD 6/23/2025    Myocardial infarction     2/20/22    Statin myopathy 05/13/2024       Past Surgical History:   Procedure Laterality Date    CARDIAC CATHETERIZATION      COLONOSCOPY N/A 09/22/2021    Procedure: COLONOSCOPY;  Surgeon: Meaghan Jaime MD;  Location: [facility] ENDO;  Service: Endoscopy;  Laterality: N/A;    CORONARY ANGIOGRAPHY  INCLUDING BYPASS GRAFTS WITH CATHETERIZATION OF LEFT HEART N/A 03/03/2020    Procedure: ANGIOGRAM, CORONARY, INCLUDING BYPASS GRAFT, WITH LEFT HEART CATHETERIZATION;  Surgeon: Ute Melchor MD;  Location: [facility] CATH LAB;  Service: Cardiology;  Laterality: N/A;    JOINT REPLACEMENT Right     knee    KNEE SURGERY      LEFT HEART CATHETERIZATION Left 02/22/2020    Procedure: CATHETERIZATION, HEART, LEFT;  Surgeon: Ute Melchor MD;  Location: [facility] CATH LAB;  Service: Cardiology;  Laterality: Left;    LEFT HEART CATHETERIZATION Left 02/26/2022    Procedure: CATHETERIZATION, HEART, LEFT;  Surgeon: Ute Melchor MD;  Location: [facility] CATH LAB;  Service: Cardiology;  Laterality: Left;    PERCUTANEOUS TRANSLUMINAL BALLOON ANGIOPLASTY OF CORONARY ARTERY Left 02/22/2020    Procedure: Angioplasty-coronary;  Surgeon: Ute Melchor MD;  Location: [facility] CATH LAB;  Service: Cardiology;  Laterality: Left;    PERCUTANEOUS TRANSLUMINAL BALLOON ANGIOPLASTY OF CORONARY ARTERY  02/26/2022    Procedure: Angioplasty-coronary;  Surgeon: Ute Melchor MD;  Location: [facility] CATH LAB;  Service: Cardiology;;    REPAIR, HERNIA, INGUINAL Right 4/5/2024    Procedure: REPAIR, HERNIA, INGUINAL;  Surgeon: Franklin Fuentes MD;  Location: [facility] OR;  Service: General;  Laterality: Right;    RIGHT HEMICOLECTOMY N/A 04/27/2022    Procedure: HEMICOLECTOMY, RIGHT;  Surgeon: See Dominguez MD;  Location: [facility] OR;  Service: General;  Laterality: N/A;    SHOULDER ARTHROSCOPY Right     Dr Bella    SIGMOIDECTOMY      simple prostatectomy  06/06/2016    robotic assisted partial prostectomy due to BPH    TOTAL KNEE ARTHROPLASTY         Social History[1]    Family History   Problem Relation Name Age of Onset    Heart disease Mother          lupus related    Diabetes Mother      Lupus Mother      Cancer Father          prostate, bone, lung, skin cancers all seperate types    Diabetes Maternal Aunt         Wt Readings from Last 3 Encounters:   06/23/25 76.7 kg  (169 lb 1.5 oz)   05/21/25 76 kg (167 lb 8.8 oz)   04/30/25 76.8 kg (169 lb 5 oz)     Temp Readings from Last 3 Encounters:   06/23/25 98.1 °F (36.7 °C) (Temporal)   04/30/25 97.5 °F (36.4 °C)   03/26/25 98.1 °F (36.7 °C)     BP Readings from Last 3 Encounters:   06/23/25 135/78   06/23/25 (!) 144/77   05/21/25 116/77     Pulse Readings from Last 3 Encounters:   06/23/25 62   06/23/25 73   05/21/25 (!) 51       Medications Ordered Prior to Encounter[2]    No cardiac monitor results found for the past 12 months    Results for orders placed during the hospital encounter of 03/16/23    Echo    Interpretation Summary  · There is pulmonary hypertension.  · The left ventricle is normal in size with concentric remodeling and moderately decreased systolic function.  · Grade I left ventricular diastolic dysfunction.  · The estimated PA systolic pressure is 44 mmHg.  · Normal right ventricular size with normal right ventricular systolic function.  · Normal central venous pressure (3 mmHg).  · The estimated ejection fraction is 30%.  · Mild mitral regurgitation.  · Mild tricuspid regurgitation.    Results for orders placed during the hospital encounter of 09/30/24    Exercise Stress - EKG    Interpretation Summary    The ECG portion of the study is negative for ischemia.    The patient reported no chest pain during the stress test.    The blood pressure response to stress was normal.    During stress, occasional PVCs are noted.    The patient exercised for 7 minutes 2 seconds on a Keith protocol, corresponding to a functional capacity of 9METS, achieving a peak heart rate of 148 bpm, which is 101% of the age predicted maximum heart rate.    Shortness of breath resolved prior to the end of the recovery period.        Results for orders placed or performed during the hospital encounter of 06/23/25   EKG 12-lead    Collection Time: 06/23/25  1:54 PM   Result Value Ref Range    QRS Duration 108 ms    [facility] QTC Calculation 429 ms     Narrative    Test Reason : I25.118,I50.20,Z01.818,    Vent. Rate :  64 BPM     Atrial Rate :  64 BPM     P-R Int : 176 ms          QRS Dur : 108 ms      QT Int : 416 ms       P-R-T Axes :  52  17  51 degrees    QTcB Int : 429 ms    Sinus rhythm with occasional Premature ventricular complexes  Low voltage QRS  Borderline Abnormal ECG  No previous ECGs available    Referred By: FILI GARCIA           Confirmed By:          Review of Systems   Constitutional: Negative.   HENT: Negative.     Eyes: Negative.    Cardiovascular: Negative.  Negative for chest pain, dyspnea on exertion, irregular heartbeat, leg swelling, near-syncope, orthopnea, palpitations, paroxysmal nocturnal dyspnea and syncope.   Respiratory: Negative.     Skin: Negative.    Musculoskeletal: Negative.    Gastrointestinal: Negative.    Genitourinary: Negative.    Neurological: Negative.    Psychiatric/Behavioral: Negative.           Physical Exam  Vitals and nursing note reviewed.   Constitutional:       Appearance: Normal appearance.   HENT:      Head: Normocephalic and atraumatic.   Eyes:      General:         Right eye: No discharge.         Left eye: No discharge.      Pupils: Pupils are equal, round, and reactive to light.   Cardiovascular:      Rate and Rhythm: Normal rate and regular rhythm.      Heart sounds: S1 normal and S2 normal. No murmur heard.     No friction rub.   Pulmonary:      Effort: Pulmonary effort is normal. No respiratory distress.      Breath sounds: Normal breath sounds. No rales.   Abdominal:      Palpations: Abdomen is soft.      Tenderness: There is no abdominal tenderness.   Musculoskeletal:      Cervical back: Neck supple.      Right lower leg: No edema.      Left lower leg: No edema.   Skin:     General: Skin is warm and dry.   Neurological:      General: No focal deficit present.      Mental Status: He is alert and oriented to person, place, and time.   Psychiatric:         Mood and Affect: Mood normal.         Behavior:  Behavior normal.         Thought Content: Thought content normal.         Lab Results   Component Value Date    CHOL 232 (H) 03/18/2025    CHOL 199 09/18/2024    CHOL 227 (H) 08/06/2024     Lab Results   Component Value Date    HDL 76 (H) 03/18/2025    HDL 60 09/18/2024    HDL 69 08/06/2024     Lab Results   Component Value Date    LDLCALC 140.8 03/18/2025    LDLCALC 121 09/18/2024    LDLCALC 145.6 08/06/2024     Lab Results   Component Value Date    TRIG 76 03/18/2025    TRIG 90 09/18/2024    TRIG 62 08/06/2024     Lab Results   Component Value Date    CHOLHDL 32.8 03/18/2025    CHOLHDL 30.4 08/06/2024    CHOLHDL 30.4 05/06/2024       Chemistry        Component Value Date/Time     06/12/2025 1357     03/18/2025 0753    K 4.3 06/12/2025 1357    K 4.9 03/18/2025 0753     06/12/2025 1357     03/18/2025 0753    CO2 28 06/12/2025 1357    CO2 25 03/18/2025 0753    BUN 21 06/12/2025 1357    CREATININE 1.2 06/12/2025 1357    GLU 44 (LL) 06/12/2025 1357    GLU 83 03/18/2025 0753        Component Value Date/Time    CALCIUM 8.9 06/12/2025 1357    CALCIUM 9.1 03/18/2025 0753    ALKPHOS 71 06/12/2025 1357    ALKPHOS 67 03/18/2025 0753    AST 22 06/12/2025 1357    AST 24 03/18/2025 0753    ALT 15 06/12/2025 1357    ALT 17 03/18/2025 0753    BILITOT 0.8 06/12/2025 1357    BILITOT 1.1 (H) 03/18/2025 0753    ESTGFRAFRICA 61 09/18/2024 0558    ESTGFRAFRICA >60 05/05/2022 1647    EGFRNONAA >60 05/05/2022 1647          Lab Results   Component Value Date    TSH 1.105 10/25/2024     Lab Results   Component Value Date    INR 1.2 09/17/2024    INR 1.0 04/29/2022    INR 1.0 04/28/2022     Lab Results   Component Value Date    WBC 7.02 06/18/2025    HGB 14.7 06/18/2025    HCT 43.5 06/18/2025    MCV 90 06/18/2025     06/18/2025         Assessment:      1. Pre-op evaluation    2. Chronic systolic CHF (congestive heart failure)    3. Coronary artery disease of native artery of native heart with  angina pectoris  History coronary stent placement    4. Hypercholesterolemia        Plan:   Pre-op evaluation    Chronic systolic CHF (congestive heart failure)    Coronary artery disease of native artery of native heart with  angina pectoris History coronary stent placement    Hypercholesterolemia      Patient at high risk for pantera and post op CV complications. No further cardiac testing need prior to surgery.   Continue metoprolol - CHF/CAD  Low fat, low salt diet, exercise as tolerated  RTC in 6 months with Dr. Barr per patient request    Haley Smith, PA-C [facility]             [1]   Social History  Tobacco Use    Smoking status: Never    Smokeless tobacco: Former     Types: Chew     Quit date: 6/3/2004   Substance Use Topics    Alcohol use: Yes     Alcohol/week: 2.0 standard drinks of alcohol     Types: 2 Glasses of wine per week     Comment: per week    Drug use: No   [2]   Current Outpatient Medications on File Prior to Visit   Medication Sig Dispense Refill    acetaminophen (TYLENOL) 500 MG tablet Take 500 mg by mouth every 6 (six) hours as needed for Pain.      alfuzosin (UROXATRAL) 10 mg Tb24 Take 1 tablet (10 mg total) by mouth daily with breakfast. 30 tablet 11    b complex vitamins capsule Take 1 capsule by mouth once daily.      cholecalciferol, vitamin D3, (VITAMIN D3) 25 mcg (1,000 unit) capsule Take 1,000 Units by mouth once daily.      cyclobenzaprine (FLEXERIL) 5 MG tablet Take 1 tablet (5 mg total) by mouth 3 (three) times daily as needed for Muscle spasms. 60 tablet 1    [] doxycycline (VIBRA-TABS) 100 MG tablet Take 1 tablet (100 mg total) by mouth 2 (two) times daily. for 10 days (Patient not taking: Reported on 2025) 20 tablet 0    doxycycline (VIBRAMYCIN) 100 MG Cap Take 1 capsule (100 mg total) by mouth every 12 (twelve) hours. (Patient not taking: Reported on 2025) 14 capsule 0    magnesium glycinate 100 mg magnesium Cap Take 100 mg by mouth daily as needed.      metoprolol  succinate (TOPROL-XL) 25 MG 24 hr tablet TAKE 1/2 TABLET BY MOUTH EVERY EVENING 45 tablet 1    mometasone 0.1% (ELOCON) 0.1 % cream AAA bid prn for psoriasis. Strong steroid.  Do not use on face, underarms or groin. 50 g 1    pantoprazole (PROTONIX) 20 MG tablet Take 1 tablet (20 mg total) by mouth once daily. 30 tablet 11     No current facility-administered medications on file prior to visit.

## 2025-06-25 ENCOUNTER — TELEPHONE (OUTPATIENT)
Dept: CARDIOLOGY | Facility: CLINIC | Age: 75
End: 2025-06-25
Payer: MEDICARE

## 2025-06-27 NOTE — PRE-PROCEDURE INSTRUCTIONS
Called and spoke with PATIENT about the following:     Please arrive to [facility] ([facility]) at 6:45 AM on MONDAY 6/30/25 for your scheduled procedure.  Address: [address] [place] [address] [place] [address] ([address]nd Building on left, 1st Floor Lobby)    NO FOOD, DRINK OR TOBACCO PRODUCTS AFTER MIDNIGHT THE NIGHT BEFORE SURGERY.     Do not eat OR drink after 12 midnight, Do not smoke or use chewing tobacco after 12 midnight!  OK to brush teeth, but no gum, candy, or mints!       Thank you,  -[facility] Pre Admit Dept.  Mon-Fri 7:30 am - 4 pm [phone]

## 2025-06-30 ENCOUNTER — ANESTHESIA (OUTPATIENT)
Dept: SURGERY | Facility: HOSPITAL | Age: 75
End: 2025-06-30
Payer: MEDICARE

## 2025-06-30 ENCOUNTER — ANESTHESIA EVENT (OUTPATIENT)
Dept: SURGERY | Facility: HOSPITAL | Age: 75
End: 2025-06-30
Payer: MEDICARE

## 2025-06-30 ENCOUNTER — HOSPITAL ENCOUNTER (OUTPATIENT)
Facility: HOSPITAL | Age: 75
Discharge: HOME | End: 2025-07-01
Attending: UROLOGY | Admitting: UROLOGY
Payer: MEDICARE

## 2025-06-30 DIAGNOSIS — N13.8 BPH WITH OBSTRUCTION/LOWER URINARY TRACT SYMPTOMS: ICD-10-CM

## 2025-06-30 DIAGNOSIS — N40.1 BPH WITH OBSTRUCTION/LOWER URINARY TRACT SYMPTOMS: ICD-10-CM

## 2025-06-30 DIAGNOSIS — R39.12 BENIGN PROSTATIC HYPERPLASIA WITH WEAK URINARY STREAM: Primary | ICD-10-CM

## 2025-06-30 DIAGNOSIS — N40.1 BENIGN PROSTATIC HYPERPLASIA WITH WEAK URINARY STREAM: Primary | ICD-10-CM

## 2025-06-30 LAB
ANION GAP (OHS): 6 MMOL/L (ref 8–16)
BUN SERPL-MCNC: 17 MG/DL (ref 8–23)
CALCIUM SERPL-MCNC: 7.7 MG/DL (ref 8.7–10.5)
CHLORIDE SERPL-SCNC: 113 MMOL/L (ref 95–110)
CO2 SERPL-SCNC: 21 MMOL/L (ref 23–29)
CREAT SERPL-MCNC: 1.1 MG/DL (ref 0.5–1.4)
ERYTHROCYTE [DISTWIDTH] IN BLOOD BY AUTOMATED COUNT: 13.5 % (ref 11.5–14.5)
GFR SERPLBLD CREATININE-BSD FMLA CKD-EPI: >60 ML/MIN/1.73/M2
GLUCOSE SERPL-MCNC: 107 MG/DL (ref 70–110)
HCT VFR BLD AUTO: 39.3 % (ref 40–54)
HGB BLD-MCNC: 13.6 GM/DL (ref 14–18)
MCH RBC QN AUTO: 30.6 PG (ref 27–31)
MCHC RBC AUTO-ENTMCNC: 34.6 G/DL (ref 32–36)
MCV RBC AUTO: 88 FL (ref 82–98)
PLATELET # BLD AUTO: 118 K/UL (ref 150–450)
PMV BLD AUTO: 9.9 FL (ref 9.2–12.9)
POTASSIUM SERPL-SCNC: 4.2 MMOL/L (ref 3.5–5.1)
RBC # BLD AUTO: 4.45 M/UL (ref 4.6–6.2)
SODIUM SERPL-SCNC: 140 MMOL/L (ref 136–145)
WBC # BLD AUTO: 5.44 K/UL (ref 3.9–12.7)

## 2025-06-30 PROCEDURE — 88305 TISSUE EXAM BY PATHOLOGIST: CPT | Mod: TC | Performed by: UROLOGY

## 2025-06-30 PROCEDURE — 25000003 PHARM REV CODE 250: Performed by: UROLOGY

## 2025-06-30 PROCEDURE — 52601 PROSTATECTOMY (TURP): CPT | Mod: ,,, | Performed by: UROLOGY

## 2025-06-30 PROCEDURE — 27201423 OPTIME MED/SURG SUP & DEVICES STERILE SUPPLY: Performed by: UROLOGY

## 2025-06-30 PROCEDURE — 82565 ASSAY OF CREATININE: CPT | Performed by: UROLOGY

## 2025-06-30 PROCEDURE — 71000039 HC RECOVERY, EACH ADD'L HOUR: Performed by: UROLOGY

## 2025-06-30 PROCEDURE — 25000003 PHARM REV CODE 250: Performed by: CLINIC/CENTER

## 2025-06-30 PROCEDURE — 63600175 PHARM REV CODE 636 W HCPCS: Performed by: UROLOGY

## 2025-06-30 PROCEDURE — 37000009 HC ANESTHESIA EA ADD 15 MINS: Performed by: UROLOGY

## 2025-06-30 PROCEDURE — 37000008 HC ANESTHESIA 1ST 15 MINUTES: Performed by: UROLOGY

## 2025-06-30 PROCEDURE — 85027 COMPLETE CBC AUTOMATED: CPT | Performed by: UROLOGY

## 2025-06-30 PROCEDURE — 71000033 HC RECOVERY, INTIAL HOUR: Performed by: UROLOGY

## 2025-06-30 PROCEDURE — 36000706: Performed by: UROLOGY

## 2025-06-30 PROCEDURE — 63600175 PHARM REV CODE 636 W HCPCS: Performed by: CLINIC/CENTER

## 2025-06-30 PROCEDURE — 63600175 PHARM REV CODE 636 W HCPCS: Mod: JZ,TB | Performed by: CLINIC/CENTER

## 2025-06-30 PROCEDURE — 36000707: Performed by: UROLOGY

## 2025-06-30 RX ORDER — HYDROMORPHONE HYDROCHLORIDE 2 MG/ML
0.2 INJECTION, SOLUTION INTRAMUSCULAR; INTRAVENOUS; SUBCUTANEOUS EVERY 5 MIN PRN
Status: DISCONTINUED | OUTPATIENT
Start: 2025-06-30 | End: 2025-06-30 | Stop reason: HOSPADM

## 2025-06-30 RX ORDER — FENTANYL CITRATE 50 UG/ML
INJECTION, SOLUTION INTRAMUSCULAR; INTRAVENOUS
Status: DISCONTINUED | OUTPATIENT
Start: 2025-06-30 | End: 2025-06-30

## 2025-06-30 RX ORDER — DEXAMETHASONE SODIUM PHOSPHATE 4 MG/ML
INJECTION, SOLUTION INTRA-ARTICULAR; INTRALESIONAL; INTRAMUSCULAR; INTRAVENOUS; SOFT TISSUE
Status: DISCONTINUED | OUTPATIENT
Start: 2025-06-30 | End: 2025-06-30

## 2025-06-30 RX ORDER — CEFAZOLIN 2 G/1
2 INJECTION, POWDER, FOR SOLUTION INTRAMUSCULAR; INTRAVENOUS
Status: COMPLETED | OUTPATIENT
Start: 2025-06-30 | End: 2025-07-01

## 2025-06-30 RX ORDER — ONDANSETRON HYDROCHLORIDE 2 MG/ML
4 INJECTION, SOLUTION INTRAVENOUS EVERY 6 HOURS PRN
Status: DISCONTINUED | OUTPATIENT
Start: 2025-06-30 | End: 2025-07-01 | Stop reason: HOSPADM

## 2025-06-30 RX ORDER — KETOROLAC TROMETHAMINE 30 MG/ML
15 INJECTION, SOLUTION INTRAMUSCULAR; INTRAVENOUS EVERY 12 HOURS
Status: DISCONTINUED | OUTPATIENT
Start: 2025-06-30 | End: 2025-07-01 | Stop reason: HOSPADM

## 2025-06-30 RX ORDER — PANTOPRAZOLE SODIUM 40 MG/1
40 TABLET, DELAYED RELEASE ORAL DAILY
Status: DISCONTINUED | OUTPATIENT
Start: 2025-06-30 | End: 2025-07-01 | Stop reason: HOSPADM

## 2025-06-30 RX ORDER — ROCURONIUM BROMIDE 10 MG/ML
INJECTION, SOLUTION INTRAVENOUS
Status: DISCONTINUED | OUTPATIENT
Start: 2025-06-30 | End: 2025-06-30

## 2025-06-30 RX ORDER — LIDOCAINE HYDROCHLORIDE 20 MG/ML
INJECTION INTRAVENOUS
Status: DISCONTINUED | OUTPATIENT
Start: 2025-06-30 | End: 2025-06-30

## 2025-06-30 RX ORDER — OXYCODONE HYDROCHLORIDE 5 MG/1
5 TABLET ORAL EVERY 4 HOURS PRN
Status: DISCONTINUED | OUTPATIENT
Start: 2025-06-30 | End: 2025-07-01 | Stop reason: HOSPADM

## 2025-06-30 RX ORDER — OXYCODONE AND ACETAMINOPHEN 5; 325 MG/1; MG/1
1 TABLET ORAL
Status: DISCONTINUED | OUTPATIENT
Start: 2025-06-30 | End: 2025-06-30 | Stop reason: HOSPADM

## 2025-06-30 RX ORDER — LIDOCAINE HYDROCHLORIDE 20 MG/ML
JELLY TOPICAL
Status: DISCONTINUED | OUTPATIENT
Start: 2025-06-30 | End: 2025-06-30 | Stop reason: HOSPADM

## 2025-06-30 RX ORDER — ACETAMINOPHEN 10 MG/ML
1000 INJECTION, SOLUTION INTRAVENOUS ONCE
Status: COMPLETED | OUTPATIENT
Start: 2025-06-30 | End: 2025-06-30

## 2025-06-30 RX ORDER — ONDANSETRON HYDROCHLORIDE 2 MG/ML
INJECTION, SOLUTION INTRAVENOUS
Status: DISCONTINUED | OUTPATIENT
Start: 2025-06-30 | End: 2025-06-30

## 2025-06-30 RX ORDER — CEFAZOLIN 2 G/1
2 INJECTION, POWDER, FOR SOLUTION INTRAMUSCULAR; INTRAVENOUS
Status: COMPLETED | OUTPATIENT
Start: 2025-06-30 | End: 2025-06-30

## 2025-06-30 RX ORDER — ACETAMINOPHEN 500 MG
1000 TABLET ORAL
Status: DISCONTINUED | OUTPATIENT
Start: 2025-06-30 | End: 2025-07-01 | Stop reason: HOSPADM

## 2025-06-30 RX ORDER — SODIUM CHLORIDE 0.9 % (FLUSH) 0.9 %
10 SYRINGE (ML) INJECTION
Status: DISCONTINUED | OUTPATIENT
Start: 2025-06-30 | End: 2025-07-01 | Stop reason: HOSPADM

## 2025-06-30 RX ORDER — ETOMIDATE 2 MG/ML
INJECTION INTRAVENOUS
Status: DISCONTINUED | OUTPATIENT
Start: 2025-06-30 | End: 2025-06-30

## 2025-06-30 RX ORDER — ONDANSETRON HYDROCHLORIDE 2 MG/ML
4 INJECTION, SOLUTION INTRAVENOUS DAILY PRN
Status: DISCONTINUED | OUTPATIENT
Start: 2025-06-30 | End: 2025-06-30 | Stop reason: HOSPADM

## 2025-06-30 RX ORDER — KETOROLAC TROMETHAMINE 30 MG/ML
INJECTION, SOLUTION INTRAMUSCULAR; INTRAVENOUS
Status: DISCONTINUED | OUTPATIENT
Start: 2025-06-30 | End: 2025-06-30

## 2025-06-30 RX ORDER — KETOROLAC TROMETHAMINE 30 MG/ML
15 INJECTION, SOLUTION INTRAMUSCULAR; INTRAVENOUS EVERY 8 HOURS PRN
Status: DISCONTINUED | OUTPATIENT
Start: 2025-06-30 | End: 2025-06-30 | Stop reason: HOSPADM

## 2025-06-30 RX ORDER — TALC
3 POWDER (GRAM) TOPICAL NIGHTLY PRN
Status: DISCONTINUED | OUTPATIENT
Start: 2025-06-30 | End: 2025-07-01 | Stop reason: HOSPADM

## 2025-06-30 RX ORDER — PROPOFOL 10 MG/ML
VIAL (ML) INTRAVENOUS
Status: DISCONTINUED | OUTPATIENT
Start: 2025-06-30 | End: 2025-06-30

## 2025-06-30 RX ORDER — SUCCINYLCHOLINE CHLORIDE 20 MG/ML
INJECTION INTRAMUSCULAR; INTRAVENOUS
Status: DISCONTINUED | OUTPATIENT
Start: 2025-06-30 | End: 2025-06-30

## 2025-06-30 RX ORDER — EPHEDRINE SULFATE 50 MG/ML
INJECTION, SOLUTION INTRAVENOUS
Status: DISCONTINUED | OUTPATIENT
Start: 2025-06-30 | End: 2025-06-30

## 2025-06-30 RX ADMIN — EPHEDRINE SULFATE 10 MG: 50 INJECTION INTRAVENOUS at 08:06

## 2025-06-30 RX ADMIN — FENTANYL CITRATE 50 MCG: 50 INJECTION, SOLUTION INTRAMUSCULAR; INTRAVENOUS at 08:06

## 2025-06-30 RX ADMIN — DEXAMETHASONE SODIUM PHOSPHATE 4 MG: 4 INJECTION, SOLUTION INTRA-ARTICULAR; INTRALESIONAL; INTRAMUSCULAR; INTRAVENOUS; SOFT TISSUE at 08:06

## 2025-06-30 RX ADMIN — CEFAZOLIN 2 G: 2 INJECTION, POWDER, FOR SOLUTION INTRAMUSCULAR; INTRAVENOUS at 11:06

## 2025-06-30 RX ADMIN — SUCCINYLCHOLINE CHLORIDE 70 MG: 20 INJECTION, SOLUTION INTRAMUSCULAR; INTRAVENOUS; PARENTERAL at 08:06

## 2025-06-30 RX ADMIN — KETOROLAC TROMETHAMINE 30 MG: 30 INJECTION, SOLUTION INTRAMUSCULAR; INTRAVENOUS at 09:06

## 2025-06-30 RX ADMIN — ONDANSETRON 4 MG: 2 INJECTION INTRAMUSCULAR; INTRAVENOUS at 08:06

## 2025-06-30 RX ADMIN — EPHEDRINE SULFATE 10 MG: 50 INJECTION INTRAVENOUS at 09:06

## 2025-06-30 RX ADMIN — PANTOPRAZOLE SODIUM 40 MG: 40 TABLET, DELAYED RELEASE ORAL at 03:06

## 2025-06-30 RX ADMIN — ACETAMINOPHEN 1000 MG: 10 INJECTION INTRAVENOUS at 03:06

## 2025-06-30 RX ADMIN — PROPOFOL 50 MG: 10 INJECTION, EMULSION INTRAVENOUS at 08:06

## 2025-06-30 RX ADMIN — ACETAMINOPHEN 1000 MG: 500 TABLET ORAL at 11:06

## 2025-06-30 RX ADMIN — FENTANYL CITRATE 25 MCG: 50 INJECTION, SOLUTION INTRAMUSCULAR; INTRAVENOUS at 09:06

## 2025-06-30 RX ADMIN — ETOMIDATE 10 MG: 2 INJECTION INTRAVENOUS at 08:06

## 2025-06-30 RX ADMIN — CEFAZOLIN 2 G: 2 INJECTION, POWDER, FOR SOLUTION INTRAMUSCULAR; INTRAVENOUS at 08:06

## 2025-06-30 RX ADMIN — SODIUM CHLORIDE, SODIUM LACTATE, POTASSIUM CHLORIDE, AND CALCIUM CHLORIDE: .6; .31; .03; .02 INJECTION, SOLUTION INTRAVENOUS at 08:06

## 2025-06-30 RX ADMIN — ROCURONIUM BROMIDE 5 MG: 10 INJECTION, SOLUTION INTRAVENOUS at 08:06

## 2025-06-30 RX ADMIN — CEFAZOLIN 2 G: 2 INJECTION, POWDER, FOR SOLUTION INTRAMUSCULAR; INTRAVENOUS at 03:06

## 2025-06-30 RX ADMIN — LIDOCAINE HYDROCHLORIDE 100 MG: 20 INJECTION INTRAVENOUS at 08:06

## 2025-06-30 NOTE — TRANSFER OF CARE
"Anesthesia Transfer of Care Note    Patient: Mekhi Lambert    Procedure(s) Performed: Procedure(s) (LRB):  TURP (TRANSURETHRAL RESECTION OF PROSTATE) (N/A)    Patient location: PACU    Anesthesia Type: general    Transport from OR: Transported from OR on room air with adequate spontaneous ventilation    Post pain: adequate analgesia    Post assessment: no apparent anesthetic complications and tolerated procedure well    Post vital signs: stable    Level of consciousness: sedated    Nausea/Vomiting: no nausea/vomiting    Complications: none    Transfer of care protocol was followed      Last vitals: Visit Vitals  /72 (BP Location: Right arm, Patient Position: Sitting)   Pulse (!) 57   Temp 36.4 °C (97.6 °F) (Temporal)   Resp 18   Ht 5' 8" (1.727 m)   Wt 74.7 kg (164 lb 10.9 oz)   SpO2 98%   BMI 25.04 kg/m²     "

## 2025-06-30 NOTE — OP NOTE
[facility]   Operative Note    Date: 06/30/2025    Pre-Op Diagnosis: BPH with bladder outlet obstruction    Post-Op Diagnosis: same    Procedure(s) Performed:   TURP    Specimen(s): prostate chips    Surgeon: Clarence Harris MD    Anesthesia: General endotracheal anesthesia    Indications: Mekhi Lambert is a 74 y.o. male with FORD who presents for TURP    Findings:  Trilobar hypertrophy, L>R, resected with a wide-open channel at the end of the case, bilateral UOs intact, excellent hemostasis at the end of the case    Estimated Blood Loss: 100mL    Drains: 22 three-way Fr escoto catheter on CBI    Procedure in detail:  After the risks and benefits of the procedure were explained, consent was obtained, and the patient was taken to the operating suite and placed in the supine position.  Anesthesia was administered.  When adequately sedated, the patient was placed in dorsal lithotomy position and prepped and draped in normal sterile fashion.  Timeout was performed and preoperative ABx were confirmed.      A 28-Swedish sheath resectoscope was placed into the bladder using a visual obturator. The visual obturator was exchanged for the resecting mechanism. The ureteral orifices were identified. The median lobe was first resected with care to avoid the ureteral orifices. Once the median lobe was resected, we then turned our attention to the right lateral lobe of the prostate.The right lateral lobe was then resected in a stepwise fashion from 7 o'clock to 12 o'clock with care to leave the verumontanum and sphincter intact. Once this was performed we directed our attention to the left lobe of the prostate and again the lateral lobe was resected from the 5 o'clock to the 12 o'clock position. Hemostasis was then achieved.    The ureteral orifices, verumontanum and sphincter were intact. The DoughMain evacuator was used to remove all prostate chips and again hemostasis was obtained.     Once the bladder was drained, we  could see that he had an open channel and the scope was removed.  A  22Fr 3-way catheter was placed in the bladder with 20 mL of water in the balloon. The patient was placed on traction and on CBI. The patient was transferred to recovery in stable condition.     Disposition: The patient will remain on the urology service overnight for observation and CBI will be titrated.    Clarence Harris MD

## 2025-06-30 NOTE — ANESTHESIA PREPROCEDURE EVALUATION
06/30/2025  Mekhi Lambert is a 74 y.o., male.    Past Medical History:   Diagnosis Date    ALBERTO (acute kidney injury) 08/08/2022    Anticoagulant long-term use     Arthritis     Cancer     Chronic systolic CHF (congestive heart failure) 02/26/2022    Coronary artery disease     Digestive disorder     Hiatal hernia with GERD 6/23/2025    Myocardial infarction     2/20/22    Statin myopathy 05/13/2024     Past Surgical History:   Procedure Laterality Date    CARDIAC CATHETERIZATION      COLONOSCOPY N/A 09/22/2021    Procedure: COLONOSCOPY;  Surgeon: Meaghan Jaime MD;  Location: [facility] ENDO;  Service: Endoscopy;  Laterality: N/A;    CORONARY ANGIOGRAPHY INCLUDING BYPASS GRAFTS WITH CATHETERIZATION OF LEFT HEART N/A 03/03/2020    Procedure: ANGIOGRAM, CORONARY, INCLUDING BYPASS GRAFT, WITH LEFT HEART CATHETERIZATION;  Surgeon: Ute Melchor MD;  Location: [facility] CATH LAB;  Service: Cardiology;  Laterality: N/A;    JOINT REPLACEMENT Right     knee    KNEE SURGERY      LEFT HEART CATHETERIZATION Left 02/22/2020    Procedure: CATHETERIZATION, HEART, LEFT;  Surgeon: Ute Melchor MD;  Location: [facility] CATH LAB;  Service: Cardiology;  Laterality: Left;    LEFT HEART CATHETERIZATION Left 02/26/2022    Procedure: CATHETERIZATION, HEART, LEFT;  Surgeon: Ute Melchor MD;  Location: [facility] CATH LAB;  Service: Cardiology;  Laterality: Left;    PERCUTANEOUS TRANSLUMINAL BALLOON ANGIOPLASTY OF CORONARY ARTERY Left 02/22/2020    Procedure: Angioplasty-coronary;  Surgeon: Ute Melchor MD;  Location: [facility] CATH LAB;  Service: Cardiology;  Laterality: Left;    PERCUTANEOUS TRANSLUMINAL BALLOON ANGIOPLASTY OF CORONARY ARTERY  02/26/2022    Procedure: Angioplasty-coronary;  Surgeon: Ute Melchor MD;  Location: [facility] CATH LAB;  Service: Cardiology;;    REPAIR, HERNIA, INGUINAL Right 4/5/2024    Procedure: REPAIR, HERNIA,  "INGUINAL;  Surgeon: Franklin Fuentes MD;  Location: [facility] OR;  Service: General;  Laterality: Right;    RIGHT HEMICOLECTOMY N/A 04/27/2022    Procedure: HEMICOLECTOMY, RIGHT;  Surgeon: See Dominguez MD;  Location: [facility] OR;  Service: General;  Laterality: N/A;    SHOULDER ARTHROSCOPY Right     Dr Bella    SIGMOIDECTOMY      simple prostatectomy  06/06/2016    robotic assisted partial prostectomy due to BPH    TOTAL KNEE ARTHROPLASTY         Pre-op Assessment    I have reviewed the Patient Summary Reports.     I have reviewed the Nursing Notes. I have reviewed the NPO Status.   I have reviewed the Medications.     Review of Systems  Anesthesia Hx:  No problems with previous Anesthesia                Social:  Non-Smoker       Hematology/Oncology:  Hematology Normal   Oncology Normal                                   Cardiovascular:       Past MI CAD      Angina CHF       ECG has been reviewed. Stents X 3.    Cards:    "Patient at high risk for pantera and post op CV complications. No further cardiac testing need prior to surgery.   Continue metoprolol - CHF/CAD  Low fat, low salt diet, exercise as tolerated  RTC in 6 months with Dr. Barr per patient request"                             Pulmonary:  Pulmonary Normal                       Renal/:    BPH              Hepatic/GI:    Hiatal Hernia, GERD, well controlled   Took his antacid last night.             Neurological:  TIA,                                     Endocrine:  Endocrine Normal            Dermatological:  Skin Normal    Psych:  Psychiatric Normal                    Physical Exam  General: Well nourished    Airway:  Mallampati: I / II  Mouth Opening: Normal  TM Distance: Normal  Tongue: Normal  Neck ROM: Normal ROM    Dental:  Intact        Anesthesia Plan  Type of Anesthesia, risks & benefits discussed:    Anesthesia Type: Gen ETT  Intra-op Monitoring Plan: Standard ASA Monitors  Post Op Pain Control Plan: multimodal analgesia  Induction:  IV  Airway " Plan: Direct, Post-Induction  Informed Consent: Informed consent signed with the Patient and all parties understand the risks and agree with anesthesia plan.  All questions answered.   ASA Score: 3  Day of Surgery Review of History & Physical: H&P Update referred to the surgeon/provider.I have interviewed and examined the patient. I have reviewed the patient's H&P dated: There are no significant changes. H&P completed by Anesthesiologist.  Anesthesia Plan Notes: Pt exercises every day.  No chest pain.  No shortness of breath even when lying flat.    Ready For Surgery From Anesthesia Perspective.     .      Chemistry        Component Value Date/Time     06/12/2025 1357     03/18/2025 0753    K 4.3 06/12/2025 1357    K 4.9 03/18/2025 0753     06/12/2025 1357     03/18/2025 0753    CO2 28 06/12/2025 1357    CO2 25 03/18/2025 0753    BUN 21 06/12/2025 1357    CREATININE 1.2 06/12/2025 1357    GLU 44 (LL) 06/12/2025 1357    GLU 83 03/18/2025 0753        Component Value Date/Time    CALCIUM 8.9 06/12/2025 1357    CALCIUM 9.1 03/18/2025 0753    ALKPHOS 71 06/12/2025 1357    ALKPHOS 67 03/18/2025 0753    AST 22 06/12/2025 1357    AST 24 03/18/2025 0753    ALT 15 06/12/2025 1357    ALT 17 03/18/2025 0753    BILITOT 0.8 06/12/2025 1357    BILITOT 1.1 (H) 03/18/2025 0753    ESTGFRAFRICA 61 09/18/2024 0558    ESTGFRAFRICA >60 05/05/2022 1647    EGFRNONAA >60 05/05/2022 1647        Lab Results   Component Value Date    WBC 7.02 06/18/2025    HGB 14.7 06/18/2025    HCT 43.5 06/18/2025    MCV 90 06/18/2025     06/18/2025     Results for orders placed during the hospital encounter of 03/16/23    Echo    Interpretation Summary  · There is pulmonary hypertension.  · The left ventricle is normal in size with concentric remodeling and moderately decreased systolic function.  · Grade I left ventricular diastolic dysfunction.  · The estimated PA systolic pressure is 44 mmHg.  · Normal right ventricular size  with normal right ventricular systolic function.  · Normal central venous pressure (3 mmHg).  · The estimated ejection fraction is 30%.  · Mild mitral regurgitation.  · Mild tricuspid regurgitation.    Echo 9/24 showed EF 25-30%         Sinus rhythm with occasional Premature ventricular complexes   Low voltage QRS   Borderline Abnormal ECG   No previous ECGs available   Confirmed by Piotr Ruano ([id number]) on 6/23/2025 4:42:10 PM

## 2025-06-30 NOTE — H&P
Chief Complaint: UTIs, BPH     HPI:   05/21/2025 - returns today for follow-up, tried the alfuzosin for about a week and made him feel very sick like he had the flu, no hematuria or dysuria     03/26/2025 - patient returns today for follow-up, still doing the naturopathic remedies for his prostate and feels like this works, stream is slow in the mornings but picks up during the day, denies incomplete emptying, denies gross hematuria or dysuria, PSA 1.7     03/26/2024 - presents today for follow-up, has stopped taking the prescription medications for his prostate and is now taking tomato juice and pumpkin seeds, notes that he feels like these work for him, notes that his stream has slow down a little bit since the last year, never got the Rapaflo as it was too expensive,     03/24/2023 - patient returns today for follow-up, over the last year he notes that his urination is not as good as it was right after his simple prostatectomy, has been taking some over-the-counter saw palmetto supplements which seemed to help but he thinks it would do better, has tried taking Flomax in the past and had significant hypotension but did well on Rapaflo but it was too expensive, no GH or dysuria     02/18/2022 - patient returns today for follow-up, has had 2 episodes of epididymitis since December, these were accompanied by slow stream and dysuria, abx resolved his symptoms, since that time, he notes that his stream has greatly improved, he denies any further dysuria or gross hematuria      11/11/19: PSA remains low.  BPH not been a problem lately.  Has read a lot about finasteride and flomax.  Discussed finasteride a a preventative hasn't ever taken it.    11/5/18: Was having trouble a month ago with voiding.  Hasn't taken rapaflo in quite a while.  Had started buproprion prior to that and the LUTS came in to play after that mainly with hesitancy.  At day 4 he felt he couldn't get started at all.  He stopped the med, started  rapaflo.  Then had constipation/gas and thought he had a UTI.  Been great for a week.  Stopped rapaflo again.  PSA normal.    5/24/17: 65 yo man in last two years had a TURP followed by a RA Simple Prostatectomy.  No LUTS at all.  No abd/pelvic pain and no exac/rel factors.  No hematuria.  No urolithiasis.  No urinary bother.  Normal sexual function.  Takes rapaflo.     Allergies:  Statins-hmg-coa reductase inhibitors     Medications:  has a current medication list which includes the following prescription(s): acetaminophen, b complex vitamins, cholecalciferol (vitamin d3), cyclobenzaprine, magnesium glycinate, metoprolol succinate, mometasone 0.1%, pantoprazole, and alfuzosin.     Review of Systems:  General: No fever, chills  Skin: No rashes  Chest:  Denies cough and sputum production  Heart: Denies chest pain  Resp: Denies dyspnea  Abdomen: Denies diarrhea, abdominal pain, hematemesis, or blood in stool.  Musculoskeletal: No joint stiffness or swelling. Denies back pain.  : see HPI  Neuro: no dizziness or weakness        PMH:   has a past medical history of ALBERTO (acute kidney injury) (08/08/2022), Anticoagulant long-term use, Arthritis, Cancer, Chronic systolic CHF (congestive heart failure) (02/26/2022), Coronary artery disease, Digestive disorder, Myocardial infarction, and Statin myopathy (05/13/2024).     PSH:   has a past surgical history that includes Shoulder arthroscopy (Right); Knee surgery; Total knee arthroplasty; simple prostatectomy (06/06/2016); Cardiac catheterization; Left heart catheterization (Left, 02/22/2020); Percutaneous transluminal balloon angioplasty of coronary artery (Left, 02/22/2020); Coronary angiography including bypass grafts with catheterization of left heart (N/A, 03/03/2020); Joint replacement (Right); Colonoscopy (N/A, 09/22/2021); Left heart catheterization (Left, 02/26/2022); Percutaneous transluminal balloon angioplasty of coronary artery (02/26/2022); Right hemicolectomy  (N/A, 04/27/2022); Sigmoidectomy; and repair, hernia, inguinal (Right, 4/5/2024).     FamHx: family history includes Cancer in his father; Diabetes in his maternal aunt and mother; Heart disease in his mother.     SocHx:  reports that he has never smoked. He quit smokeless tobacco use about 20 years ago.  His smokeless tobacco use included chew. He reports current alcohol use of about 2.0 standard drinks of alcohol per week. He reports that he does not use drugs.       Physical Exam:      Vitals:     05/21/25 0821   BP: 116/77   Pulse: (!) 51      General: awake, alert, cooperative  Head: NC/AT  Ears: external ears normal  Eyes: sclera normal  Lungs: normal inspiration, NAD  Heart: well-perfused  Abdomen: Soft, NT, ND   3/24: Normal circ'd phallus, meatus normal in size and position, BL testicles palpable, no masses, nontender, scrotum normal  JOURDAN 3/24: Normal rectal tone, no hemorrhoids. Prostate smooth and normal, no nodules 40 gm SV not palpable. Perineum and anus normal.  Lymphatic: groin nodes negative  Skin: The skin is warm and dry  Ext: No c/c/e.  Neuro: grossly intact, AOx3     Imaging:  CTA ABDOMEN AND PELVIS     CLINICAL HISTORY:  f/u spleenic artery aneurysm;Aneurysm of other specified arteries     TECHNIQUE:  Contrast enhanced CTA of the abdomen and pelvis was obtained. Images were reformatted and reviewed in coronal and sagittal planes. .     Images were acquired during the arterial phase after 100 cc of non ionic intravenous contrast administration.     COMPARISON:  CT abdomen from 04/22/2024     FINDINGS:  Vascular structures:     1.  Aorta:     The abdominal aorta is normal in caliber with mild atheromatous disease.     Celiac:Patent.     SMA: Patent.     ANN: Patent.     Splenic artery: Marked tortuosity.  Redemonstration of the splenic artery aneurysm measuring 1.7 x 1.5 cm.  Calcification near the spleen all karin.     Common iliacs: Mild atherosclerotic disease.     Internal iliacs: Moderate  atherosclerotic disease.     External iliacs: Patent.     Non-vascular:     Lower chest: No abnormalities are seen in the lung bases. No pleural effusion is noted.  Small hiatal hernia.     The spleen, liver, pancreas, and adrenals are grossly unremarkable.     Kidneys: Scarring of the bilateral renal cortex.     Gallbladder: Unremarkable.     GI: Colonic diverticulosis without evident diverticulitis.     /reproductive organs: Asymmetric enlargement of the left prostate with internal calcifications similar in appearance to study from 04/22/2024.     Peritoneum: No intraperitoneal fluid.  No pneumoperitoneum.     Abdominal wall: Normal.     Musculoskeletal system: Age-appropriate degenerative changes.     Impression:  1. Findings compatible with splenic artery aneurysm measuring 1.7 x 1.5 cm.  2. Moderate atherosclerotic disease in the aorta and branching vessels.  Prostate measures approximately 95 g and appears to have a significant left-sided lobe     Labs/Studies:         Lab Results   Component Value Date     WBC 6.67 03/26/2025     HGB 15.4 03/26/2025     HCT 44.5 03/26/2025      03/26/2025      03/18/2025     K 4.9 03/18/2025      03/18/2025     CREATININE 1.1 03/18/2025     BUN 16 03/18/2025     CO2 25 03/18/2025     TSH 1.105 10/25/2024     PSA 1.7 03/18/2025     INR 1.2 09/17/2024     HGBA1C 5.2 09/17/2024     CHOL 232 (H) 03/18/2025     TRIG 76 03/18/2025     HDL 76 (H) 03/18/2025     ALT 17 03/18/2025     AST 24 03/18/2025         Urinalysis performed in clinic today specific gravity < 1.005 pH 5.5, negative for all parameters     PVR = 320mL     Procedure:  Diagnostic Cystoscopy     Indications: BPH/LUTS     UA: normal, see lab results for values     Procedure in Detail: After proper consents were obtained, the patient was prepped and draped in normal sterile fashion for diagnostic cystoscopy. 5 ml of lidocaine jelly was instilled in the urethra. The flexible cystoscope was then  introduced into the urethra, and advanced into the bladder under direct vision. The urethral mucosa appeared normal, and no strictures were noted. The sphincter was normal, and the veru montanum was unremarkable. The prostatic mucosa and the lateral lobes of the prostate were unremarkable, with left sided adenomatous regrowth and complete visual obstruction. The bladder neck was normal. Inspection of the interior of the bladder was then carried out. The trigone was unremarkable, with no mucosal lesions. The ureteral orifices were normal in position and configuration. Systematic inspection of the mucosa of the bladder it was then carried out, rotating the cystoscope so that all areas of the left and right lateral walls, the dome of the bladder, and the posterior wall were all visualized. The cystoscope was then advanced further into the bladder, and maximum deflection of the scope was performed so that the bladder neck could be inspected. No mucosal lesions were noted there. The cystoscope was then removed, and the procedure terminated. Patient tolerated the procedure well. No complications.      Findings:  Adenomatous regrowth, moderate trabeculations, no tumors        Impression/Plan:   Right epididymitis - no further issues     BPH - incomplete emptying noted with over 300 mL remaining, notes that he underwent a simple prostatectomy as well as a TURP, last procedure was about 10 years ago, scope shows some adenomatous regrowth on the left side with obstruction, recommend proceeding with repeat TURP, to OR for this, risks/benefits/alternatives reviewed, patient understands and agrees     Prostate Cancer Screening - PSA and JORUDAN normal, continue annual screening     Clarence Harris MD

## 2025-06-30 NOTE — ANESTHESIA PROCEDURE NOTES
Intubation    Date/Time: 6/30/2025 8:22 AM    Performed by: Fran Devlin III, CRNA  Authorized by: Gaudencio Clinton II, MD    Intubation:     Induction:  Intravenous    Intubated:  Postinduction    Mask Ventilation:  Not attempted    Attempts:  1    Attempted By:  CRNA    Method of Intubation:  Direct    Blade:  Fishman 2    Laryngeal View Grade: Grade I - full view of cords      Difficult Airway Encountered?: No      Complications:  None    Airway Device:  Oral endotracheal tube    Airway Device Size:  7.5    Style/Cuff Inflation:  Cuffed (inflated to minimal occlusive pressure)    Tube secured:  23    Secured at:  The lips    Placement Verified By:  Capnometry    Complicating Factors:  None    Findings Post-Intubation:  BS equal bilateral and atraumatic/condition of teeth unchanged

## 2025-07-01 VITALS
HEIGHT: 68 IN | BODY MASS INDEX: 25.76 KG/M2 | HEART RATE: 66 BPM | WEIGHT: 170 LBS | RESPIRATION RATE: 18 BRPM | TEMPERATURE: 98 F | OXYGEN SATURATION: 98 % | DIASTOLIC BLOOD PRESSURE: 54 MMHG | SYSTOLIC BLOOD PRESSURE: 125 MMHG

## 2025-07-01 LAB
ANION GAP (OHS): 5 MMOL/L (ref 8–16)
BUN SERPL-MCNC: 16 MG/DL (ref 8–23)
CALCIUM SERPL-MCNC: 8.4 MG/DL (ref 8.7–10.5)
CHLORIDE SERPL-SCNC: 108 MMOL/L (ref 95–110)
CO2 SERPL-SCNC: 25 MMOL/L (ref 23–29)
CREAT SERPL-MCNC: 1.1 MG/DL (ref 0.5–1.4)
ERYTHROCYTE [DISTWIDTH] IN BLOOD BY AUTOMATED COUNT: 13.5 % (ref 11.5–14.5)
ESTROGEN SERPL-MCNC: NORMAL PG/ML
GFR SERPLBLD CREATININE-BSD FMLA CKD-EPI: >60 ML/MIN/1.73/M2
GLUCOSE SERPL-MCNC: 99 MG/DL (ref 70–110)
HCT VFR BLD AUTO: 39.3 % (ref 40–54)
HGB BLD-MCNC: 13.5 GM/DL (ref 14–18)
INSULIN SERPL-ACNC: NORMAL U[IU]/ML
LAB AP CLINICAL INFORMATION: NORMAL
LAB AP GROSS DESCRIPTION: NORMAL
LAB AP PERFORMING LOCATION(S): NORMAL
LAB AP REPORT FOOTNOTES: NORMAL
MCH RBC QN AUTO: 30.3 PG (ref 27–31)
MCHC RBC AUTO-ENTMCNC: 34.4 G/DL (ref 32–36)
MCV RBC AUTO: 88 FL (ref 82–98)
PLATELET # BLD AUTO: 143 K/UL (ref 150–450)
PMV BLD AUTO: 10.4 FL (ref 9.2–12.9)
POTASSIUM SERPL-SCNC: 4.1 MMOL/L (ref 3.5–5.1)
RBC # BLD AUTO: 4.46 M/UL (ref 4.6–6.2)
SODIUM SERPL-SCNC: 138 MMOL/L (ref 136–145)
WBC # BLD AUTO: 9.26 K/UL (ref 3.9–12.7)

## 2025-07-01 PROCEDURE — 63600175 PHARM REV CODE 636 W HCPCS: Performed by: UROLOGY

## 2025-07-01 PROCEDURE — 82310 ASSAY OF CALCIUM: CPT | Performed by: UROLOGY

## 2025-07-01 PROCEDURE — 25000003 PHARM REV CODE 250: Performed by: UROLOGY

## 2025-07-01 PROCEDURE — 36415 COLL VENOUS BLD VENIPUNCTURE: CPT | Performed by: UROLOGY

## 2025-07-01 PROCEDURE — 85027 COMPLETE CBC AUTOMATED: CPT | Performed by: UROLOGY

## 2025-07-01 RX ORDER — NITROFURANTOIN 25; 75 MG/1; MG/1
100 CAPSULE ORAL 2 TIMES DAILY
Qty: 8 CAPSULE | Refills: 0 | Status: SHIPPED | OUTPATIENT
Start: 2025-07-01 | End: 2025-07-05

## 2025-07-01 RX ORDER — OXYCODONE HYDROCHLORIDE 5 MG/1
5 TABLET ORAL EVERY 6 HOURS PRN
Qty: 15 TABLET | Refills: 0 | Status: SHIPPED | OUTPATIENT
Start: 2025-07-01

## 2025-07-01 RX ORDER — PHENAZOPYRIDINE HYDROCHLORIDE 100 MG/1
100 TABLET, FILM COATED ORAL 3 TIMES DAILY PRN
Qty: 30 TABLET | Refills: 0 | Status: SHIPPED | OUTPATIENT
Start: 2025-07-01 | End: 2025-07-11

## 2025-07-01 RX ADMIN — ACETAMINOPHEN 1000 MG: 500 TABLET ORAL at 08:07

## 2025-07-01 RX ADMIN — CEFAZOLIN 2 G: 2 INJECTION, POWDER, FOR SOLUTION INTRAMUSCULAR; INTRAVENOUS at 08:07

## 2025-07-01 NOTE — ANESTHESIA POSTPROCEDURE EVALUATION
Anesthesia Post Evaluation    Patient: Mekhi Lambert    Procedure(s) Performed: Procedure(s) (LRB):  TURP (TRANSURETHRAL RESECTION OF PROSTATE) (N/A)    Final Anesthesia Type: general      Patient location during evaluation: PACU  Patient participation: Yes- Able to Participate  Level of consciousness: awake and alert  Post-procedure vital signs: reviewed and stable  Pain management: adequate  Airway patency: patent  TIMA mitigation strategies: Verification of full reversal of neuromuscular block  PONV status at discharge: No PONV  Anesthetic complications: no      Cardiovascular status: hemodynamically stable  Respiratory status: spontaneous ventilation  Hydration status: euvolemic  Follow-up not needed.              Vitals Value Taken Time   BP 99/52 07/01/25 05:23   Temp 36.8 °C (98.2 °F) 07/01/25 05:23   Pulse 55 07/01/25 05:23   Resp 18 07/01/25 05:23   SpO2 97 % 07/01/25 05:23         Event Time   Out of Recovery 13:10:58         Pain/Kristy Score: Pain Rating Prior to Med Admin: 6 (6/30/2025 11:22 PM)  Pain Rating Post Med Admin: 4 (7/1/2025 12:22 AM)  Kristy Score: 10 (6/30/2025  1:00 PM)

## 2025-07-01 NOTE — PLAN OF CARE
[facility] - Med Surg 3  Discharge Final Note    Primary Care Provider: Abhilash Lopez MD    Expected Discharge Date: 7/1/2025    Final Discharge Note (most recent)       Final Note - 07/01/25 1045          Final Note    Assessment Type Final Discharge Note     Anticipated Discharge Disposition Home or Self Care     Hospital Resources/Appts/Education Provided Appointments scheduled and added to AVS                     Important Message from Medicare               DC Dispo: home  PCP f/u: July 8th

## 2025-07-01 NOTE — PLAN OF CARE
Call button within reach. Patient walked around unit. CBI continuous.    Problem: Adult Inpatient Plan of Care  Goal: Plan of Care Review  Outcome: Ongoing  Goal: Patient-Specific Goal (Individualized)  Outcome: Ongoing  Goal: Absence of Hospital-Acquired Illness or Injury  Outcome: Ongoing  Goal: Optimal Comfort and Wellbeing  Outcome: Ongoing  Goal: Readiness for Transition of Care  Outcome: Ongoing     Problem: Wound  Goal: Optimal Coping  Outcome: Ongoing  Goal: Optimal Functional Ability  Outcome: Ongoing  Goal: Absence of Infection Signs and Symptoms  Outcome: Ongoing  Goal: Improved Oral Intake  Outcome: Ongoing  Goal: Optimal Pain Control and Function  Outcome: Ongoing  Goal: Skin Health and Integrity  Outcome: Ongoing  Goal: Optimal Wound Healing  Outcome: Ongoing     Problem: Infection  Goal: Absence of Infection Signs and Symptoms  Outcome: Ongoing

## 2025-07-03 ENCOUNTER — CLINICAL SUPPORT (OUTPATIENT)
Dept: UROLOGY | Facility: CLINIC | Age: 75
End: 2025-07-03
Payer: MEDICARE

## 2025-07-03 DIAGNOSIS — N40.1 BPH WITH OBSTRUCTION/LOWER URINARY TRACT SYMPTOMS: Primary | ICD-10-CM

## 2025-07-03 DIAGNOSIS — N13.8 BPH WITH OBSTRUCTION/LOWER URINARY TRACT SYMPTOMS: Primary | ICD-10-CM

## 2025-07-03 NOTE — PROGRESS NOTES
..Voiding trial performed per orders of Dr. Harris.  300cc sterile water instilled via 60cc syringe per gravity; 10cc balloon deflated and #22fr escoto removed without difficulty; patient tolerated procedure well. Patient was able to void 300cc thus passing voiding trial. PVR was 32ml. Provider was notified and instructed to keep escoto out.

## 2025-07-04 ENCOUNTER — HOSPITAL ENCOUNTER (EMERGENCY)
Facility: HOSPITAL | Age: 75
Discharge: HOME | End: 2025-07-04
Attending: EMERGENCY MEDICINE
Payer: MEDICARE

## 2025-07-04 ENCOUNTER — NURSE TRIAGE (OUTPATIENT)
Dept: ADMINISTRATIVE | Facility: CLINIC | Age: 75
End: 2025-07-04
Payer: MEDICARE

## 2025-07-04 VITALS
DIASTOLIC BLOOD PRESSURE: 76 MMHG | WEIGHT: 170 LBS | HEART RATE: 65 BPM | OXYGEN SATURATION: 96 % | RESPIRATION RATE: 18 BRPM | TEMPERATURE: 98 F | SYSTOLIC BLOOD PRESSURE: 117 MMHG | HEIGHT: 68 IN | BODY MASS INDEX: 25.76 KG/M2

## 2025-07-04 VITALS
HEART RATE: 77 BPM | BODY MASS INDEX: 25.39 KG/M2 | RESPIRATION RATE: 17 BRPM | DIASTOLIC BLOOD PRESSURE: 85 MMHG | OXYGEN SATURATION: 98 % | TEMPERATURE: 98 F | SYSTOLIC BLOOD PRESSURE: 125 MMHG | WEIGHT: 167 LBS

## 2025-07-04 DIAGNOSIS — T83.091A OBSTRUCTION OF FOLEY CATHETER, INITIAL ENCOUNTER: Primary | ICD-10-CM

## 2025-07-04 DIAGNOSIS — R33.9 URINARY RETENTION: Primary | ICD-10-CM

## 2025-07-04 PROCEDURE — 51798 US URINE CAPACITY MEASURE: CPT

## 2025-07-04 PROCEDURE — 99283 EMERGENCY DEPT VISIT LOW MDM: CPT | Mod: 25,27

## 2025-07-04 PROCEDURE — 99282 EMERGENCY DEPT VISIT SF MDM: CPT

## 2025-07-04 PROCEDURE — 51701 INSERT BLADDER CATHETER: CPT

## 2025-07-04 NOTE — ED PROVIDER NOTES
"SCRIBE #1 NOTE: I, Brenda Nguyen, am scribing for, and in the presence of, Malik Haines MD. I have scribed the entire note.       History     Chief Complaint   Patient presents with    Urinary Retention     Pt c/o urinary retention s/p TURP procedure on 6/30. Pt states he had his catheter removed yesterday and patient reports difficulty voiding since the removal. Pt also c/o bladder pressure.      Review of patient's allergies indicates:   Allergen Reactions    Statins-hmg-coa reductase inhibitors Other (See Comments)     SEVERE MYALGIAS AND GI SIDE EFFECTS         History of Present Illness     HPI    7/4/2025, 4:31 AM  History obtained from the patient and medical records      History of Present Illness: Mekhi Lambert is a 74 y.o. male patient with a PMHx of ALBERTO, anticoagulant long-term use, arthritis, cancer, CHF, CAD, hiatal hernia with GERD, MI, and statin myopathy who presents to the Emergency Department for evaluation of urinary retention which began yesterday at 10:30 after his catheter was removed. He underwent a TURP procedure on 6/30. Symptoms are constant and moderate in severity. No mitigating or exacerbating factors reported. Patient denies any hematuria but says "the tip of my penis is dripping a little blood." No prior Tx specified.  No further complaints or concerns at this time.       Arrival mode: Personal Transportation    PCP: Abhilash Lopez MD        Past Medical History:  Past Medical History:   Diagnosis Date    ALBERTO (acute kidney injury) 08/08/2022    Anticoagulant long-term use     Arthritis     Cancer     Chronic systolic CHF (congestive heart failure) 02/26/2022    Coronary artery disease     Digestive disorder     Hiatal hernia with GERD 6/23/2025    Myocardial infarction     2/20/22    Statin myopathy 05/13/2024       Past Surgical History:  Past Surgical History:   Procedure Laterality Date    CARDIAC CATHETERIZATION      COLONOSCOPY N/A 09/22/2021    Procedure: COLONOSCOPY; "  Surgeon: Meaghan Jaime MD;  Location: [facility] ENDO;  Service: Endoscopy;  Laterality: N/A;    CORONARY ANGIOGRAPHY INCLUDING BYPASS GRAFTS WITH CATHETERIZATION OF LEFT HEART N/A 03/03/2020    Procedure: ANGIOGRAM, CORONARY, INCLUDING BYPASS GRAFT, WITH LEFT HEART CATHETERIZATION;  Surgeon: Ute Melchor MD;  Location: [facility] CATH LAB;  Service: Cardiology;  Laterality: N/A;    JOINT REPLACEMENT Right     knee    KNEE SURGERY      LEFT HEART CATHETERIZATION Left 02/22/2020    Procedure: CATHETERIZATION, HEART, LEFT;  Surgeon: Ute Melchor MD;  Location: [facility] CATH LAB;  Service: Cardiology;  Laterality: Left;    LEFT HEART CATHETERIZATION Left 02/26/2022    Procedure: CATHETERIZATION, HEART, LEFT;  Surgeon: Ute Melchor MD;  Location: [facility] CATH LAB;  Service: Cardiology;  Laterality: Left;    PERCUTANEOUS TRANSLUMINAL BALLOON ANGIOPLASTY OF CORONARY ARTERY Left 02/22/2020    Procedure: Angioplasty-coronary;  Surgeon: Ute Melchor MD;  Location: [facility] CATH LAB;  Service: Cardiology;  Laterality: Left;    PERCUTANEOUS TRANSLUMINAL BALLOON ANGIOPLASTY OF CORONARY ARTERY  02/26/2022    Procedure: Angioplasty-coronary;  Surgeon: Ute Melchor MD;  Location: [facility] CATH LAB;  Service: Cardiology;;    REPAIR, HERNIA, INGUINAL Right 4/5/2024    Procedure: REPAIR, HERNIA, INGUINAL;  Surgeon: Franklin Fuentes MD;  Location: [facility] OR;  Service: General;  Laterality: Right;    RIGHT HEMICOLECTOMY N/A 04/27/2022    Procedure: HEMICOLECTOMY, RIGHT;  Surgeon: See Dominguez MD;  Location: [facility] OR;  Service: General;  Laterality: N/A;    SHOULDER ARTHROSCOPY Right     Dr Bella    SIGMOIDECTOMY      simple prostatectomy  06/06/2016    robotic assisted partial prostectomy due to BPH    TOTAL KNEE ARTHROPLASTY      TRANSURETHRAL RESECTION OF PROSTATE N/A 6/30/2025    Procedure: TURP (TRANSURETHRAL RESECTION OF PROSTATE);  Surgeon: Clarence Harris MD;  Location: [facility] OR;  Service: Urology;  Laterality: N/A;          Family History:  Family History   Problem Relation Name Age of Onset    Heart disease Mother          lupus related    Diabetes Mother      Lupus Mother      Cancer Father          prostate, bone, lung, skin cancers all seperate types    Diabetes Maternal Aunt         Social History:  Social History     Tobacco Use    Smoking status: Never    Smokeless tobacco: Former     Types: Chew     Quit date: 6/3/2004   Substance and Sexual Activity    Alcohol use: Yes     Alcohol/week: 2.0 standard drinks of alcohol     Types: 2 Glasses of wine per week     Comment: per week    Drug use: No    Sexual activity: Yes        Review of Systems     Review of Systems   Constitutional:  Negative for fever.   HENT:  Negative for sore throat.    Respiratory:  Negative for shortness of breath.    Cardiovascular:  Negative for chest pain.   Gastrointestinal:  Negative for nausea.   Genitourinary:  Negative for dysuria and hematuria.        (+) urinary retention    (+) blood dripping from tip of penis   Musculoskeletal:  Negative for back pain.   Skin:  Negative for rash.   Neurological:  Negative for weakness.   Hematological:  Does not bruise/bleed easily.   All other systems reviewed and are negative.       Physical Exam     Initial Vitals [07/04/25 0357]   BP Pulse Resp Temp SpO2   138/88 76 19 98.1 °F (36.7 °C) 95 %      MAP       --          Physical Exam  Nursing Notes and Vital Signs Reviewed.  Constitutional: Patient is in no acute distress. Well-developed and well-nourished.  Head: Atraumatic. Normocephalic.  Eyes: PERRL. EOM intact. Conjunctivae are not pale. No scleral icterus.  ENT: Mucous membranes are moist. Oropharynx is clear and symmetric.    Neck: Supple. Full ROM. No lymphadenopathy.  Cardiovascular: Regular rate. Regular rhythm. No murmurs, rubs, or gallops. Distal pulses are 2+ and symmetric.  Pulmonary/Chest: No respiratory distress. Clear to auscultation bilaterally. No wheezing or rales.  Abdominal: Soft and  "non-distended. There is suprapubic tenderness. No rebound, guarding, or rigidity. Good bowel sounds.  Genitourinary: No CVA tenderness.  Musculoskeletal: Moves all extremities. No obvious deformities. No edema. No calf tenderness.  Skin: Warm and dry.  Neurological: Alert, awake, and appropriate. Normal speech. No acute focal neurological deficits are appreciated.  Psychiatric: Normal affect. Good eye contact. Appropriate in content.     ED Course   Procedures  ED Vital Signs:  Vitals:    07/04/25 0357 07/04/25 0441 07/04/25 0600   BP: 138/88 120/73 117/76   Pulse: 76 65 65   Resp: 19 18 18   Temp: 98.1 °F (36.7 °C)     SpO2: 95% 98% 96%   Weight: 77.1 kg (170 lb)     Height: 5' 8" (1.727 m)         Abnormal Lab Results:  Labs Reviewed - No data to display     All Lab Results:  None.    Imaging Results:  Imaging Results    None            The Emergency Provider reviewed the vital signs and test results, which are outlined above.     ED Discussion         5:41 AM: Reassessed pt at this time. Discussed with patient and/or family/caretaker all pertinent ED information and results. Discussed pt dx and plan of tx. Gave the patient all f/u and return to the ED instructions. All questions and concerns were addressed at this time. Patient and/or family/caretaker expresses understanding of information and instructions, and is comfortable with plan to discharge. Pt is stable for discharge.     I discussed with patient and/or family/caretaker that evaluation in the ED does not suggest any emergent or life threatening medical conditions requiring immediate intervention beyond what was provided in the ED, and I believe patient is safe for discharge. Regardless, an unremarkable evaluation in the ED does not preclude the development or presence of a serious or life threatening condition. As such, I instructed that the patient is to return immediately for any worsening or change in current symptoms.         Medical Decision " Making              ED Medication(s):  Medications - No data to display    Discharge Medication List as of 7/4/2025  5:38 AM           Follow-up Information       Clarence Harris MD In 3 days.    Specialty: Urology  Contact information:  [address] [place] [address]  [phone]               [facility] - Emergency Dept..    Specialty: Emergency Medicine  Why: As needed, If symptoms worsen  Contact information:  [address] [address]  [facility] [address] [phone]                               Scribe Attestation:   Scribe #1: I performed the above scribed service and the documentation accurately describes the services I performed. I attest to the accuracy of the note.     Attending:   Physician Attestation Statement for Scribe #1: I, Malik Haines MD, personally performed the services described in this documentation, as scribed by Brenda Nguyen, in my presence, and it is both accurate and complete.           Clinical Impression       ICD-10-CM ICD-9-CM   1. Urinary retention  R33.9 788.20       Disposition:   Disposition: Discharged  Condition: Stable         Malik Haines MD  07/04/25 2103

## 2025-07-04 NOTE — ED PROVIDER NOTES
SCRIBE #1 NOTE: I, Liset Villalobos, am scribing for, and in the presence of, Arturo Coronado Jr., MD. I have scribed the entire note.       History     Chief Complaint   Patient presents with    Hematuria     Pt's escoto catheter was removed by urology yesterday.  Pt was seen here early this morning due to inability to urinate.  A escoto catheter was placed in this ED, and the patient was discharged.  The patient returns due to hematuria with clots.     Review of patient's allergies indicates:   Allergen Reactions    Statins-hmg-coa reductase inhibitors Other (See Comments)     SEVERE MYALGIAS AND GI SIDE EFFECTS         History of Present Illness     HPI    7/4/2025, 2:45 PM  History obtained from the patient      History of Present Illness: Mekhi Lambert is a 74 y.o. male patient with a PMHx of MI, ALBERTO, and CAD who presents to the Emergency Department for evaluation of hematuria which began about 3 hours ago. Patient had his catheter removed 2 days ago following a TURP procedure, but then started having urinary retention. Patient came to the ED early this morning and was given another catheter. After being discharged, he started having hematuria with clots. Symptoms are constant and moderate in severity. No mitigating or exacerbating factors reported. Associated sxs include dysuria. Patient denies all other symptoms at this time. No prior Tx included. No further complaints or concerns at this time.       Arrival mode: Personal Transportation    PCP: Abhilash Lopez MD        Past Medical History:  Past Medical History:   Diagnosis Date    ALBERTO (acute kidney injury) 08/08/2022    Anticoagulant long-term use     Arthritis     Cancer     Chronic systolic CHF (congestive heart failure) 02/26/2022    Coronary artery disease     Digestive disorder     Hiatal hernia with GERD 6/23/2025    Myocardial infarction     2/20/22    Statin myopathy 05/13/2024       Past Surgical History:  Past Surgical History:   Procedure  Laterality Date    CARDIAC CATHETERIZATION      COLONOSCOPY N/A 09/22/2021    Procedure: COLONOSCOPY;  Surgeon: Meaghan Jaime MD;  Location: [facility] ENDO;  Service: Endoscopy;  Laterality: N/A;    CORONARY ANGIOGRAPHY INCLUDING BYPASS GRAFTS WITH CATHETERIZATION OF LEFT HEART N/A 03/03/2020    Procedure: ANGIOGRAM, CORONARY, INCLUDING BYPASS GRAFT, WITH LEFT HEART CATHETERIZATION;  Surgeon: Ute Melchor MD;  Location: [facility] CATH LAB;  Service: Cardiology;  Laterality: N/A;    JOINT REPLACEMENT Right     knee    KNEE SURGERY      LEFT HEART CATHETERIZATION Left 02/22/2020    Procedure: CATHETERIZATION, HEART, LEFT;  Surgeon: Ute Melchor MD;  Location: [facility] CATH LAB;  Service: Cardiology;  Laterality: Left;    LEFT HEART CATHETERIZATION Left 02/26/2022    Procedure: CATHETERIZATION, HEART, LEFT;  Surgeon: Ute Melchor MD;  Location: [facility] CATH LAB;  Service: Cardiology;  Laterality: Left;    PERCUTANEOUS TRANSLUMINAL BALLOON ANGIOPLASTY OF CORONARY ARTERY Left 02/22/2020    Procedure: Angioplasty-coronary;  Surgeon: Ute Melchor MD;  Location: [facility] CATH LAB;  Service: Cardiology;  Laterality: Left;    PERCUTANEOUS TRANSLUMINAL BALLOON ANGIOPLASTY OF CORONARY ARTERY  02/26/2022    Procedure: Angioplasty-coronary;  Surgeon: Ute Melchor MD;  Location: [facility] CATH LAB;  Service: Cardiology;;    REPAIR, HERNIA, INGUINAL Right 4/5/2024    Procedure: REPAIR, HERNIA, INGUINAL;  Surgeon: Franklin Fuentes MD;  Location: [facility] OR;  Service: General;  Laterality: Right;    RIGHT HEMICOLECTOMY N/A 04/27/2022    Procedure: HEMICOLECTOMY, RIGHT;  Surgeon: See Dominguez MD;  Location: [facility] OR;  Service: General;  Laterality: N/A;    SHOULDER ARTHROSCOPY Right     Dr Bella    SIGMOIDECTOMY      simple prostatectomy  06/06/2016    robotic assisted partial prostectomy due to BPH    TOTAL KNEE ARTHROPLASTY      TRANSURETHRAL RESECTION OF PROSTATE N/A 6/30/2025    Procedure: TURP (TRANSURETHRAL RESECTION OF PROSTATE);   Surgeon: Clarence Harris MD;  Location: [facility];  Service: Urology;  Laterality: N/A;         Family History:  Family History   Problem Relation Name Age of Onset    Heart disease Mother          lupus related    Diabetes Mother      Lupus Mother      Cancer Father          prostate, bone, lung, skin cancers all seperate types    Diabetes Maternal Aunt         Social History:  Social History     Tobacco Use    Smoking status: Never    Smokeless tobacco: Former     Types: Chew     Quit date: 6/3/2004   Substance and Sexual Activity    Alcohol use: Yes     Alcohol/week: 2.0 standard drinks of alcohol     Types: 2 Glasses of wine per week     Comment: per week    Drug use: No    Sexual activity: Yes        Review of Systems     Review of Systems   Constitutional:  Negative for fever.   HENT:  Negative for sore throat.    Respiratory:  Negative for shortness of breath.    Cardiovascular:  Negative for chest pain.   Gastrointestinal:  Negative for nausea.   Genitourinary:  Positive for dysuria and hematuria (with clots).   Musculoskeletal:  Negative for back pain.   Skin:  Negative for rash.   Neurological:  Negative for weakness.   Hematological:  Does not bruise/bleed easily.   All other systems reviewed and are negative.     Physical Exam     Initial Vitals [07/04/25 1427]   BP Pulse Resp Temp SpO2   130/82 74 16 98.3 °F (36.8 °C) 97 %      MAP       --          Physical Exam  Nursing Notes and Vital Signs Reviewed.  Constitutional: Patient is in no acute distress. Well-developed and well-nourished.  Head: Atraumatic. Normocephalic.  Eyes:  EOM intact.  No scleral icterus.  ENT: Mucous membranes are moist.    Neck:  Full ROM.   Pulmonary/Chest: No respiratory distress.  Abdominal: Soft and non-distended.  There is no tenderness.  No rebound, guarding, or rigidity. Good bowel sounds.  Genitourinary: No CVA tenderness.  No suprapubic tenderness.  Catheter in place.  That has some clots in the to  me  Musculoskeletal: Moves all extremities. No obvious deformities.  5 x 5 strength in all extremities   Skin: Warm and dry.  Neurological:  Alert, awake, and appropriate.  Normal speech.     ED Course   Procedures  ED Vital Signs:  Vitals:    07/04/25 1427 07/04/25 1615   BP: 130/82 125/85   Pulse: 74 77   Resp: 16 17   Temp: 98.3 °F (36.8 °C) 98.2 °F (36.8 °C)   TempSrc: Oral Oral   SpO2: 97% 98%   Weight: 75.8 kg (167 lb)        Abnormal Lab Results:  Labs Reviewed - No data to display     All Lab Results:  None.    Imaging Results:  Imaging Results    None          No EKG ordered.           The Emergency Provider reviewed the vital signs and test results, which are outlined above.     ED Discussion     3:49 PM: Reassessed pt at this time. Discussed with patient and/or family/caretaker all pertinent ED information and results. Discussed pt dx and plan of tx. Gave the patient and family all f/u and return to the ED instructions. All questions and concerns were addressed at this time. Patient and/or family/caretaker expresses understanding of information and instructions, and is comfortable with plan to discharge. Pt is stable for discharge.     I discussed with patient and/or family/caretaker that evaluation in the ED does not suggest any emergent or life threatening medical conditions requiring immediate intervention beyond what was provided in the ED, and I believe patient is safe for discharge. Regardless, an unremarkable evaluation in the ED does not preclude the development or presence of a serious or life threatening condition. As such, I instructed that the patient is to return immediately for any worsening or change in current symptoms.       Medical Decision Making  Differential diagnosis: Noland catheter in his function, Noland catheter clot, hematuria, UTI    The patient is with replaced Noland catheter.  Notes no drainage with a past few hours with some mild suprapubic pain.  The patient is has a recent  TURP and had his Noland discontinued yesterday and replaced.  This morning he has been replaced and I that has not draining.  That has flushed clear the patient is draining well.  That has without complaints but that has stable safe for discharge in my opinion    Amount and/or Complexity of Data Reviewed  External Data Reviewed: labs and notes.                ED Medication(s):  Medications - No data to display    Discharge Medication List as of 7/4/2025  3:49 PM           Follow-up Information       Abhilash Lopez MD.    Specialties: Internal Medicine, Pediatrics  Contact information:  [address]  [place] [address]  [phone]                                 Scribe Attestation:   Scribe #1: I performed the above scribed service and the documentation accurately describes the services I performed. I attest to the accuracy of the note.     Attending:   Physician Attestation Statement for Scribe #1: I, Arturo Coronado Jr., MD, personally performed the services described in this documentation, as scribed by Liset Villalobos, in my presence, and it is both accurate and complete.           Clinical Impression       ICD-10-CM ICD-9-CM   1. Obstruction of Noland catheter, initial encounter  T83.091A 996.76       Disposition:   Disposition: Discharged  Condition: Stable       Arturo Coronado Jr., MD  07/04/25 8994

## 2025-07-04 NOTE — TELEPHONE ENCOUNTER
Unable to urinate for 5h. Feeling blocked even after taking in additional fluids. Patient is advised as per protocol.    Reason for Disposition   [1] Unable to urinate (or only a few drops) > 4 hours AND [2] bladder feels very full (e.g., feels blocked with strong urge to urinate; palpable bladder)    Additional Information   Negative: Sounds like a life-threatening emergency to the triager   Negative: Chest pain   Negative: Difficulty breathing   Negative: Acting confused (e.g., disoriented, slurred speech) or excessively sleepy   [1] Pain or burning with passing urine (urination) AND [2] male   Negative: Shock suspected (e.g., cold/pale/clammy skin, too weak to stand, low BP, rapid pulse)   Negative: Sounds like a life-threatening emergency to the triager   Negative: Followed a genital area injury (e.g., penis, scrotum)   Negative: Taking antibiotic for urinary tract infection (UTI)   Negative: Pain in scrotum is main symptom    Protocols used: Post-Op Symptoms and [id number], Urination Pain - Male-A-AH

## 2025-07-04 NOTE — ED NOTES
Noland irrigated with 250cc of sterile water. Dark yellow urine return, no clots noted. Urine now clear yellow and flowing freely into collection bag. Patient tolerated well. Dr. Coronado notified.

## 2025-07-07 ENCOUNTER — PATIENT MESSAGE (OUTPATIENT)
Dept: UROLOGY | Facility: CLINIC | Age: 75
End: 2025-07-07
Payer: MEDICARE

## 2025-07-07 ENCOUNTER — TELEPHONE (OUTPATIENT)
Dept: UROLOGY | Facility: CLINIC | Age: 75
End: 2025-07-07
Payer: MEDICARE

## 2025-07-07 DIAGNOSIS — N40.1 BENIGN PROSTATIC HYPERPLASIA WITH WEAK URINARY STREAM: Primary | ICD-10-CM

## 2025-07-07 DIAGNOSIS — R39.12 BENIGN PROSTATIC HYPERPLASIA WITH WEAK URINARY STREAM: Primary | ICD-10-CM

## 2025-07-07 NOTE — TELEPHONE ENCOUNTER
Called the patient, after verification of name and , the patient was informed  that he will need to have his catheter in for at least 7 days since his catheter. Pt stated that he was doing kenny over all. I explained to the pt that we will not have a provider in office but he can go on Thursday for another voiding trial. Pt stated that he would like to have appt on Thursday. Appt was made. Pt informed of appt time, date and location. Voiced understanding

## 2025-07-10 ENCOUNTER — TELEPHONE (OUTPATIENT)
Dept: UROLOGY | Facility: CLINIC | Age: 75
End: 2025-07-10
Payer: MEDICARE

## 2025-07-10 ENCOUNTER — CLINICAL SUPPORT (OUTPATIENT)
Dept: UROLOGY | Facility: CLINIC | Age: 75
End: 2025-07-10
Payer: MEDICARE

## 2025-07-10 DIAGNOSIS — N40.1 BPH WITH OBSTRUCTION/LOWER URINARY TRACT SYMPTOMS: Primary | ICD-10-CM

## 2025-07-10 DIAGNOSIS — N13.8 BPH WITH OBSTRUCTION/LOWER URINARY TRACT SYMPTOMS: Primary | ICD-10-CM

## 2025-07-10 NOTE — TELEPHONE ENCOUNTER
Patient called and stated that he is leaking and he has urinated a little, patient states he does not feel like he is emptying his bladder completely. I told patient to come back so we could do a bladder scan on him and if he is in retention we will have to place the catheter again. Patient expressed understanding, all questions answered at this time.        Copied from CRM #[id number]. Topic: General Inquiry - Patient Advice  >> Jul 10, 2025  2:11 PM Noemy wrote:  Type:  Needs Medical Advice    Who Called: Mekhi Lambert   Would the patient rather a call back or a response via [facility]? Call back  Best Call Back Number: [phone]  Additional Information: Calling to give a 2 pm update.  >> Jul 10, 2025  2:26 PM Med Assistant Diana wrote:    ----- Message -----  From: Noemy Remy  Sent: 7/10/2025   2:13 PM CDT  To: Kimberly Martinez

## 2025-07-10 NOTE — PROGRESS NOTES
Patient came in this morning for a voiding trial. Per orders of Kendra Vang NP catheter was discontinued and patient was informed that after catheter is taken out he is to go home and drink plenty of fluids. Patient voiced understanding.  Pt came back this afternoon for a PVR. Patients PVR was 56 ml. NP Kendra was informed. And ordered that escoto catheter stays out. Patient left clinic per self ambulatory.

## 2025-07-15 ENCOUNTER — TELEPHONE (OUTPATIENT)
Dept: UROLOGY | Facility: CLINIC | Age: 75
End: 2025-07-15
Payer: MEDICARE

## 2025-07-15 ENCOUNTER — LAB VISIT (OUTPATIENT)
Dept: LAB | Facility: HOSPITAL | Age: 75
End: 2025-07-15
Attending: UROLOGY
Payer: MEDICARE

## 2025-07-15 DIAGNOSIS — N18.31 CHRONIC KIDNEY DISEASE, STAGE 3A: ICD-10-CM

## 2025-07-15 DIAGNOSIS — N13.8 BPH WITH OBSTRUCTION/LOWER URINARY TRACT SYMPTOMS: ICD-10-CM

## 2025-07-15 DIAGNOSIS — N13.8 BPH WITH OBSTRUCTION/LOWER URINARY TRACT SYMPTOMS: Primary | ICD-10-CM

## 2025-07-15 DIAGNOSIS — N40.1 BPH WITH OBSTRUCTION/LOWER URINARY TRACT SYMPTOMS: ICD-10-CM

## 2025-07-15 DIAGNOSIS — N40.1 BPH WITH OBSTRUCTION/LOWER URINARY TRACT SYMPTOMS: Primary | ICD-10-CM

## 2025-07-15 LAB
ALBUMIN/CREAT UR: 116.4 UG/MG
CREAT UR-MCNC: 67 MG/DL (ref 23–375)
MICROALBUMIN UR-MCNC: 78 UG/ML (ref ?–5000)

## 2025-07-15 PROCEDURE — 87077 CULTURE AEROBIC IDENTIFY: CPT

## 2025-07-15 PROCEDURE — 82570 ASSAY OF URINE CREATININE: CPT

## 2025-07-15 PROCEDURE — 87086 URINE CULTURE/COLONY COUNT: CPT | Mod: 91

## 2025-07-15 PROCEDURE — 81003 URINALYSIS AUTO W/O SCOPE: CPT

## 2025-07-16 LAB
BACTERIA #/AREA URNS AUTO: ABNORMAL /HPF
BILIRUB UR QL STRIP.AUTO: NEGATIVE
CLARITY UR: CLEAR
COLOR UR AUTO: YELLOW
GLUCOSE UR QL STRIP: NEGATIVE
HGB UR QL STRIP: ABNORMAL
KETONES UR QL STRIP: NEGATIVE
LEUKOCYTE ESTERASE UR QL STRIP: ABNORMAL
MICROSCOPIC COMMENT: ABNORMAL
NITRITE UR QL STRIP: POSITIVE
PH UR STRIP: 6 [PH]
PROT UR QL STRIP: NEGATIVE
RBC #/AREA URNS AUTO: >100 /HPF (ref 0–4)
SP GR UR STRIP: 1.01
UROBILINOGEN UR STRIP-ACNC: NEGATIVE EU/DL
WBC #/AREA URNS AUTO: >100 /HPF (ref 0–5)

## 2025-07-17 ENCOUNTER — TELEPHONE (OUTPATIENT)
Dept: UROLOGY | Facility: CLINIC | Age: 75
End: 2025-07-17
Payer: MEDICARE

## 2025-07-17 ENCOUNTER — ON-DEMAND VIRTUAL (OUTPATIENT)
Dept: URGENT CARE | Facility: CLINIC | Age: 75
End: 2025-07-17
Payer: MEDICARE

## 2025-07-17 DIAGNOSIS — Z90.79 S/P TURP: ICD-10-CM

## 2025-07-17 DIAGNOSIS — N39.0 URINARY TRACT INFECTION WITHOUT HEMATURIA, SITE UNSPECIFIED: Primary | ICD-10-CM

## 2025-07-17 LAB
BACTERIA UR CULT: ABNORMAL
BACTERIA UR CULT: ABNORMAL

## 2025-07-17 RX ORDER — SULFAMETHOXAZOLE AND TRIMETHOPRIM 800; 160 MG/1; MG/1
1 TABLET ORAL 2 TIMES DAILY
Qty: 14 TABLET | Refills: 0 | Status: SHIPPED | OUTPATIENT
Start: 2025-07-17 | End: 2025-07-24

## 2025-07-17 NOTE — PROGRESS NOTES
Subjective:      Patient ID: Mekhi Lambert is a 75 y.o. male.    Vitals:  vitals were not taken for this visit.     Chief Complaint: Urinary Tract Infection      Visit Type: TELE AUDIOVISUAL    Past Medical History:   Diagnosis Date    ALBERTO (acute kidney injury) 08/08/2022    Anticoagulant long-term use     Arthritis     Cancer     Chronic systolic CHF (congestive heart failure) 02/26/2022    Coronary artery disease     Digestive disorder     Hiatal hernia with GERD 6/23/2025    Myocardial infarction     2/20/22    Statin myopathy 05/13/2024     Past Surgical History:   Procedure Laterality Date    CARDIAC CATHETERIZATION      COLONOSCOPY N/A 09/22/2021    Procedure: COLONOSCOPY;  Surgeon: Meaghan Jaime MD;  Location: [facility] ENDO;  Service: Endoscopy;  Laterality: N/A;    CORONARY ANGIOGRAPHY INCLUDING BYPASS GRAFTS WITH CATHETERIZATION OF LEFT HEART N/A 03/03/2020    Procedure: ANGIOGRAM, CORONARY, INCLUDING BYPASS GRAFT, WITH LEFT HEART CATHETERIZATION;  Surgeon: Ute Melchor MD;  Location: [facility] CATH LAB;  Service: Cardiology;  Laterality: N/A;    JOINT REPLACEMENT Right     knee    KNEE SURGERY      LEFT HEART CATHETERIZATION Left 02/22/2020    Procedure: CATHETERIZATION, HEART, LEFT;  Surgeon: tUe Melchor MD;  Location: [facility] CATH LAB;  Service: Cardiology;  Laterality: Left;    LEFT HEART CATHETERIZATION Left 02/26/2022    Procedure: CATHETERIZATION, HEART, LEFT;  Surgeon: Ute Melchor MD;  Location: [facility] CATH LAB;  Service: Cardiology;  Laterality: Left;    PERCUTANEOUS TRANSLUMINAL BALLOON ANGIOPLASTY OF CORONARY ARTERY Left 02/22/2020    Procedure: Angioplasty-coronary;  Surgeon: Ute Melchor MD;  Location: [facility] CATH LAB;  Service: Cardiology;  Laterality: Left;    PERCUTANEOUS TRANSLUMINAL BALLOON ANGIOPLASTY OF CORONARY ARTERY  02/26/2022    Procedure: Angioplasty-coronary;  Surgeon: Ute Melchor MD;  Location: [facility] CATH LAB;  Service: Cardiology;;    REPAIR, HERNIA, INGUINAL Right  4/5/2024    Procedure: REPAIR, HERNIA, INGUINAL;  Surgeon: Franklin Fuentes MD;  Location: [facility] OR;  Service: General;  Laterality: Right;    RIGHT HEMICOLECTOMY N/A 04/27/2022    Procedure: HEMICOLECTOMY, RIGHT;  Surgeon: See Dominguez MD;  Location: [facility] OR;  Service: General;  Laterality: N/A;    SHOULDER ARTHROSCOPY Right     Dr Bella    SIGMOIDECTOMY      simple prostatectomy  06/06/2016    robotic assisted partial prostectomy due to BPH    TOTAL KNEE ARTHROPLASTY      TRANSURETHRAL RESECTION OF PROSTATE N/A 6/30/2025    Procedure: TURP (TRANSURETHRAL RESECTION OF PROSTATE);  Surgeon: Clarence Harris MD;  Location: [facility] OR;  Service: Urology;  Laterality: N/A;     Review of patient's allergies indicates:   Allergen Reactions    Statins-hmg-coa reductase inhibitors Other (See Comments)     SEVERE MYALGIAS AND GI SIDE EFFECTS     Medications Ordered Prior to Encounter[1]  Family History   Problem Relation Name Age of Onset    Heart disease Mother          lupus related    Diabetes Mother      Lupus Mother      Cancer Father          prostate, bone, lung, skin cancers all seperate types    Diabetes Maternal Aunt         Medications Ordered                [id number] [place] - [address]   [address] [address]    Telephone: [phone]   Fax: [phone]   Hours: Not open 24 hours                         E-Prescribed (1 of 1)              sulfamethoxazole-trimethoprim 800-160mg (BACTRIM DS) 800-160 mg Tab    Sig: Take 1 tablet by mouth 2 (two) times daily. for 7 days       Start: 7/17/25     Quantity: 14 tablet Refills: 0                           Ohs Peq Odvv Intake    7/17/2025  1:26 PM CDT - Filed by Patient   What is your [address] physical [address] in the event of a medical emergency[place] [address] [address]   Are you able to take your vital signs? Yes   Systolic Blood Pressure: 109   Diastolic Blood Pressure: 67   Weight: 168   Height: 68   Pulse: 58    Temperature: 97.5   Respiration rate: 55   Pulse Oxygen: 98   Please attach any relevant images or files    Is your employer contracted with [facility]? No         Presents with UTI, culture growing coag neg staph.  Reports that he has tried reaching out to his urology team all morning but has not heard back from them.  He is 9 days s/p turp. Reports that he had a staph infection prior to surgery for which he was treated with doxycycline.  Had repeat UA which demonstrated that infection had cleared.  He is afebrile, but having difficulty voiding.    Two patient identifiers were used-name was repeated verbally as well as date of birth.  The patient was located in their home in the [facility].          Genitourinary:  Positive for urine decreased.        Objective:   The physical exam was conducted virtually.  Physical Exam   Constitutional: He is oriented to person, place, and time. No distress.   HENT:   Head: Normocephalic and atraumatic.   Neck: Neck supple.   Pulmonary/Chest: Effort normal. No respiratory distress.   Abdominal: Normal appearance.   Musculoskeletal: Normal range of motion.         General: Normal range of motion.   Neurological: no focal deficit. He is alert and oriented to person, place, and time.   Skin: Skin is not pale.   Psychiatric: His behavior is normal. Mood, judgment and thought content normal.      Comments: Upset demeanor       Assessment:     1. Urinary tract infection without hematuria, site unspecified    2. S/P TURP        Plan:       Urinary tract infection without hematuria, site unspecified    S/P TURP    Other orders  -     sulfamethoxazole-trimethoprim 800-160mg (BACTRIM DS) 800-160 mg Tab; Take 1 tablet by mouth 2 (two) times daily. for 7 days  Dispense: 14 tablet; Refill: 0    Meds as above per up to date recommendation.  ?Staphylococcus - Oral options include trimethoprim-sulfamethoxazole (one double-strength [160 mg/800 mg] tablet orally twice daily)  or, for methicillin-susceptible Staphylococcus species, cefadroxil (500 mg twice daily). S. aureus is a relatively uncommon urinary isolate.  Latest lab reviewed with normal renal function noted (7/1 creat 1.1)    Will message his urology team regarding this visit.    To ER if unable to void or if fever develops.      Thank you for choosing [facility]!     Our goal in the [facility] is to always provide outstanding medical care. You may receive a survey by mail or e-mail in the next week regarding your experience today. We would greatly appreciate you completing and returning the survey. Your feedback provides us with a way to recognize our staff who provide very good care, and it helps us learn how to improve when your experience was below our aspiration of excellence.          We appreciate you trusting us with your medical care. We hope you feel better soon. We will be happy to take care of you for all of your future medical needs.     You must understand that you've received an Urgent Care treatment only and that you may be released before all your medical problems are known or treated. You, the patient, will arrange for follow up care as instructed.     Follow up with your PCP or specialty clinic as directed in the next 1-2 weeks if not improved or as needed.  You can call [phone] to schedule an appointment with the appropriate provider.     If your condition worsens we recommend that you receive another evaluation in person, with your primary care provider, urgent care or at the emergency room immediately or contact your primary medical clinics after hours call service to discuss your concerns.          Present with the patient at the time of consultation: TELEMED PRESENT WITH PATIENT: None    Time spent on this visit 31 minutes.       [1]   Current Outpatient Medications on File Prior to Visit   Medication Sig Dispense Refill    acetaminophen (TYLENOL) 500 MG  tablet Take 500 mg by mouth every 6 (six) hours as needed for Pain.      alfuzosin (UROXATRAL) 10 mg Tb24 Take 1 tablet (10 mg total) by mouth daily with breakfast. 30 tablet 11    b complex vitamins capsule Take 1 capsule by mouth once daily.      cholecalciferol, vitamin D3, (VITAMIN D3) 25 mcg (1,000 unit) capsule Take 1,000 Units by mouth once daily.      doxycycline (VIBRAMYCIN) 100 MG Cap Take 1 capsule (100 mg total) by mouth every 12 (twelve) hours. (Patient not taking: Reported on 6/23/2025) 14 capsule 0    magnesium glycinate 100 mg magnesium Cap Take 100 mg by mouth daily as needed.      metoprolol succinate (TOPROL-XL) 25 MG 24 hr tablet TAKE 1/2 TABLET BY MOUTH EVERY EVENING 45 tablet 1    mometasone 0.1% (ELOCON) 0.1 % cream AAA bid prn for psoriasis. Strong steroid.  Do not use on face, underarms or groin. 50 g 1    oxyCODONE (ROXICODONE) 5 MG immediate release tablet Take 1 tablet (5 mg total) by mouth every 6 (six) hours as needed for Pain. 15 tablet 0    pantoprazole (PROTONIX) 20 MG tablet Take 1 tablet (20 mg total) by mouth once daily. 30 tablet 11     No current facility-administered medications on file prior to visit.

## 2025-07-17 NOTE — TELEPHONE ENCOUNTER
Received call from pt; he was upset and said that he has been waiting from someone from our office to call him as a result of his recent hospitalization.  States he has a staph infection as a result of his most recent surgery.  I told pt that I would speak to Dr. Harris and call him back.  I called pt back and told him that Dr. Harris looked into his chart and saw that the NP sent in Bactrim for him and that he should followup with us in 3 months. Patient was very upset and demanded that Dr. Harris call him.  Ms sent to Dr. Harris.

## 2025-07-17 NOTE — TELEPHONE ENCOUNTER
Called patient no answer LVM         Copied from CRM #[id number]. Topic: General Inquiry - Patient Advice  >> Jul 17, 2025  1:08 PM Kristan wrote:  Type:  Needs Medical Advice    Who Called: Mekhi Lambert  Would the patient rather a call back or a response via [facility]? Call back  Best Call Back Number: [phone]  Additional Information: the patient would like a call as soon as possible. He states that no one has gotten back with him. This is concerning a internal saff infection that was found in his urinalysis. He is very upset.

## 2025-07-17 NOTE — TELEPHONE ENCOUNTER
Called patient no answer LVM to return my call.         ----- Message from TONY Campos sent at 7/17/2025  2:24 PM CDT -----  Regarding: UTI  Hello,  Patient presented today for urgent care visit.  States that he has tried today to reach you all, but no one has gotten back with him, so decided to do virtual urgent care visit.  Urine culture obtained 7/15 coag neg staph.  I have started him on a course of bactrim ds.  Please reach out to him as soon as you are able.  Thank you,  Talisha Edward NP

## 2025-07-18 ENCOUNTER — CLINICAL SUPPORT (OUTPATIENT)
Dept: UROLOGY | Facility: CLINIC | Age: 75
End: 2025-07-18
Payer: MEDICARE

## 2025-07-18 DIAGNOSIS — N13.8 BPH WITH OBSTRUCTION/LOWER URINARY TRACT SYMPTOMS: Primary | ICD-10-CM

## 2025-07-18 DIAGNOSIS — N40.1 BPH WITH OBSTRUCTION/LOWER URINARY TRACT SYMPTOMS: Primary | ICD-10-CM

## 2025-07-18 LAB — POC RESIDUAL URINE VOLUME: 54 ML (ref 0–100)

## 2025-07-18 NOTE — PROGRESS NOTES
Patient came in for PVR and possible catheter placement per Dr. Harris, PVR 54 after voiding and no catheter needed but Dr. Harris notified pt stated he has leakage at times and Dr. Harris stated notify patient he will order additional days for the antibiotic, patient verbalized understanding.

## 2025-07-21 RX ORDER — SULFAMETHOXAZOLE AND TRIMETHOPRIM 800; 160 MG/1; MG/1
1 TABLET ORAL 2 TIMES DAILY
Qty: 14 TABLET | Refills: 0 | Status: SHIPPED | OUTPATIENT
Start: 2025-07-21 | End: 2025-07-28

## 2025-07-23 ENCOUNTER — LAB VISIT (OUTPATIENT)
Dept: LAB | Facility: HOSPITAL | Age: 75
End: 2025-07-23
Attending: UROLOGY
Payer: MEDICARE

## 2025-07-23 DIAGNOSIS — N40.1 BENIGN PROSTATIC HYPERPLASIA WITH WEAK URINARY STREAM: ICD-10-CM

## 2025-07-23 DIAGNOSIS — R39.12 BENIGN PROSTATIC HYPERPLASIA WITH WEAK URINARY STREAM: ICD-10-CM

## 2025-07-23 LAB
CREAT SERPL-MCNC: 1.6 MG/DL (ref 0.5–1.4)
GFR SERPLBLD CREATININE-BSD FMLA CKD-EPI: 45 ML/MIN/1.73/M2

## 2025-07-23 PROCEDURE — 36415 COLL VENOUS BLD VENIPUNCTURE: CPT | Mod: PN

## 2025-07-23 PROCEDURE — 82565 ASSAY OF CREATININE: CPT

## 2025-07-24 ENCOUNTER — HOSPITAL ENCOUNTER (OUTPATIENT)
Dept: RADIOLOGY | Facility: HOSPITAL | Age: 75
Discharge: HOME | End: 2025-07-24
Attending: UROLOGY
Payer: MEDICARE

## 2025-07-24 ENCOUNTER — TELEPHONE (OUTPATIENT)
Dept: UROLOGY | Facility: CLINIC | Age: 75
End: 2025-07-24
Payer: MEDICARE

## 2025-07-24 ENCOUNTER — PATIENT MESSAGE (OUTPATIENT)
Dept: UROLOGY | Facility: CLINIC | Age: 75
End: 2025-07-24
Payer: MEDICARE

## 2025-07-24 DIAGNOSIS — R39.12 BENIGN PROSTATIC HYPERPLASIA WITH WEAK URINARY STREAM: ICD-10-CM

## 2025-07-24 DIAGNOSIS — N40.1 BENIGN PROSTATIC HYPERPLASIA WITH WEAK URINARY STREAM: ICD-10-CM

## 2025-07-24 PROCEDURE — 74178 CT ABD&PLV WO CNTR FLWD CNTR: CPT | Mod: 26,,, | Performed by: RADIOLOGY

## 2025-07-24 PROCEDURE — 74178 CT ABD&PLV WO CNTR FLWD CNTR: CPT | Mod: TC,PN

## 2025-07-24 PROCEDURE — 25500020 PHARM REV CODE 255: Mod: PN | Performed by: UROLOGY

## 2025-07-24 RX ADMIN — IOHEXOL 100 ML: 350 INJECTION, SOLUTION INTRAVENOUS at 09:07

## 2025-07-24 NOTE — TELEPHONE ENCOUNTER
Patient went to get his CT done with radiology.       Copied from CRM #[id number]. Topic: General Inquiry - Patient Advice  >> Jul 24, 2025  7:11 AM Kristan wrote:  Type:  Needs Medical Advice    Who Called: Mekhi Lambert    Would the patient rather a call back or a response via [facility]? either  Best Call Back Number: [phone]  Additional Information: the patient was calling to see if his abnormal blood work from yesterday will affect his CT this morning.

## 2025-07-26 ENCOUNTER — NURSE TRIAGE (OUTPATIENT)
Dept: ADMINISTRATIVE | Facility: CLINIC | Age: 75
End: 2025-07-26
Payer: MEDICARE

## 2025-07-27 ENCOUNTER — HOSPITAL ENCOUNTER (INPATIENT)
Facility: HOSPITAL | Age: 75
LOS: 9 days | Discharge: HOME | DRG: 987 | End: 2025-08-05
Admitting: HOSPITALIST
Payer: MEDICARE

## 2025-07-27 DIAGNOSIS — Q24.9 CARDIAC ABNORMALITY: ICD-10-CM

## 2025-07-27 DIAGNOSIS — I48.0 PAF (PAROXYSMAL ATRIAL FIBRILLATION): ICD-10-CM

## 2025-07-27 DIAGNOSIS — Z90.79 S/P TURP: ICD-10-CM

## 2025-07-27 DIAGNOSIS — N39.0 URINARY TRACT INFECTION WITHOUT HEMATURIA, SITE UNSPECIFIED: ICD-10-CM

## 2025-07-27 DIAGNOSIS — I48.91 ATRIAL FIBRILLATION: ICD-10-CM

## 2025-07-27 DIAGNOSIS — Z86.73 HX-TIA (TRANSIENT ISCHEMIC ATTACK): Chronic | ICD-10-CM

## 2025-07-27 DIAGNOSIS — R74.01 TRANSAMINITIS: ICD-10-CM

## 2025-07-27 DIAGNOSIS — Z13.6 SCREENING FOR CARDIOVASCULAR CONDITION: ICD-10-CM

## 2025-07-27 DIAGNOSIS — R31.0 GROSS HEMATURIA: ICD-10-CM

## 2025-07-27 DIAGNOSIS — I48.91 ATRIAL FIBRILLATION WITH RVR: Primary | ICD-10-CM

## 2025-07-27 DIAGNOSIS — I48.91 A-FIB: ICD-10-CM

## 2025-07-27 DIAGNOSIS — R07.9 CHEST PAIN: ICD-10-CM

## 2025-07-27 DIAGNOSIS — I50.22 CHRONIC SYSTOLIC CHF (CONGESTIVE HEART FAILURE): ICD-10-CM

## 2025-07-27 DIAGNOSIS — R79.89 ELEVATED BRAIN NATRIURETIC PEPTIDE (BNP) LEVEL: ICD-10-CM

## 2025-07-27 DIAGNOSIS — I25.118 CORONARY ARTERY DISEASE OF NATIVE ARTERY OF NATIVE HEART WITH STABLE ANGINA PECTORIS: Chronic | ICD-10-CM

## 2025-07-27 DIAGNOSIS — R00.2 HEART PALPITATIONS: ICD-10-CM

## 2025-07-27 DIAGNOSIS — R79.89 ELEVATED TROPONIN: ICD-10-CM

## 2025-07-27 DIAGNOSIS — I50.42 CHRONIC COMBINED SYSTOLIC AND DIASTOLIC CONGESTIVE HEART FAILURE: Chronic | ICD-10-CM

## 2025-07-27 DIAGNOSIS — I48.91 ATRIAL FIBRILLATION, UNSPECIFIED TYPE: ICD-10-CM

## 2025-07-27 PROBLEM — N40.1 BENIGN PROSTATIC HYPERPLASIA WITH WEAK URINARY STREAM: Chronic | Status: ACTIVE | Noted: 2025-06-30

## 2025-07-27 PROBLEM — R39.12 BENIGN PROSTATIC HYPERPLASIA WITH WEAK URINARY STREAM: Chronic | Status: ACTIVE | Noted: 2025-06-30

## 2025-07-27 PROBLEM — E78.00 HYPERCHOLESTEROLEMIA: Chronic | Status: ACTIVE | Noted: 2017-02-21

## 2025-07-27 LAB
ABSOLUTE EOSINOPHIL (OHS): 0.02 K/UL
ABSOLUTE EOSINOPHIL (OHS): 0.04 K/UL
ABSOLUTE EOSINOPHIL (OHS): 0.06 K/UL
ABSOLUTE MONOCYTE (OHS): 0.99 K/UL (ref 0.3–1)
ABSOLUTE MONOCYTE (OHS): 1.03 K/UL (ref 0.3–1)
ABSOLUTE MONOCYTE (OHS): 1.16 K/UL (ref 0.3–1)
ABSOLUTE NEUTROPHIL COUNT (OHS): 6.91 K/UL (ref 1.8–7.7)
ABSOLUTE NEUTROPHIL COUNT (OHS): 6.93 K/UL (ref 1.8–7.7)
ABSOLUTE NEUTROPHIL COUNT (OHS): 7.82 K/UL (ref 1.8–7.7)
ALBUMIN SERPL BCP-MCNC: 2.4 G/DL (ref 3.5–5.2)
ALBUMIN SERPL BCP-MCNC: 2.9 G/DL (ref 3.5–5.2)
ALBUMIN SERPL BCP-MCNC: 3.6 G/DL (ref 3.5–5.2)
ALP SERPL-CCNC: 65 UNIT/L (ref 40–150)
ALP SERPL-CCNC: 77 UNIT/L (ref 40–150)
ALP SERPL-CCNC: 93 UNIT/L (ref 40–150)
ALT SERPL W/O P-5'-P-CCNC: 51 UNIT/L (ref 10–44)
ALT SERPL W/O P-5'-P-CCNC: 59 UNIT/L (ref 10–44)
ALT SERPL W/O P-5'-P-CCNC: 75 UNIT/L (ref 10–44)
ANION GAP (OHS): 6 MMOL/L (ref 8–16)
ANION GAP (OHS): 7 MMOL/L (ref 8–16)
ANION GAP (OHS): 8 MMOL/L (ref 8–16)
APTT PPP: 25.6 SECONDS (ref 21–32)
AST SERPL-CCNC: 50 UNIT/L (ref 11–45)
AST SERPL-CCNC: 52 UNIT/L (ref 11–45)
AST SERPL-CCNC: 87 UNIT/L (ref 11–45)
AV INDEX (PROSTH): 1.1
AV MEAN GRADIENT: 1 MMHG
AV PEAK GRADIENT: 2 MMHG
AV VALVE AREA BY VELOCITY RATIO: 2.2 CM²
AV VALVE AREA: 3.5 CM²
AV VELOCITY RATIO: 0.71
BACTERIA #/AREA URNS AUTO: ABNORMAL /HPF
BASOPHILS # BLD AUTO: 0.05 K/UL
BASOPHILS NFR BLD AUTO: 0.5 %
BASOPHILS NFR BLD AUTO: 0.5 %
BASOPHILS NFR BLD AUTO: 0.6 %
BILIRUB SERPL-MCNC: 0.5 MG/DL (ref 0.1–1)
BILIRUB SERPL-MCNC: 0.7 MG/DL (ref 0.1–1)
BILIRUB SERPL-MCNC: 0.9 MG/DL (ref 0.1–1)
BILIRUB UR QL STRIP.AUTO: NEGATIVE
BSA FOR ECHO PROCEDURE: 1.91 M2
BUN SERPL-MCNC: 12 MG/DL (ref 8–23)
BUN SERPL-MCNC: 14 MG/DL (ref 8–23)
BUN SERPL-MCNC: 17 MG/DL (ref 8–23)
CALCIUM SERPL-MCNC: 6.7 MG/DL (ref 8.7–10.5)
CALCIUM SERPL-MCNC: 7.7 MG/DL (ref 8.7–10.5)
CALCIUM SERPL-MCNC: 9 MG/DL (ref 8.7–10.5)
CHLORIDE SERPL-SCNC: 103 MMOL/L (ref 95–110)
CHLORIDE SERPL-SCNC: 108 MMOL/L (ref 95–110)
CHLORIDE SERPL-SCNC: 114 MMOL/L (ref 95–110)
CLARITY UR: CLEAR
CO2 SERPL-SCNC: 18 MMOL/L (ref 23–29)
CO2 SERPL-SCNC: 20 MMOL/L (ref 23–29)
CO2 SERPL-SCNC: 25 MMOL/L (ref 23–29)
COLOR UR AUTO: COLORLESS
CREAT SERPL-MCNC: 0.9 MG/DL (ref 0.5–1.4)
CREAT SERPL-MCNC: 1.1 MG/DL (ref 0.5–1.4)
CREAT SERPL-MCNC: 1.3 MG/DL (ref 0.5–1.4)
CV ECHO LV RWT: 0.31 CM
DOP CALC AO PEAK VEL: 0.7 M/S
DOP CALC AO VTI: 6 CM
DOP CALC LVOT AREA: 3.1 CM2
DOP CALC LVOT DIAMETER: 2 CM
DOP CALC LVOT PEAK VEL: 0.5 M/S
DOP CALC LVOT STROKE VOLUME: 20.7 CM3
DOP CALCLVOT PEAK VEL VTI: 6.6 CM
E WAVE DECELERATION TIME: 179 MSEC
E/E' RATIO: 12 M/S
ECHO LV POSTERIOR WALL: 1 CM (ref 0.6–1.1)
ERYTHROCYTE [DISTWIDTH] IN BLOOD BY AUTOMATED COUNT: 13.2 % (ref 11.5–14.5)
ERYTHROCYTE [DISTWIDTH] IN BLOOD BY AUTOMATED COUNT: 13.3 % (ref 11.5–14.5)
ERYTHROCYTE [DISTWIDTH] IN BLOOD BY AUTOMATED COUNT: 13.3 % (ref 11.5–14.5)
FRACTIONAL SHORTENING: 9.4 % (ref 28–44)
GFR SERPLBLD CREATININE-BSD FMLA CKD-EPI: 57 ML/MIN/1.73/M2
GFR SERPLBLD CREATININE-BSD FMLA CKD-EPI: >60 ML/MIN/1.73/M2
GFR SERPLBLD CREATININE-BSD FMLA CKD-EPI: >60 ML/MIN/1.73/M2
GLUCOSE SERPL-MCNC: 116 MG/DL (ref 70–110)
GLUCOSE SERPL-MCNC: 131 MG/DL (ref 70–110)
GLUCOSE SERPL-MCNC: 99 MG/DL (ref 70–110)
GLUCOSE UR QL STRIP: NEGATIVE
HCT VFR BLD AUTO: 29.2 % (ref 40–54)
HCT VFR BLD AUTO: 36.4 % (ref 40–54)
HCT VFR BLD AUTO: 41.2 % (ref 40–54)
HCV AB SERPL QL IA: NORMAL
HGB BLD-MCNC: 12.6 GM/DL (ref 14–18)
HGB BLD-MCNC: 14.4 GM/DL (ref 14–18)
HGB BLD-MCNC: 9.9 GM/DL (ref 14–18)
HGB UR QL STRIP: ABNORMAL
HIV 1+2 AB+HIV1 P24 AG SERPL QL IA: NORMAL
IMM GRANULOCYTES # BLD AUTO: 0.03 K/UL (ref 0–0.04)
IMM GRANULOCYTES # BLD AUTO: 0.03 K/UL (ref 0–0.04)
IMM GRANULOCYTES # BLD AUTO: 0.04 K/UL (ref 0–0.04)
IMM GRANULOCYTES NFR BLD AUTO: 0.3 % (ref 0–0.5)
IMM GRANULOCYTES NFR BLD AUTO: 0.3 % (ref 0–0.5)
IMM GRANULOCYTES NFR BLD AUTO: 0.4 % (ref 0–0.5)
INFLUENZA A MOLECULAR (OHS): NEGATIVE
INFLUENZA B MOLECULAR (OHS): NEGATIVE
INR PPP: 1 (ref 0.8–1.2)
INTERVENTRICULAR SEPTUM: 1 CM (ref 0.6–1.1)
IVC DIAMETER: 1.13 CM
IVRT: 76 MSEC
KETONES UR QL STRIP: NEGATIVE
LA MAJOR: 4.8 CM
LA MINOR: 4.8 CM
LACTATE SERPL-SCNC: 1.3 MMOL/L (ref 0.5–2.2)
LACTATE SERPL-SCNC: 1.3 MMOL/L (ref 0.5–2.2)
LEFT ATRIUM SIZE: 3.7 CM
LEFT INTERNAL DIMENSION IN SYSTOLE: 5.8 CM (ref 2.1–4)
LEFT VENTRICLE DIASTOLIC VOLUME INDEX: 108.95 ML/M2
LEFT VENTRICLE DIASTOLIC VOLUME: 207 ML
LEFT VENTRICLE MASS INDEX: 145.1 G/M2
LEFT VENTRICLE SYSTOLIC VOLUME INDEX: 85.8 ML/M2
LEFT VENTRICLE SYSTOLIC VOLUME: 163 ML
LEFT VENTRICULAR INTERNAL DIMENSION IN DIASTOLE: 6.4 CM (ref 3.5–6)
LEFT VENTRICULAR MASS: 275.6 G
LEUKOCYTE ESTERASE UR QL STRIP: ABNORMAL
LV LATERAL E/E' RATIO: 10.7 M/S
LV SEPTAL E/E' RATIO: 12.8 M/S
LVED V (TEICH): 207.38 ML
LVES V (TEICH): 163.17 ML
LVOT MG: 0.57 MMHG
LVOT MV: 0.35 CM/S
LYMPHOCYTES # BLD AUTO: 0.86 K/UL (ref 1–4.8)
LYMPHOCYTES # BLD AUTO: 1.04 K/UL (ref 1–4.8)
LYMPHOCYTES # BLD AUTO: 1.05 K/UL (ref 1–4.8)
MAGNESIUM SERPL-MCNC: 1.9 MG/DL (ref 1.6–2.6)
MCH RBC QN AUTO: 30.5 PG (ref 27–31)
MCH RBC QN AUTO: 30.6 PG (ref 27–31)
MCH RBC QN AUTO: 30.6 PG (ref 27–31)
MCHC RBC AUTO-ENTMCNC: 33.9 G/DL (ref 32–36)
MCHC RBC AUTO-ENTMCNC: 34.6 G/DL (ref 32–36)
MCHC RBC AUTO-ENTMCNC: 35 G/DL (ref 32–36)
MCV RBC AUTO: 88 FL (ref 82–98)
MCV RBC AUTO: 88 FL (ref 82–98)
MCV RBC AUTO: 90 FL (ref 82–98)
MICROSCOPIC COMMENT: ABNORMAL
MV PEAK E VEL: 0.64 M/S
NITRITE UR QL STRIP: NEGATIVE
NT-PROBNP SERPL-MCNC: ABNORMAL PG/ML
NUCLEATED RBC (/100WBC) (OHS): 0 /100 WBC
OHS CV CPX PATIENT HEIGHT IN: 68
OHS QRS DURATION: 100 MS
OHS QTC CALCULATION: 445 MS
PH UR STRIP: 7 [PH]
PLATELET # BLD AUTO: 135 K/UL (ref 150–450)
PLATELET # BLD AUTO: 145 K/UL (ref 150–450)
PLATELET # BLD AUTO: 153 K/UL (ref 150–450)
PMV BLD AUTO: 10 FL (ref 9.2–12.9)
PMV BLD AUTO: 10.1 FL (ref 9.2–12.9)
PMV BLD AUTO: 9.8 FL (ref 9.2–12.9)
POTASSIUM SERPL-SCNC: 4 MMOL/L (ref 3.5–5.1)
POTASSIUM SERPL-SCNC: 4.3 MMOL/L (ref 3.5–5.1)
POTASSIUM SERPL-SCNC: 4.8 MMOL/L (ref 3.5–5.1)
PROCALCITONIN SERPL-MCNC: 0.22 NG/ML
PROT SERPL-MCNC: 4.3 GM/DL (ref 6–8.4)
PROT SERPL-MCNC: 5.2 GM/DL (ref 6–8.4)
PROT SERPL-MCNC: 6.4 GM/DL (ref 6–8.4)
PROT UR QL STRIP: NEGATIVE
PROTHROMBIN TIME: 11.3 SECONDS (ref 9–12.5)
PV MV: 0.44 M/S
PV PEAK GRADIENT: 1 MMHG
PV PEAK VELOCITY: 0.59 M/S
RA MAJOR: 5.52 CM
RA PRESSURE ESTIMATED: 3 MMHG
RBC # BLD AUTO: 3.25 M/UL (ref 4.6–6.2)
RBC # BLD AUTO: 4.12 M/UL (ref 4.6–6.2)
RBC # BLD AUTO: 4.71 M/UL (ref 4.6–6.2)
RBC #/AREA URNS AUTO: 1 /HPF (ref 0–4)
RELATIVE EOSINOPHIL (OHS): 0.2 %
RELATIVE EOSINOPHIL (OHS): 0.4 %
RELATIVE EOSINOPHIL (OHS): 0.7 %
RELATIVE LYMPHOCYTE (OHS): 10.2 % (ref 18–48)
RELATIVE LYMPHOCYTE (OHS): 11.5 % (ref 18–48)
RELATIVE LYMPHOCYTE (OHS): 9.7 % (ref 18–48)
RELATIVE MONOCYTE (OHS): 11.1 % (ref 4–15)
RELATIVE MONOCYTE (OHS): 11.3 % (ref 4–15)
RELATIVE MONOCYTE (OHS): 11.4 % (ref 4–15)
RELATIVE NEUTROPHIL (OHS): 75.7 % (ref 38–73)
RELATIVE NEUTROPHIL (OHS): 77.1 % (ref 38–73)
RELATIVE NEUTROPHIL (OHS): 78.1 % (ref 38–73)
SARS-COV-2 RDRP RESP QL NAA+PROBE: NEGATIVE
SODIUM SERPL-SCNC: 135 MMOL/L (ref 136–145)
SODIUM SERPL-SCNC: 136 MMOL/L (ref 136–145)
SODIUM SERPL-SCNC: 138 MMOL/L (ref 136–145)
SP GR UR STRIP: 1
STJ: 2.9 CM
TDI LATERAL: 0.06 M/S
TDI SEPTAL: 0.05 M/S
TDI: 0.06 M/S
TROPONIN I SERPL HS-MCNC: 29 NG/L
TROPONIN I SERPL HS-MCNC: 38 NG/L
UROBILINOGEN UR STRIP-ACNC: NEGATIVE EU/DL
VANCOMYCIN SERPL-MCNC: 4.5 UG/ML (ref ?–80)
WBC # BLD AUTO: 10.15 K/UL (ref 3.9–12.7)
WBC # BLD AUTO: 8.88 K/UL (ref 3.9–12.7)
WBC # BLD AUTO: 9.13 K/UL (ref 3.9–12.7)
WBC #/AREA URNS AUTO: 16 /HPF (ref 0–5)
Z-SCORE OF LEFT VENTRICULAR DIMENSION IN END DIASTOLE: 1.83
Z-SCORE OF LEFT VENTRICULAR DIMENSION IN END SYSTOLE: 4.49

## 2025-07-27 PROCEDURE — 21400001 HC TELEMETRY ROOM

## 2025-07-27 PROCEDURE — 87086 URINE CULTURE/COLONY COUNT: CPT

## 2025-07-27 PROCEDURE — 36415 COLL VENOUS BLD VENIPUNCTURE: CPT

## 2025-07-27 PROCEDURE — 85730 THROMBOPLASTIN TIME PARTIAL: CPT

## 2025-07-27 PROCEDURE — 25000003 PHARM REV CODE 250

## 2025-07-27 PROCEDURE — 63600175 PHARM REV CODE 636 W HCPCS: Performed by: INTERNAL MEDICINE

## 2025-07-27 PROCEDURE — 87389 HIV-1 AG W/HIV-1&-2 AB AG IA: CPT

## 2025-07-27 PROCEDURE — 85025 COMPLETE CBC W/AUTO DIFF WBC: CPT

## 2025-07-27 PROCEDURE — 99024 POSTOP FOLLOW-UP VISIT: CPT | Mod: POP,,, | Performed by: UROLOGY

## 2025-07-27 PROCEDURE — 63600175 PHARM REV CODE 636 W HCPCS

## 2025-07-27 PROCEDURE — U0002 COVID-19 LAB TEST NON-CDC: HCPCS

## 2025-07-27 PROCEDURE — 81001 URINALYSIS AUTO W/SCOPE: CPT

## 2025-07-27 PROCEDURE — 93010 ELECTROCARDIOGRAM REPORT: CPT | Mod: ,,, | Performed by: INTERNAL MEDICINE

## 2025-07-27 PROCEDURE — 25000003 PHARM REV CODE 250: Performed by: FAMILY MEDICINE

## 2025-07-27 PROCEDURE — 93005 ELECTROCARDIOGRAM TRACING: CPT

## 2025-07-27 PROCEDURE — 99223 1ST HOSP IP/OBS HIGH 75: CPT | Mod: 25,,, | Performed by: INTERNAL MEDICINE

## 2025-07-27 PROCEDURE — 86803 HEPATITIS C AB TEST: CPT

## 2025-07-27 PROCEDURE — 84145 PROCALCITONIN (PCT): CPT

## 2025-07-27 PROCEDURE — 25000003 PHARM REV CODE 250: Performed by: HOSPITALIST

## 2025-07-27 PROCEDURE — 87502 INFLUENZA DNA AMP PROBE: CPT

## 2025-07-27 PROCEDURE — 99291 CRITICAL CARE FIRST HOUR: CPT

## 2025-07-27 PROCEDURE — 85025 COMPLETE CBC W/AUTO DIFF WBC: CPT | Mod: 91 | Performed by: HOSPITALIST

## 2025-07-27 PROCEDURE — 83735 ASSAY OF MAGNESIUM: CPT

## 2025-07-27 PROCEDURE — 83880 ASSAY OF NATRIURETIC PEPTIDE: CPT

## 2025-07-27 PROCEDURE — 80053 COMPREHEN METABOLIC PANEL: CPT | Mod: 59

## 2025-07-27 PROCEDURE — 80202 ASSAY OF VANCOMYCIN: CPT

## 2025-07-27 PROCEDURE — 83605 ASSAY OF LACTIC ACID: CPT

## 2025-07-27 PROCEDURE — 85610 PROTHROMBIN TIME: CPT

## 2025-07-27 PROCEDURE — 82040 ASSAY OF SERUM ALBUMIN: CPT | Performed by: HOSPITALIST

## 2025-07-27 PROCEDURE — 84484 ASSAY OF TROPONIN QUANT: CPT | Mod: 91 | Performed by: HOSPITALIST

## 2025-07-27 PROCEDURE — 96365 THER/PROPH/DIAG IV INF INIT: CPT

## 2025-07-27 PROCEDURE — 25000003 PHARM REV CODE 250: Performed by: INTERNAL MEDICINE

## 2025-07-27 PROCEDURE — 96366 THER/PROPH/DIAG IV INF ADDON: CPT

## 2025-07-27 PROCEDURE — 96374 THER/PROPH/DIAG INJ IV PUSH: CPT | Mod: 59

## 2025-07-27 PROCEDURE — 84484 ASSAY OF TROPONIN QUANT: CPT

## 2025-07-27 PROCEDURE — 96375 TX/PRO/DX INJ NEW DRUG ADDON: CPT

## 2025-07-27 PROCEDURE — 87040 BLOOD CULTURE FOR BACTERIA: CPT

## 2025-07-27 PROCEDURE — 96361 HYDRATE IV INFUSION ADD-ON: CPT

## 2025-07-27 RX ORDER — ALUMINUM HYDROXIDE, MAGNESIUM HYDROXIDE, AND SIMETHICONE 1200; 120; 1200 MG/30ML; MG/30ML; MG/30ML
30 SUSPENSION ORAL 4 TIMES DAILY PRN
Status: DISCONTINUED | OUTPATIENT
Start: 2025-07-27 | End: 2025-08-05 | Stop reason: HOSPADM

## 2025-07-27 RX ORDER — ENOXAPARIN SODIUM 100 MG/ML
40 INJECTION SUBCUTANEOUS EVERY 24 HOURS
Status: DISCONTINUED | OUTPATIENT
Start: 2025-07-27 | End: 2025-07-27

## 2025-07-27 RX ORDER — AMIODARONE HYDROCHLORIDE 200 MG/1
200 TABLET ORAL 2 TIMES DAILY
Status: DISCONTINUED | OUTPATIENT
Start: 2025-07-27 | End: 2025-07-28

## 2025-07-27 RX ORDER — ACETAMINOPHEN 325 MG/1
650 TABLET ORAL EVERY 8 HOURS PRN
Status: DISCONTINUED | OUTPATIENT
Start: 2025-07-27 | End: 2025-08-05 | Stop reason: HOSPADM

## 2025-07-27 RX ORDER — ENOXAPARIN SODIUM 100 MG/ML
1 INJECTION SUBCUTANEOUS 2 TIMES DAILY
Status: DISCONTINUED | OUTPATIENT
Start: 2025-07-27 | End: 2025-08-03

## 2025-07-27 RX ORDER — DILTIAZEM HYDROCHLORIDE 5 MG/ML
15 INJECTION INTRAVENOUS
Status: COMPLETED | OUTPATIENT
Start: 2025-07-27 | End: 2025-07-27

## 2025-07-27 RX ORDER — PANTOPRAZOLE SODIUM 20 MG/1
20 TABLET, DELAYED RELEASE ORAL DAILY
Status: DISCONTINUED | OUTPATIENT
Start: 2025-07-27 | End: 2025-07-27

## 2025-07-27 RX ORDER — IBUPROFEN 200 MG
16 TABLET ORAL
Status: DISCONTINUED | OUTPATIENT
Start: 2025-07-27 | End: 2025-08-05 | Stop reason: HOSPADM

## 2025-07-27 RX ORDER — CEFTRIAXONE 1 G/1
1 INJECTION, POWDER, FOR SOLUTION INTRAMUSCULAR; INTRAVENOUS
Status: COMPLETED | OUTPATIENT
Start: 2025-07-27 | End: 2025-07-27

## 2025-07-27 RX ORDER — NALOXONE HCL 0.4 MG/ML
0.02 VIAL (ML) INJECTION
Status: DISCONTINUED | OUTPATIENT
Start: 2025-07-27 | End: 2025-08-05 | Stop reason: HOSPADM

## 2025-07-27 RX ORDER — FUROSEMIDE 10 MG/ML
20 INJECTION INTRAMUSCULAR; INTRAVENOUS DAILY
Status: DISCONTINUED | OUTPATIENT
Start: 2025-07-27 | End: 2025-07-29

## 2025-07-27 RX ORDER — PANTOPRAZOLE SODIUM 40 MG/1
40 TABLET, DELAYED RELEASE ORAL DAILY
Status: DISCONTINUED | OUTPATIENT
Start: 2025-07-27 | End: 2025-08-05 | Stop reason: HOSPADM

## 2025-07-27 RX ORDER — IBUPROFEN 200 MG
24 TABLET ORAL
Status: DISCONTINUED | OUTPATIENT
Start: 2025-07-27 | End: 2025-08-05 | Stop reason: HOSPADM

## 2025-07-27 RX ORDER — TALC
6 POWDER (GRAM) TOPICAL NIGHTLY PRN
Status: DISCONTINUED | OUTPATIENT
Start: 2025-07-27 | End: 2025-08-05 | Stop reason: HOSPADM

## 2025-07-27 RX ORDER — ACETAMINOPHEN 650 MG/1
650 SUPPOSITORY RECTAL EVERY 6 HOURS PRN
Status: DISCONTINUED | OUTPATIENT
Start: 2025-07-27 | End: 2025-08-05 | Stop reason: HOSPADM

## 2025-07-27 RX ORDER — OFLOXACIN 3 MG/ML
SOLUTION/ DROPS OPHTHALMIC
COMMUNITY
Start: 2025-01-30 | End: 2025-08-12

## 2025-07-27 RX ORDER — HYDROCODONE BITARTRATE AND ACETAMINOPHEN 5; 325 MG/1; MG/1
1 TABLET ORAL EVERY 6 HOURS PRN
Refills: 0 | Status: DISCONTINUED | OUTPATIENT
Start: 2025-07-27 | End: 2025-08-05 | Stop reason: HOSPADM

## 2025-07-27 RX ORDER — PROMETHAZINE HYDROCHLORIDE 25 MG/1
25 TABLET ORAL EVERY 6 HOURS PRN
Status: DISCONTINUED | OUTPATIENT
Start: 2025-07-27 | End: 2025-08-05 | Stop reason: HOSPADM

## 2025-07-27 RX ORDER — DILTIAZEM HYDROCHLORIDE 5 MG/ML
10 INJECTION INTRAVENOUS
Status: DISCONTINUED | OUTPATIENT
Start: 2025-07-27 | End: 2025-07-27

## 2025-07-27 RX ORDER — ONDANSETRON HYDROCHLORIDE 2 MG/ML
4 INJECTION, SOLUTION INTRAVENOUS EVERY 8 HOURS PRN
Status: DISCONTINUED | OUTPATIENT
Start: 2025-07-27 | End: 2025-08-05 | Stop reason: HOSPADM

## 2025-07-27 RX ORDER — GLUCAGON 1 MG
1 KIT INJECTION
Status: DISCONTINUED | OUTPATIENT
Start: 2025-07-27 | End: 2025-08-05 | Stop reason: HOSPADM

## 2025-07-27 RX ORDER — PREDNISOLONE ACETATE 10 MG/ML
SUSPENSION/ DROPS OPHTHALMIC
COMMUNITY
Start: 2025-02-07

## 2025-07-27 RX ORDER — AMOXICILLIN 250 MG
1 CAPSULE ORAL 2 TIMES DAILY PRN
Status: DISCONTINUED | OUTPATIENT
Start: 2025-07-27 | End: 2025-08-05 | Stop reason: HOSPADM

## 2025-07-27 RX ORDER — SODIUM CHLORIDE 0.9 % (FLUSH) 0.9 %
10 SYRINGE (ML) INJECTION EVERY 12 HOURS PRN
Status: DISCONTINUED | OUTPATIENT
Start: 2025-07-27 | End: 2025-08-05 | Stop reason: HOSPADM

## 2025-07-27 RX ORDER — IPRATROPIUM BROMIDE AND ALBUTEROL SULFATE 2.5; .5 MG/3ML; MG/3ML
3 SOLUTION RESPIRATORY (INHALATION) EVERY 6 HOURS PRN
Status: DISCONTINUED | OUTPATIENT
Start: 2025-07-27 | End: 2025-08-05 | Stop reason: HOSPADM

## 2025-07-27 RX ORDER — MORPHINE SULFATE 4 MG/ML
2 INJECTION, SOLUTION INTRAMUSCULAR; INTRAVENOUS EVERY 4 HOURS PRN
Refills: 0 | Status: DISCONTINUED | OUTPATIENT
Start: 2025-07-27 | End: 2025-08-05 | Stop reason: HOSPADM

## 2025-07-27 RX ADMIN — VANCOMYCIN HYDROCHLORIDE 1250 MG: 1.25 INJECTION, POWDER, LYOPHILIZED, FOR SOLUTION INTRAVENOUS at 11:07

## 2025-07-27 RX ADMIN — CEFTRIAXONE 1 G: 1 INJECTION, POWDER, FOR SOLUTION INTRAMUSCULAR; INTRAVENOUS at 04:07

## 2025-07-27 RX ADMIN — METOPROLOL SUCCINATE 12.5 MG: 25 TABLET, EXTENDED RELEASE ORAL at 09:07

## 2025-07-27 RX ADMIN — DILTIAZEM HYDROCHLORIDE 15 MG: 5 INJECTION INTRAVENOUS at 03:07

## 2025-07-27 RX ADMIN — MELATONIN TAB 3 MG 6 MG: 3 TAB at 09:07

## 2025-07-27 RX ADMIN — AMIODARONE HYDROCHLORIDE 150 MG: 1.5 INJECTION, SOLUTION INTRAVENOUS at 01:07

## 2025-07-27 RX ADMIN — PANTOPRAZOLE SODIUM 40 MG: 40 TABLET, DELAYED RELEASE ORAL at 09:07

## 2025-07-27 RX ADMIN — ENOXAPARIN SODIUM 80 MG: 80 INJECTION SUBCUTANEOUS at 09:07

## 2025-07-27 RX ADMIN — AMIODARONE HYDROCHLORIDE 1 MG/MIN: 1.8 INJECTION, SOLUTION INTRAVENOUS at 02:07

## 2025-07-27 RX ADMIN — AMIODARONE HYDROCHLORIDE 200 MG: 200 TABLET ORAL at 09:07

## 2025-07-27 RX ADMIN — SODIUM CHLORIDE 1000 ML: 9 INJECTION, SOLUTION INTRAVENOUS at 04:07

## 2025-07-27 RX ADMIN — ENOXAPARIN SODIUM 80 MG: 80 INJECTION SUBCUTANEOUS at 01:07

## 2025-07-27 RX ADMIN — ACETAMINOPHEN 650 MG: 325 TABLET ORAL at 09:07

## 2025-07-27 RX ADMIN — VANCOMYCIN HYDROCHLORIDE 1500 MG: 1.5 INJECTION, POWDER, LYOPHILIZED, FOR SOLUTION INTRAVENOUS at 03:07

## 2025-07-27 RX ADMIN — SODIUM CHLORIDE 1000 ML: 9 INJECTION, SOLUTION INTRAVENOUS at 02:07

## 2025-07-27 NOTE — TELEPHONE ENCOUNTER
Patient states he is s/p TURP on 6/30/25 and states he has not been able to have a full urine stream post procedure. Patient states he completed a CT Urogram w/contrast on yesterday, 7/25/25 as a follow up with Urology for his c/o inability to void a full stream of urine and has discussed the results with Dr. Harris in Urology.     Patient states concern regarding recent elevations in his heart rate at rest that are ranging from 91 bpm - 126 bpm. Patient also c/o joint pain, body aches, mild headache and low blood pressure readings (85/58 mm Hg, 97/68 mm Hg, 109/56 mm Hg). Patient states he exercises daily and his heart rate is usually in the 50's and blood pressure is usually low.     Patient states he slept 18 hours on yesterday, which is uncharacteristic for him and believes he has a diagnosis of sepsis. Patient also states concern regarding his elevated creatinine level and states request for an antibiotic to treat sepsis. Patient states he previously spoke with a NP that prescribed a medication for him via phone contact and believed the [facility] could assist with his request for prescription medication and diagnostic evaluation.      Patient advised to Go to ED Now for evaluation and that a message will be sent to his Urologist and PCP for follow up on Monday, 7/28/25. Patient also advised to contact the [facility] for any worsening symptoms. Patient states understanding of care advice.     Reason for Disposition   History of heart disease (i.e., heart attack, bypass surgery, angina, angioplasty, CHF)  (Exception: Brief heartbeat symptoms that went away and now feels well.)    Additional Information   Negative: Passed out (e.g., fainted, lost consciousness, blacked out and was not responding)   Negative: Shock suspected (e.g., cold/pale/clammy skin, too weak to stand, low BP, rapid pulse)   Negative: Difficult to awaken or acting confused (e.g., disoriented, slurred speech)    Negative: Visible sweat on face or sweat dripping down face   Negative: Unable to walk, or can only walk with assistance (e.g., requires support)   Negative: [1] Received SHOCK from implantable cardiac defibrillator AND [2] persisting symptoms (i.e., palpitations, lightheadedness)   Negative: [1] Feeling weak or lightheaded (e.g., woozy, feeling like they might faint) AND [2] heart beating very slowly (e.g., < 50 / minute)   Negative: [1] Dizziness, lightheadedness, or weakness AND [2] heart beating very rapidly (e.g., > 140 / minute)   Negative: Sounds like a life-threatening emergency to the triager   Negative: Chest Pain   Negative: Implantable Cardiac Defibrillator (ICD) or a pacemaker symptoms or questions   Negative: Difficulty breathing   Negative: Feeling weak or lightheaded (e.g., woozy, feeling like they might faint)   Negative: [1] Heart beating very rapidly (e.g., > 140 / minute) AND [2] present now  (Exception: During exercise.)   Negative: [1] Heart beating very slowly (e.g., < 50 / minute) AND [2] present now  (Exception: Athlete and heart rate is normal for caller.)   Negative: Patient sounds very sick or weak to the triager   Negative: [1] Heart beating very rapidly (e.g., > 140 / minute) AND [2] NOT present now  (Exception: Only during exercise.)   Negative: [1] Heart beating very slowly (e.g., < 50 / minute) AND [2] NOT present now  (Exception: Athlete and heart rate is normal for caller.)   Negative: [1] Difficulty breathing with exertion (e.g., walking) and [2] NEW or getting WORSE   Negative: [1] Skipped or extra beat(s) AND [2] increases with exercise or exertion   Negative: [1] Skipped or extra beat(s) AND [2] occurs 4 or more times per minute    Protocols used: Heart Rate and Heartbeat [id number]

## 2025-07-28 ENCOUNTER — ANESTHESIA (OUTPATIENT)
Dept: CARDIOLOGY | Facility: HOSPITAL | Age: 75
End: 2025-07-28
Payer: MEDICARE

## 2025-07-28 ENCOUNTER — ANESTHESIA EVENT (OUTPATIENT)
Dept: CARDIOLOGY | Facility: HOSPITAL | Age: 75
End: 2025-07-28
Payer: MEDICARE

## 2025-07-28 ENCOUNTER — PATIENT OUTREACH (OUTPATIENT)
Dept: ADMINISTRATIVE | Facility: HOSPITAL | Age: 75
End: 2025-07-28
Payer: MEDICARE

## 2025-07-28 ENCOUNTER — DOCUMENTATION ONLY (OUTPATIENT)
Dept: CARDIOLOGY | Facility: CLINIC | Age: 75
End: 2025-07-28
Payer: MEDICARE

## 2025-07-28 PROBLEM — R31.9 HEMATURIA: Status: ACTIVE | Noted: 2025-07-28

## 2025-07-28 PROBLEM — Z90.79 S/P TURP: Status: ACTIVE | Noted: 2025-07-28

## 2025-07-28 LAB
ABSOLUTE EOSINOPHIL (OHS): 0.06 K/UL
ABSOLUTE EOSINOPHIL (OHS): 0.17 K/UL
ABSOLUTE MONOCYTE (OHS): 0.58 K/UL (ref 0.3–1)
ABSOLUTE MONOCYTE (OHS): 0.93 K/UL (ref 0.3–1)
ABSOLUTE NEUTROPHIL COUNT (OHS): 4.54 K/UL (ref 1.8–7.7)
ABSOLUTE NEUTROPHIL COUNT (OHS): 5.53 K/UL (ref 1.8–7.7)
ALBUMIN SERPL BCP-MCNC: 2.8 G/DL (ref 3.5–5.2)
ALP SERPL-CCNC: 93 UNIT/L (ref 40–150)
ALT SERPL W/O P-5'-P-CCNC: 66 UNIT/L (ref 10–44)
ANION GAP (OHS): 5 MMOL/L (ref 8–16)
AORTIC SIZE INDEX (SOV): 2 CM/M2
AORTIC SIZE INDEX: 1.8 CM/M2
ASCENDING AORTA: 3.4 CM
AST SERPL-CCNC: 48 UNIT/L (ref 11–45)
BACTERIA UR CULT: NORMAL
BASOPHILS # BLD AUTO: 0.06 K/UL
BASOPHILS # BLD AUTO: 0.06 K/UL
BASOPHILS NFR BLD AUTO: 0.8 %
BASOPHILS NFR BLD AUTO: 0.9 %
BILIRUB SERPL-MCNC: 0.8 MG/DL (ref 0.1–1)
BSA FOR ECHO PROCEDURE: 1.89 M2
BUN SERPL-MCNC: 21 MG/DL (ref 8–23)
CALCIUM SERPL-MCNC: 8.2 MG/DL (ref 8.7–10.5)
CHLORIDE SERPL-SCNC: 107 MMOL/L (ref 95–110)
CO2 SERPL-SCNC: 24 MMOL/L (ref 23–29)
CREAT SERPL-MCNC: 1.3 MG/DL (ref 0.5–1.4)
ERYTHROCYTE [DISTWIDTH] IN BLOOD BY AUTOMATED COUNT: 13.2 % (ref 11.5–14.5)
ERYTHROCYTE [DISTWIDTH] IN BLOOD BY AUTOMATED COUNT: 13.5 % (ref 11.5–14.5)
GFR SERPLBLD CREATININE-BSD FMLA CKD-EPI: 57 ML/MIN/1.73/M2
GLUCOSE SERPL-MCNC: 108 MG/DL (ref 70–110)
HCT VFR BLD AUTO: 36 % (ref 40–54)
HCT VFR BLD AUTO: 36.3 % (ref 40–54)
HGB BLD-MCNC: 12.1 GM/DL (ref 14–18)
HGB BLD-MCNC: 12.7 GM/DL (ref 14–18)
HOLD SPECIMEN: NORMAL
IMM GRANULOCYTES # BLD AUTO: 0.02 K/UL (ref 0–0.04)
IMM GRANULOCYTES # BLD AUTO: 0.02 K/UL (ref 0–0.04)
IMM GRANULOCYTES NFR BLD AUTO: 0.3 % (ref 0–0.5)
IMM GRANULOCYTES NFR BLD AUTO: 0.3 % (ref 0–0.5)
LYMPHOCYTES # BLD AUTO: 1.14 K/UL (ref 1–4.8)
LYMPHOCYTES # BLD AUTO: 1.24 K/UL (ref 1–4.8)
Lab: 2 CM/M
Lab: 2.1 CM/M
MCH RBC QN AUTO: 29.9 PG (ref 27–31)
MCH RBC QN AUTO: 30.2 PG (ref 27–31)
MCHC RBC AUTO-ENTMCNC: 33.6 G/DL (ref 32–36)
MCHC RBC AUTO-ENTMCNC: 35 G/DL (ref 32–36)
MCV RBC AUTO: 86 FL (ref 82–98)
MCV RBC AUTO: 89 FL (ref 82–98)
NUCLEATED RBC (/100WBC) (OHS): 0 /100 WBC
NUCLEATED RBC (/100WBC) (OHS): 0 /100 WBC
OHS CV CPX PATIENT HEIGHT IN: 68
OHS CV CPX PATIENT HEIGHT IN: 68
OHS QRS DURATION: 124 MS
OHS QTC CALCULATION: 432 MS
PLATELET # BLD AUTO: 147 K/UL (ref 150–450)
PLATELET # BLD AUTO: 174 K/UL (ref 150–450)
PMV BLD AUTO: 10.9 FL (ref 9.2–12.9)
PMV BLD AUTO: 10.9 FL (ref 9.2–12.9)
POTASSIUM SERPL-SCNC: 4.6 MMOL/L (ref 3.5–5.1)
PROT SERPL-MCNC: 5.3 GM/DL (ref 6–8.4)
RBC # BLD AUTO: 4.05 M/UL (ref 4.6–6.2)
RBC # BLD AUTO: 4.21 M/UL (ref 4.6–6.2)
RELATIVE EOSINOPHIL (OHS): 0.8 %
RELATIVE EOSINOPHIL (OHS): 2.4 %
RELATIVE LYMPHOCYTE (OHS): 15.4 % (ref 18–48)
RELATIVE LYMPHOCYTE (OHS): 17.8 % (ref 18–48)
RELATIVE MONOCYTE (OHS): 13.4 % (ref 4–15)
RELATIVE MONOCYTE (OHS): 7.8 % (ref 4–15)
RELATIVE NEUTROPHIL (OHS): 65.2 % (ref 38–73)
RELATIVE NEUTROPHIL (OHS): 74.9 % (ref 38–73)
SINUS: 3.7 CM
SODIUM SERPL-SCNC: 136 MMOL/L (ref 136–145)
STJ: 2.9 CM
VANCOMYCIN SERPL-MCNC: 9.2 UG/ML (ref ?–80)
WBC # BLD AUTO: 6.96 K/UL (ref 3.9–12.7)
WBC # BLD AUTO: 7.39 K/UL (ref 3.9–12.7)

## 2025-07-28 PROCEDURE — 36415 COLL VENOUS BLD VENIPUNCTURE: CPT | Performed by: STUDENT IN AN ORGANIZED HEALTH CARE EDUCATION/TRAINING PROGRAM

## 2025-07-28 PROCEDURE — 63600175 PHARM REV CODE 636 W HCPCS: Performed by: NURSE ANESTHETIST, CERTIFIED REGISTERED

## 2025-07-28 PROCEDURE — 93010 ELECTROCARDIOGRAM REPORT: CPT | Mod: ,,, | Performed by: INTERNAL MEDICINE

## 2025-07-28 PROCEDURE — 27000221 HC OXYGEN, UP TO 24 HOURS

## 2025-07-28 PROCEDURE — 93005 ELECTROCARDIOGRAM TRACING: CPT

## 2025-07-28 PROCEDURE — 21400001 HC TELEMETRY ROOM

## 2025-07-28 PROCEDURE — 99222 1ST HOSP IP/OBS MODERATE 55: CPT | Mod: ,,, | Performed by: STUDENT IN AN ORGANIZED HEALTH CARE EDUCATION/TRAINING PROGRAM

## 2025-07-28 PROCEDURE — 63600175 PHARM REV CODE 636 W HCPCS: Performed by: STUDENT IN AN ORGANIZED HEALTH CARE EDUCATION/TRAINING PROGRAM

## 2025-07-28 PROCEDURE — 63600175 PHARM REV CODE 636 W HCPCS: Performed by: INTERNAL MEDICINE

## 2025-07-28 PROCEDURE — 25000003 PHARM REV CODE 250: Performed by: STUDENT IN AN ORGANIZED HEALTH CARE EDUCATION/TRAINING PROGRAM

## 2025-07-28 PROCEDURE — 5A2204Z RESTORATION OF CARDIAC RHYTHM, SINGLE: ICD-10-PCS | Performed by: INTERNAL MEDICINE

## 2025-07-28 PROCEDURE — 80202 ASSAY OF VANCOMYCIN: CPT

## 2025-07-28 PROCEDURE — 25000003 PHARM REV CODE 250: Performed by: INTERNAL MEDICINE

## 2025-07-28 PROCEDURE — 99900035 HC TECH TIME PER 15 MIN (STAT)

## 2025-07-28 PROCEDURE — 85025 COMPLETE CBC W/AUTO DIFF WBC: CPT | Performed by: HOSPITALIST

## 2025-07-28 PROCEDURE — 37000008 HC ANESTHESIA 1ST 15 MINUTES: Performed by: INTERNAL MEDICINE

## 2025-07-28 PROCEDURE — 85025 COMPLETE CBC W/AUTO DIFF WBC: CPT | Performed by: STUDENT IN AN ORGANIZED HEALTH CARE EDUCATION/TRAINING PROGRAM

## 2025-07-28 PROCEDURE — 25000003 PHARM REV CODE 250: Performed by: PHYSICIAN ASSISTANT

## 2025-07-28 PROCEDURE — 36415 COLL VENOUS BLD VENIPUNCTURE: CPT

## 2025-07-28 PROCEDURE — 36415 COLL VENOUS BLD VENIPUNCTURE: CPT | Performed by: HOSPITALIST

## 2025-07-28 PROCEDURE — 37000009 HC ANESTHESIA EA ADD 15 MINS: Performed by: INTERNAL MEDICINE

## 2025-07-28 PROCEDURE — 25000003 PHARM REV CODE 250: Performed by: FAMILY MEDICINE

## 2025-07-28 PROCEDURE — 25000003 PHARM REV CODE 250: Performed by: NURSE ANESTHETIST, CERTIFIED REGISTERED

## 2025-07-28 PROCEDURE — 80053 COMPREHEN METABOLIC PANEL: CPT | Performed by: HOSPITALIST

## 2025-07-28 PROCEDURE — 25000003 PHARM REV CODE 250: Performed by: HOSPITALIST

## 2025-07-28 RX ORDER — AMIODARONE HYDROCHLORIDE 200 MG/1
400 TABLET ORAL 2 TIMES DAILY
Status: DISCONTINUED | OUTPATIENT
Start: 2025-07-28 | End: 2025-08-05 | Stop reason: HOSPADM

## 2025-07-28 RX ORDER — SODIUM CHLORIDE 0.9 % (FLUSH) 0.9 %
10 SYRINGE (ML) INJECTION
Status: DISCONTINUED | OUTPATIENT
Start: 2025-07-28 | End: 2025-08-05 | Stop reason: HOSPADM

## 2025-07-28 RX ORDER — ETOMIDATE 2 MG/ML
INJECTION INTRAVENOUS
Status: DISCONTINUED | OUTPATIENT
Start: 2025-07-28 | End: 2025-07-28

## 2025-07-28 RX ORDER — LIDOCAINE HYDROCHLORIDE 20 MG/ML
SOLUTION OROPHARYNGEAL
Status: DISCONTINUED | OUTPATIENT
Start: 2025-07-28 | End: 2025-07-28

## 2025-07-28 RX ORDER — PROPOFOL 10 MG/ML
VIAL (ML) INTRAVENOUS
Status: DISCONTINUED | OUTPATIENT
Start: 2025-07-28 | End: 2025-07-28

## 2025-07-28 RX ORDER — NOREPINEPHRINE BITARTRATE 1 MG/ML
INJECTION, SOLUTION INTRAVENOUS
Status: DISCONTINUED | OUTPATIENT
Start: 2025-07-28 | End: 2025-07-28

## 2025-07-28 RX ORDER — LIDOCAINE HYDROCHLORIDE 20 MG/ML
INJECTION, SOLUTION EPIDURAL; INFILTRATION; INTRACAUDAL; PERINEURAL
Status: DISCONTINUED | OUTPATIENT
Start: 2025-07-28 | End: 2025-07-28

## 2025-07-28 RX ADMIN — MELATONIN TAB 3 MG 6 MG: 3 TAB at 08:07

## 2025-07-28 RX ADMIN — ENOXAPARIN SODIUM 80 MG: 80 INJECTION SUBCUTANEOUS at 10:07

## 2025-07-28 RX ADMIN — NOREPINEPHRINE BITARTRATE 4 MCG: 1 INJECTION, SOLUTION, CONCENTRATE INTRAVENOUS at 03:07

## 2025-07-28 RX ADMIN — LIDOCAINE HYDROCHLORIDE 5 ML: 20 SOLUTION ORAL at 03:07

## 2025-07-28 RX ADMIN — PROPOFOL 20 MG: 10 INJECTION, EMULSION INTRAVENOUS at 03:07

## 2025-07-28 RX ADMIN — VANCOMYCIN HYDROCHLORIDE 1500 MG: 1.5 INJECTION, POWDER, LYOPHILIZED, FOR SOLUTION INTRAVENOUS at 02:07

## 2025-07-28 RX ADMIN — ACETAMINOPHEN 650 MG: 325 TABLET ORAL at 08:07

## 2025-07-28 RX ADMIN — FUROSEMIDE 20 MG: 10 INJECTION, SOLUTION INTRAMUSCULAR; INTRAVENOUS at 10:07

## 2025-07-28 RX ADMIN — METOPROLOL SUCCINATE 12.5 MG: 25 TABLET, EXTENDED RELEASE ORAL at 10:07

## 2025-07-28 RX ADMIN — ENOXAPARIN SODIUM 80 MG: 80 INJECTION SUBCUTANEOUS at 08:07

## 2025-07-28 RX ADMIN — SODIUM CHLORIDE: 9 INJECTION, SOLUTION INTRAVENOUS at 03:07

## 2025-07-28 RX ADMIN — AMIODARONE HYDROCHLORIDE 200 MG: 200 TABLET ORAL at 10:07

## 2025-07-28 RX ADMIN — ETOMIDATE 5 MG: 2 INJECTION INTRAVENOUS at 03:07

## 2025-07-28 RX ADMIN — AMIODARONE HYDROCHLORIDE 400 MG: 200 TABLET ORAL at 08:07

## 2025-07-28 RX ADMIN — LIDOCAINE HYDROCHLORIDE 70 MG: 20 INJECTION, SOLUTION EPIDURAL; INFILTRATION; INTRACAUDAL; PERINEURAL at 03:07

## 2025-07-28 RX ADMIN — PANTOPRAZOLE SODIUM 40 MG: 40 TABLET, DELAYED RELEASE ORAL at 10:07

## 2025-07-28 NOTE — PROGRESS NOTES
"[facility]) nurse navigator notified of HFTCC candidate in need of education and introduction to 4-6 week program.      PT aao x 3 while lying in bed  with family at bedside. Introduced self to pt as HFTCC nurse navigator.     Patient given "Home Care Guide for Heart Failure Patients" , "[facility]" flyer and "Daily weight and symptom tracker".  Encouraged pt to review information.      Reviewed the following key points of HFTCC program with pt and family:   1.) Take your medications as directed.    2.) Weight yourself daily   3.) Follow low salt and limited fluid diet.    4.) Stop smoking and start exercising   5.) Go to your appointments and call your team.      Pt reminded to follow Symptom tracker and to call at the onset of symptoms according to tracker.     Reviewed plan for follow up once discharged to include phone calls, in person and virtual visits to assist pt optimizing their heart failure medication regimen and encouraging healthy lifestyle modifications.  Reminded pt that program will assist them over the next 4-6 weeks and then patient will be transferred to long term care provider .  Reminded pt how to contact HFTCC navigator via phone and or via OROS.     Pt given appointment or instructed appointment will be printed on hospital discharge paperwork.     Pt also reminded HF nurse will call 48-72 hours after discharge to check on them.     PT verbalize read back of information given.  Encouraged pt and family to read over information often and contact team with any questions or concerns.      "

## 2025-07-28 NOTE — TRANSFER OF CARE
"Anesthesia Transfer of Care Note    Patient: Mekhi Lambert    Procedure(s) Performed: Procedure(s) (LRB):  Transesophageal echo (JORDAN) intra-procedure log documentation (N/A)    Patient location: Cath Lab    Anesthesia Type: MAC    Transport from OR: Transported from OR on room air with adequate spontaneous ventilation    Post pain: adequate analgesia    Post assessment: no apparent anesthetic complications and tolerated procedure well    Post vital signs: stable    Level of consciousness: responds to stimulation    Nausea/Vomiting: no nausea/vomiting    Complications: none    Transfer of care protocol was followed      Last vitals: Visit Vitals  /76 (BP Location: Right arm, Patient Position: Lying)   Pulse 93   Temp 36.7 °C (98 °F) (Oral)   Resp 18   Ht 5' 8" (1.727 m)   Wt 74.4 kg (164 lb 0.4 oz)   SpO2 97%   BMI 24.94 kg/m²     "

## 2025-07-28 NOTE — ANESTHESIA PREPROCEDURE EVALUATION
07/28/2025  Mekhi Lambert is a 75 y.o., male.    Problem List[1]  Past Medical History:   Diagnosis Date    ALBERTO (acute kidney injury) 08/08/2022    Anticoagulant long-term use     Arthritis     Atrial fibrillation with RVR 7/27/2025    Cancer     Chronic systolic CHF (congestive heart failure) 02/26/2022    Coronary artery disease     Digestive disorder     Hiatal hernia with GERD 6/23/2025    Myocardial infarction     2/20/22    Statin myopathy 05/13/2024     Past Surgical History:   Procedure Laterality Date    CARDIAC CATHETERIZATION      COLONOSCOPY N/A 09/22/2021    Procedure: COLONOSCOPY;  Surgeon: Meaghan Jaime MD;  Location: [facility] ENDO;  Service: Endoscopy;  Laterality: N/A;    CORONARY ANGIOGRAPHY INCLUDING BYPASS GRAFTS WITH CATHETERIZATION OF LEFT HEART N/A 03/03/2020    Procedure: ANGIOGRAM, CORONARY, INCLUDING BYPASS GRAFT, WITH LEFT HEART CATHETERIZATION;  Surgeon: Ute Melchor MD;  Location: [facility] CATH LAB;  Service: Cardiology;  Laterality: N/A;    JOINT REPLACEMENT Right     knee    KNEE SURGERY      LEFT HEART CATHETERIZATION Left 02/22/2020    Procedure: CATHETERIZATION, HEART, LEFT;  Surgeon: Ute Melchor MD;  Location: [facility] CATH LAB;  Service: Cardiology;  Laterality: Left;    LEFT HEART CATHETERIZATION Left 02/26/2022    Procedure: CATHETERIZATION, HEART, LEFT;  Surgeon: Ute Melchor MD;  Location: [facility] CATH LAB;  Service: Cardiology;  Laterality: Left;    PERCUTANEOUS TRANSLUMINAL BALLOON ANGIOPLASTY OF CORONARY ARTERY Left 02/22/2020    Procedure: Angioplasty-coronary;  Surgeon: Ute Melchor MD;  Location: [facility] CATH LAB;  Service: Cardiology;  Laterality: Left;    PERCUTANEOUS TRANSLUMINAL BALLOON ANGIOPLASTY OF CORONARY ARTERY  02/26/2022    Procedure: Angioplasty-coronary;  Surgeon: Ute Melchor MD;  Location: [facility] CATH LAB;  Service: Cardiology;;    REPAIR,  HERNIA, INGUINAL Right 4/5/2024    Procedure: REPAIR, HERNIA, INGUINAL;  Surgeon: Franklin Fuentes MD;  Location: [facility] [place] OR;  Service: General;  Laterality: Right;    RIGHT HEMICOLECTOMY N/A 04/27/2022    Procedure: HEMICOLECTOMY, RIGHT;  Surgeon: See Dominguez MD;  Location: [facility] [place] OR;  Service: General;  Laterality: N/A;    SHOULDER ARTHROSCOPY Right     Dr Bella    SIGMOIDECTOMY      simple prostatectomy  06/06/2016    robotic assisted partial prostectomy due to BPH    TOTAL KNEE ARTHROPLASTY      TRANSURETHRAL RESECTION OF PROSTATE N/A 6/30/2025    Procedure: TURP (TRANSURETHRAL RESECTION OF PROSTATE);  Surgeon: Clarence Harris MD;  Location: [facility] [place] OR;  Service: Urology;  Laterality: N/A;       Pre-op Assessment    I have reviewed the Patient Summary Reports.     I have reviewed the Nursing Notes. I have reviewed the NPO Status.   I have reviewed the Medications.     Review of Systems  Anesthesia Hx:               Denies Personal Hx of Anesthesia complications.                    Social:  Non-Smoker, Alcohol Use       Cardiovascular:       Past MI CAD    Dysrhythmias atrial fibrillation  CHF    hyperlipidemia LILLY  ECG has been reviewed.                            Renal/:  Chronic Renal Disease, CKD                Hepatic/GI:    Hiatal Hernia, GERD                Neurological:  TIA,  Neuromuscular Disease,                                   Psych:   anxiety               Results for orders placed or performed during the hospital encounter of 07/27/25   EKG 12-lead    Collection Time: 07/27/25 12:39 AM   Result Value Ref Range    QRS Duration 100 ms    OHS QTC Calculation 445 ms    Narrative    Test Reason : Z13.6,    Vent. Rate : 131 BPM     Atrial Rate :    BPM     P-R Int :    ms          QRS Dur : 100 ms      QT Int : 302 ms       P-R-T Axes :     21  59 degrees    QTcB Int : 445 ms    Atrial fibrillation with rapid ventricular response  Low voltage QRS  Abnormal ECG  When compared with ECG of  23-Jun-2025 13:54,  Atrial fibrillation has replaced Sinus rhythm  Vent. rate has increased by  67 bpm  Confirmed by Kaleb Sanchez ([id number]) on 7/27/2025 1:49:24 PM    Referred By: AAAREFERRAL SELF           Confirmed By: Kaleb Sanchez     Results for orders placed during the hospital encounter of 07/27/25    Echo    Interpretation Summary    Left Ventricle: The left ventricle is severely dilated. Normal wall thickness. There is severely reduced systolic function with a visually estimated ejection fraction of 15 - 20%. Unable to assess diastolic function due to atrial fibrillation.    Right Ventricle: The right ventricle is normal in sizeWall thickness is normal. . Systolic function is borderline low.    Left Atrium: The left atrium is dilated.    Mitral Valve: There is mild regurgitation.    Tricuspid Valve: There is mild regurgitation.    IVC/SVC: Normal venous pressure at 3 mmHg.      Physical Exam  General: Well nourished, Cooperative, Alert and Oriented    Airway:  Mallampati: II   Mouth Opening: Normal  TM Distance: Normal  Tongue: Normal  Neck ROM: Normal ROM        Anesthesia Plan  Type of Anesthesia, risks & benefits discussed:    Anesthesia Type: MAC, Gen Natural Airway  Intra-op Monitoring Plan: Standard ASA Monitors  Induction:  IV  Informed Consent: Informed consent signed with the Patient and all parties understand the risks and agree with anesthesia plan.  All questions answered.   ASA Score: 4  Day of Surgery Review of History & Physical: H&P Update referred to the surgeon/provider.    Ready For Surgery From Anesthesia Perspective.     .           [1]   Patient Active Problem List  Diagnosis    Prostate hypertrophy    History of total right knee replacement    History of skin cancer    Hypercholesterolemia    Family history of melanoma    H/O non-ST elevation myocardial infarction (NSTEMI)    Coronary artery disease of native artery of native heart with  angina pectoris History coronary stent  placement    H/O heart artery stent    Anxiety disorder    History of colonic polyps    Dilated cardiomyopathy    Heart failure with reduced ejection fraction, NYHA class II    Rectal pain    Chronic systolic CHF (congestive heart failure)    Splenic artery aneurysm    Statin myopathy    Chronic kidney disease, stage 3a    Hx-TIA (transient ischemic attack)    Hiatal hernia with GERD    Psoriasis    Pre-op evaluation    Benign prostatic hyperplasia with weak urinary stream    Atrial fibrillation with RVR    Chronic combined systolic and diastolic congestive heart failure    Palpitations

## 2025-07-28 NOTE — ANESTHESIA POSTPROCEDURE EVALUATION
Anesthesia Post Evaluation    Patient: Mekhi Lambert    Procedure(s) Performed: Procedure(s) (LRB):  Transesophageal echo (JORDAN) intra-procedure log documentation (N/A)    Final Anesthesia Type: MAC      Patient location during evaluation: Cath Lab  Patient participation: Yes- Able to Participate  Level of consciousness: awake and alert  Post-procedure vital signs: reviewed and stable  Pain management: adequate  Airway patency: patent    PONV status at discharge: No PONV  Anesthetic complications: no      Cardiovascular status: blood pressure returned to baseline, hemodynamically stable and stable  Respiratory status: unassisted, room air and spontaneous ventilation  Hydration status: euvolemic  Follow-up not needed.              Vitals Value Taken Time   BP 97/67 07/28/25 15:40   Temp 37.2 °C (99 °F) 07/28/25 15:40   Pulse 87 07/28/25 15:40   Resp 15 07/28/25 15:40   SpO2 96 % 07/28/25 15:40         No case tracking events are documented in the log.      Pain/Kristy Score: Presence of Pain: denies (7/27/2025  5:03 AM)  Pain Rating Prior to Med Admin: 3 (7/27/2025  9:28 PM)  Pain Rating Post Med Admin: 1 (7/27/2025 10:28 PM)

## 2025-07-28 NOTE — PROGRESS NOTES
VBC OUTREACH: per chart review pt is currently hospitalized, will follow up with pt in 3 months for HM topics.

## 2025-07-29 LAB
ABSOLUTE EOSINOPHIL (OHS): 0.12 K/UL
ABSOLUTE MONOCYTE (OHS): 1.02 K/UL (ref 0.3–1)
ABSOLUTE NEUTROPHIL COUNT (OHS): 5.58 K/UL (ref 1.8–7.7)
ALBUMIN SERPL BCP-MCNC: 2.9 G/DL (ref 3.5–5.2)
ALP SERPL-CCNC: 98 UNIT/L (ref 40–150)
ALT SERPL W/O P-5'-P-CCNC: 47 UNIT/L (ref 10–44)
ANION GAP (OHS): 10 MMOL/L (ref 8–16)
AST SERPL-CCNC: 27 UNIT/L (ref 11–45)
BASOPHILS # BLD AUTO: 0.05 K/UL
BASOPHILS NFR BLD AUTO: 0.6 %
BILIRUB SERPL-MCNC: 1.1 MG/DL (ref 0.1–1)
BUN SERPL-MCNC: 18 MG/DL (ref 8–23)
CALCIUM SERPL-MCNC: 8.4 MG/DL (ref 8.7–10.5)
CHLORIDE SERPL-SCNC: 100 MMOL/L (ref 95–110)
CO2 SERPL-SCNC: 24 MMOL/L (ref 23–29)
CREAT SERPL-MCNC: 1.2 MG/DL (ref 0.5–1.4)
ERYTHROCYTE [DISTWIDTH] IN BLOOD BY AUTOMATED COUNT: 13.1 % (ref 11.5–14.5)
GFR SERPLBLD CREATININE-BSD FMLA CKD-EPI: >60 ML/MIN/1.73/M2
GLUCOSE SERPL-MCNC: 104 MG/DL (ref 70–110)
HCT VFR BLD AUTO: 35.8 % (ref 40–54)
HGB BLD-MCNC: 12.2 GM/DL (ref 14–18)
IMM GRANULOCYTES # BLD AUTO: 0.03 K/UL (ref 0–0.04)
IMM GRANULOCYTES NFR BLD AUTO: 0.4 % (ref 0–0.5)
LYMPHOCYTES # BLD AUTO: 1.35 K/UL (ref 1–4.8)
MCH RBC QN AUTO: 30.2 PG (ref 27–31)
MCHC RBC AUTO-ENTMCNC: 34.1 G/DL (ref 32–36)
MCV RBC AUTO: 89 FL (ref 82–98)
NUCLEATED RBC (/100WBC) (OHS): 0 /100 WBC
OHS QRS DURATION: 114 MS
OHS QRS DURATION: 122 MS
OHS QTC CALCULATION: 449 MS
OHS QTC CALCULATION: 449 MS
PLATELET # BLD AUTO: 186 K/UL (ref 150–450)
PMV BLD AUTO: 10.6 FL (ref 9.2–12.9)
POTASSIUM SERPL-SCNC: 4.1 MMOL/L (ref 3.5–5.1)
PROT SERPL-MCNC: 5.5 GM/DL (ref 6–8.4)
RBC # BLD AUTO: 4.04 M/UL (ref 4.6–6.2)
RELATIVE EOSINOPHIL (OHS): 1.5 %
RELATIVE LYMPHOCYTE (OHS): 16.6 % (ref 18–48)
RELATIVE MONOCYTE (OHS): 12.5 % (ref 4–15)
RELATIVE NEUTROPHIL (OHS): 68.4 % (ref 38–73)
SODIUM SERPL-SCNC: 134 MMOL/L (ref 136–145)
VANCOMYCIN SERPL-MCNC: 12.9 UG/ML (ref ?–80)
WBC # BLD AUTO: 8.15 K/UL (ref 3.9–12.7)

## 2025-07-29 PROCEDURE — 93010 ELECTROCARDIOGRAM REPORT: CPT | Mod: ,,, | Performed by: INTERNAL MEDICINE

## 2025-07-29 PROCEDURE — 25000003 PHARM REV CODE 250

## 2025-07-29 PROCEDURE — 25000003 PHARM REV CODE 250: Performed by: PHYSICIAN ASSISTANT

## 2025-07-29 PROCEDURE — 63600175 PHARM REV CODE 636 W HCPCS

## 2025-07-29 PROCEDURE — 25000003 PHARM REV CODE 250: Performed by: FAMILY MEDICINE

## 2025-07-29 PROCEDURE — 36415 COLL VENOUS BLD VENIPUNCTURE: CPT | Performed by: STUDENT IN AN ORGANIZED HEALTH CARE EDUCATION/TRAINING PROGRAM

## 2025-07-29 PROCEDURE — 51702 INSERT TEMP BLADDER CATH: CPT

## 2025-07-29 PROCEDURE — 99024 POSTOP FOLLOW-UP VISIT: CPT | Mod: ,,, | Performed by: UROLOGY

## 2025-07-29 PROCEDURE — 25000003 PHARM REV CODE 250: Performed by: STUDENT IN AN ORGANIZED HEALTH CARE EDUCATION/TRAINING PROGRAM

## 2025-07-29 PROCEDURE — 99232 SBSQ HOSP IP/OBS MODERATE 35: CPT | Mod: 95,,, | Performed by: STUDENT IN AN ORGANIZED HEALTH CARE EDUCATION/TRAINING PROGRAM

## 2025-07-29 PROCEDURE — 80053 COMPREHEN METABOLIC PANEL: CPT | Performed by: HOSPITALIST

## 2025-07-29 PROCEDURE — 85025 COMPLETE CBC W/AUTO DIFF WBC: CPT | Performed by: HOSPITALIST

## 2025-07-29 PROCEDURE — 3E1K78Z IRRIGATION OF GENITOURINARY TRACT USING IRRIGATING SUBSTANCE, VIA NATURAL OR ARTIFICIAL OPENING: ICD-10-PCS | Performed by: UROLOGY

## 2025-07-29 PROCEDURE — 21400001 HC TELEMETRY ROOM

## 2025-07-29 PROCEDURE — 63600175 PHARM REV CODE 636 W HCPCS: Performed by: INTERNAL MEDICINE

## 2025-07-29 PROCEDURE — 25000003 PHARM REV CODE 250: Performed by: HOSPITALIST

## 2025-07-29 PROCEDURE — 80202 ASSAY OF VANCOMYCIN: CPT | Performed by: STUDENT IN AN ORGANIZED HEALTH CARE EDUCATION/TRAINING PROGRAM

## 2025-07-29 PROCEDURE — 99232 SBSQ HOSP IP/OBS MODERATE 35: CPT | Mod: ,,, | Performed by: INTERNAL MEDICINE

## 2025-07-29 PROCEDURE — 51798 US URINE CAPACITY MEASURE: CPT

## 2025-07-29 PROCEDURE — 36415 COLL VENOUS BLD VENIPUNCTURE: CPT | Performed by: HOSPITALIST

## 2025-07-29 PROCEDURE — 93005 ELECTROCARDIOGRAM TRACING: CPT

## 2025-07-29 PROCEDURE — 94761 N-INVAS EAR/PLS OXIMETRY MLT: CPT

## 2025-07-29 RX ORDER — LINEZOLID 600 MG/1
600 TABLET, FILM COATED ORAL EVERY 12 HOURS
Status: DISCONTINUED | OUTPATIENT
Start: 2025-07-29 | End: 2025-08-05

## 2025-07-29 RX ORDER — METOPROLOL SUCCINATE 25 MG/1
25 TABLET, EXTENDED RELEASE ORAL DAILY
Status: DISCONTINUED | OUTPATIENT
Start: 2025-07-30 | End: 2025-08-05 | Stop reason: HOSPADM

## 2025-07-29 RX ADMIN — MELATONIN TAB 3 MG 3 MG: 3 TAB at 09:07

## 2025-07-29 RX ADMIN — AMIODARONE HYDROCHLORIDE 400 MG: 200 TABLET ORAL at 09:07

## 2025-07-29 RX ADMIN — PANTOPRAZOLE SODIUM 40 MG: 40 TABLET, DELAYED RELEASE ORAL at 10:07

## 2025-07-29 RX ADMIN — ENOXAPARIN SODIUM 80 MG: 80 INJECTION SUBCUTANEOUS at 10:07

## 2025-07-29 RX ADMIN — AMIODARONE HYDROCHLORIDE 400 MG: 200 TABLET ORAL at 10:07

## 2025-07-29 RX ADMIN — ENOXAPARIN SODIUM 80 MG: 80 INJECTION SUBCUTANEOUS at 09:07

## 2025-07-29 RX ADMIN — LINEZOLID 600 MG: 600 TABLET, FILM COATED ORAL at 09:07

## 2025-07-29 RX ADMIN — ACETAMINOPHEN 650 MG: 325 TABLET ORAL at 09:07

## 2025-07-29 RX ADMIN — LINEZOLID 600 MG: 600 TABLET, FILM COATED ORAL at 10:07

## 2025-07-29 RX ADMIN — VANCOMYCIN HYDROCHLORIDE 1500 MG: 1.5 INJECTION, POWDER, LYOPHILIZED, FOR SOLUTION INTRAVENOUS at 03:07

## 2025-07-29 RX ADMIN — METOPROLOL SUCCINATE 12.5 MG: 25 TABLET, EXTENDED RELEASE ORAL at 01:07

## 2025-07-29 RX ADMIN — METOPROLOL SUCCINATE 12.5 MG: 25 TABLET, EXTENDED RELEASE ORAL at 10:07

## 2025-07-30 LAB
ABSOLUTE EOSINOPHIL (OHS): 0.11 K/UL
ABSOLUTE MONOCYTE (OHS): 0.44 K/UL (ref 0.3–1)
ABSOLUTE NEUTROPHIL COUNT (OHS): 5.21 K/UL (ref 1.8–7.7)
ALBUMIN SERPL BCP-MCNC: 2.9 G/DL (ref 3.5–5.2)
ALP SERPL-CCNC: 101 UNIT/L (ref 40–150)
ALT SERPL W/O P-5'-P-CCNC: 47 UNIT/L (ref 10–44)
ANION GAP (OHS): 8 MMOL/L (ref 8–16)
AST SERPL-CCNC: 38 UNIT/L (ref 11–45)
BASOPHILS # BLD AUTO: 0.04 K/UL
BASOPHILS NFR BLD AUTO: 0.6 %
BILIRUB SERPL-MCNC: 0.7 MG/DL (ref 0.1–1)
BUN SERPL-MCNC: 19 MG/DL (ref 8–23)
CALCIUM SERPL-MCNC: 8.6 MG/DL (ref 8.7–10.5)
CHLORIDE SERPL-SCNC: 105 MMOL/L (ref 95–110)
CO2 SERPL-SCNC: 27 MMOL/L (ref 23–29)
CREAT SERPL-MCNC: 1.2 MG/DL (ref 0.5–1.4)
ERYTHROCYTE [DISTWIDTH] IN BLOOD BY AUTOMATED COUNT: 13.1 % (ref 11.5–14.5)
GFR SERPLBLD CREATININE-BSD FMLA CKD-EPI: >60 ML/MIN/1.73/M2
GLUCOSE SERPL-MCNC: 152 MG/DL (ref 70–110)
HCT VFR BLD AUTO: 36.5 % (ref 40–54)
HGB BLD-MCNC: 12.5 GM/DL (ref 14–18)
HOLD SPECIMEN: NORMAL
IMM GRANULOCYTES # BLD AUTO: 0.03 K/UL (ref 0–0.04)
IMM GRANULOCYTES NFR BLD AUTO: 0.5 % (ref 0–0.5)
LYMPHOCYTES # BLD AUTO: 0.79 K/UL (ref 1–4.8)
MAGNESIUM SERPL-MCNC: 1.9 MG/DL (ref 1.6–2.6)
MCH RBC QN AUTO: 30 PG (ref 27–31)
MCHC RBC AUTO-ENTMCNC: 34.2 G/DL (ref 32–36)
MCV RBC AUTO: 88 FL (ref 82–98)
NUCLEATED RBC (/100WBC) (OHS): 0 /100 WBC
PLATELET # BLD AUTO: 180 K/UL (ref 150–450)
PMV BLD AUTO: 10.2 FL (ref 9.2–12.9)
POTASSIUM SERPL-SCNC: 4.2 MMOL/L (ref 3.5–5.1)
PROT SERPL-MCNC: 5.8 GM/DL (ref 6–8.4)
RBC # BLD AUTO: 4.16 M/UL (ref 4.6–6.2)
RELATIVE EOSINOPHIL (OHS): 1.7 %
RELATIVE LYMPHOCYTE (OHS): 11.9 % (ref 18–48)
RELATIVE MONOCYTE (OHS): 6.6 % (ref 4–15)
RELATIVE NEUTROPHIL (OHS): 78.7 % (ref 38–73)
SODIUM SERPL-SCNC: 140 MMOL/L (ref 136–145)
WBC # BLD AUTO: 6.62 K/UL (ref 3.9–12.7)

## 2025-07-30 PROCEDURE — 25000003 PHARM REV CODE 250: Performed by: HOSPITALIST

## 2025-07-30 PROCEDURE — 94761 N-INVAS EAR/PLS OXIMETRY MLT: CPT

## 2025-07-30 PROCEDURE — 25000003 PHARM REV CODE 250: Performed by: FAMILY MEDICINE

## 2025-07-30 PROCEDURE — 85025 COMPLETE CBC W/AUTO DIFF WBC: CPT | Performed by: PHYSICIAN ASSISTANT

## 2025-07-30 PROCEDURE — 27000221 HC OXYGEN, UP TO 24 HOURS

## 2025-07-30 PROCEDURE — 36415 COLL VENOUS BLD VENIPUNCTURE: CPT | Performed by: PHYSICIAN ASSISTANT

## 2025-07-30 PROCEDURE — 80053 COMPREHEN METABOLIC PANEL: CPT | Performed by: PHYSICIAN ASSISTANT

## 2025-07-30 PROCEDURE — 63600175 PHARM REV CODE 636 W HCPCS: Performed by: INTERNAL MEDICINE

## 2025-07-30 PROCEDURE — 21400001 HC TELEMETRY ROOM

## 2025-07-30 PROCEDURE — 83735 ASSAY OF MAGNESIUM: CPT | Performed by: PHYSICIAN ASSISTANT

## 2025-07-30 PROCEDURE — 99232 SBSQ HOSP IP/OBS MODERATE 35: CPT | Mod: ,,, | Performed by: INTERNAL MEDICINE

## 2025-07-30 PROCEDURE — 25000003 PHARM REV CODE 250: Performed by: PHYSICIAN ASSISTANT

## 2025-07-30 PROCEDURE — 25000003 PHARM REV CODE 250: Performed by: STUDENT IN AN ORGANIZED HEALTH CARE EDUCATION/TRAINING PROGRAM

## 2025-07-30 PROCEDURE — 99900035 HC TECH TIME PER 15 MIN (STAT)

## 2025-07-30 RX ORDER — FUROSEMIDE 20 MG/1
20 TABLET ORAL DAILY
Status: DISCONTINUED | OUTPATIENT
Start: 2025-07-30 | End: 2025-08-05 | Stop reason: HOSPADM

## 2025-07-30 RX ORDER — MUPIROCIN 20 MG/G
OINTMENT TOPICAL 2 TIMES DAILY
Status: COMPLETED | OUTPATIENT
Start: 2025-07-30 | End: 2025-08-04

## 2025-07-30 RX ADMIN — LINEZOLID 600 MG: 600 TABLET, FILM COATED ORAL at 09:07

## 2025-07-30 RX ADMIN — AMIODARONE HYDROCHLORIDE 400 MG: 200 TABLET ORAL at 08:07

## 2025-07-30 RX ADMIN — ACETAMINOPHEN 650 MG: 325 TABLET ORAL at 08:07

## 2025-07-30 RX ADMIN — LINEZOLID 600 MG: 600 TABLET, FILM COATED ORAL at 08:07

## 2025-07-30 RX ADMIN — FUROSEMIDE 20 MG: 20 TABLET ORAL at 01:07

## 2025-07-30 RX ADMIN — PANTOPRAZOLE SODIUM 40 MG: 40 TABLET, DELAYED RELEASE ORAL at 09:07

## 2025-07-30 RX ADMIN — METOPROLOL SUCCINATE 25 MG: 25 TABLET, EXTENDED RELEASE ORAL at 09:07

## 2025-07-30 RX ADMIN — SPIRONOLACTONE 12.5 MG: 25 TABLET ORAL at 06:07

## 2025-07-30 RX ADMIN — ENOXAPARIN SODIUM 80 MG: 80 INJECTION SUBCUTANEOUS at 08:07

## 2025-07-30 RX ADMIN — AMIODARONE HYDROCHLORIDE 400 MG: 200 TABLET ORAL at 09:07

## 2025-07-30 RX ADMIN — ENOXAPARIN SODIUM 80 MG: 80 INJECTION SUBCUTANEOUS at 09:07

## 2025-07-30 RX ADMIN — MUPIROCIN: 20 OINTMENT TOPICAL at 08:07

## 2025-07-31 LAB
ABSOLUTE EOSINOPHIL (OHS): 0.11 K/UL
ABSOLUTE MONOCYTE (OHS): 0.85 K/UL (ref 0.3–1)
ABSOLUTE NEUTROPHIL COUNT (OHS): 6.06 K/UL (ref 1.8–7.7)
ANION GAP (OHS): 9 MMOL/L (ref 8–16)
BASOPHILS # BLD AUTO: 0.05 K/UL
BASOPHILS NFR BLD AUTO: 0.6 %
BUN SERPL-MCNC: 15 MG/DL (ref 8–23)
CALCIUM SERPL-MCNC: 8.6 MG/DL (ref 8.7–10.5)
CHLORIDE SERPL-SCNC: 106 MMOL/L (ref 95–110)
CO2 SERPL-SCNC: 25 MMOL/L (ref 23–29)
CREAT SERPL-MCNC: 1.3 MG/DL (ref 0.5–1.4)
ERYTHROCYTE [DISTWIDTH] IN BLOOD BY AUTOMATED COUNT: 13.1 % (ref 11.5–14.5)
GFR SERPLBLD CREATININE-BSD FMLA CKD-EPI: 57 ML/MIN/1.73/M2
GLUCOSE SERPL-MCNC: 113 MG/DL (ref 70–110)
HCT VFR BLD AUTO: 36.5 % (ref 40–54)
HGB BLD-MCNC: 12.2 GM/DL (ref 14–18)
IMM GRANULOCYTES # BLD AUTO: 0.03 K/UL (ref 0–0.04)
IMM GRANULOCYTES NFR BLD AUTO: 0.4 % (ref 0–0.5)
LYMPHOCYTES # BLD AUTO: 1.24 K/UL (ref 1–4.8)
MAGNESIUM SERPL-MCNC: 1.8 MG/DL (ref 1.6–2.6)
MCH RBC QN AUTO: 30.1 PG (ref 27–31)
MCHC RBC AUTO-ENTMCNC: 33.4 G/DL (ref 32–36)
MCV RBC AUTO: 90 FL (ref 82–98)
NT-PROBNP SERPL-MCNC: 9213 PG/ML
NUCLEATED RBC (/100WBC) (OHS): 0 /100 WBC
PLATELET # BLD AUTO: 228 K/UL (ref 150–450)
PMV BLD AUTO: 10.4 FL (ref 9.2–12.9)
POTASSIUM SERPL-SCNC: 3.7 MMOL/L (ref 3.5–5.1)
RBC # BLD AUTO: 4.05 M/UL (ref 4.6–6.2)
RELATIVE EOSINOPHIL (OHS): 1.3 %
RELATIVE LYMPHOCYTE (OHS): 14.9 % (ref 18–48)
RELATIVE MONOCYTE (OHS): 10.2 % (ref 4–15)
RELATIVE NEUTROPHIL (OHS): 72.6 % (ref 38–73)
SODIUM SERPL-SCNC: 140 MMOL/L (ref 136–145)
WBC # BLD AUTO: 8.34 K/UL (ref 3.9–12.7)

## 2025-07-31 PROCEDURE — 63600175 PHARM REV CODE 636 W HCPCS: Performed by: INTERNAL MEDICINE

## 2025-07-31 PROCEDURE — 25000003 PHARM REV CODE 250: Performed by: STUDENT IN AN ORGANIZED HEALTH CARE EDUCATION/TRAINING PROGRAM

## 2025-07-31 PROCEDURE — 82374 ASSAY BLOOD CARBON DIOXIDE: CPT | Performed by: PHYSICIAN ASSISTANT

## 2025-07-31 PROCEDURE — 25000003 PHARM REV CODE 250: Performed by: HOSPITALIST

## 2025-07-31 PROCEDURE — 83735 ASSAY OF MAGNESIUM: CPT | Performed by: PHYSICIAN ASSISTANT

## 2025-07-31 PROCEDURE — 85025 COMPLETE CBC W/AUTO DIFF WBC: CPT | Performed by: PHYSICIAN ASSISTANT

## 2025-07-31 PROCEDURE — 99024 POSTOP FOLLOW-UP VISIT: CPT | Mod: ,,, | Performed by: UROLOGY

## 2025-07-31 PROCEDURE — 25000003 PHARM REV CODE 250: Performed by: FAMILY MEDICINE

## 2025-07-31 PROCEDURE — 36415 COLL VENOUS BLD VENIPUNCTURE: CPT | Performed by: PHYSICIAN ASSISTANT

## 2025-07-31 PROCEDURE — 94761 N-INVAS EAR/PLS OXIMETRY MLT: CPT

## 2025-07-31 PROCEDURE — 83880 ASSAY OF NATRIURETIC PEPTIDE: CPT | Performed by: PHYSICIAN ASSISTANT

## 2025-07-31 PROCEDURE — 99233 SBSQ HOSP IP/OBS HIGH 50: CPT | Mod: ,,, | Performed by: INTERNAL MEDICINE

## 2025-07-31 PROCEDURE — 25000003 PHARM REV CODE 250: Performed by: PHYSICIAN ASSISTANT

## 2025-07-31 PROCEDURE — 21400001 HC TELEMETRY ROOM

## 2025-07-31 RX ADMIN — LINEZOLID 600 MG: 600 TABLET, FILM COATED ORAL at 08:07

## 2025-07-31 RX ADMIN — METOPROLOL SUCCINATE 25 MG: 25 TABLET, EXTENDED RELEASE ORAL at 08:07

## 2025-07-31 RX ADMIN — MUPIROCIN: 20 OINTMENT TOPICAL at 09:07

## 2025-07-31 RX ADMIN — ENOXAPARIN SODIUM 80 MG: 80 INJECTION SUBCUTANEOUS at 09:07

## 2025-07-31 RX ADMIN — SPIRONOLACTONE 12.5 MG: 25 TABLET ORAL at 08:07

## 2025-07-31 RX ADMIN — MUPIROCIN: 20 OINTMENT TOPICAL at 08:07

## 2025-07-31 RX ADMIN — LINEZOLID 600 MG: 600 TABLET, FILM COATED ORAL at 09:07

## 2025-07-31 RX ADMIN — ACETAMINOPHEN 650 MG: 325 TABLET ORAL at 07:07

## 2025-07-31 RX ADMIN — AMIODARONE HYDROCHLORIDE 400 MG: 200 TABLET ORAL at 09:07

## 2025-07-31 RX ADMIN — ENOXAPARIN SODIUM 80 MG: 80 INJECTION SUBCUTANEOUS at 08:07

## 2025-07-31 RX ADMIN — AMIODARONE HYDROCHLORIDE 400 MG: 200 TABLET ORAL at 08:07

## 2025-07-31 RX ADMIN — FUROSEMIDE 20 MG: 20 TABLET ORAL at 08:07

## 2025-07-31 RX ADMIN — PANTOPRAZOLE SODIUM 40 MG: 40 TABLET, DELAYED RELEASE ORAL at 08:07

## 2025-08-01 ENCOUNTER — ANESTHESIA EVENT (OUTPATIENT)
Dept: SURGERY | Facility: HOSPITAL | Age: 75
End: 2025-08-01
Payer: MEDICARE

## 2025-08-01 ENCOUNTER — ANESTHESIA (OUTPATIENT)
Dept: SURGERY | Facility: HOSPITAL | Age: 75
End: 2025-08-01
Payer: MEDICARE

## 2025-08-01 LAB
ABSOLUTE EOSINOPHIL (OHS): 0.08 K/UL
ABSOLUTE MONOCYTE (OHS): 0.57 K/UL (ref 0.3–1)
ABSOLUTE NEUTROPHIL COUNT (OHS): 4.44 K/UL (ref 1.8–7.7)
ALBUMIN SERPL BCP-MCNC: 2.8 G/DL (ref 3.5–5.2)
ALP SERPL-CCNC: 97 UNIT/L (ref 40–150)
ALT SERPL W/O P-5'-P-CCNC: 69 UNIT/L (ref 10–44)
ANION GAP (OHS): 10 MMOL/L (ref 8–16)
AST SERPL-CCNC: 48 UNIT/L (ref 11–45)
BACTERIA BLD CULT: NORMAL
BASOPHILS # BLD AUTO: 0.05 K/UL
BASOPHILS NFR BLD AUTO: 0.8 %
BILIRUB SERPL-MCNC: 0.6 MG/DL (ref 0.1–1)
BUN SERPL-MCNC: 15 MG/DL (ref 8–23)
CALCIUM SERPL-MCNC: 8.8 MG/DL (ref 8.7–10.5)
CHLORIDE SERPL-SCNC: 105 MMOL/L (ref 95–110)
CO2 SERPL-SCNC: 26 MMOL/L (ref 23–29)
CREAT SERPL-MCNC: 1.3 MG/DL (ref 0.5–1.4)
ERYTHROCYTE [DISTWIDTH] IN BLOOD BY AUTOMATED COUNT: 13.2 % (ref 11.5–14.5)
GFR SERPLBLD CREATININE-BSD FMLA CKD-EPI: 57 ML/MIN/1.73/M2
GLUCOSE SERPL-MCNC: 105 MG/DL (ref 70–110)
HCT VFR BLD AUTO: 37.5 % (ref 40–54)
HGB BLD-MCNC: 12.5 GM/DL (ref 14–18)
IMM GRANULOCYTES # BLD AUTO: 0.02 K/UL (ref 0–0.04)
IMM GRANULOCYTES NFR BLD AUTO: 0.3 % (ref 0–0.5)
LYMPHOCYTES # BLD AUTO: 1.05 K/UL (ref 1–4.8)
MAGNESIUM SERPL-MCNC: 2 MG/DL (ref 1.6–2.6)
MCH RBC QN AUTO: 29.8 PG (ref 27–31)
MCHC RBC AUTO-ENTMCNC: 33.3 G/DL (ref 32–36)
MCV RBC AUTO: 89 FL (ref 82–98)
NUCLEATED RBC (/100WBC) (OHS): 0 /100 WBC
PLATELET # BLD AUTO: 226 K/UL (ref 150–450)
PMV BLD AUTO: 9.8 FL (ref 9.2–12.9)
POTASSIUM SERPL-SCNC: 4.4 MMOL/L (ref 3.5–5.1)
PROT SERPL-MCNC: 5.8 GM/DL (ref 6–8.4)
RBC # BLD AUTO: 4.2 M/UL (ref 4.6–6.2)
RELATIVE EOSINOPHIL (OHS): 1.3 %
RELATIVE LYMPHOCYTE (OHS): 16.9 % (ref 18–48)
RELATIVE MONOCYTE (OHS): 9.2 % (ref 4–15)
RELATIVE NEUTROPHIL (OHS): 71.5 % (ref 38–73)
SODIUM SERPL-SCNC: 141 MMOL/L (ref 136–145)
WBC # BLD AUTO: 6.21 K/UL (ref 3.9–12.7)

## 2025-08-01 PROCEDURE — C1769 GUIDE WIRE: HCPCS | Performed by: UROLOGY

## 2025-08-01 PROCEDURE — 71000033 HC RECOVERY, INTIAL HOUR: Performed by: UROLOGY

## 2025-08-01 PROCEDURE — 25000003 PHARM REV CODE 250

## 2025-08-01 PROCEDURE — 0V508ZZ DESTRUCTION OF PROSTATE, VIA NATURAL OR ARTIFICIAL OPENING ENDOSCOPIC: ICD-10-PCS | Performed by: UROLOGY

## 2025-08-01 PROCEDURE — 25000003 PHARM REV CODE 250: Performed by: HOSPITALIST

## 2025-08-01 PROCEDURE — 88304 TISSUE EXAM BY PATHOLOGIST: CPT | Mod: TC | Performed by: UROLOGY

## 2025-08-01 PROCEDURE — 53899 UNLISTED PX URINARY SYSTEM: CPT | Mod: ,,, | Performed by: UROLOGY

## 2025-08-01 PROCEDURE — 63600175 PHARM REV CODE 636 W HCPCS: Performed by: INTERNAL MEDICINE

## 2025-08-01 PROCEDURE — 21400001 HC TELEMETRY ROOM

## 2025-08-01 PROCEDURE — 0T9B70Z DRAINAGE OF BLADDER WITH DRAINAGE DEVICE, VIA NATURAL OR ARTIFICIAL OPENING: ICD-10-PCS | Performed by: UROLOGY

## 2025-08-01 PROCEDURE — 37000008 HC ANESTHESIA 1ST 15 MINUTES: Performed by: UROLOGY

## 2025-08-01 PROCEDURE — 83735 ASSAY OF MAGNESIUM: CPT | Performed by: PHYSICIAN ASSISTANT

## 2025-08-01 PROCEDURE — 82040 ASSAY OF SERUM ALBUMIN: CPT | Performed by: PHYSICIAN ASSISTANT

## 2025-08-01 PROCEDURE — 25000003 PHARM REV CODE 250: Performed by: STUDENT IN AN ORGANIZED HEALTH CARE EDUCATION/TRAINING PROGRAM

## 2025-08-01 PROCEDURE — 85025 COMPLETE CBC W/AUTO DIFF WBC: CPT | Performed by: PHYSICIAN ASSISTANT

## 2025-08-01 PROCEDURE — 36415 COLL VENOUS BLD VENIPUNCTURE: CPT | Performed by: PHYSICIAN ASSISTANT

## 2025-08-01 PROCEDURE — 63600175 PHARM REV CODE 636 W HCPCS

## 2025-08-01 PROCEDURE — 25000003 PHARM REV CODE 250: Performed by: PHYSICIAN ASSISTANT

## 2025-08-01 PROCEDURE — 36000707: Performed by: UROLOGY

## 2025-08-01 PROCEDURE — 36000706: Performed by: UROLOGY

## 2025-08-01 PROCEDURE — 25000003 PHARM REV CODE 250: Performed by: FAMILY MEDICINE

## 2025-08-01 PROCEDURE — 37000009 HC ANESTHESIA EA ADD 15 MINS: Performed by: UROLOGY

## 2025-08-01 PROCEDURE — 0TCB8ZZ EXTIRPATION OF MATTER FROM BLADDER, VIA NATURAL OR ARTIFICIAL OPENING ENDOSCOPIC: ICD-10-PCS | Performed by: UROLOGY

## 2025-08-01 PROCEDURE — 88304 TISSUE EXAM BY PATHOLOGIST: CPT | Mod: 26,,, | Performed by: PATHOLOGY

## 2025-08-01 PROCEDURE — 63600175 PHARM REV CODE 636 W HCPCS: Performed by: STUDENT IN AN ORGANIZED HEALTH CARE EDUCATION/TRAINING PROGRAM

## 2025-08-01 PROCEDURE — 27201423 OPTIME MED/SURG SUP & DEVICES STERILE SUPPLY: Performed by: UROLOGY

## 2025-08-01 PROCEDURE — 71000039 HC RECOVERY, EACH ADD'L HOUR: Performed by: UROLOGY

## 2025-08-01 PROCEDURE — 99232 SBSQ HOSP IP/OBS MODERATE 35: CPT | Mod: ,,, | Performed by: INTERNAL MEDICINE

## 2025-08-01 RX ORDER — SODIUM CHLORIDE, SODIUM LACTATE, POTASSIUM CHLORIDE, CALCIUM CHLORIDE 600; 310; 30; 20 MG/100ML; MG/100ML; MG/100ML; MG/100ML
INJECTION, SOLUTION INTRAVENOUS CONTINUOUS PRN
Status: DISCONTINUED | OUTPATIENT
Start: 2025-08-01 | End: 2025-08-01

## 2025-08-01 RX ORDER — ONDANSETRON HYDROCHLORIDE 2 MG/ML
4 INJECTION, SOLUTION INTRAVENOUS DAILY PRN
Status: DISCONTINUED | OUTPATIENT
Start: 2025-08-01 | End: 2025-08-01 | Stop reason: HOSPADM

## 2025-08-01 RX ORDER — SUCCINYLCHOLINE CHLORIDE 20 MG/ML
INJECTION INTRAMUSCULAR; INTRAVENOUS
Status: DISCONTINUED | OUTPATIENT
Start: 2025-08-01 | End: 2025-08-01

## 2025-08-01 RX ORDER — LIDOCAINE HYDROCHLORIDE 20 MG/ML
INJECTION INTRAVENOUS
Status: DISCONTINUED | OUTPATIENT
Start: 2025-08-01 | End: 2025-08-01

## 2025-08-01 RX ORDER — PROPOFOL 10 MG/ML
VIAL (ML) INTRAVENOUS
Status: DISCONTINUED | OUTPATIENT
Start: 2025-08-01 | End: 2025-08-01

## 2025-08-01 RX ORDER — ROCURONIUM BROMIDE 10 MG/ML
INJECTION, SOLUTION INTRAVENOUS
Status: DISCONTINUED | OUTPATIENT
Start: 2025-08-01 | End: 2025-08-01

## 2025-08-01 RX ORDER — HYDROMORPHONE HYDROCHLORIDE 2 MG/ML
0.2 INJECTION, SOLUTION INTRAMUSCULAR; INTRAVENOUS; SUBCUTANEOUS EVERY 5 MIN PRN
Status: DISCONTINUED | OUTPATIENT
Start: 2025-08-01 | End: 2025-08-01 | Stop reason: HOSPADM

## 2025-08-01 RX ORDER — GLUCAGON 1 MG
1 KIT INJECTION
Status: DISCONTINUED | OUTPATIENT
Start: 2025-08-01 | End: 2025-08-01 | Stop reason: HOSPADM

## 2025-08-01 RX ORDER — ETOMIDATE 2 MG/ML
INJECTION INTRAVENOUS
Status: DISCONTINUED | OUTPATIENT
Start: 2025-08-01 | End: 2025-08-01

## 2025-08-01 RX ORDER — OXYCODONE AND ACETAMINOPHEN 5; 325 MG/1; MG/1
1 TABLET ORAL
Status: DISCONTINUED | OUTPATIENT
Start: 2025-08-01 | End: 2025-08-01 | Stop reason: HOSPADM

## 2025-08-01 RX ORDER — ONDANSETRON HYDROCHLORIDE 2 MG/ML
INJECTION, SOLUTION INTRAVENOUS
Status: DISCONTINUED | OUTPATIENT
Start: 2025-08-01 | End: 2025-08-01

## 2025-08-01 RX ADMIN — LIDOCAINE HYDROCHLORIDE 50 MG: 20 INJECTION INTRAVENOUS at 01:08

## 2025-08-01 RX ADMIN — SODIUM CHLORIDE, SODIUM LACTATE, POTASSIUM CHLORIDE, AND CALCIUM CHLORIDE: .6; .31; .03; .02 INJECTION, SOLUTION INTRAVENOUS at 01:08

## 2025-08-01 RX ADMIN — METOPROLOL SUCCINATE 25 MG: 25 TABLET, EXTENDED RELEASE ORAL at 08:08

## 2025-08-01 RX ADMIN — ENOXAPARIN SODIUM 80 MG: 80 INJECTION SUBCUTANEOUS at 08:08

## 2025-08-01 RX ADMIN — HYDROMORPHONE HYDROCHLORIDE 0.2 MG: 2 INJECTION, SOLUTION INTRAMUSCULAR; INTRAVENOUS; SUBCUTANEOUS at 03:08

## 2025-08-01 RX ADMIN — HYDROCODONE BITARTRATE AND ACETAMINOPHEN 1 TABLET: 5; 325 TABLET ORAL at 04:08

## 2025-08-01 RX ADMIN — FUROSEMIDE 20 MG: 20 TABLET ORAL at 08:08

## 2025-08-01 RX ADMIN — SPIRONOLACTONE 12.5 MG: 25 TABLET ORAL at 08:08

## 2025-08-01 RX ADMIN — OXYCODONE HYDROCHLORIDE AND ACETAMINOPHEN 1 TABLET: 5; 325 TABLET ORAL at 03:08

## 2025-08-01 RX ADMIN — PANTOPRAZOLE SODIUM 40 MG: 40 TABLET, DELAYED RELEASE ORAL at 08:08

## 2025-08-01 RX ADMIN — MUPIROCIN: 20 OINTMENT TOPICAL at 08:08

## 2025-08-01 RX ADMIN — AMIODARONE HYDROCHLORIDE 400 MG: 200 TABLET ORAL at 08:08

## 2025-08-01 RX ADMIN — ONDANSETRON 4 MG: 2 INJECTION INTRAMUSCULAR; INTRAVENOUS at 01:08

## 2025-08-01 RX ADMIN — SUGAMMADEX 200 MG: 100 INJECTION, SOLUTION INTRAVENOUS at 03:08

## 2025-08-01 RX ADMIN — PROPOFOL 50 MG: 10 INJECTION, EMULSION INTRAVENOUS at 01:08

## 2025-08-01 RX ADMIN — ETOMIDATE 8 MG: 2 INJECTION INTRAVENOUS at 01:08

## 2025-08-01 RX ADMIN — ROCURONIUM BROMIDE 35 MG: 10 SOLUTION INTRAVENOUS at 01:08

## 2025-08-01 RX ADMIN — ROCURONIUM BROMIDE 5 MG: 10 SOLUTION INTRAVENOUS at 01:08

## 2025-08-01 RX ADMIN — LINEZOLID 600 MG: 600 TABLET, FILM COATED ORAL at 08:08

## 2025-08-01 RX ADMIN — SUCCINYLCHOLINE CHLORIDE 100 MG: 20 INJECTION, SOLUTION INTRAMUSCULAR; INTRAVENOUS; PARENTERAL at 01:08

## 2025-08-01 NOTE — TRANSFER OF CARE
Anesthesia Transfer of Care Note    Patient: Mekhi Lambert    Procedure(s) Performed: Procedure(s) (LRB):  CYSTOSCOPY W/ FULGURATION, BLADDER (N/A)  EVACUATION, HEMATOMA (N/A)    Patient location: PACU    Anesthesia Type: general    Transport from OR: Transported from OR on room air with adequate spontaneous ventilation    Post pain: adequate analgesia    Post assessment: no apparent anesthetic complications    Post vital signs: stable    Level of consciousness: responds to stimulation    Nausea/Vomiting: no nausea/vomiting    Complications: none    Transfer of care protocol was followed      Vitals:    08/01/25 1513   BP: 94/64   Pulse: (!) 119   Resp: (!) 33   Temp: 36.1 °C (97 °F)

## 2025-08-01 NOTE — ANESTHESIA PROCEDURE NOTES
Intubation    Date/Time: 8/1/2025 1:38 PM    Performed by: Darrin Fishman CRNA  Authorized by: Jovanny Tate MD    Intubation:     Induction:  Intravenous    Intubated:  Postinduction    Mask Ventilation:  Easy mask    Attempts:  1    Attempted By:  CRNA    Method of Intubation:  Direct    Blade:  Mk 3    Laryngeal View Grade: Grade I - full view of cords      Difficult Airway Encountered?: No      Complications:  None    Airway Device:  Oral endotracheal tube    Airway Device Size:  7.5    Style/Cuff Inflation:  Cuffed (inflated to minimal occlusive pressure)    Tube secured:  22    Secured at:  The lips    Placement Verified By:  Capnometry    Complicating Factors:  None    Findings Post-Intubation:  BS equal bilateral

## 2025-08-01 NOTE — ANESTHESIA PREPROCEDURE EVALUATION
08/01/2025  Mekhi Lambert is a 75 y.o., male.    Past Medical History:   Diagnosis Date    ALBERTO (acute kidney injury) 08/08/2022    Anticoagulant long-term use     Arthritis     Atrial fibrillation with RVR 7/27/2025    Cancer     Chronic systolic CHF (congestive heart failure) 02/26/2022    Coronary artery disease     Digestive disorder     Hiatal hernia with GERD 6/23/2025    Myocardial infarction     2/20/22    Statin myopathy 05/13/2024     Past Surgical History:   Procedure Laterality Date    CARDIAC CATHETERIZATION      COLONOSCOPY N/A 09/22/2021    Procedure: COLONOSCOPY;  Surgeon: Meaghan Jaime MD;  Location: [facility] ENDO;  Service: Endoscopy;  Laterality: N/A;    CORONARY ANGIOGRAPHY INCLUDING BYPASS GRAFTS WITH CATHETERIZATION OF LEFT HEART N/A 03/03/2020    Procedure: ANGIOGRAM, CORONARY, INCLUDING BYPASS GRAFT, WITH LEFT HEART CATHETERIZATION;  Surgeon: Ute Melchor MD;  Location: [facility] CATH LAB;  Service: Cardiology;  Laterality: N/A;    JOINT REPLACEMENT Right     knee    KNEE SURGERY      LEFT HEART CATHETERIZATION Left 02/22/2020    Procedure: CATHETERIZATION, HEART, LEFT;  Surgeon: Ute Melchor MD;  Location: [facility] CATH LAB;  Service: Cardiology;  Laterality: Left;    LEFT HEART CATHETERIZATION Left 02/26/2022    Procedure: CATHETERIZATION, HEART, LEFT;  Surgeon: Ute Melchor MD;  Location: [facility] CATH LAB;  Service: Cardiology;  Laterality: Left;    PERCUTANEOUS TRANSLUMINAL BALLOON ANGIOPLASTY OF CORONARY ARTERY Left 02/22/2020    Procedure: Angioplasty-coronary;  Surgeon: Ute Melchor MD;  Location: [facility] CATH LAB;  Service: Cardiology;  Laterality: Left;    PERCUTANEOUS TRANSLUMINAL BALLOON ANGIOPLASTY OF CORONARY ARTERY  02/26/2022    Procedure: Angioplasty-coronary;  Surgeon: Ute Melchor MD;  Location: [facility] CATH LAB;  Service: Cardiology;;    REPAIR,  HERNIA, INGUINAL Right 4/5/2024    Procedure: REPAIR, HERNIA, INGUINAL;  Surgeon: Franklin Fuentes MD;  Location: [facility] OR;  Service: General;  Laterality: Right;    RIGHT HEMICOLECTOMY N/A 04/27/2022    Procedure: HEMICOLECTOMY, RIGHT;  Surgeon: See Dominguez MD;  Location: [facility] OR;  Service: General;  Laterality: N/A;    SHOULDER ARTHROSCOPY Right     Dr Bella    SIGMOIDECTOMY      simple prostatectomy  06/06/2016    robotic assisted partial prostectomy due to BPH    TOTAL KNEE ARTHROPLASTY      TRANSESOPHAGEAL ECHOCARDIOGRAM WITH POSSIBLE CARDIOVERSION (JORDAN W/ POSS CARDIOVERSION) N/A 7/28/2025    Procedure: Transesophageal echo (JORDAN) intra-procedure log documentation;  Surgeon: Hong Carrasco MD;  Location: [facility] CATH LAB;  Service: Cardiology;  Laterality: N/A;    TRANSURETHRAL RESECTION OF PROSTATE N/A 6/30/2025    Procedure: TURP (TRANSURETHRAL RESECTION OF PROSTATE);  Surgeon: Clarence Harris MD;  Location: [facility] OR;  Service: Urology;  Laterality: N/A;       Pre-op Assessment    I have reviewed the Patient Summary Reports.     I have reviewed the Nursing Notes. I have reviewed the NPO Status.   I have reviewed the Medications.     Review of Systems  Anesthesia Hx:  No problems with previous Anesthesia  Underwent Gen and MAC anesthesia within last 6 mths - tolerated Etomidate, low-dose Fent,and Prop; Ephedrine used for BP support     Currently in A-Fib    Lovenox dose this AM around 08:00 History of prior surgery of interest to airway management or planning:  Previous anesthesia: General, MAC          Denies Personal Hx of Anesthesia complications.                    Social:  Non-Smoker, Social Alcohol Use       Hematology/Oncology:  Hematology Normal   Oncology Normal                                   Cardiovascular:       Past MI CAD    Dysrhythmias (recent DCCV under MAC (7/28/25)) atrial fibrillation Angina CHF       ECG has been reviewed. Atrial fibrillation   Low voltage QRS   Incomplete  "left bundle branch block   Abnormal ECG   No previous ECGs available   Confirmed by Ute Melchor ([id number]) on 7/29/2025 8:12:37 PM       Stents X 3.    Cards:  "Patient at high risk for pantera and post op CV complications. No further cardiac testing need prior to surgery.   Continue metoprolol - CHF/CAD  Low fat, low salt diet, exercise as tolerated  RTC in 6 months with Dr. Barr per patient request"                             Pulmonary:  Pulmonary Normal                       Renal/:  Chronic Renal Disease  BPH CKD-3             Hepatic/GI:    Hiatal Hernia, GERD, well controlled   Took his antacid last night.             Neurological:  TIA,                                     Endocrine:  Endocrine Normal    Bmi 26        Dermatological:  Skin Normal    Psych:  Psychiatric History anxiety               Physical Exam  General: Well nourished, Cooperative, Alert and Oriented    Airway:  Mallampati: I / II  Mouth Opening: Normal  TM Distance: Normal  Tongue: Normal  Neck ROM: Normal ROM    Dental:  Intact      Anesthesia Plan  Type of Anesthesia, risks & benefits discussed:    Anesthesia Type: Gen ETT  Intra-op Monitoring Plan: Standard ASA Monitors  Post Op Pain Control Plan: multimodal analgesia and IV/PO Opioids PRN  Induction:  IV  Airway Plan: Direct, Post-Induction  Informed Consent: Informed consent signed with the Patient and all parties understand the risks and agree with anesthesia plan.  All questions answered.   ASA Score: 4  Day of Surgery Review of History & Physical: H&P Update referred to the surgeon/provider.I have interviewed and examined the patient. I have reviewed the patient's H&P dated: There are no significant changes. H&P completed by Anesthesiologist.  Anesthesia Plan Notes: Pt exercises every day.  No chest pain.  No shortness of breath even when lying flat.    Intubation     Date/Time: 6/30/2025 8:22 AM     Performed by: Fran Devlin III, CRNA  Authorized by: Gaudencio Clinton II, MD  "   Intubation:     Induction:  Intravenous    Intubated:  Postinduction    Mask Ventilation:  Not attempted    Attempts:  1    Attempted By:  CRNA    Method of Intubation:  Direct    Blade:  Fishman 2    Laryngeal View Grade: Grade I - full view of cords      Difficult Airway Encountered?: No      Complications:  None    Airway Device:  Oral endotracheal tube    Airway Device Size:  7.5    Style/Cuff Inflation:  Cuffed (inflated to minimal occlusive pressure)    Tube secured:  23    Secured at:  The lips    Placement Verified By:  Capnometry    Complicating Factors:  None    Findings Post-Intubation:  BS equal bilateral and atraumatic/condition of teeth unchanged      Ready For Surgery From Anesthesia Perspective.     .      Chemistry        Component Value Date/Time     08/01/2025 0854     03/18/2025 0753    K 4.4 08/01/2025 0854    K 4.9 03/18/2025 0753     08/01/2025 0854     03/18/2025 0753    CO2 26 08/01/2025 0854    CO2 25 03/18/2025 0753    BUN 15 08/01/2025 0854    CREATININE 1.3 08/01/2025 0854     08/01/2025 0854    GLU 83 03/18/2025 0753        Component Value Date/Time    CALCIUM 8.8 08/01/2025 0854    CALCIUM 9.1 03/18/2025 0753    ALKPHOS 97 08/01/2025 0854    ALKPHOS 67 03/18/2025 0753    AST 48 (H) 08/01/2025 0854    AST 24 03/18/2025 0753    ALT 69 (H) 08/01/2025 0854    ALT 17 03/18/2025 0753    BILITOT 0.6 08/01/2025 0854    BILITOT 1.1 (H) 03/18/2025 0753    ESTGFRAFRICA 61 09/18/2024 0558    ESTGFRAFRICA >60 05/05/2022 1647    EGFRNONAA >60 05/05/2022 1647        Lab Results   Component Value Date    WBC 6.21 08/01/2025    HGB 12.5 (L) 08/01/2025    HCT 37.5 (L) 08/01/2025    MCV 89 08/01/2025     08/01/2025     Summary         Left Ventricle: Severe global hypokinesis present. There is severely reduced systolic function with a visually estimated ejection fraction of 15 - 20%.    Right Ventricle: Systolic function is moderately reduced.    Left Atrium: The  left atrium is dilated. The left atrial appendage has a windsock morphology. Appendage velocity is reduced at less than 40 cm/sec. There is no thrombus in the left atrial appendage.    Right Atrium: The right atrium is dilated.    Mitral Valve: There is mild regurgitation with multiple jets.    Tricuspid Valve: There is mild regurgitation.    The pre-cardioversion rhythm was atrial fibrillation. A 200 J synchronized cardioversion was successfully performed with restoration of normal sinus rhythm. The post-cardioversion rhythm was normal sinus rhythm. Post cardioversion heart rate was 72 BPM.    Echo 9/24 showed EF 15-20%

## 2025-08-02 PROCEDURE — 99232 SBSQ HOSP IP/OBS MODERATE 35: CPT | Mod: ,,, | Performed by: INTERNAL MEDICINE

## 2025-08-02 PROCEDURE — 63600175 PHARM REV CODE 636 W HCPCS: Performed by: HOSPITALIST

## 2025-08-02 PROCEDURE — 25000003 PHARM REV CODE 250: Performed by: STUDENT IN AN ORGANIZED HEALTH CARE EDUCATION/TRAINING PROGRAM

## 2025-08-02 PROCEDURE — 25000003 PHARM REV CODE 250: Performed by: PHYSICIAN ASSISTANT

## 2025-08-02 PROCEDURE — 94761 N-INVAS EAR/PLS OXIMETRY MLT: CPT

## 2025-08-02 PROCEDURE — 21400001 HC TELEMETRY ROOM

## 2025-08-02 PROCEDURE — 25000003 PHARM REV CODE 250: Performed by: FAMILY MEDICINE

## 2025-08-02 PROCEDURE — 63600175 PHARM REV CODE 636 W HCPCS: Performed by: INTERNAL MEDICINE

## 2025-08-02 PROCEDURE — 25000003 PHARM REV CODE 250: Performed by: HOSPITALIST

## 2025-08-02 RX ADMIN — ACETAMINOPHEN 650 MG: 325 TABLET ORAL at 07:08

## 2025-08-02 RX ADMIN — METOPROLOL SUCCINATE 25 MG: 25 TABLET, EXTENDED RELEASE ORAL at 08:08

## 2025-08-02 RX ADMIN — AMIODARONE HYDROCHLORIDE 400 MG: 200 TABLET ORAL at 08:08

## 2025-08-02 RX ADMIN — ENOXAPARIN SODIUM 80 MG: 80 INJECTION SUBCUTANEOUS at 08:08

## 2025-08-02 RX ADMIN — MUPIROCIN: 20 OINTMENT TOPICAL at 08:08

## 2025-08-02 RX ADMIN — MORPHINE SULFATE 2 MG: 4 INJECTION INTRAVENOUS at 01:08

## 2025-08-02 RX ADMIN — LINEZOLID 600 MG: 600 TABLET, FILM COATED ORAL at 08:08

## 2025-08-02 RX ADMIN — PANTOPRAZOLE SODIUM 40 MG: 40 TABLET, DELAYED RELEASE ORAL at 08:08

## 2025-08-02 RX ADMIN — MUPIROCIN: 20 OINTMENT TOPICAL at 09:08

## 2025-08-03 LAB
ABSOLUTE EOSINOPHIL (OHS): 0.13 K/UL
ABSOLUTE MONOCYTE (OHS): 0.59 K/UL (ref 0.3–1)
ABSOLUTE NEUTROPHIL COUNT (OHS): 4.45 K/UL (ref 1.8–7.7)
ALBUMIN SERPL BCP-MCNC: 2.9 G/DL (ref 3.5–5.2)
ALP SERPL-CCNC: 86 UNIT/L (ref 40–150)
ALT SERPL W/O P-5'-P-CCNC: 105 UNIT/L (ref 10–44)
ANION GAP (OHS): 8 MMOL/L (ref 8–16)
AST SERPL-CCNC: 95 UNIT/L (ref 11–45)
BASOPHILS # BLD AUTO: 0.04 K/UL
BASOPHILS NFR BLD AUTO: 0.6 %
BILIRUB SERPL-MCNC: 0.5 MG/DL (ref 0.1–1)
BUN SERPL-MCNC: 18 MG/DL (ref 8–23)
CALCIUM SERPL-MCNC: 8.5 MG/DL (ref 8.7–10.5)
CHLORIDE SERPL-SCNC: 104 MMOL/L (ref 95–110)
CO2 SERPL-SCNC: 24 MMOL/L (ref 23–29)
CREAT SERPL-MCNC: 1.4 MG/DL (ref 0.5–1.4)
ERYTHROCYTE [DISTWIDTH] IN BLOOD BY AUTOMATED COUNT: 13 % (ref 11.5–14.5)
GFR SERPLBLD CREATININE-BSD FMLA CKD-EPI: 52 ML/MIN/1.73/M2
GLUCOSE SERPL-MCNC: 133 MG/DL (ref 70–110)
HCT VFR BLD AUTO: 35.3 % (ref 40–54)
HGB BLD-MCNC: 12.2 GM/DL (ref 14–18)
IMM GRANULOCYTES # BLD AUTO: 0.03 K/UL (ref 0–0.04)
IMM GRANULOCYTES NFR BLD AUTO: 0.5 % (ref 0–0.5)
LYMPHOCYTES # BLD AUTO: 1.1 K/UL (ref 1–4.8)
MAGNESIUM SERPL-MCNC: 2 MG/DL (ref 1.6–2.6)
MCH RBC QN AUTO: 30 PG (ref 27–31)
MCHC RBC AUTO-ENTMCNC: 34.6 G/DL (ref 32–36)
MCV RBC AUTO: 87 FL (ref 82–98)
NUCLEATED RBC (/100WBC) (OHS): 0 /100 WBC
PHOSPHATE SERPL-MCNC: 3 MG/DL (ref 2.7–4.5)
PLATELET # BLD AUTO: 271 K/UL (ref 150–450)
PMV BLD AUTO: 9.1 FL (ref 9.2–12.9)
POTASSIUM SERPL-SCNC: 3.7 MMOL/L (ref 3.5–5.1)
PROT SERPL-MCNC: 5.6 GM/DL (ref 6–8.4)
RBC # BLD AUTO: 4.07 M/UL (ref 4.6–6.2)
RELATIVE EOSINOPHIL (OHS): 2.1 %
RELATIVE LYMPHOCYTE (OHS): 17.4 % (ref 18–48)
RELATIVE MONOCYTE (OHS): 9.3 % (ref 4–15)
RELATIVE NEUTROPHIL (OHS): 70.1 % (ref 38–73)
SODIUM SERPL-SCNC: 136 MMOL/L (ref 136–145)
WBC # BLD AUTO: 6.34 K/UL (ref 3.9–12.7)

## 2025-08-03 PROCEDURE — 25000003 PHARM REV CODE 250: Performed by: INTERNAL MEDICINE

## 2025-08-03 PROCEDURE — 25000003 PHARM REV CODE 250: Performed by: STUDENT IN AN ORGANIZED HEALTH CARE EDUCATION/TRAINING PROGRAM

## 2025-08-03 PROCEDURE — 63600175 PHARM REV CODE 636 W HCPCS: Performed by: INTERNAL MEDICINE

## 2025-08-03 PROCEDURE — 21400001 HC TELEMETRY ROOM

## 2025-08-03 PROCEDURE — 25000003 PHARM REV CODE 250: Performed by: FAMILY MEDICINE

## 2025-08-03 PROCEDURE — 84100 ASSAY OF PHOSPHORUS: CPT | Performed by: STUDENT IN AN ORGANIZED HEALTH CARE EDUCATION/TRAINING PROGRAM

## 2025-08-03 PROCEDURE — 99233 SBSQ HOSP IP/OBS HIGH 50: CPT | Mod: ,,, | Performed by: INTERNAL MEDICINE

## 2025-08-03 PROCEDURE — 83735 ASSAY OF MAGNESIUM: CPT | Performed by: STUDENT IN AN ORGANIZED HEALTH CARE EDUCATION/TRAINING PROGRAM

## 2025-08-03 PROCEDURE — 25000003 PHARM REV CODE 250: Performed by: PHYSICIAN ASSISTANT

## 2025-08-03 PROCEDURE — 85025 COMPLETE CBC W/AUTO DIFF WBC: CPT | Performed by: STUDENT IN AN ORGANIZED HEALTH CARE EDUCATION/TRAINING PROGRAM

## 2025-08-03 PROCEDURE — 94761 N-INVAS EAR/PLS OXIMETRY MLT: CPT

## 2025-08-03 PROCEDURE — 80053 COMPREHEN METABOLIC PANEL: CPT | Performed by: STUDENT IN AN ORGANIZED HEALTH CARE EDUCATION/TRAINING PROGRAM

## 2025-08-03 PROCEDURE — 36415 COLL VENOUS BLD VENIPUNCTURE: CPT | Performed by: STUDENT IN AN ORGANIZED HEALTH CARE EDUCATION/TRAINING PROGRAM

## 2025-08-03 RX ADMIN — PANTOPRAZOLE SODIUM 40 MG: 40 TABLET, DELAYED RELEASE ORAL at 09:08

## 2025-08-03 RX ADMIN — MUPIROCIN: 20 OINTMENT TOPICAL at 09:08

## 2025-08-03 RX ADMIN — MUPIROCIN: 20 OINTMENT TOPICAL at 10:08

## 2025-08-03 RX ADMIN — AMIODARONE HYDROCHLORIDE 400 MG: 200 TABLET ORAL at 10:08

## 2025-08-03 RX ADMIN — LINEZOLID 600 MG: 600 TABLET, FILM COATED ORAL at 09:08

## 2025-08-03 RX ADMIN — METOPROLOL SUCCINATE 25 MG: 25 TABLET, EXTENDED RELEASE ORAL at 04:08

## 2025-08-03 RX ADMIN — SPIRONOLACTONE 12.5 MG: 25 TABLET ORAL at 09:08

## 2025-08-03 RX ADMIN — ENOXAPARIN SODIUM 80 MG: 80 INJECTION SUBCUTANEOUS at 09:08

## 2025-08-03 RX ADMIN — LINEZOLID 600 MG: 600 TABLET, FILM COATED ORAL at 10:08

## 2025-08-03 RX ADMIN — FUROSEMIDE 20 MG: 20 TABLET ORAL at 09:08

## 2025-08-03 RX ADMIN — METOPROLOL SUCCINATE 25 MG: 25 TABLET, EXTENDED RELEASE ORAL at 09:08

## 2025-08-03 RX ADMIN — AMIODARONE HYDROCHLORIDE 400 MG: 200 TABLET ORAL at 09:08

## 2025-08-03 RX ADMIN — APIXABAN 5 MG: 2.5 TABLET, FILM COATED ORAL at 10:08

## 2025-08-04 ENCOUNTER — ANESTHESIA EVENT (OUTPATIENT)
Dept: CARDIOLOGY | Facility: HOSPITAL | Age: 75
End: 2025-08-04
Payer: MEDICARE

## 2025-08-04 ENCOUNTER — ANESTHESIA (OUTPATIENT)
Dept: CARDIOLOGY | Facility: HOSPITAL | Age: 75
End: 2025-08-04
Payer: MEDICARE

## 2025-08-04 LAB
ABSOLUTE EOSINOPHIL (OHS): 0.2 K/UL
ABSOLUTE MONOCYTE (OHS): 0.61 K/UL (ref 0.3–1)
ABSOLUTE NEUTROPHIL COUNT (OHS): 3.97 K/UL (ref 1.8–7.7)
ALBUMIN SERPL BCP-MCNC: 3 G/DL (ref 3.5–5.2)
ALP SERPL-CCNC: 83 UNIT/L (ref 40–150)
ALT SERPL W/O P-5'-P-CCNC: 124 UNIT/L (ref 10–44)
ANION GAP (OHS): 8 MMOL/L (ref 8–16)
AST SERPL-CCNC: 108 UNIT/L (ref 11–45)
BASOPHILS # BLD AUTO: 0.04 K/UL
BASOPHILS NFR BLD AUTO: 0.7 %
BILIRUB SERPL-MCNC: 0.6 MG/DL (ref 0.1–1)
BUN SERPL-MCNC: 20 MG/DL (ref 8–23)
CALCIUM SERPL-MCNC: 8.4 MG/DL (ref 8.7–10.5)
CHLORIDE SERPL-SCNC: 104 MMOL/L (ref 95–110)
CO2 SERPL-SCNC: 25 MMOL/L (ref 23–29)
CREAT SERPL-MCNC: 1.3 MG/DL (ref 0.5–1.4)
ERYTHROCYTE [DISTWIDTH] IN BLOOD BY AUTOMATED COUNT: 12.9 % (ref 11.5–14.5)
GFR SERPLBLD CREATININE-BSD FMLA CKD-EPI: 57 ML/MIN/1.73/M2
GLUCOSE SERPL-MCNC: 100 MG/DL (ref 70–110)
HCT VFR BLD AUTO: 36.4 % (ref 40–54)
HGB BLD-MCNC: 12.2 GM/DL (ref 14–18)
IMM GRANULOCYTES # BLD AUTO: 0.02 K/UL (ref 0–0.04)
IMM GRANULOCYTES NFR BLD AUTO: 0.3 % (ref 0–0.5)
LYMPHOCYTES # BLD AUTO: 1.3 K/UL (ref 1–4.8)
MAGNESIUM SERPL-MCNC: 2.1 MG/DL (ref 1.6–2.6)
MCH RBC QN AUTO: 29.8 PG (ref 27–31)
MCHC RBC AUTO-ENTMCNC: 33.5 G/DL (ref 32–36)
MCV RBC AUTO: 89 FL (ref 82–98)
NUCLEATED RBC (/100WBC) (OHS): 0 /100 WBC
OHS CV CPX PATIENT HEIGHT IN: 68
OHS QRS DURATION: 114 MS
OHS QRS DURATION: 116 MS
OHS QTC CALCULATION: 452 MS
OHS QTC CALCULATION: 573 MS
PHOSPHATE SERPL-MCNC: 3.1 MG/DL (ref 2.7–4.5)
PLATELET # BLD AUTO: 250 K/UL (ref 150–450)
PMV BLD AUTO: 9.3 FL (ref 9.2–12.9)
POTASSIUM SERPL-SCNC: 3.9 MMOL/L (ref 3.5–5.1)
PROT SERPL-MCNC: 5.7 GM/DL (ref 6–8.4)
RBC # BLD AUTO: 4.09 M/UL (ref 4.6–6.2)
RELATIVE EOSINOPHIL (OHS): 3.3 %
RELATIVE LYMPHOCYTE (OHS): 21.2 % (ref 18–48)
RELATIVE MONOCYTE (OHS): 9.9 % (ref 4–15)
RELATIVE NEUTROPHIL (OHS): 64.6 % (ref 38–73)
SODIUM SERPL-SCNC: 137 MMOL/L (ref 136–145)
WBC # BLD AUTO: 6.14 K/UL (ref 3.9–12.7)

## 2025-08-04 PROCEDURE — 25000003 PHARM REV CODE 250: Performed by: INTERNAL MEDICINE

## 2025-08-04 PROCEDURE — 37000009 HC ANESTHESIA EA ADD 15 MINS: Performed by: INTERNAL MEDICINE

## 2025-08-04 PROCEDURE — 92960 CARDIOVERSION ELECTRIC EXT: CPT | Mod: ,,, | Performed by: INTERNAL MEDICINE

## 2025-08-04 PROCEDURE — 25000003 PHARM REV CODE 250: Performed by: PHYSICIAN ASSISTANT

## 2025-08-04 PROCEDURE — 36415 COLL VENOUS BLD VENIPUNCTURE: CPT | Performed by: STUDENT IN AN ORGANIZED HEALTH CARE EDUCATION/TRAINING PROGRAM

## 2025-08-04 PROCEDURE — 99900035 HC TECH TIME PER 15 MIN (STAT)

## 2025-08-04 PROCEDURE — 93005 ELECTROCARDIOGRAM TRACING: CPT

## 2025-08-04 PROCEDURE — 84100 ASSAY OF PHOSPHORUS: CPT | Performed by: STUDENT IN AN ORGANIZED HEALTH CARE EDUCATION/TRAINING PROGRAM

## 2025-08-04 PROCEDURE — 80053 COMPREHEN METABOLIC PANEL: CPT | Performed by: STUDENT IN AN ORGANIZED HEALTH CARE EDUCATION/TRAINING PROGRAM

## 2025-08-04 PROCEDURE — 92960 CARDIOVERSION ELECTRIC EXT: CPT | Performed by: INTERNAL MEDICINE

## 2025-08-04 PROCEDURE — 63600175 PHARM REV CODE 636 W HCPCS: Performed by: ANESTHESIOLOGY

## 2025-08-04 PROCEDURE — 25000003 PHARM REV CODE 250: Performed by: ANESTHESIOLOGY

## 2025-08-04 PROCEDURE — 85025 COMPLETE CBC W/AUTO DIFF WBC: CPT | Performed by: STUDENT IN AN ORGANIZED HEALTH CARE EDUCATION/TRAINING PROGRAM

## 2025-08-04 PROCEDURE — 21400001 HC TELEMETRY ROOM

## 2025-08-04 PROCEDURE — 25000003 PHARM REV CODE 250: Performed by: STUDENT IN AN ORGANIZED HEALTH CARE EDUCATION/TRAINING PROGRAM

## 2025-08-04 PROCEDURE — 25000003 PHARM REV CODE 250: Performed by: FAMILY MEDICINE

## 2025-08-04 PROCEDURE — 83735 ASSAY OF MAGNESIUM: CPT | Performed by: STUDENT IN AN ORGANIZED HEALTH CARE EDUCATION/TRAINING PROGRAM

## 2025-08-04 PROCEDURE — 37000008 HC ANESTHESIA 1ST 15 MINUTES: Performed by: INTERNAL MEDICINE

## 2025-08-04 PROCEDURE — 5A2204Z RESTORATION OF CARDIAC RHYTHM, SINGLE: ICD-10-PCS | Performed by: INTERNAL MEDICINE

## 2025-08-04 RX ORDER — ETOMIDATE 2 MG/ML
INJECTION INTRAVENOUS
Status: DISCONTINUED | OUTPATIENT
Start: 2025-08-04 | End: 2025-08-04

## 2025-08-04 RX ORDER — MIDAZOLAM HYDROCHLORIDE 1 MG/ML
INJECTION INTRAMUSCULAR; INTRAVENOUS
Status: DISCONTINUED | OUTPATIENT
Start: 2025-08-04 | End: 2025-08-04

## 2025-08-04 RX ORDER — LIDOCAINE HYDROCHLORIDE 20 MG/ML
INJECTION INTRAVENOUS
Status: DISCONTINUED | OUTPATIENT
Start: 2025-08-04 | End: 2025-08-04

## 2025-08-04 RX ORDER — PROPOFOL 10 MG/ML
VIAL (ML) INTRAVENOUS
Status: DISCONTINUED | OUTPATIENT
Start: 2025-08-04 | End: 2025-08-04

## 2025-08-04 RX ADMIN — ETOMIDATE 2 MG: 2 INJECTION INTRAVENOUS at 12:08

## 2025-08-04 RX ADMIN — PANTOPRAZOLE SODIUM 40 MG: 40 TABLET, DELAYED RELEASE ORAL at 08:08

## 2025-08-04 RX ADMIN — SPIRONOLACTONE 12.5 MG: 25 TABLET ORAL at 08:08

## 2025-08-04 RX ADMIN — LINEZOLID 600 MG: 600 TABLET, FILM COATED ORAL at 08:08

## 2025-08-04 RX ADMIN — APIXABAN 5 MG: 2.5 TABLET, FILM COATED ORAL at 09:08

## 2025-08-04 RX ADMIN — APIXABAN 5 MG: 2.5 TABLET, FILM COATED ORAL at 08:08

## 2025-08-04 RX ADMIN — AMIODARONE HYDROCHLORIDE 400 MG: 200 TABLET ORAL at 09:08

## 2025-08-04 RX ADMIN — LINEZOLID 600 MG: 600 TABLET, FILM COATED ORAL at 09:08

## 2025-08-04 RX ADMIN — MUPIROCIN: 20 OINTMENT TOPICAL at 08:08

## 2025-08-04 RX ADMIN — LIDOCAINE HYDROCHLORIDE 80 MG: 20 INJECTION INTRAVENOUS at 12:08

## 2025-08-04 RX ADMIN — PROPOFOL 20 MG: 10 INJECTION, EMULSION INTRAVENOUS at 12:08

## 2025-08-04 RX ADMIN — FUROSEMIDE 20 MG: 20 TABLET ORAL at 08:08

## 2025-08-04 RX ADMIN — METOPROLOL SUCCINATE 25 MG: 25 TABLET, EXTENDED RELEASE ORAL at 08:08

## 2025-08-04 RX ADMIN — AMIODARONE HYDROCHLORIDE 400 MG: 200 TABLET ORAL at 08:08

## 2025-08-04 RX ADMIN — MIDAZOLAM HYDROCHLORIDE 2 MG: 1 INJECTION, SOLUTION INTRAMUSCULAR; INTRAVENOUS at 12:08

## 2025-08-04 NOTE — ANESTHESIA POSTPROCEDURE EVALUATION
Anesthesia Post Evaluation    Patient: Mekhi Lambert    Procedure(s) Performed: Procedure(s) (LRB):  Cardioversion (N/A)    Final Anesthesia Type: MAC      Patient location: [facility].  Patient participation: Yes- Able to Participate  Level of consciousness: awake and alert  Post-procedure vital signs: reviewed and stable  Pain management: adequate  Airway patency: patent  TIMA mitigation strategies: Multimodal analgesia  PONV status at discharge: No PONV  Anesthetic complications: no      Cardiovascular status: blood pressure returned to baseline  Respiratory status: unassisted  Hydration status: euvolemic  Follow-up not needed.              Vitals Value Taken Time   BP 98/68 08/04/25 12:50   Temp 36.5 °C (97.7 °F) 08/04/25 12:46   Pulse 65 08/04/25 12:50   Resp 15 08/04/25 12:50   SpO2 97 % 08/04/25 12:50         No case tracking events are documented in the log.      Pain/Kristy Score: Kristy Score: 9 (8/4/2025 12:50 PM)

## 2025-08-04 NOTE — TRANSFER OF CARE
"Anesthesia Transfer of Care Note    Patient: Mekhi Lambert    Procedure(s) Performed: Procedure(s) (LRB):  Cardioversion (N/A)    Patient location: GI    Anesthesia Type: MAC    Transport from OR: Transported from OR on room air with adequate spontaneous ventilation    Post pain: adequate analgesia    Post assessment: no apparent anesthetic complications and tolerated procedure well    Post vital signs: stable    Level of consciousness: responds to stimulation    Nausea/Vomiting: no nausea/vomiting    Complications: none    Transfer of care protocol was followed      Last vitals: Visit Vitals  BP 97/65   Pulse 96   Temp 36.4 °C (97.6 °F) (Oral)   Resp 18   Ht 5' 8" (1.727 m)   Wt 73.2 kg (161 lb 6 oz)   SpO2 96%   BMI 24.54 kg/m²     "

## 2025-08-04 NOTE — ANESTHESIA PREPROCEDURE EVALUATION
08/04/2025  Mekhi Lambert is a 75 y.o., male.    Past Medical History:   Diagnosis Date    ALBERTO (acute kidney injury) 08/08/2022    Anticoagulant long-term use     Arthritis     Atrial fibrillation with RVR 7/27/2025    Cancer     Chronic systolic CHF (congestive heart failure) 02/26/2022    Coronary artery disease     Digestive disorder     Hiatal hernia with GERD 6/23/2025    Myocardial infarction     2/20/22    Statin myopathy 05/13/2024     Past Surgical History:   Procedure Laterality Date    CARDIAC CATHETERIZATION      COLONOSCOPY N/A 09/22/2021    Procedure: COLONOSCOPY;  Surgeon: Meaghan Jaime MD;  Location: [facility] ENDO;  Service: Endoscopy;  Laterality: N/A;    CORONARY ANGIOGRAPHY INCLUDING BYPASS GRAFTS WITH CATHETERIZATION OF LEFT HEART N/A 03/03/2020    Procedure: ANGIOGRAM, CORONARY, INCLUDING BYPASS GRAFT, WITH LEFT HEART CATHETERIZATION;  Surgeon: Ute Melchor MD;  Location: [facility] CATH LAB;  Service: Cardiology;  Laterality: N/A;    JOINT REPLACEMENT Right     knee    KNEE SURGERY      LEFT HEART CATHETERIZATION Left 02/22/2020    Procedure: CATHETERIZATION, HEART, LEFT;  Surgeon: Ute Melchor MD;  Location: [facility] CATH LAB;  Service: Cardiology;  Laterality: Left;    LEFT HEART CATHETERIZATION Left 02/26/2022    Procedure: CATHETERIZATION, HEART, LEFT;  Surgeon: Ute Melchor MD;  Location: [facility] CATH LAB;  Service: Cardiology;  Laterality: Left;    PERCUTANEOUS TRANSLUMINAL BALLOON ANGIOPLASTY OF CORONARY ARTERY Left 02/22/2020    Procedure: Angioplasty-coronary;  Surgeon: Ute Melchor MD;  Location: [facility] CATH LAB;  Service: Cardiology;  Laterality: Left;    PERCUTANEOUS TRANSLUMINAL BALLOON ANGIOPLASTY OF CORONARY ARTERY  02/26/2022    Procedure: Angioplasty-coronary;  Surgeon: Ute Melchor MD;  Location: [facility] CATH LAB;  Service: Cardiology;;    REPAIR, HERNIA, INGUINAL Right  4/5/2024    Procedure: REPAIR, HERNIA, INGUINAL;  Surgeon: Franklin Fuentes MD;  Location: [facility] OR;  Service: General;  Laterality: Right;    RIGHT HEMICOLECTOMY N/A 04/27/2022    Procedure: HEMICOLECTOMY, RIGHT;  Surgeon: See Dominguez MD;  Location: [facility] OR;  Service: General;  Laterality: N/A;    SHOULDER ARTHROSCOPY Right     Dr Bella    SIGMOIDECTOMY      simple prostatectomy  06/06/2016    robotic assisted partial prostectomy due to BPH    TOTAL KNEE ARTHROPLASTY      TRANSESOPHAGEAL ECHOCARDIOGRAM WITH POSSIBLE CARDIOVERSION (JORDAN W/ POSS CARDIOVERSION) N/A 7/28/2025    Procedure: Transesophageal echo (JORDAN) intra-procedure log documentation;  Surgeon: Hong Carrasco MD;  Location: [facility] CATH LAB;  Service: Cardiology;  Laterality: N/A;    TRANSURETHRAL RESECTION OF PROSTATE N/A 6/30/2025    Procedure: TURP (TRANSURETHRAL RESECTION OF PROSTATE);  Surgeon: Clarence Harris MD;  Location: [facility] OR;  Service: Urology;  Laterality: N/A;       Chemistry        Component Value Date/Time     08/04/2025 0301     03/18/2025 0753    K 3.9 08/04/2025 0301    K 4.9 03/18/2025 0753     08/04/2025 0301     03/18/2025 0753    CO2 25 08/04/2025 0301    CO2 25 03/18/2025 0753    BUN 20 08/04/2025 0301    CREATININE 1.3 08/04/2025 0301     08/04/2025 0301    GLU 83 03/18/2025 0753        Component Value Date/Time    CALCIUM 8.4 (L) 08/04/2025 0301    CALCIUM 9.1 03/18/2025 0753    ALKPHOS 83 08/04/2025 0301    ALKPHOS 67 03/18/2025 0753     (H) 08/04/2025 0301    AST 24 03/18/2025 0753     (H) 08/04/2025 0301    ALT 17 03/18/2025 0753    BILITOT 0.6 08/04/2025 0301    BILITOT 1.1 (H) 03/18/2025 0753    ESTGFRAFRICA 61 09/18/2024 0558    ESTGFRAFRICA >60 05/05/2022 1647    EGFRNONAA >60 05/05/2022 1647        Lab Results   Component Value Date    WBC 6.14 08/04/2025    HGB 12.2 (L) 08/04/2025    HCT 36.4 (L) 08/04/2025    MCV 89 08/04/2025     08/04/2025     Results for  orders placed during the hospital encounter of 07/27/25    Echo    Interpretation Summary    Left Ventricle: The left ventricle is severely dilated. Normal wall thickness. There is severely reduced systolic function with a visually estimated ejection fraction of 15 - 20%. Unable to assess diastolic function due to atrial fibrillation.    Right Ventricle: The right ventricle is normal in sizeWall thickness is normal. . Systolic function is borderline low.    Left Atrium: The left atrium is dilated.    Mitral Valve: There is mild regurgitation.    Tricuspid Valve: There is mild regurgitation.    IVC/SVC: Normal venous pressure at 3  mmHg.    Vitals:    08/04/25 1000   BP:    Pulse: 96   Resp:    Temp:    Medications Ordered Prior to Encounter[1]  Pre-op Assessment    I have reviewed the Patient Summary Reports.     I have reviewed the Nursing Notes. I have reviewed the NPO Status.   I have reviewed the Medications.     Review of Systems  Anesthesia Hx:  No problems with previous Anesthesia             Denies Family Hx of Anesthesia complications.    Denies Personal Hx of Anesthesia complications.                    Social:  Non-Smoker, No Alcohol Use       Hematology/Oncology:  Hematology Normal   Oncology Normal                                   EENT/Dental:  EENT/Dental Normal           Cardiovascular:       Past MI CAD      Angina CHF                                   Pulmonary:  Pulmonary Normal                       Renal/:  Chronic Renal Disease, CKD                Hepatic/GI:    Hiatal Hernia, GERD, well controlled                Musculoskeletal:  Musculoskeletal Normal                Neurological:    Neuromuscular Disease,                                   Endocrine:  Endocrine Normal            Dermatological:  Skin Normal    Psych:  Psychiatric History                  Physical Exam  General: Well nourished, Cooperative, Alert and Oriented    Airway:  Mallampati: II / II  Mouth Opening: Normal  TM  Distance: Normal  Tongue: Normal  Neck ROM: Normal ROM    Dental:  Intact        Anesthesia Plan  Type of Anesthesia, risks & benefits discussed:    Anesthesia Type: MAC  Intra-op Monitoring Plan: Standard ASA Monitors  Post Op Pain Control Plan: multimodal analgesia  Induction:  IV  Informed Consent: Informed consent signed with the Patient and all parties understand the risks and agree with anesthesia plan.  All questions answered.   ASA Score: 4  Day of Surgery Review of History & Physical: H&P Update referred to the surgeon/provider.    Ready For Surgery From Anesthesia Perspective.     .           [1]   No current facility-administered medications on file prior to encounter.     Current Outpatient Medications on File Prior to Encounter   Medication Sig Dispense Refill    metoprolol succinate (TOPROL-XL) 25 MG 24 hr tablet TAKE 1/2 TABLET BY MOUTH EVERY EVENING 45 tablet 1    ofloxacin (OCUFLOX) 0.3 % ophthalmic solution PLEASE SEE ATTACHED FOR DETAILED DIRECTIONS      prednisoLONE acetate (PRED FORTE) 1 % DrpS PLEASE SEE ATTACHED FOR DETAILED DIRECTIONS      acetaminophen (TYLENOL) 500 MG tablet Take 500 mg by mouth every 6 (six) hours as needed for Pain.      b complex vitamins capsule Take 1 capsule by mouth once daily.      cholecalciferol, vitamin D3, (VITAMIN D3) 25 mcg (1,000 unit) capsule Take 1,000 Units by mouth once daily.      magnesium glycinate 100 mg magnesium Cap Take 100 mg by mouth daily as needed.      mometasone 0.1% (ELOCON) 0.1 % cream AAA bid prn for psoriasis. Strong steroid.  Do not use on face, underarms or groin. 50 g 1    pantoprazole (PROTONIX) 20 MG tablet Take 1 tablet (20 mg total) by mouth once daily. 30 tablet 11

## 2025-08-05 ENCOUNTER — TELEPHONE (OUTPATIENT)
Dept: CARDIOLOGY | Facility: HOSPITAL | Age: 75
End: 2025-08-05
Payer: MEDICARE

## 2025-08-05 VITALS
BODY MASS INDEX: 24.09 KG/M2 | TEMPERATURE: 97 F | OXYGEN SATURATION: 98 % | HEIGHT: 68 IN | HEART RATE: 63 BPM | RESPIRATION RATE: 18 BRPM | SYSTOLIC BLOOD PRESSURE: 97 MMHG | WEIGHT: 158.94 LBS | DIASTOLIC BLOOD PRESSURE: 62 MMHG

## 2025-08-05 DIAGNOSIS — I48.91 ATRIAL FIBRILLATION WITH RVR: Primary | ICD-10-CM

## 2025-08-05 LAB
ABSOLUTE EOSINOPHIL (OHS): 0.18 K/UL
ABSOLUTE MONOCYTE (OHS): 0.64 K/UL (ref 0.3–1)
ABSOLUTE NEUTROPHIL COUNT (OHS): 4.79 K/UL (ref 1.8–7.7)
ALBUMIN SERPL BCP-MCNC: 3 G/DL (ref 3.5–5.2)
ALP SERPL-CCNC: 83 UNIT/L (ref 40–150)
ALT SERPL W/O P-5'-P-CCNC: 136 UNIT/L (ref 10–44)
ANION GAP (OHS): 10 MMOL/L (ref 8–16)
AST SERPL-CCNC: 100 UNIT/L (ref 11–45)
BACTERIA BLD CULT: NORMAL
BASOPHILS # BLD AUTO: 0.04 K/UL
BASOPHILS NFR BLD AUTO: 0.6 %
BILIRUB SERPL-MCNC: 0.9 MG/DL (ref 0.1–1)
BUN SERPL-MCNC: 23 MG/DL (ref 8–23)
CALCIUM SERPL-MCNC: 8.5 MG/DL (ref 8.7–10.5)
CHLORIDE SERPL-SCNC: 104 MMOL/L (ref 95–110)
CO2 SERPL-SCNC: 23 MMOL/L (ref 23–29)
CREAT SERPL-MCNC: 1.5 MG/DL (ref 0.5–1.4)
ERYTHROCYTE [DISTWIDTH] IN BLOOD BY AUTOMATED COUNT: 13.1 % (ref 11.5–14.5)
ESTROGEN SERPL-MCNC: NORMAL PG/ML
GFR SERPLBLD CREATININE-BSD FMLA CKD-EPI: 48 ML/MIN/1.73/M2
GLUCOSE SERPL-MCNC: 108 MG/DL (ref 70–110)
HCT VFR BLD AUTO: 34.7 % (ref 40–54)
HGB BLD-MCNC: 12 GM/DL (ref 14–18)
IMM GRANULOCYTES # BLD AUTO: 0.02 K/UL (ref 0–0.04)
IMM GRANULOCYTES NFR BLD AUTO: 0.3 % (ref 0–0.5)
INSULIN SERPL-ACNC: NORMAL U[IU]/ML
LAB AP CLINICAL INFORMATION: NORMAL
LAB AP GROSS DESCRIPTION: NORMAL
LAB AP PERFORMING LOCATION(S): NORMAL
LAB AP REPORT FOOTNOTES: NORMAL
LYMPHOCYTES # BLD AUTO: 1.04 K/UL (ref 1–4.8)
MAGNESIUM SERPL-MCNC: 2 MG/DL (ref 1.6–2.6)
MCH RBC QN AUTO: 30.3 PG (ref 27–31)
MCHC RBC AUTO-ENTMCNC: 34.6 G/DL (ref 32–36)
MCV RBC AUTO: 88 FL (ref 82–98)
NUCLEATED RBC (/100WBC) (OHS): 0 /100 WBC
PHOSPHATE SERPL-MCNC: 3.3 MG/DL (ref 2.7–4.5)
PLATELET # BLD AUTO: 224 K/UL (ref 150–450)
PMV BLD AUTO: 9.1 FL (ref 9.2–12.9)
POTASSIUM SERPL-SCNC: 4.1 MMOL/L (ref 3.5–5.1)
PROT SERPL-MCNC: 5.6 GM/DL (ref 6–8.4)
RBC # BLD AUTO: 3.96 M/UL (ref 4.6–6.2)
RELATIVE EOSINOPHIL (OHS): 2.7 %
RELATIVE LYMPHOCYTE (OHS): 15.5 % (ref 18–48)
RELATIVE MONOCYTE (OHS): 9.5 % (ref 4–15)
RELATIVE NEUTROPHIL (OHS): 71.4 % (ref 38–73)
SODIUM SERPL-SCNC: 137 MMOL/L (ref 136–145)
WBC # BLD AUTO: 6.71 K/UL (ref 3.9–12.7)

## 2025-08-05 PROCEDURE — 84100 ASSAY OF PHOSPHORUS: CPT | Performed by: INTERNAL MEDICINE

## 2025-08-05 PROCEDURE — 83735 ASSAY OF MAGNESIUM: CPT | Performed by: INTERNAL MEDICINE

## 2025-08-05 PROCEDURE — 85025 COMPLETE CBC W/AUTO DIFF WBC: CPT | Performed by: INTERNAL MEDICINE

## 2025-08-05 PROCEDURE — 80053 COMPREHEN METABOLIC PANEL: CPT | Performed by: INTERNAL MEDICINE

## 2025-08-05 PROCEDURE — 25000003 PHARM REV CODE 250: Performed by: INTERNAL MEDICINE

## 2025-08-05 PROCEDURE — 93005 ELECTROCARDIOGRAM TRACING: CPT

## 2025-08-05 PROCEDURE — 36415 COLL VENOUS BLD VENIPUNCTURE: CPT | Performed by: INTERNAL MEDICINE

## 2025-08-05 RX ORDER — AMIODARONE HYDROCHLORIDE 200 MG/1
TABLET ORAL
Qty: 86 TABLET | Refills: 0 | Status: SHIPPED | OUTPATIENT
Start: 2025-08-05 | End: 2025-09-25

## 2025-08-05 RX ORDER — FUROSEMIDE 20 MG/1
20 TABLET ORAL DAILY
Qty: 30 TABLET | Refills: 0 | Status: SHIPPED | OUTPATIENT
Start: 2025-08-06

## 2025-08-05 RX ORDER — METOPROLOL SUCCINATE 25 MG/1
25 TABLET, EXTENDED RELEASE ORAL DAILY
Qty: 30 TABLET | Refills: 0 | Status: SHIPPED | OUTPATIENT
Start: 2025-08-06

## 2025-08-05 RX ADMIN — AMIODARONE HYDROCHLORIDE 400 MG: 200 TABLET ORAL at 09:08

## 2025-08-05 RX ADMIN — METOPROLOL SUCCINATE 25 MG: 25 TABLET, EXTENDED RELEASE ORAL at 10:08

## 2025-08-05 RX ADMIN — SPIRONOLACTONE 12.5 MG: 25 TABLET ORAL at 10:08

## 2025-08-05 RX ADMIN — MELATONIN TAB 3 MG 6 MG: 3 TAB at 01:08

## 2025-08-05 RX ADMIN — LINEZOLID 600 MG: 600 TABLET, FILM COATED ORAL at 09:08

## 2025-08-05 RX ADMIN — PANTOPRAZOLE SODIUM 40 MG: 40 TABLET, DELAYED RELEASE ORAL at 09:08

## 2025-08-05 RX ADMIN — APIXABAN 5 MG: 2.5 TABLET, FILM COATED ORAL at 09:08

## 2025-08-05 NOTE — TELEPHONE ENCOUNTER
Another update.     Please do CMP instead of BMP and a CBC along with it to monitor liver function, renal function and H/H to check for adverse effects of anticoagulants, diuretics and antiarrhythmics. Also to be considered for spironolactone initiation if kidney function allows.     Thanks!

## 2025-08-06 ENCOUNTER — CLINICAL SUPPORT (OUTPATIENT)
Dept: UROLOGY | Facility: CLINIC | Age: 75
End: 2025-08-06
Payer: MEDICARE

## 2025-08-06 ENCOUNTER — TELEPHONE (OUTPATIENT)
Dept: UROLOGY | Facility: CLINIC | Age: 75
End: 2025-08-06

## 2025-08-06 DIAGNOSIS — N40.1 BPH WITH OBSTRUCTION/LOWER URINARY TRACT SYMPTOMS: Primary | ICD-10-CM

## 2025-08-06 DIAGNOSIS — N13.8 BPH WITH OBSTRUCTION/LOWER URINARY TRACT SYMPTOMS: Primary | ICD-10-CM

## 2025-08-06 PROBLEM — R79.89 ELEVATED BRAIN NATRIURETIC PEPTIDE (BNP) LEVEL: Status: ACTIVE | Noted: 2025-08-06

## 2025-08-06 PROBLEM — R79.89 ELEVATED TROPONIN: Status: ACTIVE | Noted: 2025-08-06

## 2025-08-06 LAB
OHS QRS DURATION: 130 MS
OHS QTC CALCULATION: 497 MS

## 2025-08-06 PROCEDURE — 99999 PR PBB SHADOW E&M-EST. PATIENT-LVL II: CPT | Mod: PBBFAC,,,

## 2025-08-06 PROCEDURE — 99212 OFFICE O/P EST SF 10 MIN: CPT | Mod: PBBFAC

## 2025-08-06 NOTE — TELEPHONE ENCOUNTER
Pt is returning to the clinic for PVR.       Copied from CRM #[id number]. Topic: General Inquiry - Patient Advice  >> Aug 6, 2025  1:15 PM Ashtyn wrote:  Type:  Needs Medical Advice    Who Called:  pt  Symptoms (please be specific): pt had his cathedra pulled out this morning and he is not able to urinate, he is leaking all over   How long has patient had these symptoms:     Pharmacy name and phone #:     Would the patient rather a call back or a response via [facility]? phone  Best Call Back Number: [phone]   Additional Information:

## 2025-08-06 NOTE — PROGRESS NOTES
0920 PVR 15 ml. Dr. Harris notified, and ordered to not replace the catheter. I explained to the pt that we are leaving the catheter out but if he has any problems urinating he needs to call us ASAP or go tot he ER. He verbalized understanding and had no further questions.   ANKUSH Mcdonald LPN

## 2025-08-06 NOTE — PROGRESS NOTES
0900 Pt in for voiding trial. 240 cc sterile water injected into the bladder. Using a 10 cc syringe, the balloon was deflated. Pt was instructed to take a deep breathe and the escoto catheter was removed. 100 cc of clear yellow urine noted to  BAG. Pt immediately voided 200 cc clear yellow urine.   A. Bonfanti LPN

## 2025-08-06 NOTE — PROGRESS NOTES
Patient came in clinic for a PVR. Patient urinated and PVR was 30.  notified. Patient left clinic ambulatory per self.

## 2025-08-06 NOTE — ANESTHESIA POSTPROCEDURE EVALUATION
Anesthesia Post Evaluation    Patient: Mekhi Lambert    Procedure(s) Performed: Procedure(s) (LRB):  CYSTOSCOPY W/ FULGURATION, BLADDER (N/A)  EVACUATION, HEMATOMA (N/A)    Final Anesthesia Type: general      Patient location during evaluation: PACU  Patient participation: Yes- Able to Participate  Level of consciousness: awake and alert and oriented  Post-procedure vital signs: reviewed and stable  Pain management: adequate  Airway patency: patent  TIMA mitigation strategies: Verification of full reversal of neuromuscular block  PONV status at discharge: No PONV  Anesthetic complications: no      Cardiovascular status: blood pressure returned to baseline and hemodynamically stable  Respiratory status: unassisted  Hydration status: euvolemic  Follow-up not needed.              Vitals Value Taken Time   BP 97/62 08/01/25 16:13   Temp 36.3 °C (97.3 °F) 08/01/25 16:13   Pulse 63 08/01/25 17:10   Resp 18 08/01/25 16:13   SpO2 98 % 08/01/25 16:13         Event Time   Out of Recovery 08/01/2025 16:26:00         Pain/Kristy Score: No data recorded

## 2025-08-07 ENCOUNTER — TELEPHONE (OUTPATIENT)
Dept: CARDIOLOGY | Facility: CLINIC | Age: 75
End: 2025-08-07
Payer: MEDICARE

## 2025-08-07 PROBLEM — R74.01 TRANSAMINITIS: Status: ACTIVE | Noted: 2025-08-07

## 2025-08-07 NOTE — TELEPHONE ENCOUNTER
"[facility]) hospital discharge 48-72 hour phone follow up completed.     Most Recent Hospital Discharge Date: 8/5/25  Last admission Diagnosis/chief complaint:Palpitation    TCC nurse Navigator spoke with pt. Pt denies any fluid issues/CHF. Refuses heart  failure clinic.    Current Patient reported weight: 158 lbs    Current Patient reported blood pressure and heart rate: B/P 95/65    Pt reports the following:  []  Shortness of Breath with Activity  []  Shortness of Breath at rest   []  Fatigue  []  Edema   [] Chest pain or tightness  [] Weight Increase since discharge  [] None of the above    Medications:   Discharge medication reviewed with pt.  Pt reports having medication list available and has all medications at home for use per list.     Education:   Confirmed pt received "Home Care Guide for Heart Failure Patients" while admitted.   Reviewed key points as listed below.     Recommend 2 -3 gram sodium restriction and 1500 cc-2000 cc fluid restriction.  Encourage physical activity with graded exercise program.  Requested patient to weigh themselves daily, and to notify us if their weight increases by more than 3 lbs in 1 day or 5 lbs in 3 days.   Reminded patient to use "Daily weight and symptom tracker" to record and guide patient on when and how to call [facility]. PT may also use symptom tracker if no scale available  Pt reports being in the green(color) Zone. If in yellow/red, reminded that they should be calling [facility] today or now.     Watch for these Signs and Symptoms: If any of these occur, contact H[facility] immediately:   Increase in shortness of breath with movement   Increase in swelling in your legs and ankles   Weight gain of more than 3 pounds in a day or 5 pounds in 3 days.   Difficulty breathing when you are lying down   Worsening fatigue or tiredness   Stomach bloating, a full feeling or a loss of appetite   Increased coughing--especially when you are lying " down      Pt was able to verbalize back to nurse in their own words correct diet/fluid restrictions, necessity for exercise, warning signs and symptoms, when and how to contact their TCC team.      Pt educated on follow-up plan while in [facility] program to include:   Week 1 -  F/u appt with Provider and HF nurse (Date) Pt Cancel his appointment.   Week 2-5 - In person/ Virtual/ phone call check ins    Week 5-7 - Pt will discharge from [facility] and transition to longterm care provider (Cardiology/PCP/ Advanced Heart Failure).      Patient active on myChart? Yes      Pt given the following contact information for ease of communication: [phone] (Mon-Fri, 8a-5p) & for urgent issues on the weekend call [facility] on-call [phone] to page the Cardiology MD on call.  Pt also encouraged utilize [facility] messaging as well.      Will follow up with pt at first clinic visit and HF nurse navigator available for pt questions, issues or concerns.

## 2025-08-08 ENCOUNTER — PATIENT OUTREACH (OUTPATIENT)
Dept: ADMINISTRATIVE | Facility: CLINIC | Age: 75
End: 2025-08-08
Payer: MEDICARE

## 2025-08-08 ENCOUNTER — TELEPHONE (OUTPATIENT)
Dept: CARDIOLOGY | Facility: CLINIC | Age: 75
End: 2025-08-08
Payer: MEDICARE

## 2025-08-08 DIAGNOSIS — Z76.89 ENCOUNTER TO ESTABLISH CARE: Primary | ICD-10-CM

## 2025-08-08 PROBLEM — I48.0 PAF (PAROXYSMAL ATRIAL FIBRILLATION): Status: ACTIVE | Noted: 2025-08-08

## 2025-08-08 NOTE — PROGRESS NOTES
C3 nurse spoke with Mekhi Lambert  for a [facility] post hospital discharge follow up call. The patient has a scheduled [facility] appointment with Abhilash Lopez MD  on 8/11/25 @ 0800.

## 2025-08-11 ENCOUNTER — TELEPHONE (OUTPATIENT)
Dept: UROLOGY | Facility: CLINIC | Age: 75
End: 2025-08-11
Payer: MEDICARE

## 2025-08-11 ENCOUNTER — PATIENT MESSAGE (OUTPATIENT)
Dept: INTERNAL MEDICINE | Facility: CLINIC | Age: 75
End: 2025-08-11

## 2025-08-11 ENCOUNTER — HOSPITAL ENCOUNTER (OUTPATIENT)
Dept: CARDIOLOGY | Facility: HOSPITAL | Age: 75
Discharge: HOME | End: 2025-08-11
Payer: MEDICARE

## 2025-08-11 ENCOUNTER — OFFICE VISIT (OUTPATIENT)
Dept: CARDIOLOGY | Facility: CLINIC | Age: 75
End: 2025-08-11
Payer: MEDICARE

## 2025-08-11 ENCOUNTER — CLINICAL SUPPORT (OUTPATIENT)
Dept: UROLOGY | Facility: CLINIC | Age: 75
End: 2025-08-11
Payer: MEDICARE

## 2025-08-11 ENCOUNTER — OFFICE VISIT (OUTPATIENT)
Dept: INTERNAL MEDICINE | Facility: CLINIC | Age: 75
End: 2025-08-11
Payer: MEDICARE

## 2025-08-11 VITALS
HEART RATE: 60 BPM | BODY MASS INDEX: 24.71 KG/M2 | DIASTOLIC BLOOD PRESSURE: 70 MMHG | OXYGEN SATURATION: 98 % | WEIGHT: 163 LBS | SYSTOLIC BLOOD PRESSURE: 130 MMHG | HEIGHT: 68 IN

## 2025-08-11 VITALS
HEART RATE: 68 BPM | OXYGEN SATURATION: 97 % | HEIGHT: 68 IN | TEMPERATURE: 97 F | SYSTOLIC BLOOD PRESSURE: 110 MMHG | DIASTOLIC BLOOD PRESSURE: 68 MMHG | RESPIRATION RATE: 16 BRPM | BODY MASS INDEX: 24.79 KG/M2 | WEIGHT: 163.56 LBS

## 2025-08-11 VITALS
OXYGEN SATURATION: 98 % | BODY MASS INDEX: 24.1 KG/M2 | WEIGHT: 159 LBS | HEART RATE: 66 BPM | DIASTOLIC BLOOD PRESSURE: 70 MMHG | SYSTOLIC BLOOD PRESSURE: 142 MMHG | HEIGHT: 68 IN

## 2025-08-11 DIAGNOSIS — N40.1 BENIGN PROSTATIC HYPERPLASIA WITH WEAK URINARY STREAM: Chronic | ICD-10-CM

## 2025-08-11 DIAGNOSIS — R00.2 PALPITATIONS: ICD-10-CM

## 2025-08-11 DIAGNOSIS — I42.0 DILATED CARDIOMYOPATHY: ICD-10-CM

## 2025-08-11 DIAGNOSIS — I50.22 CHRONIC SYSTOLIC CHF (CONGESTIVE HEART FAILURE): ICD-10-CM

## 2025-08-11 DIAGNOSIS — R31.9 URINARY TRACT INFECTION WITH HEMATURIA, SITE UNSPECIFIED: ICD-10-CM

## 2025-08-11 DIAGNOSIS — I48.91 ATRIAL FIBRILLATION WITH RVR: ICD-10-CM

## 2025-08-11 DIAGNOSIS — I50.42 CHRONIC COMBINED SYSTOLIC AND DIASTOLIC CONGESTIVE HEART FAILURE: Chronic | ICD-10-CM

## 2025-08-11 DIAGNOSIS — R39.12 BENIGN PROSTATIC HYPERPLASIA WITH WEAK URINARY STREAM: Chronic | ICD-10-CM

## 2025-08-11 DIAGNOSIS — T46.6X5A STATIN MYOPATHY: ICD-10-CM

## 2025-08-11 DIAGNOSIS — N39.0 URINARY TRACT INFECTION WITH HEMATURIA, SITE UNSPECIFIED: ICD-10-CM

## 2025-08-11 DIAGNOSIS — N18.31 CHRONIC KIDNEY DISEASE, STAGE 3A: ICD-10-CM

## 2025-08-11 DIAGNOSIS — Z90.79 S/P TURP: ICD-10-CM

## 2025-08-11 DIAGNOSIS — I48.91 ATRIAL FIBRILLATION WITH RVR: Primary | ICD-10-CM

## 2025-08-11 DIAGNOSIS — I25.118 CORONARY ARTERY DISEASE OF NATIVE ARTERY OF NATIVE HEART WITH STABLE ANGINA PECTORIS: Primary | Chronic | ICD-10-CM

## 2025-08-11 DIAGNOSIS — N40.1 BPH WITH OBSTRUCTION/LOWER URINARY TRACT SYMPTOMS: Primary | ICD-10-CM

## 2025-08-11 DIAGNOSIS — R31.9 HEMATURIA, UNSPECIFIED TYPE: ICD-10-CM

## 2025-08-11 DIAGNOSIS — R79.89 ELEVATED BRAIN NATRIURETIC PEPTIDE (BNP) LEVEL: ICD-10-CM

## 2025-08-11 DIAGNOSIS — E78.00 HYPERCHOLESTEROLEMIA: Chronic | ICD-10-CM

## 2025-08-11 DIAGNOSIS — R93.89 ABNORMAL FINDINGS ON DIAGNOSTIC IMAGING OF OTHER SPECIFIED BODY STRUCTURES: ICD-10-CM

## 2025-08-11 DIAGNOSIS — I50.20 HEART FAILURE WITH REDUCED EJECTION FRACTION, NYHA CLASS II: ICD-10-CM

## 2025-08-11 DIAGNOSIS — I50.42 CHRONIC COMBINED SYSTOLIC AND DIASTOLIC CONGESTIVE HEART FAILURE: Primary | Chronic | ICD-10-CM

## 2025-08-11 DIAGNOSIS — I48.0 PAF (PAROXYSMAL ATRIAL FIBRILLATION): ICD-10-CM

## 2025-08-11 DIAGNOSIS — N13.8 BPH WITH OBSTRUCTION/LOWER URINARY TRACT SYMPTOMS: Primary | ICD-10-CM

## 2025-08-11 DIAGNOSIS — G72.0 STATIN MYOPATHY: ICD-10-CM

## 2025-08-11 DIAGNOSIS — Z76.89 ENCOUNTER TO ESTABLISH CARE: ICD-10-CM

## 2025-08-11 DIAGNOSIS — T88.7XXA DRUG SIDE EFFECTS: ICD-10-CM

## 2025-08-11 LAB
OHS QRS DURATION: 114 MS
OHS QTC CALCULATION: 482 MS

## 2025-08-11 PROCEDURE — 99214 OFFICE O/P EST MOD 30 MIN: CPT | Mod: PBBFAC,25 | Performed by: PEDIATRICS

## 2025-08-11 PROCEDURE — 93010 ELECTROCARDIOGRAM REPORT: CPT | Mod: ,,, | Performed by: INTERNAL MEDICINE

## 2025-08-11 PROCEDURE — 93005 ELECTROCARDIOGRAM TRACING: CPT

## 2025-08-11 PROCEDURE — 99999 PR PBB SHADOW E&M-EST. PATIENT-LVL IV: CPT | Mod: PBBFAC,,, | Performed by: PEDIATRICS

## 2025-08-11 PROCEDURE — 99214 OFFICE O/P EST MOD 30 MIN: CPT | Mod: PBBFAC,25,27 | Performed by: NURSE PRACTITIONER

## 2025-08-11 PROCEDURE — 99999 PR PBB SHADOW E&M-EST. PATIENT-LVL III: CPT | Mod: PBBFAC,,,

## 2025-08-11 PROCEDURE — 99496 TRANSJ CARE MGMT HIGH F2F 7D: CPT | Mod: S$PBB,,, | Performed by: PEDIATRICS

## 2025-08-11 PROCEDURE — 99999 PR PBB SHADOW E&M-EST. PATIENT-LVL IV: CPT | Mod: PBBFAC,,, | Performed by: NURSE PRACTITIONER

## 2025-08-11 PROCEDURE — 99213 OFFICE O/P EST LOW 20 MIN: CPT | Mod: PBBFAC,25,27

## 2025-08-12 ENCOUNTER — OUTPATIENT CASE MANAGEMENT (OUTPATIENT)
Dept: ADMINISTRATIVE | Facility: OTHER | Age: 75
End: 2025-08-12
Payer: MEDICARE

## 2025-08-12 ENCOUNTER — OFFICE VISIT (OUTPATIENT)
Dept: INFECTIOUS DISEASES | Facility: CLINIC | Age: 75
End: 2025-08-12
Payer: MEDICARE

## 2025-08-12 VITALS
SYSTOLIC BLOOD PRESSURE: 98 MMHG | HEART RATE: 50 BPM | WEIGHT: 164 LBS | DIASTOLIC BLOOD PRESSURE: 68 MMHG | BODY MASS INDEX: 24.86 KG/M2 | HEIGHT: 68 IN

## 2025-08-12 DIAGNOSIS — I48.91 ATRIAL FIBRILLATION WITH RVR: ICD-10-CM

## 2025-08-12 DIAGNOSIS — I25.118 CORONARY ARTERY DISEASE OF NATIVE ARTERY OF NATIVE HEART WITH STABLE ANGINA PECTORIS: Chronic | ICD-10-CM

## 2025-08-12 DIAGNOSIS — R39.12 BENIGN PROSTATIC HYPERPLASIA WITH WEAK URINARY STREAM: Primary | Chronic | ICD-10-CM

## 2025-08-12 DIAGNOSIS — E78.00 HYPERCHOLESTEROLEMIA: Chronic | ICD-10-CM

## 2025-08-12 DIAGNOSIS — I50.42 CHRONIC COMBINED SYSTOLIC AND DIASTOLIC CONGESTIVE HEART FAILURE: Chronic | ICD-10-CM

## 2025-08-12 DIAGNOSIS — N40.1 BENIGN PROSTATIC HYPERPLASIA WITH WEAK URINARY STREAM: Primary | Chronic | ICD-10-CM

## 2025-08-12 DIAGNOSIS — Z90.79 S/P TURP: ICD-10-CM

## 2025-08-12 PROCEDURE — 99999 PR PBB SHADOW E&M-EST. PATIENT-LVL IV: CPT | Mod: PBBFAC,,, | Performed by: STUDENT IN AN ORGANIZED HEALTH CARE EDUCATION/TRAINING PROGRAM

## 2025-08-12 PROCEDURE — 99214 OFFICE O/P EST MOD 30 MIN: CPT | Mod: S$PBB,,, | Performed by: STUDENT IN AN ORGANIZED HEALTH CARE EDUCATION/TRAINING PROGRAM

## 2025-08-12 PROCEDURE — 99214 OFFICE O/P EST MOD 30 MIN: CPT | Mod: PBBFAC | Performed by: STUDENT IN AN ORGANIZED HEALTH CARE EDUCATION/TRAINING PROGRAM

## 2025-08-13 ENCOUNTER — OUTPATIENT CASE MANAGEMENT (OUTPATIENT)
Dept: ADMINISTRATIVE | Facility: OTHER | Age: 75
End: 2025-08-13
Payer: MEDICARE

## 2025-08-15 ENCOUNTER — TELEPHONE (OUTPATIENT)
Dept: ELECTROPHYSIOLOGY | Facility: CLINIC | Age: 75
End: 2025-08-15
Payer: MEDICARE

## 2025-08-18 ENCOUNTER — PATIENT MESSAGE (OUTPATIENT)
Dept: INTERNAL MEDICINE | Facility: CLINIC | Age: 75
End: 2025-08-18
Payer: MEDICARE

## 2025-08-18 DIAGNOSIS — I48.0 PAF (PAROXYSMAL ATRIAL FIBRILLATION): Primary | ICD-10-CM

## 2025-08-18 DIAGNOSIS — G47.00 INSOMNIA, UNSPECIFIED TYPE: Primary | ICD-10-CM

## 2025-08-21 RX ORDER — CLONAZEPAM 0.5 MG/1
0.5 TABLET ORAL NIGHTLY PRN
Qty: 10 TABLET | Refills: 0 | Status: SHIPPED | OUTPATIENT
Start: 2025-08-21 | End: 2026-08-21

## 2025-08-25 ENCOUNTER — PATIENT MESSAGE (OUTPATIENT)
Dept: UROLOGY | Facility: CLINIC | Age: 75
End: 2025-08-25
Payer: MEDICARE

## 2025-08-25 ENCOUNTER — TELEPHONE (OUTPATIENT)
Dept: UROLOGY | Facility: CLINIC | Age: 75
End: 2025-08-25
Payer: MEDICARE

## 2025-08-25 ENCOUNTER — LAB VISIT (OUTPATIENT)
Dept: LAB | Facility: HOSPITAL | Age: 75
End: 2025-08-25
Attending: UROLOGY
Payer: MEDICARE

## 2025-08-25 DIAGNOSIS — N13.8 BPH WITH OBSTRUCTION/LOWER URINARY TRACT SYMPTOMS: Primary | ICD-10-CM

## 2025-08-25 DIAGNOSIS — N40.1 BPH WITH OBSTRUCTION/LOWER URINARY TRACT SYMPTOMS: Primary | ICD-10-CM

## 2025-08-25 DIAGNOSIS — N40.1 BPH WITH OBSTRUCTION/LOWER URINARY TRACT SYMPTOMS: ICD-10-CM

## 2025-08-25 DIAGNOSIS — N13.8 BPH WITH OBSTRUCTION/LOWER URINARY TRACT SYMPTOMS: ICD-10-CM

## 2025-08-25 PROCEDURE — 87088 URINE BACTERIA CULTURE: CPT

## 2025-08-28 LAB — BACTERIA UR CULT: ABNORMAL

## (undated) DEVICE — SUT VICRYL 0 SH

## (undated) DEVICE — EVACUATOR BLADDER UROVAC ADPT

## (undated) DEVICE — SPONGE COTTON TRAY 4X4IN

## (undated) DEVICE — DRAPE T CYSTOSCOPY STERILE

## (undated) DEVICE — SYR LUER LOCK STERILE 10ML

## (undated) DEVICE — DRAIN PENROSE STD 18X1/2IN

## (undated) DEVICE — WIRE BMW 014X190

## (undated) DEVICE — Device

## (undated) DEVICE — KIT WATCHDOG HEMSTAS VALVE 8FR

## (undated) DEVICE — CUTTER PROXIMATE BLUE 75MM

## (undated) DEVICE — IRRIGATION SET Y-TYPE TUR/BLAD

## (undated) DEVICE — SOL NACL IRR 1000ML BTL

## (undated) DEVICE — CONTAINER SPECIMEN OR STER 4OZ

## (undated) DEVICE — TOWEL OR DISP STRL BLUE 4/PK

## (undated) DEVICE — ELECTRODE REM PLYHSV RETURN 9

## (undated) DEVICE — DRAPE CORETEMP FLD WRM 56X62IN

## (undated) DEVICE — OMNIPAQUE 350MG 150ML VIAL

## (undated) DEVICE — APPLICATOR CHLORAPREP ORN 26ML

## (undated) DEVICE — SEE MEDLINE ITEM 157148

## (undated) DEVICE — WIRE CHOICE PT X SUPP 014X300

## (undated) DEVICE — POSITIONER HEAD DONUT 9IN FOAM

## (undated) DEVICE — CONTRAST OMNIPAQUE 240 150ML

## (undated) DEVICE — WIRE X-SUP CHOICE PT .014X182

## (undated) DEVICE — SOL 9P NACL IRR PIC IL

## (undated) DEVICE — KIT GLIDESHEATH SLEND 6FR 10CM

## (undated) DEVICE — CATH JL4 5FR

## (undated) DEVICE — UROVIEW 2600/2800

## (undated) DEVICE — GUIDE LAUNCHER 6FR EBU 3.5

## (undated) DEVICE — INFLATOR ENCORE 26 BLLN INFL

## (undated) DEVICE — BAG DRAIN ANTI REFLUX 4000ML

## (undated) DEVICE — PACK CATH LAB CUSTOM BR

## (undated) DEVICE — ADHESIVE MASTISOL VIAL 48/BX

## (undated) DEVICE — GUIDEWIRE WHOLEY HI TORQ 175CM

## (undated) DEVICE — GUIDE WIRE MOTION .035 X 150CM

## (undated) DEVICE — GLOVE SURGICAL LATEX SZ 7

## (undated) DEVICE — PAD ABD 8X10 STERILE

## (undated) DEVICE — CANISTER SUCTION JUMBO 12L

## (undated) DEVICE — EVACUATOR PENCIL SMOKE NEPTUNE

## (undated) DEVICE — PAD RADIOLUCENT STAT ADULT

## (undated) DEVICE — SUT 1 48IN PDS II VIO MONO

## (undated) DEVICE — CATH INFINITI MULTIPAK JR4 5FR

## (undated) DEVICE — KIT SYR REUSABLE

## (undated) DEVICE — SUPPORT ULNA NERVE PROTECTOR

## (undated) DEVICE — PACK ANGIOPLASTY ACCESS PLUS

## (undated) DEVICE — GOWN POLY REINF BRTH SLV XL

## (undated) DEVICE — MANIFOLD 4 PORT

## (undated) DEVICE — GUIDEWIRE EMERALD .035IN 260CM

## (undated) DEVICE — PACK BASIC SETUP SC BR

## (undated) DEVICE — DEVICE ENSEAL X1 LARGE JAW

## (undated) DEVICE — BAND TR COMP DEVICE REG 24CM

## (undated) DEVICE — SUT CTD VICRYL 0 UND BR SUT

## (undated) DEVICE — DRESSING TRANS 4X4 TEGADERM

## (undated) DEVICE — WIRE GUIDE TEFLON 3CM .035 145

## (undated) DEVICE — SUT MONOCYRL 4-0 PS2 UND

## (undated) DEVICE — CATH JR4 5FR

## (undated) DEVICE — SYR 10CC LUER LOCK

## (undated) DEVICE — SUT SILK 0 SUTUPAK SA86H

## (undated) DEVICE — CATH NC QUANTUM APEX MR 2.5X8

## (undated) DEVICE — CATH PIGTAIL 5FX110CM

## (undated) DEVICE — TRAY CATH FOL SIL URIMTR 16FR

## (undated) DEVICE — SUT MONOCRYL 4.0 PS2 CP496G

## (undated) DEVICE — RELOAD PROXIMATE CUT BLUE 75MM

## (undated) DEVICE — CATH PIG145 INFINITI 5X110CM

## (undated) DEVICE — BOWL STERILE LARGE 32OZ

## (undated) DEVICE — CATH BLLN FG APEX MR 2.00X20MM

## (undated) DEVICE — TAPE SURG MEDIPORE 6X72IN

## (undated) DEVICE — COVER LIGHT HANDLE 80/CA

## (undated) DEVICE — GAUZE SPONGE 4X4 12PLY

## (undated) DEVICE — SOL IRRI STRL WATER 1000ML

## (undated) DEVICE — ELECTRODE LOOP CUTTING BIPOLAR

## (undated) DEVICE — SEE MEDLINE ITEM 157027

## (undated) DEVICE — GOWN POLY REINF X-LONG XL

## (undated) DEVICE — ANGIOTOUCH KIT

## (undated) DEVICE — OMNIPAQUE 300MG 150ML VIAL

## (undated) DEVICE — SUT MONOCRYL 4-0 PS-1 UND

## (undated) DEVICE — NDL ECLIPSE SAF REG 25GX1.5IN

## (undated) DEVICE — KIT SITE-RITE NDL GUIDE 21G

## (undated) DEVICE — ELECTRODE BLD EXT 6.50 ST DISP

## (undated) DEVICE — CATH TURNPIKE LP 135CM

## (undated) DEVICE — UNDERGLOVES BIOGEL PI SZ 7 LF

## (undated) DEVICE — SET IRRIGATION WARMING NORMAL

## (undated) DEVICE — CATH EXPORT ASPIRATION 6FR

## (undated) DEVICE — KIT INTRODUCER STIFFEN MICRO

## (undated) DEVICE — TRAY SKIN SCRUB WET 4 COMPART

## (undated) DEVICE — SPONGE LAP 18X18 PREWASHED

## (undated) DEVICE — SOL NACL IRR 3000ML

## (undated) DEVICE — DRESSING GAUZE XEROFORM 5X9

## (undated) DEVICE — CATH NC QUANTUM APEX MR 3.5X12

## (undated) DEVICE — SUT VICRYL 3-0 27 SH

## (undated) DEVICE — GUIDEWIRE VERRATA + JTIP 185CM

## (undated) DEVICE — SYR 50ML CATH TIP

## (undated) DEVICE — SYR ONLY LUER LOCK 20CC

## (undated) DEVICE — DEVICE SNAPSECURE CATH 3WAY

## (undated) DEVICE — CATH BLLN FG APEX MR 3.00X20MM

## (undated) DEVICE — GLOVE SIGNATURE ESSNTL LTX 7.5

## (undated) DEVICE — SUT SILK 3-0 SH 18IN BLACK

## (undated) DEVICE — SUT PDS II 1 TP-1 VIL

## (undated) DEVICE — CATH NC QUANTUM APEX MR 4X20

## (undated) DEVICE — ADHESIVE DERMABOND ADVANCED

## (undated) DEVICE — KIT MANIFOLD LOW PRESS TUBING

## (undated) DEVICE — SUT VICRYL PLUS 3-0 SH 18IN

## (undated) DEVICE — DRAPE T TRNSVRS LAP 102X78X121

## (undated) DEVICE — SEE MEDLINE ITEM 157117